# Patient Record
Sex: FEMALE | Race: WHITE | Employment: UNEMPLOYED | URBAN - NONMETROPOLITAN AREA
[De-identification: names, ages, dates, MRNs, and addresses within clinical notes are randomized per-mention and may not be internally consistent; named-entity substitution may affect disease eponyms.]

---

## 2017-02-06 ENCOUNTER — OFFICE VISIT (OUTPATIENT)
Dept: PRIMARY CARE CLINIC | Age: 52
End: 2017-02-06

## 2017-02-06 VITALS
DIASTOLIC BLOOD PRESSURE: 74 MMHG | SYSTOLIC BLOOD PRESSURE: 138 MMHG | HEART RATE: 74 BPM | WEIGHT: 176.6 LBS | BODY MASS INDEX: 27.72 KG/M2 | TEMPERATURE: 98.3 F | HEIGHT: 67 IN | OXYGEN SATURATION: 98 %

## 2017-02-06 DIAGNOSIS — M25.572 PAIN IN JOINT, FOOT, LEFT: ICD-10-CM

## 2017-02-06 DIAGNOSIS — M25.572 ACUTE LEFT ANKLE PAIN: ICD-10-CM

## 2017-02-06 DIAGNOSIS — S93.492A HIGH ANKLE SPRAIN, LEFT, INITIAL ENCOUNTER: Primary | ICD-10-CM

## 2017-02-06 PROCEDURE — L4350 ANKLE CONTROL ORTHO PRE OTS: HCPCS | Performed by: FAMILY MEDICINE

## 2017-02-06 PROCEDURE — 99213 OFFICE O/P EST LOW 20 MIN: CPT | Performed by: FAMILY MEDICINE

## 2017-02-06 ASSESSMENT — ENCOUNTER SYMPTOMS: COLOR CHANGE: 0

## 2017-07-14 ENCOUNTER — OFFICE VISIT (OUTPATIENT)
Dept: PRIMARY CARE CLINIC | Age: 52
End: 2017-07-14
Payer: COMMERCIAL

## 2017-07-14 VITALS
BODY MASS INDEX: 44.2 KG/M2 | WEIGHT: 275 LBS | HEIGHT: 66 IN | DIASTOLIC BLOOD PRESSURE: 102 MMHG | HEART RATE: 63 BPM | OXYGEN SATURATION: 98 % | SYSTOLIC BLOOD PRESSURE: 156 MMHG

## 2017-07-14 DIAGNOSIS — M77.8 TENDONITIS OF SHOULDER, LEFT: Primary | ICD-10-CM

## 2017-07-14 PROCEDURE — 99213 OFFICE O/P EST LOW 20 MIN: CPT | Performed by: FAMILY MEDICINE

## 2017-07-14 RX ORDER — PREDNISONE 20 MG/1
TABLET ORAL
Qty: 12 TABLET | Refills: 0 | Status: SHIPPED | OUTPATIENT
Start: 2017-07-14 | End: 2017-09-05 | Stop reason: ALTCHOICE

## 2017-07-16 ASSESSMENT — ENCOUNTER SYMPTOMS: COLOR CHANGE: 0

## 2017-08-14 ENCOUNTER — HOSPITAL ENCOUNTER (OUTPATIENT)
Dept: ULTRASOUND IMAGING | Age: 52
Discharge: HOME | End: 2017-08-14
Payer: MEDICARE

## 2017-08-14 DIAGNOSIS — N94.9 UNSPECIFIED SYMPTOM ASSOCIATED WITH FEMALE GENITAL ORGANS: ICD-10-CM

## 2017-08-14 DIAGNOSIS — R10.10 PAIN OF UPPER ABDOMEN: ICD-10-CM

## 2017-08-14 PROCEDURE — 76700 US EXAM ABDOM COMPLETE: CPT

## 2017-08-14 PROCEDURE — 76830 TRANSVAGINAL US NON-OB: CPT

## 2017-08-14 PROCEDURE — 76856 US EXAM PELVIC COMPLETE: CPT

## 2017-09-01 ENCOUNTER — HOSPITAL ENCOUNTER (OUTPATIENT)
Dept: MAMMOGRAPHY | Age: 52
Discharge: HOME | End: 2017-09-01
Payer: MEDICARE

## 2017-09-01 ENCOUNTER — HOSPITAL ENCOUNTER (OUTPATIENT)
Dept: ULTRASOUND IMAGING | Age: 52
Discharge: HOME | End: 2017-09-01
Payer: MEDICARE

## 2017-09-01 DIAGNOSIS — R92.8 ABNORMAL MAMMOGRAM: ICD-10-CM

## 2017-09-01 PROCEDURE — G0206 DX MAMMO INCL CAD UNI: HCPCS

## 2017-09-01 PROCEDURE — 76642 ULTRASOUND BREAST LIMITED: CPT

## 2017-09-05 ENCOUNTER — INITIAL CONSULT (OUTPATIENT)
Dept: SURGERY | Age: 52
End: 2017-09-05
Payer: MEDICARE

## 2017-09-05 VITALS
SYSTOLIC BLOOD PRESSURE: 128 MMHG | RESPIRATION RATE: 16 BRPM | BODY MASS INDEX: 45.71 KG/M2 | DIASTOLIC BLOOD PRESSURE: 86 MMHG | HEIGHT: 66 IN | TEMPERATURE: 98.3 F | WEIGHT: 284.4 LBS

## 2017-09-05 DIAGNOSIS — N63.10 MASS OF RIGHT BREAST: Primary | ICD-10-CM

## 2017-09-05 PROCEDURE — 99204 OFFICE O/P NEW MOD 45 MIN: CPT | Performed by: SURGERY

## 2017-09-05 RX ORDER — FLUOXETINE HYDROCHLORIDE 20 MG/1
CAPSULE ORAL
COMMUNITY
Start: 2017-07-21 | End: 2017-09-05 | Stop reason: ALTCHOICE

## 2017-09-05 RX ORDER — PRAVASTATIN SODIUM 40 MG
40 TABLET ORAL DAILY
COMMUNITY
Start: 2017-08-31 | End: 2019-02-12 | Stop reason: DRUGHIGH

## 2017-09-05 RX ORDER — LORATADINE 10 MG/1
10 TABLET ORAL DAILY
COMMUNITY
Start: 2017-08-31 | End: 2019-01-02

## 2017-09-05 RX ORDER — LAMOTRIGINE 100 MG/1
150 TABLET ORAL NIGHTLY
COMMUNITY
Start: 2017-08-30

## 2017-09-05 RX ORDER — QUETIAPINE FUMARATE 300 MG/1
150 TABLET, FILM COATED ORAL DAILY
COMMUNITY
Start: 2017-08-09 | End: 2019-02-22

## 2017-09-05 RX ORDER — LISINOPRIL 20 MG/1
5 TABLET ORAL DAILY
COMMUNITY
Start: 2017-08-31 | End: 2019-02-22 | Stop reason: DRUGHIGH

## 2017-09-05 RX ORDER — HYDROXYZINE HYDROCHLORIDE 10 MG/1
10 TABLET, FILM COATED ORAL DAILY
COMMUNITY
Start: 2017-07-21 | End: 2021-05-10

## 2017-09-05 RX ORDER — TOPIRAMATE 25 MG/1
25 TABLET ORAL DAILY
Status: ON HOLD | COMMUNITY
Start: 2017-07-21 | End: 2017-12-20 | Stop reason: HOSPADM

## 2017-09-05 RX ORDER — TRAZODONE HYDROCHLORIDE 100 MG/1
50 TABLET ORAL NIGHTLY
COMMUNITY
Start: 2017-08-30 | End: 2019-04-04 | Stop reason: ALTCHOICE

## 2017-09-05 RX ORDER — IBUPROFEN 800 MG/1
800 TABLET ORAL EVERY 6 HOURS PRN
COMMUNITY
Start: 2017-08-31 | End: 2017-11-17

## 2017-09-05 RX ORDER — CHOLECALCIFEROL (VITAMIN D3) 125 MCG
5000 CAPSULE ORAL DAILY
Status: ON HOLD | COMMUNITY
Start: 2017-08-31 | End: 2017-12-20 | Stop reason: HOSPADM

## 2017-09-05 RX ORDER — FLUTICASONE PROPIONATE 50 MCG
1 SPRAY, SUSPENSION (ML) NASAL DAILY
COMMUNITY
Start: 2017-08-31 | End: 2020-04-24 | Stop reason: SDUPTHER

## 2017-09-05 RX ORDER — SUMATRIPTAN 25 MG/1
25 TABLET, FILM COATED ORAL
Status: ON HOLD | COMMUNITY
Start: 2017-07-21 | End: 2017-12-20 | Stop reason: HOSPADM

## 2017-09-05 RX ORDER — BUTALBITAL, ACETAMINOPHEN AND CAFFEINE 50; 325; 40 MG/1; MG/1; MG/1
1-2 TABLET ORAL EVERY 4 HOURS PRN
COMMUNITY
Start: 2017-07-21 | End: 2019-07-09 | Stop reason: SDUPTHER

## 2017-09-08 ENCOUNTER — HOSPITAL ENCOUNTER (OUTPATIENT)
Age: 52
Setting detail: SPECIMEN
Discharge: HOME | End: 2017-09-08
Payer: MEDICARE

## 2017-09-08 ENCOUNTER — HOSPITAL ENCOUNTER (OUTPATIENT)
Dept: ULTRASOUND IMAGING | Age: 52
Discharge: HOME | End: 2017-09-08
Payer: MEDICARE

## 2017-09-08 ENCOUNTER — HOSPITAL ENCOUNTER (OUTPATIENT)
Dept: MAMMOGRAPHY | Age: 52
Discharge: HOME | End: 2017-09-08
Payer: MEDICARE

## 2017-09-08 DIAGNOSIS — N63.10 MASS OF RIGHT BREAST: ICD-10-CM

## 2017-09-08 DIAGNOSIS — R92.8 ABNORMAL MAMMOGRAM: ICD-10-CM

## 2017-09-08 DIAGNOSIS — R92.8 ABNORMAL MAMMOGRAM, UNSPECIFIED: ICD-10-CM

## 2017-09-08 PROCEDURE — 19084 BX BREAST ADD LESION US IMAG: CPT

## 2017-09-08 PROCEDURE — G0206 DX MAMMO INCL CAD UNI: HCPCS

## 2017-09-08 PROCEDURE — 88377 M/PHMTRC ALYS ISHQUANT/SEMIQ: CPT

## 2017-09-08 PROCEDURE — A4648 IMPLANTABLE TISSUE MARKER: HCPCS

## 2017-09-08 PROCEDURE — 88342 IMHCHEM/IMCYTCHM 1ST ANTB: CPT

## 2017-09-08 PROCEDURE — 88360 TUMOR IMMUNOHISTOCHEM/MANUAL: CPT

## 2017-09-08 PROCEDURE — 88305 TISSUE EXAM BY PATHOLOGIST: CPT

## 2017-09-11 ENCOUNTER — OFFICE VISIT (OUTPATIENT)
Dept: OBGYN | Age: 52
End: 2017-09-11
Payer: MEDICARE

## 2017-09-11 VITALS
HEART RATE: 80 BPM | BODY MASS INDEX: 45.32 KG/M2 | SYSTOLIC BLOOD PRESSURE: 128 MMHG | RESPIRATION RATE: 16 BRPM | HEIGHT: 66 IN | WEIGHT: 282 LBS | DIASTOLIC BLOOD PRESSURE: 80 MMHG

## 2017-09-11 DIAGNOSIS — N85.9 ENDOMETRIAL DISORDER: Primary | ICD-10-CM

## 2017-09-11 PROCEDURE — 99203 OFFICE O/P NEW LOW 30 MIN: CPT | Performed by: OBSTETRICS & GYNECOLOGY

## 2017-09-13 ENCOUNTER — OFFICE VISIT (OUTPATIENT)
Dept: SURGERY | Age: 52
End: 2017-09-13
Payer: MEDICARE

## 2017-09-13 VITALS
HEART RATE: 78 BPM | WEIGHT: 283 LBS | HEIGHT: 66 IN | TEMPERATURE: 98.1 F | SYSTOLIC BLOOD PRESSURE: 110 MMHG | RESPIRATION RATE: 16 BRPM | BODY MASS INDEX: 45.48 KG/M2 | DIASTOLIC BLOOD PRESSURE: 84 MMHG

## 2017-09-13 DIAGNOSIS — C50.911 INVASIVE DUCTAL CARCINOMA OF BREAST, RIGHT (HCC): Primary | ICD-10-CM

## 2017-09-13 DIAGNOSIS — N60.11 FIBROCYSTIC DISEASE OF BREAST, RIGHT: ICD-10-CM

## 2017-09-13 DIAGNOSIS — D05.11 DUCTAL CARCINOMA IN SITU (DCIS) OF RIGHT BREAST: ICD-10-CM

## 2017-09-13 PROCEDURE — 99214 OFFICE O/P EST MOD 30 MIN: CPT | Performed by: SURGERY

## 2017-09-18 ENCOUNTER — HOSPITAL ENCOUNTER (OUTPATIENT)
Dept: LAB | Age: 52
Setting detail: SPECIMEN
Discharge: HOME | End: 2017-09-18
Payer: MEDICARE

## 2017-09-18 ENCOUNTER — OFFICE VISIT (OUTPATIENT)
Dept: ONCOLOGY | Age: 52
End: 2017-09-18
Payer: MEDICAID

## 2017-09-18 VITALS
WEIGHT: 285.6 LBS | DIASTOLIC BLOOD PRESSURE: 90 MMHG | RESPIRATION RATE: 16 BRPM | SYSTOLIC BLOOD PRESSURE: 128 MMHG | HEIGHT: 66 IN | BODY MASS INDEX: 45.9 KG/M2 | TEMPERATURE: 99.8 F

## 2017-09-18 DIAGNOSIS — C50.211 MALIGNANT NEOPLASM OF UPPER-INNER QUADRANT OF RIGHT FEMALE BREAST (HCC): Primary | ICD-10-CM

## 2017-09-18 DIAGNOSIS — C50.211 MALIGNANT NEOPLASM OF UPPER-INNER QUADRANT OF RIGHT FEMALE BREAST (HCC): ICD-10-CM

## 2017-09-18 LAB
ABSOLUTE EOS #: 0.2 K/UL (ref 0–0.4)
ABSOLUTE LYMPH #: 3 K/UL (ref 1–4.8)
ABSOLUTE MONO #: 0.7 K/UL (ref 0.1–1.2)
ALBUMIN SERPL-MCNC: 4.1 G/DL (ref 3.5–5.2)
ALBUMIN/GLOBULIN RATIO: 1.2 (ref 1–2.5)
ALP BLD-CCNC: 78 U/L (ref 35–104)
ALT SERPL-CCNC: 10 U/L (ref 5–33)
ANION GAP SERPL CALCULATED.3IONS-SCNC: 12 MMOL/L (ref 9–17)
AST SERPL-CCNC: 12 U/L
BASOPHILS # BLD: 1 % (ref 0–1)
BASOPHILS ABSOLUTE: 0.1 K/UL (ref 0–0.2)
BILIRUB SERPL-MCNC: 0.16 MG/DL (ref 0.3–1.2)
BUN BLDV-MCNC: 20 MG/DL (ref 6–20)
BUN/CREAT BLD: 25 (ref 9–20)
CALCIUM SERPL-MCNC: 9.5 MG/DL (ref 8.6–10.4)
CHLORIDE BLD-SCNC: 106 MMOL/L (ref 98–107)
CO2: 23 MMOL/L (ref 20–31)
CREAT SERPL-MCNC: 0.79 MG/DL (ref 0.5–0.9)
DIFFERENTIAL TYPE: NORMAL
EOSINOPHILS RELATIVE PERCENT: 2 % (ref 1–7)
GFR AFRICAN AMERICAN: >60 ML/MIN
GFR NON-AFRICAN AMERICAN: >60 ML/MIN
GFR SERPL CREATININE-BSD FRML MDRD: ABNORMAL ML/MIN/{1.73_M2}
GFR SERPL CREATININE-BSD FRML MDRD: ABNORMAL ML/MIN/{1.73_M2}
GLUCOSE BLD-MCNC: 100 MG/DL (ref 70–99)
HCT VFR BLD CALC: 39.6 % (ref 36–46)
HEMOGLOBIN: 13 G/DL (ref 12–16)
LYMPHOCYTES # BLD: 33 % (ref 16–46)
MCH RBC QN AUTO: 30.1 PG (ref 26–34)
MCHC RBC AUTO-ENTMCNC: 32.7 G/DL (ref 31–37)
MCV RBC AUTO: 92 FL (ref 80–100)
MONOCYTES # BLD: 8 % (ref 4–11)
PDW BLD-RTO: 13.3 % (ref 11–14.5)
PLATELET # BLD: 228 K/UL (ref 140–450)
PLATELET ESTIMATE: NORMAL
PMV BLD AUTO: 7.9 FL (ref 6–12)
POTASSIUM SERPL-SCNC: 3.9 MMOL/L (ref 3.7–5.3)
RBC # BLD: 4.31 M/UL (ref 4–5.2)
RBC # BLD: NORMAL 10*6/UL
SEG NEUTROPHILS: 56 % (ref 43–77)
SEGMENTED NEUTROPHILS ABSOLUTE COUNT: 5.1 K/UL (ref 1.8–7.7)
SODIUM BLD-SCNC: 141 MMOL/L (ref 135–144)
SURGICAL PATHOLOGY REPORT: NORMAL
TOTAL PROTEIN: 7.6 G/DL (ref 6.4–8.3)
WBC # BLD: 9.1 K/UL (ref 3.5–11)
WBC # BLD: NORMAL 10*3/UL

## 2017-09-18 PROCEDURE — 85025 COMPLETE CBC W/AUTO DIFF WBC: CPT

## 2017-09-18 PROCEDURE — 99204 OFFICE O/P NEW MOD 45 MIN: CPT | Performed by: INTERNAL MEDICINE

## 2017-09-18 PROCEDURE — 36415 COLL VENOUS BLD VENIPUNCTURE: CPT

## 2017-09-18 PROCEDURE — 80053 COMPREHEN METABOLIC PANEL: CPT

## 2017-09-19 ENCOUNTER — TELEPHONE (OUTPATIENT)
Dept: INFUSION THERAPY | Facility: MEDICAL CENTER | Age: 52
End: 2017-09-19

## 2017-09-22 ENCOUNTER — INITIAL CONSULT (OUTPATIENT)
Dept: ONCOLOGY | Age: 52
End: 2017-09-22
Payer: MEDICARE

## 2017-09-22 DIAGNOSIS — Z80.3 FAMILY HISTORY OF BREAST CANCER: ICD-10-CM

## 2017-09-22 DIAGNOSIS — C50.211 MALIGNANT NEOPLASM OF UPPER-INNER QUADRANT OF RIGHT FEMALE BREAST (HCC): Primary | ICD-10-CM

## 2017-09-22 PROCEDURE — 99215 OFFICE O/P EST HI 40 MIN: CPT | Performed by: GENETIC COUNSELOR, MS

## 2017-09-29 ENCOUNTER — TELEPHONE (OUTPATIENT)
Dept: SURGERY | Age: 52
End: 2017-09-29

## 2017-10-04 ENCOUNTER — TELEPHONE (OUTPATIENT)
Dept: ONCOLOGY | Age: 52
End: 2017-10-04

## 2017-10-04 NOTE — TELEPHONE ENCOUNTER
3 [facility] Program   Hereditary Cancer Risk Assessment     Name: Jihan Myers  YOB: 1965  Date of Results Disclosure: 10/04/17     HISTORY   Ms. Francetta Scheuermann was seen for genetic counseling on 9/22/2017 at the request of Dr. Lilly Burnham due to her personal and family history of breast cancer. At that time, Ms. Francetta Scheuermann chose to pursue genetic testing via the [facility] gene panel. These results were discussed with Ms. Francetta Scheuermann on 10/04/17 via telephone. A summary of Ms. Francetta Scheuermann' results and recommendations are below. RESULTS  [facility]: NEGATIVE - NO CLINICALLY SIGNIFICANT MUTATION DETECTED   This panel included the analysis of 28 genes associated with hereditary cancer including: APC, BEATRIZ, BARD1, BMPR1A, BRCA1, BRCA2, BRIP1, CDH1, CDK4, CDKN2A, CHEK2, EPCAM, GREM1, MLH1, MSH2, MSH6, MUTYH, NBN, PALB2, POLD1, POLE, PMS2, PTEN, RAD51C, RAD51D, SMAD4, STK11, and TP53. Please refer to genetic test report for technical details. We discussed that Ms. Francetta Scheuermann' negative test result greatly reduces the likelihood that her personal history of cancer is due to a hereditary mutation. It is possible that her personal history of cancer may be due to a gene for which testing was not performed or which has yet to be discovered. Additional Findings:   Variant of Uncertain Significance (VUS): BEATRIZ c.38G>A ([id number])     A variant of uncertain significance (VUS) occurs when the laboratory does not have enough data to determine whether the genetic variant is benign (not associated with cancer) or pathogenic (associated with cancer). Because the significance of the BEATRIZ VUS is unknown, medical management must be based on Ms. Francetta Scheuermann' personal and family history of cancer. RECOMMENDATIONS  BREAST CANCER   The outcome of Ms. Francetta Scheuermann' genetic test results do not affect her current cancer treatment.  Ms. Francetta Scheuermann should continue cancer treatment and surveillance as directed by her

## 2017-10-09 ENCOUNTER — HOSPITAL ENCOUNTER (OUTPATIENT)
Dept: LAB | Age: 52
Setting detail: SPECIMEN
Discharge: HOME | End: 2017-10-09
Payer: MEDICARE

## 2017-10-09 ENCOUNTER — OFFICE VISIT (OUTPATIENT)
Dept: OBGYN | Age: 52
End: 2017-10-09
Payer: MEDICARE

## 2017-10-09 VITALS
HEART RATE: 84 BPM | HEIGHT: 66 IN | DIASTOLIC BLOOD PRESSURE: 82 MMHG | RESPIRATION RATE: 16 BRPM | BODY MASS INDEX: 46.12 KG/M2 | SYSTOLIC BLOOD PRESSURE: 130 MMHG | WEIGHT: 287 LBS

## 2017-10-09 DIAGNOSIS — R93.89 ENDOMETRIAL THICKENING ON ULTRA SOUND: ICD-10-CM

## 2017-10-09 DIAGNOSIS — Z01.818 PRE-OP TESTING: ICD-10-CM

## 2017-10-09 DIAGNOSIS — Z01.818 PRE-OP TESTING: Primary | ICD-10-CM

## 2017-10-09 LAB
ABSOLUTE EOS #: 0.2 K/UL (ref 0–0.4)
ABSOLUTE LYMPH #: 2.7 K/UL (ref 1–4.8)
ABSOLUTE MONO #: 0.8 K/UL (ref 0.1–1.2)
ALBUMIN SERPL-MCNC: 4.3 G/DL (ref 3.5–5.2)
ALBUMIN/GLOBULIN RATIO: 1.3 (ref 1–2.5)
ALP BLD-CCNC: 74 U/L (ref 35–104)
ALT SERPL-CCNC: 16 U/L (ref 5–33)
ANION GAP SERPL CALCULATED.3IONS-SCNC: 15 MMOL/L (ref 9–17)
AST SERPL-CCNC: 17 U/L
BASOPHILS # BLD: 1 % (ref 0–1)
BASOPHILS ABSOLUTE: 0 K/UL (ref 0–0.2)
BILIRUB SERPL-MCNC: 0.25 MG/DL (ref 0.3–1.2)
BUN BLDV-MCNC: 18 MG/DL (ref 6–20)
BUN/CREAT BLD: 20 (ref 9–20)
CALCIUM SERPL-MCNC: 10.2 MG/DL (ref 8.6–10.4)
CHLORIDE BLD-SCNC: 105 MMOL/L (ref 98–107)
CO2: 25 MMOL/L (ref 20–31)
CREAT SERPL-MCNC: 0.89 MG/DL (ref 0.5–0.9)
DIFFERENTIAL TYPE: NORMAL
EOSINOPHILS RELATIVE PERCENT: 2 % (ref 1–7)
GFR AFRICAN AMERICAN: >60 ML/MIN
GFR NON-AFRICAN AMERICAN: >60 ML/MIN
GFR SERPL CREATININE-BSD FRML MDRD: ABNORMAL ML/MIN/{1.73_M2}
GFR SERPL CREATININE-BSD FRML MDRD: ABNORMAL ML/MIN/{1.73_M2}
GLUCOSE BLD-MCNC: 141 MG/DL (ref 70–99)
HCT VFR BLD CALC: 41 % (ref 36–46)
HEMOGLOBIN: 13.4 G/DL (ref 12–16)
LYMPHOCYTES # BLD: 33 % (ref 16–46)
MCH RBC QN AUTO: 30.4 PG (ref 26–34)
MCHC RBC AUTO-ENTMCNC: 32.7 G/DL (ref 31–37)
MCV RBC AUTO: 93 FL (ref 80–100)
MONOCYTES # BLD: 9 % (ref 4–11)
PDW BLD-RTO: 13.7 % (ref 11–14.5)
PLATELET # BLD: 252 K/UL (ref 140–450)
PLATELET ESTIMATE: NORMAL
PMV BLD AUTO: 7.9 FL (ref 6–12)
POTASSIUM SERPL-SCNC: 3.9 MMOL/L (ref 3.7–5.3)
RBC # BLD: 4.41 M/UL (ref 4–5.2)
RBC # BLD: NORMAL 10*6/UL
SEG NEUTROPHILS: 55 % (ref 43–77)
SEGMENTED NEUTROPHILS ABSOLUTE COUNT: 4.4 K/UL (ref 1.8–7.7)
SODIUM BLD-SCNC: 145 MMOL/L (ref 135–144)
TOTAL PROTEIN: 7.7 G/DL (ref 6.4–8.3)
WBC # BLD: 8.1 K/UL (ref 3.5–11)
WBC # BLD: NORMAL 10*3/UL

## 2017-10-09 PROCEDURE — 85025 COMPLETE CBC W/AUTO DIFF WBC: CPT

## 2017-10-09 PROCEDURE — 80053 COMPREHEN METABOLIC PANEL: CPT

## 2017-10-09 PROCEDURE — 36415 COLL VENOUS BLD VENIPUNCTURE: CPT

## 2017-10-09 PROCEDURE — 99213 OFFICE O/P EST LOW 20 MIN: CPT | Performed by: OBSTETRICS & GYNECOLOGY

## 2017-10-09 ASSESSMENT — ENCOUNTER SYMPTOMS
RESPIRATORY NEGATIVE: 1
EYES NEGATIVE: 1
GASTROINTESTINAL NEGATIVE: 1

## 2017-10-09 NOTE — PROGRESS NOTES
Subjective:      Patient ID: Shiv Rangel is a 46 y.o. female. HPI here for pre-op H&P. This menopausal pt was found to have an abnormally thickened EMC at 11 mm on an US done in w/u of abdominal pain. Denies any postmenopausal bleeding. Never took HRT. Hx significant for HTN, Bipolar depression and recent diagnosis of right breast cancer. Prior . Review of Systems   Constitutional: Negative. HENT: Negative. Eyes: Negative. Respiratory: Negative. Cardiovascular: Negative. Gastrointestinal: Negative. Genitourinary: Negative. Musculoskeletal: Negative. Hematological: Negative. Psychiatric/Behavioral:        Depression       Objective:   Physical Exam   Constitutional: She is oriented to person, place, and time. She appears well-developed. OBESE     Eyes: Pupils are equal, round, and reactive to light. Neck: Neck supple. No thyromegaly present. Cardiovascular: Normal rate, regular rhythm and normal heart sounds. Pulmonary/Chest: Effort normal and breath sounds normal. She has no wheezes. She has no rales. Abdominal: Soft. She exhibits no distension and no mass. There is no tenderness. Low midline vertical scar   Genitourinary:   Genitourinary Comments: Normal perineum and vagina. Cervix small, without lesion and stenotic. Uterus nl size. No mass or tenderness. Musculoskeletal: Normal range of motion. Lymphadenopathy:     She has no cervical adenopathy. Neurological: She is alert and oriented to person, place, and time. Skin: Skin is warm and dry. Numerous insect bite sores   Psychiatric: She has a normal mood and affect.        Postmenopausal thickening of EMC      Plan:      Hysteroscopic D&C

## 2017-10-10 ENCOUNTER — OFFICE VISIT (OUTPATIENT)
Dept: SURGERY | Age: 52
End: 2017-10-10
Payer: MEDICARE

## 2017-10-10 ENCOUNTER — HOSPITAL ENCOUNTER (OUTPATIENT)
Dept: LAB | Age: 52
Setting detail: SPECIMEN
Discharge: HOME | End: 2017-10-10
Payer: MEDICARE

## 2017-10-10 ENCOUNTER — HOSPITAL ENCOUNTER (OUTPATIENT)
Dept: GENERAL RADIOLOGY | Age: 52
Discharge: HOME | End: 2017-10-10
Payer: MEDICARE

## 2017-10-10 ENCOUNTER — HOSPITAL ENCOUNTER (OUTPATIENT)
Dept: NON INVASIVE DIAGNOSTICS | Age: 52
Discharge: HOME | End: 2017-10-10
Payer: MEDICARE

## 2017-10-10 VITALS
WEIGHT: 288.6 LBS | HEIGHT: 66 IN | BODY MASS INDEX: 46.38 KG/M2 | SYSTOLIC BLOOD PRESSURE: 128 MMHG | TEMPERATURE: 99 F | HEART RATE: 78 BPM | RESPIRATION RATE: 16 BRPM | DIASTOLIC BLOOD PRESSURE: 80 MMHG

## 2017-10-10 DIAGNOSIS — C50.911 INVASIVE DUCTAL CARCINOMA OF BREAST, RIGHT (HCC): ICD-10-CM

## 2017-10-10 DIAGNOSIS — C50.911 INVASIVE DUCTAL CARCINOMA OF BREAST, RIGHT (HCC): Primary | ICD-10-CM

## 2017-10-10 LAB
EKG ATRIAL RATE: 77 BPM
EKG P AXIS: 47 DEGREES
EKG P-R INTERVAL: 148 MS
EKG Q-T INTERVAL: 396 MS
EKG QRS DURATION: 94 MS
EKG QTC CALCULATION (BAZETT): 448 MS
EKG R AXIS: 79 DEGREES
EKG T AXIS: 31 DEGREES
EKG VENTRICULAR RATE: 77 BPM
INR BLD: 1
PARTIAL THROMBOPLASTIN TIME: 26.7 SEC (ref 27–35)
PROTHROMBIN TIME: 10.7 SEC (ref 9.4–11.3)

## 2017-10-10 PROCEDURE — 99215 OFFICE O/P EST HI 40 MIN: CPT | Performed by: SURGERY

## 2017-10-10 PROCEDURE — 85730 THROMBOPLASTIN TIME PARTIAL: CPT

## 2017-10-10 PROCEDURE — 71020 XR CHEST STANDARD TWO VW: CPT

## 2017-10-10 PROCEDURE — 93005 ELECTROCARDIOGRAM TRACING: CPT

## 2017-10-10 PROCEDURE — 36415 COLL VENOUS BLD VENIPUNCTURE: CPT

## 2017-10-10 PROCEDURE — 85610 PROTHROMBIN TIME: CPT

## 2017-10-10 NOTE — PROGRESS NOTES
cooperative, obese and ambulatory without difficulty  Head/face:  NCAT  Neck:  no bruits, Adenopathy- absent and thick neck  Lungs:  Normal expansion. Clear to auscultation. No rales, rhonchi, or wheezing. Heart:  Heart sounds are normal.  Regular rate and rhythm without murmur, gallop or rub. 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  Strong history of cancer of the breast in the family. Obesity. Plan:  Scheduled for right total mastectomy and sentinel lymph node biopsy of the right axilla. She is to have a prophylactic total mastectomy on the left side after 3 months from the right mastectomy.     Electronically signed by Farshad Lyman MD on 10/10/2017 at 2:09 PM

## 2017-10-11 ENCOUNTER — ANESTHESIA EVENT (OUTPATIENT)
Dept: OPERATING ROOM | Age: 52
End: 2017-10-11
Payer: MEDICAID

## 2017-10-13 ENCOUNTER — TELEPHONE (OUTPATIENT)
Dept: SURGERY | Age: 52
End: 2017-10-13

## 2017-10-13 ENCOUNTER — ANESTHESIA (OUTPATIENT)
Dept: OPERATING ROOM | Age: 52
End: 2017-10-13
Payer: MEDICAID

## 2017-10-13 ENCOUNTER — HOSPITAL ENCOUNTER (OUTPATIENT)
Age: 52
Setting detail: OUTPATIENT SURGERY
Discharge: HOME | End: 2017-10-13
Attending: OBSTETRICS & GYNECOLOGY | Admitting: OBSTETRICS & GYNECOLOGY
Payer: MEDICAID

## 2017-10-13 VITALS
DIASTOLIC BLOOD PRESSURE: 72 MMHG | HEIGHT: 66 IN | TEMPERATURE: 98 F | RESPIRATION RATE: 16 BRPM | SYSTOLIC BLOOD PRESSURE: 141 MMHG | WEIGHT: 287 LBS | OXYGEN SATURATION: 95 % | HEART RATE: 74 BPM | BODY MASS INDEX: 46.12 KG/M2

## 2017-10-13 VITALS
RESPIRATION RATE: 16 BRPM | SYSTOLIC BLOOD PRESSURE: 166 MMHG | OXYGEN SATURATION: 99 % | DIASTOLIC BLOOD PRESSURE: 89 MMHG | TEMPERATURE: 95.9 F

## 2017-10-13 PROCEDURE — 6370000000 HC RX 637 (ALT 250 FOR IP): Performed by: NURSE ANESTHETIST, CERTIFIED REGISTERED

## 2017-10-13 PROCEDURE — 2500000003 HC RX 250 WO HCPCS: Performed by: NURSE ANESTHETIST, CERTIFIED REGISTERED

## 2017-10-13 PROCEDURE — 7100000000 HC PACU RECOVERY - FIRST 15 MIN: Performed by: OBSTETRICS & GYNECOLOGY

## 2017-10-13 PROCEDURE — 88305 TISSUE EXAM BY PATHOLOGIST: CPT

## 2017-10-13 PROCEDURE — A6402 STERILE GAUZE <= 16 SQ IN: HCPCS | Performed by: OBSTETRICS & GYNECOLOGY

## 2017-10-13 PROCEDURE — 7100000001 HC PACU RECOVERY - ADDTL 15 MIN: Performed by: OBSTETRICS & GYNECOLOGY

## 2017-10-13 PROCEDURE — 00952 ANES VAG PX HYSTSC&/HSG: CPT | Performed by: NURSE ANESTHETIST, CERTIFIED REGISTERED

## 2017-10-13 PROCEDURE — 3700000001 HC ADD 15 MINUTES (ANESTHESIA): Performed by: OBSTETRICS & GYNECOLOGY

## 2017-10-13 PROCEDURE — 2580000003 HC RX 258: Performed by: NURSE ANESTHETIST, CERTIFIED REGISTERED

## 2017-10-13 PROCEDURE — 2580000003 HC RX 258: Performed by: OBSTETRICS & GYNECOLOGY

## 2017-10-13 PROCEDURE — 3600000012 HC SURGERY LEVEL 2 ADDTL 15MIN: Performed by: OBSTETRICS & GYNECOLOGY

## 2017-10-13 PROCEDURE — 7100000011 HC PHASE II RECOVERY - ADDTL 15 MIN: Performed by: OBSTETRICS & GYNECOLOGY

## 2017-10-13 PROCEDURE — 3700000000 HC ANESTHESIA ATTENDED CARE: Performed by: OBSTETRICS & GYNECOLOGY

## 2017-10-13 PROCEDURE — 7100000010 HC PHASE II RECOVERY - FIRST 15 MIN: Performed by: OBSTETRICS & GYNECOLOGY

## 2017-10-13 PROCEDURE — 3600000002 HC SURGERY LEVEL 2 BASE: Performed by: OBSTETRICS & GYNECOLOGY

## 2017-10-13 PROCEDURE — 6360000002 HC RX W HCPCS: Performed by: NURSE ANESTHETIST, CERTIFIED REGISTERED

## 2017-10-13 RX ORDER — MIDAZOLAM HYDROCHLORIDE 1 MG/ML
INJECTION INTRAMUSCULAR; INTRAVENOUS PRN
Status: DISCONTINUED | OUTPATIENT
Start: 2017-10-13 | End: 2017-10-13 | Stop reason: SDUPTHER

## 2017-10-13 RX ORDER — SODIUM CHLORIDE, SODIUM LACTATE, POTASSIUM CHLORIDE, CALCIUM CHLORIDE 600; 310; 30; 20 MG/100ML; MG/100ML; MG/100ML; MG/100ML
INJECTION, SOLUTION INTRAVENOUS CONTINUOUS
Status: CANCELLED | OUTPATIENT
Start: 2017-10-13

## 2017-10-13 RX ORDER — KETOROLAC TROMETHAMINE 30 MG/ML
30 INJECTION, SOLUTION INTRAMUSCULAR; INTRAVENOUS EVERY 6 HOURS
Status: CANCELLED | OUTPATIENT
Start: 2017-10-13 | End: 2017-10-15

## 2017-10-13 RX ORDER — OXYCODONE HYDROCHLORIDE 5 MG/1
5 TABLET ORAL EVERY 4 HOURS PRN
Status: CANCELLED | OUTPATIENT
Start: 2017-10-13

## 2017-10-13 RX ORDER — PROPOFOL 10 MG/ML
INJECTION, EMULSION INTRAVENOUS PRN
Status: DISCONTINUED | OUTPATIENT
Start: 2017-10-13 | End: 2017-10-13 | Stop reason: SDUPTHER

## 2017-10-13 RX ORDER — SODIUM CHLORIDE, SODIUM LACTATE, POTASSIUM CHLORIDE, CALCIUM CHLORIDE 600; 310; 30; 20 MG/100ML; MG/100ML; MG/100ML; MG/100ML
INJECTION, SOLUTION INTRAVENOUS CONTINUOUS PRN
Status: DISCONTINUED | OUTPATIENT
Start: 2017-10-13 | End: 2017-10-13 | Stop reason: SDUPTHER

## 2017-10-13 RX ORDER — KETOROLAC TROMETHAMINE 30 MG/ML
INJECTION, SOLUTION INTRAMUSCULAR; INTRAVENOUS PRN
Status: DISCONTINUED | OUTPATIENT
Start: 2017-10-13 | End: 2017-10-13 | Stop reason: SDUPTHER

## 2017-10-13 RX ORDER — FENTANYL CITRATE 50 UG/ML
INJECTION, SOLUTION INTRAMUSCULAR; INTRAVENOUS PRN
Status: DISCONTINUED | OUTPATIENT
Start: 2017-10-13 | End: 2017-10-13 | Stop reason: SDUPTHER

## 2017-10-13 RX ORDER — SODIUM CHLORIDE 0.9 % (FLUSH) 0.9 %
10 SYRINGE (ML) INJECTION PRN
Status: CANCELLED | OUTPATIENT
Start: 2017-10-13

## 2017-10-13 RX ORDER — LIDOCAINE HYDROCHLORIDE 40 MG/ML
INJECTION, SOLUTION RETROBULBAR; TOPICAL PRN
Status: DISCONTINUED | OUTPATIENT
Start: 2017-10-13 | End: 2017-10-13 | Stop reason: SDUPTHER

## 2017-10-13 RX ORDER — SODIUM CHLORIDE, SODIUM LACTATE, POTASSIUM CHLORIDE, CALCIUM CHLORIDE 600; 310; 30; 20 MG/100ML; MG/100ML; MG/100ML; MG/100ML
INJECTION, SOLUTION INTRAVENOUS CONTINUOUS
Status: DISCONTINUED | OUTPATIENT
Start: 2017-10-13 | End: 2017-10-13 | Stop reason: HOSPADM

## 2017-10-13 RX ORDER — OXYCODONE HYDROCHLORIDE 5 MG/1
10 TABLET ORAL EVERY 4 HOURS PRN
Status: CANCELLED | OUTPATIENT
Start: 2017-10-13

## 2017-10-13 RX ORDER — SODIUM CHLORIDE 0.9 % (FLUSH) 0.9 %
10 SYRINGE (ML) INJECTION EVERY 12 HOURS SCHEDULED
Status: CANCELLED | OUTPATIENT
Start: 2017-10-13

## 2017-10-13 RX ORDER — ONDANSETRON 2 MG/ML
4 INJECTION INTRAMUSCULAR; INTRAVENOUS EVERY 6 HOURS PRN
Status: CANCELLED | OUTPATIENT
Start: 2017-10-13

## 2017-10-13 RX ORDER — ACETAMINOPHEN 325 MG/1
650 TABLET ORAL EVERY 4 HOURS PRN
Status: CANCELLED | OUTPATIENT
Start: 2017-10-13

## 2017-10-13 RX ORDER — ONDANSETRON 2 MG/ML
INJECTION INTRAMUSCULAR; INTRAVENOUS PRN
Status: DISCONTINUED | OUTPATIENT
Start: 2017-10-13 | End: 2017-10-13 | Stop reason: SDUPTHER

## 2017-10-13 RX ORDER — DEXAMETHASONE SODIUM PHOSPHATE 4 MG/ML
INJECTION, SOLUTION INTRA-ARTICULAR; INTRALESIONAL; INTRAMUSCULAR; INTRAVENOUS; SOFT TISSUE PRN
Status: DISCONTINUED | OUTPATIENT
Start: 2017-10-13 | End: 2017-10-13 | Stop reason: SDUPTHER

## 2017-10-13 RX ADMIN — LIDOCAINE HYDROCHLORIDE 80 MG: 40 INJECTION, SOLUTION RETROBULBAR; TOPICAL at 07:48

## 2017-10-13 RX ADMIN — SODIUM CHLORIDE, POTASSIUM CHLORIDE, SODIUM LACTATE AND CALCIUM CHLORIDE: 600; 310; 30; 20 INJECTION, SOLUTION INTRAVENOUS at 07:00

## 2017-10-13 RX ADMIN — FENTANYL CITRATE 50 MCG: 50 INJECTION, SOLUTION INTRAMUSCULAR; INTRAVENOUS at 07:55

## 2017-10-13 RX ADMIN — KETOROLAC TROMETHAMINE 30 MG: 30 INJECTION, SOLUTION INTRAMUSCULAR; INTRAVENOUS at 07:55

## 2017-10-13 RX ADMIN — MIDAZOLAM HYDROCHLORIDE 2 MG: 1 INJECTION, SOLUTION INTRAMUSCULAR; INTRAVENOUS at 07:39

## 2017-10-13 RX ADMIN — SODIUM CHLORIDE, POTASSIUM CHLORIDE, SODIUM LACTATE AND CALCIUM CHLORIDE: 600; 310; 30; 20 INJECTION, SOLUTION INTRAVENOUS at 07:39

## 2017-10-13 RX ADMIN — FENTANYL CITRATE 50 MCG: 50 INJECTION, SOLUTION INTRAMUSCULAR; INTRAVENOUS at 07:39

## 2017-10-13 RX ADMIN — ACETAMINOPHEN 650 MG: 325 SUPPOSITORY RECTAL at 07:50

## 2017-10-13 RX ADMIN — ONDANSETRON 4 MG: 2 INJECTION INTRAMUSCULAR; INTRAVENOUS at 07:50

## 2017-10-13 RX ADMIN — DEXAMETHASONE SODIUM PHOSPHATE 8 MG: 4 INJECTION, SOLUTION INTRAMUSCULAR; INTRAVENOUS at 07:50

## 2017-10-13 RX ADMIN — PROPOFOL 200 MG: 10 INJECTION, EMULSION INTRAVENOUS at 07:48

## 2017-10-13 ASSESSMENT — PULMONARY FUNCTION TESTS
PIF_VALUE: 11
PIF_VALUE: 10
PIF_VALUE: 9
PIF_VALUE: 0
PIF_VALUE: 9
PIF_VALUE: 2
PIF_VALUE: 13
PIF_VALUE: 0
PIF_VALUE: 28
PIF_VALUE: 7
PIF_VALUE: 13
PIF_VALUE: 20
PIF_VALUE: 10
PIF_VALUE: 2
PIF_VALUE: 12
PIF_VALUE: 10
PIF_VALUE: 9
PIF_VALUE: 10
PIF_VALUE: 12
PIF_VALUE: 10
PIF_VALUE: 12
PIF_VALUE: 15
PIF_VALUE: 16
PIF_VALUE: 14
PIF_VALUE: 9
PIF_VALUE: 12
PIF_VALUE: 10
PIF_VALUE: 4
PIF_VALUE: 11
PIF_VALUE: 10
PIF_VALUE: 28
PIF_VALUE: 10

## 2017-10-13 ASSESSMENT — PAIN SCALES - GENERAL
PAINLEVEL_OUTOF10: 0
PAINLEVEL_OUTOF10: 0
PAINLEVEL_OUTOF10: 1
PAINLEVEL_OUTOF10: 0
PAINLEVEL_OUTOF10: 1
PAINLEVEL_OUTOF10: 0
PAINLEVEL_OUTOF10: 0

## 2017-10-13 ASSESSMENT — PAIN - FUNCTIONAL ASSESSMENT: PAIN_FUNCTIONAL_ASSESSMENT: 0-10

## 2017-10-13 NOTE — OP NOTE
complications were none. Sponge and instrument counts correct. Blood loss was negligible. LORETTA Rodrigues    D: 10/13/2017 13:37:24       T: 10/13/2017 16:23:38     [email or web address]  Job#: [id number]     Doc#: [id number]

## 2017-10-13 NOTE — TELEPHONE ENCOUNTER
[facility]    Pre-Operative Evaluation/Consultation    Name:  Tarik Walden                                         Age:  46 y.o. MRN:  [id number]       :  1965   Date:  10/13/2017         Sex: female    There were no encounter diagnoses.     Surgeon:  Dr. Keith Gonzalez  Procedure (Planned):  Right mastectomy with sentinel node bx  Date Scheduled surgery: 10/19/17    Attending : No att. providers found    Primary Physician:Pooja Casillas  Cardiologist: None    Type of Anesthesia Requested: General    Patient Medical history:  No Known Allergies  Patient Active Problem List   Diagnosis    [facility]    Hyperlipidemia     Past Medical History:   Diagnosis Date    [facility]     [facility] cancer ([facility] 2017    right side     Headache     Hyperlipidemia     Migraine      Past Surgical History:   Procedure Laterality Date    SHOULDER SURGERY Left , 2015    x 2 surgeries total; Dr. Nava [place] History   Substance Use Topics    Smoking status: Never Smoker    Smokeless tobacco: Never Used    Alcohol use No     Medications:  Current Outpatient Prescriptions   Medication Sig Dispense Refill    VENTOLIN  (90 Base) MCG/ACT inhaler Inhale 2 puffs into the lungs every 4 hours as needed       butalbital-acetaminophen-caffeine (FIORICET, ESGIC) -40 MG per tablet Take 1-2 tablets by mouth every 4 hours as needed       RA VITAMIN D-3 5000 units CAPS capsule Take 5,000 Units by mouth daily       fluticasone (FLONASE) 50 MCG/ACT nasal spray 1 spray by Nasal route Indications: as needed       hydrOXYzine (ATARAX) 10 MG tablet Take 10 mg by mouth daily       ibuprofen (ADVIL;MOTRIN) 800 MG tablet Take 800 mg by mouth every 6 hours as needed       lamoTRIgine (LAMICTAL) 100 MG tablet 100 mg nightly       lisinopril (PRINIVIL;ZESTRIL) 20 MG tablet 20 mg daily       loratadine (CLARITIN) 10 MG tablet Take 10 mg by mouth daily       pravastatin (PRAVACHOL) 40 MG tablet Take 40 mg by mouth daily       QUEtiapine (SEROQUEL) 300 MG tablet Take 300 mg by mouth nightly       SUMAtriptan (IMITREX) 25 MG tablet 25 mg once as needed       topiramate (TOPAMAX) 25 MG tablet Take 25 mg by mouth daily       traZODone (DESYREL) 100 MG tablet Take 100 mg by mouth nightly        No current facility-administered medications for this visit. Facility-Administered Medications Ordered in Other Visits   Medication Dose Route Frequency Provider Last Rate Last Dose    lactated ringers infusion   Intravenous Continuous Jenaro Harper MD   Stopped at 10/13/17 0913     Scheduled Meds:  Continuous Infusions:  PRN Meds:. Prior to Admission medications    Medication Sig Start Date End Date Taking?  Authorizing Provider   VENTOLIN  (90 Base) MCG/ACT inhaler Inhale 2 puffs into the lungs every 4 hours as needed  9/1/17   Historical Provider, MD   butalbital-acetaminophen-caffeine (FIORICET, ESGIC) -40 MG per tablet Take 1-2 tablets by mouth every 4 hours as needed  7/21/17   Historical Provider, MD SHIN VITAMIN D-3 5000 units CAPS capsule Take 5,000 Units by mouth daily  8/31/17   Historical Provider, MD   fluticasone (FLONASE) 50 MCG/ACT nasal spray 1 spray by Nasal route Indications: as needed  8/31/17   Historical Provider, MD   hydrOXYzine (ATARAX) 10 MG tablet Take 10 mg by mouth daily  7/21/17   Historical Provider, MD   ibuprofen (ADVIL;MOTRIN) 800 MG tablet Take 800 mg by mouth every 6 hours as needed  8/31/17   Historical Provider, MD   lamoTRIgine (LAMICTAL) 100 MG tablet 100 mg nightly  8/30/17   Historical Provider, MD   lisinopril (PRINIVIL;ZESTRIL) 20 MG tablet 20 mg daily  8/31/17   Historical Provider, MD   loratadine (CLARITIN) 10 MG tablet Take 10 mg by mouth daily  8/31/17   Historical Provider, MD   pravastatin (PRAVACHOL) 40 MG tablet Take 40 mg by mouth daily  8/31/17   Historical Provider, MD   QUEtiapine (SEROQUEL) 300 MG tablet Take 300 PREGTESTUR, PREGSERUM, HCG, HCGQUANT   ABGs No results found for: PHART, PO2ART, EKT9GPN, ZCB3WZW, BEART, O6SQOAUX   Type & Screen (If Applicable)  No results found for: Castropetra Velarde    Additional ordered pre-operative testing:  [x]CBC    []ABG      [x] BMP   []URINALYSIS   []CMP    []HCG   [x]COAGS PT/INR  []T&C  []LFTs   []TYPE AND SCREEN    [x] EKG  [x] Chest X-Ray  [] Other Radiology    [] Sent to Hospitalist None  [] Sent to Anesthesia for your review: CRNA pool   [] Additional Orders: None     Comments:see epic   Requests: None    Signed: Tisa Crigler, MA 10/13/2017 11:13 AM

## 2017-10-13 NOTE — ANESTHESIA PRE PROCEDURE
Historical Provider, MD       Current medications:    Current Facility-Administered Medications   Medication Dose Route Frequency Provider Last Rate Last Dose    lactated ringers infusion   Intravenous Continuous Trino Quiros  mL/hr at 10/13/17 0700         Allergies:  No Known Allergies    Problem List:    Patient Active Problem List   Diagnosis Code    Bipolar 1 disorder ([facility] F31.9    Hyperlipidemia E78.5       Past Medical History:        Diagnosis Date    B[facility] 1 disorder ([facility]     Breast cancer ([facility] 2017    right side     Headache     Hyperlipidemia     Migraine        Past Surgical History:        Procedure Laterality Date    SHOULDER SURGERY Left 2014, 2015    x 2 surgeries total; Dr. Orly Barclay History:    Social History   Substance Use Topics    Smoking status: Never Smoker    Smokeless tobacco: Never Used    Alcohol use No                                Counseling given: Not Answered      Vital Signs (Current):   Vitals:    10/13/17 0648   BP: (!) 160/75   Pulse: 79   Resp: 20   Temp: 36.3 °C (97.4 °F)   TempSrc: Oral   SpO2: 97%   Weight: 287 lb (130.2 kg)   Height: 5' 6\" (1.676 m)                                              BP Readings from Last 3 Encounters:   10/13/17 (!) 160/75   10/10/17 128/80   10/09/17 130/82       NPO Status: Time of last liquid consumption: 1800                        Time of last solid consumption: 1800                        Date of last liquid consumption: 10/12/17                        Date of last solid food consumption: 10/12/17    BMI:   Wt Readings from Last 3 Encounters:   10/13/17 287 lb (130.2 kg)   10/10/17 288 lb 9.6 oz (130.9 kg)   10/09/17 287 lb (130.2 kg)     Body mass index is 46.32 kg/m².     CBC:   Lab Results   Component Value Date    WBC 8.1 10/09/2017    RBC 4.41 10/09/2017    HGB 13.4 10/09/2017    HCT 41.0 10/09/2017    MCV 93.0 10/09/2017    RDW 13.7 10/09/2017     10/09/2017       CMP:   Lab Results

## 2017-10-13 NOTE — ANESTHESIA POSTPROCEDURE EVALUATION
Department of Anesthesiology  Postprocedure Note    Patient: Mariah Bradshaw  MRN: [id number]  YOB: 1965  Date of evaluation: 10/13/2017  Time:  8:35 AM     Procedure Summary     Date:  10/13/17 Room / Location:  [address] [place] [address]    Anesthesia Start:  0740 Anesthesia Stop:  0831    Procedure:  D & C HYSTEROSCOPY with polypectomy (N/A ) Diagnosis:  (Thickened Endometrium)    Surgeon:  Barbara Buenrostro MD Responsible Provider:  Sy Waddell CRNA    Anesthesia Type:  general ASA Status:  3          Anesthesia Type: general    Chris Phase I: Chris Score: 9    Chris Phase II:      Last vitals: Reviewed and per EMR flowsheets.        Anesthesia Post Evaluation    Patient location during evaluation: PACU  Patient participation: complete - patient cannot participate  Level of consciousness: awake and alert  Pain score: 0  Airway patency: patent  Nausea & Vomiting: no nausea  Complications: no  Cardiovascular status: blood pressure returned to baseline and hemodynamically stable  Respiratory status: acceptable  Hydration status: euvolemic

## 2017-10-13 NOTE — PROCEDURES
Hysteroscope D&C  Pre-op - postmenopausal bleeding with thickened EMC at 11mm  Post-op -same with 2 endometrial polyps. Procedure. Hysteroscope showed  a sessile polyp in her right lower uterus and a long feathery polyp in her mid fundus. Endometrium appeared atrophic otherwise. D&C performed with removal of both polyps. Hysteroscope confirmed the surgical absence of both polyps. Uterus sounded to 9 cm. Complications - none. EBL was negligible.

## 2017-10-17 LAB — SURGICAL PATHOLOGY REPORT: NORMAL

## 2017-10-18 NOTE — H&P
Name:  Morgan Vega  Age:  46 y.o.   :  1965     Physician: Ronit Voss MD         Chief Complaint: This 46years old white female had a right breast biopsy on 2017 which showed invasive ductal carcinoma of the right breast.  The tumor was estrogen and progesterone positive and RIGHT BREAST CARCINOMA (1:00):       -  POSITIVE FOR HER2 (ERBB2) GENE AMPLIFICATION BY FISH.         -  HER2: CEP (D17Z1) RATIO, 6.1    The patient has a strong history of breast cancer in the family having an aunt who had cancer of the breast and one of her sisters had cancer of the breast and  from it. She had genetic testing for breast cancer which came back negative. She was referred for medical oncology consultation also. Her findings were discussed with her and her mother and after having the options of the surgical treatment the patient requested to have bilateral mastectomy therapeutic on the right side and prophylactic on the left side because of the strong history of the breast cancer in the family even though the genetic testing was negative for genetic connection. So I recommended having a centimeter lymph node biopsy on the right breast side where the invasive tumor was diagnosed. And recommended because of his obesity to have the right total mastectomy and sentinel lymph node biopsy done first and after 6-12 weeks due to prophylactic total mastectomy on the left side.   The patient agreed to that plan.     Past Medical History:    Past Medical History        Diagnosis Date    Bipolar 1 disorder ([address]      Breast cancer ([facility] 2017     right side     Headache      Hyperlipidemia      Migraine              Past Surgical History:    Past Surgical History             Procedure Laterality Date    SHOULDER SURGERY Left ,      x 2 surgeries total; Dr. Nohemi Pal                   Current Outpatient Prescriptions on File Prior to Visit   Medication Sig Dispense Refill    VENTOLIN HFA

## 2017-10-19 ENCOUNTER — APPOINTMENT (OUTPATIENT)
Dept: NUCLEAR MEDICINE | Age: 52
End: 2017-10-19
Attending: SURGERY
Payer: MEDICAID

## 2017-10-19 ENCOUNTER — ANESTHESIA (OUTPATIENT)
Dept: OPERATING ROOM | Age: 52
End: 2017-10-19
Payer: MEDICAID

## 2017-10-19 ENCOUNTER — HOSPITAL ENCOUNTER (OUTPATIENT)
Age: 52
Setting detail: OUTPATIENT SURGERY
Discharge: HOME | End: 2017-10-19
Attending: SURGERY | Admitting: SURGERY
Payer: MEDICAID

## 2017-10-19 ENCOUNTER — ANESTHESIA EVENT (OUTPATIENT)
Dept: OPERATING ROOM | Age: 52
End: 2017-10-19
Payer: MEDICAID

## 2017-10-19 VITALS
TEMPERATURE: 95.9 F | RESPIRATION RATE: 11 BRPM | OXYGEN SATURATION: 99 % | DIASTOLIC BLOOD PRESSURE: 89 MMHG | SYSTOLIC BLOOD PRESSURE: 156 MMHG

## 2017-10-19 VITALS
TEMPERATURE: 97 F | BODY MASS INDEX: 46.45 KG/M2 | WEIGHT: 289 LBS | OXYGEN SATURATION: 97 % | SYSTOLIC BLOOD PRESSURE: 123 MMHG | RESPIRATION RATE: 16 BRPM | HEIGHT: 66 IN | DIASTOLIC BLOOD PRESSURE: 73 MMHG | HEART RATE: 76 BPM

## 2017-10-19 PROCEDURE — A6258 TRANSPARENT FILM >16<=48 IN: HCPCS | Performed by: SURGERY

## 2017-10-19 PROCEDURE — 00400 ANES INTEGUMENTARY SYS NOS: CPT | Performed by: NURSE ANESTHETIST, CERTIFIED REGISTERED

## 2017-10-19 PROCEDURE — 88307 TISSUE EXAM BY PATHOLOGIST: CPT

## 2017-10-19 PROCEDURE — 2500000003 HC RX 250 WO HCPCS

## 2017-10-19 PROCEDURE — 6370000000 HC RX 637 (ALT 250 FOR IP): Performed by: SURGERY

## 2017-10-19 PROCEDURE — 3430000000 HC RX DIAGNOSTIC RADIOPHARMACEUTICAL: Performed by: SURGERY

## 2017-10-19 PROCEDURE — 6360000002 HC RX W HCPCS

## 2017-10-19 PROCEDURE — 7100000001 HC PACU RECOVERY - ADDTL 15 MIN: Performed by: SURGERY

## 2017-10-19 PROCEDURE — 6360000002 HC RX W HCPCS: Performed by: SURGERY

## 2017-10-19 PROCEDURE — 88360 TUMOR IMMUNOHISTOCHEM/MANUAL: CPT

## 2017-10-19 PROCEDURE — 3700000001 HC ADD 15 MINUTES (ANESTHESIA): Performed by: SURGERY

## 2017-10-19 PROCEDURE — 38525 BIOPSY/REMOVAL LYMPH NODES: CPT | Performed by: SURGERY

## 2017-10-19 PROCEDURE — 2580000003 HC RX 258: Performed by: SURGERY

## 2017-10-19 PROCEDURE — 7100000010 HC PHASE II RECOVERY - FIRST 15 MIN: Performed by: SURGERY

## 2017-10-19 PROCEDURE — 3600000013 HC SURGERY LEVEL 3 ADDTL 15MIN: Performed by: SURGERY

## 2017-10-19 PROCEDURE — C1729 CATH, DRAINAGE: HCPCS | Performed by: SURGERY

## 2017-10-19 PROCEDURE — 7100000000 HC PACU RECOVERY - FIRST 15 MIN: Performed by: SURGERY

## 2017-10-19 PROCEDURE — 78195 LYMPH SYSTEM IMAGING: CPT

## 2017-10-19 PROCEDURE — 7100000011 HC PHASE II RECOVERY - ADDTL 15 MIN: Performed by: SURGERY

## 2017-10-19 PROCEDURE — 19303 MAST SIMPLE COMPLETE: CPT | Performed by: SURGERY

## 2017-10-19 PROCEDURE — 3700000000 HC ANESTHESIA ATTENDED CARE: Performed by: SURGERY

## 2017-10-19 PROCEDURE — 3600000003 HC SURGERY LEVEL 3 BASE: Performed by: SURGERY

## 2017-10-19 PROCEDURE — A9541 TC99M SULFUR COLLOID: HCPCS | Performed by: SURGERY

## 2017-10-19 RX ORDER — LIDOCAINE HYDROCHLORIDE 40 MG/ML
INJECTION, SOLUTION RETROBULBAR; TOPICAL PRN
Status: DISCONTINUED | OUTPATIENT
Start: 2017-10-19 | End: 2017-10-19 | Stop reason: SDUPTHER

## 2017-10-19 RX ORDER — SODIUM CHLORIDE, SODIUM LACTATE, POTASSIUM CHLORIDE, CALCIUM CHLORIDE 600; 310; 30; 20 MG/100ML; MG/100ML; MG/100ML; MG/100ML
INJECTION, SOLUTION INTRAVENOUS CONTINUOUS
Status: DISCONTINUED | OUTPATIENT
Start: 2017-10-19 | End: 2017-10-19 | Stop reason: HOSPADM

## 2017-10-19 RX ORDER — PROMETHAZINE HYDROCHLORIDE 25 MG/ML
6.25 INJECTION, SOLUTION INTRAMUSCULAR; INTRAVENOUS EVERY 6 HOURS PRN
Status: DISCONTINUED | OUTPATIENT
Start: 2017-10-19 | End: 2017-10-19 | Stop reason: HOSPADM

## 2017-10-19 RX ORDER — HYDROCODONE BITARTRATE AND ACETAMINOPHEN 5; 325 MG/1; MG/1
2 TABLET ORAL EVERY 4 HOURS PRN
Status: DISCONTINUED | OUTPATIENT
Start: 2017-10-19 | End: 2017-10-19 | Stop reason: SDUPTHER

## 2017-10-19 RX ORDER — FENTANYL CITRATE 50 UG/ML
INJECTION, SOLUTION INTRAMUSCULAR; INTRAVENOUS PRN
Status: DISCONTINUED | OUTPATIENT
Start: 2017-10-19 | End: 2017-10-19 | Stop reason: SDUPTHER

## 2017-10-19 RX ORDER — MORPHINE SULFATE 10 MG/ML
INJECTION, SOLUTION INTRAMUSCULAR; INTRAVENOUS PRN
Status: DISCONTINUED | OUTPATIENT
Start: 2017-10-19 | End: 2017-10-19 | Stop reason: SDUPTHER

## 2017-10-19 RX ORDER — KETOROLAC TROMETHAMINE 30 MG/ML
INJECTION, SOLUTION INTRAMUSCULAR; INTRAVENOUS PRN
Status: DISCONTINUED | OUTPATIENT
Start: 2017-10-19 | End: 2017-10-19 | Stop reason: SDUPTHER

## 2017-10-19 RX ORDER — MIDAZOLAM HYDROCHLORIDE 1 MG/ML
INJECTION INTRAMUSCULAR; INTRAVENOUS PRN
Status: DISCONTINUED | OUTPATIENT
Start: 2017-10-19 | End: 2017-10-19 | Stop reason: SDUPTHER

## 2017-10-19 RX ORDER — HYDROCODONE BITARTRATE AND ACETAMINOPHEN 5; 325 MG/1; MG/1
2 TABLET ORAL EVERY 6 HOURS PRN
Status: DISCONTINUED | OUTPATIENT
Start: 2017-10-19 | End: 2017-10-19 | Stop reason: HOSPADM

## 2017-10-19 RX ORDER — PROPOFOL 10 MG/ML
INJECTION, EMULSION INTRAVENOUS PRN
Status: DISCONTINUED | OUTPATIENT
Start: 2017-10-19 | End: 2017-10-19 | Stop reason: SDUPTHER

## 2017-10-19 RX ORDER — SUCCINYLCHOLINE CHLORIDE 20 MG/ML
INJECTION INTRAMUSCULAR; INTRAVENOUS PRN
Status: DISCONTINUED | OUTPATIENT
Start: 2017-10-19 | End: 2017-10-19 | Stop reason: SDUPTHER

## 2017-10-19 RX ORDER — HYDROCODONE BITARTRATE AND ACETAMINOPHEN 5; 325 MG/1; MG/1
1 TABLET ORAL EVERY 6 HOURS PRN
Qty: 20 TABLET | Refills: 0 | Status: SHIPPED | OUTPATIENT
Start: 2017-10-19 | End: 2017-10-24

## 2017-10-19 RX ORDER — ONDANSETRON 2 MG/ML
INJECTION INTRAMUSCULAR; INTRAVENOUS PRN
Status: DISCONTINUED | OUTPATIENT
Start: 2017-10-19 | End: 2017-10-19 | Stop reason: SDUPTHER

## 2017-10-19 RX ADMIN — PHENYLEPHRINE HYDROCHLORIDE 200 MCG: 10 INJECTION INTRAMUSCULAR; INTRAVENOUS; SUBCUTANEOUS at 10:50

## 2017-10-19 RX ADMIN — FENTANYL CITRATE 100 MCG: 50 INJECTION, SOLUTION INTRAMUSCULAR; INTRAVENOUS at 11:42

## 2017-10-19 RX ADMIN — ONDANSETRON 8 MG: 2 INJECTION INTRAMUSCULAR; INTRAVENOUS at 11:10

## 2017-10-19 RX ADMIN — FENTANYL CITRATE 100 MCG: 50 INJECTION, SOLUTION INTRAMUSCULAR; INTRAVENOUS at 09:51

## 2017-10-19 RX ADMIN — PHENYLEPHRINE HYDROCHLORIDE 200 MCG: 10 INJECTION INTRAMUSCULAR; INTRAVENOUS; SUBCUTANEOUS at 10:12

## 2017-10-19 RX ADMIN — PHENYLEPHRINE HYDROCHLORIDE 200 MCG: 10 INJECTION INTRAMUSCULAR; INTRAVENOUS; SUBCUTANEOUS at 10:24

## 2017-10-19 RX ADMIN — PHENYLEPHRINE HYDROCHLORIDE 200 MCG: 10 INJECTION INTRAMUSCULAR; INTRAVENOUS; SUBCUTANEOUS at 10:54

## 2017-10-19 RX ADMIN — PROMETHAZINE HYDROCHLORIDE 6.25 MG: 25 INJECTION INTRAMUSCULAR; INTRAVENOUS at 12:59

## 2017-10-19 RX ADMIN — FENTANYL CITRATE 50 MCG: 50 INJECTION, SOLUTION INTRAMUSCULAR; INTRAVENOUS at 11:34

## 2017-10-19 RX ADMIN — PROPOFOL 200 MG: 10 INJECTION, EMULSION INTRAVENOUS at 09:51

## 2017-10-19 RX ADMIN — PHENYLEPHRINE HYDROCHLORIDE 200 MCG: 10 INJECTION INTRAMUSCULAR; INTRAVENOUS; SUBCUTANEOUS at 10:47

## 2017-10-19 RX ADMIN — SODIUM CHLORIDE, POTASSIUM CHLORIDE, SODIUM LACTATE AND CALCIUM CHLORIDE: 600; 310; 30; 20 INJECTION, SOLUTION INTRAVENOUS at 11:07

## 2017-10-19 RX ADMIN — HYDROCODONE BITARTRATE AND ACETAMINOPHEN 1 TABLET: 5; 325 TABLET ORAL at 12:49

## 2017-10-19 RX ADMIN — MIDAZOLAM HYDROCHLORIDE 2 MG: 1 INJECTION, SOLUTION INTRAMUSCULAR; INTRAVENOUS at 09:43

## 2017-10-19 RX ADMIN — HYDROCODONE BITARTRATE AND ACETAMINOPHEN 1 TABLET: 5; 325 TABLET ORAL at 12:51

## 2017-10-19 RX ADMIN — SODIUM CHLORIDE, POTASSIUM CHLORIDE, SODIUM LACTATE AND CALCIUM CHLORIDE: 600; 310; 30; 20 INJECTION, SOLUTION INTRAVENOUS at 09:24

## 2017-10-19 RX ADMIN — FENTANYL CITRATE 50 MCG: 50 INJECTION, SOLUTION INTRAMUSCULAR; INTRAVENOUS at 10:09

## 2017-10-19 RX ADMIN — LIDOCAINE HYDROCHLORIDE 80 MG: 40 INJECTION, SOLUTION RETROBULBAR; TOPICAL at 09:43

## 2017-10-19 RX ADMIN — MORPHINE SULFATE 10 MG: 10 INJECTION, SOLUTION INTRAMUSCULAR; INTRAVENOUS at 10:09

## 2017-10-19 RX ADMIN — PHENYLEPHRINE HYDROCHLORIDE 200 MCG: 10 INJECTION INTRAMUSCULAR; INTRAVENOUS; SUBCUTANEOUS at 10:20

## 2017-10-19 RX ADMIN — SODIUM CHLORIDE, POTASSIUM CHLORIDE, SODIUM LACTATE AND CALCIUM CHLORIDE: 600; 310; 30; 20 INJECTION, SOLUTION INTRAVENOUS at 12:43

## 2017-10-19 RX ADMIN — SUCCINYLCHOLINE CHLORIDE 200 MG: 20 INJECTION, SOLUTION INTRAMUSCULAR; INTRAVENOUS at 09:51

## 2017-10-19 RX ADMIN — Medication 1 MILLICURIE: at 08:25

## 2017-10-19 RX ADMIN — PHENYLEPHRINE HYDROCHLORIDE 200 MCG: 10 INJECTION INTRAMUSCULAR; INTRAVENOUS; SUBCUTANEOUS at 11:03

## 2017-10-19 RX ADMIN — KETOROLAC TROMETHAMINE 30 MG: 30 INJECTION, SOLUTION INTRAMUSCULAR; INTRAVENOUS at 11:10

## 2017-10-19 RX ADMIN — PHENYLEPHRINE HYDROCHLORIDE 100 MCG: 10 INJECTION INTRAMUSCULAR; INTRAVENOUS; SUBCUTANEOUS at 10:06

## 2017-10-19 ASSESSMENT — PULMONARY FUNCTION TESTS
PIF_VALUE: 26
PIF_VALUE: 15
PIF_VALUE: 25
PIF_VALUE: 19
PIF_VALUE: 24
PIF_VALUE: 16
PIF_VALUE: 26
PIF_VALUE: 23
PIF_VALUE: 24
PIF_VALUE: 25
PIF_VALUE: 10
PIF_VALUE: 25
PIF_VALUE: 24
PIF_VALUE: 26
PIF_VALUE: 24
PIF_VALUE: 21
PIF_VALUE: 25
PIF_VALUE: 23
PIF_VALUE: 25
PIF_VALUE: 15
PIF_VALUE: 24
PIF_VALUE: 19
PIF_VALUE: 26
PIF_VALUE: 23
PIF_VALUE: 25
PIF_VALUE: 26
PIF_VALUE: 21
PIF_VALUE: 15
PIF_VALUE: 23
PIF_VALUE: 25
PIF_VALUE: 26
PIF_VALUE: 25
PIF_VALUE: 24
PIF_VALUE: 24
PIF_VALUE: 22
PIF_VALUE: 24
PIF_VALUE: 26
PIF_VALUE: 21
PIF_VALUE: 24
PIF_VALUE: 21
PIF_VALUE: 22
PIF_VALUE: 25
PIF_VALUE: 25
PIF_VALUE: 5
PIF_VALUE: 5
PIF_VALUE: 25
PIF_VALUE: 14
PIF_VALUE: 27
PIF_VALUE: 24
PIF_VALUE: 15
PIF_VALUE: 2
PIF_VALUE: 23
PIF_VALUE: 24
PIF_VALUE: 12
PIF_VALUE: 24
PIF_VALUE: 24
PIF_VALUE: 16
PIF_VALUE: 15
PIF_VALUE: 24
PIF_VALUE: 25
PIF_VALUE: 15
PIF_VALUE: 25
PIF_VALUE: 16
PIF_VALUE: 26
PIF_VALUE: 15
PIF_VALUE: 32
PIF_VALUE: 24
PIF_VALUE: 19
PIF_VALUE: 25
PIF_VALUE: 15
PIF_VALUE: 25
PIF_VALUE: 24
PIF_VALUE: 15
PIF_VALUE: 16
PIF_VALUE: 4
PIF_VALUE: 25
PIF_VALUE: 26
PIF_VALUE: 25
PIF_VALUE: 26
PIF_VALUE: 22
PIF_VALUE: 25
PIF_VALUE: 24
PIF_VALUE: 23
PIF_VALUE: 27
PIF_VALUE: 24
PIF_VALUE: 13
PIF_VALUE: 31
PIF_VALUE: 26
PIF_VALUE: 25
PIF_VALUE: 24
PIF_VALUE: 16
PIF_VALUE: 25
PIF_VALUE: 26
PIF_VALUE: 24
PIF_VALUE: 24
PIF_VALUE: 22
PIF_VALUE: 25
PIF_VALUE: 26
PIF_VALUE: 25
PIF_VALUE: 22
PIF_VALUE: 21
PIF_VALUE: 22

## 2017-10-19 ASSESSMENT — PAIN DESCRIPTION - ORIENTATION
ORIENTATION: RIGHT

## 2017-10-19 ASSESSMENT — PAIN SCALES - GENERAL
PAINLEVEL_OUTOF10: 7
PAINLEVEL_OUTOF10: 7
PAINLEVEL_OUTOF10: 8
PAINLEVEL_OUTOF10: 7
PAINLEVEL_OUTOF10: 6

## 2017-10-19 ASSESSMENT — PAIN DESCRIPTION - DESCRIPTORS
DESCRIPTORS: CONSTANT

## 2017-10-19 ASSESSMENT — PAIN DESCRIPTION - LOCATION
LOCATION: CHEST

## 2017-10-19 ASSESSMENT — PAIN DESCRIPTION - FREQUENCY
FREQUENCY: CONTINUOUS

## 2017-10-19 ASSESSMENT — PAIN DESCRIPTION - PAIN TYPE
TYPE: SURGICAL PAIN

## 2017-10-19 ASSESSMENT — PAIN - FUNCTIONAL ASSESSMENT: PAIN_FUNCTIONAL_ASSESSMENT: 0-10

## 2017-10-19 NOTE — ANESTHESIA PRE PROCEDURE
tablet Take 1-2 tablets by mouth every 4 hours as needed  7/21/17   Historical Provider, MD       Current medications:    Current Facility-Administered Medications   Medication Dose Route Frequency Provider Last Rate Last Dose    lactated ringers infusion   Intravenous Continuous Sunil Mcclure  mL/hr at 10/19/17 7072         Allergies:  No Known Allergies    Problem List:    Patient Active Problem List   Diagnosis Code    Bipolar 1 disorder ([facility] F31.9    Hyperlipidemia E78.5       Past Medical History:        Diagnosis Date    Bipolar 1 disorder ([facility]     Breast cancer ([facility] 2017    right side     Headache(784.0)     Hyperlipidemia     Migraine        Past Surgical History:        Procedure Laterality Date    DILATION AND CURETTAGE OF UTERUS N/A 10/13/2017    D & C HYSTEROSCOPY with polypectomy performed by Jenaro Harper MD at [place] [address] Left 2014, 2015    x 2 surgeries total; Dr. Shruthi Gongora History:    Social History   Substance Use Topics    Smoking status: Never Smoker    Smokeless tobacco: Never Used    Alcohol use No                                Counseling given: Not Answered      Vital Signs (Current):   Vitals:    10/19/17 0736   BP: 129/81   Pulse: 74   Resp: 16   SpO2: 94%   Weight: 289 lb (131.1 kg)   Height: 5' 6\" (1.676 m)                                              BP Readings from Last 3 Encounters:   10/19/17 129/81   10/13/17 (!) 166/89   10/13/17 (!) 141/72       NPO Status: Time of last liquid consumption: 1700                        Time of last solid consumption: 1700                        Date of last liquid consumption: 10/18/17                        Date of last solid food consumption: 10/18/17    BMI:   Wt Readings from Last 3 Encounters:   10/19/17 289 lb (131.1 kg)   10/13/17 287 lb (130.2 kg)   10/10/17 288 lb 9.6 oz (130.9 kg)     Body mass index is 46.65 kg/m².     CBC:   Lab Results   Component Value Date    WBC 8.1 10/09/2017 RBC 4.41 10/09/2017    HGB 13.4 10/09/2017    HCT 41.0 10/09/2017    MCV 93.0 10/09/2017    RDW 13.7 10/09/2017     10/09/2017       CMP:   Lab Results   Component Value Date     10/09/2017    K 3.9 10/09/2017     10/09/2017    CO2 25 10/09/2017    BUN 18 10/09/2017    CREATININE 0.89 10/09/2017    GFRAA >60 10/09/2017    LABGLOM >60 10/09/2017    GLUCOSE 141 10/09/2017    PROT 7.7 10/09/2017    CALCIUM 10.2 10/09/2017    BILITOT 0.25 10/09/2017    ALKPHOS 74 10/09/2017    AST 17 10/09/2017    ALT 16 10/09/2017       POC Tests: No results for input(s): POCGLU, POCNA, POCK, POCCL, POCBUN, POCHEMO, POCHCT in the last 72 hours. Coags:   Lab Results   Component Value Date    PROTIME 10.7 10/10/2017    INR 1.0 10/10/2017    APTT 26.7 10/10/2017       HCG (If Applicable): No results found for: PREGTESTUR, PREGSERUM, HCG, HCGQUANT     ABGs: No results found for: PHART, PO2ART, WDM0ZAB, VDF9IWT, BEART, S7ILGSED     Type & Screen (If Applicable):  No results found for: LABABO, LABRH    Anesthesia Evaluation    Airway: Mallampati: III     Neck ROM: full   Dental: normal exam         Pulmonary:negative ROS and normal exam                               Cardiovascular:  Exercise tolerance: good (>4 METS),   (+) hypertension: moderate,       ECG reviewed  Rhythm: regular  Rate: normal                    Neuro/Psych:   (+) headaches: migraine headaches, psychiatric history: stable with treatment            GI/Hepatic/Renal: neg ROS            Endo/Other: negative ROS             Pt had PAT visit. Abdominal:   (+) obese,         Vascular:                                      Anesthesia Plan      general     ASA 3       Induction: intravenous. Anesthetic plan and risks discussed with patient. Use of blood products discussed with patient whom. Plan discussed with CRNA.                   Michelle Villavicencio RN   10/19/2017

## 2017-10-19 NOTE — OP NOTE
Of note    Bubba Rothman  YOB: 1965  [id number]    Pre-operative Diagnosis: Invasive ductal carcinoma right breast     Post-operative Diagnosis: Same    Procedure: Total mastectomy and sentinel lymph node dissection of right breast.  The radioactive material for the localization of sentinel lymph node was injected by the radiology department before surgery. Anesthesia: General    Anesthetist: HUBER Miller    Surgeons/Assistants: Alisa Hayes    Estimated Blood Loss: Approximately 615 mL    Complications: None    Specimens: Was Obtained: Right breast and sent to the lymph node marked with a stitch at the site of the maximum admission and the lymph node itself. Findings: The patient had a biopsy of the right breast which showed invasive ductal carcinoma. She has a strong history of cancer of the breast in the family however her genetic testing was unremarkable. The centimeter lymph node is approximately one half by half by 1 cm greatest examination and it may have an attached smaller lymph node approximately 5 mm diameter there was maximum radiation within the lymph node on one side which was marked with a stitch. Procedure: The patient was put in the supine position with the right arm abducted and extended and the right shoulder and chest wall elevated with a rolled towel. The patient was given general anesthesia with endotracheal tube. Using the [employer] SYSTEM the radiation was detected within the site of injection and the maximum detection of admission and the axilla was marked and appeared to be behind the lateral margin of the pectoralis major in the mid axilla. The chest wall was then prepped with Betadine soap and solution including the neck right shoulder upper arm and right side of the chest.  The right breast and right axilla were prepped and draped in the usual manner.   An elliptical incision was made in transverse fashion and slightly oblique upward and around the malleoli nipple and extended through subcutaneous tissue. The flap of skin and subcutaneous tissue were elevated above the breast and below the breasts all the way to the chest wall and the pectoralis major fascia and the interdigitating and head of the right rectus muscle. The breast with overlying skin and nipple and areola were then elevated off the chest wall anterior to the pectoral fascia and all the way to the edge of the dorsalis muscle and removed. Detection of radiation through the incision in the axilla was then carried out and the sentinel lymph nodes was slowly placed and found and it was then dissected off of the surrounding tissue utilizing electrocautery and removed. The radiation was detected with the lymph node outside of the axilla and showed the same radiation and detected before and no significant remaining radiation in the axilla. The wound was irrigated thoroughly with distilled water and VITO drain was placed percutaneously in the axilla and exited through the skin below the incision. The wound was then closed with interrupted #2-0 Vicryl suture applied to the subcutaneous tissue and skin staples applied to the skin.   The drain was suture ligated to the skin with #3 0 nylon suture ligature  Proper sterile dry dressing was applied to the incision and the patient was transferred to the recovery room in satisfactory condition    Electronically signed by Ronit Voss MD on 10/19/2017 at 11:34 AM

## 2017-10-19 NOTE — FLOWSHEET NOTE
Patient injected for sentinel node at 0825 by Dr. Donney Aschoff. No problems to report. Imaging to follow.

## 2017-10-19 NOTE — ANESTHESIA POSTPROCEDURE EVALUATION
Department of Anesthesiology  Postprocedure Note    Patient: Malcolm Scott  MRN: [id number]  YOB: 1965  Date of evaluation: 10/19/2017  Time:  11:44 AM     Procedure Summary     Date:  10/19/17 Room / Location:  [address] [place] [address] / [address] [facility]    Anesthesia Start:  Elena Patel Anesthesia Stop:  0081    Procedure:  Right Breast Mastectomy with  [place] Node Biopsy (Right ) Diagnosis:  (Right Breast Cancer)    Surgeon:  Oneida Jenkins MD Responsible Provider:  Pedrito Haynes CRNA    Anesthesia Type:  general ASA Status:  3          Anesthesia Type: general    Chris Phase I: Chris Score: 10    Chris Phase II:      Last vitals: Reviewed and per EMR flowsheets.        Anesthesia Post Evaluation    Patient location during evaluation: PACU  Patient participation: complete - patient participated  Level of consciousness: awake and responsive to verbal stimuli  Airway patency: patent  Nausea & Vomiting: no nausea and no vomiting  Complications: no  Cardiovascular status: blood pressure returned to baseline  Respiratory status: acceptable  Hydration status: euvolemic

## 2017-10-20 ENCOUNTER — OFFICE VISIT (OUTPATIENT)
Dept: SURGERY | Age: 52
End: 2017-10-20

## 2017-10-20 VITALS
HEART RATE: 80 BPM | DIASTOLIC BLOOD PRESSURE: 80 MMHG | HEIGHT: 66 IN | BODY MASS INDEX: 47.09 KG/M2 | SYSTOLIC BLOOD PRESSURE: 124 MMHG | WEIGHT: 293 LBS | TEMPERATURE: 98.5 F

## 2017-10-20 DIAGNOSIS — Z90.11 STATUS POST RIGHT MASTECTOMY: Primary | ICD-10-CM

## 2017-10-20 PROCEDURE — 99024 POSTOP FOLLOW-UP VISIT: CPT | Performed by: SURGERY

## 2017-10-20 NOTE — PROGRESS NOTES
This patient had total mastectomy and sentinel lymph node biopsy of the right breast yesterday. She called the office and stated that she had fever this morning and having pain in the area of the incision. She apparently felt wall and did not take her temperature. And has soreness in the chest wall when she moves her arm. The drain is draining small amount blood. The skin appears normal and the area of the incision and the dressing is dry and intact. She moved her arm freely with some soreness. There is no indication of infection or active bleeding. She is instructed to move her arm freely and take Norco for pain and if needed she may take an Advil between the Norco doses. She is instructed to return 1 week after her surgery for removal of the skin staples and the drain. She is advised to keep the dressing on as long as this dry and intact and return if she needs to have a dressing change.

## 2017-10-24 LAB — SURGICAL PATHOLOGY REPORT: NORMAL

## 2017-10-26 ENCOUNTER — TELEPHONE (OUTPATIENT)
Dept: INFUSION THERAPY | Facility: MEDICAL CENTER | Age: 52
End: 2017-10-26

## 2017-10-31 ENCOUNTER — OFFICE VISIT (OUTPATIENT)
Dept: SURGERY | Age: 52
End: 2017-10-31

## 2017-10-31 VITALS
DIASTOLIC BLOOD PRESSURE: 90 MMHG | HEART RATE: 80 BPM | TEMPERATURE: 99.1 F | RESPIRATION RATE: 16 BRPM | HEIGHT: 66 IN | SYSTOLIC BLOOD PRESSURE: 132 MMHG | BODY MASS INDEX: 47.09 KG/M2 | WEIGHT: 293 LBS

## 2017-10-31 DIAGNOSIS — Z90.11 STATUS POST RIGHT MASTECTOMY: Primary | ICD-10-CM

## 2017-10-31 DIAGNOSIS — C50.911 INVASIVE DUCTAL CARCINOMA OF BREAST, RIGHT (HCC): ICD-10-CM

## 2017-10-31 PROCEDURE — 99024 POSTOP FOLLOW-UP VISIT: CPT | Performed by: SURGERY

## 2017-10-31 RX ORDER — HYDROCODONE BITARTRATE AND ACETAMINOPHEN 5; 325 MG/1; MG/1
1 TABLET ORAL EVERY 6 HOURS PRN
COMMUNITY
End: 2018-08-15 | Stop reason: ALTCHOICE

## 2017-11-01 ENCOUNTER — OFFICE VISIT (OUTPATIENT)
Dept: ONCOLOGY | Age: 52
End: 2017-11-01
Payer: MEDICARE

## 2017-11-01 VITALS
TEMPERATURE: 99.2 F | BODY MASS INDEX: 47.09 KG/M2 | HEIGHT: 66 IN | DIASTOLIC BLOOD PRESSURE: 82 MMHG | SYSTOLIC BLOOD PRESSURE: 124 MMHG | HEART RATE: 93 BPM | OXYGEN SATURATION: 98 % | WEIGHT: 293 LBS

## 2017-11-01 DIAGNOSIS — C50.811 MALIGNANT NEOPLASM OF OVERLAPPING SITES OF RIGHT BREAST IN FEMALE, ESTROGEN RECEPTOR NEGATIVE (HCC): Primary | ICD-10-CM

## 2017-11-01 DIAGNOSIS — Z17.1 MALIGNANT NEOPLASM OF OVERLAPPING SITES OF RIGHT BREAST IN FEMALE, ESTROGEN RECEPTOR NEGATIVE (HCC): Primary | ICD-10-CM

## 2017-11-01 DIAGNOSIS — R94.31 ABNORMAL ELECTROCARDIOGRAM: ICD-10-CM

## 2017-11-01 PROBLEM — C50.819 MALIGNANT NEOPLASM OF OVERLAPPING SITES OF BREAST IN FEMALE, ESTROGEN RECEPTOR NEGATIVE (HCC): Status: ACTIVE | Noted: 2017-11-01

## 2017-11-01 PROCEDURE — G8484 FLU IMMUNIZE NO ADMIN: HCPCS | Performed by: INTERNAL MEDICINE

## 2017-11-01 PROCEDURE — 99215 OFFICE O/P EST HI 40 MIN: CPT | Performed by: INTERNAL MEDICINE

## 2017-11-01 PROCEDURE — 1036F TOBACCO NON-USER: CPT | Performed by: INTERNAL MEDICINE

## 2017-11-01 PROCEDURE — G8427 DOCREV CUR MEDS BY ELIG CLIN: HCPCS | Performed by: INTERNAL MEDICINE

## 2017-11-01 PROCEDURE — 3017F COLORECTAL CA SCREEN DOC REV: CPT | Performed by: INTERNAL MEDICINE

## 2017-11-01 PROCEDURE — 3014F SCREEN MAMMO DOC REV: CPT | Performed by: INTERNAL MEDICINE

## 2017-11-01 PROCEDURE — G8417 CALC BMI ABV UP PARAM F/U: HCPCS | Performed by: INTERNAL MEDICINE

## 2017-11-01 RX ORDER — DEXAMETHASONE 4 MG/1
8 TABLET ORAL 2 TIMES DAILY WITH MEALS
COMMUNITY
End: 2017-11-17 | Stop reason: ALTCHOICE

## 2017-11-01 RX ORDER — DEXAMETHASONE 4 MG/1
8 TABLET ORAL 2 TIMES DAILY WITH MEALS
Status: CANCELLED | OUTPATIENT
Start: 2017-11-01

## 2017-11-01 NOTE — PROGRESS NOTES
Seamus Elise                                                                                                                  11/1/2017  MRN:   [id number]  YOB: 1965  PCP:                           Gilles Sibley NP  Referring Physician: No ref. provider found  Treating Physician Name: Veleta Eisenmenger, MD      Reason for visit:  Patient presents to the clinic for a follow-up visit to discuss further treatment plan    Current problems/ Active and recent treatments:  Stage IIa infiltrating ductal carcinoma of right breast, ER negative, OH positive and HER-2 amplified. Strong family history of breast cancer in sister and maternal aunt    Summary of Case/History:    Seamus Elise a 46 y. o.female who presents to the hematology oncology office to establish care and for further recommendations for management of her recently diagnosed right breast cancer    Patient had a mammogram after many years in September 2017 which came back abnormal.  Subsequently patient had an ultrasound which confirmed a 1.2 cm lesion in the right breast at 1:00 position. There was another 4 mm lesion at 12:00 position    Patient underwent ultrasound-guided biopsy on 9/8/17. the lesion at 1:00 position shows invasive ductal carcinoma. ER negative and OH positive associated with high-grade DCIS. Lesion at 12:00 position showed fibrocystic disease and focal adenosis and hyperplasia. Patient underwent right sided mastectomy with sentinel lymph node biopsy on 10/19/17. Pathology report showed invasive ductal carcinoma, grade 3 out of 3, 2.8 cm in size with negative margins. pT2,pN0,M0  Tumor specimen was negative for ER receptor and weekly positive for OH receptor (5-10 percent). High-grade DCIS was also noted. One sentinel lymph node was sampled which was negative for metastasis    Patient denies any unintentional weight loss. Denies any focal area of bone pain. Patient denies history of smoking.   She does have strong family history. ECOG function status 0      Interim History:  Patient presents to the clinic for follow-up visit and for further treatment. Since last office visit patient underwent right-sided mastectomy with sentinel lymph node biopsy. Patient is recovering from the surgery without any unexpected or severe side effects. Denies nausea vomiting fever or chills. Does complain of pain over the chest however it is controlled. Past Medical History:   Past Medical History:   Diagnosis Date    Bipolar 1 disorder ([address]     Breast cancer ([address] 2017    right side     Headache(784.0)     Hyperlipidemia     Migraine        Past Surgical History:     Past Surgical History:   Procedure Laterality Date    DILATION AND CURETTAGE OF UTERUS N/A 10/13/2017    D & [facility] [address] [facility]    MASTECTOMY Right 10/19/2017    Right Breast Mastectomy with  [place] Node Biopsy performed by Marianne Gerard MD at [place] [address] Left 2014, 2015    x 2 surgeries total; Dr. Omero Marquis       Patient Family Social History:     Social History     Social History Narrative    No narrative on file       Current Medications:     Current Outpatient Prescriptions   Medication Sig Dispense Refill    dexamethasone (DECADRON) 4 MG tablet Take 8 mg by mouth 2 times daily (with meals) Take 2 4mg tablets po BID      HYDROcodone-acetaminophen (NORCO) 5-325 MG per tablet Take 1 tablet by mouth every 6 hours as needed for Pain .       VENTOLIN  (90 Base) MCG/ACT inhaler Inhale 2 puffs into the lungs every 4 hours as needed       butalbital-acetaminophen-caffeine (FIORICET, ESGIC) -40 MG per tablet Take 1-2 tablets by mouth every 4 hours as needed       RA VITAMIN D-3 5000 units CAPS capsule Take 5,000 Units by mouth daily       fluticasone (FLONASE) 50 MCG/ACT nasal spray 1 spray by Nasal route Indications: CRIBRIFORM TYPE, MARGINS  NEGATIVE.  -PREVIOUSLY POSITIVE FOR HER-2 AMPLIFICATION, RATIO 6.1 (SEE VS  [id number]). 2.  SENTINEL LYMPH NODE BIOPSY:  -NEGATIVE FOR MALIGNANCY (0/1). JEET Gomez  **Electronically Signed Out**         ajlorena/10/23/2017      Clinical Information  Pre-op Diagnosis:  RIGHT BREAST CA NCER  Operative Findings:  RIGHT BREAST; SENTINEL NODE  SUTURE MARKER AT  MAXIMUM IMMISSION OF RADIATION  Operation Performed:  RIGHT BREAST MASTECTOMY WITH SENTINEL NODE  BIOPSY     Source of Specimen  1: RIGHT BREAST (A)  2: SENTINEL NODE (B)    Gross Description  1. \"GENIA RADHA, RIGHT BREAST\" 19.0 x 16.0 x 7.0 cm product of a  right simple mastectomy surmounted by a 19.0 x 9.0 cm ellipse of tan  skin with an eccentrically located non-retracted nipple. No obvious  scar is seen on the skin surface. The deep margin is inked black, one  anterior margin blue and the opposite anterior margin red. Sectioning  reveals a 2.8 x 2.2 x 1.0 cm tan-white lesion confined to one  quadrant. The lesion is 4.5 cm from the deep margin and 1.0 cm from  the red inked margin. A coil marker is present within the lesion  consistent with a previous biopsy. At the periphery of this lesion, a  second ribbon-shaped marker is identified and there is an adjacent 1.5  x 1.3 x 0.8 cm circumscribed nodule  that is 5.0 cm from the nearest  deep margin. The remaining cut surfaces are mostly fatty with no  other lesion. Cassette summary:  \"A\" nipple and deep margin  perpendicular, \"B-D\" lesion with section that contain coiled marker in  \"B,\" \"E\" additional tissue near lesion, \"F\" area of lesion that  contained ribbon-shaped marker and nodule, \"G\" remainder of  circumscribed nodule, \"H-L\" uninvolved breast.  2. \"GENIA GARCIA SENTINEL NODE  SUTURE MARKED AT MAXIMUM IMMISSION  OF RADIATION\" 3.0 x 1.5 x 1.0 cm node with attached fat.   At the  periphery of the node, is a suture designating maximum immission manual morphometry  (negative=less than 1% tumor cell nuclear staining; otherwise  positive); external control is reactive  -Positive (percent / range of positive tumor cell nuclei):  No,  0%   ---Average intensity of positive staining:  N/A  -Negative:   ---Internal control present and stain as expected:  Yes  ---Internal control cells absent (comment):  N/A  ---Internal control cells present but do not stain (comment):  N/A  Progesterone Re ceptor If performed at [place] [address] laboratory: Immunostain  clone 1E2 with indirect biotin streptavidin detection system, vendor  Cathy Latham (FDA cleared); evaluated by manual morphometry  (negative=less than 1% tumor cell nuclear staining; otherwise  positive); external control is reactive  -Positive (percent / range of positive tumor cell nuclei):  Yes, 5-10  %   --- Average intensity of positive staining:  Weak  -Negative:   ---Internal control present and stain as expected:  N/A  ---Internal control cells absent (comment):  N/A  ---Internal control cells present but do not stain (comment):  N/A  HER2 (immunohistochemistry):  N/A  HER2 (insitu hybridization):  Previously reported positive,  HER-2  amplified, ratio 6.1         Time specimen removed:  10-19- 17 at  10:51 AM        Time specimen placed in formalin  10-20-17  at  8: 56 AM   Cold ischemia time meets the requirements specified in latest version  of ASCO / CAP guidelines (less than 1 hour):  0   Fixation time meets  the requirements specified in latest version of  ASCO / CAP guidelines (6-72 hours):  35 hours and  9 minutes  Fixative: 10% buffered formalin  [Based on CAP version January 2016]               Xr Chest Standard (2 Vw)    Result Date: 10/10/2017  EXAMINATION: TWO VIEWS OF THE CHEST 10/10/2017 2:37 pm COMPARISON: 10/18/2011 HISTORY: ORDERING SYSTEM PROVIDED HISTORY: Invasive ductal carcinoma of breast, right [facility]) TECHNOLOGIST PROVIDED HISTORY: Reason for exam:->Pre op Ordering Physician Provided Reason for Exam: Pre op for right mastectomy. No chest complaints. Acuity: Unknown Type of Exam: Initial Additional signs and symptoms: n/a Relevant Medical/Surgical History: breast cancer FINDINGS: The lungs are without acute focal process. There is no effusion or pneumothorax. The cardiomediastinal silhouette is stable. The osseous structures are stable. No acute process. Nm Lymphoscintigram    Result Date: 10/19/2017  EXAMINATION: LYMPHOSCINTIGRAPHY 10/19/2017 9:21 am TECHNIQUE: Sulfur colloid labeled with 0.920 mCi of Tc99 was administered in 4 subdermal wheals around the patient's right areolar region. Delayed images were obtained. HISTORY: ORDERING SYSTEM PROVIDED HISTORY: Surgery TECHNOLOGIST PROVIDED HISTORY: Reason for exam:->Surgery Ordering Physician Provided Reason for Exam: 0.920 mCi 99mTc filtered Sulfur Colloid injected subcutaneous around RT nipple. Acuity: Unknown Type of Exam: Unknown FINDINGS: There appears to be migration into the right axilla suggestive of the sentinel node. Migration of the radiotracer into the right axilla suggestive of the patient's sentinel node. Impression:  Invasive ductal carcinoma of right breast, stage IIa. pT,pN0,M0. ER negative, GA weakly positive. HER-2 amplified. Status post right mastectomy with sentinel lymph node biopsy  Family history of breast cancer      Plan:  I had a detailed discussion with the patient and personally went over the results of the pathology report and other level. I also personally discussed patient's case with her surgeon over the phone. Patient has stage IIA HER-2 amplified invasive ductal carcinoma of right breast.  I described the role of adjuvant chemotherapy and added benefit which has shown to improve rate of recurrence as well as overall survival.  Patient does have high risk features including greater than 2 cm primary tumor size.   According to data from phase 3 Aphinity trial I would recommend treatment

## 2017-11-06 ENCOUNTER — HOSPITAL ENCOUNTER (OUTPATIENT)
Dept: INFUSION THERAPY | Age: 52
Discharge: HOME | End: 2017-11-06
Payer: MEDICARE

## 2017-11-06 DIAGNOSIS — C50.811 MALIGNANT NEOPLASM OF OVERLAPPING SITES OF RIGHT BREAST IN FEMALE, ESTROGEN RECEPTOR NEGATIVE (HCC): ICD-10-CM

## 2017-11-06 DIAGNOSIS — Z17.1 MALIGNANT NEOPLASM OF OVERLAPPING SITES OF RIGHT BREAST IN FEMALE, ESTROGEN RECEPTOR NEGATIVE (HCC): ICD-10-CM

## 2017-11-06 PROCEDURE — 99214 OFFICE O/P EST MOD 30 MIN: CPT

## 2017-11-06 RX ORDER — PROCHLORPERAZINE MALEATE 10 MG
10 TABLET ORAL EVERY 6 HOURS PRN
COMMUNITY
End: 2019-01-02

## 2017-11-06 NOTE — PROGRESS NOTES
Patient here for chemotherapy teaching along with her mother and sister. Spent 60 minutes with them. Teaching sheets from [facility] and verbal information given on chemotherapy agents, action, administration and side effects. Provided chemotherapy foldder, chemotherapy booklet, and nutrition information. Drugs discussed: Perjeta; Herceptin; Taxotere; Carboplatin    Discussed implanted port and echocardiogram    Reviewed anti emetic medications and steriod. Prescriptions for Compazine 10 mg 1 tablet every 6 hours as needed for nausea and Decadron 4 mg 2 tablets twice a day for three days starting one day prior to chemotherapy treatment called to [facility] [place]. Questions answered, support given, encouraged patient to review all information given and to write down any questions and to call office or bring with her to her treatment on Monday.

## 2017-11-07 ENCOUNTER — HOSPITAL ENCOUNTER (OUTPATIENT)
Dept: NON INVASIVE DIAGNOSTICS | Age: 52
Discharge: HOME | End: 2017-11-07
Payer: MEDICARE

## 2017-11-07 ENCOUNTER — TELEPHONE (OUTPATIENT)
Dept: SURGERY | Age: 52
End: 2017-11-07

## 2017-11-07 ENCOUNTER — INITIAL CONSULT (OUTPATIENT)
Dept: SURGERY | Age: 52
End: 2017-11-07
Payer: MEDICARE

## 2017-11-07 ENCOUNTER — OFFICE VISIT (OUTPATIENT)
Dept: OBGYN | Age: 52
End: 2017-11-07

## 2017-11-07 VITALS
DIASTOLIC BLOOD PRESSURE: 80 MMHG | WEIGHT: 293 LBS | BODY MASS INDEX: 47.09 KG/M2 | HEIGHT: 66 IN | SYSTOLIC BLOOD PRESSURE: 118 MMHG | RESPIRATION RATE: 16 BRPM | HEART RATE: 76 BPM

## 2017-11-07 VITALS
TEMPERATURE: 98.6 F | RESPIRATION RATE: 16 BRPM | DIASTOLIC BLOOD PRESSURE: 68 MMHG | HEART RATE: 96 BPM | WEIGHT: 293 LBS | HEIGHT: 66 IN | BODY MASS INDEX: 47.09 KG/M2 | SYSTOLIC BLOOD PRESSURE: 112 MMHG

## 2017-11-07 DIAGNOSIS — C50.811 MALIGNANT NEOPLASM OF OVERLAPPING SITES OF RIGHT BREAST IN FEMALE, ESTROGEN RECEPTOR NEGATIVE (HCC): ICD-10-CM

## 2017-11-07 DIAGNOSIS — N95.0 POSTMENOPAUSAL VAGINAL BLEEDING: ICD-10-CM

## 2017-11-07 DIAGNOSIS — Z09 SURGERY FOLLOW-UP EXAMINATION: ICD-10-CM

## 2017-11-07 DIAGNOSIS — Z17.1 MALIGNANT NEOPLASM OF OVERLAPPING SITES OF RIGHT BREAST IN FEMALE, ESTROGEN RECEPTOR NEGATIVE (HCC): ICD-10-CM

## 2017-11-07 DIAGNOSIS — R94.31 ABNORMAL ELECTROCARDIOGRAM: ICD-10-CM

## 2017-11-07 DIAGNOSIS — Z79.899 NEED FOR PROPHYLACTIC CHEMOTHERAPY: ICD-10-CM

## 2017-11-07 DIAGNOSIS — C50.911 INVASIVE DUCTAL CARCINOMA OF BREAST, RIGHT (HCC): Primary | ICD-10-CM

## 2017-11-07 DIAGNOSIS — I87.8 POOR VENOUS ACCESS: ICD-10-CM

## 2017-11-07 PROCEDURE — 99215 OFFICE O/P EST HI 40 MIN: CPT | Performed by: SURGERY

## 2017-11-07 PROCEDURE — G8427 DOCREV CUR MEDS BY ELIG CLIN: HCPCS | Performed by: SURGERY

## 2017-11-07 PROCEDURE — G8484 FLU IMMUNIZE NO ADMIN: HCPCS | Performed by: SURGERY

## 2017-11-07 PROCEDURE — 1036F TOBACCO NON-USER: CPT | Performed by: SURGERY

## 2017-11-07 PROCEDURE — 3017F COLORECTAL CA SCREEN DOC REV: CPT | Performed by: SURGERY

## 2017-11-07 PROCEDURE — 93306 TTE W/DOPPLER COMPLETE: CPT

## 2017-11-07 PROCEDURE — 99024 POSTOP FOLLOW-UP VISIT: CPT | Performed by: OBSTETRICS & GYNECOLOGY

## 2017-11-07 PROCEDURE — 3014F SCREEN MAMMO DOC REV: CPT | Performed by: SURGERY

## 2017-11-07 PROCEDURE — G8417 CALC BMI ABV UP PARAM F/U: HCPCS | Performed by: SURGERY

## 2017-11-07 NOTE — PROGRESS NOTES
Name:  Tarik Walden  Age:  46 y.o.   :  1965    Physician: Astrid Marie MD       Chief Complaint: This 46years old white female is known to have invasive ductal carcinoma of the right breasts with negative sentinel lymph node biopsy and had total mastectomy and sentinel lymph node biopsy approximately 6 weeks ago. She is planned to have chemotherapy and has poor peripheral venous access. She is here for to be scheduled for that. The procedure was explained to the patient and she understood and agreed. The risk of infection bleeding pneumothorax and fragmentation of the catheter were also discussed with her. She understood and agreed. She does not smoke and she does not drink alcohol. She is known to have obesity and strong history of cancer of the breasts in the family. She is known to have bipolar disorder. Social History: @  Social History     Social History    Marital status: Single     Spouse name: N/A    Number of children: N/A    Years of education: N/A     Occupational History    Not on file.      Social History Main Topics    Smoking status: Never Smoker    Smokeless tobacco: Never Used    Alcohol use No    Drug use: No    Sexual activity: Yes     Partners: Male     Birth control/ protection: Surgical     Other Topics Concern    Not on file     Social History Narrative    No narrative on file       Family History: @  Family History   Problem Relation Age of Onset    Breast Cancer Sister 54    Breast Cancer Maternal Aunt 71    Other Maternal Cousin      Atypical Ductal Hyperplasia     Uterine Cancer Sister 32    Other Sister      breast cyst       Past Medical History:        Diagnosis Date    Bipolar 1 disorder ([address]     [facility]     right side     Headache(784.0)     Hyperlipidemia     Migraine        Past Surgical History:        Procedure Laterality Date    DILATION AND CURETTAGE OF UTERUS N/A 10/13/2017    D & C HYSTEROSCOPY with polypectomy performed by Geovanny De La Paz MD at [address] Right 10/19/2017     [address] [facility]    MASTECTOMY Right 10/19/2017    Right Breast Mastectomy with  [place] Node Biopsy performed by Jalen Jules MD at [place] [address] Left 2014, 2015    x 2 surgeries total; Dr. Cortez Rosado Prescriptions on File Prior to Visit   Medication Sig Dispense Refill    prochlorperazine (COMPAZINE) 10 MG tablet Take 10 mg by mouth every 6 hours as needed      dexamethasone (DECADRON) 4 MG tablet Take 8 mg by mouth 2 times daily (with meals) Take 2 4mg tablets po BID      HYDROcodone-acetaminophen (NORCO) 5-325 MG per tablet Take 1 tablet by mouth every 6 hours as needed for Pain .  VENTOLIN  (90 Base) MCG/ACT inhaler Inhale 2 puffs into the lungs every 4 hours as needed       butalbital-acetaminophen-caffeine (FIORICET, ESGIC) -40 MG per tablet Take 1-2 tablets by mouth every 4 hours as needed       RA VITAMIN D-3 5000 units CAPS capsule Take 5,000 Units by mouth daily       fluticasone (FLONASE) 50 MCG/ACT nasal spray 1 spray by Nasal route Indications: as needed       hydrOXYzine (ATARAX) 10 MG tablet Take 10 mg by mouth daily       ibuprofen (ADVIL;MOTRIN) 800 MG tablet Take 800 mg by mouth every 6 hours as needed       lamoTRIgine (LAMICTAL) 100 MG tablet 100 mg nightly       lisinopril (PRINIVIL;ZESTRIL) 20 MG tablet 20 mg daily       loratadine (CLARITIN) 10 MG tablet Take 10 mg by mouth daily       pravastatin (PRAVACHOL) 40 MG tablet Take 40 mg by mouth daily       QUEtiapine (SEROQUEL) 300 MG tablet Take 300 mg by mouth nightly       SUMAtriptan (IMITREX) 25 MG tablet 25 mg once as needed       topiramate (TOPAMAX) 25 MG tablet Take 25 mg by mouth daily       traZODone (DESYREL) 100 MG tablet Take 100 mg by mouth nightly        No current facility-administered medications on file prior to visit.         No Known Allergies     reports that

## 2017-11-07 NOTE — TELEPHONE ENCOUNTER
[facility]    Pre-Operative Evaluation/Consultation    Name:  Regino Norman                                         Age:  46 y.o. MRN:  [id number]       :  1965   Date:  2017         Sex: female    There were no encounter diagnoses. Surgeon:  Dr. Quang Aceves  Procedure (Planned):  Port placement  Date Scheduled surgery: 17    Attending : No att. providers found    Primary Physician:Tasha Buenrostro  Cardiologist: None    Type of Anesthesia Requested: local mac    Patient Medical history:  No Known Allergies  Patient Active Problem List   Diagnosis    [facility]    Hyperlipidemia    Malignant neoplasm of overlapping sites of breast in female, estrogen receptor negative ([facility]     Past Medical History:   Diagnosis Date    B[facility] 1 disorder ([facility]     Breast cancer ([facility] 2017    right side     Headache(784.0)     Hyperlipidemia     Migraine      Past Surgical History:   Procedure Laterality Date    DILATION AND CURETTAGE OF UTERUS N/A 10/13/2017    D & C HYSTEROSCOPY with polypectomy performed by Kevin Alvares MD at [address] [place] [address] Right 10/19/2017     [address] [facility]    MASTECTOMY Right 10/19/2017    Right Breast Mastectomy with  [place] Node Biopsy performed by Allyssa Mahmood MD at [place] [address] Left , 2015    x 2 surgeries total; Dr. Jessy Gómez History   Substance Use Topics    Smoking status: Never Smoker    Smokeless tobacco: Never Used    Alcohol use No     Medications:  Current Outpatient Prescriptions   Medication Sig Dispense Refill    prochlorperazine (COMPAZINE) 10 MG tablet Take 10 mg by mouth every 6 hours as needed      dexamethasone (DECADRON) 4 MG tablet Take 8 mg by mouth 2 times daily (with meals) Take 2 4mg tablets po BID      HYDROcodone-acetaminophen (NORCO) 5-325 MG per tablet Take 1 tablet by mouth every 6 hours as needed for Pain .       VENTOLIN  (90 Base) MCG/ACT inhaler Inhale 2 are the current and completed labs:  CBC:   Lab Results   Component Value Date    WBC 8.1 10/09/2017    RBC 4.41 10/09/2017    HGB 13.4 10/09/2017    HCT 41.0 10/09/2017    MCV 93.0 10/09/2017    RDW 13.7 10/09/2017     10/09/2017     CMP:   Lab Results   Component Value Date     10/09/2017    K 3.9 10/09/2017     10/09/2017    CO2 25 10/09/2017    BUN 18 10/09/2017    CREATININE 0.89 10/09/2017    GFRAA >60 10/09/2017    LABGLOM >60 10/09/2017    GLUCOSE 141 10/09/2017    PROT 7.7 10/09/2017    CALCIUM 10.2 10/09/2017    BILITOT 0.25 10/09/2017    ALKPHOS 74 10/09/2017    AST 17 10/09/2017    ALT 16 10/09/2017     POC Tests: No results for input(s): POCGLU, POCNA, POCK, POCCL, POCBUN, POCHEMO, POCHCT in the last 72 hours.   Coags    Lab Results   Component Value Date    PROTIME 10.7 10/10/2017    INR 1.0 10/10/2017    APTT 26.7 24/81/4778     HCG (If Applicable) No results found for: PREGTESTUR, PREGSERUM, HCG, HCGQUANT   ABGs No results found for: PHART, PO2ART, MON5GWM, QWE0DUM, BEART, M6CGDXZE   Type & Screen (If Applicable)  No results found for: Logan Gold    Additional ordered pre-operative testing:  [x]CBC    []ABG      [x] BMP   []URINALYSIS   []CMP    []HCG   [x]COAGS PT/INR  []T&C  []LFTs   []TYPE AND SCREEN    [x] EKG  [x] Chest X-Ray  [] Other Radiology    [] Sent to Hospitalist None  [] Sent to Anesthesia for your review: CRNA   [] Additional Orders: None     Comments:see epic   Requests: None    Signed: Luis Carlos Amaro MA 11/7/2017 10:00 AM

## 2017-11-07 NOTE — PROGRESS NOTES
Subjective:      Patient ID: Shiv Rangel is a 46 y.o. female. HPI  For post op D&C check. No problems   No bleeding. Path showed benign polyps. Pt had right mastectomy 1 wk after D&C. To start chemo next week and eventually have left mastectomy.   Review of Systems    Objective:   Physical Exam    Assessment:      Benign endometrial pathology      Plan:      F/U for yearly or sooner prn

## 2017-11-07 NOTE — PROGRESS NOTES
SOCIAL HISTORY      Smoking    Tobacco No   Marijuana No   Chew No   Snuff No     Drinking     Alcohol No     Non-Alcoholic No       Coffee No     Pop Yes     Tea Yes    Any over the counter medications      NSAID'S No      Health food supplements  No        Daily Diet (Do you eat at least one of  these daily)        Beef Yes     Pork Yes     Fish No     Poultry Yes     Dairy Yes        Do you consume any of the following at least once a day     Fruits Yes     Vegetables Yes     Fiber No       Restricted calorie diet No   Diabetic diet No    Chocolate Yes  Laxatives No    Occupation:  Disabled

## 2017-11-08 ENCOUNTER — ANESTHESIA EVENT (OUTPATIENT)
Dept: OPERATING ROOM | Age: 52
End: 2017-11-08
Payer: MEDICARE

## 2017-11-08 NOTE — H&P
topiramate (TOPAMAX) 25 MG tablet Take 25 mg by mouth daily         traZODone (DESYREL) 100 MG tablet Take 100 mg by mouth nightly           No current facility-administered medications on file prior to visit.          No Known Allergies      reports that she has never smoked. She has never used smokeless tobacco.  reports that she does not drink alcohol.       Review of Systems - History obtained from chart review and the patient  General ROS: Overweight and obesity  Ophthalmic ROS: negative  Endocrine ROS: Obesity  Breast ROS: As mentioned above  Respiratory ROS: no cough, shortness of breath, or wheezing  Cardiovascular ROS: no chest pain or dyspnea on exertion  Gastrointestinal ROS: no abdominal pain, change in bowel habits, or black or bloody stools  Genito-Urinary ROS: no dysuria, trouble voiding, or hematuria  Has mentioned above  Musculoskeletal ROS: negative  Neurological ROS: negative           Physical Exam:     /68   Pulse 96   Temp 98.6 °F (37 °C) (Tympanic)   Resp 16   Ht 5' 5.98\" (1.676 m)   Wt 295 lb 12.8 oz (134.2 kg)   BMI 47.77 kg/m²   Weight: 295 lb 12.8 oz (134.2 kg)      General Appearance:  awake, alert, oriented, in no acute distress, well developed, well nourished, in no acute distress, cooperative, obese and ambulatory  Head/face:  NCAT  Neck:  no bruits and thick neck  Breasts: Status post simple mastectomy on the right side with healed scar. Lungs:  Normal expansion. Clear to auscultation. No rales, rhonchi, or wheezing.,  Status post right total mastectomy  Heart:  Heart sounds are normal.  Regular rate and rhythm without murmur, gallop or rub. Extremities: Extremities warm to touch, pink, with no edema. and moves all extremities equally.   Neurologic:  No obvious musculoskeletal weakness or lateralization or loss of balance.          Assessment:  Poor peripheral venous access, status post invasive ductal carcinoma of the right breast and right simple mastectomy and sentinel lymph node dissection. Need for venous access.     Plan:  Scheduled for insertion of [id number].

## 2017-11-09 ENCOUNTER — HOSPITAL ENCOUNTER (OUTPATIENT)
Age: 52
Setting detail: OUTPATIENT SURGERY
Discharge: HOME | End: 2017-11-09
Attending: SURGERY | Admitting: SURGERY
Payer: MEDICARE

## 2017-11-09 ENCOUNTER — APPOINTMENT (OUTPATIENT)
Dept: GENERAL RADIOLOGY | Age: 52
End: 2017-11-09
Attending: SURGERY
Payer: MEDICARE

## 2017-11-09 ENCOUNTER — ANESTHESIA (OUTPATIENT)
Dept: OPERATING ROOM | Age: 52
End: 2017-11-09
Payer: MEDICARE

## 2017-11-09 VITALS
RESPIRATION RATE: 16 BRPM | SYSTOLIC BLOOD PRESSURE: 132 MMHG | HEIGHT: 66 IN | OXYGEN SATURATION: 96 % | BODY MASS INDEX: 47.09 KG/M2 | DIASTOLIC BLOOD PRESSURE: 82 MMHG | HEART RATE: 78 BPM | TEMPERATURE: 98.1 F | WEIGHT: 293 LBS

## 2017-11-09 VITALS — SYSTOLIC BLOOD PRESSURE: 130 MMHG | OXYGEN SATURATION: 98 % | DIASTOLIC BLOOD PRESSURE: 73 MMHG

## 2017-11-09 PROCEDURE — C1892 INTRO/SHEATH,FIXED,PEEL-AWAY: HCPCS | Performed by: SURGERY

## 2017-11-09 PROCEDURE — 2500000003 HC RX 250 WO HCPCS: Performed by: NURSE ANESTHETIST, CERTIFIED REGISTERED

## 2017-11-09 PROCEDURE — 2580000003 HC RX 258: Performed by: SURGERY

## 2017-11-09 PROCEDURE — 3700000000 HC ANESTHESIA ATTENDED CARE: Performed by: SURGERY

## 2017-11-09 PROCEDURE — 7100000010 HC PHASE II RECOVERY - FIRST 15 MIN: Performed by: SURGERY

## 2017-11-09 PROCEDURE — 6360000002 HC RX W HCPCS: Performed by: SURGERY

## 2017-11-09 PROCEDURE — 3209999900 FLUORO FOR SURGICAL PROCEDURES

## 2017-11-09 PROCEDURE — 3600000012 HC SURGERY LEVEL 2 ADDTL 15MIN: Performed by: SURGERY

## 2017-11-09 PROCEDURE — A6258 TRANSPARENT FILM >16<=48 IN: HCPCS | Performed by: SURGERY

## 2017-11-09 PROCEDURE — 3700000001 HC ADD 15 MINUTES (ANESTHESIA): Performed by: SURGERY

## 2017-11-09 PROCEDURE — 6360000002 HC RX W HCPCS: Performed by: NURSE ANESTHETIST, CERTIFIED REGISTERED

## 2017-11-09 PROCEDURE — 3600000002 HC SURGERY LEVEL 2 BASE: Performed by: SURGERY

## 2017-11-09 PROCEDURE — 00532 ANES ACCESS CTR VENOUS CRCJ: CPT | Performed by: NURSE ANESTHETIST, CERTIFIED REGISTERED

## 2017-11-09 PROCEDURE — 7100000011 HC PHASE II RECOVERY - ADDTL 15 MIN: Performed by: SURGERY

## 2017-11-09 PROCEDURE — C1788 PORT, INDWELLING, IMP: HCPCS | Performed by: SURGERY

## 2017-11-09 PROCEDURE — 2500000003 HC RX 250 WO HCPCS

## 2017-11-09 PROCEDURE — 71010 XR CHEST PORTABLE: CPT

## 2017-11-09 PROCEDURE — 6370000000 HC RX 637 (ALT 250 FOR IP): Performed by: SURGERY

## 2017-11-09 PROCEDURE — 36561 INSERT TUNNELED CV CATH: CPT | Performed by: SURGERY

## 2017-11-09 DEVICE — IMPLANTABLE DEVICE
Type: IMPLANTABLE DEVICE | Site: CHEST | Status: FUNCTIONAL
Removed: 2018-11-30

## 2017-11-09 RX ORDER — MIDAZOLAM HYDROCHLORIDE 1 MG/ML
INJECTION INTRAMUSCULAR; INTRAVENOUS PRN
Status: DISCONTINUED | OUTPATIENT
Start: 2017-11-09 | End: 2017-11-09 | Stop reason: SDUPTHER

## 2017-11-09 RX ORDER — KETAMINE HYDROCHLORIDE 50 MG/ML
INJECTION, SOLUTION, CONCENTRATE INTRAMUSCULAR; INTRAVENOUS PRN
Status: DISCONTINUED | OUTPATIENT
Start: 2017-11-09 | End: 2017-11-09 | Stop reason: SDUPTHER

## 2017-11-09 RX ORDER — SODIUM CHLORIDE, SODIUM LACTATE, POTASSIUM CHLORIDE, CALCIUM CHLORIDE 600; 310; 30; 20 MG/100ML; MG/100ML; MG/100ML; MG/100ML
INJECTION, SOLUTION INTRAVENOUS CONTINUOUS
Status: DISCONTINUED | OUTPATIENT
Start: 2017-11-09 | End: 2017-11-09 | Stop reason: HOSPADM

## 2017-11-09 RX ORDER — LIDOCAINE HYDROCHLORIDE 20 MG/ML
INJECTION, SOLUTION EPIDURAL; INFILTRATION; INTRACAUDAL; PERINEURAL PRN
Status: DISCONTINUED | OUTPATIENT
Start: 2017-11-09 | End: 2017-11-09 | Stop reason: SDUPTHER

## 2017-11-09 RX ORDER — ACETAMINOPHEN 325 MG/1
650 TABLET ORAL EVERY 4 HOURS PRN
Status: DISCONTINUED | OUTPATIENT
Start: 2017-11-09 | End: 2017-11-09 | Stop reason: HOSPADM

## 2017-11-09 RX ORDER — FENTANYL CITRATE 50 UG/ML
INJECTION, SOLUTION INTRAMUSCULAR; INTRAVENOUS PRN
Status: DISCONTINUED | OUTPATIENT
Start: 2017-11-09 | End: 2017-11-09 | Stop reason: SDUPTHER

## 2017-11-09 RX ORDER — HYDROCODONE BITARTRATE AND ACETAMINOPHEN 5; 325 MG/1; MG/1
1 TABLET ORAL EVERY 6 HOURS PRN
Status: DISCONTINUED | OUTPATIENT
Start: 2017-11-09 | End: 2017-11-09 | Stop reason: HOSPADM

## 2017-11-09 RX ORDER — HEPARIN SODIUM 5000 [USP'U]/ML
INJECTION, SOLUTION INTRAVENOUS; SUBCUTANEOUS PRN
Status: DISCONTINUED | OUTPATIENT
Start: 2017-11-09 | End: 2017-11-09 | Stop reason: HOSPADM

## 2017-11-09 RX ORDER — PROPOFOL 10 MG/ML
INJECTION, EMULSION INTRAVENOUS PRN
Status: DISCONTINUED | OUTPATIENT
Start: 2017-11-09 | End: 2017-11-09 | Stop reason: SDUPTHER

## 2017-11-09 RX ADMIN — LIDOCAINE HYDROCHLORIDE 30 MG: 20 INJECTION, SOLUTION EPIDURAL; INFILTRATION; INTRACAUDAL; PERINEURAL at 07:50

## 2017-11-09 RX ADMIN — PROPOFOL 30 MG: 10 INJECTION, EMULSION INTRAVENOUS at 08:00

## 2017-11-09 RX ADMIN — PROPOFOL 30 MG: 10 INJECTION, EMULSION INTRAVENOUS at 08:05

## 2017-11-09 RX ADMIN — FENTANYL CITRATE 15 MCG: 50 INJECTION INTRAMUSCULAR; INTRAVENOUS at 07:45

## 2017-11-09 RX ADMIN — MIDAZOLAM 2 MG: 1 INJECTION INTRAMUSCULAR; INTRAVENOUS at 07:41

## 2017-11-09 RX ADMIN — PROPOFOL 30 MG: 10 INJECTION, EMULSION INTRAVENOUS at 08:10

## 2017-11-09 RX ADMIN — KETAMINE HYDROCHLORIDE 15 MG: 50 INJECTION, SOLUTION INTRAMUSCULAR; INTRAVENOUS at 07:45

## 2017-11-09 RX ADMIN — FENTANYL CITRATE 15 MCG: 50 INJECTION INTRAMUSCULAR; INTRAVENOUS at 07:50

## 2017-11-09 RX ADMIN — KETAMINE HYDROCHLORIDE 15 MG: 50 INJECTION, SOLUTION INTRAMUSCULAR; INTRAVENOUS at 07:55

## 2017-11-09 RX ADMIN — PROPOFOL 50 MG: 10 INJECTION, EMULSION INTRAVENOUS at 08:16

## 2017-11-09 RX ADMIN — LIDOCAINE HYDROCHLORIDE 30 MG: 20 INJECTION, SOLUTION EPIDURAL; INFILTRATION; INTRACAUDAL; PERINEURAL at 08:10

## 2017-11-09 RX ADMIN — PROPOFOL 30 MG: 10 INJECTION, EMULSION INTRAVENOUS at 07:55

## 2017-11-09 RX ADMIN — LIDOCAINE HYDROCHLORIDE 30 MG: 20 INJECTION, SOLUTION EPIDURAL; INFILTRATION; INTRACAUDAL; PERINEURAL at 07:45

## 2017-11-09 RX ADMIN — PROPOFOL 30 MG: 10 INJECTION, EMULSION INTRAVENOUS at 07:45

## 2017-11-09 RX ADMIN — PROPOFOL 50 MG: 10 INJECTION, EMULSION INTRAVENOUS at 08:24

## 2017-11-09 RX ADMIN — FENTANYL CITRATE 15 MCG: 50 INJECTION INTRAMUSCULAR; INTRAVENOUS at 08:10

## 2017-11-09 RX ADMIN — PROPOFOL 20 MG: 10 INJECTION, EMULSION INTRAVENOUS at 08:13

## 2017-11-09 RX ADMIN — LIDOCAINE HYDROCHLORIDE 30 MG: 20 INJECTION, SOLUTION EPIDURAL; INFILTRATION; INTRACAUDAL; PERINEURAL at 08:00

## 2017-11-09 RX ADMIN — FENTANYL CITRATE 10 MCG: 50 INJECTION INTRAMUSCULAR; INTRAVENOUS at 08:13

## 2017-11-09 RX ADMIN — FENTANYL CITRATE 15 MCG: 50 INJECTION INTRAMUSCULAR; INTRAVENOUS at 07:55

## 2017-11-09 RX ADMIN — SODIUM CHLORIDE, POTASSIUM CHLORIDE, SODIUM LACTATE AND CALCIUM CHLORIDE: 600; 310; 30; 20 INJECTION, SOLUTION INTRAVENOUS at 07:25

## 2017-11-09 RX ADMIN — KETAMINE HYDROCHLORIDE 10 MG: 50 INJECTION, SOLUTION INTRAMUSCULAR; INTRAVENOUS at 08:13

## 2017-11-09 RX ADMIN — LIDOCAINE HYDROCHLORIDE 20 MG: 20 INJECTION, SOLUTION EPIDURAL; INFILTRATION; INTRACAUDAL; PERINEURAL at 08:13

## 2017-11-09 RX ADMIN — FENTANYL CITRATE 15 MCG: 50 INJECTION INTRAMUSCULAR; INTRAVENOUS at 08:00

## 2017-11-09 RX ADMIN — KETAMINE HYDROCHLORIDE 15 MG: 50 INJECTION, SOLUTION INTRAMUSCULAR; INTRAVENOUS at 07:50

## 2017-11-09 RX ADMIN — ACETAMINOPHEN 650 MG: 325 TABLET ORAL at 09:17

## 2017-11-09 RX ADMIN — KETAMINE HYDROCHLORIDE 15 MG: 50 INJECTION, SOLUTION INTRAMUSCULAR; INTRAVENOUS at 08:10

## 2017-11-09 RX ADMIN — LIDOCAINE HYDROCHLORIDE 30 MG: 20 INJECTION, SOLUTION EPIDURAL; INFILTRATION; INTRACAUDAL; PERINEURAL at 08:05

## 2017-11-09 RX ADMIN — KETAMINE HYDROCHLORIDE 15 MG: 50 INJECTION, SOLUTION INTRAMUSCULAR; INTRAVENOUS at 08:05

## 2017-11-09 RX ADMIN — KETAMINE HYDROCHLORIDE 15 MG: 50 INJECTION, SOLUTION INTRAMUSCULAR; INTRAVENOUS at 08:00

## 2017-11-09 RX ADMIN — PROPOFOL 50 MG: 10 INJECTION, EMULSION INTRAVENOUS at 08:20

## 2017-11-09 RX ADMIN — FENTANYL CITRATE 15 MCG: 50 INJECTION INTRAMUSCULAR; INTRAVENOUS at 08:05

## 2017-11-09 RX ADMIN — LIDOCAINE HYDROCHLORIDE 30 MG: 20 INJECTION, SOLUTION EPIDURAL; INFILTRATION; INTRACAUDAL; PERINEURAL at 07:55

## 2017-11-09 RX ADMIN — PROPOFOL 10 MG: 10 INJECTION, EMULSION INTRAVENOUS at 08:28

## 2017-11-09 RX ADMIN — PROPOFOL 30 MG: 10 INJECTION, EMULSION INTRAVENOUS at 07:50

## 2017-11-09 ASSESSMENT — PULMONARY FUNCTION TESTS
PIF_VALUE: 0

## 2017-11-09 ASSESSMENT — PAIN DESCRIPTION - PAIN TYPE
TYPE: SURGICAL PAIN

## 2017-11-09 ASSESSMENT — PAIN DESCRIPTION - LOCATION
LOCATION: CHEST

## 2017-11-09 ASSESSMENT — PAIN SCALES - GENERAL
PAINLEVEL_OUTOF10: 8
PAINLEVEL_OUTOF10: 5
PAINLEVEL_OUTOF10: 4
PAINLEVEL_OUTOF10: 8
PAINLEVEL_OUTOF10: 5

## 2017-11-09 ASSESSMENT — PAIN DESCRIPTION - ORIENTATION
ORIENTATION: LEFT

## 2017-11-09 ASSESSMENT — PAIN DESCRIPTION - PROGRESSION: CLINICAL_PROGRESSION: GRADUALLY IMPROVING

## 2017-11-09 ASSESSMENT — PAIN - FUNCTIONAL ASSESSMENT: PAIN_FUNCTIONAL_ASSESSMENT: 0-10

## 2017-11-09 NOTE — OP NOTE
Operative note     Yoandy Wadsworth  YOB: 1965  [id number]    Pre-operative Diagnosis: Cancer of the right breast and chemotherapy. Poor venous access    Post-operative Diagnosis: Same    Procedure: Insertion implant of Nhnwbi-l-Pjiy left subclavian vein    Anesthesia: MAC and Local, using 1% Xylocaine solution and 0.5% with Marcaine solution in 1-1 proportion. Utilizing approximately 25 mL    Anesthetist: HUBER Acosta    Surgeons/Assistants: Stephanie Hunter    Estimated Blood Loss: Less than 20 ML    Complications: None    Specimens: Was Not Obtained    Findings: This patient has invasive ductal carcinoma of the right breast and had simple mastectomy and sentinel lymph node biopsy and is scheduled to have adjuvant chemotherapy. The procedure: The patient was put in the supine position. She was given monitored anesthesia care and sedation. A rolled towel was placed between the shoulder blades in the upper back and the left chest wall shoulder and left side of the neck were prepped with Betadine soap and solution and draped in the usual manner exposing the left subclavian area. This construct a tissue were infiltrated with Marcaine 0.5 and 1% Xylocaine solution and a skin incision was made parallel to the mid third of the clavicle and below it and extended through the subcutaneous tissue over the to the pectoralis fascia. A pocket of subcutaneous tissue was developed extended to the pectoralis fascia and below the incision. The venipuncture of the left subclavian was made through the incision and a guidewire was passed through the needle however he did not read properly so and was then removed along with the needle and another venipuncture was made slightly lateral to the first one and this time the guidewire was passed through the needle easily.   The needle was then removed and the dilator were passed over the guidewire and the guidewire along with the stylet were then removed and

## 2017-11-09 NOTE — ANESTHESIA PRE PROCEDURE
mg by mouth daily  8/31/17   Historical Provider, MD   QUEtiapine (SEROQUEL) 300 MG tablet Take 300 mg by mouth nightly  8/9/17   Historical Provider, MD   SUMAtriptan (IMITREX) 25 MG tablet 25 mg once as needed  7/21/17   Historical Provider, MD   topiramate (TOPAMAX) 25 MG tablet Take 25 mg by mouth daily  7/21/17   Historical Provider, MD   traZODone (DESYREL) 100 MG tablet Take 100 mg by mouth nightly  8/30/17   Historical Provider, MD       Current medications:    No current facility-administered medications for this visit. No current outpatient prescriptions on file. Facility-Administered Medications Ordered in Other Visits   Medication Dose Route Frequency Provider Last Rate Last Dose    lactated ringers infusion   Intravenous Continuous Souheil MD Kami           Allergies:  No Known Allergies    Problem List:    Patient Active Problem List   Diagnosis Code    Bipolar 1 disorder ([facility] F31.9    Hyperlipidemia E78.5    Malignant neoplasm of overlapping sites of breast in female, estrogen receptor negative ([facility] C50.819, Z17.1       Past Medical History:        Diagnosis Date    Bipolar 1 disorder ([facility]     Breast cancer ([facility] 2017    right side     Headache(784.0)     Hyperlipidemia     Migraine        Past Surgical History:        Procedure Laterality Date    DILATION AND CURETTAGE OF UTERUS N/A 10/13/2017    D & C HYSTEROSCOPY with polypectomy performed by Rafael An MD at [address] [place] [address] Right 10/19/2017     [address] [facility]    MASTECTOMY Right 10/19/2017    Right Breast Mastectomy with  [place] Node Biopsy performed by Joey Cartagena MD at [place] [address] Left 2014, 2015    x 2 surgeries total; Dr. Jay Gamboa History:    Social History   Substance Use Topics    Smoking status: Never Smoker    Smokeless tobacco: Never Used    Alcohol use No                                Counseling given: Not Answered      Vital Signs (Current):    There were no vitals filed for this visit. BP Readings from Last 3 Encounters:   11/07/17 118/80   11/07/17 112/68   11/01/17 124/82       NPO Status:                                                                                 BMI:   Wt Readings from Last 3 Encounters:   11/07/17 294 lb (133.4 kg)   11/07/17 295 lb 12.8 oz (134.2 kg)   11/01/17 293 lb (132.9 kg)     There is no height or weight on file to calculate BMI.    CBC:   Lab Results   Component Value Date    WBC 8.1 10/09/2017    RBC 4.41 10/09/2017    HGB 13.4 10/09/2017    HCT 41.0 10/09/2017    MCV 93.0 10/09/2017    RDW 13.7 10/09/2017     10/09/2017       CMP:   Lab Results   Component Value Date     10/09/2017    K 3.9 10/09/2017     10/09/2017    CO2 25 10/09/2017    BUN 18 10/09/2017    CREATININE 0.89 10/09/2017    GFRAA >60 10/09/2017    LABGLOM >60 10/09/2017    GLUCOSE 141 10/09/2017    PROT 7.7 10/09/2017    CALCIUM 10.2 10/09/2017    BILITOT 0.25 10/09/2017    ALKPHOS 74 10/09/2017    AST 17 10/09/2017    ALT 16 10/09/2017       POC Tests: No results for input(s): POCGLU, POCNA, POCK, POCCL, POCBUN, POCHEMO, POCHCT in the last 72 hours.     Coags:   Lab Results   Component Value Date    PROTIME 10.7 10/10/2017    INR 1.0 10/10/2017    APTT 26.7 10/10/2017       HCG (If Applicable): No results found for: PREGTESTUR, PREGSERUM, HCG, HCGQUANT     ABGs: No results found for: PHART, PO2ART, KVD2TZE, XJV3ZJS, BEART, C7GJXCCQ     Type & Screen (If Applicable):  [facility]: [facility]    Anesthesia Evaluation  Patient summary reviewed no history of anesthetic complications:   Airway: Mallampati: III  TM distance: >3 FB   Neck ROM: full  Mouth opening: > = 3 FB Dental: normal exam         Pulmonary:negative ROS   (+) decreased breath sounds,  asthma:                            Cardiovascular:  Exercise tolerance: good (>4 METS),   (+) hypertension: moderate,       ECG reviewed  Rhythm: regular  Rate: normal  Echocardiogram reviewed    Cleared by cardiology              Neuro/Psych:   (+) headaches: migraine headaches, psychiatric history: stable with treatment            GI/Hepatic/Renal: neg ROS            Endo/Other: negative ROS             Pt had PAT visit. Abdominal:   (+) obese,         Vascular:                                        Anesthesia Plan      MAC     ASA 3     (IV sedation   Anesthetic plan includes the use of IV sedation. Specifically dicussed risks, benefits, alternatives, personnel involved. Risks include: loss of airway/stop breathing, need for airway device, seizures, drop in blood pressure, pain, pressure, memory of event, ability to hear things, talking but unable to remember conversation, lifting chin/repositioning airway. Benefits continue breathing on own, unless obtunded. Since sedation is on a contuinuum general anesthesia with the use of endotracheal intubation was discussed as a back up plan. Specifically dicussed risks, benefits, alternatives, personnel involved, including risks of: cut or cracked lip, chipped tooth/teeth, broken or removed teeth, sore throat, damaged vocal cords, nausea and vomiting, allergic reaction to medication, failed intubation, need to repeat procedure, emergent airway attempts, case cancellation, need for prolonged intubation/remaining intubated, other monitors, and possible death. The patient will be placed on a cardiac monitor and vital signs, pulse oximetry and level of consciousness will be continuously evaluated throughout the procedure. The patient will be closely monitored until recovery from the medications is complete and the patient has returned to baseline status. Pt verbalized an understanding and agreed to proceed.  )  Induction: intravenous. Anesthetic plan and risks discussed with patient. Use of blood products discussed with patient whom.    Plan discussed with surgical team.          Electronically signed

## 2017-11-13 ENCOUNTER — HOSPITAL ENCOUNTER (OUTPATIENT)
Dept: GENERAL RADIOLOGY | Age: 52
Discharge: HOME | End: 2017-11-13
Payer: MEDICARE

## 2017-11-13 ENCOUNTER — HOSPITAL ENCOUNTER (OUTPATIENT)
Dept: INFUSION THERAPY | Age: 52
Discharge: HOME | End: 2017-11-13
Payer: MEDICARE

## 2017-11-13 VITALS
RESPIRATION RATE: 16 BRPM | SYSTOLIC BLOOD PRESSURE: 131 MMHG | BODY MASS INDEX: 46.9 KG/M2 | WEIGHT: 290.6 LBS | HEART RATE: 121 BPM | DIASTOLIC BLOOD PRESSURE: 82 MMHG | TEMPERATURE: 98.9 F

## 2017-11-13 DIAGNOSIS — C50.811 MALIGNANT NEOPLASM OF OVERLAPPING SITES OF RIGHT BREAST IN FEMALE, ESTROGEN RECEPTOR NEGATIVE (HCC): ICD-10-CM

## 2017-11-13 DIAGNOSIS — Z45.2 ENCOUNTER FOR CARE RELATED TO VASCULAR ACCESS PORT: ICD-10-CM

## 2017-11-13 DIAGNOSIS — Z17.1 MALIGNANT NEOPLASM OF OVERLAPPING SITES OF RIGHT BREAST IN FEMALE, ESTROGEN RECEPTOR NEGATIVE (HCC): ICD-10-CM

## 2017-11-13 LAB
ABSOLUTE EOS #: 0 K/UL (ref 0–0.4)
ABSOLUTE IMMATURE GRANULOCYTE: ABNORMAL K/UL (ref 0–0.3)
ABSOLUTE LYMPH #: 1.4 K/UL (ref 1–4.8)
ABSOLUTE MONO #: 0.3 K/UL (ref 0.1–1.2)
ALBUMIN SERPL-MCNC: 4 G/DL (ref 3.5–5.2)
ALBUMIN/GLOBULIN RATIO: 1.2 (ref 1–2.5)
ALP BLD-CCNC: 77 U/L (ref 35–104)
ALT SERPL-CCNC: 16 U/L (ref 5–33)
ANION GAP SERPL CALCULATED.3IONS-SCNC: 20 MMOL/L (ref 9–17)
AST SERPL-CCNC: 14 U/L
BASOPHILS # BLD: 0 % (ref 0–1)
BASOPHILS ABSOLUTE: 0 K/UL (ref 0–0.2)
BILIRUB SERPL-MCNC: 0.14 MG/DL (ref 0.3–1.2)
BUN BLDV-MCNC: 21 MG/DL (ref 6–20)
BUN/CREAT BLD: 22 (ref 9–20)
CALCIUM SERPL-MCNC: 9.7 MG/DL (ref 8.6–10.4)
CHLORIDE BLD-SCNC: 100 MMOL/L (ref 98–107)
CO2: 19 MMOL/L (ref 20–31)
CREAT SERPL-MCNC: 0.95 MG/DL (ref 0.5–0.9)
DIFFERENTIAL TYPE: ABNORMAL
EOSINOPHILS RELATIVE PERCENT: 0 % (ref 1–7)
GFR AFRICAN AMERICAN: >60 ML/MIN
GFR NON-AFRICAN AMERICAN: >60 ML/MIN
GFR SERPL CREATININE-BSD FRML MDRD: ABNORMAL ML/MIN/{1.73_M2}
GFR SERPL CREATININE-BSD FRML MDRD: ABNORMAL ML/MIN/{1.73_M2}
GLUCOSE BLD-MCNC: 276 MG/DL (ref 70–99)
HCT VFR BLD CALC: 40 % (ref 36–46)
HEMOGLOBIN: 13.3 G/DL (ref 12–16)
IMMATURE GRANULOCYTES: ABNORMAL %
LYMPHOCYTES # BLD: 10 % (ref 16–46)
MCH RBC QN AUTO: 31.1 PG (ref 26–34)
MCHC RBC AUTO-ENTMCNC: 33.2 G/DL (ref 31–37)
MCV RBC AUTO: 93.4 FL (ref 80–100)
MONOCYTES # BLD: 3 % (ref 4–11)
PDW BLD-RTO: 13.5 % (ref 11–14.5)
PLATELET # BLD: 220 K/UL (ref 140–450)
PLATELET ESTIMATE: ABNORMAL
PMV BLD AUTO: 8.3 FL (ref 6–12)
POTASSIUM SERPL-SCNC: 4 MMOL/L (ref 3.7–5.3)
RBC # BLD: 4.28 M/UL (ref 4–5.2)
RBC # BLD: ABNORMAL 10*6/UL
SEG NEUTROPHILS: 87 % (ref 43–77)
SEGMENTED NEUTROPHILS ABSOLUTE COUNT: 11.6 K/UL (ref 1.8–7.7)
SODIUM BLD-SCNC: 139 MMOL/L (ref 135–144)
TOTAL PROTEIN: 7.4 G/DL (ref 6.4–8.3)
WBC # BLD: 13.4 K/UL (ref 3.5–11)
WBC # BLD: ABNORMAL 10*3/UL

## 2017-11-13 PROCEDURE — 77001 FLUOROGUIDE FOR VEIN DEVICE: CPT

## 2017-11-13 PROCEDURE — 2580000003 HC RX 258: Performed by: INTERNAL MEDICINE

## 2017-11-13 PROCEDURE — 6360000004 HC RX CONTRAST MEDICATION: Performed by: INTERNAL MEDICINE

## 2017-11-13 PROCEDURE — 36593 DECLOT VASCULAR DEVICE: CPT

## 2017-11-13 PROCEDURE — 6360000002 HC RX W HCPCS: Performed by: INTERNAL MEDICINE

## 2017-11-13 PROCEDURE — 96413 CHEMO IV INFUSION 1 HR: CPT

## 2017-11-13 PROCEDURE — 96415 CHEMO IV INFUSION ADDL HR: CPT

## 2017-11-13 PROCEDURE — 36415 COLL VENOUS BLD VENIPUNCTURE: CPT

## 2017-11-13 PROCEDURE — 85025 COMPLETE CBC W/AUTO DIFF WBC: CPT

## 2017-11-13 PROCEDURE — 96375 TX/PRO/DX INJ NEW DRUG ADDON: CPT

## 2017-11-13 PROCEDURE — 96367 TX/PROPH/DG ADDL SEQ IV INF: CPT

## 2017-11-13 PROCEDURE — 80053 COMPREHEN METABOLIC PANEL: CPT

## 2017-11-13 PROCEDURE — 96417 CHEMO IV INFUS EACH ADDL SEQ: CPT

## 2017-11-13 RX ORDER — HEPARIN SODIUM (PORCINE) LOCK FLUSH IV SOLN 100 UNIT/ML 100 UNIT/ML
500 SOLUTION INTRAVENOUS PRN
Status: DISCONTINUED | OUTPATIENT
Start: 2017-11-13 | End: 2017-11-14 | Stop reason: HOSPADM

## 2017-11-13 RX ORDER — PALONOSETRON 0.05 MG/ML
0.25 INJECTION, SOLUTION INTRAVENOUS ONCE
Status: CANCELLED | OUTPATIENT
Start: 2017-11-13

## 2017-11-13 RX ORDER — 0.9 % SODIUM CHLORIDE 0.9 %
10 VIAL (ML) INJECTION ONCE
Status: CANCELLED | OUTPATIENT
Start: 2017-11-13 | End: 2017-11-13

## 2017-11-13 RX ORDER — SODIUM CHLORIDE 9 MG/ML
INJECTION, SOLUTION INTRAVENOUS ONCE
Status: CANCELLED | OUTPATIENT
Start: 2017-11-13 | End: 2017-11-13

## 2017-11-13 RX ORDER — SODIUM CHLORIDE 9 MG/ML
INJECTION, SOLUTION INTRAVENOUS ONCE
Status: COMPLETED | OUTPATIENT
Start: 2017-11-13 | End: 2017-11-13

## 2017-11-13 RX ORDER — METHYLPREDNISOLONE SODIUM SUCCINATE 125 MG/2ML
125 INJECTION, POWDER, LYOPHILIZED, FOR SOLUTION INTRAMUSCULAR; INTRAVENOUS ONCE
Status: CANCELLED | OUTPATIENT
Start: 2017-11-13 | End: 2017-11-13

## 2017-11-13 RX ORDER — SODIUM CHLORIDE 9 MG/ML
INJECTION, SOLUTION INTRAVENOUS CONTINUOUS
Status: CANCELLED | OUTPATIENT
Start: 2017-11-13

## 2017-11-13 RX ORDER — SODIUM CHLORIDE 0.9 % (FLUSH) 0.9 %
10 SYRINGE (ML) INJECTION PRN
Status: DISCONTINUED | OUTPATIENT
Start: 2017-11-13 | End: 2017-11-14 | Stop reason: HOSPADM

## 2017-11-13 RX ORDER — PALONOSETRON 0.05 MG/ML
0.25 INJECTION, SOLUTION INTRAVENOUS ONCE
Status: COMPLETED | OUTPATIENT
Start: 2017-11-13 | End: 2017-11-13

## 2017-11-13 RX ORDER — SODIUM CHLORIDE 0.9 % (FLUSH) 0.9 %
5 SYRINGE (ML) INJECTION PRN
Status: DISCONTINUED | OUTPATIENT
Start: 2017-11-13 | End: 2017-11-14 | Stop reason: HOSPADM

## 2017-11-13 RX ORDER — HEPARIN SODIUM (PORCINE) LOCK FLUSH IV SOLN 100 UNIT/ML 100 UNIT/ML
500 SOLUTION INTRAVENOUS PRN
Status: CANCELLED | OUTPATIENT
Start: 2017-11-13

## 2017-11-13 RX ORDER — DIPHENHYDRAMINE HYDROCHLORIDE 50 MG/ML
50 INJECTION INTRAMUSCULAR; INTRAVENOUS ONCE
Status: CANCELLED | OUTPATIENT
Start: 2017-11-13 | End: 2017-11-13

## 2017-11-13 RX ADMIN — IOHEXOL 40 ML: 240 INJECTION, SOLUTION INTRATHECAL; INTRAVASCULAR; INTRAVENOUS; ORAL at 12:42

## 2017-11-13 RX ADMIN — Medication 10 ML: at 12:44

## 2017-11-13 RX ADMIN — ALTEPLASE 2 MG: 2.2 INJECTION, POWDER, LYOPHILIZED, FOR SOLUTION INTRAVENOUS at 10:02

## 2017-11-13 RX ADMIN — ALTEPLASE 2 MG: 2.2 INJECTION, POWDER, LYOPHILIZED, FOR SOLUTION INTRAVENOUS at 08:53

## 2017-11-13 RX ADMIN — TRASTUZUMAB 1063 MG: 150 INJECTION, POWDER, LYOPHILIZED, FOR SOLUTION INTRAVENOUS at 14:32

## 2017-11-13 RX ADMIN — CARBOPLATIN 800 MG: 10 INJECTION, SOLUTION INTRAVENOUS at 17:16

## 2017-11-13 RX ADMIN — PERTUZUMAB 840 MG: 30 INJECTION, SOLUTION, CONCENTRATE INTRAVENOUS at 12:56

## 2017-11-13 RX ADMIN — Medication 10 ML: at 08:47

## 2017-11-13 RX ADMIN — WATER: 1 INJECTION INTRAMUSCULAR; INTRAVENOUS; SUBCUTANEOUS at 08:51

## 2017-11-13 RX ADMIN — Medication 10 ML: at 10:00

## 2017-11-13 RX ADMIN — Medication 10 ML: at 08:46

## 2017-11-13 RX ADMIN — Medication 10 ML: at 12:27

## 2017-11-13 RX ADMIN — DEXAMETHASONE SODIUM PHOSPHATE 12 MG: 10 INJECTION, SOLUTION INTRAMUSCULAR; INTRAVENOUS at 11:49

## 2017-11-13 RX ADMIN — SODIUM CHLORIDE, PRESERVATIVE FREE 500 UNITS: 5 INJECTION INTRAVENOUS at 12:46

## 2017-11-13 RX ADMIN — PALONOSETRON HYDROCHLORIDE 0.25 MG: 0.25 INJECTION INTRAVENOUS at 11:45

## 2017-11-13 RX ADMIN — SODIUM CHLORIDE: 9 INJECTION, SOLUTION INTRAVENOUS at 10:52

## 2017-11-13 RX ADMIN — DOCETAXEL ANHYDROUS 187 MG: 10 INJECTION, SOLUTION INTRAVENOUS at 16:11

## 2017-11-13 RX ADMIN — Medication 10 ML: at 10:50

## 2017-11-13 NOTE — PROGRESS NOTES
perjeta infused. No complaints. Ordered lunch. Patient sitting in chair with feet elevated.   Will monitor for 30 minutes prior to administering herceptin

## 2017-11-13 NOTE — PROGRESS NOTES
Patient came to unit for first treatment. VAD accessed with 3/4 inch mora needle. Flushes easy with blood return. Lab to come to unit for draw. Flushed with 10 ml of normal saline 3 times. No complaints. Reports feeling good feeling very nervous support needed and given.

## 2017-11-13 NOTE — PROGRESS NOTES
Dr. Gonzalo Mcgovern informed of carbo dose and WBC. Awaiting for orders signed. He will look at chart and sign orders he reported back.

## 2017-11-13 NOTE — PROGRESS NOTES
Patient is feeling hot. Face reddened. Gave her a hand fan and removed blankets. No difficulties breathing no other symptoms does report getting hot flashes regularly at home. Daughter at chairside.

## 2017-11-13 NOTE — PROGRESS NOTES
Patient completed treatment. IV discontinued. No complaints she is feeling good. She went to bathroom and she was discharged with her daughter.

## 2017-11-13 NOTE — PROGRESS NOTES
Went to radiology and accessed port with 1 inch mora needle. No blood return. Saline pushed easily.   Left patient with radiologist.

## 2017-11-13 NOTE — PLAN OF CARE
Problem: KNOWLEDGE DEFICIT  Goal: Patient/S.O. demonstrates understanding of disease process, treatment plan, medications, and discharge instructions.     Intervention: PROVIDE TEACHING AT LEVEL OF UNDERSTANDING  Review medications name, purpose, and side effects as they are given

## 2017-11-13 NOTE — PROGRESS NOTES
No blood return with chathflo. Humberto Ochoa RN re accessed VAD with no blood return and did push well. Letting patient sit will recheck again.

## 2017-11-13 NOTE — PROGRESS NOTES
Appointment made with Dr. Tyrese Murphy at 11 am.  Wrote this out on her AVS that has already been reviewed with her. She has had all of her premeds.

## 2017-11-14 ENCOUNTER — OFFICE VISIT (OUTPATIENT)
Dept: SURGERY | Age: 52
End: 2017-11-14
Payer: MEDICARE

## 2017-11-14 VITALS
SYSTOLIC BLOOD PRESSURE: 138 MMHG | WEIGHT: 293 LBS | RESPIRATION RATE: 16 BRPM | HEART RATE: 98 BPM | TEMPERATURE: 98.8 F | BODY MASS INDEX: 47.09 KG/M2 | DIASTOLIC BLOOD PRESSURE: 88 MMHG | HEIGHT: 66 IN

## 2017-11-14 PROCEDURE — 1036F TOBACCO NON-USER: CPT | Performed by: SURGERY

## 2017-11-14 PROCEDURE — G8417 CALC BMI ABV UP PARAM F/U: HCPCS | Performed by: SURGERY

## 2017-11-14 PROCEDURE — G8484 FLU IMMUNIZE NO ADMIN: HCPCS | Performed by: SURGERY

## 2017-11-14 PROCEDURE — G8427 DOCREV CUR MEDS BY ELIG CLIN: HCPCS | Performed by: SURGERY

## 2017-11-14 PROCEDURE — 3017F COLORECTAL CA SCREEN DOC REV: CPT | Performed by: SURGERY

## 2017-11-14 PROCEDURE — 99215 OFFICE O/P EST HI 40 MIN: CPT | Performed by: SURGERY

## 2017-11-14 PROCEDURE — 3014F SCREEN MAMMO DOC REV: CPT | Performed by: SURGERY

## 2017-11-14 NOTE — PROGRESS NOTES
This patient had an insertion of Umkmvy-e-Dojx of the left subclavian 5 days ago. Attempt to the porch yesterday was not successful apparently blood could not be aspirated through the port. She had x-ray ports grams which showed patent port and catheter although blood could not be aspirated.   The dressing is removed and the port is stopped with a #21 needle percutaneously and was flushed with 20 mL of normal saline followed by flushing with 5 mL of heparin solution 100 unit per mL  She is advised to return after 3 days for removal of the suture and repeated flushing and possible aspiration of the port and if that's feasible then the port may be used for chemotherapy

## 2017-11-16 ENCOUNTER — TELEPHONE (OUTPATIENT)
Dept: ONCOLOGY | Age: 52
End: 2017-11-16

## 2017-11-16 NOTE — TELEPHONE ENCOUNTER
Pt called into infusion room complaining of diarrhea to Domonique Hoffman notified me and I contacted Dr. Manasa Ley. Per his instructions pt can try OTC imodium and monitor for temp. Writer notified pt of these instructions and told pt to call back in if diarrhea persists and will notify physician.  Pt stated understanding of instructions

## 2017-11-17 ENCOUNTER — OFFICE VISIT (OUTPATIENT)
Dept: SURGERY | Age: 52
End: 2017-11-17

## 2017-11-17 VITALS
HEART RATE: 64 BPM | SYSTOLIC BLOOD PRESSURE: 120 MMHG | DIASTOLIC BLOOD PRESSURE: 60 MMHG | BODY MASS INDEX: 47.09 KG/M2 | WEIGHT: 293 LBS | RESPIRATION RATE: 16 BRPM | HEIGHT: 66 IN | TEMPERATURE: 99.5 F

## 2017-11-17 DIAGNOSIS — T85.698S: Primary | ICD-10-CM

## 2017-11-17 PROCEDURE — 99024 POSTOP FOLLOW-UP VISIT: CPT | Performed by: SURGERY

## 2017-11-17 NOTE — PROGRESS NOTES
The patient is here for suture removal after insertion of Jvspfl-n-Surd in the left subclavian vein. She is also for testing the port. .  The wound healed satisfactorily. All sutures removed. The port was accessed with the needle and could not aspirate however it was irrigated with saline Estrace 20 mL and then heparinized with 10 mL without having any difficulties and there was no local swelling or evidence of extravasation.   The patient will come back from a couple of weeks we'll try and alleviate again and if that doesn't work then we may have to change the port

## 2017-11-20 ENCOUNTER — TELEPHONE (OUTPATIENT)
Dept: ONCOLOGY | Age: 52
End: 2017-11-20

## 2017-11-20 NOTE — TELEPHONE ENCOUNTER
Pt called in stated that she is still having some diarrhea, pt is currently taking imodium 2 tabs every 4 hrs, pt denies fever or stomach pain. Pt did say that has developed rash on face by hairline that is itchy. Writer phoned Dr. Ping Ely and per his instructions pt to continue with imodium, monitor temp, pt also instructed to take benadryl OTC. Pt also instructed to call our office back in 1-2 days to notify of response to medication. Pt also instructed that if she were to experience abdominal pain, and fever and was unable to reach our office that pt was to go to ER. Pt stated understanding of instructions.

## 2017-11-27 ENCOUNTER — TELEPHONE (OUTPATIENT)
Dept: INFUSION THERAPY | Facility: MEDICAL CENTER | Age: 52
End: 2017-11-27

## 2017-11-28 ENCOUNTER — TELEPHONE (OUTPATIENT)
Dept: SURGERY | Age: 52
End: 2017-11-28

## 2017-11-28 ENCOUNTER — OFFICE VISIT (OUTPATIENT)
Dept: SURGERY | Age: 52
End: 2017-11-28
Payer: MEDICARE

## 2017-11-28 VITALS
HEIGHT: 65 IN | SYSTOLIC BLOOD PRESSURE: 152 MMHG | WEIGHT: 292 LBS | TEMPERATURE: 99.3 F | HEART RATE: 80 BPM | DIASTOLIC BLOOD PRESSURE: 98 MMHG | BODY MASS INDEX: 48.65 KG/M2

## 2017-11-28 DIAGNOSIS — Z95.828 PORT CATHETER IN PLACE: Primary | ICD-10-CM

## 2017-11-28 PROCEDURE — 1036F TOBACCO NON-USER: CPT | Performed by: SURGERY

## 2017-11-28 PROCEDURE — G8484 FLU IMMUNIZE NO ADMIN: HCPCS | Performed by: SURGERY

## 2017-11-28 PROCEDURE — 3017F COLORECTAL CA SCREEN DOC REV: CPT | Performed by: SURGERY

## 2017-11-28 PROCEDURE — G8417 CALC BMI ABV UP PARAM F/U: HCPCS | Performed by: SURGERY

## 2017-11-28 PROCEDURE — G8427 DOCREV CUR MEDS BY ELIG CLIN: HCPCS | Performed by: SURGERY

## 2017-11-28 PROCEDURE — 99215 OFFICE O/P EST HI 40 MIN: CPT | Performed by: SURGERY

## 2017-11-28 PROCEDURE — 3014F SCREEN MAMMO DOC REV: CPT | Performed by: SURGERY

## 2017-11-28 NOTE — TELEPHONE ENCOUNTER
Indications: as needed  8/31/17   Historical Provider, MD   hydrOXYzine (ATARAX) 10 MG tablet Take 10 mg by mouth daily  7/21/17   Historical Provider, MD   lamoTRIgine (LAMICTAL) 100 MG tablet 100 mg nightly  8/30/17   Historical Provider, MD   lisinopril (PRINIVIL;ZESTRIL) 20 MG tablet 20 mg daily  8/31/17   Historical Provider, MD   loratadine (CLARITIN) 10 MG tablet Take 10 mg by mouth daily  8/31/17   Historical Provider, MD   pravastatin (PRAVACHOL) 40 MG tablet Take 40 mg by mouth daily  8/31/17   Historical Provider, MD   QUEtiapine (SEROQUEL) 300 MG tablet Take 300 mg by mouth nightly  8/9/17   Historical Provider, MD   SUMAtriptan (IMITREX) 25 MG tablet 25 mg once as needed  7/21/17   Historical Provider, MD   topiramate (TOPAMAX) 25 MG tablet Take 25 mg by mouth daily  7/21/17   Historical Provider, MD   traZODone (DESYREL) 100 MG tablet Take 100 mg by mouth nightly  8/30/17   Historical Provider, MD     Vital Signs (Current) [unfilled]    Weight:   Wt Readings from Last 1 Encounters:   11/28/17 292 lb (132.5 kg)     Height:   Ht Readings from Last 1 Encounters:   11/28/17 5' 5\" (1.651 m)      BMI:  There is no height or weight on file to calculate BMI. Estimated body mass index is 48.59 kg/m² as calculated from the following:    Height as of an earlier encounter on 11/28/17: 5' 5\" (1.651 m). Weight as of an earlier encounter on 11/28/17: 292 lb (132.5 kg). body mass index is unknown because there is no height or weight on file. Cardiac Clearance: None   Medical Clearance:None   Appointment for surgery Clearance scheduled for:None     Preoperative Testing:   These are the current and completed labs:  CBC:   Lab Results   Component Value Date    WBC 13.4 11/13/2017    RBC 4.28 11/13/2017    HGB 13.3 11/13/2017    HCT 40.0 11/13/2017    MCV 93.4 11/13/2017    RDW 13.5 11/13/2017     11/13/2017     CMP:   Lab Results   Component Value Date     11/13/2017    K 4.0 11/13/2017    CL 100 11/13/2017    CO2 19 11/13/2017    BUN 21 11/13/2017    CREATININE 0.95 11/13/2017    GFRAA >60 11/13/2017    LABGLOM >60 11/13/2017    GLUCOSE 276 11/13/2017    PROT 7.4 11/13/2017    CALCIUM 9.7 11/13/2017    BILITOT 0.14 11/13/2017    ALKPHOS 77 11/13/2017    AST 14 11/13/2017    ALT 16 11/13/2017     POC Tests: No results for input(s): POCGLU, POCNA, POCK, POCCL, POCBUN, POCHEMO, POCHCT in the last 72 hours.   Coags    Lab Results   Component Value Date    PROTIME 10.7 10/10/2017    INR 1.0 10/10/2017    APTT 26.7 70/17/2693     HCG (If Applicable) No results found for: PREGTESTUR, PREGSERUM, HCG, HCGQUANT   ABGs No results found for: PHART, PO2ART, UHB9HPJ, DTA0IZJ, BEART, Q9LVITRT   Type & Screen (If Applicable)  No results found for: Kolton Gray    Additional ordered pre-operative testing:  [x]CBC    []ABG      [] BMP   []URINALYSIS   [x]CMP    []HCG   []COAGS PT/INR  []T&C  []LFTs   []TYPE AND SCREEN    [x] EKG  [x] Chest X-Ray  [] Other Radiology    [] Sent to Hospitalist None  [] Sent to Anesthesia for your review: None   [] Additional Orders: None     Comments:see epic   Requests: None    Signed: Sy Denise MA 11/28/2017 2:38 PM

## 2017-11-28 NOTE — PROGRESS NOTES
Eloisa Jenkins MD at [address] [facility] / REMOVAL / REPLACEMENT VENOUS ACCESS CATHETER Left 11/9/2017    PORT INSERTION performed by Leila Arias MD at [address] Right 10/19/2017     [address] [facility]    MASTECTOMY Right 10/19/2017    Right Breast Mastectomy with  [place] Node Biopsy performed by Leila Arias MD at [place] [address] Left 2014, 2015    x 2 surgeries total; Dr. Edith Sellers Prescriptions on File Prior to Visit   Medication Sig Dispense Refill    lamoTRIgine (LAMICTAL) 100 MG tablet 100 mg nightly       lisinopril (PRINIVIL;ZESTRIL) 20 MG tablet 20 mg daily       loratadine (CLARITIN) 10 MG tablet Take 10 mg by mouth daily       pravastatin (PRAVACHOL) 40 MG tablet Take 40 mg by mouth daily       QUEtiapine (SEROQUEL) 300 MG tablet Take 300 mg by mouth nightly       SUMAtriptan (IMITREX) 25 MG tablet 25 mg once as needed       topiramate (TOPAMAX) 25 MG tablet Take 25 mg by mouth daily       traZODone (DESYREL) 100 MG tablet Take 100 mg by mouth nightly       prochlorperazine (COMPAZINE) 10 MG tablet Take 10 mg by mouth every 6 hours as needed      HYDROcodone-acetaminophen (NORCO) 5-325 MG per tablet Take 1 tablet by mouth every 6 hours as needed for Pain .  VENTOLIN  (90 Base) MCG/ACT inhaler Inhale 2 puffs into the lungs every 4 hours as needed       butalbital-acetaminophen-caffeine (FIORICET, ESGIC) -40 MG per tablet Take 1-2 tablets by mouth every 4 hours as needed       RA VITAMIN D-3 5000 units CAPS capsule Take 5,000 Units by mouth daily       fluticasone (FLONASE) 50 MCG/ACT nasal spray 1 spray by Nasal route Indications: as needed       hydrOXYzine (ATARAX) 10 MG tablet Take 10 mg by mouth daily        No current facility-administered medications on file prior to visit. No Known Allergies     reports that she has never smoked.  She has never used smokeless tobacco.  reports that she does not drink alcohol. Review of Systems - History obtained from chart review and the patient  General ROS: Obesity  Psychological ROS: negative  Ophthalmic ROS: negative  Hematological and Lymphatic ROS: negative  Breast ROS: As mentioned above  Respiratory ROS: negative  Cardiovascular ROS: negative  Gastrointestinal ROS: negative  Genito-Urinary ROS: negative  Musculoskeletal ROS: negative        Physical Exam:    BP (!) 152/98   Pulse 80   Temp 99.3 °F (37.4 °C)   Ht 5' 5\" (1.651 m)   Wt 292 lb (132.5 kg)   BMI 48.59 kg/m²   Weight: 292 lb (132.5 kg)     General Appearance:  awake, alert, oriented, in no acute distress, well developed, well nourished, in no acute distress, cooperative, obese and ambulatory  Head/face:  NCAT  Neck:  no bruits and thick neck  Lungs:  Normal expansion. Clear to auscultation. No rales, rhonchi, or wheezing. The port is palpable in the subcutaneous pocket below the left clavicle  Heart:  Heart sounds are normal.  Regular rate and rhythm without murmur, gallop or rub. Heart regular rate and rhythm        Assessment:  Malfunctioning Ehvqzn-k-Jrzt. Advised replacement. The patient understood and agreed. Plan:  Scheduled for replacement of Fggfsb-e-Nrxc over the left subclavian vein.     Electronically signed by Maycol Puente MD on 11/28/2017 at 2:21 PM

## 2017-11-30 ENCOUNTER — ANESTHESIA EVENT (OUTPATIENT)
Dept: OPERATING ROOM | Age: 52
End: 2017-11-30
Payer: MEDICARE

## 2017-11-30 ENCOUNTER — APPOINTMENT (OUTPATIENT)
Dept: GENERAL RADIOLOGY | Age: 52
End: 2017-11-30
Attending: SURGERY
Payer: MEDICARE

## 2017-11-30 ENCOUNTER — ANESTHESIA (OUTPATIENT)
Dept: OPERATING ROOM | Age: 52
End: 2017-11-30
Payer: MEDICARE

## 2017-11-30 ENCOUNTER — HOSPITAL ENCOUNTER (OUTPATIENT)
Age: 52
Setting detail: OUTPATIENT SURGERY
Discharge: HOME | End: 2017-11-30
Attending: SURGERY | Admitting: SURGERY
Payer: MEDICARE

## 2017-11-30 VITALS
RESPIRATION RATE: 18 BRPM | BODY MASS INDEX: 48.65 KG/M2 | SYSTOLIC BLOOD PRESSURE: 130 MMHG | HEART RATE: 86 BPM | HEIGHT: 65 IN | WEIGHT: 292 LBS | TEMPERATURE: 97.5 F | OXYGEN SATURATION: 95 % | DIASTOLIC BLOOD PRESSURE: 81 MMHG

## 2017-11-30 VITALS — SYSTOLIC BLOOD PRESSURE: 164 MMHG | DIASTOLIC BLOOD PRESSURE: 103 MMHG | OXYGEN SATURATION: 95 %

## 2017-11-30 PROCEDURE — 2500000003 HC RX 250 WO HCPCS

## 2017-11-30 PROCEDURE — 2500000003 HC RX 250 WO HCPCS: Performed by: NURSE ANESTHETIST, CERTIFIED REGISTERED

## 2017-11-30 PROCEDURE — 2580000003 HC RX 258: Performed by: SURGERY

## 2017-11-30 PROCEDURE — 00532 ANES ACCESS CTR VENOUS CRCJ: CPT | Performed by: NURSE ANESTHETIST, CERTIFIED REGISTERED

## 2017-11-30 PROCEDURE — 3600000002 HC SURGERY LEVEL 2 BASE: Performed by: SURGERY

## 2017-11-30 PROCEDURE — 36582 REPLACE TUNNELED CV CATH: CPT | Performed by: SURGERY

## 2017-11-30 PROCEDURE — 6370000000 HC RX 637 (ALT 250 FOR IP): Performed by: SURGERY

## 2017-11-30 PROCEDURE — A6257 TRANSPARENT FILM <= 16 SQ IN: HCPCS | Performed by: SURGERY

## 2017-11-30 PROCEDURE — 3600000012 HC SURGERY LEVEL 2 ADDTL 15MIN: Performed by: SURGERY

## 2017-11-30 PROCEDURE — 7100000001 HC PACU RECOVERY - ADDTL 15 MIN: Performed by: SURGERY

## 2017-11-30 PROCEDURE — 6360000002 HC RX W HCPCS: Performed by: NURSE ANESTHETIST, CERTIFIED REGISTERED

## 2017-11-30 PROCEDURE — 7100000011 HC PHASE II RECOVERY - ADDTL 15 MIN: Performed by: SURGERY

## 2017-11-30 PROCEDURE — 71010 XR CHEST PORTABLE: CPT

## 2017-11-30 PROCEDURE — 6360000002 HC RX W HCPCS: Performed by: SURGERY

## 2017-11-30 PROCEDURE — 7100000000 HC PACU RECOVERY - FIRST 15 MIN: Performed by: SURGERY

## 2017-11-30 PROCEDURE — C1788 PORT, INDWELLING, IMP: HCPCS | Performed by: SURGERY

## 2017-11-30 PROCEDURE — 7100000010 HC PHASE II RECOVERY - FIRST 15 MIN: Performed by: SURGERY

## 2017-11-30 PROCEDURE — 3209999900 FLUORO FOR SURGICAL PROCEDURES

## 2017-11-30 PROCEDURE — 3700000001 HC ADD 15 MINUTES (ANESTHESIA): Performed by: SURGERY

## 2017-11-30 PROCEDURE — 3700000000 HC ANESTHESIA ATTENDED CARE: Performed by: SURGERY

## 2017-11-30 DEVICE — IMPLANTABLE DEVICE
Type: IMPLANTABLE DEVICE | Site: SUBCLAVIAN | Status: FUNCTIONAL
Removed: 2018-03-01

## 2017-11-30 RX ORDER — PROPOFOL 10 MG/ML
INJECTION, EMULSION INTRAVENOUS CONTINUOUS PRN
Status: DISCONTINUED | OUTPATIENT
Start: 2017-11-30 | End: 2017-11-30 | Stop reason: SDUPTHER

## 2017-11-30 RX ORDER — HEPARIN SODIUM 5000 [USP'U]/ML
INJECTION, SOLUTION INTRAVENOUS; SUBCUTANEOUS PRN
Status: DISCONTINUED | OUTPATIENT
Start: 2017-11-30 | End: 2017-11-30 | Stop reason: HOSPADM

## 2017-11-30 RX ORDER — PROPOFOL 10 MG/ML
INJECTION, EMULSION INTRAVENOUS PRN
Status: DISCONTINUED | OUTPATIENT
Start: 2017-11-30 | End: 2017-11-30 | Stop reason: SDUPTHER

## 2017-11-30 RX ORDER — KETOROLAC TROMETHAMINE 30 MG/ML
INJECTION, SOLUTION INTRAMUSCULAR; INTRAVENOUS PRN
Status: DISCONTINUED | OUTPATIENT
Start: 2017-11-30 | End: 2017-11-30 | Stop reason: SDUPTHER

## 2017-11-30 RX ORDER — SODIUM CHLORIDE, SODIUM LACTATE, POTASSIUM CHLORIDE, CALCIUM CHLORIDE 600; 310; 30; 20 MG/100ML; MG/100ML; MG/100ML; MG/100ML
INJECTION, SOLUTION INTRAVENOUS CONTINUOUS
Status: DISCONTINUED | OUTPATIENT
Start: 2017-11-30 | End: 2017-11-30 | Stop reason: HOSPADM

## 2017-11-30 RX ORDER — MIDAZOLAM HYDROCHLORIDE 1 MG/ML
INJECTION INTRAMUSCULAR; INTRAVENOUS PRN
Status: DISCONTINUED | OUTPATIENT
Start: 2017-11-30 | End: 2017-11-30 | Stop reason: SDUPTHER

## 2017-11-30 RX ORDER — ONDANSETRON 2 MG/ML
INJECTION INTRAMUSCULAR; INTRAVENOUS PRN
Status: DISCONTINUED | OUTPATIENT
Start: 2017-11-30 | End: 2017-11-30 | Stop reason: SDUPTHER

## 2017-11-30 RX ORDER — FENTANYL CITRATE 50 UG/ML
INJECTION, SOLUTION INTRAMUSCULAR; INTRAVENOUS PRN
Status: DISCONTINUED | OUTPATIENT
Start: 2017-11-30 | End: 2017-11-30 | Stop reason: SDUPTHER

## 2017-11-30 RX ORDER — HYDROCODONE BITARTRATE AND ACETAMINOPHEN 5; 325 MG/1; MG/1
1 TABLET ORAL EVERY 6 HOURS PRN
Qty: 20 TABLET | Refills: 0 | Status: SHIPPED | OUTPATIENT
Start: 2017-11-30 | End: 2017-12-05

## 2017-11-30 RX ORDER — LIDOCAINE HYDROCHLORIDE 40 MG/ML
INJECTION, SOLUTION RETROBULBAR; TOPICAL PRN
Status: DISCONTINUED | OUTPATIENT
Start: 2017-11-30 | End: 2017-11-30 | Stop reason: SDUPTHER

## 2017-11-30 RX ORDER — HYDROCODONE BITARTRATE AND ACETAMINOPHEN 5; 325 MG/1; MG/1
1 TABLET ORAL ONCE
Status: COMPLETED | OUTPATIENT
Start: 2017-11-30 | End: 2017-11-30

## 2017-11-30 RX ADMIN — PROPOFOL 225 MCG/KG/MIN: 10 INJECTION, EMULSION INTRAVENOUS at 12:53

## 2017-11-30 RX ADMIN — FENTANYL CITRATE 50 MCG: 50 INJECTION, SOLUTION INTRAMUSCULAR; INTRAVENOUS at 14:08

## 2017-11-30 RX ADMIN — MIDAZOLAM HYDROCHLORIDE 2 MG: 1 INJECTION, SOLUTION INTRAMUSCULAR; INTRAVENOUS at 12:25

## 2017-11-30 RX ADMIN — LIDOCAINE HYDROCHLORIDE 100 MG: 40 INJECTION, SOLUTION RETROBULBAR; TOPICAL at 12:50

## 2017-11-30 RX ADMIN — KETOROLAC TROMETHAMINE 30 MG: 30 INJECTION, SOLUTION INTRAMUSCULAR; INTRAVENOUS at 14:17

## 2017-11-30 RX ADMIN — HYDROCODONE BITARTRATE AND ACETAMINOPHEN 1 TABLET: 5; 325 TABLET ORAL at 15:19

## 2017-11-30 RX ADMIN — FENTANYL CITRATE 50 MCG: 50 INJECTION, SOLUTION INTRAMUSCULAR; INTRAVENOUS at 14:15

## 2017-11-30 RX ADMIN — PROPOFOL 100 MG: 10 INJECTION, EMULSION INTRAVENOUS at 13:00

## 2017-11-30 RX ADMIN — ONDANSETRON 4 MG: 2 INJECTION INTRAMUSCULAR; INTRAVENOUS at 13:41

## 2017-11-30 RX ADMIN — FENTANYL CITRATE 50 MCG: 50 INJECTION, SOLUTION INTRAMUSCULAR; INTRAVENOUS at 12:25

## 2017-11-30 RX ADMIN — SODIUM CHLORIDE, POTASSIUM CHLORIDE, SODIUM LACTATE AND CALCIUM CHLORIDE: 600; 310; 30; 20 INJECTION, SOLUTION INTRAVENOUS at 12:29

## 2017-11-30 ASSESSMENT — PULMONARY FUNCTION TESTS
PIF_VALUE: 15
PIF_VALUE: 6
PIF_VALUE: 2
PIF_VALUE: 1
PIF_VALUE: 2
PIF_VALUE: 3
PIF_VALUE: 5
PIF_VALUE: 2
PIF_VALUE: 2
PIF_VALUE: 1
PIF_VALUE: 4
PIF_VALUE: 2
PIF_VALUE: 8
PIF_VALUE: 4
PIF_VALUE: 1
PIF_VALUE: 2
PIF_VALUE: 8
PIF_VALUE: 8
PIF_VALUE: 15
PIF_VALUE: 14
PIF_VALUE: 6
PIF_VALUE: 5
PIF_VALUE: 1
PIF_VALUE: 2
PIF_VALUE: 5
PIF_VALUE: 7
PIF_VALUE: 1
PIF_VALUE: 2
PIF_VALUE: 1
PIF_VALUE: 6
PIF_VALUE: 2
PIF_VALUE: 11
PIF_VALUE: 6
PIF_VALUE: 2
PIF_VALUE: 2
PIF_VALUE: 1
PIF_VALUE: 12
PIF_VALUE: 2
PIF_VALUE: 2

## 2017-11-30 ASSESSMENT — PAIN SCALES - GENERAL
PAINLEVEL_OUTOF10: 0
PAINLEVEL_OUTOF10: 10
PAINLEVEL_OUTOF10: 5

## 2017-11-30 ASSESSMENT — PAIN DESCRIPTION - ORIENTATION
ORIENTATION: LEFT

## 2017-11-30 ASSESSMENT — PAIN DESCRIPTION - PAIN TYPE
TYPE: SURGICAL PAIN

## 2017-11-30 ASSESSMENT — PAIN DESCRIPTION - ONSET: ONSET: GRADUAL

## 2017-11-30 ASSESSMENT — PAIN DESCRIPTION - LOCATION
LOCATION: CHEST

## 2017-11-30 ASSESSMENT — PAIN - FUNCTIONAL ASSESSMENT: PAIN_FUNCTIONAL_ASSESSMENT: 0-10

## 2017-11-30 ASSESSMENT — PAIN DESCRIPTION - DESCRIPTORS: DESCRIPTORS: CONSTANT

## 2017-11-30 ASSESSMENT — PAIN DESCRIPTION - PROGRESSION: CLINICAL_PROGRESSION: GRADUALLY IMPROVING

## 2017-11-30 ASSESSMENT — PAIN DESCRIPTION - FREQUENCY: FREQUENCY: CONTINUOUS

## 2017-11-30 NOTE — ANESTHESIA PRE PROCEDURE
daily  7/21/17   Historical Provider, MD   traZODone (DESYREL) 100 MG tablet Take 100 mg by mouth nightly  8/30/17   Historical Provider, MD       Current medications:    No current facility-administered medications for this visit. No current outpatient prescriptions on file. Facility-Administered Medications Ordered in Other Visits   Medication Dose Route Frequency Provider Last Rate Last Dose    lactated ringers infusion   Intravenous Continuous Lilianail MD Kami 125 mL/hr at 11/30/17 1229         Allergies:  No Known Allergies    Problem List:    Patient Active Problem List   Diagnosis Code    Bipolar 1 disorder ([facility] F31.9    Hyperlipidemia E78.5    Malignant neoplasm of overlapping sites of breast in female, estrogen receptor negative ([facility] C50.819, Z17.1       Past Medical History:        Diagnosis Date    Bipolar 1 disorder ([facility]     Breast cancer ([facility] 2017    right side     Headache(784.0)     Hyperlipidemia     Migraine        Past Surgical History:        Procedure Laterality Date    DILATION AND CURETTAGE OF UTERUS N/A 10/13/2017    D & C HYSTEROSCOPY with polypectomy performed by Yesika Rich MD at [address] / REMOVAL / [address] Ulysses Said Left 11/9/2017    PORT INSERTION performed by Brooks Herring MD at [address] Right 10/19/2017     [facility]    MASTECTOMY Right 10/19/2017    Right Breast Mastectomy with  [place] Node Biopsy performed by Brooks Herring MD at [place] [address] Left 2014, 2015    x 2 surgeries total; [facility] History:    Social History   Substance Use Topics    Smoking status: Never Smoker    Smokeless tobacco: Never Used    Alcohol use No                                Counseling given: Not Answered      Vital Signs (Current): There were no vitals filed for this visit.                                            BP Readings from Last 3 Encounters:   11/30/17 136/85   11/28/17 (!) 152/98   11/17/17 120/60       NPO Status:                                                                                 BMI:   Wt Readings from Last 3 Encounters:   11/30/17 292 lb (132.5 kg)   11/28/17 292 lb (132.5 kg)   11/17/17 294 lb (133.4 kg)     There is no height or weight on file to calculate BMI.    CBC:   Lab Results   Component Value Date    WBC 13.4 11/13/2017    RBC 4.28 11/13/2017    HGB 13.3 11/13/2017    HCT 40.0 11/13/2017    MCV 93.4 11/13/2017    RDW 13.5 11/13/2017     11/13/2017       CMP:   Lab Results   Component Value Date     11/13/2017    K 4.0 11/13/2017     11/13/2017    CO2 19 11/13/2017    BUN 21 11/13/2017    CREATININE 0.95 11/13/2017    GFRAA >60 11/13/2017    LABGLOM >60 11/13/2017    GLUCOSE 276 11/13/2017    PROT 7.4 11/13/2017    CALCIUM 9.7 11/13/2017    BILITOT 0.14 11/13/2017    ALKPHOS 77 11/13/2017    AST 14 11/13/2017    ALT 16 11/13/2017       POC Tests: No results for input(s): POCGLU, POCNA, POCK, POCCL, POCBUN, POCHEMO, POCHCT in the last 72 hours. Coags:   Lab Results   Component Value Date    PROTIME 10.7 10/10/2017    INR 1.0 10/10/2017    APTT 26.7 10/10/2017       HCG (If Applicable): No results found for: PREGTESTUR, PREGSERUM, HCG, HCGQUANT     ABGs: No results found for: PHART, PO2ART, BGK5FGH, UBE2TQW, BEART, A7MGWSRK     Type & Screen (If Applicable):  No results found for: LABABO, [address]    Anesthesia Evaluation  Patient summary reviewed and Nursing notes reviewed no history of anesthetic complications:   Airway: Mallampati: II  TM distance: >3 FB   Neck ROM: full  Mouth opening: > = 3 FB Dental: normal exam         Pulmonary:Negative Pulmonary ROS and normal exam              Patient did not smoke on day of surgery.                  Cardiovascular:    (+) hypertension:,       ECG reviewed        Stress test reviewed                Neuro/Psych:   (+) headaches:, psychiatric history:            GI/Hepatic/Renal: Neg GI/Hepatic/Renal ROS            Endo/Other: Negative Endo/Other ROS                    Abdominal:           Vascular:                                          Anesthesia Plan      general     ASA 3     (Risks and benefits of general anesthesia discussed, questions answered, Pt verbalizes understanding and agrees to proceed. Specifically dicussed risks, benefits, alternatives, personnel involved, including risks of: cut or cracked lip, chipped tooth/teeth, broken or removed teeth, sore throat, damaged vocal cords, nausea and vomiting, allergic reaction to medication, failed intubation, need to repeat procedure, emergent airway attempts, case cancellation, need for prolonged intubation/remaining intubated, other monitors, and possible death. The patient will be placed on a cardiac monitor and vital signs, pulse oximetry and level of consciousness will be continuously evaluated throughout the procedure. The patient will be closely monitored until recovery from the medications is complete and the patient has returned to baseline status. Electronically signed by:  Andreas Walls CRNA,MSN,10/13/2017 7:16 AM  Staff Anesthetist  [facility]  Dept of Anesthesia  )  Induction: intravenous. Anesthetic plan and risks discussed with patient. Use of blood products discussed with patient whom consented to blood products.    Plan discussed with CRNA and surgical team.                  Andreas Walls CRNA   11/30/2017

## 2017-11-30 NOTE — ANESTHESIA POSTPROCEDURE EVALUATION
Department of Anesthesiology  Postprocedure Note    Patient: Cassia Albarado  MRN: [id number]  YOB: 1965  Date of evaluation: 11/30/2017  Time:  4:07 PM     Procedure Summary     Date:  11/30/17 Room / Location:  [address] [place] [address]    Anesthesia Start:  5510 Anesthesia Stop:  7058    Procedure:  Port Removal & Insertion (N/A ) Diagnosis:  (Non functioning port)    Surgeon:  Leila Arias MD Responsible Provider:  Ruthie Nicole CRNA    Anesthesia Type:  general ASA Status:  3          Anesthesia Type: general    Chris Phase I: Chris Score: 9    Chris Phase II: Crhis Score: 10    Last vitals: Reviewed and per EMR flowsheets. Anesthesia Post Evaluation    Patient location during evaluation: PACU  Patient participation: complete - patient participated  Level of consciousness: awake and alert  Pain score: 0  Airway patency: patent  Nausea & Vomiting: no nausea and no vomiting  Complications: no  Cardiovascular status: hemodynamically stable  Respiratory status: acceptable and room air  Hydration status: euvolemic  Comments: Pt stated no pain upon discharge & was taking po without nausea.

## 2017-12-04 ENCOUNTER — HOSPITAL ENCOUNTER (OUTPATIENT)
Dept: INFUSION THERAPY | Age: 52
End: 2017-12-04
Payer: MEDICARE

## 2017-12-06 ENCOUNTER — OFFICE VISIT (OUTPATIENT)
Dept: SURGERY | Age: 52
End: 2017-12-06

## 2017-12-06 VITALS
WEIGHT: 288.4 LBS | RESPIRATION RATE: 20 BRPM | HEART RATE: 74 BPM | HEIGHT: 65 IN | SYSTOLIC BLOOD PRESSURE: 124 MMHG | TEMPERATURE: 101 F | BODY MASS INDEX: 48.05 KG/M2 | DIASTOLIC BLOOD PRESSURE: 84 MMHG

## 2017-12-06 DIAGNOSIS — Z09 POSTOP CHECK: Primary | ICD-10-CM

## 2017-12-06 PROCEDURE — 99024 POSTOP FOLLOW-UP VISIT: CPT | Performed by: SURGERY

## 2017-12-06 NOTE — PROGRESS NOTES
This patient is post replacement of malfunctioning Gnlvoe-a-Dbxw, the catheter of  was found to be transected. That was replaced with a longer catheter and was tested and was found to be functional in the time of the surgery. Her wound is satisfactory and all sutures were removed. The site of the port was located and it was then marked and in shoes with saline using at least 20 mL of normal saline and was to be functional however blood could not be aspirated through the needle. It was then heparinized. She is due for chemotherapy treatment and 4 days. The port may be tapped at that time and tested with an saline infusion and aspiration. If aspiration of blood could not be done and attempt to verify the port with x-ray and injection of contrast through the port should be done and if it appears functional without extravasation then it may be used for infusion of chemotherapy.

## 2017-12-11 ENCOUNTER — HOSPITAL ENCOUNTER (OUTPATIENT)
Dept: INFUSION THERAPY | Age: 52
Discharge: HOME | End: 2017-12-11
Payer: MEDICARE

## 2017-12-11 ENCOUNTER — OFFICE VISIT (OUTPATIENT)
Dept: ONCOLOGY | Age: 52
End: 2017-12-11
Payer: MEDICARE

## 2017-12-11 VITALS
HEART RATE: 116 BPM | WEIGHT: 283.8 LBS | HEIGHT: 65 IN | RESPIRATION RATE: 18 BRPM | SYSTOLIC BLOOD PRESSURE: 108 MMHG | DIASTOLIC BLOOD PRESSURE: 64 MMHG | BODY MASS INDEX: 47.28 KG/M2 | TEMPERATURE: 98 F

## 2017-12-11 DIAGNOSIS — Z17.1 MALIGNANT NEOPLASM OF OVERLAPPING SITES OF RIGHT BREAST IN FEMALE, ESTROGEN RECEPTOR NEGATIVE (HCC): ICD-10-CM

## 2017-12-11 DIAGNOSIS — C50.811 MALIGNANT NEOPLASM OF OVERLAPPING SITES OF RIGHT BREAST IN FEMALE, ESTROGEN RECEPTOR NEGATIVE (HCC): Primary | ICD-10-CM

## 2017-12-11 DIAGNOSIS — Z17.1 MALIGNANT NEOPLASM OF OVERLAPPING SITES OF RIGHT BREAST IN FEMALE, ESTROGEN RECEPTOR NEGATIVE (HCC): Primary | ICD-10-CM

## 2017-12-11 DIAGNOSIS — C50.811 MALIGNANT NEOPLASM OF OVERLAPPING SITES OF RIGHT BREAST IN FEMALE, ESTROGEN RECEPTOR NEGATIVE (HCC): ICD-10-CM

## 2017-12-11 LAB
ABSOLUTE EOS #: 0.2 K/UL (ref 0–0.4)
ABSOLUTE IMMATURE GRANULOCYTE: ABNORMAL K/UL (ref 0–0.3)
ABSOLUTE LYMPH #: 1.9 K/UL (ref 1–4.8)
ABSOLUTE MONO #: 0.7 K/UL (ref 0.1–1.2)
ALBUMIN SERPL-MCNC: 3.5 G/DL (ref 3.5–5.2)
ALBUMIN/GLOBULIN RATIO: 1 (ref 1–2.5)
ALP BLD-CCNC: 69 U/L (ref 35–104)
ALT SERPL-CCNC: 15 U/L (ref 5–33)
ANION GAP SERPL CALCULATED.3IONS-SCNC: 18 MMOL/L (ref 9–17)
AST SERPL-CCNC: 15 U/L
BASOPHILS # BLD: 1 % (ref 0–1)
BASOPHILS ABSOLUTE: 0 K/UL (ref 0–0.2)
BILIRUB SERPL-MCNC: 0.22 MG/DL (ref 0.3–1.2)
BUN BLDV-MCNC: 17 MG/DL (ref 6–20)
BUN/CREAT BLD: 16 (ref 9–20)
CALCIUM SERPL-MCNC: 9.4 MG/DL (ref 8.6–10.4)
CHLORIDE BLD-SCNC: 102 MMOL/L (ref 98–107)
CO2: 23 MMOL/L (ref 20–31)
CREAT SERPL-MCNC: 1.04 MG/DL (ref 0.5–0.9)
DIFFERENTIAL TYPE: ABNORMAL
EOSINOPHILS RELATIVE PERCENT: 2 % (ref 1–7)
GFR AFRICAN AMERICAN: >60 ML/MIN
GFR NON-AFRICAN AMERICAN: 56 ML/MIN
GFR SERPL CREATININE-BSD FRML MDRD: ABNORMAL ML/MIN/{1.73_M2}
GFR SERPL CREATININE-BSD FRML MDRD: ABNORMAL ML/MIN/{1.73_M2}
GLUCOSE BLD-MCNC: 172 MG/DL (ref 70–99)
HCT VFR BLD CALC: 32.9 % (ref 36–46)
HEMOGLOBIN: 10.9 G/DL (ref 12–16)
IMMATURE GRANULOCYTES: ABNORMAL %
LYMPHOCYTES # BLD: 31 % (ref 16–46)
MCH RBC QN AUTO: 30.7 PG (ref 26–34)
MCHC RBC AUTO-ENTMCNC: 33.2 G/DL (ref 31–37)
MCV RBC AUTO: 92.7 FL (ref 80–100)
MONOCYTES # BLD: 11 % (ref 4–11)
PDW BLD-RTO: 13.5 % (ref 11–14.5)
PLATELET # BLD: 257 K/UL (ref 140–450)
PLATELET ESTIMATE: ABNORMAL
PMV BLD AUTO: 7.7 FL (ref 6–12)
POTASSIUM SERPL-SCNC: 3.5 MMOL/L (ref 3.7–5.3)
RBC # BLD: 3.55 M/UL (ref 4–5.2)
RBC # BLD: ABNORMAL 10*6/UL
SEG NEUTROPHILS: 55 % (ref 43–77)
SEGMENTED NEUTROPHILS ABSOLUTE COUNT: 3.5 K/UL (ref 1.8–7.7)
SODIUM BLD-SCNC: 143 MMOL/L (ref 135–144)
TOTAL PROTEIN: 6.9 G/DL (ref 6.4–8.3)
WBC # BLD: 6.3 K/UL (ref 3.5–11)
WBC # BLD: ABNORMAL 10*3/UL

## 2017-12-11 PROCEDURE — 2580000003 HC RX 258: Performed by: INTERNAL MEDICINE

## 2017-12-11 PROCEDURE — 96375 TX/PRO/DX INJ NEW DRUG ADDON: CPT

## 2017-12-11 PROCEDURE — G8417 CALC BMI ABV UP PARAM F/U: HCPCS | Performed by: INTERNAL MEDICINE

## 2017-12-11 PROCEDURE — G8484 FLU IMMUNIZE NO ADMIN: HCPCS | Performed by: INTERNAL MEDICINE

## 2017-12-11 PROCEDURE — 96417 CHEMO IV INFUS EACH ADDL SEQ: CPT

## 2017-12-11 PROCEDURE — 36591 DRAW BLOOD OFF VENOUS DEVICE: CPT

## 2017-12-11 PROCEDURE — 36593 DECLOT VASCULAR DEVICE: CPT

## 2017-12-11 PROCEDURE — 6360000002 HC RX W HCPCS: Performed by: INTERNAL MEDICINE

## 2017-12-11 PROCEDURE — 85025 COMPLETE CBC W/AUTO DIFF WBC: CPT

## 2017-12-11 PROCEDURE — 99215 OFFICE O/P EST HI 40 MIN: CPT | Performed by: INTERNAL MEDICINE

## 2017-12-11 PROCEDURE — 96367 TX/PROPH/DG ADDL SEQ IV INF: CPT

## 2017-12-11 PROCEDURE — 96413 CHEMO IV INFUSION 1 HR: CPT

## 2017-12-11 PROCEDURE — 80053 COMPREHEN METABOLIC PANEL: CPT

## 2017-12-11 PROCEDURE — 96415 CHEMO IV INFUSION ADDL HR: CPT

## 2017-12-11 PROCEDURE — 36415 COLL VENOUS BLD VENIPUNCTURE: CPT

## 2017-12-11 PROCEDURE — G8427 DOCREV CUR MEDS BY ELIG CLIN: HCPCS | Performed by: INTERNAL MEDICINE

## 2017-12-11 PROCEDURE — 3014F SCREEN MAMMO DOC REV: CPT | Performed by: INTERNAL MEDICINE

## 2017-12-11 PROCEDURE — 1036F TOBACCO NON-USER: CPT | Performed by: INTERNAL MEDICINE

## 2017-12-11 PROCEDURE — 3017F COLORECTAL CA SCREEN DOC REV: CPT | Performed by: INTERNAL MEDICINE

## 2017-12-11 RX ORDER — HEPARIN SODIUM (PORCINE) LOCK FLUSH IV SOLN 100 UNIT/ML 100 UNIT/ML
500 SOLUTION INTRAVENOUS PRN
Status: CANCELLED | OUTPATIENT
Start: 2017-12-11

## 2017-12-11 RX ORDER — SODIUM CHLORIDE 0.9 % (FLUSH) 0.9 %
10 SYRINGE (ML) INJECTION PRN
Status: CANCELLED | OUTPATIENT
Start: 2017-12-11

## 2017-12-11 RX ORDER — SODIUM CHLORIDE 0.9 % (FLUSH) 0.9 %
5 SYRINGE (ML) INJECTION PRN
Status: CANCELLED | OUTPATIENT
Start: 2017-12-11

## 2017-12-11 RX ORDER — METHYLPREDNISOLONE SODIUM SUCCINATE 125 MG/2ML
125 INJECTION, POWDER, LYOPHILIZED, FOR SOLUTION INTRAMUSCULAR; INTRAVENOUS ONCE
Status: CANCELLED | OUTPATIENT
Start: 2017-12-11 | End: 2017-12-11

## 2017-12-11 RX ORDER — EPINEPHRINE 1 MG/ML
0.3 INJECTION INTRAMUSCULAR; INTRAVENOUS; SUBCUTANEOUS PRN
Status: CANCELLED | OUTPATIENT
Start: 2017-12-11

## 2017-12-11 RX ORDER — SODIUM CHLORIDE 9 MG/ML
INJECTION, SOLUTION INTRAVENOUS CONTINUOUS
Status: CANCELLED | OUTPATIENT
Start: 2017-12-11

## 2017-12-11 RX ORDER — SODIUM CHLORIDE 0.9 % (FLUSH) 0.9 %
10 SYRINGE (ML) INJECTION PRN
Status: DISCONTINUED | OUTPATIENT
Start: 2017-12-11 | End: 2017-12-12 | Stop reason: HOSPADM

## 2017-12-11 RX ORDER — 0.9 % SODIUM CHLORIDE 0.9 %
1000 INTRAVENOUS SOLUTION INTRAVENOUS ONCE
Status: CANCELLED
Start: 2017-12-14 | End: 2017-12-14

## 2017-12-11 RX ORDER — PALONOSETRON 0.05 MG/ML
0.25 INJECTION, SOLUTION INTRAVENOUS ONCE
Status: COMPLETED | OUTPATIENT
Start: 2017-12-11 | End: 2017-12-11

## 2017-12-11 RX ORDER — HEPARIN SODIUM (PORCINE) LOCK FLUSH IV SOLN 100 UNIT/ML 100 UNIT/ML
500 SOLUTION INTRAVENOUS PRN
Status: DISCONTINUED | OUTPATIENT
Start: 2017-12-11 | End: 2017-12-12 | Stop reason: HOSPADM

## 2017-12-11 RX ORDER — PALONOSETRON 0.05 MG/ML
0.25 INJECTION, SOLUTION INTRAVENOUS ONCE
Status: CANCELLED | OUTPATIENT
Start: 2017-12-11

## 2017-12-11 RX ORDER — SODIUM CHLORIDE 9 MG/ML
INJECTION, SOLUTION INTRAVENOUS ONCE
Status: COMPLETED | OUTPATIENT
Start: 2017-12-11 | End: 2017-12-11

## 2017-12-11 RX ORDER — SODIUM CHLORIDE 9 MG/ML
INJECTION, SOLUTION INTRAVENOUS ONCE
Status: CANCELLED | OUTPATIENT
Start: 2017-12-11 | End: 2017-12-11

## 2017-12-11 RX ORDER — DIPHENHYDRAMINE HYDROCHLORIDE 50 MG/ML
50 INJECTION INTRAMUSCULAR; INTRAVENOUS ONCE
Status: CANCELLED | OUTPATIENT
Start: 2017-12-11 | End: 2017-12-11

## 2017-12-11 RX ADMIN — Medication 10 ML: at 09:10

## 2017-12-11 RX ADMIN — CARBOPLATIN 900 MG: 10 INJECTION, SOLUTION INTRAVENOUS at 14:38

## 2017-12-11 RX ADMIN — DEXAMETHASONE SODIUM PHOSPHATE 12 MG: 10 INJECTION, SOLUTION INTRAMUSCULAR; INTRAVENOUS at 10:51

## 2017-12-11 RX ADMIN — ALTEPLASE 2 MG: 2.2 INJECTION, POWDER, LYOPHILIZED, FOR SOLUTION INTRAVENOUS at 09:25

## 2017-12-11 RX ADMIN — SODIUM CHLORIDE: 9 INJECTION, SOLUTION INTRAVENOUS at 10:13

## 2017-12-11 RX ADMIN — DOCETAXEL 187 MG: 20 INJECTION, SOLUTION, CONCENTRATE INTRAVENOUS at 13:33

## 2017-12-11 RX ADMIN — SODIUM CHLORIDE, PRESERVATIVE FREE 500 UNITS: 5 INJECTION INTRAVENOUS at 15:28

## 2017-12-11 RX ADMIN — Medication 10 ML: at 09:11

## 2017-12-11 RX ADMIN — PALONOSETRON HYDROCHLORIDE 0.25 MG: 0.25 INJECTION INTRAVENOUS at 10:48

## 2017-12-11 RX ADMIN — PERTUZUMAB 420 MG: 30 INJECTION, SOLUTION, CONCENTRATE INTRAVENOUS at 11:35

## 2017-12-11 RX ADMIN — TRASTUZUMAB 750 MG: 150 INJECTION, POWDER, LYOPHILIZED, FOR SOLUTION INTRAVENOUS at 12:57

## 2017-12-11 NOTE — PROGRESS NOTES
Chemotherapy infused and d/c'd. Mediport capped flushed with 30 ml NSS and 5 ml heparin. Patient tolerated treatment without difficulty. Patient ambulated to restroom without difficulty, denies any complaints. Patient's daughter arrived to  patient. Patient discharged to home.

## 2017-12-11 NOTE — PROGRESS NOTES
ON unit for cycle 2 of chemo day 1. Port ids new it was accessed with 1 inch mora needle saline pushes easily no blood return. Cathflow ordered. Sitting in chair feet elevated. Lab here to draw blood.

## 2017-12-11 NOTE — PROGRESS NOTES
Did get blood return from port. IV infusing saline through the port. Patient sitting in chair with feet elevated and eating breakfast.  Lab results called to Dr. Carol Cristobal office per Robin Almanza to relay message to check labs and sign orders.

## 2017-12-11 NOTE — PLAN OF CARE
Dose adjustment.  Herceptin ordered as 6mg/kg reduced dose down to 750 mg to reduce medication waste

## 2017-12-12 NOTE — PROGRESS NOTES
Cassia Albarado                                                                                                                  12/11/2017  MRN:   [id number]  YOB: 1965  PCP:                           Pete Walden NP  Referring Physician: No ref. provider found  Treating Physician Name: [facility], MD      Reason for visit:  Patient presents to the clinic for a follow-up visit to discuss further treatment plan    Current problems/ Active and recent treatments:  Stage IIa infiltrating ductal carcinoma of right breast, ER negative, OH positive and HER-2 amplified. Strong family history of breast cancer in sister and maternal aunt  Grade 2 diarrhea, nonbloody    Summary of Case/History:    Cassia Albarado a 46 y. o.female who presents to the hematology oncology office to establish care and for further recommendations for management of her recently diagnosed right breast cancer    Patient had a mammogram after many years in September 2017 which came back abnormal.  Subsequently patient had an ultrasound which confirmed a 1.2 cm lesion in the right breast at 1:00 position. There was another 4 mm lesion at 12:00 position    Patient underwent ultrasound-guided biopsy on 9/8/17. the lesion at 1:00 position shows invasive ductal carcinoma. ER negative and OH positive associated with high-grade DCIS. Lesion at 12:00 position showed fibrocystic disease and focal adenosis and hyperplasia. Patient underwent right sided mastectomy with sentinel lymph node biopsy on 10/19/17. Pathology report showed invasive ductal carcinoma, grade 3 out of 3, 2.8 cm in size with negative margins. pT2,pN0,M0  Tumor specimen was negative for ER receptor and weekly positive for OH receptor (5-10 percent). High-grade DCIS was also noted. One sentinel lymph node was sampled which was negative for metastasis    Patient started on neoadjuvant chemotherapy using TCHP regimen.     Cycle #1, day #1 on 11/13/17    Patient underwent removal of port with replacement due to port malfunction on 11/30/17    Cycle #2, day #1 on 12/11/17      Interim History:  Patient presents to the clinic for a follow-up visit and to discuss further treatment plan. Patient is complaining of frequent bowel movements every day. He denies noticing any bloody diarrhea. Also complains of noticing fever at home. Denies abdominal pain. Denies any rash. She has started to lose hair. Complains of weakness. Complains of loss of appetite.       Past Medical History:   Past Medical History:   Diagnosis Date    Bipolar 1 disorder ([facility]     Breast cancer ([facility] 2017    right side     Headache(784.0)     Hyperlipidemia     Migraine        Past Surgical History:     Past Surgical History:   Procedure Laterality Date    DILATION AND CURETTAGE OF UTERUS N/A 10/13/2017    D & C HYSTEROSCOPY with polypectomy performed by Cristiana Meier MD at [address] [place] [address] / REMOVAL / [address] [place] [address] [place] Said Left 11/9/2017    PORT INSERTION performed by Ben Dale MD at [address] [place] [address] / REMOVAL / [facility] [place] N/A 11/30/2017    Port Removal & Insertion performed by Ben Dale MD at [address] [place] [address] Right 10/19/2017     [facility]    MASTECTOMY Right 10/19/2017    Right Breast Mastectomy with  [place] Node Biopsy performed by Ben Dale MD at [place] [address] Left 2014, 2015    x 2 surgeries total; Dr. Kori Gibbons TUNNELED VENOUS PORT PLACEMENT Left 11/30/2017    removal of et replacement of       Patient Family Social History:     Social History     Social History Narrative    No narrative on file       Current Medications:     Current Outpatient Prescriptions   Medication Sig Dispense Refill    prochlorperazine (COMPAZINE) 10 MG tablet Take 10 mg by mouth every 6 hours as needed      HYDROcodone-acetaminophen (NORCO) 5-325 MG per tablet Take 1 tablet by mouth every 6 hours as needed for Pain .  VENTOLIN  (90 Base) MCG/ACT inhaler Inhale 2 puffs into the lungs every 4 hours as needed       butalbital-acetaminophen-caffeine (FIORICET, ESGIC) -40 MG per tablet Take 1-2 tablets by mouth every 4 hours as needed       RA VITAMIN D-3 5000 units CAPS capsule Take 5,000 Units by mouth daily       fluticasone (FLONASE) 50 MCG/ACT nasal spray 1 spray by Nasal route Indications: as needed       hydrOXYzine (ATARAX) 10 MG tablet Take 10 mg by mouth daily       lamoTRIgine (LAMICTAL) 100 MG tablet 100 mg nightly       lisinopril (PRINIVIL;ZESTRIL) 20 MG tablet 20 mg daily       loratadine (CLARITIN) 10 MG tablet Take 10 mg by mouth daily       pravastatin (PRAVACHOL) 40 MG tablet Take 40 mg by mouth daily       QUEtiapine (SEROQUEL) 300 MG tablet Take 300 mg by mouth nightly       SUMAtriptan (IMITREX) 25 MG tablet 25 mg once as needed       topiramate (TOPAMAX) 25 MG tablet Take 25 mg by mouth daily       traZODone (DESYREL) 100 MG tablet Take 100 mg by mouth nightly        No current facility-administered medications for this visit. Facility-Administered Medications Ordered in Other Visits   Medication Dose Route Frequency Provider Last Rate Last Dose    sodium chloride flush 0.9 % injection 10 mL  10 mL Intravenous PRN Kam Wilks MD   10 mL at 12/11/17 0911    heparin flush 100 UNIT/ML injection 500 Units  500 Units Intercatheter PRN Kam Wilks MD   500 Units at 12/11/17 1528    sterile water injection 2.2 mL  2.2 mL Injection Once Kam Wilks MD           Allergies:   Review of patient's allergies indicates no known allergies. Review of Systems:    Constitutional: No fever or chills.  No night sweats, Positive weight loss   Eyes: No eye discharge, double vision, or eye pain   HEENT: negative for sore mouth, sore throat, hoarseness and voice change   Respiratory: negative for cough , sputum, dyspnea, wheezing, hemoptysis, chest pain Cardiovascular: negative for chest pain, dyspnea, palpitations, orthopnea, PND   Gastrointestinal: negative for nausea, vomiting, constipation, abdominal pain, Dysphagia, hematemesis and hematochezia . Positive for diarrhea  Genitourinary: negative for frequency, dysuria, nocturia, urinary incontinence, and hematuria   Integument: negative for rash, skin lesions, bruises.    Hematologic/Lymphatic: negative for easy bruising, bleeding, lymphadenopathy, or petechiae   Endocrine: negative for heat or cold intolerance,weight changes, change in bowel habits and hair loss   Musculoskeletal: negative for myalgias, arthralgias, pain, joint swelling,and bone pain   Neurological: negative for headaches, dizziness, seizures, weakness, numbness        Physical Exam:    Vitals:  LMP 02/28/2013    General appearance - well appearing, no in pain or distress  Mental status - AAO X3  Eyes - pupils equal and reactive, extraocular eye movements intact  Mouth - mucous membranes moist, pharynx normal without lesions  Neck - supple, no significant adenopathy  Lymphatics - no palpable lymphadenopathy, no hepatosplenomegaly  Chest - clear to auscultation, no wheezes, rales or rhonchi, symmetric air entry  Heart - normal rate, regular rhythm, normal S1, S2, no murmurs  Abdomen - soft, nontender, nondistended, no masses or organomegaly  Neurological - alert, oriented, normal speech, no focal findings or movement disorder noted  Extremities - peripheral pulses normal, no pedal edema, no clubbing or cyanosis  Skin - normal coloration and turgor, no rashes, no suspicious skin lesions noted       DATA:    Results for orders placed or performed during the hospital encounter of 12/11/17   CBC Auto Differential   Result Value Ref Range    WBC 6.3 3.5 - 11.0 k/uL    RBC 3.55 (L) 4.0 - 5.2 m/uL    Hemoglobin 10.9 (L) 12.0 - 16.0 g/dL    Hematocrit 32.9 (L) 36 - 46 %    MCV 92.7 80 - 100 fL    MCH 30.7 26 - 34 pg    MCHC 33.2 31 - 37 g/dL    RDW 13.5 11.0 - 14.5 %    Platelets 614 619 - 391 k/uL    MPV 7.7 6.0 - 12.0 fL    Differential Type NOT REPORTED     Immature Granulocytes NOT REPORTED 0 %    Absolute Immature Granulocyte NOT REPORTED 0.00 - 0.30 k/uL    WBC Morphology NOT REPORTED     RBC Morphology NOT REPORTED     Platelet Estimate NOT REPORTED     Seg Neutrophils 55 43 - 77 %    Lymphocytes 31 16 - 46 %    Monocytes 11 4 - 11 %    Eosinophils % 2 1 - 7 %    Basophils 1 0 - 1 %    Segs Absolute 3.50 1.8 - 7.7 k/uL    Absolute Lymph # 1.90 1.0 - 4.8 k/uL    Absolute Mono # 0.70 0.1 - 1.2 k/uL    Absolute Eos # 0.20 0.0 - 0.4 k/uL    Basophils # 0.00 0.0 - 0.2 k/uL   Comprehensive Metabolic Panel   Result Value Ref Range    Glucose 172 (H) 70 - 99 mg/dL    BUN 17 6 - 20 mg/dL    CREATININE 1.04 (H) 0.50 - 0.90 mg/dL    Bun/Cre Ratio 16 9 - 20    Calcium 9.4 8.6 - 10.4 mg/dL    Sodium 143 135 - 144 mmol/L    Potassium 3.5 (L) 3.7 - 5.3 mmol/L    Chloride 102 98 - 107 mmol/L    CO2 23 20 - 31 mmol/L    Anion Gap 18 (H) 9 - 17 mmol/L    Alkaline Phosphatase 69 35 - 104 U/L    ALT 15 5 - 33 U/L    AST 15 <32 U/L    Total Bilirubin 0.22 (L) 0.3 - 1.2 mg/dL    Total Protein 6.9 6.4 - 8.3 g/dL    Alb 3.5 3.5 - 5.2 g/dL    Albumin/Globulin Ratio 1.0 1.0 - 2.5    GFR Non- 56 (L) >60 mL/min    GFR African American >60 >60 mL/min    GFR Comment          GFR Staging NOT REPORTED      Xr Chest Standard (2 Vw)    Result Date: 10/10/2017  EXAMINATION: TWO VIEWS OF THE CHEST 10/10/2017 2:37 pm COMPARISON: 10/18/2011 HISTORY: ORDERING SYSTEM PROVIDED HISTORY: Invasive ductal carcinoma of breast, right [facility]) TECHNOLOGIST PROVIDED HISTORY: Reason for exam:->Pre op Ordering Physician Provided Reason for Exam: Pre op for right mastectomy. No chest complaints. Acuity: Unknown Type of Exam: Initial Additional signs and symptoms: n/a Relevant Medical/Surgical History: breast cancer FINDINGS: The lungs are without acute focal process.   There is no effusion opportunities to ask questions which I answered to the best of my ability. SONAL GARCÍA        I spent more than 40 minutes examining, evaluating, reviewing data and counseling the patient. Greater than 50% time was spent face-to-face patient    This note is created with the assistance of a speech recognition program.  While intending to generate a document that actually reflects the content of the visit, the document can still have some errors including those of syntax and sound a like substitutions which may escape proof reading. It such instances, actual meaning can be extrapolated by contextual diversion.

## 2017-12-14 ENCOUNTER — APPOINTMENT (OUTPATIENT)
Dept: INFUSION THERAPY | Age: 52
End: 2017-12-14
Payer: MEDICARE

## 2017-12-15 NOTE — PROGRESS NOTES
Pt. Canceled appointment for 12/14. Called to follow up with patient because her port was not de-accessed due to her coming in on 12/14 of IV hydration. Pt.s daughter stated that she was currently in the ER at [place]. I spoke with the nurse at [place] ER and she will de-access the port upon discharge. She stated they are currently giving the patient IV hydration.

## 2017-12-18 ENCOUNTER — HOSPITAL ENCOUNTER (OUTPATIENT)
Age: 52
Setting detail: OBSERVATION
Discharge: HOME | End: 2017-12-20
Attending: INTERNAL MEDICINE | Admitting: INTERNAL MEDICINE
Payer: MEDICARE

## 2017-12-18 ENCOUNTER — OFFICE VISIT (OUTPATIENT)
Dept: ONCOLOGY | Age: 52
End: 2017-12-18
Payer: MEDICARE

## 2017-12-18 VITALS
HEIGHT: 66 IN | HEART RATE: 66 BPM | BODY MASS INDEX: 45.14 KG/M2 | WEIGHT: 280.87 LBS | SYSTOLIC BLOOD PRESSURE: 134 MMHG | TEMPERATURE: 100 F | DIASTOLIC BLOOD PRESSURE: 84 MMHG

## 2017-12-18 DIAGNOSIS — C50.811 MALIGNANT NEOPLASM OF OVERLAPPING SITES OF RIGHT BREAST IN FEMALE, ESTROGEN RECEPTOR NEGATIVE (HCC): Primary | ICD-10-CM

## 2017-12-18 DIAGNOSIS — R19.7 DIARRHEA OF PRESUMED INFECTIOUS ORIGIN: ICD-10-CM

## 2017-12-18 DIAGNOSIS — T45.1X5A CHEMOTHERAPY INDUCED NAUSEA AND VOMITING: ICD-10-CM

## 2017-12-18 DIAGNOSIS — Z17.1 MALIGNANT NEOPLASM OF OVERLAPPING SITES OF RIGHT BREAST IN FEMALE, ESTROGEN RECEPTOR NEGATIVE (HCC): Primary | ICD-10-CM

## 2017-12-18 DIAGNOSIS — R11.2 CHEMOTHERAPY INDUCED NAUSEA AND VOMITING: ICD-10-CM

## 2017-12-18 LAB
ABSOLUTE EOS #: 0.1 K/UL (ref 0–0.4)
ABSOLUTE IMMATURE GRANULOCYTE: ABNORMAL K/UL (ref 0–0.3)
ABSOLUTE LYMPH #: 1.7 K/UL (ref 1–4.8)
ABSOLUTE MONO #: 0.3 K/UL (ref 0.1–1.2)
ALBUMIN SERPL-MCNC: 3.4 G/DL (ref 3.5–5.2)
ALBUMIN/GLOBULIN RATIO: 0.8 (ref 1–2.5)
ALP BLD-CCNC: 72 U/L (ref 35–104)
ALT SERPL-CCNC: 17 U/L (ref 5–33)
ANION GAP SERPL CALCULATED.3IONS-SCNC: 17 MMOL/L (ref 9–17)
AST SERPL-CCNC: 18 U/L
BASOPHILS # BLD: 1 % (ref 0–1)
BASOPHILS ABSOLUTE: 0 K/UL (ref 0–0.2)
BILIRUB SERPL-MCNC: 0.43 MG/DL (ref 0.3–1.2)
BUN BLDV-MCNC: 13 MG/DL (ref 6–20)
BUN/CREAT BLD: 16 (ref 9–20)
CALCIUM SERPL-MCNC: 9.2 MG/DL (ref 8.6–10.4)
CHLORIDE BLD-SCNC: 97 MMOL/L (ref 98–107)
CO2: 23 MMOL/L (ref 20–31)
CREAT SERPL-MCNC: 0.83 MG/DL (ref 0.5–0.9)
DIFFERENTIAL TYPE: ABNORMAL
EOSINOPHILS RELATIVE PERCENT: 2 % (ref 1–7)
GFR AFRICAN AMERICAN: >60 ML/MIN
GFR NON-AFRICAN AMERICAN: >60 ML/MIN
GFR SERPL CREATININE-BSD FRML MDRD: ABNORMAL ML/MIN/{1.73_M2}
GFR SERPL CREATININE-BSD FRML MDRD: ABNORMAL ML/MIN/{1.73_M2}
GLUCOSE BLD-MCNC: 123 MG/DL (ref 70–99)
HCT VFR BLD CALC: 36 % (ref 36–46)
HEMOGLOBIN: 12 G/DL (ref 12–16)
IMMATURE GRANULOCYTES: ABNORMAL %
LACTIC ACID: 1.8 MMOL/L (ref 0.5–2.2)
LYMPHOCYTES # BLD: 61 % (ref 16–46)
MCH RBC QN AUTO: 30.4 PG (ref 26–34)
MCHC RBC AUTO-ENTMCNC: 33.2 G/DL (ref 31–37)
MCV RBC AUTO: 91.4 FL (ref 80–100)
MONOCYTES # BLD: 10 % (ref 4–11)
PDW BLD-RTO: 13.2 % (ref 11–14.5)
PLATELET # BLD: 220 K/UL (ref 140–450)
PLATELET ESTIMATE: ABNORMAL
PMV BLD AUTO: 8.7 FL (ref 6–12)
POTASSIUM SERPL-SCNC: 3.6 MMOL/L (ref 3.7–5.3)
RBC # BLD: 3.94 M/UL (ref 4–5.2)
RBC # BLD: ABNORMAL 10*6/UL
SEG NEUTROPHILS: 26 % (ref 43–77)
SEGMENTED NEUTROPHILS ABSOLUTE COUNT: 0.7 K/UL (ref 1.8–7.7)
SODIUM BLD-SCNC: 137 MMOL/L (ref 135–144)
TOTAL PROTEIN: 7.9 G/DL (ref 6.4–8.3)
WBC # BLD: 2.8 K/UL (ref 3.5–11)
WBC # BLD: ABNORMAL 10*3/UL

## 2017-12-18 PROCEDURE — 6360000002 HC RX W HCPCS: Performed by: INTERNAL MEDICINE

## 2017-12-18 PROCEDURE — 99220 PR INITIAL OBSERVATION CARE/DAY 70 MINUTES: CPT | Performed by: INTERNAL MEDICINE

## 2017-12-18 PROCEDURE — 87505 NFCT AGENT DETECTION GI: CPT

## 2017-12-18 PROCEDURE — 96372 THER/PROPH/DIAG INJ SC/IM: CPT

## 2017-12-18 PROCEDURE — 36415 COLL VENOUS BLD VENIPUNCTURE: CPT

## 2017-12-18 PROCEDURE — G0378 HOSPITAL OBSERVATION PER HR: HCPCS

## 2017-12-18 PROCEDURE — 96366 THER/PROPH/DIAG IV INF ADDON: CPT

## 2017-12-18 PROCEDURE — 96365 THER/PROPH/DIAG IV INF INIT: CPT

## 2017-12-18 PROCEDURE — G8484 FLU IMMUNIZE NO ADMIN: HCPCS | Performed by: INTERNAL MEDICINE

## 2017-12-18 PROCEDURE — G8417 CALC BMI ABV UP PARAM F/U: HCPCS | Performed by: INTERNAL MEDICINE

## 2017-12-18 PROCEDURE — G8427 DOCREV CUR MEDS BY ELIG CLIN: HCPCS | Performed by: INTERNAL MEDICINE

## 2017-12-18 PROCEDURE — 85025 COMPLETE CBC W/AUTO DIFF WBC: CPT

## 2017-12-18 PROCEDURE — 6370000000 HC RX 637 (ALT 250 FOR IP): Performed by: INTERNAL MEDICINE

## 2017-12-18 PROCEDURE — 96375 TX/PRO/DX INJ NEW DRUG ADDON: CPT

## 2017-12-18 PROCEDURE — G0379 DIRECT REFER HOSPITAL OBSERV: HCPCS

## 2017-12-18 PROCEDURE — 83605 ASSAY OF LACTIC ACID: CPT

## 2017-12-18 PROCEDURE — 2500000003 HC RX 250 WO HCPCS: Performed by: INTERNAL MEDICINE

## 2017-12-18 PROCEDURE — 1036F TOBACCO NON-USER: CPT | Performed by: INTERNAL MEDICINE

## 2017-12-18 PROCEDURE — 2580000003 HC RX 258: Performed by: INTERNAL MEDICINE

## 2017-12-18 PROCEDURE — 80053 COMPREHEN METABOLIC PANEL: CPT

## 2017-12-18 PROCEDURE — 3017F COLORECTAL CA SCREEN DOC REV: CPT | Performed by: INTERNAL MEDICINE

## 2017-12-18 PROCEDURE — 99214 OFFICE O/P EST MOD 30 MIN: CPT | Performed by: INTERNAL MEDICINE

## 2017-12-18 PROCEDURE — 3014F SCREEN MAMMO DOC REV: CPT | Performed by: INTERNAL MEDICINE

## 2017-12-18 PROCEDURE — 96361 HYDRATE IV INFUSION ADD-ON: CPT

## 2017-12-18 PROCEDURE — 87493 C DIFF AMPLIFIED PROBE: CPT

## 2017-12-18 RX ORDER — TRAZODONE HYDROCHLORIDE 50 MG/1
100 TABLET ORAL NIGHTLY
Status: DISCONTINUED | OUTPATIENT
Start: 2017-12-18 | End: 2017-12-19

## 2017-12-18 RX ORDER — SODIUM CHLORIDE 9 MG/ML
INJECTION, SOLUTION INTRAVENOUS CONTINUOUS
Status: DISCONTINUED | OUTPATIENT
Start: 2017-12-18 | End: 2017-12-20 | Stop reason: HOSPADM

## 2017-12-18 RX ORDER — LAMOTRIGINE 100 MG/1
100 TABLET ORAL NIGHTLY
Status: DISCONTINUED | OUTPATIENT
Start: 2017-12-18 | End: 2017-12-19

## 2017-12-18 RX ORDER — LISINOPRIL 20 MG/1
20 TABLET ORAL DAILY
Status: DISCONTINUED | OUTPATIENT
Start: 2017-12-19 | End: 2017-12-19

## 2017-12-18 RX ORDER — LACTOBACILLUS RHAMNOSUS GG 10B CELL
1 CAPSULE ORAL 3 TIMES DAILY
Status: DISCONTINUED | OUTPATIENT
Start: 2017-12-18 | End: 2017-12-19

## 2017-12-18 RX ORDER — FLUTICASONE PROPIONATE 50 MCG
1 SPRAY, SUSPENSION (ML) NASAL DAILY
Status: DISCONTINUED | OUTPATIENT
Start: 2017-12-18 | End: 2017-12-20 | Stop reason: HOSPADM

## 2017-12-18 RX ORDER — ONDANSETRON 2 MG/ML
8 INJECTION INTRAMUSCULAR; INTRAVENOUS EVERY 4 HOURS PRN
Status: DISCONTINUED | OUTPATIENT
Start: 2017-12-18 | End: 2017-12-20 | Stop reason: HOSPADM

## 2017-12-18 RX ORDER — BUTALBITAL, ACETAMINOPHEN AND CAFFEINE 50; 325; 40 MG/1; MG/1; MG/1
1 TABLET ORAL EVERY 4 HOURS PRN
Status: DISCONTINUED | OUTPATIENT
Start: 2017-12-18 | End: 2017-12-20 | Stop reason: HOSPADM

## 2017-12-18 RX ORDER — TOPIRAMATE 25 MG/1
25 TABLET ORAL DAILY
Status: DISCONTINUED | OUTPATIENT
Start: 2017-12-18 | End: 2017-12-19

## 2017-12-18 RX ORDER — HYDROXYZINE HYDROCHLORIDE 10 MG/1
10 TABLET, FILM COATED ORAL DAILY
Status: DISCONTINUED | OUTPATIENT
Start: 2017-12-18 | End: 2017-12-20 | Stop reason: HOSPADM

## 2017-12-18 RX ORDER — QUETIAPINE FUMARATE 100 MG/1
300 TABLET, FILM COATED ORAL NIGHTLY
Status: DISCONTINUED | OUTPATIENT
Start: 2017-12-18 | End: 2017-12-19

## 2017-12-18 RX ORDER — PRAVASTATIN SODIUM 20 MG
40 TABLET ORAL DAILY
Status: DISCONTINUED | OUTPATIENT
Start: 2017-12-18 | End: 2017-12-19

## 2017-12-18 RX ORDER — PROMETHAZINE HYDROCHLORIDE 25 MG/ML
12.5 INJECTION, SOLUTION INTRAMUSCULAR; INTRAVENOUS EVERY 6 HOURS PRN
Status: DISCONTINUED | OUTPATIENT
Start: 2017-12-18 | End: 2017-12-20 | Stop reason: HOSPADM

## 2017-12-18 RX ORDER — LORATADINE 10 MG/1
10 TABLET ORAL DAILY
Status: DISCONTINUED | OUTPATIENT
Start: 2017-12-18 | End: 2017-12-20 | Stop reason: HOSPADM

## 2017-12-18 RX ORDER — SACCHAROMYCES BOULARDII 250 MG
250 CAPSULE ORAL 2 TIMES DAILY
Status: DISCONTINUED | OUTPATIENT
Start: 2017-12-18 | End: 2017-12-20 | Stop reason: HOSPADM

## 2017-12-18 RX ORDER — HYDROCODONE BITARTRATE AND ACETAMINOPHEN 5; 325 MG/1; MG/1
1 TABLET ORAL EVERY 6 HOURS PRN
Status: DISCONTINUED | OUTPATIENT
Start: 2017-12-18 | End: 2017-12-20 | Stop reason: HOSPADM

## 2017-12-18 RX ADMIN — Medication 1 CAPSULE: at 20:32

## 2017-12-18 RX ADMIN — SODIUM CHLORIDE: 9 INJECTION, SOLUTION INTRAVENOUS at 14:58

## 2017-12-18 RX ADMIN — ENOXAPARIN SODIUM 40 MG: 40 INJECTION SUBCUTANEOUS at 17:04

## 2017-12-18 RX ADMIN — Medication 250 MG: at 20:32

## 2017-12-18 RX ADMIN — NYSTATIN 500000 UNITS: 100000 SUSPENSION ORAL at 21:14

## 2017-12-18 RX ADMIN — METRONIDAZOLE 500 MG: 500 INJECTION, SOLUTION INTRAVENOUS at 20:32

## 2017-12-18 RX ADMIN — NYSTATIN 500000 UNITS: 100000 SUSPENSION ORAL at 17:04

## 2017-12-18 RX ADMIN — METRONIDAZOLE 500 MG: 500 INJECTION, SOLUTION INTRAVENOUS at 15:47

## 2017-12-18 RX ADMIN — Medication 1 CAPSULE: at 17:04

## 2017-12-18 RX ADMIN — ONDANSETRON 8 MG: 2 INJECTION INTRAMUSCULAR; INTRAVENOUS at 21:19

## 2017-12-18 ASSESSMENT — PAIN SCALES - GENERAL
PAINLEVEL_OUTOF10: 0
PAINLEVEL_OUTOF10: 0

## 2017-12-18 NOTE — PROGRESS NOTES
Morphology NOT REPORTED     RBC Morphology NOT REPORTED     Platelet Estimate NOT REPORTED     Seg Neutrophils 55 43 - 77 %    Lymphocytes 31 16 - 46 %    Monocytes 11 4 - 11 %    Eosinophils % 2 1 - 7 %    Basophils 1 0 - 1 %    Segs Absolute 3.50 1.8 - 7.7 k/uL    Absolute Lymph # 1.90 1.0 - 4.8 k/uL    Absolute Mono # 0.70 0.1 - 1.2 k/uL    Absolute Eos # 0.20 0.0 - 0.4 k/uL    Basophils # 0.00 0.0 - 0.2 k/uL   Comprehensive Metabolic Panel   Result Value Ref Range    Glucose 172 (H) 70 - 99 mg/dL    BUN 17 6 - 20 mg/dL    CREATININE 1.04 (H) 0.50 - 0.90 mg/dL    Bun/Cre Ratio 16 9 - 20    Calcium 9.4 8.6 - 10.4 mg/dL    Sodium 143 135 - 144 mmol/L    Potassium 3.5 (L) 3.7 - 5.3 mmol/L    Chloride 102 98 - 107 mmol/L    CO2 23 20 - 31 mmol/L    Anion Gap 18 (H) 9 - 17 mmol/L    Alkaline Phosphatase 69 35 - 104 U/L    ALT 15 5 - 33 U/L    AST 15 <32 U/L    Total Bilirubin 0.22 (L) 0.3 - 1.2 mg/dL    Total Protein 6.9 6.4 - 8.3 g/dL    Alb 3.5 3.5 - 5.2 g/dL    Albumin/Globulin Ratio 1.0 1.0 - 2.5    GFR Non- 56 (L) >60 mL/min    GFR African American >60 >60 mL/min    GFR Comment          GFR Staging NOT REPORTED      Xr Chest Standard (2 Vw)    Result Date: 10/10/2017  EXAMINATION: TWO VIEWS OF THE CHEST 10/10/2017 2:37 pm COMPARISON: 10/18/2011 HISTORY: ORDERING SYSTEM PROVIDED HISTORY: Invasive ductal carcinoma of breast, right [facility]) TECHNOLOGIST PROVIDED HISTORY: Reason for exam:->Pre op Ordering Physician Provided Reason for Exam: Pre op for right mastectomy. No chest complaints. Acuity: Unknown Type of Exam: Initial Additional signs and symptoms: n/a Relevant Medical/Surgical History: breast cancer FINDINGS: The lungs are without acute focal process. There is no effusion or pneumothorax. The cardiomediastinal silhouette is stable. The osseous structures are stable. No acute process.      Nm Lymphoscintigram    Result Date: 10/19/2017  EXAMINATION: LYMPHOSCINTIGRAPHY 10/19/2017 9:21 am TECHNIQUE: Sulfur colloid labeled with 0.920 mCi of Tc99 was administered in 4 subdermal wheals around the patient's right areolar region. Delayed images were obtained. HISTORY: ORDERING SYSTEM PROVIDED HISTORY: Surgery TECHNOLOGIST PROVIDED HISTORY: Reason for exam:->Surgery Ordering Physician Provided Reason for Exam: 0.920 mCi 99mTc filtered Sulfur Colloid injected subcutaneous around RT nipple. Acuity: Unknown Type of Exam: Unknown FINDINGS: There appears to be migration into the right axilla suggestive of the sentinel node. Migration of the radiotracer into the right axilla suggestive of the patient's sentinel node. Impression:  Invasive ductal carcinoma of right breast, stage IIa. pT,pN0,M0. ER negative, AL weakly positive. HER-2 amplified. Status post right mastectomy with sentinel lymph node biopsy  Family history of breast cancer  Abd cramps, Diarrhea, NV    Plan:  I had a detailed discussion with the patient and personally went over the results of the blood workup. With her intractable NV a abd cramps and geeny watery stools, it is important to rule out C diff. If C diff negative the she could use imodium or lomitil. Approximately 10% patient developed grade 3 or higher diarrhea in Pertuzumab arm in [facility] trial    She will need inpatient management for her symptoms. I discussed with dr Maxime Chapman who kindly agreed assume her inpatient care. Plan for IV, IV zofran, check for C diff and supportive care  Patient is agreeable  Patient expressed understanding of the plan and was in agreement. She was also given opportunities to ask questions which I answered to the best of my ability. Dalton Tapia        I spent more than 40 minutes examining, evaluating, reviewing data and counseling the patient.   Greater than 50% time was spent face-to-face patient    This note is created with the assistance of a speech recognition program.  While intending to generate a document that actually reflects the content of the visit, the document can still have some errors including those of syntax and sound a like substitutions which may escape proof reading. It such instances, actual meaning can be extrapolated by contextual diversion.

## 2017-12-18 NOTE — H&P
HOSPITALIST ADMISSION H&P      REASON FOR ADMISSION:  Gastroenteritis--nausea--vomiting--diarrhea--abdominal cramping--fever--breast cancer chemotherapy patient--concern possible C. difficle  ESTIMATED LENGTH OF STAY:  > 2 midnights----2-3 days    ATTENDING/ADMITTING PHYSICIAN: Lesa Redman MD  PCP: Pete Walden NP    HISTORY OF PRESENT ILLNESS:      The patient is a 46 y.o. female patient of Pete Walden NP who presents with above---started after last chemotherapy 12.11.2017----abdominal pain---cramping---diarrhea--fevers----some events of nausea-vomiting ---WBC = 2.8---dehydration. Breast carcinoma---post op and chemotherapy---see below     Kidney disease----Stage 3    Bipolar disorder I--has missed c. 6 days of Seroquel       See below for additional PMH.     Patient vkpp-[id number]-[id number]-available records reviewed, including, but not limited to,  Hematology Oncology  eport from Dr. Gloria Hinkle records clinic and ----personal notes       Past Medical History:   Diagnosis Date    Bipolar 1 disorder ([facility]     Breast cancer ([facility] 2017    right side     Headache(784.0)     Hyperlipidemia     Migraine            Past Surgical History:   Procedure Laterality Date    DILATION AND CURETTAGE OF UTERUS N/A 10/13/2017    D & C HYSTEROSCOPY with polypectomy performed by Shamika Gtz MD at [facility] / REMOVAL / [address] Left 11/9/2017    PORT INSERTION performed by Leila Arias MD at [facility] / REMOVAL / [address] N/A 11/30/2017    Port Removal & Insertion performed by Leila Arias MD at [address] Right 10/19/2017     [address] [place]    MASTECTOMY Right 10/19/2017    Right Breast Mastectomy with  [place] Node Biopsy performed by Leila Arias MD at [place] [id number] Left 2014, 2015    x 2 surgeries total; Dr. Kathy Braswell [place] [address] Left 11/30/2017 removal of et replacement of       Medications Prior to Admission:    Prescriptions Prior to Admission: prochlorperazine (COMPAZINE) 10 MG tablet, Take 10 mg by mouth every 6 hours as needed  HYDROcodone-acetaminophen (NORCO) 5-325 MG per tablet, Take 1 tablet by mouth every 6 hours as needed for Pain . VENTOLIN  (90 Base) MCG/ACT inhaler, Inhale 2 puffs into the lungs every 4 hours as needed   butalbital-acetaminophen-caffeine (FIORICET, ESGIC) -40 MG per tablet, Take 1-2 tablets by mouth every 4 hours as needed   fluticasone (FLONASE) 50 MCG/ACT nasal spray, 1 spray by Nasal route Indications: as needed   lamoTRIgine (LAMICTAL) 100 MG tablet, 100 mg nightly   lisinopril (PRINIVIL;ZESTRIL) 20 MG tablet, 20 mg daily   loratadine (CLARITIN) 10 MG tablet, Take 10 mg by mouth daily   pravastatin (PRAVACHOL) 40 MG tablet, Take 40 mg by mouth daily   QUEtiapine (SEROQUEL) 300 MG tablet, Take 300 mg by mouth nightly   topiramate (TOPAMAX) 25 MG tablet, Take 25 mg by mouth daily   traZODone (DESYREL) 100 MG tablet, Take 100 mg by mouth nightly   RA VITAMIN D-3 5000 units CAPS capsule, Take 5,000 Units by mouth daily   hydrOXYzine (ATARAX) 10 MG tablet, Take 10 mg by mouth daily   SUMAtriptan (IMITREX) 25 MG tablet, 25 mg once as needed     Allergies:    Review of patient's allergies indicates no known allergies. Social History:    reports that she has never smoked. She has never used smokeless tobacco. She reports that she does not drink alcohol or use drugs. Family History:   family history includes Breast Cancer (age of onset: 54) in her sister; Breast Cancer (age of onset: 71) in her maternal aunt; Other in her maternal cousin and sister; Uterine Cancer (age of onset: 32) in her sister.     REVIEW OF SYSTEMS:  See HPI and problem list; otherwise no other new complaints with respect to HEENT, neck, pulmonary, coronary, GI, , endocrine, musculoskeletal, immune system/connective tissue disease,

## 2017-12-19 ENCOUNTER — HOSPITAL ENCOUNTER (OUTPATIENT)
Dept: INFUSION THERAPY | Age: 52
Discharge: HOME | End: 2017-12-19

## 2017-12-19 LAB
-: ABNORMAL
ABSOLUTE EOS #: 0.02 K/UL (ref 0–0.4)
ABSOLUTE IMMATURE GRANULOCYTE: ABNORMAL K/UL (ref 0–0.3)
ABSOLUTE LYMPH #: 1.28 K/UL (ref 1–4.8)
ABSOLUTE MONO #: 0.4 K/UL (ref 0.1–1.2)
ALBUMIN SERPL-MCNC: 3.2 G/DL (ref 3.5–5.2)
ALBUMIN/GLOBULIN RATIO: 0.9 (ref 1–2.5)
ALP BLD-CCNC: 62 U/L (ref 35–104)
ALT SERPL-CCNC: 14 U/L (ref 5–33)
AMORPHOUS: ABNORMAL
ANION GAP SERPL CALCULATED.3IONS-SCNC: 13 MMOL/L (ref 9–17)
AST SERPL-CCNC: 12 U/L
ATYPICAL LYMPHOCYTE ABSOLUTE COUNT: 0.11 K/UL
ATYPICAL LYMPHOCYTES: 5 %
BACTERIA: ABNORMAL
BASOPHILS # BLD: 0 % (ref 0–1)
BASOPHILS ABSOLUTE: 0 K/UL (ref 0–0.2)
BILIRUB SERPL-MCNC: 0.35 MG/DL (ref 0.3–1.2)
BILIRUBIN URINE: NEGATIVE
BUN BLDV-MCNC: 11 MG/DL (ref 6–20)
BUN/CREAT BLD: 13 (ref 9–20)
CALCIUM SERPL-MCNC: 8.7 MG/DL (ref 8.6–10.4)
CASTS UA: ABNORMAL /LPF (ref 0–2)
CHLORIDE BLD-SCNC: 101 MMOL/L (ref 98–107)
CO2: 25 MMOL/L (ref 20–31)
COLOR: ABNORMAL
COMMENT UA: ABNORMAL
CREAT SERPL-MCNC: 0.84 MG/DL (ref 0.5–0.9)
CRYSTALS, UA: ABNORMAL /HPF
DIFFERENTIAL TYPE: ABNORMAL
DIRECT EXAM: NORMAL
EOSINOPHILS RELATIVE PERCENT: 1 % (ref 1–7)
EPITHELIAL CELLS UA: ABNORMAL /HPF (ref 0–5)
GFR AFRICAN AMERICAN: >60 ML/MIN
GFR NON-AFRICAN AMERICAN: >60 ML/MIN
GFR SERPL CREATININE-BSD FRML MDRD: ABNORMAL ML/MIN/{1.73_M2}
GFR SERPL CREATININE-BSD FRML MDRD: ABNORMAL ML/MIN/{1.73_M2}
GLUCOSE BLD-MCNC: 120 MG/DL (ref 70–99)
GLUCOSE URINE: NEGATIVE
HCT VFR BLD CALC: 30.5 % (ref 36–46)
HCT VFR BLD CALC: 31.4 % (ref 36–46)
HEMOGLOBIN: 10.1 G/DL (ref 12–16)
HEMOGLOBIN: 10.4 G/DL (ref 12–16)
IMMATURE GRANULOCYTES: ABNORMAL %
KETONES, URINE: NEGATIVE
LEUKOCYTE ESTERASE, URINE: NEGATIVE
LYMPHOCYTES # BLD: 61 % (ref 16–46)
Lab: NORMAL
MCH RBC QN AUTO: 30.3 PG (ref 26–34)
MCHC RBC AUTO-ENTMCNC: 33.2 G/DL (ref 31–37)
MCV RBC AUTO: 91.3 FL (ref 80–100)
MONOCYTES # BLD: 19 % (ref 4–11)
MORPHOLOGY: ABNORMAL
MUCUS: ABNORMAL
NITRITE, URINE: NEGATIVE
OTHER OBSERVATIONS UA: ABNORMAL
PDW BLD-RTO: 13.4 % (ref 11–14.5)
PH UA: 6 (ref 5–6)
PLATELET # BLD: 162 K/UL (ref 140–450)
PLATELET ESTIMATE: ABNORMAL
PMV BLD AUTO: 8.4 FL (ref 6–12)
POTASSIUM SERPL-SCNC: 3.6 MMOL/L (ref 3.7–5.3)
PROTEIN UA: NEGATIVE
RBC # BLD: 3.34 M/UL (ref 4–5.2)
RBC # BLD: ABNORMAL 10*6/UL
RBC UA: ABNORMAL /HPF (ref 0–4)
RENAL EPITHELIAL, UA: ABNORMAL /HPF
SEG NEUTROPHILS: 14 % (ref 43–77)
SEGMENTED NEUTROPHILS ABSOLUTE COUNT: 0.29 K/UL (ref 1.8–7.7)
SODIUM BLD-SCNC: 139 MMOL/L (ref 135–144)
SPECIFIC GRAVITY UA: 1.01 (ref 1.01–1.02)
SPECIMEN DESCRIPTION: NORMAL
STATUS: NORMAL
TOTAL PROTEIN: 6.7 G/DL (ref 6.4–8.3)
TRICHOMONAS: ABNORMAL
TURBIDITY: ABNORMAL
URINE HGB: NEGATIVE
UROBILINOGEN, URINE: NORMAL
WBC # BLD: 2.1 K/UL (ref 3.5–11)
WBC # BLD: ABNORMAL 10*3/UL
WBC UA: ABNORMAL /HPF (ref 0–4)
YEAST: ABNORMAL

## 2017-12-19 PROCEDURE — 6360000002 HC RX W HCPCS: Performed by: INTERNAL MEDICINE

## 2017-12-19 PROCEDURE — 80053 COMPREHEN METABOLIC PANEL: CPT

## 2017-12-19 PROCEDURE — 99225 PR SBSQ OBSERVATION CARE/DAY 25 MINUTES: CPT | Performed by: INTERNAL MEDICINE

## 2017-12-19 PROCEDURE — 2580000003 HC RX 258: Performed by: INTERNAL MEDICINE

## 2017-12-19 PROCEDURE — 6370000000 HC RX 637 (ALT 250 FOR IP): Performed by: INTERNAL MEDICINE

## 2017-12-19 PROCEDURE — 2500000003 HC RX 250 WO HCPCS: Performed by: INTERNAL MEDICINE

## 2017-12-19 PROCEDURE — 96366 THER/PROPH/DIAG IV INF ADDON: CPT

## 2017-12-19 PROCEDURE — 96376 TX/PRO/DX INJ SAME DRUG ADON: CPT

## 2017-12-19 PROCEDURE — G0378 HOSPITAL OBSERVATION PER HR: HCPCS

## 2017-12-19 PROCEDURE — 81001 URINALYSIS AUTO W/SCOPE: CPT

## 2017-12-19 PROCEDURE — 96361 HYDRATE IV INFUSION ADD-ON: CPT

## 2017-12-19 PROCEDURE — 36415 COLL VENOUS BLD VENIPUNCTURE: CPT

## 2017-12-19 PROCEDURE — 96372 THER/PROPH/DIAG INJ SC/IM: CPT

## 2017-12-19 PROCEDURE — 85014 HEMATOCRIT: CPT

## 2017-12-19 PROCEDURE — 6360000002 HC RX W HCPCS: Performed by: PHYSICIAN ASSISTANT

## 2017-12-19 PROCEDURE — 85025 COMPLETE CBC W/AUTO DIFF WBC: CPT

## 2017-12-19 PROCEDURE — 85018 HEMOGLOBIN: CPT

## 2017-12-19 RX ORDER — LISINOPRIL 20 MG/1
20 TABLET ORAL DAILY
Status: DISCONTINUED | OUTPATIENT
Start: 2017-12-20 | End: 2017-12-20 | Stop reason: HOSPADM

## 2017-12-19 RX ORDER — LAMOTRIGINE 100 MG/1
100 TABLET ORAL NIGHTLY
Status: DISCONTINUED | OUTPATIENT
Start: 2017-12-19 | End: 2017-12-20 | Stop reason: HOSPADM

## 2017-12-19 RX ORDER — TRAZODONE HYDROCHLORIDE 50 MG/1
TABLET ORAL
Status: DISPENSED
Start: 2017-12-19 | End: 2017-12-20

## 2017-12-19 RX ORDER — QUETIAPINE FUMARATE 100 MG/1
TABLET, FILM COATED ORAL
Status: DISPENSED
Start: 2017-12-19 | End: 2017-12-20

## 2017-12-19 RX ORDER — TRAZODONE HYDROCHLORIDE 100 MG/1
100 TABLET ORAL NIGHTLY
Status: DISCONTINUED | OUTPATIENT
Start: 2017-12-19 | End: 2017-12-20 | Stop reason: HOSPADM

## 2017-12-19 RX ORDER — LAMOTRIGINE 100 MG/1
TABLET ORAL
Status: DISPENSED
Start: 2017-12-19 | End: 2017-12-20

## 2017-12-19 RX ORDER — PRAVASTATIN SODIUM 20 MG
TABLET ORAL
Status: DISPENSED
Start: 2017-12-19 | End: 2017-12-20

## 2017-12-19 RX ORDER — LACTOBACILLUS RHAMNOSUS GG 10B CELL
1 CAPSULE ORAL 3 TIMES DAILY
Status: DISCONTINUED | OUTPATIENT
Start: 2017-12-19 | End: 2017-12-20 | Stop reason: HOSPADM

## 2017-12-19 RX ORDER — QUETIAPINE FUMARATE 300 MG/1
300 TABLET, FILM COATED ORAL NIGHTLY
Status: DISCONTINUED | OUTPATIENT
Start: 2017-12-19 | End: 2017-12-20 | Stop reason: HOSPADM

## 2017-12-19 RX ORDER — PRAVASTATIN SODIUM 40 MG
40 TABLET ORAL DAILY
Status: DISCONTINUED | OUTPATIENT
Start: 2017-12-19 | End: 2017-12-20 | Stop reason: HOSPADM

## 2017-12-19 RX ADMIN — ONDANSETRON 8 MG: 2 INJECTION INTRAMUSCULAR; INTRAVENOUS at 18:15

## 2017-12-19 RX ADMIN — METRONIDAZOLE 500 MG: 500 INJECTION, SOLUTION INTRAVENOUS at 03:01

## 2017-12-19 RX ADMIN — Medication 1 CAPSULE: at 13:01

## 2017-12-19 RX ADMIN — ONDANSETRON 8 MG: 2 INJECTION INTRAMUSCULAR; INTRAVENOUS at 13:01

## 2017-12-19 RX ADMIN — ENOXAPARIN SODIUM 40 MG: 40 INJECTION SUBCUTANEOUS at 08:33

## 2017-12-19 RX ADMIN — Medication 40 MG: at 21:38

## 2017-12-19 RX ADMIN — SODIUM CHLORIDE: 9 INJECTION, SOLUTION INTRAVENOUS at 08:49

## 2017-12-19 RX ADMIN — Medication 250 MG: at 21:31

## 2017-12-19 RX ADMIN — METRONIDAZOLE 500 MG: 500 INJECTION, SOLUTION INTRAVENOUS at 08:33

## 2017-12-19 RX ADMIN — LORATADINE 10 MG: 10 TABLET ORAL at 08:37

## 2017-12-19 RX ADMIN — LISINOPRIL 20 MG: 20 TABLET ORAL at 08:36

## 2017-12-19 RX ADMIN — ONDANSETRON 8 MG: 2 INJECTION INTRAMUSCULAR; INTRAVENOUS at 03:06

## 2017-12-19 RX ADMIN — NYSTATIN 500000 UNITS: 100000 SUSPENSION ORAL at 21:36

## 2017-12-19 RX ADMIN — SODIUM CHLORIDE: 9 INJECTION, SOLUTION INTRAVENOUS at 18:11

## 2017-12-19 RX ADMIN — Medication 250 MG: at 08:33

## 2017-12-19 RX ADMIN — Medication 1 CAPSULE: at 08:34

## 2017-12-19 RX ADMIN — Medication 1 CAPSULE: at 21:32

## 2017-12-19 RX ADMIN — NYSTATIN 500000 UNITS: 100000 SUSPENSION ORAL at 08:33

## 2017-12-19 RX ADMIN — NYSTATIN 500000 UNITS: 100000 SUSPENSION ORAL at 13:01

## 2017-12-19 RX ADMIN — ONDANSETRON 8 MG: 2 INJECTION INTRAMUSCULAR; INTRAVENOUS at 23:51

## 2017-12-19 RX ADMIN — HYDROCODONE BITARTRATE AND ACETAMINOPHEN 1 TABLET: 5; 325 TABLET ORAL at 14:34

## 2017-12-19 RX ADMIN — NYSTATIN 500000 UNITS: 100000 SUSPENSION ORAL at 17:27

## 2017-12-19 RX ADMIN — HYDROCODONE BITARTRATE AND ACETAMINOPHEN 1 TABLET: 5; 325 TABLET ORAL at 08:49

## 2017-12-19 RX ADMIN — SODIUM CHLORIDE: 9 INJECTION, SOLUTION INTRAVENOUS at 00:40

## 2017-12-19 RX ADMIN — PROMETHAZINE HYDROCHLORIDE 12.5 MG: 25 INJECTION INTRAMUSCULAR; INTRAVENOUS at 14:55

## 2017-12-19 RX ADMIN — PROMETHAZINE HYDROCHLORIDE 12.5 MG: 25 INJECTION INTRAMUSCULAR; INTRAVENOUS at 00:54

## 2017-12-19 RX ADMIN — ONDANSETRON 8 MG: 2 INJECTION INTRAMUSCULAR; INTRAVENOUS at 08:50

## 2017-12-19 ASSESSMENT — PAIN SCALES - GENERAL
PAINLEVEL_OUTOF10: 0
PAINLEVEL_OUTOF10: 6
PAINLEVEL_OUTOF10: 0
PAINLEVEL_OUTOF10: 2
PAINLEVEL_OUTOF10: 0
PAINLEVEL_OUTOF10: 8
PAINLEVEL_OUTOF10: 4
PAINLEVEL_OUTOF10: 0

## 2017-12-19 NOTE — PROGRESS NOTES
Luis Manuel Chetingham is a 46 y.o. female patient.     Current Facility-Administered Medications   Medication Dose Route Frequency Provider Last Rate Last Dose    metronidazole (FLAGYL) 500 mg in NaCl 100 mL IVPB premix  500 mg Intravenous Q6H Peyton Hurl   Stopped at 12/18/17 2132    0.9 % sodium chloride infusion   Intravenous Continuous Peyton Hurl 125 mL/hr at 12/18/17 1458      enoxaparin (LOVENOX) injection 40 mg  40 mg Subcutaneous Daily Peyton Hurl   40 mg at 12/18/17 1704    lactobacillus (CULTURELLE) capsule 1 capsule  1 capsule Oral TID Peyton Hurl   1 capsule at 12/18/17 2032    saccharomyces boulardii (FLORASTOR) capsule 250 mg  250 mg Oral BID Peyton Hurl   250 mg at 12/18/17 2032    butalbital-acetaminophen-caffeine (FIORICET, ESGIC) per tablet 1 tablet  1 tablet Oral Q4H PRN Peyton Hurl        fluticasone Hoffmann Lust) 50 MCG/ACT nasal spray 1 spray  1 spray Each Nare Daily Peyton Hurl        HYDROcodone-acetaminophen (NORCO) 5-325 MG per tablet 1 tablet  1 tablet Oral Q6H PRN Peyton Hurl        hydrOXYzine (ATARAX) tablet 10 mg  10 mg Oral Daily Peyton Hurl        lamoTRIgine (LAMICTAL) tablet 100 mg  100 mg Oral Nightly Peyton Hurl        [START ON 12/19/2017] lisinopril (PRINIVIL;ZESTRIL) tablet 20 mg  20 mg Oral Daily Yamil Vaughn        loratadine (CLARITIN) tablet 10 mg  10 mg Oral Daily Peyton Hurl        pravastatin (PRAVACHOL) tablet 40 mg  40 mg Oral Daily Peyton Hurl        QUEtiapine (SEROQUEL) tablet 300 mg  300 mg Oral Nightly Peyton Hurl        vitamin D (CHOLECALCIFEROL) tablet 5,000 Units  5,000 Units Oral Daily Peyton Hurl        topiramate (TOPAMAX) tablet 25 mg  25 mg Oral Daily Peyton Hurl        traZODone (DESYREL) tablet 100 mg  100 mg Oral Nightly Peyton Hurl        ondansetron [facility]) injection 8 mg  8 mg Intravenous Q4H PRN Peyton Hurl   8 mg at 12/18/17 2119    nystatin (MYCOSTATIN) 068102 UNIT/ML suspension 500,000 Units  5 mL

## 2017-12-19 NOTE — PROGRESS NOTES
Patient declined OT services, stating they are at their baseline level of function. Patient notes they are independent with functional mobility, toileting and simple ADLs. Patient discharged per their request. Patient was advised to inform staff if there is a decline in function or if they have any questions, and therapy can return at that time. Patient verbalized understanding.

## 2017-12-19 NOTE — PROGRESS NOTES
Physical Therapy  Attempted PT eval. Patient refused eval stating she has no concerns about her strength, movement, or functional ability now (in hospital) or at home. Told patient that if something changes where she does feel unsafe or unsteady that new orders can be made for a PT eval. Pt stated she understood.     Discontinue PT orders due to pt refusal.    Liane Valle, PT, DPT

## 2017-12-19 NOTE — FLOWSHEET NOTE
Lorenza Nowak intends to discuss advance directives with her daughter. Did not have questions at this time. Provided contact information if she has questions.       12/19/17 1233   Encounter Summary   Services provided to: Patient   Referral/Consult From: Rounding   Complexity of Encounter Low   Length of Encounter 15 minutes   Routine   Type Follow up   Assessment Approachable   Intervention Sustaining presence/ Ministry of presence   Outcome Engaged in conversation

## 2017-12-19 NOTE — PROGRESS NOTES
Nutrition Assessment    Type and Reason for Visit: Initial    Malnutrition Assessment:  · Malnutrition Status: No malnutrition    Nutrition Diagnosis:   · Problem: No nutrition diagnosis at this time    Nutrition Assessment:  · Subjective Assessment: Patient referred to nutrition due to weight loss. Pt is consuming food, and weight loss is not significant. Full assesment is not indicated. Nutrition Risk Level   Risk Level: Low      Patient assessed for nutrition risk. Deemed to be at low risk at this time. Will continue to follow patient.       Electronically signed by Jensen Bonilla on 12/19/17 at 1:50 PM    Contact Number: [id number]

## 2017-12-19 NOTE — PROGRESS NOTES
Hospitalist Progress Note    Patient:  Mo Cole     YOB: 1965    MRN: [id number]   Admit date: 12/18/2017     Acct: [de-identified]     PCP: Bethel Zeng NP    CC--Interval History:   Gastroenteritis----still with loose stool-nausea--abdominal discomfort more LLQ today--cultures pending    Dehydration---IV fluids    Kidney disease---Stage 3 at admission ----> Stage 2 today    Breast carcinoma---chemotherapy patient---last chemotherapy---12.11.2017    See note below     All other ROS negative except noted in HPI    Diet:  DIET FULL LIQUID;    Medications:  Scheduled Meds:   [START ON 12/20/2017] lisinopril  20 mg Oral Daily    traZODone  100 mg Oral Nightly    QUEtiapine  300 mg Oral Nightly    lamoTRIgine  100 mg Oral Nightly    pravastatin  40 mg Oral Daily    metroNIDAZOLE  500 mg Intravenous Q6H    enoxaparin  40 mg Subcutaneous Daily    lactobacillus  1 capsule Oral TID    saccharomyces boulardii  250 mg Oral BID    fluticasone  1 spray Each Nare Daily    hydrOXYzine  10 mg Oral Daily    loratadine  10 mg Oral Daily    nystatin  5 mL Oral 4x Daily     Continuous Infusions:   sodium chloride 125 mL/hr at 12/19/17 0849     PRN Meds:butalbital-acetaminophen-caffeine, HYDROcodone-acetaminophen, ondansetron, promethazine    Objective:  Labs:  CBC with Differential:    Lab Results   Component Value Date    WBC 2.1 12/19/2017    RBC 3.34 12/19/2017    HGB 10.4 12/19/2017    HCT 31.4 12/19/2017     12/19/2017    MCV 91.3 12/19/2017    MCH 30.3 12/19/2017    MCHC 33.2 12/19/2017    RDW 13.4 12/19/2017    LYMPHOPCT 61 12/19/2017    MONOPCT 19 12/19/2017    BASOPCT 0 12/19/2017    MONOSABS 0.40 12/19/2017    LYMPHSABS 1.28 12/19/2017    EOSABS 0.02 12/19/2017    BASOSABS 0.00 12/19/2017    DIFFTYPE NOT REPORTED 12/19/2017     CMP:    Lab Results   Component Value Date     12/19/2017    K 3.6 12/19/2017     12/19/2017    CO2 25 12/19/2017    BUN 11 12/19/2017 CREATININE 0.84 12/19/2017    GFRAA >60 12/19/2017    LABGLOM >60 12/19/2017    GLUCOSE 120 12/19/2017    PROT 6.7 12/19/2017    LABALBU 3.2 12/19/2017    CALCIUM 8.7 12/19/2017    BILITOT 0.35 12/19/2017    ALKPHOS 62 12/19/2017    AST 12 12/19/2017    ALT 14 12/19/2017           Physical Exam:  Vitals: /85   Pulse 98   Temp 98.8 °F (37.1 °C) (Tympanic)   Resp 18   Ht 5' 6\" (1.676 m)   Wt 278 lb 6.4 oz (126.3 kg)   LMP 02/28/2013   SpO2 95%   BMI 44.93 kg/m²   24 hour intake/output:  Intake/Output Summary (Last 24 hours) at 12/19/17 1031  Last data filed at 12/19/17 0310   Gross per 24 hour   Intake              716 ml   Output              450 ml   Net              266 ml     Last 3 weights: Wt Readings from Last 3 Encounters:   12/18/17 278 lb 6.4 oz (126.3 kg)   12/18/17 280 lb 13.9 oz (127.4 kg)   12/11/17 283 lb 12.8 oz (128.7 kg)     HEENT: Normocephalic and Atraumatic  Neck: Supple, No Masses, Tenderness, Nodularity and No Lymphadenopathy  Chest/Lungs: Clear to Auscultation without Rales, Rhonchi, or Wheezes  Cardiac: Regular Rate and Rhythm  GI/Abdomen:  Bowel Sounds Present and Tenderness--diffusely more epigastric and LLQ---no GR/R  : Not examined  EXT/Skin: No Edema, No Cyanosis and No Clubbing  Neuro: Alert and Oriented, to Person, to Time, to Place, to Situation and No Localizing Signs/Symptoms      Assessment:    Active Problems:    Nausea and vomiting    GENIA GARCIA dc  HO  52  WF  [SOLO Clement;  Farhad Garvin, NP---[facility]; LYRIC Parsons]  FULL CODE       LOVENOX    Anti-infectives:  Flagyl IV    Gastroenteritis----12.18.2017--nausea-vomiting---diarrhea---nausea---                   fever--abdominal cramping  Dehydration---12.18.2017  Infiltrating ductal carcinoma---Stage IIa--right breast--ER--[-]--NH-[+]--                  HER-2 amplified--overlapping 1.2 cm 1:00 position and 4 mm                  12:00 position---pT--pNO--pMO Right-sided mastectomy--sentinel LN biopsy--10.19.2017--invasive                  ductal carcinoma--Grade 3/3--2.8 cm size--negative margins--                  ER-[-]---weakly AK-[+]---LN negative        Ultrasound-guided biopsy---9.8.2017--invasive ductal carcinoma--                  ER-[-]--AK-[+]--high grade DCIS---lesion 12:00 fibrocystic                  disease--focal adenosis--hyperplasia       Adjuvant chemotherapy---TCHP regimen---11.13.2017 and 12.11.2017           2D ECHO--11.7.2017--NLVSF--NRVSF--normal appearing valves---                  Grade 1 DD---LVEF ~ 60%        EKG---10.10.2017--NSR--77--NSSTTCs    Hyperlipidemia  Kidney disease---chronic---Stage 3  Hyperglycemia   Morbid obesity   Anemia   Bipolar disorder----Type 1  PMH:  headaches---migraine type  PSH:   see above, left shoulder x 2, left tunneled venous port placement---             11.9.2017---replacement---11.30.2017    Allergies:   NKDA           Plan:  1. Gastroenteritis---IV fluids  2. Loose stool----await culture---currently on Flagyl  3.   Anemia----recheck H&H  4.  See orders    Add:  PAVREZ kahn----[-] ----> dc Flagyl      Electronically signed by Anthony Soria on 12/19/2017 at 10:31 AM    Hospitalist

## 2017-12-20 VITALS
DIASTOLIC BLOOD PRESSURE: 79 MMHG | HEART RATE: 87 BPM | OXYGEN SATURATION: 96 % | HEIGHT: 66 IN | SYSTOLIC BLOOD PRESSURE: 135 MMHG | TEMPERATURE: 97.6 F | RESPIRATION RATE: 16 BRPM | BODY MASS INDEX: 45.8 KG/M2 | WEIGHT: 285 LBS

## 2017-12-20 LAB
ABSOLUTE EOS #: 0 K/UL (ref 0–0.4)
ABSOLUTE IMMATURE GRANULOCYTE: ABNORMAL K/UL (ref 0–0.3)
ABSOLUTE LYMPH #: 1.3 K/UL (ref 1–4.8)
ABSOLUTE MONO #: 0.5 K/UL (ref 0.1–1.2)
ANION GAP SERPL CALCULATED.3IONS-SCNC: 12 MMOL/L (ref 9–17)
BASOPHILS # BLD: 0 % (ref 0–1)
BASOPHILS ABSOLUTE: 0 K/UL (ref 0–0.2)
BUN BLDV-MCNC: 7 MG/DL (ref 6–20)
BUN/CREAT BLD: 9 (ref 9–20)
CALCIUM SERPL-MCNC: 8.7 MG/DL (ref 8.6–10.4)
CAMPYLOBACTER PCR: NORMAL
CHLORIDE BLD-SCNC: 106 MMOL/L (ref 98–107)
CO2: 23 MMOL/L (ref 20–31)
CREAT SERPL-MCNC: 0.82 MG/DL (ref 0.5–0.9)
DIFFERENTIAL TYPE: ABNORMAL
EOSINOPHILS RELATIVE PERCENT: 2 % (ref 1–7)
GFR AFRICAN AMERICAN: >60 ML/MIN
GFR NON-AFRICAN AMERICAN: >60 ML/MIN
GFR SERPL CREATININE-BSD FRML MDRD: ABNORMAL ML/MIN/{1.73_M2}
GFR SERPL CREATININE-BSD FRML MDRD: ABNORMAL ML/MIN/{1.73_M2}
GLUCOSE BLD-MCNC: 112 MG/DL (ref 70–99)
HCT VFR BLD CALC: 30.4 % (ref 36–46)
HEMOGLOBIN: 10.1 G/DL (ref 12–16)
IMMATURE GRANULOCYTES: ABNORMAL %
LYMPHOCYTES # BLD: 63 % (ref 16–46)
MCH RBC QN AUTO: 30.6 PG (ref 26–34)
MCHC RBC AUTO-ENTMCNC: 33.2 G/DL (ref 31–37)
MCV RBC AUTO: 92.2 FL (ref 80–100)
MONOCYTES # BLD: 22 % (ref 4–11)
PDW BLD-RTO: 13.3 % (ref 11–14.5)
PLATELET # BLD: 154 K/UL (ref 140–450)
PLATELET ESTIMATE: ABNORMAL
PMV BLD AUTO: 8.1 FL (ref 6–12)
POTASSIUM SERPL-SCNC: 3.9 MMOL/L (ref 3.7–5.3)
RBC # BLD: 3.3 M/UL (ref 4–5.2)
RBC # BLD: ABNORMAL 10*6/UL
SALMONELLA PCR: NORMAL
SEG NEUTROPHILS: 13 % (ref 43–77)
SEGMENTED NEUTROPHILS ABSOLUTE COUNT: 0.3 K/UL (ref 1.8–7.7)
SHIGATOXIN GENE PCR: NORMAL
SHIGELLA SP PCR: NORMAL
SODIUM BLD-SCNC: 141 MMOL/L (ref 135–144)
SPECIMEN: NORMAL
WBC # BLD: 2.1 K/UL (ref 3.5–11)
WBC # BLD: ABNORMAL 10*3/UL

## 2017-12-20 PROCEDURE — 96376 TX/PRO/DX INJ SAME DRUG ADON: CPT

## 2017-12-20 PROCEDURE — 2580000003 HC RX 258: Performed by: INTERNAL MEDICINE

## 2017-12-20 PROCEDURE — G0378 HOSPITAL OBSERVATION PER HR: HCPCS

## 2017-12-20 PROCEDURE — 80048 BASIC METABOLIC PNL TOTAL CA: CPT

## 2017-12-20 PROCEDURE — 6360000002 HC RX W HCPCS: Performed by: INTERNAL MEDICINE

## 2017-12-20 PROCEDURE — 6370000000 HC RX 637 (ALT 250 FOR IP): Performed by: INTERNAL MEDICINE

## 2017-12-20 PROCEDURE — 99217 PR OBSERVATION CARE DISCHARGE MANAGEMENT: CPT | Performed by: INTERNAL MEDICINE

## 2017-12-20 PROCEDURE — 36415 COLL VENOUS BLD VENIPUNCTURE: CPT

## 2017-12-20 PROCEDURE — 96372 THER/PROPH/DIAG INJ SC/IM: CPT

## 2017-12-20 PROCEDURE — 99222 1ST HOSP IP/OBS MODERATE 55: CPT | Performed by: INTERNAL MEDICINE

## 2017-12-20 PROCEDURE — 85025 COMPLETE CBC W/AUTO DIFF WBC: CPT

## 2017-12-20 PROCEDURE — 96361 HYDRATE IV INFUSION ADD-ON: CPT

## 2017-12-20 RX ORDER — LACTOBACILLUS RHAMNOSUS GG 10B CELL
1 CAPSULE ORAL 3 TIMES DAILY
Qty: 90 CAPSULE | Refills: 0 | COMMUNITY
Start: 2017-12-20 | End: 2019-01-02

## 2017-12-20 RX ORDER — SACCHAROMYCES BOULARDII 250 MG
250 CAPSULE ORAL 2 TIMES DAILY
Qty: 60 CAPSULE | Refills: 0 | COMMUNITY
Start: 2017-12-20 | End: 2017-12-27

## 2017-12-20 RX ORDER — OMEPRAZOLE 20 MG/1
20 CAPSULE, DELAYED RELEASE ORAL 2 TIMES DAILY
Qty: 60 CAPSULE | Refills: 0 | COMMUNITY
Start: 2017-12-20 | End: 2018-08-15 | Stop reason: ALTCHOICE

## 2017-12-20 RX ADMIN — Medication 250 MG: at 09:34

## 2017-12-20 RX ADMIN — HYDROCODONE BITARTRATE AND ACETAMINOPHEN 1 TABLET: 5; 325 TABLET ORAL at 09:34

## 2017-12-20 RX ADMIN — Medication 1 CAPSULE: at 09:34

## 2017-12-20 RX ADMIN — ONDANSETRON 8 MG: 2 INJECTION INTRAMUSCULAR; INTRAVENOUS at 06:57

## 2017-12-20 RX ADMIN — NYSTATIN 500000 UNITS: 100000 SUSPENSION ORAL at 13:03

## 2017-12-20 RX ADMIN — LISINOPRIL 20 MG: 20 TABLET ORAL at 09:34

## 2017-12-20 RX ADMIN — LORATADINE 10 MG: 10 TABLET ORAL at 09:36

## 2017-12-20 RX ADMIN — Medication 1 CAPSULE: at 13:03

## 2017-12-20 RX ADMIN — ENOXAPARIN SODIUM 40 MG: 40 INJECTION SUBCUTANEOUS at 09:36

## 2017-12-20 RX ADMIN — NYSTATIN 500000 UNITS: 100000 SUSPENSION ORAL at 09:34

## 2017-12-20 RX ADMIN — SODIUM CHLORIDE: 9 INJECTION, SOLUTION INTRAVENOUS at 02:25

## 2017-12-20 ASSESSMENT — PAIN DESCRIPTION - PAIN TYPE: TYPE: ACUTE PAIN

## 2017-12-20 ASSESSMENT — PAIN SCALES - GENERAL
PAINLEVEL_OUTOF10: 0
PAINLEVEL_OUTOF10: 8
PAINLEVEL_OUTOF10: 0

## 2017-12-20 ASSESSMENT — PAIN DESCRIPTION - LOCATION: LOCATION: ABDOMEN

## 2017-12-20 ASSESSMENT — PAIN DESCRIPTION - DESCRIPTORS: DESCRIPTORS: ACHING

## 2017-12-20 ASSESSMENT — PAIN DESCRIPTION - ORIENTATION: ORIENTATION: LEFT

## 2017-12-20 NOTE — PROGRESS NOTES
GFRAA >60 12/20/2017    LABGLOM >60 12/20/2017    GLUCOSE 112 12/20/2017           Physical Exam:  Vitals: /79   Pulse 87   Temp 97.6 °F (36.4 °C) (Tympanic)   Resp 16   Ht 5' 6\" (1.676 m)   Wt 285 lb (129.3 kg)   LMP 02/28/2013   SpO2 96%   BMI 46.00 kg/m²   24 hour intake/output:  Intake/Output Summary (Last 24 hours) at 12/20/17 1308  Last data filed at 12/20/17 1300   Gross per 24 hour   Intake          3771.66 ml   Output                0 ml   Net          3771.66 ml     Last 3 weights: Wt Readings from Last 3 Encounters:   12/20/17 285 lb (129.3 kg)   12/18/17 280 lb 13.9 oz (127.4 kg)   12/11/17 283 lb 12.8 oz (128.7 kg)     HEENT: alopecia----, Normocephalic and Atraumatic  Neck: Supple, No Masses, Tenderness, Nodularity and No Lymphadenopathy  Chest/Lungs: Clear to Auscultation without Rales, Rhonchi, or Wheezes  Cardiac: Regular Rate and Rhythm without Rubs, Clicks, Gallops, or Murmurs  GI/Abdomen:  Bowel Sounds Present, Soft, mild epigastric tenderness---without Guarding or Rebound Tenderness, No Masses and No Tenderness  : Not examined  EXT/Skin: No Edema, No Cyanosis and No Clubbing  Neuro: Alert and Oriented and No Localizing Signs/Symptoms      Assessment:    Active Problems:    Nausea and vomiting    [facility]  52  WF  [SOLO Clement;  [employer], NP---[facility]; osman Das ]  FULL CODE       LOVENOX    Anti-infectives:  Flagyl IV--dcd    Gastroenteritis----12.18.2017--nausea-vomiting---diarrhea---nausea---                   fever--abdominal cramping----C. difficle--[-]  Dehydration---12.18.2017  Infiltrating ductal carcinoma---Stage IIa--right breast--ER--[-]--DC-[+]--                  HER-2 amplified--overlapping 1.2 cm 1:00 position and 4 mm                  12:00 position---pT--pNO--pMO        Right-sided mastectomy--sentinel LN biopsy--10.19.2017--invasive                  ductal carcinoma--Grade 3/3--2.8 cm size--negative

## 2017-12-20 NOTE — PROGRESS NOTES
Dickson Chaidez is a 46 y.o. female patient.     Current Facility-Administered Medications   Medication Dose Route Frequency Provider Last Rate Last Dose    [START ON 12/20/2017] lisinopril (PRINIVIL;ZESTRIL) tablet 20 mg  20 mg Oral Daily Yamil Vaughn        traZODone (DESYREL) tablet 100 mg  100 mg Oral Nightly Magalie Hedge        QUEtiapine (SEROQUEL) tablet 300 mg  300 mg Oral Nightly Magalie Hedge        lamoTRIgine (LAMICTAL) tablet 100 mg  100 mg Oral Nightly Magalie Hedge        pravastatin (PRAVACHOL) tablet 40 mg  40 mg Oral Daily Yamil Vaughn        lactobacillus (CULTURELLE) capsule 1 capsule  1 capsule Oral TID Magalie Hedge   1 capsule at 12/19/17 1301    QUEtiapine (SEROQUEL) 100 MG tablet             lamoTRIgine (LAMICTAL) 100 MG tablet             0.9 % sodium chloride infusion   Intravenous Continuous Magalie Hedge 125 mL/hr at 12/19/17 1811      enoxaparin (LOVENOX) injection 40 mg  40 mg Subcutaneous Daily Tracy Milton Vaughn   40 mg at 12/19/17 4206    saccharomyces boulardii (FLORASTOR) capsule 250 mg  250 mg Oral BID Magalie Hedge   250 mg at 12/19/17 5714    butalbital-acetaminophen-caffeine (FIORICET, ESGIC) per tablet 1 tablet  1 tablet Oral Q4H PRN Magalie Hedge        fluticasone [facility]) 50 MCG/ACT nasal spray 1 spray  1 spray Each Nare Daily Magalie Hedge        HYDROcodone-acetaminophen (NORCO) 5-325 MG per tablet 1 tablet  1 tablet Oral Q6H PRN Magalie Hedge   1 tablet at 12/19/17 1434    hydrOXYzine (ATARAX) tablet 10 mg  10 mg Oral Daily Yamil Vaughn        loratadine (CLARITIN) tablet 10 mg  10 mg Oral Daily Magalie Hedge   10 mg at 12/19/17 8827    ondansetron (ZOFRAN) injection 8 mg  8 mg Intravenous Q4H PRN Magalie Hedge   8 mg at 12/19/17 1815    nystatin (MYCOSTATIN) 125582 UNIT/ML suspension 500,000 Units  5 mL Oral 4x Daily Magalie Hedge   500,000 Units at 12/19/17 1727    promethazine (PHENERGAN) injection 12.5 mg  12.5 mg Intramuscular Q6H PRN Delicia CAPPS Arnaldo Doran PA-C   12.5 mg at 12/19/17 8147     No Known Allergies  Active Problems:    Nausea and vomiting    Blood pressure (!) 145/77, pulse 91, temperature 98.7 °F (37.1 °C), resp. rate 16, height 5' 6\" (1.676 m), weight 278 lb 6.4 oz (126.3 kg), last menstrual period 02/28/2013, SpO2 98 %, not currently breastfeeding. Subjective Patient is still complaining of nausea that is intermittent and severe. She is not tolerating oral fluids well but continues to get IV fluids. Objective  Patient is well appearing in no acute distress. The patient is breathing easily, heart is RRR. The patient's lower extremities have a full ROM and no edema.     Assessment & Plan  Nausea and vomiting - patient's nausea is difficult to control, will continue anti-emetics at this time and IV fluids    Erika Coello PA-C  12/19/2017

## 2017-12-20 NOTE — CONSULTS
11/30/2017). Medications:    Prior to Admission medications    Medication Sig Start Date End Date Taking? Authorizing Provider   prochlorperazine (COMPAZINE) 10 MG tablet Take 10 mg by mouth every 6 hours as needed   Yes Historical Provider, MD   HYDROcodone-acetaminophen (NORCO) 5-325 MG per tablet Take 1 tablet by mouth every 6 hours as needed for Pain .    Yes Historical Provider, MD   VENTOLIN  (90 Base) MCG/ACT inhaler Inhale 2 puffs into the lungs every 4 hours as needed  9/1/17  Yes Historical Provider, MD   butalbital-acetaminophen-caffeine (FIORICET, ESGIC) -40 MG per tablet Take 1-2 tablets by mouth every 4 hours as needed  7/21/17  Yes Historical Provider, MD   fluticasone (FLONASE) 50 MCG/ACT nasal spray 1 spray by Nasal route Indications: as needed  8/31/17  Yes Historical Provider, MD   lamoTRIgine (LAMICTAL) 100 MG tablet 100 mg nightly  8/30/17  Yes Historical Provider, MD   lisinopril (PRINIVIL;ZESTRIL) 20 MG tablet 20 mg daily  8/31/17  Yes Historical Provider, MD   loratadine (CLARITIN) 10 MG tablet Take 10 mg by mouth daily  8/31/17  Yes Historical Provider, MD   pravastatin (PRAVACHOL) 40 MG tablet Take 40 mg by mouth daily  8/31/17  Yes Historical Provider, MD   QUEtiapine (SEROQUEL) 300 MG tablet Take 300 mg by mouth nightly  8/9/17  Yes Historical Provider, MD   topiramate (TOPAMAX) 25 MG tablet Take 25 mg by mouth daily  7/21/17  Yes Historical Provider, MD   traZODone (DESYREL) 100 MG tablet Take 100 mg by mouth nightly  8/30/17  Yes Historical Provider, MD   RA VITAMIN D-3 5000 units CAPS capsule Take 5,000 Units by mouth daily  8/31/17   Historical Provider, MD   hydrOXYzine (ATARAX) 10 MG tablet Take 10 mg by mouth daily  7/21/17   Historical Provider, MD   SUMAtriptan (IMITREX) 25 MG tablet 25 mg once as needed  7/21/17   Historical Provider, MD     Current Facility-Administered Medications   Medication Dose Route Frequency Provider Last Rate Last Dose    lisinopril (PRINIVIL;ZESTRIL) tablet 20 mg  20 mg Oral Daily Love Bolk   20 mg at 12/20/17 9243    traZODone (DESYREL) tablet 100 mg  100 mg Oral Nightly Love Bolk        QUEtiapine (SEROQUEL) tablet 300 mg  300 mg Oral Nightly Love Bolk        lamoTRIgine (LAMICTAL) tablet 100 mg  100 mg Oral Nightly Love Bolk        pravastatin (PRAVACHOL) tablet 40 mg  40 mg Oral Daily Love Bolk   40 mg at 12/19/17 2138    lactobacillus (CULTURELLE) capsule 1 capsule  1 capsule Oral TID Love Bolk   1 capsule at 12/20/17 9939    0.9 % sodium chloride infusion   Intravenous Continuous Love Bolk   Stopped at 12/20/17 1030    enoxaparin (LOVENOX) injection 40 mg  40 mg Subcutaneous Daily Love Bolk   40 mg at 12/20/17 3356    saccharomyces boulardii (FLORASTOR) capsule 250 mg  250 mg Oral BID Love Bolk   250 mg at 12/20/17 6656    butalbital-acetaminophen-caffeine (FIORICET, ESGIC) per tablet 1 tablet  1 tablet Oral Q4H PRN Love Bolk        fluticasone [facility]) 50 MCG/ACT nasal spray 1 spray  1 spray Each Nare Daily Love Bolk        HYDROcodone-acetaminophen (NORCO) 5-325 MG per tablet 1 tablet  1 tablet Oral Q6H PRN Love Bolk   1 tablet at 12/20/17 3992    hydrOXYzine (ATARAX) tablet 10 mg  10 mg Oral Daily Yamil Vaughn        loratadine (CLARITIN) tablet 10 mg  10 mg Oral Daily Love Bolk   10 mg at 12/20/17 2644    ondansetron (ZOFRAN) injection 8 mg  8 mg Intravenous Q4H PRN Love Bolk   8 mg at 12/20/17 8497    nystatin (MYCOSTATIN) 227133 UNIT/ML suspension 500,000 Units  5 mL Oral 4x Daily Love Bolk   500,000 Units at 12/20/17 3133    promethazine (PHENERGAN) injection 12.5 mg  12.5 mg Intramuscular Q6H PRN Alice Castro PA-C   12.5 mg at 12/19/17 7095       Allergies:  Review of patient's allergies indicates no known allergies. Social History:   reports that she has never smoked.  She has never used smokeless tobacco. She reports that she does not drink alcohol or use drugs. Family History: family history includes Breast Cancer (age of onset: 54) in her sister; Breast Cancer (age of onset: 71) in her maternal aunt; Other in her maternal cousin and sister; Uterine Cancer (age of onset: 32) in her sister. REVIEW OF SYSTEMS:      Constitutional: Positive fever . No night sweats, no weight loss   Eyes: No eye discharge, double vision, or eye pain   HEENT: negative for sore mouth, sore throat, hoarseness and voice change   Respiratory: negative for cough , sputum, dyspnea, wheezing, hemoptysis, chest pain   Cardiovascular: negative for chest pain, dyspnea, palpitations, orthopnea, PND   Gastrointestinal:  Intractable nausea vomiting diarrhea  Genitourinary: negative for frequency, dysuria, nocturia, urinary incontinence, and hematuria   Integument: negative for rash, skin lesions, bruises.    Hematologic/Lymphatic: negative for easy bruising, bleeding, lymphadenopathy, or petechiae   Endocrine: negative for heat or cold intolerance,weight changes, change in bowel habits and hair loss   Musculoskeletal: negative for myalgias, arthralgias, pain, joint swelling,and bone pain   Neurological: negative for headaches, dizziness, seizures, weakness, numbness    PHYSICAL EXAM:        /79   Pulse 87   Temp 97.6 °F (36.4 °C) (Tympanic)   Resp 16   Ht 5' 6\" (1.676 m)   Wt 285 lb (129.3 kg)   LMP 2013   SpO2 96%   BMI 46.00 kg/m²    Temp (24hrs), Av.7 °F (37.1 °C), Min:97.6 °F (36.4 °C), Max:99.4 °F (37.4 °C)      General appearance - not in acute distress   Mental status - alert and cooperative   Eyes - pupils equal and reactive, extraocular eye movements intact   Ears - bilateral TM's and external ear canals normal   Mouth - mucous membranes moist, pharynx normal without lesions   Neck - supple, no significant adenopathy   Lymphatics - no palpable lymphadenopathy, no hepatosplenomegaly   Chest - clear to auscultation, no wheezes, rales or rhonchi, 18 <32 U/L    Total Bilirubin 0.43 0.3 - 1.2 mg/dL    Total Protein 7.9 6.4 - 8.3 g/dL    Alb 3.4 (L) 3.5 - 5.2 g/dL    Albumin/Globulin Ratio 0.8 (L) 1.0 - 2.5    GFR Non-African American >60 >60 mL/min    GFR African American >60 >60 mL/min    GFR Comment          GFR Staging NOT REPORTED    CBC Auto Differential   Result Value Ref Range    WBC 2.1 (L) 3.5 - 11.0 k/uL    RBC 3.34 (L) 4.0 - 5.2 m/uL    Hemoglobin 10.1 (L) 12.0 - 16.0 g/dL    Hematocrit 30.5 (L) 36 - 46 %    MCV 91.3 80 - 100 fL    MCH 30.3 26 - 34 pg    MCHC 33.2 31 - 37 g/dL    RDW 13.4 11.0 - 14.5 %    Platelets 536 001 - 662 k/uL    MPV 8.4 6.0 - 12.0 fL    Differential Type NOT REPORTED     Immature Granulocytes NOT REPORTED 0 %    Absolute Immature Granulocyte NOT REPORTED 0.00 - 0.30 k/uL    WBC Morphology NOT REPORTED     RBC Morphology NOT REPORTED     Platelet Estimate NOT REPORTED     Monocytes 19 (H) 4 - 11 %    Lymphocytes 61 (H) 16 - 46 %    Seg Neutrophils 14 (L) 43 - 77 %    Eosinophils % 1 1 - 7 %    Basophils 0 0 - 1 %    Atypical Lymphocytes 5 %    Absolute Mono # 0.40 0.1 - 1.2 k/uL    Absolute Lymph # 1.28 1.0 - 4.8 k/uL    Segs Absolute 0.29 (L) 1.8 - 7.7 k/uL    Absolute Eos # 0.02 0.0 - 0.4 k/uL    Basophils # 0.00 0.0 - 0.2 k/uL    Atypical Lymphocytes Absolute 0.11 k/uL    Morphology [facility]    Comprehensive Metabolic Panel   Result Value Ref Range    Glucose 120 (H) 70 - 99 mg/dL    BUN 11 6 - 20 mg/dL    CREATININE 0.84 0.50 - 0.90 mg/dL    Bun/Cre Ratio 13 9 - 20    Calcium 8.7 8.6 - 10.4 mg/dL    Sodium 139 135 - 144 mmol/L    Potassium 3.6 (L) 3.7 - 5.3 mmol/L    Chloride 101 98 - 107 mmol/L    CO2 25 20 - 31 mmol/L    Anion Gap 13 9 - 17 mmol/L    Alkaline Phosphatase 62 35 - 104 U/L    ALT 14 5 - 33 U/L    AST 12 <32 U/L    Total Bilirubin 0.35 0.3 - 1.2 mg/dL    Total Protein 6.7 6.4 - 8.3 g/dL    Alb 3.2 (L) 3.5 - 5.2 g/dL    Albumin/Globulin Ratio 0.9 (L) 1.0 - 2.5    GFR Non-African American >60 >60 mL/min    GFR  >60 >60 mL/min    GFR Comment          GFR Staging NOT REPORTED    C Diff Tox A, DNA   Result Value Ref Range    Specimen Description . FECES     Special Requests NOT REPORTED     Direct Exam       Negative-Specimen negative for toxigenic C. difficile by DNA amplification    Direct Exam       Performed at [facility] [address] [facility] [address] [phone]     Status FINAL 12/19/2017    Hgb/Hct   Result Value Ref Range    Hemoglobin 10.4 (L) 12.0 - 16.0 g/dL    Hematocrit 31.4 (L) 36 - 46 %   CBC Auto Differential   Result Value Ref Range    WBC 2.1 (L) 3.5 - 11.0 k/uL    RBC 3.30 (L) 4.0 - 5.2 m/uL    Hemoglobin 10.1 (L) 12.0 - 16.0 g/dL    Hematocrit 30.4 (L) 36 - 46 %    MCV 92.2 80 - 100 fL    MCH 30.6 26 - 34 pg    MCHC 33.2 31 - 37 g/dL    RDW 13.3 11.0 - 14.5 %    Platelets 956 436 - 042 k/uL    MPV 8.1 6.0 - 12.0 fL    Differential Type NOT REPORTED     Immature Granulocytes NOT REPORTED 0 %    Absolute Immature Granulocyte NOT REPORTED 0.00 - 0.30 k/uL    WBC Morphology NOT REPORTED     RBC Morphology NOT REPORTED     Platelet Estimate NOT REPORTED     Seg Neutrophils 13 (L) 43 - 77 %    Lymphocytes 63 (H) 16 - 46 %    Monocytes 22 (H) 4 - 11 %    Eosinophils % 2 1 - 7 %    Basophils 0 0 - 1 %    Segs Absolute 0.30 (L) 1.8 - 7.7 k/uL    Absolute Lymph # 1.30 1.0 - 4.8 k/uL    Absolute Mono # 0.50 0.1 - 1.2 k/uL    Absolute Eos # 0.00 0.0 - 0.4 k/uL    Basophils # 0.00 0.0 - 0.2 k/uL   Basic Metabolic Panel   Result Value Ref Range    Glucose 112 (H) 70 - 99 mg/dL    BUN 7 6 - 20 mg/dL    CREATININE 0.82 0.50 - 0.90 mg/dL    Bun/Cre Ratio 9 9 - 20    Calcium 8.7 8.6 - 10.4 mg/dL    Sodium 141 135 - 144 mmol/L    Potassium 3.9 3.7 - 5.3 mmol/L    Chloride 106 98 - 107 mmol/L    CO2 23 20 - 31 mmol/L    Anion Gap 12 9 - 17 mmol/L    GFR Non-African American >60 >60 mL/min    GFR African American >60 >60 mL/min    GFR Comment GFR Staging NOT REPORTED        IMAGING DATA:    Xr Chest Portable    Result Date: 11/30/2017  EXAMINATION: SINGLE VIEW OF THE CHEST 11/30/2017 2:32 pm COMPARISON: 11/09/2017 HISTORY: ORDERING SYSTEM PROVIDED HISTORY: POST OP PORT PLACEMENT TECHNOLOGIST PROVIDED HISTORY: Reason for exam:->POST OP PORT PLACEMENT Ordering Physician Provided Reason for Exam: Post-op CXR after port removal and replacement Acuity: Acute Type of Exam: Initial FINDINGS: A port a catheter is in place from the left side terminating in the SVC. The lungs are without acute focal process. There is no effusion or pneumothorax. The cardiomediastinal silhouette is stable. The osseous structures are stable. No acute process. Port a catheter in place from the left side. No pneumothorax identified. Fluoro For Surgical Procedures    Result Date: 11/30/2017  Radiology exam is complete. No Radiologist dictation. Please follow up with ordering provider. Path report:  -- Diagnosis --   1.  RIGHT BREAST, MASTECTOMY:   -INVASIVE DUCTAL CARCINOMA, GRADE 3/3, 2.8 CM, SURGICAL MARGINS   NEGATIVE. -NEGATIVE FOR ESTROGEN RECEPTOR (0% TUMOR NUCLEI POSITIVE) AND   POSITIVE   FOR PROGESTERONE RECEPTOR (5-10% % TUMOR NUCLEI WEAK POSITIVE). -HIGH-GRADE DUCTAL CARCINOMA IN SITU, CRIBRIFORM TYPE, MARGINS   NEGATIVE.   -PREVIOUSLY POSITIVE FOR HER-2 AMPLIFICATION, RATIO 6.1 (SEE VS   [id number]). 2.  SENTINEL LYMPH NODE BIOPSY:   -NEGATIVE FOR MALIGNANCY (0/1). IMPRESSION:   Primary Problem  <principal problem not specified>    Active Hospital Problems    Diagnosis Date Noted    Nausea and vomiting [R11.2]      Intractable diarrhea  Dehydration  Stage IIa invasive ductal carcinoma of breast, HER-2 amplified. ER negative MN positive. High-grade DCIS    RECOMMENDATIONS:  I had a detailed discussion with the patient and personally went over the results of her blood workup and imaging studies.   Patient's diarrhea is likely related to

## 2017-12-21 NOTE — DISCHARGE SUMMARY
nightly. Lisinopril 20  mg p.o. daily. Claritin (loratadine) 10 mg p.o. daily. Norco (hydrocodone  APAP) 5/325 one p.o. q. 6 hours p.r.n. pain. Pravachol (pravastatin) 40 mg  p.o. daily. Seroquel (quetiapine) 300 mg p.o. nightly. Trazodone 100 mg  p.o. nightly. Ventolin HFA 2 puffs every 4 hours p.r.n. shortness of  breath. Follow up with the patient's personal caregiver Mazin Palacios, nurse  practitioner, [facility], Dr. Erinn Mcadams,  hematology/oncology. Any aspect of the patient's care not discussed in the chart and/or  dictation will be addressed and treated as an outpatient. The patient's medications have been reviewed including, but not limited to,  pre-hospital, hospital and discharge medications. The patient and/or the  patient's personal representatives were specifically advised the only  medications to be taken are those set forth in the discharge orders and no  other medications should be taken. Any prior medications not on the  discharge orders are specifically discontinued. The patient has been advised of the financial relationship and ownership  interests between [facility] physicians and employees  of [facility] and [facility].         Michelle Landa    D: 12/20/2017 13:45:48       T: 12/21/2017 3:24:55     [email or web address]  Job#: [id number]     Doc#: [id number]    CC:

## 2017-12-22 ENCOUNTER — TELEPHONE (OUTPATIENT)
Dept: ONCOLOGY | Age: 52
End: 2017-12-22

## 2018-01-02 ENCOUNTER — HOSPITAL ENCOUNTER (OUTPATIENT)
Dept: INFUSION THERAPY | Age: 53
Discharge: HOME | End: 2018-01-02
Payer: MEDICARE

## 2018-01-02 VITALS
BODY MASS INDEX: 44.23 KG/M2 | HEIGHT: 66 IN | SYSTOLIC BLOOD PRESSURE: 165 MMHG | TEMPERATURE: 99.2 F | DIASTOLIC BLOOD PRESSURE: 95 MMHG | WEIGHT: 275.2 LBS | HEART RATE: 108 BPM | RESPIRATION RATE: 16 BRPM

## 2018-01-02 DIAGNOSIS — C50.811 MALIGNANT NEOPLASM OF OVERLAPPING SITES OF RIGHT BREAST IN FEMALE, ESTROGEN RECEPTOR NEGATIVE (HCC): Primary | ICD-10-CM

## 2018-01-02 DIAGNOSIS — Z17.1 MALIGNANT NEOPLASM OF OVERLAPPING SITES OF RIGHT BREAST IN FEMALE, ESTROGEN RECEPTOR NEGATIVE (HCC): Primary | ICD-10-CM

## 2018-01-02 DIAGNOSIS — Z17.1 MALIGNANT NEOPLASM OF OVERLAPPING SITES OF RIGHT BREAST IN FEMALE, ESTROGEN RECEPTOR NEGATIVE (HCC): ICD-10-CM

## 2018-01-02 DIAGNOSIS — C50.811 MALIGNANT NEOPLASM OF OVERLAPPING SITES OF RIGHT BREAST IN FEMALE, ESTROGEN RECEPTOR NEGATIVE (HCC): ICD-10-CM

## 2018-01-02 LAB
ABSOLUTE EOS #: 0 K/UL (ref 0–0.4)
ABSOLUTE IMMATURE GRANULOCYTE: ABNORMAL K/UL (ref 0–0.3)
ABSOLUTE LYMPH #: 1.6 K/UL (ref 1–4.8)
ABSOLUTE MONO #: 0.6 K/UL (ref 0.1–1.2)
ALBUMIN SERPL-MCNC: 3.6 G/DL (ref 3.5–5.2)
ALBUMIN/GLOBULIN RATIO: 0.9 (ref 1–2.5)
ALP BLD-CCNC: 85 U/L (ref 35–104)
ALT SERPL-CCNC: 20 U/L (ref 5–33)
ANION GAP SERPL CALCULATED.3IONS-SCNC: 15 MMOL/L (ref 9–17)
AST SERPL-CCNC: 21 U/L
BASOPHILS # BLD: 1 % (ref 0–1)
BASOPHILS ABSOLUTE: 0 K/UL (ref 0–0.2)
BILIRUB SERPL-MCNC: 0.24 MG/DL (ref 0.3–1.2)
BUN BLDV-MCNC: 10 MG/DL (ref 6–20)
BUN/CREAT BLD: 11 (ref 9–20)
CALCIUM SERPL-MCNC: 9.3 MG/DL (ref 8.6–10.4)
CHLORIDE BLD-SCNC: 99 MMOL/L (ref 98–107)
CO2: 27 MMOL/L (ref 20–31)
CREAT SERPL-MCNC: 0.93 MG/DL (ref 0.5–0.9)
DIFFERENTIAL TYPE: ABNORMAL
EOSINOPHILS RELATIVE PERCENT: 1 % (ref 1–7)
GFR AFRICAN AMERICAN: >60 ML/MIN
GFR NON-AFRICAN AMERICAN: >60 ML/MIN
GFR SERPL CREATININE-BSD FRML MDRD: ABNORMAL ML/MIN/{1.73_M2}
GFR SERPL CREATININE-BSD FRML MDRD: ABNORMAL ML/MIN/{1.73_M2}
GLUCOSE BLD-MCNC: 125 MG/DL (ref 70–99)
HCT VFR BLD CALC: 34.5 % (ref 36–46)
HEMOGLOBIN: 11.4 G/DL (ref 12–16)
IMMATURE GRANULOCYTES: ABNORMAL %
LYMPHOCYTES # BLD: 33 % (ref 16–46)
MCH RBC QN AUTO: 30.7 PG (ref 26–34)
MCHC RBC AUTO-ENTMCNC: 33.1 G/DL (ref 31–37)
MCV RBC AUTO: 92.9 FL (ref 80–100)
MONOCYTES # BLD: 12 % (ref 4–11)
PDW BLD-RTO: 15.3 % (ref 11–14.5)
PLATELET # BLD: 175 K/UL (ref 140–450)
PLATELET ESTIMATE: ABNORMAL
PMV BLD AUTO: 7.6 FL (ref 6–12)
POTASSIUM SERPL-SCNC: 3.6 MMOL/L (ref 3.7–5.3)
RBC # BLD: 3.72 M/UL (ref 4–5.2)
RBC # BLD: ABNORMAL 10*6/UL
SEG NEUTROPHILS: 53 % (ref 43–77)
SEGMENTED NEUTROPHILS ABSOLUTE COUNT: 2.6 K/UL (ref 1.8–7.7)
SODIUM BLD-SCNC: 141 MMOL/L (ref 135–144)
TOTAL PROTEIN: 7.7 G/DL (ref 6.4–8.3)
WBC # BLD: 4.8 K/UL (ref 3.5–11)
WBC # BLD: ABNORMAL 10*3/UL

## 2018-01-02 PROCEDURE — 96413 CHEMO IV INFUSION 1 HR: CPT

## 2018-01-02 PROCEDURE — 96417 CHEMO IV INFUS EACH ADDL SEQ: CPT

## 2018-01-02 PROCEDURE — 6360000002 HC RX W HCPCS: Performed by: INTERNAL MEDICINE

## 2018-01-02 PROCEDURE — 2580000003 HC RX 258: Performed by: INTERNAL MEDICINE

## 2018-01-02 PROCEDURE — 96375 TX/PRO/DX INJ NEW DRUG ADDON: CPT

## 2018-01-02 PROCEDURE — 96367 TX/PROPH/DG ADDL SEQ IV INF: CPT

## 2018-01-02 PROCEDURE — 85025 COMPLETE CBC W/AUTO DIFF WBC: CPT

## 2018-01-02 PROCEDURE — 96415 CHEMO IV INFUSION ADDL HR: CPT

## 2018-01-02 PROCEDURE — 80053 COMPREHEN METABOLIC PANEL: CPT

## 2018-01-02 PROCEDURE — 36415 COLL VENOUS BLD VENIPUNCTURE: CPT

## 2018-01-02 RX ORDER — PALONOSETRON 0.05 MG/ML
0.25 INJECTION, SOLUTION INTRAVENOUS ONCE
Status: COMPLETED | OUTPATIENT
Start: 2018-01-02 | End: 2018-01-02

## 2018-01-02 RX ORDER — 0.9 % SODIUM CHLORIDE 0.9 %
10 VIAL (ML) INJECTION ONCE
Status: CANCELLED | OUTPATIENT
Start: 2018-01-02 | End: 2018-01-02

## 2018-01-02 RX ORDER — 0.9 % SODIUM CHLORIDE 0.9 %
1000 INTRAVENOUS SOLUTION INTRAVENOUS ONCE
Status: CANCELLED
Start: 2018-01-04 | End: 2018-01-04

## 2018-01-02 RX ORDER — 0.9 % SODIUM CHLORIDE 0.9 %
1000 INTRAVENOUS SOLUTION INTRAVENOUS ONCE
Status: CANCELLED
Start: 2018-01-15 | End: 2018-01-11

## 2018-01-02 RX ORDER — 0.9 % SODIUM CHLORIDE 0.9 %
1000 INTRAVENOUS SOLUTION INTRAVENOUS ONCE
Status: CANCELLED
Start: 2018-01-18 | End: 2018-01-18

## 2018-01-02 RX ORDER — DIPHENHYDRAMINE HYDROCHLORIDE 50 MG/ML
50 INJECTION INTRAMUSCULAR; INTRAVENOUS ONCE
Status: CANCELLED | OUTPATIENT
Start: 2018-01-02 | End: 2018-01-02

## 2018-01-02 RX ORDER — SODIUM CHLORIDE 0.9 % (FLUSH) 0.9 %
5 SYRINGE (ML) INJECTION PRN
Status: CANCELLED | OUTPATIENT
Start: 2018-01-02

## 2018-01-02 RX ORDER — METHYLPREDNISOLONE SODIUM SUCCINATE 125 MG/2ML
125 INJECTION, POWDER, LYOPHILIZED, FOR SOLUTION INTRAMUSCULAR; INTRAVENOUS ONCE
Status: CANCELLED | OUTPATIENT
Start: 2018-01-02 | End: 2018-01-02

## 2018-01-02 RX ORDER — HEPARIN SODIUM (PORCINE) LOCK FLUSH IV SOLN 100 UNIT/ML 100 UNIT/ML
500 SOLUTION INTRAVENOUS PRN
Status: CANCELLED | OUTPATIENT
Start: 2018-01-02

## 2018-01-02 RX ORDER — SODIUM CHLORIDE 0.9 % (FLUSH) 0.9 %
10 SYRINGE (ML) INJECTION PRN
Status: DISCONTINUED | OUTPATIENT
Start: 2018-01-02 | End: 2018-01-03 | Stop reason: HOSPADM

## 2018-01-02 RX ORDER — HEPARIN SODIUM (PORCINE) LOCK FLUSH IV SOLN 100 UNIT/ML 100 UNIT/ML
500 SOLUTION INTRAVENOUS PRN
Status: DISCONTINUED | OUTPATIENT
Start: 2018-01-02 | End: 2018-01-03 | Stop reason: HOSPADM

## 2018-01-02 RX ORDER — SODIUM CHLORIDE 9 MG/ML
INJECTION, SOLUTION INTRAVENOUS ONCE
Status: CANCELLED | OUTPATIENT
Start: 2018-01-02 | End: 2018-01-02

## 2018-01-02 RX ORDER — SODIUM CHLORIDE 0.9 % (FLUSH) 0.9 %
10 SYRINGE (ML) INJECTION PRN
Status: CANCELLED | OUTPATIENT
Start: 2018-01-02

## 2018-01-02 RX ORDER — SODIUM CHLORIDE 9 MG/ML
INJECTION, SOLUTION INTRAVENOUS CONTINUOUS
Status: CANCELLED | OUTPATIENT
Start: 2018-01-02

## 2018-01-02 RX ORDER — PALONOSETRON 0.05 MG/ML
0.25 INJECTION, SOLUTION INTRAVENOUS ONCE
Status: CANCELLED | OUTPATIENT
Start: 2018-01-02

## 2018-01-02 RX ORDER — SODIUM CHLORIDE 9 MG/ML
INJECTION, SOLUTION INTRAVENOUS ONCE
Status: COMPLETED | OUTPATIENT
Start: 2018-01-02 | End: 2018-01-02

## 2018-01-02 RX ADMIN — SODIUM CHLORIDE: 9 INJECTION, SOLUTION INTRAVENOUS at 09:07

## 2018-01-02 RX ADMIN — PALONOSETRON HYDROCHLORIDE 0.25 MG: 0.25 INJECTION INTRAVENOUS at 10:23

## 2018-01-02 RX ADMIN — SODIUM CHLORIDE, PRESERVATIVE FREE 500 UNITS: 5 INJECTION INTRAVENOUS at 15:30

## 2018-01-02 RX ADMIN — Medication 10 ML: at 09:07

## 2018-01-02 RX ADMIN — DEXAMETHASONE SODIUM PHOSPHATE 12 MG: 10 INJECTION INTRAMUSCULAR; INTRAVENOUS at 10:26

## 2018-01-02 RX ADMIN — TRASTUZUMAB 750 MG: 150 INJECTION, POWDER, LYOPHILIZED, FOR SOLUTION INTRAVENOUS at 12:40

## 2018-01-02 RX ADMIN — CARBOPLATIN 900 MG: 10 INJECTION, SOLUTION INTRAVENOUS at 14:55

## 2018-01-02 RX ADMIN — PERTUZUMAB 420 MG: 30 INJECTION, SOLUTION, CONCENTRATE INTRAVENOUS at 11:30

## 2018-01-02 RX ADMIN — DOCETAXEL ANHYDROUS 187 MG: 10 INJECTION, SOLUTION INTRAVENOUS at 13:50

## 2018-01-02 NOTE — PROGRESS NOTES
Chemotherapy infused and d/c'd. Mediport flushed with 20 ml NSS and 5 ml heparin. Patient tolerated treatment with out difficulty. Guerrero needle d/c'd, pressure dressing applied to site. Patient ambulated to restroom without difficulty,  denies any complaints.

## 2018-01-02 NOTE — PROGRESS NOTES
Patient Reports was very sick day 2 after last treatment. Was hospitalized for 3 days during week one afterlast treatment. Is feeling better now. Has a runny nose today and had diarrhea yesterday. Did vomit day 2 after treatment and vomited blood she reports. None since then 2 weeks ago.

## 2018-01-02 NOTE — PROGRESS NOTES
Called  Reported labs and that she has a runny nose and cough no fever and WBC WNL. Temp 99.2 he will sign chemo orders.

## 2018-01-02 NOTE — PROGRESS NOTES
ON unit for chemotherapy cycle 3 day one. Aviva Davis RN Accessed port. Gets pink to light red blood return. Saline pushes easily. Lab came to draw blood form right antecubital unable to pull from port. IV infusing saline through the port no complaints of pain. Sitting in chair with feet elevated.

## 2018-01-04 ENCOUNTER — HOSPITAL ENCOUNTER (OUTPATIENT)
Dept: INFUSION THERAPY | Age: 53
Discharge: HOME | End: 2018-01-04
Payer: MEDICARE

## 2018-01-04 VITALS
HEART RATE: 96 BPM | TEMPERATURE: 98.2 F | DIASTOLIC BLOOD PRESSURE: 88 MMHG | RESPIRATION RATE: 16 BRPM | SYSTOLIC BLOOD PRESSURE: 144 MMHG

## 2018-01-04 DIAGNOSIS — Z17.1 MALIGNANT NEOPLASM OF OVERLAPPING SITES OF RIGHT BREAST IN FEMALE, ESTROGEN RECEPTOR NEGATIVE (HCC): ICD-10-CM

## 2018-01-04 DIAGNOSIS — C50.811 MALIGNANT NEOPLASM OF OVERLAPPING SITES OF RIGHT BREAST IN FEMALE, ESTROGEN RECEPTOR NEGATIVE (HCC): ICD-10-CM

## 2018-01-04 PROCEDURE — 6360000002 HC RX W HCPCS: Performed by: INTERNAL MEDICINE

## 2018-01-04 PROCEDURE — 96361 HYDRATE IV INFUSION ADD-ON: CPT

## 2018-01-04 PROCEDURE — 2580000003 HC RX 258: Performed by: INTERNAL MEDICINE

## 2018-01-04 PROCEDURE — 96360 HYDRATION IV INFUSION INIT: CPT

## 2018-01-04 RX ORDER — HEPARIN SODIUM (PORCINE) LOCK FLUSH IV SOLN 100 UNIT/ML 100 UNIT/ML
500 SOLUTION INTRAVENOUS PRN
Status: DISCONTINUED | OUTPATIENT
Start: 2018-01-04 | End: 2018-01-05 | Stop reason: HOSPADM

## 2018-01-04 RX ORDER — 0.9 % SODIUM CHLORIDE 0.9 %
1000 INTRAVENOUS SOLUTION INTRAVENOUS ONCE
Status: COMPLETED | OUTPATIENT
Start: 2018-01-04 | End: 2018-01-04

## 2018-01-04 RX ORDER — SODIUM CHLORIDE 0.9 % (FLUSH) 0.9 %
10 SYRINGE (ML) INJECTION PRN
Status: CANCELLED | OUTPATIENT
Start: 2018-01-04

## 2018-01-04 RX ORDER — HEPARIN SODIUM (PORCINE) LOCK FLUSH IV SOLN 100 UNIT/ML 100 UNIT/ML
500 SOLUTION INTRAVENOUS PRN
Status: CANCELLED | OUTPATIENT
Start: 2018-01-04

## 2018-01-04 RX ORDER — SODIUM CHLORIDE 0.9 % (FLUSH) 0.9 %
10 SYRINGE (ML) INJECTION PRN
Status: DISCONTINUED | OUTPATIENT
Start: 2018-01-04 | End: 2018-01-05 | Stop reason: HOSPADM

## 2018-01-04 RX ADMIN — SODIUM CHLORIDE, PRESERVATIVE FREE 500 UNITS: 5 INJECTION INTRAVENOUS at 11:01

## 2018-01-04 RX ADMIN — Medication 10 ML: at 08:52

## 2018-01-04 RX ADMIN — SODIUM CHLORIDE 1000 ML: 9 INJECTION, SOLUTION INTRAVENOUS at 08:54

## 2018-01-04 RX ADMIN — Medication 10 ML: at 08:53

## 2018-01-04 NOTE — PROGRESS NOTES
Port accesses with 1 inch mora needle. Saline pushes easily. IV infusing saline through the port.,  No complaints from patient. Stomach cramps started this am.  Still trying to get over cold. Temperature 98.2. No other complaints. Did encourage to take immodium. Will observe throughout hydration therapy.

## 2018-01-09 ENCOUNTER — PRE-PROCEDURE TELEPHONE (OUTPATIENT)
Dept: INFUSION THERAPY | Age: 53
End: 2018-01-09

## 2018-01-09 ENCOUNTER — HOSPITAL ENCOUNTER (INPATIENT)
Age: 53
LOS: 1 days | Discharge: HOME | DRG: 640 | End: 2018-01-12
Attending: EMERGENCY MEDICINE | Admitting: FAMILY MEDICINE
Payer: MEDICARE

## 2018-01-09 DIAGNOSIS — R11.2 INTRACTABLE VOMITING WITH NAUSEA, UNSPECIFIED VOMITING TYPE: Primary | ICD-10-CM

## 2018-01-09 DIAGNOSIS — E86.0 DEHYDRATION: ICD-10-CM

## 2018-01-09 LAB
ABSOLUTE EOS #: 0 K/UL (ref 0–0.4)
ABSOLUTE EOS #: 0.02 K/UL (ref 0–0.4)
ABSOLUTE IMMATURE GRANULOCYTE: ABNORMAL K/UL (ref 0–0.3)
ABSOLUTE IMMATURE GRANULOCYTE: ABNORMAL K/UL (ref 0–0.3)
ABSOLUTE LYMPH #: 1.4 K/UL (ref 1–4.8)
ABSOLUTE LYMPH #: 1.54 K/UL (ref 1–4.8)
ABSOLUTE MONO #: 0.1 K/UL (ref 0.1–1.2)
ABSOLUTE MONO #: 0.15 K/UL (ref 0.1–1.2)
ALBUMIN SERPL-MCNC: 3.8 G/DL (ref 3.5–5.2)
ALBUMIN SERPL-MCNC: 4.1 G/DL (ref 3.5–5.2)
ALBUMIN/GLOBULIN RATIO: 1 (ref 1–2.5)
ALBUMIN/GLOBULIN RATIO: 1.1 (ref 1–2.5)
ALP BLD-CCNC: 74 U/L (ref 35–104)
ALP BLD-CCNC: 79 U/L (ref 35–104)
ALT SERPL-CCNC: 39 U/L (ref 5–33)
ALT SERPL-CCNC: 42 U/L (ref 5–33)
ANION GAP SERPL CALCULATED.3IONS-SCNC: 14 MMOL/L (ref 9–17)
ANION GAP SERPL CALCULATED.3IONS-SCNC: 18 MMOL/L (ref 9–17)
AST SERPL-CCNC: 35 U/L
AST SERPL-CCNC: 40 U/L
BASOPHILS # BLD: 0 % (ref 0–1)
BASOPHILS # BLD: 1 % (ref 0–1)
BASOPHILS ABSOLUTE: 0 K/UL (ref 0–0.2)
BASOPHILS ABSOLUTE: 0 K/UL (ref 0–0.2)
BILIRUB SERPL-MCNC: 0.47 MG/DL (ref 0.3–1.2)
BILIRUB SERPL-MCNC: 0.57 MG/DL (ref 0.3–1.2)
BILIRUBIN DIRECT: 0.15 MG/DL
BILIRUBIN, INDIRECT: 0.42 MG/DL (ref 0–1)
BUN BLDV-MCNC: 18 MG/DL (ref 6–20)
BUN BLDV-MCNC: 19 MG/DL (ref 6–20)
BUN/CREAT BLD: 17 (ref 9–20)
BUN/CREAT BLD: 19 (ref 9–20)
CALCIUM SERPL-MCNC: 9.1 MG/DL (ref 8.6–10.4)
CALCIUM SERPL-MCNC: 9.7 MG/DL (ref 8.6–10.4)
CHLORIDE BLD-SCNC: 102 MMOL/L (ref 98–107)
CHLORIDE BLD-SCNC: 99 MMOL/L (ref 98–107)
CO2: 23 MMOL/L (ref 20–31)
CO2: 25 MMOL/L (ref 20–31)
CREAT SERPL-MCNC: 0.98 MG/DL (ref 0.5–0.9)
CREAT SERPL-MCNC: 1.05 MG/DL (ref 0.5–0.9)
DIFFERENTIAL TYPE: ABNORMAL
DIFFERENTIAL TYPE: ABNORMAL
EKG ATRIAL RATE: 95 BPM
EKG P AXIS: 26 DEGREES
EKG P-R INTERVAL: 160 MS
EKG Q-T INTERVAL: 366 MS
EKG QRS DURATION: 94 MS
EKG QTC CALCULATION (BAZETT): 459 MS
EKG R AXIS: 73 DEGREES
EKG T AXIS: -90 DEGREES
EKG VENTRICULAR RATE: 95 BPM
EOSINOPHILS RELATIVE PERCENT: 1 % (ref 1–7)
EOSINOPHILS RELATIVE PERCENT: 1 % (ref 1–7)
GFR AFRICAN AMERICAN: >60 ML/MIN
GFR AFRICAN AMERICAN: >60 ML/MIN
GFR NON-AFRICAN AMERICAN: 55 ML/MIN
GFR NON-AFRICAN AMERICAN: 60 ML/MIN
GFR SERPL CREATININE-BSD FRML MDRD: ABNORMAL ML/MIN/{1.73_M2}
GLOBULIN: 4 G/DL (ref 1.5–3.8)
GLUCOSE BLD-MCNC: 114 MG/DL (ref 70–99)
GLUCOSE BLD-MCNC: 150 MG/DL (ref 70–99)
HCT VFR BLD CALC: 31.6 % (ref 36–46)
HCT VFR BLD CALC: 34.6 % (ref 36–46)
HEMOGLOBIN: 10.6 G/DL (ref 12–16)
HEMOGLOBIN: 11.6 G/DL (ref 12–16)
IMMATURE GRANULOCYTES: ABNORMAL %
IMMATURE GRANULOCYTES: ABNORMAL %
LACTIC ACID, WHOLE BLOOD: ABNORMAL MMOL/L (ref 0.7–2.1)
LACTIC ACID: 2.4 MMOL/L (ref 0.5–2.2)
LIPASE: 28 U/L (ref 13–60)
LYMPHOCYTES # BLD: 74 % (ref 16–46)
LYMPHOCYTES # BLD: 81 % (ref 16–46)
MCH RBC QN AUTO: 30.8 PG (ref 26–34)
MCH RBC QN AUTO: 31 PG (ref 26–34)
MCHC RBC AUTO-ENTMCNC: 33.5 G/DL (ref 31–37)
MCHC RBC AUTO-ENTMCNC: 33.7 G/DL (ref 31–37)
MCV RBC AUTO: 92 FL (ref 80–100)
MCV RBC AUTO: 92.1 FL (ref 80–100)
MONOCYTES # BLD: 8 % (ref 4–11)
MONOCYTES # BLD: 8 % (ref 4–11)
MORPHOLOGY: ABNORMAL
PDW BLD-RTO: 15.5 % (ref 11–14.5)
PDW BLD-RTO: 15.7 % (ref 11–14.5)
PLATELET # BLD: 198 K/UL (ref 140–450)
PLATELET # BLD: 237 K/UL (ref 140–450)
PLATELET ESTIMATE: ABNORMAL
PLATELET ESTIMATE: ABNORMAL
PMV BLD AUTO: 8.3 FL (ref 6–12)
PMV BLD AUTO: 8.4 FL (ref 6–12)
POTASSIUM SERPL-SCNC: 3.8 MMOL/L (ref 3.7–5.3)
POTASSIUM SERPL-SCNC: 3.9 MMOL/L (ref 3.7–5.3)
RBC # BLD: 3.43 M/UL (ref 4–5.2)
RBC # BLD: 3.76 M/UL (ref 4–5.2)
RBC # BLD: ABNORMAL 10*6/UL
RBC # BLD: ABNORMAL 10*6/UL
SEG NEUTROPHILS: 10 % (ref 43–77)
SEG NEUTROPHILS: 16 % (ref 43–77)
SEGMENTED NEUTROPHILS ABSOLUTE COUNT: 0.19 K/UL (ref 1.8–7.7)
SEGMENTED NEUTROPHILS ABSOLUTE COUNT: 0.3 K/UL (ref 1.8–7.7)
SODIUM BLD-SCNC: 140 MMOL/L (ref 135–144)
SODIUM BLD-SCNC: 141 MMOL/L (ref 135–144)
TOTAL PROTEIN: 7.3 G/DL (ref 6.4–8.3)
TOTAL PROTEIN: 8.1 G/DL (ref 6.4–8.3)
WBC # BLD: 1.9 K/UL (ref 3.5–11)
WBC # BLD: 1.9 K/UL (ref 3.5–11)
WBC # BLD: ABNORMAL 10*3/UL
WBC # BLD: ABNORMAL 10*3/UL

## 2018-01-09 PROCEDURE — 85025 COMPLETE CBC W/AUTO DIFF WBC: CPT

## 2018-01-09 PROCEDURE — 99225 PR SBSQ OBSERVATION CARE/DAY 25 MINUTES: CPT | Performed by: INTERNAL MEDICINE

## 2018-01-09 PROCEDURE — 94640 AIRWAY INHALATION TREATMENT: CPT

## 2018-01-09 PROCEDURE — 83690 ASSAY OF LIPASE: CPT

## 2018-01-09 PROCEDURE — G0378 HOSPITAL OBSERVATION PER HR: HCPCS

## 2018-01-09 PROCEDURE — 6360000002 HC RX W HCPCS: Performed by: INTERNAL MEDICINE

## 2018-01-09 PROCEDURE — 80048 BASIC METABOLIC PNL TOTAL CA: CPT

## 2018-01-09 PROCEDURE — 2580000003 HC RX 258: Performed by: NURSE PRACTITIONER

## 2018-01-09 PROCEDURE — 6360000002 HC RX W HCPCS: Performed by: EMERGENCY MEDICINE

## 2018-01-09 PROCEDURE — 36415 COLL VENOUS BLD VENIPUNCTURE: CPT

## 2018-01-09 PROCEDURE — 96374 THER/PROPH/DIAG INJ IV PUSH: CPT

## 2018-01-09 PROCEDURE — 80053 COMPREHEN METABOLIC PANEL: CPT

## 2018-01-09 PROCEDURE — 6360000002 HC RX W HCPCS: Performed by: NURSE PRACTITIONER

## 2018-01-09 PROCEDURE — 6370000000 HC RX 637 (ALT 250 FOR IP): Performed by: INTERNAL MEDICINE

## 2018-01-09 PROCEDURE — 2580000003 HC RX 258: Performed by: EMERGENCY MEDICINE

## 2018-01-09 PROCEDURE — 96375 TX/PRO/DX INJ NEW DRUG ADDON: CPT

## 2018-01-09 PROCEDURE — 96372 THER/PROPH/DIAG INJ SC/IM: CPT

## 2018-01-09 PROCEDURE — 99284 EMERGENCY DEPT VISIT MOD MDM: CPT

## 2018-01-09 PROCEDURE — 96376 TX/PRO/DX INJ SAME DRUG ADON: CPT

## 2018-01-09 PROCEDURE — 96361 HYDRATE IV INFUSION ADD-ON: CPT

## 2018-01-09 PROCEDURE — 6370000000 HC RX 637 (ALT 250 FOR IP): Performed by: NURSE PRACTITIONER

## 2018-01-09 PROCEDURE — 93005 ELECTROCARDIOGRAM TRACING: CPT

## 2018-01-09 PROCEDURE — 80076 HEPATIC FUNCTION PANEL: CPT

## 2018-01-09 PROCEDURE — 94760 N-INVAS EAR/PLS OXIMETRY 1: CPT

## 2018-01-09 PROCEDURE — 83605 ASSAY OF LACTIC ACID: CPT

## 2018-01-09 RX ORDER — PROMETHAZINE HYDROCHLORIDE 25 MG/ML
12.5 INJECTION, SOLUTION INTRAMUSCULAR; INTRAVENOUS ONCE
Status: COMPLETED | OUTPATIENT
Start: 2018-01-09 | End: 2018-01-09

## 2018-01-09 RX ORDER — TRAZODONE HYDROCHLORIDE 50 MG/1
100 TABLET ORAL NIGHTLY
Status: DISCONTINUED | OUTPATIENT
Start: 2018-01-09 | End: 2018-01-12 | Stop reason: HOSPADM

## 2018-01-09 RX ORDER — MAGNESIUM HYDROXIDE/ALUMINUM HYDROXICE/SIMETHICONE 120; 1200; 1200 MG/30ML; MG/30ML; MG/30ML
30 SUSPENSION ORAL EVERY 6 HOURS PRN
Status: DISCONTINUED | OUTPATIENT
Start: 2018-01-09 | End: 2018-01-12 | Stop reason: HOSPADM

## 2018-01-09 RX ORDER — ONDANSETRON 2 MG/ML
4 INJECTION INTRAMUSCULAR; INTRAVENOUS EVERY 4 HOURS PRN
Status: DISCONTINUED | OUTPATIENT
Start: 2018-01-09 | End: 2018-01-12 | Stop reason: HOSPADM

## 2018-01-09 RX ORDER — MORPHINE SULFATE 4 MG/ML
4 INJECTION, SOLUTION INTRAMUSCULAR; INTRAVENOUS ONCE
Status: COMPLETED | OUTPATIENT
Start: 2018-01-09 | End: 2018-01-09

## 2018-01-09 RX ORDER — CETIRIZINE HYDROCHLORIDE 5 MG/1
10 TABLET ORAL DAILY
Status: DISCONTINUED | OUTPATIENT
Start: 2018-01-09 | End: 2018-01-12 | Stop reason: HOSPADM

## 2018-01-09 RX ORDER — PRAVASTATIN SODIUM 20 MG
40 TABLET ORAL DAILY
Status: DISCONTINUED | OUTPATIENT
Start: 2018-01-09 | End: 2018-01-12 | Stop reason: HOSPADM

## 2018-01-09 RX ORDER — 0.9 % SODIUM CHLORIDE 0.9 %
1000 INTRAVENOUS SOLUTION INTRAVENOUS ONCE
Status: COMPLETED | OUTPATIENT
Start: 2018-01-09 | End: 2018-01-09

## 2018-01-09 RX ORDER — ALBUTEROL SULFATE 90 UG/1
2 AEROSOL, METERED RESPIRATORY (INHALATION) EVERY 4 HOURS PRN
Status: DISCONTINUED | OUTPATIENT
Start: 2018-01-09 | End: 2018-01-12 | Stop reason: HOSPADM

## 2018-01-09 RX ORDER — HYDROXYZINE HYDROCHLORIDE 10 MG/1
10 TABLET, FILM COATED ORAL DAILY
Status: DISCONTINUED | OUTPATIENT
Start: 2018-01-09 | End: 2018-01-09 | Stop reason: DRUGHIGH

## 2018-01-09 RX ORDER — SODIUM CHLORIDE 0.9 % (FLUSH) 0.9 %
10 SYRINGE (ML) INJECTION EVERY 12 HOURS SCHEDULED
Status: DISCONTINUED | OUTPATIENT
Start: 2018-01-09 | End: 2018-01-12 | Stop reason: HOSPADM

## 2018-01-09 RX ORDER — SODIUM CHLORIDE 9 MG/ML
INJECTION, SOLUTION INTRAVENOUS CONTINUOUS
Status: DISCONTINUED | OUTPATIENT
Start: 2018-01-09 | End: 2018-01-12 | Stop reason: HOSPADM

## 2018-01-09 RX ORDER — PANTOPRAZOLE SODIUM 40 MG/1
40 TABLET, DELAYED RELEASE ORAL
Status: DISCONTINUED | OUTPATIENT
Start: 2018-01-09 | End: 2018-01-12 | Stop reason: HOSPADM

## 2018-01-09 RX ORDER — HYDROXYZINE HYDROCHLORIDE 25 MG/1
12.5 TABLET, FILM COATED ORAL DAILY
Status: DISCONTINUED | OUTPATIENT
Start: 2018-01-09 | End: 2018-01-12 | Stop reason: HOSPADM

## 2018-01-09 RX ORDER — SODIUM CHLORIDE 0.9 % (FLUSH) 0.9 %
10 SYRINGE (ML) INJECTION PRN
Status: DISCONTINUED | OUTPATIENT
Start: 2018-01-09 | End: 2018-01-12 | Stop reason: HOSPADM

## 2018-01-09 RX ORDER — LAMOTRIGINE 100 MG/1
100 TABLET ORAL NIGHTLY
Status: DISCONTINUED | OUTPATIENT
Start: 2018-01-09 | End: 2018-01-12 | Stop reason: HOSPADM

## 2018-01-09 RX ORDER — LACTOBACILLUS RHAMNOSUS GG 10B CELL
1 CAPSULE ORAL 3 TIMES DAILY
Status: DISCONTINUED | OUTPATIENT
Start: 2018-01-09 | End: 2018-01-12 | Stop reason: HOSPADM

## 2018-01-09 RX ORDER — ONDANSETRON 2 MG/ML
4 INJECTION INTRAMUSCULAR; INTRAVENOUS ONCE
Status: COMPLETED | OUTPATIENT
Start: 2018-01-09 | End: 2018-01-09

## 2018-01-09 RX ORDER — QUETIAPINE FUMARATE 100 MG/1
300 TABLET, FILM COATED ORAL NIGHTLY
Status: DISCONTINUED | OUTPATIENT
Start: 2018-01-09 | End: 2018-01-12 | Stop reason: HOSPADM

## 2018-01-09 RX ORDER — LISINOPRIL 20 MG/1
20 TABLET ORAL DAILY
Status: DISCONTINUED | OUTPATIENT
Start: 2018-01-09 | End: 2018-01-12 | Stop reason: HOSPADM

## 2018-01-09 RX ORDER — MAGNESIUM HYDROXIDE/ALUMINUM HYDROXICE/SIMETHICONE 120; 1200; 1200 MG/30ML; MG/30ML; MG/30ML
SUSPENSION ORAL
Status: DISPENSED
Start: 2018-01-09 | End: 2018-01-09

## 2018-01-09 RX ORDER — ACETAMINOPHEN 325 MG/1
650 TABLET ORAL EVERY 4 HOURS PRN
Status: DISCONTINUED | OUTPATIENT
Start: 2018-01-09 | End: 2018-01-12 | Stop reason: HOSPADM

## 2018-01-09 RX ORDER — FLUTICASONE PROPIONATE 50 MCG
1 SPRAY, SUSPENSION (ML) NASAL DAILY
Status: DISCONTINUED | OUTPATIENT
Start: 2018-01-09 | End: 2018-01-12 | Stop reason: HOSPADM

## 2018-01-09 RX ADMIN — ONDANSETRON 4 MG: 2 INJECTION INTRAMUSCULAR; INTRAVENOUS at 09:41

## 2018-01-09 RX ADMIN — SODIUM CHLORIDE: 9 INJECTION, SOLUTION INTRAVENOUS at 03:54

## 2018-01-09 RX ADMIN — ALBUTEROL SULFATE 2 PUFF: 90 AEROSOL, METERED RESPIRATORY (INHALATION) at 09:35

## 2018-01-09 RX ADMIN — ALUMINUM HYDROXIDE, MAGNESIUM HYDROXIDE, AND SIMETHICONE 30 ML: 200; 200; 20 SUSPENSION ORAL at 06:31

## 2018-01-09 RX ADMIN — ONDANSETRON 4 MG: 2 INJECTION INTRAMUSCULAR; INTRAVENOUS at 02:15

## 2018-01-09 RX ADMIN — CETIRIZINE HYDROCHLORIDE 10 MG: 5 TABLET ORAL at 09:41

## 2018-01-09 RX ADMIN — MORPHINE SULFATE 4 MG: 4 INJECTION, SOLUTION INTRAMUSCULAR; INTRAVENOUS at 02:16

## 2018-01-09 RX ADMIN — HYDROXYZINE HYDROCHLORIDE 12.5 MG: 25 TABLET, FILM COATED ORAL at 12:57

## 2018-01-09 RX ADMIN — ALUMINUM HYDROXIDE, MAGNESIUM HYDROXIDE, AND SIMETHICONE 30 ML: 200; 200; 20 SUSPENSION ORAL at 13:01

## 2018-01-09 RX ADMIN — SODIUM CHLORIDE 1000 ML: 9 INJECTION, SOLUTION INTRAVENOUS at 00:59

## 2018-01-09 RX ADMIN — SODIUM CHLORIDE: 9 INJECTION, SOLUTION INTRAVENOUS at 12:56

## 2018-01-09 RX ADMIN — PANTOPRAZOLE SODIUM 40 MG: 40 TABLET, DELAYED RELEASE ORAL at 16:31

## 2018-01-09 RX ADMIN — Medication 10 ML: at 03:54

## 2018-01-09 RX ADMIN — ENOXAPARIN SODIUM 30 MG: 30 INJECTION SUBCUTANEOUS at 21:20

## 2018-01-09 RX ADMIN — PROMETHAZINE HYDROCHLORIDE 12.5 MG: 25 INJECTION INTRAMUSCULAR; INTRAVENOUS at 00:56

## 2018-01-09 RX ADMIN — SODIUM CHLORIDE: 9 INJECTION, SOLUTION INTRAVENOUS at 19:16

## 2018-01-09 RX ADMIN — Medication 1 CAPSULE: at 21:20

## 2018-01-09 RX ADMIN — Medication 1 CAPSULE: at 12:56

## 2018-01-09 RX ADMIN — PANTOPRAZOLE SODIUM 40 MG: 40 TABLET, DELAYED RELEASE ORAL at 06:10

## 2018-01-09 RX ADMIN — ONDANSETRON 4 MG: 2 INJECTION INTRAMUSCULAR; INTRAVENOUS at 17:59

## 2018-01-09 RX ADMIN — Medication 1 CAPSULE: at 09:41

## 2018-01-09 RX ADMIN — ENOXAPARIN SODIUM 40 MG: 40 INJECTION SUBCUTANEOUS at 09:41

## 2018-01-09 RX ADMIN — ONDANSETRON 4 MG: 2 INJECTION INTRAMUSCULAR; INTRAVENOUS at 06:14

## 2018-01-09 RX ADMIN — PRAVASTATIN SODIUM 40 MG: 20 TABLET ORAL at 09:41

## 2018-01-09 RX ADMIN — PROCHLORPERAZINE EDISYLATE 10 MG: 5 INJECTION INTRAMUSCULAR; INTRAVENOUS at 20:24

## 2018-01-09 RX ADMIN — LISINOPRIL 20 MG: 20 TABLET ORAL at 09:41

## 2018-01-09 ASSESSMENT — ENCOUNTER SYMPTOMS
SHORTNESS OF BREATH: 0
EYE DISCHARGE: 0
FACIAL SWELLING: 0
CHEST TIGHTNESS: 0
ABDOMINAL DISTENTION: 0
NAUSEA: 1
ABDOMINAL PAIN: 1
EYE REDNESS: 0

## 2018-01-09 ASSESSMENT — PAIN SCALES - GENERAL
PAINLEVEL_OUTOF10: 0
PAINLEVEL_OUTOF10: 7
PAINLEVEL_OUTOF10: 0

## 2018-01-09 ASSESSMENT — PAIN DESCRIPTION - PROGRESSION: CLINICAL_PROGRESSION: GRADUALLY WORSENING

## 2018-01-09 ASSESSMENT — PAIN DESCRIPTION - LOCATION: LOCATION: ABDOMEN

## 2018-01-09 ASSESSMENT — PAIN DESCRIPTION - FREQUENCY: FREQUENCY: INTERMITTENT

## 2018-01-09 ASSESSMENT — PAIN DESCRIPTION - ONSET: ONSET: GRADUAL

## 2018-01-09 ASSESSMENT — PAIN DESCRIPTION - ORIENTATION: ORIENTATION: LEFT

## 2018-01-09 ASSESSMENT — PAIN DESCRIPTION - PAIN TYPE: TYPE: ACUTE PAIN

## 2018-01-09 ASSESSMENT — PAIN DESCRIPTION - DESCRIPTORS: DESCRIPTORS: ACHING

## 2018-01-09 NOTE — ED PROVIDER NOTES
pallor and rash. Neurological: Negative for speech difficulty and headaches. All other systems reviewed and are negative. Except as noted above the remainder of the review of systems was reviewed and negative. PAST MEDICAL HISTORY     Past Medical History:   Diagnosis Date    B[facility] 1 disorder ([facility]     Breast cancer ([facility] 2017    right side     Headache(784.0)     Hyperlipidemia     Migraine          SURGICAL HISTORY       Past Surgical History:   Procedure Laterality Date    DILATION AND CURETTAGE OF UTERUS N/A 10/13/2017    D & C HYSTEROSCOPY with polypectomy performed by Dory Muñoz MD at [address] [place] / REMOVAL / [address] Left 11/9/2017    PORT INSERTION performed by Sydni Hinson MD at [address] [place] / REMOVAL / REPLACEMENT VENOUS ACCESS CATHETER N/A 11/30/2017    Port Removal & Insertion performed by Sydni Hinson MD at [address] [place] [address] Right 10/19/2017     [address] [facility]    MASTECTOMY Right 10/19/2017    Right Breast Mastectomy with  [place] Node Biopsy performed by Sydni Hinson MD at [facility] Left 2014, 2015    x 2 surgeries total; Dr. Cecilia Cortes TUNNELED VENOUS PORT PLACEMENT Left 11/30/2017    removal of et replacement of         CURRENT MEDICATIONS       Previous Medications    BUTALBITAL-ACETAMINOPHEN-CAFFEINE (FIORICET, ESGIC) -40 MG PER TABLET    Take 1-2 tablets by mouth every 4 hours as needed     FLUTICASONE (FLONASE) 50 MCG/ACT NASAL SPRAY    1 spray by Nasal route Indications: as needed     HYDROCODONE-ACETAMINOPHEN (NORCO) 5-325 MG PER TABLET    Take 1 tablet by mouth every 6 hours as needed for Pain .     HYDROXYZINE (ATARAX) 10 MG TABLET    Take 10 mg by mouth daily     LACTOBACILLUS (CULTURELLE) CAPS CAPSULE    Take 1 capsule by mouth 3 times daily    LAMOTRIGINE (LAMICTAL) 100 MG TABLET    100 mg nightly     LISINOPRIL (PRINIVIL;ZESTRIL) 20 MG TABLET    20 mg daily     LORATADINE 01/09/18 0022 01/09/18 0201   BP: 114/73 108/71   Pulse: 105 96   Resp: 16 14   Temp: 99.9 °F (37.7 °C)    TempSrc: Tympanic    SpO2: 98% 96%   Weight: 270 lb (122.5 kg)    Height: 5' 6\" (1.676 m)        The patient will get lab work and fluids. I will give her something for nausea. RE-EVALUATION:    The patient is still having some nausea and she did have another episode of vomiting. She would like to be admitted. She's had this similar incident happened with nausea in December after her chemo treatments. I spoke with she'll begin for admission. CONSULTS:  None    PROCEDURES:  None    FINAL IMPRESSION      1. Intractable vomiting with nausea, unspecified vomiting type    2. Dehydration          DISPOSITION/PLAN   DISPOSITION Decision To Admit 01/09/2018 02:10:48 AM      CONDITION ON DISPOSITION:  stable    PATIENT REFERRED TO:  No follow-up provider specified. DISCHARGE MEDICATIONS:  New Prescriptions    No medications on file       (Please note that portions of this note were completed with a voice recognition program.  Efforts were made to edit the dictations but occasionally words are mis-transcribed.)    Alicia Garcia D.O., F.A.C.E.P.   Attending Emergency Physician         Alicia Garcia,   01/09/18 9967

## 2018-01-09 NOTE — PROGRESS NOTES
CREATININE 0.98 01/09/2018    CALCIUM 9.1 01/09/2018    GFRAA >60 01/09/2018    LABGLOM 60 01/09/2018    GLUCOSE 114 01/09/2018           Physical Exam:  Vitals: /69   Pulse 91   Temp 97.6 °F (36.4 °C) (Tympanic)   Resp 18   Ht 5' 6\" (1.676 m)   Wt 266 lb 1.6 oz (120.7 kg)   LMP 02/28/2013   SpO2 94%   BMI 42.95 kg/m²   24 hour intake/output:  Intake/Output Summary (Last 24 hours) at 01/09/18 1234  Last data filed at 01/09/18 0636   Gross per 24 hour   Intake              390 ml   Output                0 ml   Net              390 ml     Last 3 weights: Wt Readings from Last 3 Encounters:   01/09/18 266 lb 1.6 oz (120.7 kg)   01/02/18 275 lb 3.2 oz (124.8 kg)   12/20/17 285 lb (129.3 kg)     HEENT: Mucosa Pink, Moist, Normocephalic and Atraumatic  Neck: Supple, No Masses, Tenderness, Nodularity and No Lymphadenopathy  Chest/Lungs: Clear to Auscultation without Rales, Rhonchi, or Wheezes  Cardiac: Regular Rate and Rhythm  GI/Abdomen: Bowel Sounds Present and Soft, Non-tender, without Guarding or Rebound Tenderness---except mild tenderness to palpation left abdomen similar to prior examination  : Not examined  EXT/Skin: no rashes---, No Edema, No Cyanosis and No Clubbing  Neuro: Alert and Oriented and No Localizing Signs/Symptoms      Assessment:    Principal Problem:    Nausea with vomiting  Active Problems:    Dehydration    Intractable vomiting with nausea     GENIA GARCIA dc    52  WF  [SOLO Clement;  Nii Durbin NP---[facility];                                                                            LYRIC Pierre]  FULL CODE       LOVENOX    Nausea--vomiting---1.9.2018                EKG---1.9.2018--NSR--95--NSSTTCs--inferolateral ischemia? Last chemotherapy---1. 2.2018--TCHP  Dehydration---1.9.2018  Elevated lactic acid level---1.9.2018---due to dehydration                  Sepsis ruled

## 2018-01-09 NOTE — PROGRESS NOTES
· Goals: Consume greater than 50% of items on tray at each meal time. Start ONS 3x per day. · Monitoring: Meal Intake, Supplement Intake    See Adult Nutrition Doc Flowsheet for more detail.      Electronically signed by Michael Mendieta on 1/9/18 at 4:07 PM    Contact Number: [phone]

## 2018-01-09 NOTE — H&P
HOSPITALIST ADMISSION H&P      REASON FOR ADMISSION:  Nausea and vomiting--dehydration  ESTIMATED LENGTH OF STAY:<2mn, 1-2 days    ATTENDING/ADMITTING PHYSICIAN: Jose Carlos Pimentel MD/Bernadine Garvey NP-C, FNP-BC  PCP: Jorden Chandra NP    HISTORY OF PRESENT ILLNESS:      The patient is a 46 y.o. female patient of Jorden Chandra NP who presents from the ER with c/o nausea and vomiting which began around 2100 on 01/08/2018. She states she feels her symptoms are a side effect of her chemotherapy (TCHP regimen), which she last received on 01/02/2018, for right sided breast cancer. She states she has compazine PO at home, but was unable to keep the tablet down. Her oncologist is Dr. Carlo Schmitz. She does report diarrhea, but states it is chronic in nature and unchanged. She states she has chronic acid reflux/indigestion and occasional epigastric discomfort after vomiting. She feels the morphine given in ER has helped resolve this discomfort. She denies any chest pain/pressure, dyspnea, diaphoresis, fever/chills, abdominal pain, cough, otalgia, or sore throat. In ER, her ALT and AST were slightly increased at 42 and 40 respectively. WBC 1.9. Lactic acid 2.4--likely due to cancer and dehydration. Dr. Emily Henriquez requested admission for IV hydration, antiemetics prn, and recheck ALT/AST. She has a history of CKD, stage 3--stable    She has a history of bipolar, managed by Lexis Houser at [facility]. Cristel Haro states she stopped taking her atarax, lamictal, seroquel, and trazodone more than a month ago due to them making her feel confused and too sedated. She states she has not seen Robert Fernandez since taking herself off of these medications. Wounds and LDAs present prior to admission: left subclavian port present     See below for additional PMH.     Patient qrwy-vvfnahaioq-dygaxlgx-available records reviewed, including, but not limited to ER records, imaging results, lab results, office records, .  butalbital-acetaminophen-caffeine (FIORICET, ESGIC) -40 MG per tablet, Take 1-2 tablets by mouth every 4 hours as needed   hydrOXYzine (ATARAX) 10 MG tablet, Take 10 mg by mouth daily   lamoTRIgine (LAMICTAL) 100 MG tablet, 100 mg nightly   QUEtiapine (SEROQUEL) 300 MG tablet, Take 300 mg by mouth nightly   traZODone (DESYREL) 100 MG tablet, Take 100 mg by mouth nightly     Allergies:    Review of patient's allergies indicates no known allergies. Social History:    reports that she has never smoked. She has never used smokeless tobacco. She reports that she does not drink alcohol or use drugs. Family History:   family history includes Breast Cancer (age of onset: 54) in her sister; Breast Cancer (age of onset: 71) in her maternal aunt; Other in her maternal cousin and sister; Uterine Cancer (age of onset: 32) in her sister. REVIEW OF SYSTEMS:  See HPI and problem list; otherwise no other new complaints with respect to HEENT, neck, pulmonary, coronary, GI, , endocrine, musculoskeletal, immune system/connective tissue disease, hematologic, neuropsych, skin, lymphatics, or malignancies. Code status discussed with patient/family--wishes for Full Code at this time. PHYSICAL EXAM:  Vitals:  BP (!) 102/59   Pulse 88   Temp 96.4 °F (35.8 °C) (Oral)   Resp 18   Ht 5' 6\" (1.676 m)   Wt 266 lb 1.6 oz (120.7 kg)   LMP 02/28/2013   SpO2 92%   BMI 42.95 kg/m²     General: awake, alert and cooperative  HEENT: alopecia, EMOI, Normocephalic and Atraumatic  Neck: Supple, Carotid Pulses Present, No Masses, Tenderness, Nodularity and No Lymphadenopathy  Chest/Lungs: Clear to Auscultation without Rales, Rhonchi, or Wheezes  Cardiac: Regular Rate and Rhythm  GI/Abdomen:  Bowel Sounds Present, Soft, Non-tender, without Guarding or Rebound Tenderness and No Masses  : Not examined  EXT/Skin: 1+ BLE edema, No Cyanosis and No Clubbing  Neuro: Alert and Oriented, to Person, to Time, to Place, to Situation

## 2018-01-10 LAB
ABSOLUTE EOS #: 0 K/UL (ref 0–0.4)
ABSOLUTE IMMATURE GRANULOCYTE: ABNORMAL K/UL (ref 0–0.3)
ABSOLUTE LYMPH #: 1 K/UL (ref 1–4.8)
ABSOLUTE MONO #: 0.2 K/UL (ref 0.1–1.2)
ANION GAP SERPL CALCULATED.3IONS-SCNC: 13 MMOL/L (ref 9–17)
BASOPHILS # BLD: 0 % (ref 0–1)
BASOPHILS ABSOLUTE: 0 K/UL (ref 0–0.2)
BUN BLDV-MCNC: 11 MG/DL (ref 6–20)
BUN/CREAT BLD: 13 (ref 9–20)
CALCIUM SERPL-MCNC: 8.6 MG/DL (ref 8.6–10.4)
CHLORIDE BLD-SCNC: 107 MMOL/L (ref 98–107)
CO2: 21 MMOL/L (ref 20–31)
CREAT SERPL-MCNC: 0.87 MG/DL (ref 0.5–0.9)
DIFFERENTIAL TYPE: ABNORMAL
EOSINOPHILS RELATIVE PERCENT: 1 % (ref 1–7)
GFR AFRICAN AMERICAN: >60 ML/MIN
GFR NON-AFRICAN AMERICAN: >60 ML/MIN
GFR SERPL CREATININE-BSD FRML MDRD: ABNORMAL ML/MIN/{1.73_M2}
GFR SERPL CREATININE-BSD FRML MDRD: ABNORMAL ML/MIN/{1.73_M2}
GLUCOSE BLD-MCNC: 101 MG/DL (ref 70–99)
HCT VFR BLD CALC: 28.9 % (ref 36–46)
HEMOGLOBIN: 9.6 G/DL (ref 12–16)
IMMATURE GRANULOCYTES: ABNORMAL %
LYMPHOCYTES # BLD: 71 % (ref 16–46)
MCH RBC QN AUTO: 30.6 PG (ref 26–34)
MCHC RBC AUTO-ENTMCNC: 33.3 G/DL (ref 31–37)
MCV RBC AUTO: 91.9 FL (ref 80–100)
MONOCYTES # BLD: 18 % (ref 4–11)
PDW BLD-RTO: 16.1 % (ref 11–14.5)
PLATELET # BLD: 154 K/UL (ref 140–450)
PLATELET ESTIMATE: ABNORMAL
PMV BLD AUTO: 8.3 FL (ref 6–12)
POTASSIUM SERPL-SCNC: 3.5 MMOL/L (ref 3.7–5.3)
RBC # BLD: 3.14 M/UL (ref 4–5.2)
RBC # BLD: ABNORMAL 10*6/UL
SEG NEUTROPHILS: 10 % (ref 43–77)
SEGMENTED NEUTROPHILS ABSOLUTE COUNT: 0.1 K/UL (ref 1.8–7.7)
SODIUM BLD-SCNC: 141 MMOL/L (ref 135–144)
WBC # BLD: 1.4 K/UL (ref 3.5–11)
WBC # BLD: ABNORMAL 10*3/UL

## 2018-01-10 PROCEDURE — G8987 SELF CARE CURRENT STATUS: HCPCS | Performed by: OCCUPATIONAL THERAPIST

## 2018-01-10 PROCEDURE — 96376 TX/PRO/DX INJ SAME DRUG ADON: CPT

## 2018-01-10 PROCEDURE — 96372 THER/PROPH/DIAG INJ SC/IM: CPT

## 2018-01-10 PROCEDURE — G8989 SELF CARE D/C STATUS: HCPCS | Performed by: OCCUPATIONAL THERAPIST

## 2018-01-10 PROCEDURE — 80048 BASIC METABOLIC PNL TOTAL CA: CPT

## 2018-01-10 PROCEDURE — 6360000002 HC RX W HCPCS: Performed by: INTERNAL MEDICINE

## 2018-01-10 PROCEDURE — 94760 N-INVAS EAR/PLS OXIMETRY 1: CPT

## 2018-01-10 PROCEDURE — 97165 OT EVAL LOW COMPLEX 30 MIN: CPT | Performed by: OCCUPATIONAL THERAPIST

## 2018-01-10 PROCEDURE — 85025 COMPLETE CBC W/AUTO DIFF WBC: CPT

## 2018-01-10 PROCEDURE — G8988 SELF CARE GOAL STATUS: HCPCS | Performed by: OCCUPATIONAL THERAPIST

## 2018-01-10 PROCEDURE — 36415 COLL VENOUS BLD VENIPUNCTURE: CPT

## 2018-01-10 PROCEDURE — 6370000000 HC RX 637 (ALT 250 FOR IP): Performed by: NURSE PRACTITIONER

## 2018-01-10 PROCEDURE — G0378 HOSPITAL OBSERVATION PER HR: HCPCS

## 2018-01-10 PROCEDURE — 99225 PR SBSQ OBSERVATION CARE/DAY 25 MINUTES: CPT | Performed by: INTERNAL MEDICINE

## 2018-01-10 PROCEDURE — 6370000000 HC RX 637 (ALT 250 FOR IP): Performed by: INTERNAL MEDICINE

## 2018-01-10 PROCEDURE — 6360000002 HC RX W HCPCS: Performed by: NURSE PRACTITIONER

## 2018-01-10 PROCEDURE — 2580000003 HC RX 258: Performed by: NURSE PRACTITIONER

## 2018-01-10 RX ORDER — POTASSIUM CHLORIDE 20 MEQ/1
40 TABLET, EXTENDED RELEASE ORAL ONCE
Status: COMPLETED | OUTPATIENT
Start: 2018-01-10 | End: 2018-01-10

## 2018-01-10 RX ADMIN — Medication 1 CAPSULE: at 13:36

## 2018-01-10 RX ADMIN — NYSTATIN 500000 UNITS: 100000 SUSPENSION ORAL at 20:28

## 2018-01-10 RX ADMIN — SODIUM CHLORIDE: 9 INJECTION, SOLUTION INTRAVENOUS at 03:14

## 2018-01-10 RX ADMIN — Medication 1 CAPSULE: at 09:47

## 2018-01-10 RX ADMIN — Medication 1 CAPSULE: at 20:27

## 2018-01-10 RX ADMIN — ENOXAPARIN SODIUM 30 MG: 30 INJECTION SUBCUTANEOUS at 09:48

## 2018-01-10 RX ADMIN — ENOXAPARIN SODIUM 30 MG: 30 INJECTION SUBCUTANEOUS at 20:28

## 2018-01-10 RX ADMIN — ONDANSETRON 4 MG: 2 INJECTION INTRAMUSCULAR; INTRAVENOUS at 23:26

## 2018-01-10 RX ADMIN — PANTOPRAZOLE SODIUM 40 MG: 40 TABLET, DELAYED RELEASE ORAL at 07:26

## 2018-01-10 RX ADMIN — NYSTATIN 500000 UNITS: 100000 SUSPENSION ORAL at 17:11

## 2018-01-10 RX ADMIN — PRAVASTATIN SODIUM 40 MG: 20 TABLET ORAL at 09:47

## 2018-01-10 RX ADMIN — POTASSIUM CHLORIDE 40 MEQ: 20 TABLET, EXTENDED RELEASE ORAL at 09:47

## 2018-01-10 RX ADMIN — CETIRIZINE HYDROCHLORIDE 10 MG: 5 TABLET ORAL at 09:48

## 2018-01-10 RX ADMIN — SODIUM CHLORIDE: 9 INJECTION, SOLUTION INTRAVENOUS at 19:46

## 2018-01-10 RX ADMIN — LISINOPRIL 20 MG: 20 TABLET ORAL at 09:48

## 2018-01-10 RX ADMIN — PANTOPRAZOLE SODIUM 40 MG: 40 TABLET, DELAYED RELEASE ORAL at 17:11

## 2018-01-10 RX ADMIN — HYDROXYZINE HYDROCHLORIDE 12.5 MG: 25 TABLET, FILM COATED ORAL at 09:48

## 2018-01-10 RX ADMIN — SODIUM CHLORIDE: 9 INJECTION, SOLUTION INTRAVENOUS at 10:00

## 2018-01-10 NOTE — PROGRESS NOTES
Occupational Therapy   Occupational Therapy Initial Assessment  Date: 1/10/2018   Patient Name: Satish Pringle  MRN: [id number]     : 1965    Patient Diagnosis(es): The primary encounter diagnosis was Intractable vomiting with nausea, unspecified vomiting type. A diagnosis of Dehydration was also pertinent to this visit. has a past medical history of Bipolar 1 disorder ([facility] [address] Breast cancer ([facility] [address] Headache(784.0); Hyperlipidemia; and Migraine. has a past surgical history that includes shoulder surgery (Left, , ); Dilation and curettage of uterus (N/A, 10/13/2017); Mastectomy (Right, 10/19/2017); Mastectomy (Right, 10/19/2017); INSERTION / REMOVAL / REPLACEMENT VENOUS ACCESS CATHETER (Left, 2017); Tunneled venous port placement (Left, 2017); and INSERTION / REMOVAL / REPLACEMENT VENOUS ACCESS CATHETER (N/A, 2017).          Restrictions  Restrictions/Precautions  Restrictions/Precautions: Contact Precautions    Subjective   General  Chart Reviewed: Yes  Patient assessed for rehabilitation services?: Yes  Family / Caregiver Present: No  Referring Practitioner: Dipti Murphy  Diagnosis: intractable vomiting with nausea  Subjective  Subjective: Patient rec'd in bed, pleasant and cooperative 46 yr old female with vomiting  Pain Assessment  Patient Currently in Pain: Yes  Pain Assessment: 0-10 (had morphine in the ER)  Pain Level: 0  Pain Type: Acute pain  Pain Location: Abdomen  Pain Orientation: Left  Pain Descriptors: Aching  Pain Frequency: Intermittent  Clinical Progression: Gradually worsening  Patient's Stated Pain Goal: No pain  Pain Intervention(s): Medication (see eMar)  Response to Pain Intervention: Patient Satisfied  Multiple Pain Sites: No  Oxygen Therapy  SpO2: 94 %  Pulse Oximeter Device Mode: Intermittent  O2 Device: None (Room air)  Social/Functional History  Social/Functional History  Lives With: Daughter  Type of Home: House  Bathroom Accessibility: Accessible  ADL Assistance: Independent  Homemaking Assistance: Independent  Homemaking Responsibilities: Yes  Meal Prep Responsibility: Secondary  Laundry Responsibility: Secondary  Cleaning Responsibility: Secondary  Shopping Responsibility: Secondary  Ambulation Assistance: Independent  Transfer Assistance: Independent     Objective   Orientation  Overall Orientation Status: Within Normal Limits  Standing Balance  Sit to stand: Independent  Stand to sit: Independent  ADL  LE Dressing: Independent  Toileting: Independent  Bed mobility  Supine to Sit: Independent  Sit to Supine: Independent  Scooting: Independent  Transfers  Sit to stand: Independent  Stand to sit: Independent  Cognition  Overall Cognitive Status: WNL  LUE AROM (degrees)  LUE AROM : WFL  Left Hand AROM (degrees)  Left Hand AROM: WFL  RUE AROM (degrees)  RUE AROM : WFL  Right Hand AROM (degrees)  Right Hand AROM: WFL  LUE Strength  Gross LUE Strength: WFL  RUE Strength  Gross RUE Strength: WFL    Assessment   Performance deficits / Impairments: Decreased endurance;Decreased high-level IADLs  Decision Making: Low Complexity  Discharge Recommendations: Home with assist PRN  No Skilled OT: Independent with ADL's  REQUIRES OT FOLLOW UP: No  Safety Devices  Safety Devices in place: Yes  Type of devices: Call light within reach; Left in bed        Discharge Recommendations:  Home with assist PRN  Plan   Plan  Times per week: acute care OT services not required  G-Code  OT G-codes  Functional Limitation: Self care  Self Care Current Status (): At least 1 percent but less than 20 percent impaired, limited or restricted  Self Care Goal Status (): At least 1 percent but less than 20 percent impaired, limited or restricted  Self Care Discharge Status ():  At least 1 percent but less than 20 percent impaired, limited or restricted  OutComes Score     AM-PAC Score    Goals  Short term goals  Time Frame for Short term goals: n/a eval only       Therapy Time

## 2018-01-10 NOTE — PROGRESS NOTES
BUN 11 01/10/2018    LABALBU 3.8 01/09/2018    CREATININE 0.87 01/10/2018    CALCIUM 8.6 01/10/2018    GFRAA >60 01/10/2018    LABGLOM >60 01/10/2018    GLUCOSE 101 01/10/2018           Physical Exam:  Vitals: /61   Pulse 94   Temp 97.1 °F (36.2 °C) (Oral)   Resp 18   Ht 5' 5.75\" (1.67 m)   Wt 266 lb 1.6 oz (120.7 kg)   LMP 02/28/2013   SpO2 95%   BMI 43.28 kg/m²   24 hour intake/output:  Intake/Output Summary (Last 24 hours) at 01/10/18 1315  Last data filed at 01/10/18 1257   Gross per 24 hour   Intake          1807.92 ml   Output                0 ml   Net          1807.92 ml     Last 3 weights: Wt Readings from Last 3 Encounters:   01/09/18 266 lb 1.6 oz (120.7 kg)   01/02/18 275 lb 3.2 oz (124.8 kg)   12/20/17 285 lb (129.3 kg)     HEENT: Normocephalic and Atraumatic--tongue--mouth--throat visualized portion clear--no thrush  Neck: Supple, No Masses, Tenderness, Nodularity and No Lymphadenopathy  Chest/Lungs: Clear to Auscultation without Rales, Rhonchi, or Wheezes  Cardiac: Regular Rate and Rhythm without Rubs, Clicks, Gallops, or Murmurs  GI/Abdomen: Bowel Sounds Present and Soft, Non-tender, without Guarding or Rebound Tenderness  : Not examined  EXT/Skin: No Edema, No Cyanosis and No Clubbing  Neuro: Alert and Oriented, to Person, to Time, to Place, to Situation and No Localizing Signs/Symptoms      Assessment:    Principal Problem:    Nausea with vomiting  Active Problems:    Dehydration    Intractable vomiting with nausea    GENIA GARCIA dc  52  WF  [SOLO Clement;  Diaz Clay NP---[facility] [place] [facility]                                                                            osman Pimentel ]  FULL CODE       LOVENOX    Nausea--vomiting---1.9.2018                EKG---1.9.2018--NSR--95--NSSTTCs--inferolateral ischemia? Last chemotherapy---1. 2.2018--[facility]  Dehydration---1.9.2018  Elevated lactic acid level---1.9.2018---due to dehydration

## 2018-01-11 ENCOUNTER — HOSPITAL ENCOUNTER (OUTPATIENT)
Dept: INFUSION THERAPY | Age: 53
End: 2018-01-11
Payer: MEDICARE

## 2018-01-11 LAB
ABSOLUTE EOS #: 0 K/UL (ref 0–0.4)
ABSOLUTE IMMATURE GRANULOCYTE: ABNORMAL K/UL (ref 0–0.3)
ABSOLUTE LYMPH #: 1.2 K/UL (ref 1–4.8)
ABSOLUTE MONO #: 0.4 K/UL (ref 0.1–1.2)
ANION GAP SERPL CALCULATED.3IONS-SCNC: 15 MMOL/L (ref 9–17)
BASOPHILS # BLD: 0 % (ref 0–1)
BASOPHILS ABSOLUTE: 0 K/UL (ref 0–0.2)
BUN BLDV-MCNC: 5 MG/DL (ref 6–20)
BUN/CREAT BLD: 6 (ref 9–20)
CALCIUM SERPL-MCNC: 9 MG/DL (ref 8.6–10.4)
CHLORIDE BLD-SCNC: 111 MMOL/L (ref 98–107)
CO2: 18 MMOL/L (ref 20–31)
CREAT SERPL-MCNC: 0.86 MG/DL (ref 0.5–0.9)
DIFFERENTIAL TYPE: ABNORMAL
DIRECT EXAM: NORMAL
EOSINOPHILS RELATIVE PERCENT: 0 % (ref 1–7)
GFR AFRICAN AMERICAN: >60 ML/MIN
GFR NON-AFRICAN AMERICAN: >60 ML/MIN
GFR SERPL CREATININE-BSD FRML MDRD: ABNORMAL ML/MIN/{1.73_M2}
GFR SERPL CREATININE-BSD FRML MDRD: ABNORMAL ML/MIN/{1.73_M2}
GLUCOSE BLD-MCNC: 105 MG/DL (ref 70–99)
HCT VFR BLD CALC: 29.1 % (ref 36–46)
HEMOGLOBIN: 9.6 G/DL (ref 12–16)
IMMATURE GRANULOCYTES: ABNORMAL %
LYMPHOCYTES # BLD: 71 % (ref 16–46)
Lab: NORMAL
MCH RBC QN AUTO: 31 PG (ref 26–34)
MCHC RBC AUTO-ENTMCNC: 33.1 G/DL (ref 31–37)
MCV RBC AUTO: 93.6 FL (ref 80–100)
MONOCYTES # BLD: 22 % (ref 4–11)
PDW BLD-RTO: 16.2 % (ref 11–14.5)
PLATELET # BLD: 152 K/UL (ref 140–450)
PLATELET ESTIMATE: ABNORMAL
PMV BLD AUTO: 8.1 FL (ref 6–12)
POTASSIUM SERPL-SCNC: 3.7 MMOL/L (ref 3.7–5.3)
RBC # BLD: 3.11 M/UL (ref 4–5.2)
RBC # BLD: ABNORMAL 10*6/UL
SEG NEUTROPHILS: 7 % (ref 43–77)
SEGMENTED NEUTROPHILS ABSOLUTE COUNT: 0.1 K/UL (ref 1.8–7.7)
SODIUM BLD-SCNC: 144 MMOL/L (ref 135–144)
SPECIMEN DESCRIPTION: NORMAL
STATUS: NORMAL
WBC # BLD: 1.8 K/UL (ref 3.5–11)
WBC # BLD: ABNORMAL 10*3/UL

## 2018-01-11 PROCEDURE — 99232 SBSQ HOSP IP/OBS MODERATE 35: CPT | Performed by: INTERNAL MEDICINE

## 2018-01-11 PROCEDURE — 94761 N-INVAS EAR/PLS OXIMETRY MLT: CPT

## 2018-01-11 PROCEDURE — 6370000000 HC RX 637 (ALT 250 FOR IP): Performed by: NURSE PRACTITIONER

## 2018-01-11 PROCEDURE — 36415 COLL VENOUS BLD VENIPUNCTURE: CPT

## 2018-01-11 PROCEDURE — 87493 C DIFF AMPLIFIED PROBE: CPT

## 2018-01-11 PROCEDURE — 2060000000 HC ICU INTERMEDIATE R&B

## 2018-01-11 PROCEDURE — 6360000002 HC RX W HCPCS: Performed by: INTERNAL MEDICINE

## 2018-01-11 PROCEDURE — 80048 BASIC METABOLIC PNL TOTAL CA: CPT

## 2018-01-11 PROCEDURE — 85025 COMPLETE CBC W/AUTO DIFF WBC: CPT

## 2018-01-11 PROCEDURE — 2580000003 HC RX 258: Performed by: NURSE PRACTITIONER

## 2018-01-11 PROCEDURE — G0378 HOSPITAL OBSERVATION PER HR: HCPCS

## 2018-01-11 PROCEDURE — 6370000000 HC RX 637 (ALT 250 FOR IP): Performed by: INTERNAL MEDICINE

## 2018-01-11 RX ORDER — POTASSIUM CHLORIDE 20 MEQ/1
40 TABLET, EXTENDED RELEASE ORAL ONCE
Status: COMPLETED | OUTPATIENT
Start: 2018-01-11 | End: 2018-01-11

## 2018-01-11 RX ORDER — DIPHENOXYLATE HYDROCHLORIDE AND ATROPINE SULFATE 2.5; .025 MG/1; MG/1
2 TABLET ORAL 4 TIMES DAILY PRN
Status: DISCONTINUED | OUTPATIENT
Start: 2018-01-11 | End: 2018-01-12 | Stop reason: HOSPADM

## 2018-01-11 RX ORDER — IPRATROPIUM BROMIDE AND ALBUTEROL SULFATE 2.5; .5 MG/3ML; MG/3ML
SOLUTION RESPIRATORY (INHALATION)
Status: DISPENSED
Start: 2018-01-11 | End: 2018-01-11

## 2018-01-11 RX ADMIN — DIPHENOXYLATE HYDROCHLORIDE AND ATROPINE SULFATE 2 TABLET: 2.5; .025 TABLET ORAL at 20:32

## 2018-01-11 RX ADMIN — Medication 1 CAPSULE: at 20:32

## 2018-01-11 RX ADMIN — LISINOPRIL 20 MG: 20 TABLET ORAL at 09:17

## 2018-01-11 RX ADMIN — Medication 1 CAPSULE: at 13:07

## 2018-01-11 RX ADMIN — PRAVASTATIN SODIUM 40 MG: 20 TABLET ORAL at 09:16

## 2018-01-11 RX ADMIN — SODIUM CHLORIDE: 9 INJECTION, SOLUTION INTRAVENOUS at 03:12

## 2018-01-11 RX ADMIN — NYSTATIN 500000 UNITS: 100000 SUSPENSION ORAL at 13:07

## 2018-01-11 RX ADMIN — ENOXAPARIN SODIUM 30 MG: 30 INJECTION SUBCUTANEOUS at 09:16

## 2018-01-11 RX ADMIN — PANTOPRAZOLE SODIUM 40 MG: 40 TABLET, DELAYED RELEASE ORAL at 06:28

## 2018-01-11 RX ADMIN — PANTOPRAZOLE SODIUM 40 MG: 40 TABLET, DELAYED RELEASE ORAL at 15:57

## 2018-01-11 RX ADMIN — SODIUM CHLORIDE: 9 INJECTION, SOLUTION INTRAVENOUS at 15:57

## 2018-01-11 RX ADMIN — CETIRIZINE HYDROCHLORIDE 10 MG: 5 TABLET ORAL at 09:17

## 2018-01-11 RX ADMIN — NYSTATIN 500000 UNITS: 100000 SUSPENSION ORAL at 18:08

## 2018-01-11 RX ADMIN — Medication 1 CAPSULE: at 09:17

## 2018-01-11 RX ADMIN — NYSTATIN 500000 UNITS: 100000 SUSPENSION ORAL at 09:16

## 2018-01-11 RX ADMIN — POTASSIUM CHLORIDE 40 MEQ: 20 TABLET, EXTENDED RELEASE ORAL at 14:48

## 2018-01-11 RX ADMIN — NYSTATIN 500000 UNITS: 100000 SUSPENSION ORAL at 20:33

## 2018-01-11 RX ADMIN — HYDROXYZINE HYDROCHLORIDE 12.5 MG: 25 TABLET, FILM COATED ORAL at 09:16

## 2018-01-11 RX ADMIN — ENOXAPARIN SODIUM 30 MG: 30 INJECTION SUBCUTANEOUS at 20:35

## 2018-01-11 ASSESSMENT — PAIN SCALES - GENERAL
PAINLEVEL_OUTOF10: 0

## 2018-01-11 NOTE — PROGRESS NOTES
Regular Rate and Rhythm  GI/Abdomen: Bowel Sounds Present, Soft, Non-tender, without Guarding or Rebound Tenderness and No Masses  : Not examined  EXT/Skin: No edema, No Cyanosis and No Clubbing  Neuro: Alert and Oriented, to Person, to Time, to Place, to Situation and No Localizing Signs/Symptoms        Assessment:    Principal Problem:    Nausea with vomiting  Active Problems:    Dehydration    Intractable vomiting with nausea        Plan:  1. Nausea--con't IV fluids--antiemetics prn  2. Neutropenic precautions--oncology to see  3. C. Diff screening pending  4.  See orders    Electronically signed by Peri LERMAC, FNP-BC on 1/11/2018 at 3:11 AM    Hospitalist

## 2018-01-11 NOTE — CONSULTS
UNIT/ML suspension 500,000 Units  5 mL Oral 4x Daily Tom Corey   500,000 Units at 01/11/18 1307    fluticasone (FLONASE) 50 MCG/ACT nasal spray 1 spray  1 spray Nasal Daily Lisa Pour, CNP        lactobacillus (CULTURELLE) capsule 1 capsule  1 capsule Oral TID Lisa Pour, CNP   1 capsule at 01/11/18 1307    lisinopril (PRINIVIL;ZESTRIL) tablet 20 mg  20 mg Oral Daily Lisa Pour, CNP   20 mg at 01/11/18 0917    cetirizine (ZYRTEC) tablet 10 mg  10 mg Oral Daily Lisa Pour, CNP   10 mg at 01/11/18 0917    pantoprazole (PROTONIX) tablet 40 mg  40 mg Oral BID AC Lisa Pour, CNP   40 mg at 01/11/18 1557    pravastatin (PRAVACHOL) tablet 40 mg  40 mg Oral Daily Lisa Pour, CNP   40 mg at 01/11/18 0916    albuterol sulfate  (90 Base) MCG/ACT inhaler 2 puff  2 puff Inhalation Q4H PRN Lisa Pour, CNP   2 puff at 01/09/18 0935    sodium chloride flush 0.9 % injection 10 mL  10 mL Intravenous 2 times per day Lisa Pour, CNP        sodium chloride flush 0.9 % injection 10 mL  10 mL Intravenous PRN Lisa Pour, CNP   10 mL at 01/09/18 0354    acetaminophen (TYLENOL) tablet 650 mg  650 mg Oral Q4H PRN Lisa Pour, CNP        magnesium hydroxide (MILK OF MAGNESIA) 400 MG/5ML suspension 30 mL  30 mL Oral Daily PRN Lisa Pour, CNP        ondansetron (ZOFRAN) injection 4 mg  4 mg Intravenous Q4H PRN Lisa Pour, CNP   4 mg at 01/10/18 2326    0.9 % sodium chloride infusion   Intravenous Continuous Lisa Pour, CNP 75 mL/hr at 01/11/18 1557      prochlorperazine (COMPAZINE) injection 10 mg  10 mg Intravenous Q6H PRN Lisa Pour, CNP   10 mg at 01/09/18 2024    gi cocktail 30 mL  30 mL Oral Once Lisa Pour, CNP        aluminum & magnesium hydroxide-simethicone (MAALOX) 200-200-20 MG/5ML suspension 30 mL  30 mL Oral Q6H PRN Lisa Pour, CNP   30 mL at 01/09/18 1301    traZODone (DESYREL) tablet 100 mg  100 mg Oral Nightly Lucho Ceballos °F (37.2 °C) (Tympanic)   Resp 16   Ht 5' 5.75\" (1.67 m)   Wt 266 lb 1.6 oz (120.7 kg)   LMP 2013   SpO2 97%   BMI 43.28 kg/m²    Temp (24hrs), Av.3 °F (36.8 °C), Min:97.6 °F (36.4 °C), Max:98.9 °F (37.2 °C)      General appearance - well appearing, no in pain or distress   Mental status - alert and cooperative   Eyes - pupils equal and reactive, extraocular eye movements intact   Ears - bilateral TM's and external ear canals normal   Mouth - mucous membranes moist, pharynx normal without lesions   Neck - supple, no significant adenopathy   Lymphatics - no palpable lymphadenopathy, no hepatosplenomegaly   Chest - clear to auscultation, no wheezes, rales or rhonchi, symmetric air entry   Heart - normal rate, regular rhythm, normal S1, S2, no murmurs  Abdomen - soft, nontender, nondistended, no masses or organomegaly   Neurological - alert, oriented, normal speech, no focal findings or movement disorder noted   Musculoskeletal - no joint tenderness, deformity or swelling   Extremities - peripheral pulses normal, no pedal edema, no clubbing or cyanosis   Skin - normal coloration and turgor, no rashes, no suspicious skin lesions noted ,      DATA:      Labs:     CBC:   Recent Labs      01/10/18   0626  18   0636   WBC  1.4*  1.8*   HGB  9.6*  9.6*   HCT  28.9*  29.1*   PLT  154  152     BMP:   Recent Labs      01/10/18   0626  18   0636   NA  141  144   K  3.5*  3.7   CO2  21  18*   BUN  11  5*   CREATININE  0.87  0.86   LABGLOM  >60  >60   GLUCOSE  101*  105*     PT/INR: No results for input(s): PROTIME, INR in the last 72 hours. APTT:No results for input(s): APTT in the last 72 hours.   LIVER PROFILE:  Recent Labs      18   0051  18   0658   AST  40*  35*   ALT  42*  39*   LABALBU  4.1  3.8       Results for orders placed or performed during the hospital encounter of 18   CBC Auto Differential   Result Value Ref Range    WBC 1.9 (LL) 3.5 - 11.0 k/uL    RBC 3.76 (L) 4.0 - 16.1 (H) 11.0 - 14.5 %    Platelets 218 551 - 183 k/uL    MPV 8.3 6.0 - 12.0 fL    Differential Type NOT REPORTED     Immature Granulocytes NOT REPORTED 0 %    Absolute Immature Granulocyte NOT REPORTED 0.00 - 0.30 k/uL    WBC Morphology NOT REPORTED     RBC Morphology NOT REPORTED     Platelet Estimate NOT REPORTED     Seg Neutrophils 10 (L) 43 - 77 %    Lymphocytes 71 (H) 16 - 46 %    Monocytes 18 (H) 4 - 11 %    Eosinophils % 1 1 - 7 %    Basophils 0 0 - 1 %    Segs Absolute 0.10 (L) 1.8 - 7.7 k/uL    Absolute Lymph # 1.00 1.0 - 4.8 k/uL    Absolute Mono # 0.20 0.1 - 1.2 k/uL    Absolute Eos # 0.00 0.0 - 0.4 k/uL    Basophils # 0.00 0.0 - 0.2 k/uL   Basic Metabolic Panel   Result Value Ref Range    Glucose 101 (H) 70 - 99 mg/dL    BUN 11 6 - 20 mg/dL    CREATININE 0.87 0.50 - 0.90 mg/dL    Bun/Cre Ratio 13 9 - 20    Calcium 8.6 8.6 - 10.4 mg/dL    Sodium 141 135 - 144 mmol/L    Potassium 3.5 (L) 3.7 - 5.3 mmol/L    Chloride 107 98 - 107 mmol/L    CO2 21 20 - 31 mmol/L    Anion Gap 13 9 - 17 mmol/L    GFR Non-African American >60 >60 mL/min    GFR African American >60 >60 mL/min    GFR Comment          GFR Staging NOT REPORTED    CBC Auto Differential   Result Value Ref Range    WBC 1.8 (LL) 3.5 - 11.0 k/uL    RBC 3.11 (L) 4.0 - 5.2 m/uL    Hemoglobin 9.6 (L) 12.0 - 16.0 g/dL    Hematocrit 29.1 (L) 36 - 46 %    MCV 93.6 80 - 100 fL    MCH 31.0 26 - 34 pg    MCHC 33.1 31 - 37 g/dL    RDW 16.2 (H) 11.0 - 14.5 %    Platelets 236 220 - 864 k/uL    MPV 8.1 6.0 - 12.0 fL    Differential Type NOT REPORTED     Immature Granulocytes NOT REPORTED 0 %    Absolute Immature Granulocyte NOT REPORTED 0.00 - 0.30 k/uL    WBC Morphology NOT REPORTED     RBC Morphology NOT REPORTED     Platelet Estimate NOT REPORTED     Seg Neutrophils 7 (L) 43 - 77 %    Lymphocytes 71 (H) 16 - 46 %    Monocytes 22 (H) 4 - 11 %    Eosinophils % 0 (L) 1 - 7 %    Basophils 0 0 - 1 %    Segs Absolute 0.10 (L) 1.8 - 7.7 k/uL    Absolute Lymph # 1.20 1.0 - 4.8 k/uL    Absolute Mono # 0.40 0.1 - 1.2 k/uL    Absolute Eos # 0.00 0.0 - 0.4 k/uL    Basophils # 0.00 0.0 - 0.2 k/uL   Basic Metabolic Panel   Result Value Ref Range    Glucose 105 (H) 70 - 99 mg/dL    BUN 5 (L) 6 - 20 mg/dL    CREATININE 0.86 0.50 - 0.90 mg/dL    Bun/Cre Ratio 6 (L) 9 - 20    Calcium 9.0 8.6 - 10.4 mg/dL    Sodium 144 135 - 144 mmol/L    Potassium 3.7 3.7 - 5.3 mmol/L    Chloride 111 (H) 98 - 107 mmol/L    CO2 18 (L) 20 - 31 mmol/L    Anion Gap 15 9 - 17 mmol/L    GFR Non-African American >60 >60 mL/min    GFR African American >60 >60 mL/min    GFR Comment          GFR Staging NOT REPORTED    C Diff Tox A, DNA   Result Value Ref Range    Specimen Description . FECES     Special Requests NOT REPORTED     Direct Exam       Negative-Specimen negative for toxigenic C. difficile by DNA amplification    Direct Exam       Performed at [facility] [address] [facility] [place] [address] [phone]     Status FINAL 01/11/2018    EKG 12 lead   Result Value Ref Range    Ventricular Rate 95 BPM    Atrial Rate 95 BPM    P-R Interval 160 ms    QRS Duration 94 ms    Q-T Interval 366 ms    QTc Calculation (Bazett) 459 ms    P Axis 26 degrees    R Axis 73 degrees    T Axis -90 degrees     -- Diagnosis --   1.  RIGHT BREAST, 1:00, ULTRASOUND-GUIDED NEEDLE BIOPSIES:   - INVASIVE DUCTAL CARCINOMA, ER NEGATIVE, [place] POSITIVE, WITH ASSOCIATED   HIGH-GRADE DUCTAL CARCINOMA IN SITU. SEE COMMENT. 2.  RIGHT BREAST, 12:00, ULTRASOUND-GUIDED NEEDLE BIOPSIES:   - BENIGN FIBROCYSTIC CHANGES WITH FOCAL ADENOSIS AND FOCAL MILD TO   MODERATE EPITHELIAL HYPERPLASIA. - NEGATIVE FOR ATYPIA AND MALIGNANCY. COMMENT: THE RESULT OF THE HER-2 FISH ANALYSIS WILL FOLLOW IN A   SEPARATE REPORT. FOR , THE SLIDES OF SPECIMEN 1 WERE   REVIEWED BY A SECOND PATHOLOGIST (MICHELLE).   DUE TO THE SMALL VOLUME OF   INVASIVE TUMOR IN THE BIOPSY (SPECIMEN 1), REPEAT OF THE ER AND NV   ANALYSIS ON THE RESECTION SPECIMEN MAY BE CONSIDERED. IMAGING DATA:    Chest x-ray:  Impression   No acute process.       Port a catheter in place from the left side.  No pneumothorax identified.           IMPRESSION:     Primary Problem  Nausea with vomiting    Active Hospital Problems    Diagnosis Date Noted    Nausea with vomiting [R11.2] 01/09/2018    Dehydration [E86.0] 01/09/2018    Intractable vomiting with nausea [R11.2]      Stage IIa invasive ductal carcinoma of breast, HER-2 amplified. ER negative NV positive. High-grade DCIS      RECOMMENDATIONS:  I had a detailed discussion with the patient and personally went over the results of her blood workup imaging studies and other data. Patient's diarrhea is likely secondary to adverse effects from chemotherapy. Recommend testing stool for C. diff. Recommend supportive care and IV fluid. We will make adjustments to those of carboplatin and It at 800 mg   Once C. diff rule out continue symptomatic management using Imodium for diarrhea. patient will also be given a prescription for Zofran to help with the nausea. Continue current management and supportive care. discussed with Dr. Niecy Vargas      Discussed with patient and Nurse. Thank you for asking us to see this patient. Eliud Hogue MD          This note is created with the assistance of a speech recognition program.  While intending to generate a document that actually reflects the content of the visit, the document can still have some errors including those of syntax and sound a like substitutions which may escape proof reading. It such instances, actual meaning can be extrapolated by contextual diversion.

## 2018-01-11 NOTE — PROGRESS NOTES
Hospitalist Progress Note    Patient:  Ashly Moody     YOB: 1965    MRN: [id number]   Admit date: 1/9/2018     Acct: [de-identified]     PCP: Jhonatan Posey NP    CC--Interval History:    Nausea--vomiting---some nausea--no vomiting    Dehydration---improved with IV fluids    Loose stool likely due to chemotherapy----C difficle  Negative---Lomotil started    Breast CA---sp chemotherapy----WBC 1.4 ---> 1.8 today    See by Oncology---see note    See note below     All other ROS negative except noted in HPI    Diet:  DIET FULL LIQUID;    Medications:  Scheduled Meds:   ipratropium-albuterol        nystatin  5 mL Oral 4x Daily    fluticasone  1 spray Nasal Daily    lactobacillus  1 capsule Oral TID    lisinopril  20 mg Oral Daily    cetirizine  10 mg Oral Daily    pantoprazole  40 mg Oral BID AC    pravastatin  40 mg Oral Daily    sodium chloride flush  10 mL Intravenous 2 times per day    gi cocktail  30 mL Oral Once    traZODone  100 mg Oral Nightly    QUEtiapine  300 mg Oral Nightly    lamoTRIgine  100 mg Oral Nightly    enoxaparin  30 mg Subcutaneous BID    hydrOXYzine  12.5 mg Oral Daily     Continuous Infusions:   sodium chloride 75 mL/hr at 01/11/18 0320     PRN Meds:albuterol sulfate HFA, sodium chloride flush, acetaminophen, magnesium hydroxide, ondansetron, prochlorperazine, aluminum & magnesium hydroxide-simethicone    Objective:  Labs:  CBC with Differential:    Lab Results   Component Value Date    WBC 1.8 01/11/2018    RBC 3.11 01/11/2018    HGB 9.6 01/11/2018    HCT 29.1 01/11/2018     01/11/2018    MCV 93.6 01/11/2018    MCH 31.0 01/11/2018    MCHC 33.1 01/11/2018    RDW 16.2 01/11/2018    LYMPHOPCT 71 01/11/2018    MONOPCT 22 01/11/2018    BASOPCT 0 01/11/2018    MONOSABS 0.40 01/11/2018    LYMPHSABS 1.20 01/11/2018    EOSABS 0.00 01/11/2018    BASOSABS 0.00 01/11/2018    DIFFTYPE NOT REPORTED 01/11/2018     BMP:    Lab Results   Component Value Date

## 2018-01-12 VITALS
SYSTOLIC BLOOD PRESSURE: 120 MMHG | TEMPERATURE: 99.3 F | RESPIRATION RATE: 16 BRPM | HEART RATE: 94 BPM | WEIGHT: 266.1 LBS | HEIGHT: 66 IN | BODY MASS INDEX: 42.77 KG/M2 | OXYGEN SATURATION: 97 % | DIASTOLIC BLOOD PRESSURE: 68 MMHG

## 2018-01-12 LAB
ABSOLUTE EOS #: 0 K/UL (ref 0–0.4)
ABSOLUTE IMMATURE GRANULOCYTE: ABNORMAL K/UL (ref 0–0.3)
ABSOLUTE LYMPH #: 1.4 K/UL (ref 1–4.8)
ABSOLUTE MONO #: 0.6 K/UL (ref 0.1–1.2)
ANION GAP SERPL CALCULATED.3IONS-SCNC: 16 MMOL/L (ref 9–17)
BASOPHILS # BLD: 0 % (ref 0–1)
BASOPHILS ABSOLUTE: 0 K/UL (ref 0–0.2)
BUN BLDV-MCNC: 3 MG/DL (ref 6–20)
BUN/CREAT BLD: 4 (ref 9–20)
CALCIUM SERPL-MCNC: 9.2 MG/DL (ref 8.6–10.4)
CHLORIDE BLD-SCNC: 109 MMOL/L (ref 98–107)
CO2: 16 MMOL/L (ref 20–31)
CREAT SERPL-MCNC: 0.82 MG/DL (ref 0.5–0.9)
DIFFERENTIAL TYPE: ABNORMAL
EOSINOPHILS RELATIVE PERCENT: 1 % (ref 1–7)
GFR AFRICAN AMERICAN: >60 ML/MIN
GFR NON-AFRICAN AMERICAN: >60 ML/MIN
GFR SERPL CREATININE-BSD FRML MDRD: ABNORMAL ML/MIN/{1.73_M2}
GFR SERPL CREATININE-BSD FRML MDRD: ABNORMAL ML/MIN/{1.73_M2}
GLUCOSE BLD-MCNC: 98 MG/DL (ref 70–99)
HCT VFR BLD CALC: 28.6 % (ref 36–46)
HEMOGLOBIN: 9.7 G/DL (ref 12–16)
IMMATURE GRANULOCYTES: ABNORMAL %
LYMPHOCYTES # BLD: 64 % (ref 16–46)
MCH RBC QN AUTO: 31.1 PG (ref 26–34)
MCHC RBC AUTO-ENTMCNC: 33.8 G/DL (ref 31–37)
MCV RBC AUTO: 92.2 FL (ref 80–100)
MONOCYTES # BLD: 27 % (ref 4–11)
PDW BLD-RTO: 16.2 % (ref 11–14.5)
PLATELET # BLD: 137 K/UL (ref 140–450)
PLATELET ESTIMATE: ABNORMAL
PMV BLD AUTO: 9.1 FL (ref 6–12)
POTASSIUM SERPL-SCNC: 3.6 MMOL/L (ref 3.7–5.3)
RBC # BLD: 3.1 M/UL (ref 4–5.2)
RBC # BLD: ABNORMAL 10*6/UL
SEG NEUTROPHILS: 8 % (ref 43–77)
SEGMENTED NEUTROPHILS ABSOLUTE COUNT: 0.2 K/UL (ref 1.8–7.7)
SODIUM BLD-SCNC: 141 MMOL/L (ref 135–144)
WBC # BLD: 2.1 K/UL (ref 3.5–11)
WBC # BLD: ABNORMAL 10*3/UL

## 2018-01-12 PROCEDURE — 94760 N-INVAS EAR/PLS OXIMETRY 1: CPT

## 2018-01-12 PROCEDURE — 99223 1ST HOSP IP/OBS HIGH 75: CPT | Performed by: INTERNAL MEDICINE

## 2018-01-12 PROCEDURE — 2580000003 HC RX 258: Performed by: NURSE PRACTITIONER

## 2018-01-12 PROCEDURE — 80048 BASIC METABOLIC PNL TOTAL CA: CPT

## 2018-01-12 PROCEDURE — 85025 COMPLETE CBC W/AUTO DIFF WBC: CPT

## 2018-01-12 PROCEDURE — 36415 COLL VENOUS BLD VENIPUNCTURE: CPT

## 2018-01-12 PROCEDURE — 6360000002 HC RX W HCPCS: Performed by: INTERNAL MEDICINE

## 2018-01-12 PROCEDURE — 6370000000 HC RX 637 (ALT 250 FOR IP): Performed by: NURSE PRACTITIONER

## 2018-01-12 PROCEDURE — 6370000000 HC RX 637 (ALT 250 FOR IP): Performed by: INTERNAL MEDICINE

## 2018-01-12 PROCEDURE — 99238 HOSP IP/OBS DSCHRG MGMT 30/<: CPT | Performed by: INTERNAL MEDICINE

## 2018-01-12 RX ORDER — SACCHAROMYCES BOULARDII 250 MG
250 CAPSULE ORAL 2 TIMES DAILY
Qty: 60 CAPSULE | Refills: 0 | COMMUNITY
Start: 2018-01-12 | End: 2018-02-11

## 2018-01-12 RX ORDER — DIPHENOXYLATE HYDROCHLORIDE AND ATROPINE SULFATE 2.5; .025 MG/1; MG/1
2 TABLET ORAL 4 TIMES DAILY PRN
Qty: 20 TABLET | Refills: 0 | COMMUNITY
Start: 2018-01-12 | End: 2018-01-22

## 2018-01-12 RX ORDER — POTASSIUM CHLORIDE 20 MEQ/1
40 TABLET, EXTENDED RELEASE ORAL ONCE
Status: COMPLETED | OUTPATIENT
Start: 2018-01-12 | End: 2018-01-12

## 2018-01-12 RX ADMIN — PANTOPRAZOLE SODIUM 40 MG: 40 TABLET, DELAYED RELEASE ORAL at 05:43

## 2018-01-12 RX ADMIN — DIPHENOXYLATE HYDROCHLORIDE AND ATROPINE SULFATE 2 TABLET: 2.5; .025 TABLET ORAL at 04:16

## 2018-01-12 RX ADMIN — CETIRIZINE HYDROCHLORIDE 10 MG: 5 TABLET ORAL at 07:59

## 2018-01-12 RX ADMIN — HYDROXYZINE HYDROCHLORIDE 12.5 MG: 25 TABLET, FILM COATED ORAL at 07:59

## 2018-01-12 RX ADMIN — DIPHENOXYLATE HYDROCHLORIDE AND ATROPINE SULFATE 2 TABLET: 2.5; .025 TABLET ORAL at 07:59

## 2018-01-12 RX ADMIN — NYSTATIN 500000 UNITS: 100000 SUSPENSION ORAL at 08:00

## 2018-01-12 RX ADMIN — Medication 1 CAPSULE: at 07:59

## 2018-01-12 RX ADMIN — SODIUM CHLORIDE: 9 INJECTION, SOLUTION INTRAVENOUS at 04:07

## 2018-01-12 RX ADMIN — ENOXAPARIN SODIUM 30 MG: 30 INJECTION SUBCUTANEOUS at 07:59

## 2018-01-12 RX ADMIN — POTASSIUM CHLORIDE 40 MEQ: 20 TABLET, EXTENDED RELEASE ORAL at 09:04

## 2018-01-12 RX ADMIN — PRAVASTATIN SODIUM 40 MG: 20 TABLET ORAL at 07:59

## 2018-01-12 RX ADMIN — LISINOPRIL 20 MG: 20 TABLET ORAL at 07:59

## 2018-01-12 ASSESSMENT — PAIN SCALES - GENERAL
PAINLEVEL_OUTOF10: 0
PAINLEVEL_OUTOF10: 0

## 2018-01-12 NOTE — PLAN OF CARE
Problem: Pain:  Goal: Control of acute pain  Control of acute pain   Outcome: Ongoing      Problem: Nausea/Vomiting:  Goal: Absence of nausea/vomiting  Absence of nausea/vomiting   Outcome: Ongoing
Problem: Pain:  Goal: Pain level will decrease  Pain level will decrease   Outcome: Ongoing    Goal: Control of acute pain  Control of acute pain   Outcome: Ongoing    Goal: Control of chronic pain  Control of chronic pain   Outcome: Ongoing      Problem: Nausea/Vomiting:  Goal: Absence of nausea/vomiting  Absence of nausea/vomiting   Outcome: Ongoing    Goal: Able to drink  Able to drink   Outcome: Ongoing    Goal: Able to eat  Able to eat   Outcome: Ongoing    Goal: Ability to achieve adequate nutritional intake will improve  Ability to achieve adequate nutritional intake will improve   Outcome: Ongoing      Problem: Activity:  Goal: Risk for activity intolerance will decrease  Risk for activity intolerance will decrease   Outcome: Ongoing      Problem:  Bowel/Gastric:  Goal: Bowel function will improve  Bowel function will improve   Outcome: Ongoing    Goal: Diagnostic test results will improve  Diagnostic test results will improve   Outcome: Ongoing    Goal: Occurrences of nausea will decrease  Occurrences of nausea will decrease   Outcome: Ongoing    Goal: Occurrences of vomiting will decrease  Occurrences of vomiting will decrease   Outcome: Ongoing      Problem: Fluid Volume:  Goal: Maintenance of adequate hydration will improve  Maintenance of adequate hydration will improve   Outcome: Ongoing      Problem: Health Behavior:  Goal: Ability to state signs and symptoms to report to health care provider will improve  Ability to state signs and symptoms to report to health care provider will improve   Outcome: Ongoing      Problem: Physical Regulation:  Goal: Complications related to the disease process, condition or treatment will be avoided or minimized  Complications related to the disease process, condition or treatment will be avoided or minimized   Outcome: Ongoing    Goal: Ability to maintain clinical measurements within normal limits will improve  Ability to maintain clinical measurements within normal
Problem: Pain:  Goal: Pain level will decrease  Pain level will decrease   Outcome: Ongoing    Goal: Control of acute pain  Control of acute pain   Outcome: Ongoing    Goal: Control of chronic pain  Control of chronic pain   Outcome: Ongoing      Problem: Nausea/Vomiting:  Goal: Absence of nausea/vomiting  Absence of nausea/vomiting   Outcome: Ongoing    Goal: Able to drink  Able to drink   Outcome: Ongoing    Goal: Able to eat  Able to eat   Outcome: Ongoing    Goal: Ability to achieve adequate nutritional intake will improve  Ability to achieve adequate nutritional intake will improve   Outcome: Ongoing      Problem: Activity:  Goal: Risk for activity intolerance will decrease  Risk for activity intolerance will decrease   Outcome: Ongoing      Problem:  Bowel/Gastric:  Goal: Bowel function will improve  Bowel function will improve   Outcome: Ongoing    Goal: Diagnostic test results will improve  Diagnostic test results will improve   Outcome: Ongoing    Goal: Occurrences of nausea will decrease  Occurrences of nausea will decrease   Outcome: Ongoing    Goal: Occurrences of vomiting will decrease  Occurrences of vomiting will decrease   Outcome: Ongoing      Problem: Fluid Volume:  Goal: Maintenance of adequate hydration will improve  Maintenance of adequate hydration will improve   Outcome: Ongoing      Problem: Health Behavior:  Goal: Ability to state signs and symptoms to report to health care provider will improve  Ability to state signs and symptoms to report to health care provider will improve   Outcome: Ongoing      Problem: Physical Regulation:  Goal: Complications related to the disease process, condition or treatment will be avoided or minimized  Complications related to the disease process, condition or treatment will be avoided or minimized   Outcome: Ongoing    Goal: Ability to maintain clinical measurements within normal limits will improve  Ability to maintain clinical measurements within normal
improve  Outcome: Ongoing    Goal: Will remain free from infection  Will remain free from infection  Outcome: Ongoing    Goal: Ability to maintain vital signs within normal range will improve  Ability to maintain vital signs within normal range will improve  Outcome: Ongoing      Problem: Sensory:  Goal: Pain level will decrease  Pain level will decrease   Outcome: Ongoing    Goal: Ability to identify factors that increase the pain will improve  Ability to identify factors that increase the pain will improve  Outcome: Ongoing    Goal: Ability to notify healthcare provider of pain before it becomes unmanageable or unbearable will improve  Ability to notify healthcare provider of pain before it becomes unmanageable or unbearable will improve  Outcome: Ongoing      Problem: Skin Integrity:  Goal: Complications related to intravenous access or infusion will be avoided or minimized  Complications related to intravenous access or infusion will be avoided or minimized  Outcome: Ongoing

## 2018-01-12 NOTE — PROGRESS NOTES
Hospitalist Progress Note    Patient:  Mendy Arenas     YOB: 1965    MRN: [id number]   Admit date: 1/9/2018     Acct: [de-identified]     PCP: Meron Green NP    CC--Interval History: Patient reports nausea has decreased and appetite is beginning to improve--WBC 1.8--seen by oncology--C. Diff [-]    All other ROS negative except noted in HPI    Diet:  DIET CARDIAC;    Medications:  Scheduled Meds:   nystatin  5 mL Oral 4x Daily    fluticasone  1 spray Nasal Daily    lactobacillus  1 capsule Oral TID    lisinopril  20 mg Oral Daily    cetirizine  10 mg Oral Daily    pantoprazole  40 mg Oral BID AC    pravastatin  40 mg Oral Daily    sodium chloride flush  10 mL Intravenous 2 times per day    gi cocktail  30 mL Oral Once    traZODone  100 mg Oral Nightly    QUEtiapine  300 mg Oral Nightly    lamoTRIgine  100 mg Oral Nightly    enoxaparin  30 mg Subcutaneous BID    hydrOXYzine  12.5 mg Oral Daily     Continuous Infusions:   sodium chloride 75 mL/hr at 01/11/18 1557     PRN Meds:diphenoxylate-atropine, albuterol sulfate HFA, sodium chloride flush, acetaminophen, magnesium hydroxide, ondansetron, prochlorperazine, aluminum & magnesium hydroxide-simethicone    Objective:      Physical Exam:  Vitals: /64   Pulse 102   Temp 98.6 °F (37 °C) (Oral)   Resp 16   Ht 5' 5.75\" (1.67 m)   Wt 266 lb 1.6 oz (120.7 kg)   LMP 02/28/2013   SpO2 96%   BMI 43.28 kg/m²   24 hour intake/output:    Intake/Output Summary (Last 24 hours) at 01/12/18 0115  Last data filed at 01/11/18 2300   Gross per 24 hour   Intake             2588 ml   Output              400 ml   Net             2188 ml       General: awake, alert and cooperative  HEENT: EMOI, Normocephalic and Atraumatic  Neck: Supple, Carotid Pulses Present, No Masses, Tenderness, Nodularity and No Lymphadenopathy  Chest/Lungs: Clear to Auscultation without Rales, Rhonchi, or Wheezes  Cardiac: Regular Rate and Rhythm  GI/Abdomen:  Bowel Sounds

## 2018-01-13 RX ORDER — ONDANSETRON 4 MG/1
8 TABLET, FILM COATED ORAL EVERY 6 HOURS PRN
Qty: 40 TABLET | Refills: 1 | Status: SHIPPED | OUTPATIENT
Start: 2018-01-13 | End: 2018-02-01 | Stop reason: SDUPTHER

## 2018-01-13 NOTE — DISCHARGE SUMMARY
Maddi [address]                   [address] [place] [address], 00 [facility]                                 DISCHARGE SUMMARY    PATIENT NAME: Toribio Bell                   :        1965  MED REC NO:   [id number]                             ROOM:       0210  ACCOUNT NO:   [de-identified]                           ADMIT DATE: 2018  PROVIDER:     Logan Moody                  DISCHARGE DATE: 2018    ATTENDING PHYSICIAN OF HOSPITALIZATION:  Lebron Polanco MD    The patient is seen by Dr. Aaron Lopez, Hematology/Oncology, seen by Dr. Johanna Matamoros,  outpatient as well. PERSONAL CAREGIVER:  Sriramsergo Mik, nurse practitioner, [facility]. DIAGNOSES:  1.  Nausea/vomiting, 2018. EKG, 2018; normal sinus rhythm,  rate 95, nonspecific ST-T changes, inferior lateral ischemia? .  Last  chemotherapy 2018, TCHP. 2.  Dehydration, 2018. 3.  Elevated lactic acid level, 2018, due to dehydration. Sepsis  ruled out, 2018. Leukopenia, 2018, with chemotherapy-induced  pancytopenia. 4.  Diarrhea, chronic, 2018, unchanged. Clostridium difficile  negative. 5.  Gastroesophageal reflux disease. 6.  Infiltrating ductal carcinoma, stage 2A, right breast ER negative, ME  positive, HER-2 amplified. Overlapping 1.2 cm at 1 o'clock position, 4 mm  at 12 o'clock position of masses, PT _____, status post right-sided  mastectomy, sentinel lymph node biopsy 10/19/2017, for invasive ductal  carcinoma. 7.  Two-D echo on 2017; normal left ventricular systolic function,  normal right ventricular systolic function, normal-appearing valves, grade  1 diastolic dysfunction, LVEF 60%. 8.  Hyperlipidemia. 9.  Kidney disease, chronic, state III, currently the stage II level  following rehydration. 10.  Hyperglycemia. 11.  Morbid obesity. 12.  Bipolar disorder 1, controlled.     Other medical problems set forth in the

## 2018-01-15 ENCOUNTER — HOSPITAL ENCOUNTER (OUTPATIENT)
Dept: INFUSION THERAPY | Age: 53
Discharge: HOME | End: 2018-01-15
Payer: MEDICARE

## 2018-01-15 VITALS
HEART RATE: 99 BPM | RESPIRATION RATE: 16 BRPM | TEMPERATURE: 99.3 F | SYSTOLIC BLOOD PRESSURE: 101 MMHG | DIASTOLIC BLOOD PRESSURE: 73 MMHG

## 2018-01-15 DIAGNOSIS — C50.811 MALIGNANT NEOPLASM OF OVERLAPPING SITES OF RIGHT BREAST IN FEMALE, ESTROGEN RECEPTOR NEGATIVE (HCC): ICD-10-CM

## 2018-01-15 DIAGNOSIS — Z17.1 MALIGNANT NEOPLASM OF OVERLAPPING SITES OF RIGHT BREAST IN FEMALE, ESTROGEN RECEPTOR NEGATIVE (HCC): ICD-10-CM

## 2018-01-15 PROCEDURE — 2580000003 HC RX 258: Performed by: INTERNAL MEDICINE

## 2018-01-15 PROCEDURE — 96361 HYDRATE IV INFUSION ADD-ON: CPT

## 2018-01-15 PROCEDURE — 96360 HYDRATION IV INFUSION INIT: CPT

## 2018-01-15 PROCEDURE — 6360000002 HC RX W HCPCS: Performed by: INTERNAL MEDICINE

## 2018-01-15 RX ORDER — 0.9 % SODIUM CHLORIDE 0.9 %
1000 INTRAVENOUS SOLUTION INTRAVENOUS ONCE
Status: COMPLETED | OUTPATIENT
Start: 2018-01-15 | End: 2018-01-15

## 2018-01-15 RX ORDER — SODIUM CHLORIDE 0.9 % (FLUSH) 0.9 %
10 SYRINGE (ML) INJECTION PRN
Status: DISCONTINUED | OUTPATIENT
Start: 2018-01-15 | End: 2018-01-16 | Stop reason: HOSPADM

## 2018-01-15 RX ORDER — SODIUM CHLORIDE 0.9 % (FLUSH) 0.9 %
10 SYRINGE (ML) INJECTION PRN
Status: CANCELLED | OUTPATIENT
Start: 2018-01-15

## 2018-01-15 RX ORDER — HEPARIN SODIUM (PORCINE) LOCK FLUSH IV SOLN 100 UNIT/ML 100 UNIT/ML
500 SOLUTION INTRAVENOUS PRN
Status: CANCELLED | OUTPATIENT
Start: 2018-01-15

## 2018-01-15 RX ORDER — HEPARIN SODIUM (PORCINE) LOCK FLUSH IV SOLN 100 UNIT/ML 100 UNIT/ML
500 SOLUTION INTRAVENOUS PRN
Status: DISCONTINUED | OUTPATIENT
Start: 2018-01-15 | End: 2018-01-16 | Stop reason: HOSPADM

## 2018-01-15 RX ADMIN — SODIUM CHLORIDE, PRESERVATIVE FREE 500 UNITS: 5 INJECTION INTRAVENOUS at 15:17

## 2018-01-15 RX ADMIN — SODIUM CHLORIDE 1000 ML: 9 INJECTION, SOLUTION INTRAVENOUS at 13:15

## 2018-01-15 NOTE — PROGRESS NOTES
Patient on unit for hydration. VAD accessed with 3/4 inch mora needle. Did not flush de accessed port and MEMO Hernandez RN  and Re accessed patient with 1 1/4 inch Mora needle she felt back of port and saline pushed easily. No blood return. No swelling at site. Patient could not feel anything when flushed port with saline. IV infusing saline through the port. Will monitor. Patient knows to let staff know if port begins to hurt. Secured accessed port with transparent dressing. Patient sitting in chair with feet elevated.   No complaints looking at her cell phone

## 2018-01-18 ENCOUNTER — HOSPITAL ENCOUNTER (OUTPATIENT)
Dept: INFUSION THERAPY | Age: 53
Discharge: HOME | End: 2018-01-18
Payer: MEDICARE

## 2018-01-18 VITALS
DIASTOLIC BLOOD PRESSURE: 81 MMHG | HEART RATE: 96 BPM | RESPIRATION RATE: 16 BRPM | TEMPERATURE: 99.3 F | SYSTOLIC BLOOD PRESSURE: 123 MMHG

## 2018-01-18 DIAGNOSIS — Z17.1 MALIGNANT NEOPLASM OF OVERLAPPING SITES OF RIGHT BREAST IN FEMALE, ESTROGEN RECEPTOR NEGATIVE (HCC): ICD-10-CM

## 2018-01-18 DIAGNOSIS — C50.811 MALIGNANT NEOPLASM OF OVERLAPPING SITES OF RIGHT BREAST IN FEMALE, ESTROGEN RECEPTOR NEGATIVE (HCC): ICD-10-CM

## 2018-01-18 PROCEDURE — 96360 HYDRATION IV INFUSION INIT: CPT

## 2018-01-18 PROCEDURE — 2580000003 HC RX 258: Performed by: INTERNAL MEDICINE

## 2018-01-18 PROCEDURE — 96361 HYDRATE IV INFUSION ADD-ON: CPT

## 2018-01-18 PROCEDURE — 6360000002 HC RX W HCPCS: Performed by: INTERNAL MEDICINE

## 2018-01-18 RX ORDER — HEPARIN SODIUM (PORCINE) LOCK FLUSH IV SOLN 100 UNIT/ML 100 UNIT/ML
500 SOLUTION INTRAVENOUS PRN
Status: CANCELLED | OUTPATIENT
Start: 2018-01-18

## 2018-01-18 RX ORDER — 0.9 % SODIUM CHLORIDE 0.9 %
1000 INTRAVENOUS SOLUTION INTRAVENOUS ONCE
Status: COMPLETED | OUTPATIENT
Start: 2018-01-18 | End: 2018-01-18

## 2018-01-18 RX ORDER — SODIUM CHLORIDE 0.9 % (FLUSH) 0.9 %
10 SYRINGE (ML) INJECTION PRN
Status: DISCONTINUED | OUTPATIENT
Start: 2018-01-18 | End: 2018-01-19 | Stop reason: HOSPADM

## 2018-01-18 RX ORDER — SODIUM CHLORIDE 0.9 % (FLUSH) 0.9 %
10 SYRINGE (ML) INJECTION PRN
Status: CANCELLED | OUTPATIENT
Start: 2018-01-18

## 2018-01-18 RX ORDER — HEPARIN SODIUM (PORCINE) LOCK FLUSH IV SOLN 100 UNIT/ML 100 UNIT/ML
500 SOLUTION INTRAVENOUS PRN
Status: DISCONTINUED | OUTPATIENT
Start: 2018-01-18 | End: 2018-01-19 | Stop reason: HOSPADM

## 2018-01-18 RX ADMIN — SODIUM CHLORIDE, PRESERVATIVE FREE 500 UNITS: 5 INJECTION INTRAVENOUS at 11:05

## 2018-01-18 RX ADMIN — SODIUM CHLORIDE 1000 ML: 9 INJECTION, SOLUTION INTRAVENOUS at 09:05

## 2018-01-18 RX ADMIN — Medication 10 ML: at 09:04

## 2018-01-18 NOTE — PROGRESS NOTES
Patient on unit for IV hydration. VAD accessed per MATHIEU. :guadalupe RN with 11/4 inch mora needle saline pushes easily and patient could taste salt when she flushed port patient tolerated access without complaint. IV infusing saline through the port. No complaints from patient. Reports diarrhea getting better 3-4 watery stools per day. Been working on improving diet with bland foods and taking antidiarrheal meds. Been nauseated a couple but no vomiting. In chair now feet elevated warm blankets provided.

## 2018-01-18 NOTE — PROGRESS NOTES
Patient resting comfortably in recliner watching TV, denies any complaints. Will continue to monitor.

## 2018-01-18 NOTE — PROGRESS NOTES
Flushed port with 5 ml of heparin and de accessed port. Band-Aid applied. No complaints form patient. Up to bathroom stable. Discharged to home.

## 2018-01-22 ENCOUNTER — HOSPITAL ENCOUNTER (OUTPATIENT)
Dept: GENERAL RADIOLOGY | Age: 53
Discharge: HOME | End: 2018-01-25
Payer: MEDICARE

## 2018-01-22 ENCOUNTER — HOSPITAL ENCOUNTER (OUTPATIENT)
Dept: INFUSION THERAPY | Age: 53
Discharge: HOME | End: 2018-01-22
Payer: MEDICARE

## 2018-01-22 VITALS
SYSTOLIC BLOOD PRESSURE: 118 MMHG | RESPIRATION RATE: 16 BRPM | HEART RATE: 85 BPM | TEMPERATURE: 99.2 F | DIASTOLIC BLOOD PRESSURE: 77 MMHG

## 2018-01-22 DIAGNOSIS — C50.811 MALIGNANT NEOPLASM OF OVERLAPPING SITES OF RIGHT BREAST IN FEMALE, ESTROGEN RECEPTOR NEGATIVE (HCC): ICD-10-CM

## 2018-01-22 DIAGNOSIS — Z17.1 MALIGNANT NEOPLASM OF OVERLAPPING SITES OF RIGHT BREAST IN FEMALE, ESTROGEN RECEPTOR NEGATIVE (HCC): ICD-10-CM

## 2018-01-22 PROCEDURE — 2580000003 HC RX 258: Performed by: INTERNAL MEDICINE

## 2018-01-22 PROCEDURE — 6360000004 HC RX CONTRAST MEDICATION: Performed by: INTERNAL MEDICINE

## 2018-01-22 PROCEDURE — 77001 FLUOROGUIDE FOR VEIN DEVICE: CPT

## 2018-01-22 PROCEDURE — 36555 INSERT NON-TUNNEL CV CATH: CPT

## 2018-01-22 PROCEDURE — 96360 HYDRATION IV INFUSION INIT: CPT

## 2018-01-22 PROCEDURE — 96365 THER/PROPH/DIAG IV INF INIT: CPT

## 2018-01-22 PROCEDURE — 96361 HYDRATE IV INFUSION ADD-ON: CPT

## 2018-01-22 RX ORDER — 0.9 % SODIUM CHLORIDE 0.9 %
1000 INTRAVENOUS SOLUTION INTRAVENOUS ONCE
Status: COMPLETED | OUTPATIENT
Start: 2018-01-22 | End: 2018-01-22

## 2018-01-22 RX ORDER — HEPARIN SODIUM (PORCINE) LOCK FLUSH IV SOLN 100 UNIT/ML 100 UNIT/ML
500 SOLUTION INTRAVENOUS PRN
Status: DISCONTINUED | OUTPATIENT
Start: 2018-01-22 | End: 2018-01-23 | Stop reason: HOSPADM

## 2018-01-22 RX ORDER — SODIUM CHLORIDE 0.9 % (FLUSH) 0.9 %
10 SYRINGE (ML) INJECTION PRN
Status: DISCONTINUED | OUTPATIENT
Start: 2018-01-22 | End: 2018-01-23 | Stop reason: HOSPADM

## 2018-01-22 RX ORDER — SODIUM CHLORIDE 0.9 % (FLUSH) 0.9 %
10 SYRINGE (ML) INJECTION PRN
Status: CANCELLED | OUTPATIENT
Start: 2018-01-22

## 2018-01-22 RX ORDER — 0.9 % SODIUM CHLORIDE 0.9 %
1000 INTRAVENOUS SOLUTION INTRAVENOUS ONCE
Status: CANCELLED | OUTPATIENT
Start: 2018-01-22 | End: 2018-01-22

## 2018-01-22 RX ORDER — HEPARIN SODIUM (PORCINE) LOCK FLUSH IV SOLN 100 UNIT/ML 100 UNIT/ML
500 SOLUTION INTRAVENOUS PRN
Status: CANCELLED | OUTPATIENT
Start: 2018-01-22

## 2018-01-22 RX ADMIN — SODIUM CHLORIDE 1000 ML: 9 INJECTION, SOLUTION INTRAVENOUS at 12:19

## 2018-01-22 RX ADMIN — IOHEXOL 20 ML: 240 INJECTION, SOLUTION INTRATHECAL; INTRAVASCULAR; INTRAVENOUS; ORAL at 11:00

## 2018-01-22 NOTE — PROGRESS NOTES
Patient's left arm appears slightly larger than right arm. NO pitting edema when pushed on left arm (port on left subclavian area) No pain in arm. Reported this to Dr. Lyle Langford. Port site appears swollen has serous draniage when pushed on where it was de accessed today. Band-Aid now over that site and ice at port site. Encouraged patient to ice site 20 min Off and on. Instructed to watch for signs of infection. Redness, hot to touch , and pain. No orders given for swollen arm. He wants patient to watch if it gets worse to call him. If arm gets red,  more swollen, and/ or  painful call. Patient has on call number to [facility].

## 2018-01-22 NOTE — PROGRESS NOTES
Patient stable at discharge. Discharged to home. She plans on going to [address] [place] [address] to get a wig.

## 2018-01-22 NOTE — PROGRESS NOTES
Back from radiology ordered patient breakfast.  Waiting for radiologist to read portagram and write report.   Gema Alford RN talked to radiologist.

## 2018-01-22 NOTE — PROGRESS NOTES
Ice removed from port site no redness noted does have pink area below port area marked out area with skin marker. .  Tender slightly to touch. Encouraged once again to monitor site for sign of infection and temperature above 100.5 F  Patient to monitor pink area if gets larger or more red to call in.

## 2018-01-22 NOTE — PROGRESS NOTES
Port site not red  Is swollen Did note serous drainage form site reapplied band aid . Is swollen ice applied to port site.

## 2018-01-22 NOTE — PROGRESS NOTES
Spoke with  regarding patient port dye study.  ordered patient to receive hydration through peripheral IV and will review dye study. IV accessed, NSS infusing. Patient resting in recliner, will continue to monitor.

## 2018-01-22 NOTE — PROGRESS NOTES
Patient on unit for hydration. Writer accessed port with 1 1/4 inch mora needle flet needle hit side of port De accessed needle MEMO Hernandez,PARRIS accessed port felt back of port and got light red blood return and flushed well with saline cspped port access and dressing applied of tegaderm. area of port looked swollen. Called Dr. Manuel Ray and got order for [facility].  Patient down in radiology for portagram now.

## 2018-01-23 ENCOUNTER — APPOINTMENT (OUTPATIENT)
Dept: INFUSION THERAPY | Age: 53
End: 2018-01-23
Payer: MEDICARE

## 2018-01-25 ENCOUNTER — OFFICE VISIT (OUTPATIENT)
Dept: ONCOLOGY | Age: 53
End: 2018-01-25
Payer: MEDICARE

## 2018-01-25 ENCOUNTER — HOSPITAL ENCOUNTER (OUTPATIENT)
Dept: INFUSION THERAPY | Age: 53
Discharge: HOME | End: 2018-01-25
Payer: MEDICARE

## 2018-01-25 VITALS
OXYGEN SATURATION: 98 % | RESPIRATION RATE: 16 BRPM | BODY MASS INDEX: 43.71 KG/M2 | SYSTOLIC BLOOD PRESSURE: 123 MMHG | HEIGHT: 66 IN | HEART RATE: 101 BPM | DIASTOLIC BLOOD PRESSURE: 84 MMHG | TEMPERATURE: 99.6 F | WEIGHT: 272 LBS

## 2018-01-25 DIAGNOSIS — C50.811 MALIGNANT NEOPLASM OF OVERLAPPING SITES OF RIGHT BREAST IN FEMALE, ESTROGEN RECEPTOR NEGATIVE (HCC): Primary | ICD-10-CM

## 2018-01-25 DIAGNOSIS — C50.811 MALIGNANT NEOPLASM OF OVERLAPPING SITES OF RIGHT BREAST IN FEMALE, ESTROGEN RECEPTOR NEGATIVE (HCC): ICD-10-CM

## 2018-01-25 DIAGNOSIS — Z17.1 MALIGNANT NEOPLASM OF OVERLAPPING SITES OF RIGHT BREAST IN FEMALE, ESTROGEN RECEPTOR NEGATIVE (HCC): Primary | ICD-10-CM

## 2018-01-25 DIAGNOSIS — Z17.1 MALIGNANT NEOPLASM OF OVERLAPPING SITES OF RIGHT BREAST IN FEMALE, ESTROGEN RECEPTOR NEGATIVE (HCC): ICD-10-CM

## 2018-01-25 LAB
ABSOLUTE EOS #: 0 K/UL (ref 0–0.4)
ABSOLUTE IMMATURE GRANULOCYTE: ABNORMAL K/UL (ref 0–0.3)
ABSOLUTE LYMPH #: 1.63 K/UL (ref 1–4.8)
ABSOLUTE MONO #: 0.3 K/UL (ref 0.1–1.2)
ALBUMIN SERPL-MCNC: 3.5 G/DL (ref 3.5–5.2)
ALBUMIN/GLOBULIN RATIO: 1.1 (ref 1–2.5)
ALP BLD-CCNC: 81 U/L (ref 35–104)
ALT SERPL-CCNC: 29 U/L (ref 5–33)
ANION GAP SERPL CALCULATED.3IONS-SCNC: 12 MMOL/L (ref 9–17)
AST SERPL-CCNC: 27 U/L
BASOPHILS # BLD: 0 % (ref 0–1)
BASOPHILS ABSOLUTE: 0 K/UL (ref 0–0.2)
BILIRUB SERPL-MCNC: 0.34 MG/DL (ref 0.3–1.2)
BUN BLDV-MCNC: 9 MG/DL (ref 6–20)
BUN/CREAT BLD: 13 (ref 9–20)
CALCIUM SERPL-MCNC: 8.8 MG/DL (ref 8.6–10.4)
CHLORIDE BLD-SCNC: 102 MMOL/L (ref 98–107)
CO2: 30 MMOL/L (ref 20–31)
CREAT SERPL-MCNC: 0.72 MG/DL (ref 0.5–0.9)
DIFFERENTIAL TYPE: ABNORMAL
EOSINOPHILS RELATIVE PERCENT: 0 % (ref 1–7)
GFR AFRICAN AMERICAN: >60 ML/MIN
GFR NON-AFRICAN AMERICAN: >60 ML/MIN
GFR SERPL CREATININE-BSD FRML MDRD: ABNORMAL ML/MIN/{1.73_M2}
GFR SERPL CREATININE-BSD FRML MDRD: ABNORMAL ML/MIN/{1.73_M2}
GLUCOSE BLD-MCNC: 121 MG/DL (ref 70–99)
HCT VFR BLD CALC: 29.7 % (ref 36–46)
HEMOGLOBIN: 10.1 G/DL (ref 12–16)
IMMATURE GRANULOCYTES: ABNORMAL %
LYMPHOCYTES # BLD: 44 % (ref 16–46)
MCH RBC QN AUTO: 32.1 PG (ref 26–34)
MCHC RBC AUTO-ENTMCNC: 33.9 G/DL (ref 31–37)
MCV RBC AUTO: 94.6 FL (ref 80–100)
MONOCYTES # BLD: 8 % (ref 4–11)
MORPHOLOGY: ABNORMAL
NRBC AUTOMATED: ABNORMAL PER 100 WBC
PDW BLD-RTO: 19.8 % (ref 11–14.5)
PLATELET # BLD: 139 K/UL (ref 140–450)
PLATELET ESTIMATE: ABNORMAL
PMV BLD AUTO: 7.9 FL (ref 6–12)
POTASSIUM SERPL-SCNC: 2.9 MMOL/L (ref 3.7–5.3)
RBC # BLD: 3.14 M/UL (ref 4–5.2)
RBC # BLD: ABNORMAL 10*6/UL
SEG NEUTROPHILS: 48 % (ref 43–77)
SEGMENTED NEUTROPHILS ABSOLUTE COUNT: 1.77 K/UL (ref 1.8–7.7)
SODIUM BLD-SCNC: 144 MMOL/L (ref 135–144)
TOTAL PROTEIN: 6.8 G/DL (ref 6.4–8.3)
WBC # BLD: 3.7 K/UL (ref 3.5–11)
WBC # BLD: ABNORMAL 10*3/UL

## 2018-01-25 PROCEDURE — G8417 CALC BMI ABV UP PARAM F/U: HCPCS | Performed by: INTERNAL MEDICINE

## 2018-01-25 PROCEDURE — 96413 CHEMO IV INFUSION 1 HR: CPT

## 2018-01-25 PROCEDURE — 85025 COMPLETE CBC W/AUTO DIFF WBC: CPT

## 2018-01-25 PROCEDURE — 1036F TOBACCO NON-USER: CPT | Performed by: INTERNAL MEDICINE

## 2018-01-25 PROCEDURE — G8428 CUR MEDS NOT DOCUMENT: HCPCS | Performed by: INTERNAL MEDICINE

## 2018-01-25 PROCEDURE — 6360000002 HC RX W HCPCS: Performed by: INTERNAL MEDICINE

## 2018-01-25 PROCEDURE — 3017F COLORECTAL CA SCREEN DOC REV: CPT | Performed by: INTERNAL MEDICINE

## 2018-01-25 PROCEDURE — G8484 FLU IMMUNIZE NO ADMIN: HCPCS | Performed by: INTERNAL MEDICINE

## 2018-01-25 PROCEDURE — 1111F DSCHRG MED/CURRENT MED MERGE: CPT | Performed by: INTERNAL MEDICINE

## 2018-01-25 PROCEDURE — 96417 CHEMO IV INFUS EACH ADDL SEQ: CPT

## 2018-01-25 PROCEDURE — 96415 CHEMO IV INFUSION ADDL HR: CPT

## 2018-01-25 PROCEDURE — 2580000003 HC RX 258: Performed by: INTERNAL MEDICINE

## 2018-01-25 PROCEDURE — 3014F SCREEN MAMMO DOC REV: CPT | Performed by: INTERNAL MEDICINE

## 2018-01-25 PROCEDURE — 80053 COMPREHEN METABOLIC PANEL: CPT

## 2018-01-25 PROCEDURE — 96367 TX/PROPH/DG ADDL SEQ IV INF: CPT

## 2018-01-25 PROCEDURE — 99214 OFFICE O/P EST MOD 30 MIN: CPT | Performed by: INTERNAL MEDICINE

## 2018-01-25 PROCEDURE — 36415 COLL VENOUS BLD VENIPUNCTURE: CPT

## 2018-01-25 PROCEDURE — 96375 TX/PRO/DX INJ NEW DRUG ADDON: CPT

## 2018-01-25 PROCEDURE — 96366 THER/PROPH/DIAG IV INF ADDON: CPT

## 2018-01-25 PROCEDURE — 36593 DECLOT VASCULAR DEVICE: CPT

## 2018-01-25 RX ORDER — POTASSIUM CHLORIDE 20 MEQ/1
20 TABLET, EXTENDED RELEASE ORAL DAILY
Qty: 30 TABLET | Refills: 1 | Status: SHIPPED | OUTPATIENT
Start: 2018-01-25 | End: 2018-04-18 | Stop reason: SDUPTHER

## 2018-01-25 RX ORDER — DIPHENHYDRAMINE HYDROCHLORIDE 50 MG/ML
50 INJECTION INTRAMUSCULAR; INTRAVENOUS ONCE
Status: CANCELLED | OUTPATIENT
Start: 2018-01-25 | End: 2018-01-25

## 2018-01-25 RX ORDER — SODIUM CHLORIDE 9 MG/ML
INJECTION, SOLUTION INTRAVENOUS ONCE
Status: CANCELLED | OUTPATIENT
Start: 2018-01-25 | End: 2018-01-25

## 2018-01-25 RX ORDER — PALONOSETRON 0.05 MG/ML
0.25 INJECTION, SOLUTION INTRAVENOUS ONCE
Status: COMPLETED | OUTPATIENT
Start: 2018-01-25 | End: 2018-01-25

## 2018-01-25 RX ORDER — SODIUM CHLORIDE 9 MG/ML
INJECTION, SOLUTION INTRAVENOUS CONTINUOUS
Status: CANCELLED | OUTPATIENT
Start: 2018-01-25

## 2018-01-25 RX ORDER — ECHINACEA PURPUREA EXTRACT 125 MG
1 TABLET ORAL PRN
Qty: 1 BOTTLE | Refills: 1 | Status: SHIPPED | OUTPATIENT
Start: 2018-01-25 | End: 2021-05-10

## 2018-01-25 RX ORDER — HEPARIN SODIUM (PORCINE) LOCK FLUSH IV SOLN 100 UNIT/ML 100 UNIT/ML
500 SOLUTION INTRAVENOUS PRN
Status: CANCELLED | OUTPATIENT
Start: 2018-01-25

## 2018-01-25 RX ORDER — HEPARIN SODIUM (PORCINE) LOCK FLUSH IV SOLN 100 UNIT/ML 100 UNIT/ML
500 SOLUTION INTRAVENOUS PRN
Status: DISCONTINUED | OUTPATIENT
Start: 2018-01-25 | End: 2018-01-26 | Stop reason: HOSPADM

## 2018-01-25 RX ORDER — METHYLPREDNISOLONE SODIUM SUCCINATE 125 MG/2ML
125 INJECTION, POWDER, LYOPHILIZED, FOR SOLUTION INTRAMUSCULAR; INTRAVENOUS ONCE
Status: CANCELLED | OUTPATIENT
Start: 2018-01-25 | End: 2018-01-25

## 2018-01-25 RX ORDER — SODIUM CHLORIDE 0.9 % (FLUSH) 0.9 %
10 SYRINGE (ML) INJECTION PRN
Status: DISCONTINUED | OUTPATIENT
Start: 2018-01-25 | End: 2018-01-26 | Stop reason: HOSPADM

## 2018-01-25 RX ORDER — 0.9 % SODIUM CHLORIDE 0.9 %
1000 INTRAVENOUS SOLUTION INTRAVENOUS ONCE
Status: CANCELLED
Start: 2018-01-26 | End: 2018-01-26

## 2018-01-25 RX ORDER — SODIUM CHLORIDE 9 MG/ML
INJECTION, SOLUTION INTRAVENOUS ONCE
Status: COMPLETED | OUTPATIENT
Start: 2018-01-25 | End: 2018-01-25

## 2018-01-25 RX ORDER — 0.9 % SODIUM CHLORIDE 0.9 %
10 VIAL (ML) INJECTION ONCE
Status: CANCELLED | OUTPATIENT
Start: 2018-01-25 | End: 2018-01-25

## 2018-01-25 RX ORDER — POTASSIUM CHLORIDE AND SODIUM CHLORIDE 900; 300 MG/100ML; MG/100ML
INJECTION, SOLUTION INTRAVENOUS ONCE
Status: COMPLETED | OUTPATIENT
Start: 2018-01-25 | End: 2018-01-25

## 2018-01-25 RX ORDER — SODIUM CHLORIDE 0.9 % (FLUSH) 0.9 %
5 SYRINGE (ML) INJECTION PRN
Status: CANCELLED | OUTPATIENT
Start: 2018-01-25

## 2018-01-25 RX ORDER — PALONOSETRON 0.05 MG/ML
0.25 INJECTION, SOLUTION INTRAVENOUS ONCE
Status: CANCELLED | OUTPATIENT
Start: 2018-01-25

## 2018-01-25 RX ORDER — 0.9 % SODIUM CHLORIDE 0.9 %
1000 INTRAVENOUS SOLUTION INTRAVENOUS ONCE
Status: CANCELLED
Start: 2018-02-09 | End: 2018-02-09

## 2018-01-25 RX ORDER — 0.9 % SODIUM CHLORIDE 0.9 %
1000 INTRAVENOUS SOLUTION INTRAVENOUS ONCE
Status: CANCELLED
Start: 2018-02-01 | End: 2018-02-02

## 2018-01-25 RX ORDER — SODIUM CHLORIDE 0.9 % (FLUSH) 0.9 %
10 SYRINGE (ML) INJECTION PRN
Status: CANCELLED | OUTPATIENT
Start: 2018-01-25

## 2018-01-25 RX ADMIN — DOCETAXEL ANHYDROUS 187 MG: 10 INJECTION, SOLUTION INTRAVENOUS at 12:48

## 2018-01-25 RX ADMIN — DEXAMETHASONE SODIUM PHOSPHATE 12 MG: 10 INJECTION, SOLUTION INTRAMUSCULAR; INTRAVENOUS at 10:15

## 2018-01-25 RX ADMIN — Medication 10 ML: at 09:06

## 2018-01-25 RX ADMIN — ALTEPLASE 2 MG: 2.2 INJECTION, POWDER, LYOPHILIZED, FOR SOLUTION INTRAVENOUS at 09:15

## 2018-01-25 RX ADMIN — PERTUZUMAB 420 MG: 30 INJECTION, SOLUTION, CONCENTRATE INTRAVENOUS at 11:04

## 2018-01-25 RX ADMIN — CARBOPLATIN 800 MG: 10 INJECTION, SOLUTION INTRAVENOUS at 13:55

## 2018-01-25 RX ADMIN — Medication 10 ML: at 09:05

## 2018-01-25 RX ADMIN — TRASTUZUMAB 750 MG: 150 INJECTION, POWDER, LYOPHILIZED, FOR SOLUTION INTRAVENOUS at 12:10

## 2018-01-25 RX ADMIN — Medication 10 ML: at 15:59

## 2018-01-25 RX ADMIN — WATER: 1 INJECTION INTRAMUSCULAR; INTRAVENOUS; SUBCUTANEOUS at 09:16

## 2018-01-25 RX ADMIN — SODIUM CHLORIDE, PRESERVATIVE FREE 500 UNITS: 5 INJECTION INTRAVENOUS at 15:59

## 2018-01-25 RX ADMIN — PALONOSETRON HYDROCHLORIDE 0.25 MG: 0.25 INJECTION INTRAVENOUS at 10:12

## 2018-01-25 RX ADMIN — POTASSIUM CHLORIDE AND SODIUM CHLORIDE: 900; 300 INJECTION, SOLUTION INTRAVENOUS at 11:45

## 2018-01-25 RX ADMIN — SODIUM CHLORIDE: 9 INJECTION, SOLUTION INTRAVENOUS at 09:51

## 2018-01-25 ASSESSMENT — PAIN SCALES - GENERAL: PAINLEVEL_OUTOF10: 0

## 2018-01-25 NOTE — PROGRESS NOTES
patient comes to unit for Cycle 4 day 1. VAD accessed with 3/4 inch mora needle per Maurice Blankenship RN. Flushed easily with saline 10 ml with pink  return. Unable to get dark red return. Labs drawn per . Secured accessed port with transparent dressing. Activase ordered and released and dwelling in port. Did report patient condition to Dr. Alberto Johnson. He will sign orders . Reported all symptoms to him. See toxicity assessment.

## 2018-01-25 NOTE — PROGRESS NOTES
Patient ate a full lunch and perjeta has been infused will monitor. Presently sitting in chair no complaints looking at cell phone warm blankets provided.

## 2018-01-25 NOTE — PROGRESS NOTES
IV Potassium infused. Flushed port with 10 ml of saline and 5 ml of heparin flush. De accessed port and Band-Aid applied with 2x2 under 2 band aide port site bleeding. pressure held for approximately 45 seconds. Port site not tender. No redness noted. Patient stable. No complaints . D/C to home with daughter.

## 2018-01-25 NOTE — PROGRESS NOTES
Spoke with  regarding patients low potassium of 2.9. Telephone orders received to give patient IV 40meq of potassium with treatment today. Orders placed and pharmacy notified.

## 2018-01-25 NOTE — PLAN OF CARE
Problem: SAFETY  Goal: Free from accidental physical injury    Intervention: PROVIDE AND MAINTAIN SAFE ENVIRONMENT  Patient in direct line of vision during treatment. Patient has been encouraged and acknowledges to ask for assistance if needed during treatment.

## 2018-01-25 NOTE — PROGRESS NOTES
Reported labs to Eloisa Gutting LPN to report to Dr. Evonne Isaacs. Potassium 2.9. CBC within limit to give chemo treatment.

## 2018-01-25 NOTE — PROGRESS NOTES
After 30 minute dwell MARIA FERNANDA Mccullough, RN Got dark red blood return. IV infusing saline at 500 ml/ hr through the port. Patient in chair with feet elevated no complaints.

## 2018-01-29 ENCOUNTER — HOSPITAL ENCOUNTER (OUTPATIENT)
Dept: INFUSION THERAPY | Age: 53
Discharge: HOME | End: 2018-01-29
Payer: MEDICARE

## 2018-01-29 VITALS
SYSTOLIC BLOOD PRESSURE: 90 MMHG | HEART RATE: 99 BPM | RESPIRATION RATE: 16 BRPM | TEMPERATURE: 99 F | DIASTOLIC BLOOD PRESSURE: 70 MMHG

## 2018-01-29 DIAGNOSIS — Z17.1 MALIGNANT NEOPLASM OF OVERLAPPING SITES OF RIGHT BREAST IN FEMALE, ESTROGEN RECEPTOR NEGATIVE (HCC): ICD-10-CM

## 2018-01-29 DIAGNOSIS — C50.811 MALIGNANT NEOPLASM OF OVERLAPPING SITES OF RIGHT BREAST IN FEMALE, ESTROGEN RECEPTOR NEGATIVE (HCC): ICD-10-CM

## 2018-01-29 PROCEDURE — 96360 HYDRATION IV INFUSION INIT: CPT

## 2018-01-29 PROCEDURE — 2580000003 HC RX 258: Performed by: INTERNAL MEDICINE

## 2018-01-29 PROCEDURE — 6360000002 HC RX W HCPCS: Performed by: INTERNAL MEDICINE

## 2018-01-29 PROCEDURE — 96361 HYDRATE IV INFUSION ADD-ON: CPT

## 2018-01-29 RX ORDER — HEPARIN SODIUM (PORCINE) LOCK FLUSH IV SOLN 100 UNIT/ML 100 UNIT/ML
500 SOLUTION INTRAVENOUS PRN
Status: DISCONTINUED | OUTPATIENT
Start: 2018-01-29 | End: 2018-01-30 | Stop reason: HOSPADM

## 2018-01-29 RX ORDER — 0.9 % SODIUM CHLORIDE 0.9 %
1000 INTRAVENOUS SOLUTION INTRAVENOUS ONCE
Status: COMPLETED | OUTPATIENT
Start: 2018-01-29 | End: 2018-01-29

## 2018-01-29 RX ORDER — HEPARIN SODIUM (PORCINE) LOCK FLUSH IV SOLN 100 UNIT/ML 100 UNIT/ML
500 SOLUTION INTRAVENOUS PRN
Status: CANCELLED | OUTPATIENT
Start: 2018-01-29

## 2018-01-29 RX ORDER — SODIUM CHLORIDE 0.9 % (FLUSH) 0.9 %
10 SYRINGE (ML) INJECTION PRN
Status: CANCELLED | OUTPATIENT
Start: 2018-01-29

## 2018-01-29 RX ORDER — SODIUM CHLORIDE 0.9 % (FLUSH) 0.9 %
10 SYRINGE (ML) INJECTION PRN
Status: DISCONTINUED | OUTPATIENT
Start: 2018-01-29 | End: 2018-01-30 | Stop reason: HOSPADM

## 2018-01-29 RX ADMIN — SODIUM CHLORIDE 1000 ML: 9 INJECTION, SOLUTION INTRAVENOUS at 11:06

## 2018-01-29 RX ADMIN — Medication 10 ML: at 11:05

## 2018-01-29 RX ADMIN — SODIUM CHLORIDE, PRESERVATIVE FREE 500 UNITS: 5 INJECTION INTRAVENOUS at 13:10

## 2018-01-29 NOTE — PROGRESS NOTES
Patient at infusion center for IV hydration. Patient c/o headache, increased eye twitching and states her vision is slightly more blurry. Patient also states that she is weaker today and nauseated, states her zofran is really not helping. Eusebio Henley Notified  of her symptoms, physician ordered patient to get an MRI of her brain with and without contrast.  VAD accessed per protocol using 20 gauge 1 1/4\" mora needle. Flushes easily. Negative for blood return. NSS infusing. Patient notified of physicians orders and verbalized understanding.

## 2018-01-29 NOTE — PROGRESS NOTES
IV hydration infused and d/c'd. Mediport flushed with 5 ml heparin. Patient states she feels about the same as when she arrived to infusion center. Instructed patient to increase her fluids and to call in anything worsens. Patient verbalized understanding. Guerrero needle d/c'd, pressure dressing applied. Patient ambulated to restroom without difficulty. Patient discharged to home.

## 2018-02-01 ENCOUNTER — HOSPITAL ENCOUNTER (OUTPATIENT)
Dept: MRI IMAGING | Age: 53
Discharge: HOME | End: 2018-02-03
Payer: MEDICARE

## 2018-02-01 ENCOUNTER — HOSPITAL ENCOUNTER (OUTPATIENT)
Dept: INFUSION THERAPY | Age: 53
Discharge: HOME | End: 2018-02-01
Payer: MEDICARE

## 2018-02-01 VITALS
DIASTOLIC BLOOD PRESSURE: 81 MMHG | SYSTOLIC BLOOD PRESSURE: 114 MMHG | TEMPERATURE: 99 F | RESPIRATION RATE: 16 BRPM | HEART RATE: 101 BPM

## 2018-02-01 DIAGNOSIS — C50.811 MALIGNANT NEOPLASM OF OVERLAPPING SITES OF RIGHT BREAST IN FEMALE, ESTROGEN RECEPTOR NEGATIVE (HCC): ICD-10-CM

## 2018-02-01 DIAGNOSIS — Z17.1 MALIGNANT NEOPLASM OF OVERLAPPING SITES OF RIGHT BREAST IN FEMALE, ESTROGEN RECEPTOR NEGATIVE (HCC): Primary | ICD-10-CM

## 2018-02-01 DIAGNOSIS — Z17.1 MALIGNANT NEOPLASM OF OVERLAPPING SITES OF RIGHT BREAST IN FEMALE, ESTROGEN RECEPTOR NEGATIVE (HCC): ICD-10-CM

## 2018-02-01 DIAGNOSIS — C50.811 MALIGNANT NEOPLASM OF OVERLAPPING SITES OF RIGHT BREAST IN FEMALE, ESTROGEN RECEPTOR NEGATIVE (HCC): Primary | ICD-10-CM

## 2018-02-01 PROCEDURE — 6360000002 HC RX W HCPCS: Performed by: INTERNAL MEDICINE

## 2018-02-01 PROCEDURE — 96361 HYDRATE IV INFUSION ADD-ON: CPT

## 2018-02-01 PROCEDURE — 96360 HYDRATION IV INFUSION INIT: CPT

## 2018-02-01 PROCEDURE — 2580000003 HC RX 258: Performed by: INTERNAL MEDICINE

## 2018-02-01 RX ORDER — HEPARIN SODIUM (PORCINE) LOCK FLUSH IV SOLN 100 UNIT/ML 100 UNIT/ML
500 SOLUTION INTRAVENOUS PRN
Status: DISCONTINUED | OUTPATIENT
Start: 2018-02-01 | End: 2018-02-02 | Stop reason: HOSPADM

## 2018-02-01 RX ORDER — SODIUM CHLORIDE 0.9 % (FLUSH) 0.9 %
10 SYRINGE (ML) INJECTION PRN
Status: DISCONTINUED | OUTPATIENT
Start: 2018-02-01 | End: 2018-02-02 | Stop reason: HOSPADM

## 2018-02-01 RX ORDER — HEPARIN SODIUM (PORCINE) LOCK FLUSH IV SOLN 100 UNIT/ML 100 UNIT/ML
500 SOLUTION INTRAVENOUS PRN
Status: CANCELLED | OUTPATIENT
Start: 2018-02-01

## 2018-02-01 RX ORDER — SODIUM CHLORIDE 0.9 % (FLUSH) 0.9 %
10 SYRINGE (ML) INJECTION PRN
Status: CANCELLED | OUTPATIENT
Start: 2018-02-01

## 2018-02-01 RX ORDER — 0.9 % SODIUM CHLORIDE 0.9 %
1000 INTRAVENOUS SOLUTION INTRAVENOUS ONCE
Status: COMPLETED | OUTPATIENT
Start: 2018-02-01 | End: 2018-02-01

## 2018-02-01 RX ORDER — LORAZEPAM 0.5 MG/1
0.5 TABLET ORAL PRN
COMMUNITY
End: 2018-02-01 | Stop reason: SDUPTHER

## 2018-02-01 RX ADMIN — SODIUM CHLORIDE 1000 ML: 9 INJECTION, SOLUTION INTRAVENOUS at 09:00

## 2018-02-01 RX ADMIN — SODIUM CHLORIDE, PRESERVATIVE FREE 500 UNITS: 5 INJECTION INTRAVENOUS at 11:02

## 2018-02-01 RX ADMIN — Medication 10 ML: at 09:00

## 2018-02-01 ASSESSMENT — PAIN SCALES - GENERAL: PAINLEVEL_OUTOF10: 8

## 2018-02-01 ASSESSMENT — PAIN DESCRIPTION - LOCATION: LOCATION: HEAD

## 2018-02-01 NOTE — PROGRESS NOTES
Patient came to unit from MRI was unable to do MRI because she became anxious. Reports feeling weak today and last couple days. Left eye continues to twitch. Has a headache and has had headache for 3 weeks. Ativan was ordered for MRI and MRI rescheduled for Monday February 5th. Instructions given to patient. Patient to show up at 8 am for 8:30am MRI and take one ativan half hour before MRI and one tablet when she gets to clinic if needed. She verbalized understanding. Instructions wrote out on her AVS.   Gaviota Su RN accessed Port with 1 1/4 \" mora needle. Saline pushes easily. No blood return. Patient could taste saline as pushed in no pain. IV infusing saline through the port. Will monitor patient and site.

## 2018-02-01 NOTE — PROGRESS NOTES
Infusion complete. Flushed with saline from infusion and 5 ml of heparin flush. D/C mora needle. 2x2 and Tegaderm to site. D/C from unit stable.

## 2018-02-01 NOTE — PROGRESS NOTES
Patient feels nauseated today. Says it will not go away. Did eat some food since here.   No further complaints

## 2018-02-02 RX ORDER — LORAZEPAM 0.5 MG/1
0.5 TABLET ORAL PRN
Qty: 2 TABLET | Refills: 0 | Status: SHIPPED | OUTPATIENT
Start: 2018-02-02 | End: 2018-02-03

## 2018-02-02 RX ORDER — ONDANSETRON 4 MG/1
8 TABLET, FILM COATED ORAL EVERY 6 HOURS PRN
Qty: 40 TABLET | Refills: 1 | Status: SHIPPED | OUTPATIENT
Start: 2018-02-02 | End: 2020-04-24

## 2018-02-05 ENCOUNTER — HOSPITAL ENCOUNTER (OUTPATIENT)
Dept: MRI IMAGING | Age: 53
Discharge: HOME | End: 2018-02-07
Payer: MEDICARE

## 2018-02-05 ENCOUNTER — HOSPITAL ENCOUNTER (OUTPATIENT)
Dept: INFUSION THERAPY | Age: 53
Discharge: HOME | End: 2018-02-05
Payer: MEDICARE

## 2018-02-05 VITALS
DIASTOLIC BLOOD PRESSURE: 70 MMHG | TEMPERATURE: 98 F | SYSTOLIC BLOOD PRESSURE: 113 MMHG | HEART RATE: 117 BPM | RESPIRATION RATE: 16 BRPM

## 2018-02-05 DIAGNOSIS — Z17.1 MALIGNANT NEOPLASM OF OVERLAPPING SITES OF RIGHT BREAST IN FEMALE, ESTROGEN RECEPTOR NEGATIVE (HCC): ICD-10-CM

## 2018-02-05 DIAGNOSIS — C50.811 MALIGNANT NEOPLASM OF OVERLAPPING SITES OF RIGHT BREAST IN FEMALE, ESTROGEN RECEPTOR NEGATIVE (HCC): ICD-10-CM

## 2018-02-05 PROCEDURE — 2580000003 HC RX 258: Performed by: INTERNAL MEDICINE

## 2018-02-05 PROCEDURE — 6360000004 HC RX CONTRAST MEDICATION: Performed by: INTERNAL MEDICINE

## 2018-02-05 PROCEDURE — 96360 HYDRATION IV INFUSION INIT: CPT

## 2018-02-05 PROCEDURE — 96361 HYDRATE IV INFUSION ADD-ON: CPT

## 2018-02-05 PROCEDURE — 6360000002 HC RX W HCPCS: Performed by: INTERNAL MEDICINE

## 2018-02-05 PROCEDURE — 70553 MRI BRAIN STEM W/O & W/DYE: CPT

## 2018-02-05 PROCEDURE — A9579 GAD-BASE MR CONTRAST NOS,1ML: HCPCS | Performed by: INTERNAL MEDICINE

## 2018-02-05 RX ORDER — HEPARIN SODIUM (PORCINE) LOCK FLUSH IV SOLN 100 UNIT/ML 100 UNIT/ML
500 SOLUTION INTRAVENOUS PRN
Status: CANCELLED | OUTPATIENT
Start: 2018-02-05

## 2018-02-05 RX ORDER — SODIUM CHLORIDE 0.9 % (FLUSH) 0.9 %
10 SYRINGE (ML) INJECTION PRN
Status: DISCONTINUED | OUTPATIENT
Start: 2018-02-05 | End: 2018-02-06 | Stop reason: HOSPADM

## 2018-02-05 RX ORDER — 0.9 % SODIUM CHLORIDE 0.9 %
1000 INTRAVENOUS SOLUTION INTRAVENOUS ONCE
Status: CANCELLED
Start: 2018-02-05 | End: 2018-02-05

## 2018-02-05 RX ORDER — HEPARIN SODIUM (PORCINE) LOCK FLUSH IV SOLN 100 UNIT/ML 100 UNIT/ML
500 SOLUTION INTRAVENOUS PRN
Status: DISCONTINUED | OUTPATIENT
Start: 2018-02-05 | End: 2018-02-06 | Stop reason: HOSPADM

## 2018-02-05 RX ORDER — 0.9 % SODIUM CHLORIDE 0.9 %
1000 INTRAVENOUS SOLUTION INTRAVENOUS ONCE
Status: COMPLETED | OUTPATIENT
Start: 2018-02-05 | End: 2018-02-05

## 2018-02-05 RX ORDER — SODIUM CHLORIDE 0.9 % (FLUSH) 0.9 %
10 SYRINGE (ML) INJECTION PRN
Status: CANCELLED | OUTPATIENT
Start: 2018-02-05

## 2018-02-05 RX ADMIN — GADOTERIDOL 15 ML: 279.3 INJECTION, SOLUTION INTRAVENOUS at 09:05

## 2018-02-05 RX ADMIN — SODIUM CHLORIDE 1000 ML: 9 INJECTION, SOLUTION INTRAVENOUS at 09:32

## 2018-02-05 RX ADMIN — SODIUM CHLORIDE, PRESERVATIVE FREE 500 UNITS: 5 INJECTION INTRAVENOUS at 11:35

## 2018-02-05 RX ADMIN — Medication 10 ML: at 09:32

## 2018-02-05 NOTE — PROGRESS NOTES
Infusion complete. VAD deaccessed and dressing applied. Pt denies needs. Discharged to home with family. no

## 2018-02-05 NOTE — PROGRESS NOTES
patient on unit for hydration. Came to unit after getting MRI of head with and without contrast.  Had Ativan prior to MRI. Laura Morrow RN accessed port with 1 inch mora needle. Pushed easily with saline no blood return. Patient could taste saline. Sitting in chair with feet elevated. Warm blankets provided. Reports her vsion is now blurred. Can not read her mail now.

## 2018-02-08 ENCOUNTER — HOSPITAL ENCOUNTER (OUTPATIENT)
Dept: INFUSION THERAPY | Age: 53
Discharge: HOME | End: 2018-02-08
Payer: MEDICARE

## 2018-02-08 VITALS
DIASTOLIC BLOOD PRESSURE: 72 MMHG | HEART RATE: 92 BPM | RESPIRATION RATE: 16 BRPM | TEMPERATURE: 99.1 F | SYSTOLIC BLOOD PRESSURE: 136 MMHG

## 2018-02-08 DIAGNOSIS — Z17.1 MALIGNANT NEOPLASM OF OVERLAPPING SITES OF RIGHT BREAST IN FEMALE, ESTROGEN RECEPTOR NEGATIVE (HCC): ICD-10-CM

## 2018-02-08 DIAGNOSIS — C50.811 MALIGNANT NEOPLASM OF OVERLAPPING SITES OF RIGHT BREAST IN FEMALE, ESTROGEN RECEPTOR NEGATIVE (HCC): ICD-10-CM

## 2018-02-08 PROCEDURE — 96360 HYDRATION IV INFUSION INIT: CPT

## 2018-02-08 PROCEDURE — 2580000003 HC RX 258: Performed by: INTERNAL MEDICINE

## 2018-02-08 PROCEDURE — 96361 HYDRATE IV INFUSION ADD-ON: CPT

## 2018-02-08 PROCEDURE — 6360000002 HC RX W HCPCS: Performed by: INTERNAL MEDICINE

## 2018-02-08 RX ORDER — SODIUM CHLORIDE 0.9 % (FLUSH) 0.9 %
10 SYRINGE (ML) INJECTION PRN
Status: CANCELLED | OUTPATIENT
Start: 2018-02-08

## 2018-02-08 RX ORDER — 0.9 % SODIUM CHLORIDE 0.9 %
1000 INTRAVENOUS SOLUTION INTRAVENOUS ONCE
Status: COMPLETED | OUTPATIENT
Start: 2018-02-08 | End: 2018-02-08

## 2018-02-08 RX ORDER — 0.9 % SODIUM CHLORIDE 0.9 %
1000 INTRAVENOUS SOLUTION INTRAVENOUS ONCE
Status: CANCELLED
Start: 2018-02-08 | End: 2018-02-08

## 2018-02-08 RX ORDER — HEPARIN SODIUM (PORCINE) LOCK FLUSH IV SOLN 100 UNIT/ML 100 UNIT/ML
500 SOLUTION INTRAVENOUS PRN
Status: CANCELLED | OUTPATIENT
Start: 2018-02-08

## 2018-02-08 RX ORDER — HEPARIN SODIUM (PORCINE) LOCK FLUSH IV SOLN 100 UNIT/ML 100 UNIT/ML
500 SOLUTION INTRAVENOUS PRN
Status: DISCONTINUED | OUTPATIENT
Start: 2018-02-08 | End: 2018-02-09 | Stop reason: HOSPADM

## 2018-02-08 RX ORDER — SODIUM CHLORIDE 0.9 % (FLUSH) 0.9 %
10 SYRINGE (ML) INJECTION PRN
Status: DISCONTINUED | OUTPATIENT
Start: 2018-02-08 | End: 2018-02-09 | Stop reason: HOSPADM

## 2018-02-08 RX ADMIN — Medication 10 ML: at 10:01

## 2018-02-08 RX ADMIN — SODIUM CHLORIDE 1000 ML: 9 INJECTION, SOLUTION INTRAVENOUS at 10:02

## 2018-02-08 RX ADMIN — SODIUM CHLORIDE, PRESERVATIVE FREE 500 UNITS: 5 INJECTION INTRAVENOUS at 12:11

## 2018-02-08 NOTE — PROGRESS NOTES
Pt ambulated into unit. VAD accessed with good blood return and flushed per Augusto Parish RN. Warm blanket and water provided to pt per request.  Denies other needs at present.

## 2018-02-08 NOTE — PROGRESS NOTES
IV hydration infused and d/c'd. Mediport flushed with NSS and 5 ml heparin. Patient tolerated infusion treatment without complaints. Guerrero needle d/c'd, pressure dressing applied. Patient discharged to home.

## 2018-02-12 ENCOUNTER — HOSPITAL ENCOUNTER (OUTPATIENT)
Dept: INFUSION THERAPY | Age: 53
Discharge: HOME | End: 2018-02-12
Payer: MEDICARE

## 2018-02-12 VITALS
TEMPERATURE: 99.3 F | RESPIRATION RATE: 16 BRPM | HEART RATE: 100 BPM | SYSTOLIC BLOOD PRESSURE: 121 MMHG | DIASTOLIC BLOOD PRESSURE: 82 MMHG

## 2018-02-12 DIAGNOSIS — C50.811 MALIGNANT NEOPLASM OF OVERLAPPING SITES OF RIGHT BREAST IN FEMALE, ESTROGEN RECEPTOR NEGATIVE (HCC): ICD-10-CM

## 2018-02-12 DIAGNOSIS — Z17.1 MALIGNANT NEOPLASM OF OVERLAPPING SITES OF RIGHT BREAST IN FEMALE, ESTROGEN RECEPTOR NEGATIVE (HCC): ICD-10-CM

## 2018-02-12 PROCEDURE — 96360 HYDRATION IV INFUSION INIT: CPT

## 2018-02-12 PROCEDURE — 6360000002 HC RX W HCPCS: Performed by: INTERNAL MEDICINE

## 2018-02-12 PROCEDURE — 2580000003 HC RX 258: Performed by: INTERNAL MEDICINE

## 2018-02-12 PROCEDURE — 96361 HYDRATE IV INFUSION ADD-ON: CPT

## 2018-02-12 RX ORDER — 0.9 % SODIUM CHLORIDE 0.9 %
1000 INTRAVENOUS SOLUTION INTRAVENOUS ONCE
Status: CANCELLED
Start: 2018-02-12 | End: 2018-02-12

## 2018-02-12 RX ORDER — SODIUM CHLORIDE 0.9 % (FLUSH) 0.9 %
10 SYRINGE (ML) INJECTION PRN
Status: CANCELLED | OUTPATIENT
Start: 2018-02-12

## 2018-02-12 RX ORDER — HEPARIN SODIUM (PORCINE) LOCK FLUSH IV SOLN 100 UNIT/ML 100 UNIT/ML
500 SOLUTION INTRAVENOUS PRN
Status: CANCELLED | OUTPATIENT
Start: 2018-02-12

## 2018-02-12 RX ORDER — SODIUM CHLORIDE 0.9 % (FLUSH) 0.9 %
10 SYRINGE (ML) INJECTION PRN
Status: DISCONTINUED | OUTPATIENT
Start: 2018-02-12 | End: 2018-02-13 | Stop reason: HOSPADM

## 2018-02-12 RX ORDER — 0.9 % SODIUM CHLORIDE 0.9 %
1000 INTRAVENOUS SOLUTION INTRAVENOUS ONCE
Status: COMPLETED | OUTPATIENT
Start: 2018-02-12 | End: 2018-02-12

## 2018-02-12 RX ORDER — HEPARIN SODIUM (PORCINE) LOCK FLUSH IV SOLN 100 UNIT/ML 100 UNIT/ML
500 SOLUTION INTRAVENOUS PRN
Status: DISCONTINUED | OUTPATIENT
Start: 2018-02-12 | End: 2018-02-13 | Stop reason: HOSPADM

## 2018-02-12 RX ADMIN — Medication 10 ML: at 10:07

## 2018-02-12 RX ADMIN — SODIUM CHLORIDE 1000 ML: 9 INJECTION, SOLUTION INTRAVENOUS at 10:09

## 2018-02-12 RX ADMIN — SODIUM CHLORIDE, PRESERVATIVE FREE 500 UNITS: 5 INJECTION INTRAVENOUS at 12:11

## 2018-02-12 NOTE — PROGRESS NOTES
Hydration infused. Flushed port with 5 ml heparin flush and D/C'd mora needle. Applied 2x2 and tegaderm over site. Bled easily when needle removed. Blood dark red. No bleeding noted at discharge. Patient stable.

## 2018-02-12 NOTE — PROGRESS NOTES
Patient on unit for hydration. Reports eye is still twitching. Vision changes are getting better using natural tear eye drops reports this is helping. Says she has been feeling good this week. Warm blankets provided. Lara Dumont RN accessed port with 1 inch mora needle with minimal blood return. IV infusing normal saline through the port. No complaints.

## 2018-02-14 ENCOUNTER — HOSPITAL ENCOUNTER (OUTPATIENT)
Dept: LAB | Age: 53
Setting detail: SPECIMEN
Discharge: HOME | End: 2018-02-14
Payer: MEDICARE

## 2018-02-14 ENCOUNTER — OFFICE VISIT (OUTPATIENT)
Dept: ONCOLOGY | Age: 53
End: 2018-02-14
Payer: MEDICARE

## 2018-02-14 VITALS
OXYGEN SATURATION: 98 % | BODY MASS INDEX: 42.56 KG/M2 | WEIGHT: 264.8 LBS | TEMPERATURE: 99.1 F | HEIGHT: 66 IN | SYSTOLIC BLOOD PRESSURE: 122 MMHG | DIASTOLIC BLOOD PRESSURE: 68 MMHG | HEART RATE: 91 BPM

## 2018-02-14 DIAGNOSIS — C50.811 MALIGNANT NEOPLASM OF OVERLAPPING SITES OF RIGHT BREAST IN FEMALE, ESTROGEN RECEPTOR NEGATIVE (HCC): ICD-10-CM

## 2018-02-14 DIAGNOSIS — Z17.1 MALIGNANT NEOPLASM OF OVERLAPPING SITES OF RIGHT BREAST IN FEMALE, ESTROGEN RECEPTOR NEGATIVE (HCC): Primary | ICD-10-CM

## 2018-02-14 DIAGNOSIS — Z17.1 MALIGNANT NEOPLASM OF OVERLAPPING SITES OF RIGHT BREAST IN FEMALE, ESTROGEN RECEPTOR NEGATIVE (HCC): ICD-10-CM

## 2018-02-14 DIAGNOSIS — C50.811 MALIGNANT NEOPLASM OF OVERLAPPING SITES OF RIGHT BREAST IN FEMALE, ESTROGEN RECEPTOR NEGATIVE (HCC): Primary | ICD-10-CM

## 2018-02-14 LAB
ABSOLUTE EOS #: 0 K/UL (ref 0–0.4)
ABSOLUTE IMMATURE GRANULOCYTE: ABNORMAL K/UL (ref 0–0.3)
ABSOLUTE LYMPH #: 1.9 K/UL (ref 1–4.8)
ABSOLUTE MONO #: 0.4 K/UL (ref 0.1–1.2)
ALBUMIN SERPL-MCNC: 3.8 G/DL (ref 3.5–5.2)
ALBUMIN/GLOBULIN RATIO: 1.1 (ref 1–2.5)
ALP BLD-CCNC: 82 U/L (ref 35–104)
ALT SERPL-CCNC: 30 U/L (ref 5–33)
ANION GAP SERPL CALCULATED.3IONS-SCNC: 15 MMOL/L (ref 9–17)
AST SERPL-CCNC: 28 U/L
BASOPHILS # BLD: 1 % (ref 0–1)
BASOPHILS ABSOLUTE: 0 K/UL (ref 0–0.2)
BILIRUB SERPL-MCNC: 0.29 MG/DL (ref 0.3–1.2)
BUN BLDV-MCNC: 11 MG/DL (ref 6–20)
BUN/CREAT BLD: 15 (ref 9–20)
CALCIUM SERPL-MCNC: 8.8 MG/DL (ref 8.6–10.4)
CHLORIDE BLD-SCNC: 107 MMOL/L (ref 98–107)
CO2: 21 MMOL/L (ref 20–31)
CREAT SERPL-MCNC: 0.72 MG/DL (ref 0.5–0.9)
DIFFERENTIAL TYPE: ABNORMAL
EOSINOPHILS RELATIVE PERCENT: 0 % (ref 1–7)
GFR AFRICAN AMERICAN: >60 ML/MIN
GFR NON-AFRICAN AMERICAN: >60 ML/MIN
GFR SERPL CREATININE-BSD FRML MDRD: ABNORMAL ML/MIN/{1.73_M2}
GFR SERPL CREATININE-BSD FRML MDRD: ABNORMAL ML/MIN/{1.73_M2}
GLUCOSE BLD-MCNC: 122 MG/DL (ref 70–99)
HCT VFR BLD CALC: 31.4 % (ref 36–46)
HEMOGLOBIN: 10.6 G/DL (ref 12–16)
IMMATURE GRANULOCYTES: ABNORMAL %
LYMPHOCYTES # BLD: 46 % (ref 16–46)
MCH RBC QN AUTO: 33.1 PG (ref 26–34)
MCHC RBC AUTO-ENTMCNC: 33.6 G/DL (ref 31–37)
MCV RBC AUTO: 98.4 FL (ref 80–100)
MONOCYTES # BLD: 11 % (ref 4–11)
NRBC AUTOMATED: ABNORMAL PER 100 WBC
PDW BLD-RTO: 21.5 % (ref 11–14.5)
PLATELET # BLD: 53 K/UL (ref 140–450)
PLATELET ESTIMATE: ABNORMAL
PMV BLD AUTO: 8.1 FL (ref 6–12)
POTASSIUM SERPL-SCNC: 3.8 MMOL/L (ref 3.7–5.3)
RBC # BLD: 3.19 M/UL (ref 4–5.2)
RBC # BLD: ABNORMAL 10*6/UL
SEG NEUTROPHILS: 42 % (ref 43–77)
SEGMENTED NEUTROPHILS ABSOLUTE COUNT: 1.7 K/UL (ref 1.8–7.7)
SODIUM BLD-SCNC: 143 MMOL/L (ref 135–144)
TOTAL PROTEIN: 7.3 G/DL (ref 6.4–8.3)
WBC # BLD: 4.1 K/UL (ref 3.5–11)
WBC # BLD: ABNORMAL 10*3/UL

## 2018-02-14 PROCEDURE — G8427 DOCREV CUR MEDS BY ELIG CLIN: HCPCS | Performed by: INTERNAL MEDICINE

## 2018-02-14 PROCEDURE — G8484 FLU IMMUNIZE NO ADMIN: HCPCS | Performed by: INTERNAL MEDICINE

## 2018-02-14 PROCEDURE — 99215 OFFICE O/P EST HI 40 MIN: CPT | Performed by: INTERNAL MEDICINE

## 2018-02-14 PROCEDURE — 85025 COMPLETE CBC W/AUTO DIFF WBC: CPT

## 2018-02-14 PROCEDURE — 80053 COMPREHEN METABOLIC PANEL: CPT

## 2018-02-14 PROCEDURE — 1036F TOBACCO NON-USER: CPT | Performed by: INTERNAL MEDICINE

## 2018-02-14 PROCEDURE — G8417 CALC BMI ABV UP PARAM F/U: HCPCS | Performed by: INTERNAL MEDICINE

## 2018-02-14 PROCEDURE — 3014F SCREEN MAMMO DOC REV: CPT | Performed by: INTERNAL MEDICINE

## 2018-02-14 PROCEDURE — 36415 COLL VENOUS BLD VENIPUNCTURE: CPT

## 2018-02-14 PROCEDURE — 3017F COLORECTAL CA SCREEN DOC REV: CPT | Performed by: INTERNAL MEDICINE

## 2018-02-14 NOTE — PROGRESS NOTES
Xr Chest Standard (2 Vw)    Result Date: 10/10/2017  EXAMINATION: TWO VIEWS OF THE CHEST 10/10/2017 2:37 pm COMPARISON: 10/18/2011 HISTORY: ORDERING SYSTEM PROVIDED HISTORY: Invasive ductal carcinoma of breast, right [facility]) TECHNOLOGIST PROVIDED HISTORY: Reason for exam:->Pre op Ordering Physician Provided Reason for Exam: Pre op for right mastectomy. No chest complaints. Acuity: Unknown Type of Exam: Initial Additional signs and symptoms: n/a Relevant Medical/Surgical History: breast cancer FINDINGS: The lungs are without acute focal process. There is no effusion or pneumothorax. The cardiomediastinal silhouette is stable. The osseous structures are stable. No acute process. Nm Lymphoscintigram    Result Date: 10/19/2017  EXAMINATION: LYMPHOSCINTIGRAPHY 10/19/2017 9:21 am TECHNIQUE: Sulfur colloid labeled with 0.920 mCi of Tc99 was administered in 4 subdermal wheals around the patient's right areolar region. Delayed images were obtained. HISTORY: ORDERING SYSTEM PROVIDED HISTORY: Surgery TECHNOLOGIST PROVIDED HISTORY: Reason for exam:->Surgery Ordering Physician Provided Reason for Exam: 0.920 mCi 99mTc filtered Sulfur Colloid injected subcutaneous around RT nipple. Acuity: Unknown Type of Exam: Unknown FINDINGS: There appears to be migration into the right axilla suggestive of the sentinel node. Migration of the radiotracer into the right axilla suggestive of the patient's sentinel node. MRI brain 2/5/18  Impression   1. Unremarkable MRI of the brain.  No evidence of metastatic disease. 2. Acute left sphenoid sinusitis. Impression:  Invasive ductal carcinoma of right breast, stage IIa. pT,pN0,M0. ER negative, AK weakly positive. HER-2 amplified.   Status post right mastectomy with sentinel lymph node biopsy  Currently receiving TCH+P with plan for  6 cycle followed by Herceptin for total 1 year  Family history of breast cancer  Abd cramps, Diarrhea, NV  Facial twitching left side:  brain negative    Plan:  I had a detailed discussion with the patient and personally went over the results of the blood workup. Her plts are 53K. She is scheduled for chemo tomorrow and also will get hydration, so will check CBC tomorrow and will hold off chemo if plts are still low, Otherwise she will receive hydration only and reschedule chemo in one week  Add magnesium to today's lab and replace tomorrow if low    Patient is agreeable  Patient expressed understanding of the plan and was in agreement. She was also given opportunities to ask questions which I answered to the best of my ability. Dalton Tapia        I spent more than 40 minutes examining, evaluating, reviewing data and counseling the patient. Greater than 50% time was spent face-to-face patient    This note is created with the assistance of a speech recognition program.  While intending to generate a document that actually reflects the content of the visit, the document can still have some errors including those of syntax and sound a like substitutions which may escape proof reading. It such instances, actual meaning can be extrapolated by contextual diversion.

## 2018-02-15 ENCOUNTER — TELEPHONE (OUTPATIENT)
Dept: ONCOLOGY | Age: 53
End: 2018-02-15

## 2018-02-15 ENCOUNTER — HOSPITAL ENCOUNTER (OUTPATIENT)
Dept: INFUSION THERAPY | Age: 53
Discharge: HOME | End: 2018-02-15
Payer: MEDICARE

## 2018-02-15 VITALS
RESPIRATION RATE: 16 BRPM | DIASTOLIC BLOOD PRESSURE: 92 MMHG | WEIGHT: 263.2 LBS | TEMPERATURE: 99.3 F | SYSTOLIC BLOOD PRESSURE: 120 MMHG | BODY MASS INDEX: 42.81 KG/M2 | HEART RATE: 86 BPM

## 2018-02-15 DIAGNOSIS — Z17.1 MALIGNANT NEOPLASM OF OVERLAPPING SITES OF RIGHT BREAST IN FEMALE, ESTROGEN RECEPTOR NEGATIVE (HCC): ICD-10-CM

## 2018-02-15 DIAGNOSIS — C50.811 MALIGNANT NEOPLASM OF OVERLAPPING SITES OF RIGHT BREAST IN FEMALE, ESTROGEN RECEPTOR NEGATIVE (HCC): ICD-10-CM

## 2018-02-15 LAB
ABSOLUTE EOS #: 0 K/UL (ref 0–0.4)
ABSOLUTE IMMATURE GRANULOCYTE: ABNORMAL K/UL (ref 0–0.3)
ABSOLUTE LYMPH #: 2 K/UL (ref 1–4.8)
ABSOLUTE MONO #: 0.5 K/UL (ref 0.1–1.2)
BASOPHILS # BLD: 1 % (ref 0–1)
BASOPHILS ABSOLUTE: 0 K/UL (ref 0–0.2)
DIFFERENTIAL TYPE: ABNORMAL
EOSINOPHILS RELATIVE PERCENT: 0 % (ref 1–7)
HCT VFR BLD CALC: 30.1 % (ref 36–46)
HEMOGLOBIN: 10.1 G/DL (ref 12–16)
IMMATURE GRANULOCYTES: ABNORMAL %
LYMPHOCYTES # BLD: 44 % (ref 16–46)
MAGNESIUM: 1.4 MG/DL (ref 1.6–2.6)
MCH RBC QN AUTO: 32.8 PG (ref 26–34)
MCHC RBC AUTO-ENTMCNC: 33.6 G/DL (ref 31–37)
MCV RBC AUTO: 97.8 FL (ref 80–100)
MONOCYTES # BLD: 12 % (ref 4–11)
NRBC AUTOMATED: ABNORMAL PER 100 WBC
PDW BLD-RTO: 21.5 % (ref 11–14.5)
PLATELET # BLD: 49 K/UL (ref 140–450)
PLATELET ESTIMATE: ABNORMAL
PMV BLD AUTO: 7.8 FL (ref 6–12)
RBC # BLD: 3.08 M/UL (ref 4–5.2)
RBC # BLD: ABNORMAL 10*6/UL
SEG NEUTROPHILS: 43 % (ref 43–77)
SEGMENTED NEUTROPHILS ABSOLUTE COUNT: 1.9 K/UL (ref 1.8–7.7)
WBC # BLD: 4.4 K/UL (ref 3.5–11)
WBC # BLD: ABNORMAL 10*3/UL

## 2018-02-15 PROCEDURE — 6360000002 HC RX W HCPCS: Performed by: INTERNAL MEDICINE

## 2018-02-15 PROCEDURE — 96360 HYDRATION IV INFUSION INIT: CPT

## 2018-02-15 PROCEDURE — 36415 COLL VENOUS BLD VENIPUNCTURE: CPT

## 2018-02-15 PROCEDURE — 96366 THER/PROPH/DIAG IV INF ADDON: CPT

## 2018-02-15 PROCEDURE — 85025 COMPLETE CBC W/AUTO DIFF WBC: CPT

## 2018-02-15 PROCEDURE — 83735 ASSAY OF MAGNESIUM: CPT

## 2018-02-15 PROCEDURE — 2580000003 HC RX 258: Performed by: INTERNAL MEDICINE

## 2018-02-15 PROCEDURE — 96361 HYDRATE IV INFUSION ADD-ON: CPT

## 2018-02-15 PROCEDURE — 96365 THER/PROPH/DIAG IV INF INIT: CPT

## 2018-02-15 RX ORDER — 0.9 % SODIUM CHLORIDE 0.9 %
1000 INTRAVENOUS SOLUTION INTRAVENOUS ONCE
Status: COMPLETED | OUTPATIENT
Start: 2018-02-15 | End: 2018-02-15

## 2018-02-15 RX ORDER — SODIUM CHLORIDE 0.9 % (FLUSH) 0.9 %
10 SYRINGE (ML) INJECTION PRN
Status: CANCELLED | OUTPATIENT
Start: 2018-02-15

## 2018-02-15 RX ORDER — HEPARIN SODIUM (PORCINE) LOCK FLUSH IV SOLN 100 UNIT/ML 100 UNIT/ML
500 SOLUTION INTRAVENOUS PRN
Status: CANCELLED | OUTPATIENT
Start: 2018-02-15

## 2018-02-15 RX ORDER — 0.9 % SODIUM CHLORIDE 0.9 %
1000 INTRAVENOUS SOLUTION INTRAVENOUS ONCE
Status: CANCELLED
Start: 2018-02-15 | End: 2018-02-15

## 2018-02-15 RX ORDER — MAGNESIUM SULFATE IN WATER 40 MG/ML
2 INJECTION, SOLUTION INTRAVENOUS ONCE
Status: DISCONTINUED | OUTPATIENT
Start: 2018-02-15 | End: 2018-02-15 | Stop reason: RX

## 2018-02-15 RX ORDER — SODIUM CHLORIDE 0.9 % (FLUSH) 0.9 %
10 SYRINGE (ML) INJECTION PRN
Status: DISCONTINUED | OUTPATIENT
Start: 2018-02-15 | End: 2018-02-16 | Stop reason: HOSPADM

## 2018-02-15 RX ORDER — HEPARIN SODIUM (PORCINE) LOCK FLUSH IV SOLN 100 UNIT/ML 100 UNIT/ML
500 SOLUTION INTRAVENOUS PRN
Status: DISCONTINUED | OUTPATIENT
Start: 2018-02-15 | End: 2018-02-16 | Stop reason: HOSPADM

## 2018-02-15 RX ADMIN — MAGNESIUM SULFATE HEPTAHYDRATE 2 G: 500 INJECTION, SOLUTION INTRAMUSCULAR; INTRAVENOUS at 10:20

## 2018-02-15 RX ADMIN — Medication 10 ML: at 09:10

## 2018-02-15 RX ADMIN — SODIUM CHLORIDE 1000 ML: 9 INJECTION, SOLUTION INTRAVENOUS at 09:10

## 2018-02-15 RX ADMIN — SODIUM CHLORIDE, PRESERVATIVE FREE 500 UNITS: 5 INJECTION INTRAVENOUS at 13:19

## 2018-02-15 ASSESSMENT — PAIN SCALES - GENERAL: PAINLEVEL_OUTOF10: 0

## 2018-02-15 NOTE — PROGRESS NOTES
Spoke with  regarding patients platelet count of 49 and magnesium level of 1.4. Orders received to hold treatment today and for patient to receive 2 grams magnesium sulfate today.  would also like to see patient next week in the clinic. Sayra notified and scheduled patient for 2/22/18 at 2pm. Magnesium Sulfate orders placed and patient notified of physicians orders. Patient verbalized understanding. AVS given.

## 2018-02-15 NOTE — PROGRESS NOTES
Patient on unit for cycle 5 day 1. Saw Dr. Chapo Guadalupe yesterday toxicity screen done by him. Patient to get CBC and Magnesium drawn today had to draw from left antecubital per Angelic Martinez RN.  Angelic Martinez RN accessed VAD did get blood return and pushed easily with saline. Not able to draw blood back for lab draw from port. Patient had low platelet count yesterday if still low today will hold treatment. Patient continues to have diarrhea and eye twitching. Her headaches are better and come and go now were constant. Numbness in finger tips off and on. Dr. Chapo Guadalupe reviewed all these symptoms with her yesterday. Sitting in chair with feet elevated and warm blankets provided. No further complaints. IV infusing saline at 500 ml / hour for hydration.

## 2018-02-15 NOTE — PROGRESS NOTES
Hydration infused and d/c'd. Mediport flushed with 5 ml heparin. Guerrero needle d/c'd, and pressure dressing applied. Patient tolerated infusion without difficulty and denies any complaints. Patient waiting for daughter to pick her up.

## 2018-02-19 ENCOUNTER — HOSPITAL ENCOUNTER (OUTPATIENT)
Dept: INFUSION THERAPY | Age: 53
Discharge: HOME | End: 2018-02-19
Payer: MEDICARE

## 2018-02-19 DIAGNOSIS — C50.811 MALIGNANT NEOPLASM OF OVERLAPPING SITES OF RIGHT BREAST IN FEMALE, ESTROGEN RECEPTOR NEGATIVE (HCC): ICD-10-CM

## 2018-02-19 DIAGNOSIS — Z17.1 MALIGNANT NEOPLASM OF OVERLAPPING SITES OF RIGHT BREAST IN FEMALE, ESTROGEN RECEPTOR NEGATIVE (HCC): ICD-10-CM

## 2018-02-19 PROCEDURE — 6360000002 HC RX W HCPCS: Performed by: INTERNAL MEDICINE

## 2018-02-19 PROCEDURE — 2580000003 HC RX 258: Performed by: INTERNAL MEDICINE

## 2018-02-19 PROCEDURE — 96361 HYDRATE IV INFUSION ADD-ON: CPT

## 2018-02-19 PROCEDURE — 96360 HYDRATION IV INFUSION INIT: CPT

## 2018-02-19 RX ORDER — 0.9 % SODIUM CHLORIDE 0.9 %
1000 INTRAVENOUS SOLUTION INTRAVENOUS ONCE
Status: COMPLETED | OUTPATIENT
Start: 2018-02-19 | End: 2018-02-19

## 2018-02-19 RX ORDER — 0.9 % SODIUM CHLORIDE 0.9 %
1000 INTRAVENOUS SOLUTION INTRAVENOUS ONCE
Status: CANCELLED
Start: 2018-02-19 | End: 2018-02-19

## 2018-02-19 RX ORDER — HEPARIN SODIUM (PORCINE) LOCK FLUSH IV SOLN 100 UNIT/ML 100 UNIT/ML
500 SOLUTION INTRAVENOUS PRN
Status: CANCELLED | OUTPATIENT
Start: 2018-02-19

## 2018-02-19 RX ORDER — SODIUM CHLORIDE 0.9 % (FLUSH) 0.9 %
10 SYRINGE (ML) INJECTION PRN
Status: DISCONTINUED | OUTPATIENT
Start: 2018-02-19 | End: 2018-02-20 | Stop reason: HOSPADM

## 2018-02-19 RX ORDER — SODIUM CHLORIDE 0.9 % (FLUSH) 0.9 %
10 SYRINGE (ML) INJECTION PRN
Status: CANCELLED | OUTPATIENT
Start: 2018-02-19

## 2018-02-19 RX ORDER — HEPARIN SODIUM (PORCINE) LOCK FLUSH IV SOLN 100 UNIT/ML 100 UNIT/ML
500 SOLUTION INTRAVENOUS PRN
Status: DISCONTINUED | OUTPATIENT
Start: 2018-02-19 | End: 2018-02-20 | Stop reason: HOSPADM

## 2018-02-19 RX ADMIN — SODIUM CHLORIDE, PRESERVATIVE FREE 500 UNITS: 5 INJECTION INTRAVENOUS at 12:44

## 2018-02-19 RX ADMIN — Medication 10 ML: at 12:44

## 2018-02-19 RX ADMIN — SODIUM CHLORIDE 1000 ML: 9 INJECTION, SOLUTION INTRAVENOUS at 10:40

## 2018-02-19 NOTE — PROGRESS NOTES
Pt ambulated into unit for IV hydration infusion. States she is feeling better and eating better. VAD accessed and blood return noted. IV fluids attached to line for infusion. Pt denies needs at this time.

## 2018-02-19 NOTE — PROGRESS NOTES
Fluid hydration infusion complete. VAD flushed with heparin and deaccessed without difficulty. Dressing applied to VAD per pt request.  Pt up to bathroom and discharged home.

## 2018-02-19 NOTE — PROGRESS NOTES
IV hydration infusing. Patient resting in recliner watching TV, denies any complaints, will continue to monitor.

## 2018-02-22 ENCOUNTER — OFFICE VISIT (OUTPATIENT)
Dept: ONCOLOGY | Age: 53
End: 2018-02-22
Payer: MEDICARE

## 2018-02-22 ENCOUNTER — HOSPITAL ENCOUNTER (OUTPATIENT)
Dept: INFUSION THERAPY | Age: 53
Discharge: HOME | End: 2018-02-22
Payer: MEDICARE

## 2018-02-22 ENCOUNTER — HOSPITAL ENCOUNTER (OUTPATIENT)
Age: 53
Setting detail: SPECIMEN
Discharge: HOME | End: 2018-02-22
Payer: MEDICARE

## 2018-02-22 VITALS
BODY MASS INDEX: 43.01 KG/M2 | HEART RATE: 92 BPM | OXYGEN SATURATION: 97 % | RESPIRATION RATE: 16 BRPM | HEIGHT: 66 IN | DIASTOLIC BLOOD PRESSURE: 74 MMHG | TEMPERATURE: 98.6 F | SYSTOLIC BLOOD PRESSURE: 143 MMHG | WEIGHT: 267.6 LBS

## 2018-02-22 DIAGNOSIS — Z17.1 MALIGNANT NEOPLASM OF OVERLAPPING SITES OF RIGHT BREAST IN FEMALE, ESTROGEN RECEPTOR NEGATIVE (HCC): Primary | ICD-10-CM

## 2018-02-22 DIAGNOSIS — R11.2 CHEMOTHERAPY INDUCED NAUSEA AND VOMITING: ICD-10-CM

## 2018-02-22 DIAGNOSIS — Z17.1 MALIGNANT NEOPLASM OF OVERLAPPING SITES OF RIGHT BREAST IN FEMALE, ESTROGEN RECEPTOR NEGATIVE (HCC): ICD-10-CM

## 2018-02-22 DIAGNOSIS — C50.811 MALIGNANT NEOPLASM OF OVERLAPPING SITES OF RIGHT BREAST IN FEMALE, ESTROGEN RECEPTOR NEGATIVE (HCC): Primary | ICD-10-CM

## 2018-02-22 DIAGNOSIS — C50.811 MALIGNANT NEOPLASM OF OVERLAPPING SITES OF RIGHT BREAST IN FEMALE, ESTROGEN RECEPTOR NEGATIVE (HCC): ICD-10-CM

## 2018-02-22 DIAGNOSIS — T45.1X5A CHEMOTHERAPY INDUCED NAUSEA AND VOMITING: ICD-10-CM

## 2018-02-22 LAB
ABSOLUTE EOS #: 0.07 K/UL (ref 0–0.4)
ABSOLUTE IMMATURE GRANULOCYTE: ABNORMAL K/UL (ref 0–0.3)
ABSOLUTE LYMPH #: 1.7 K/UL (ref 1–4.8)
ABSOLUTE MONO #: 0.41 K/UL (ref 0.1–1.2)
ALBUMIN SERPL-MCNC: 3.7 G/DL (ref 3.5–5.2)
ALBUMIN/GLOBULIN RATIO: 1.1 (ref 1–2.5)
ALP BLD-CCNC: 76 U/L (ref 35–104)
ALT SERPL-CCNC: 25 U/L (ref 5–33)
ANION GAP SERPL CALCULATED.3IONS-SCNC: 14 MMOL/L (ref 9–17)
AST SERPL-CCNC: 21 U/L
BASOPHILS # BLD: 0 % (ref 0–1)
BASOPHILS ABSOLUTE: 0 K/UL (ref 0–0.2)
BILIRUB SERPL-MCNC: 0.28 MG/DL (ref 0.3–1.2)
BUN BLDV-MCNC: 14 MG/DL (ref 6–20)
BUN/CREAT BLD: 17 (ref 9–20)
CALCIUM SERPL-MCNC: 9.3 MG/DL (ref 8.6–10.4)
CHLORIDE BLD-SCNC: 103 MMOL/L (ref 98–107)
CO2: 24 MMOL/L (ref 20–31)
CREAT SERPL-MCNC: 0.82 MG/DL (ref 0.5–0.9)
DIFFERENTIAL TYPE: ABNORMAL
EOSINOPHILS RELATIVE PERCENT: 2 % (ref 1–7)
FOLATE: 6.2 NG/ML
GFR AFRICAN AMERICAN: >60 ML/MIN
GFR NON-AFRICAN AMERICAN: >60 ML/MIN
GFR SERPL CREATININE-BSD FRML MDRD: ABNORMAL ML/MIN/{1.73_M2}
GFR SERPL CREATININE-BSD FRML MDRD: ABNORMAL ML/MIN/{1.73_M2}
GLUCOSE BLD-MCNC: 107 MG/DL (ref 70–99)
HCT VFR BLD CALC: 29.6 % (ref 36–46)
HEMOGLOBIN: 9.7 G/DL (ref 12–16)
IMMATURE GRANULOCYTES: ABNORMAL %
LYMPHOCYTES # BLD: 46 % (ref 16–46)
MAGNESIUM: 1.6 MG/DL (ref 1.6–2.6)
MCH RBC QN AUTO: 32.9 PG (ref 26–34)
MCHC RBC AUTO-ENTMCNC: 32.7 G/DL (ref 31–37)
MCV RBC AUTO: 100.5 FL (ref 80–100)
MONOCYTES # BLD: 11 % (ref 4–11)
MORPHOLOGY: ABNORMAL
NRBC AUTOMATED: ABNORMAL PER 100 WBC
PDW BLD-RTO: 21.9 % (ref 11–14.5)
PLATELET # BLD: 133 K/UL (ref 140–450)
PLATELET ESTIMATE: ABNORMAL
PMV BLD AUTO: 8 FL (ref 6–12)
POTASSIUM SERPL-SCNC: 4.1 MMOL/L (ref 3.7–5.3)
RBC # BLD: 2.95 M/UL (ref 4–5.2)
RBC # BLD: ABNORMAL 10*6/UL
SEG NEUTROPHILS: 41 % (ref 43–77)
SEGMENTED NEUTROPHILS ABSOLUTE COUNT: 1.52 K/UL (ref 1.8–7.7)
SODIUM BLD-SCNC: 141 MMOL/L (ref 135–144)
TOTAL PROTEIN: 7.2 G/DL (ref 6.4–8.3)
VITAMIN B-12: 331 PG/ML (ref 232–1245)
WBC # BLD: 3.7 K/UL (ref 3.5–11)
WBC # BLD: ABNORMAL 10*3/UL

## 2018-02-22 PROCEDURE — G8428 CUR MEDS NOT DOCUMENT: HCPCS | Performed by: INTERNAL MEDICINE

## 2018-02-22 PROCEDURE — 82607 VITAMIN B-12: CPT

## 2018-02-22 PROCEDURE — G8417 CALC BMI ABV UP PARAM F/U: HCPCS | Performed by: INTERNAL MEDICINE

## 2018-02-22 PROCEDURE — 1036F TOBACCO NON-USER: CPT | Performed by: INTERNAL MEDICINE

## 2018-02-22 PROCEDURE — 99214 OFFICE O/P EST MOD 30 MIN: CPT | Performed by: INTERNAL MEDICINE

## 2018-02-22 PROCEDURE — 36415 COLL VENOUS BLD VENIPUNCTURE: CPT

## 2018-02-22 PROCEDURE — 3014F SCREEN MAMMO DOC REV: CPT | Performed by: INTERNAL MEDICINE

## 2018-02-22 PROCEDURE — 85025 COMPLETE CBC W/AUTO DIFF WBC: CPT

## 2018-02-22 PROCEDURE — 96415 CHEMO IV INFUSION ADDL HR: CPT

## 2018-02-22 PROCEDURE — 36593 DECLOT VASCULAR DEVICE: CPT

## 2018-02-22 PROCEDURE — 6360000002 HC RX W HCPCS: Performed by: INTERNAL MEDICINE

## 2018-02-22 PROCEDURE — 96417 CHEMO IV INFUS EACH ADDL SEQ: CPT

## 2018-02-22 PROCEDURE — 83735 ASSAY OF MAGNESIUM: CPT

## 2018-02-22 PROCEDURE — 96367 TX/PROPH/DG ADDL SEQ IV INF: CPT

## 2018-02-22 PROCEDURE — 80053 COMPREHEN METABOLIC PANEL: CPT

## 2018-02-22 PROCEDURE — 3017F COLORECTAL CA SCREEN DOC REV: CPT | Performed by: INTERNAL MEDICINE

## 2018-02-22 PROCEDURE — G8484 FLU IMMUNIZE NO ADMIN: HCPCS | Performed by: INTERNAL MEDICINE

## 2018-02-22 PROCEDURE — 96413 CHEMO IV INFUSION 1 HR: CPT

## 2018-02-22 PROCEDURE — 82746 ASSAY OF FOLIC ACID SERUM: CPT

## 2018-02-22 PROCEDURE — 2580000003 HC RX 258: Performed by: INTERNAL MEDICINE

## 2018-02-22 PROCEDURE — 96375 TX/PRO/DX INJ NEW DRUG ADDON: CPT

## 2018-02-22 RX ORDER — SODIUM CHLORIDE 9 MG/ML
INJECTION, SOLUTION INTRAVENOUS ONCE
Status: COMPLETED | OUTPATIENT
Start: 2018-02-22 | End: 2018-02-22

## 2018-02-22 RX ORDER — HEPARIN SODIUM (PORCINE) LOCK FLUSH IV SOLN 100 UNIT/ML 100 UNIT/ML
500 SOLUTION INTRAVENOUS PRN
Status: CANCELLED | OUTPATIENT
Start: 2018-02-22

## 2018-02-22 RX ORDER — METHYLPREDNISOLONE SODIUM SUCCINATE 125 MG/2ML
125 INJECTION, POWDER, LYOPHILIZED, FOR SOLUTION INTRAMUSCULAR; INTRAVENOUS ONCE
Status: CANCELLED | OUTPATIENT
Start: 2018-02-22 | End: 2018-02-22

## 2018-02-22 RX ORDER — PALONOSETRON 0.05 MG/ML
0.25 INJECTION, SOLUTION INTRAVENOUS ONCE
Status: CANCELLED | OUTPATIENT
Start: 2018-02-22

## 2018-02-22 RX ORDER — DIPHENHYDRAMINE HYDROCHLORIDE 50 MG/ML
50 INJECTION INTRAMUSCULAR; INTRAVENOUS ONCE
Status: CANCELLED | OUTPATIENT
Start: 2018-02-22 | End: 2018-02-22

## 2018-02-22 RX ORDER — 0.9 % SODIUM CHLORIDE 0.9 %
1000 INTRAVENOUS SOLUTION INTRAVENOUS ONCE
Status: CANCELLED
Start: 2018-03-04 | End: 2018-03-04

## 2018-02-22 RX ORDER — SODIUM CHLORIDE 0.9 % (FLUSH) 0.9 %
5 SYRINGE (ML) INJECTION PRN
Status: CANCELLED | OUTPATIENT
Start: 2018-02-22

## 2018-02-22 RX ORDER — PALONOSETRON 0.05 MG/ML
0.25 INJECTION, SOLUTION INTRAVENOUS ONCE
Status: COMPLETED | OUTPATIENT
Start: 2018-02-22 | End: 2018-02-22

## 2018-02-22 RX ORDER — 0.9 % SODIUM CHLORIDE 0.9 %
1000 INTRAVENOUS SOLUTION INTRAVENOUS ONCE
Status: CANCELLED
Start: 2018-03-11 | End: 2018-03-11

## 2018-02-22 RX ORDER — SODIUM CHLORIDE 0.9 % (FLUSH) 0.9 %
10 SYRINGE (ML) INJECTION PRN
Status: CANCELLED | OUTPATIENT
Start: 2018-02-22

## 2018-02-22 RX ORDER — 0.9 % SODIUM CHLORIDE 0.9 %
1000 INTRAVENOUS SOLUTION INTRAVENOUS ONCE
Status: CANCELLED
Start: 2018-02-25 | End: 2018-02-25

## 2018-02-22 RX ORDER — 0.9 % SODIUM CHLORIDE 0.9 %
10 VIAL (ML) INJECTION ONCE
Status: CANCELLED | OUTPATIENT
Start: 2018-02-22 | End: 2018-02-22

## 2018-02-22 RX ORDER — SODIUM CHLORIDE 9 MG/ML
INJECTION, SOLUTION INTRAVENOUS CONTINUOUS
Status: CANCELLED | OUTPATIENT
Start: 2018-02-22

## 2018-02-22 RX ORDER — SODIUM CHLORIDE 0.9 % (FLUSH) 0.9 %
10 SYRINGE (ML) INJECTION PRN
Status: DISCONTINUED | OUTPATIENT
Start: 2018-02-22 | End: 2018-02-23 | Stop reason: HOSPADM

## 2018-02-22 RX ORDER — HEPARIN SODIUM (PORCINE) LOCK FLUSH IV SOLN 100 UNIT/ML 100 UNIT/ML
500 SOLUTION INTRAVENOUS PRN
Status: DISCONTINUED | OUTPATIENT
Start: 2018-02-22 | End: 2018-02-23 | Stop reason: HOSPADM

## 2018-02-22 RX ORDER — SODIUM CHLORIDE 9 MG/ML
INJECTION, SOLUTION INTRAVENOUS ONCE
Status: CANCELLED | OUTPATIENT
Start: 2018-02-22 | End: 2018-02-22

## 2018-02-22 RX ADMIN — CARBOPLATIN 800 MG: 10 INJECTION, SOLUTION INTRAVENOUS at 13:59

## 2018-02-22 RX ADMIN — Medication 10 ML: at 09:09

## 2018-02-22 RX ADMIN — DEXAMETHASONE SODIUM PHOSPHATE 12 MG: 10 INJECTION, SOLUTION INTRAMUSCULAR; INTRAVENOUS at 10:33

## 2018-02-22 RX ADMIN — TRASTUZUMAB 750 MG: 150 INJECTION, POWDER, LYOPHILIZED, FOR SOLUTION INTRAVENOUS at 12:18

## 2018-02-22 RX ADMIN — PERTUZUMAB 420 MG: 30 INJECTION, SOLUTION, CONCENTRATE INTRAVENOUS at 11:06

## 2018-02-22 RX ADMIN — Medication 10 ML: at 09:43

## 2018-02-22 RX ADMIN — PALONOSETRON HYDROCHLORIDE 0.25 MG: 0.25 INJECTION INTRAVENOUS at 10:30

## 2018-02-22 RX ADMIN — SODIUM CHLORIDE: 9 INJECTION, SOLUTION INTRAVENOUS at 09:44

## 2018-02-22 RX ADMIN — Medication 10 ML: at 09:08

## 2018-02-22 RX ADMIN — Medication 10 ML: at 09:44

## 2018-02-22 RX ADMIN — SODIUM CHLORIDE, PRESERVATIVE FREE 500 UNITS: 5 INJECTION INTRAVENOUS at 14:36

## 2018-02-22 RX ADMIN — DOCETAXEL 187 MG: 20 INJECTION, SOLUTION, CONCENTRATE INTRAVENOUS at 12:54

## 2018-02-22 ASSESSMENT — PAIN SCALES - GENERAL: PAINLEVEL_OUTOF10: 0

## 2018-02-22 NOTE — PROGRESS NOTES
Chemotherapy infused and d/c'd. Mediport flushed with 30 ml NSS and 5 ml heparin. Guerrero needle d/c'd, pressure dressing applied. Patient denies any complaints while waiting on transportation.

## 2018-02-22 NOTE — PROGRESS NOTES
Medications Ordered in Other Visits   Medication Dose Route Frequency Provider Last Rate Last Dose    sterile water injection 2.2 mL  2.2 mL Injection Once [facility], MD        alteplase (CATHFLO) injection 2 mg  2 mg Intracatheter Once [facility], MD        sodium chloride flush 0.9 % injection 10 mL  10 mL Intravenous PRN [facility] MD EMERITA   10 mL at 02/22/18 0944    heparin flush 100 UNIT/ML injection 500 Units  500 Units Intercatheter PRN [facility] MD EMERITA   500 Units at 02/22/18 1436       Allergies:   Review of patient's allergies indicates no known allergies. Review of Systems:    Constitutional: Negative for fever or chills. No night sweats, weight is stable now  Eyes: No eye discharge, double vision, or eye pain   HEENT: negative for sore mouth, sore throat, hoarseness and voice change   Respiratory: negative for cough , sputum, dyspnea, wheezing, hemoptysis, chest pain   Cardiovascular: negative for chest pain, dyspnea, palpitations, orthopnea, PND   Gastrointestinal: +++ nausea, vomiting, constipation, abdominal pain, Dysphagia, hematemesis and hematochezia . Positive for diarrhea  Genitourinary: negative for frequency, dysuria, nocturia, urinary incontinence, and hematuria   Integument: negative for rash, skin lesions, bruises.    Hematologic/Lymphatic: negative for easy bruising, bleeding, lymphadenopathy, or petechiae   Endocrine: negative for heat or cold intolerance,weight changes, change in bowel habits and hair loss   Musculoskeletal: negative for myalgias, arthralgias, pain, joint swelling,and bone pain   Neurological: negative for headaches, dizziness, seizures, weakness, numbness      Physical Exam:    Vitals:  [facility] 02/28/2013    General appearance - patient not in acute distress  Mental status - AAO X3  Eyes - pupils equal and reactive, extraocular eye movements intact  Mouth - mucous membranes moist, pharynx normal without lesions  Neck - supple, no significant adenopathy  Lymphatics - no palpable lymphadenopathy, no hepatosplenomegaly  Chest - clear to auscultation, no wheezes, rales or rhonchi, symmetric air entry  Heart - normal rate, regular rhythm, normal S1, S2, no murmurs  Abdomen - soft, nontender, nondistended, no masses or organomegaly  Neurological - alert, oriented, normal speech, no focal findings or movement disorder noted  Extremities - peripheral pulses normal, no pedal edema, no clubbing or cyanosis  Skin - normal coloration and turgor, no rashes, no suspicious skin lesions noted       DATA:    Results for orders placed or performed during the hospital encounter of 02/22/18   CBC Auto Differential   Result Value Ref Range    WBC 3.7 3.5 - 11.0 k/uL    RBC 2.95 (L) 4.0 - 5.2 m/uL    Hemoglobin 9.7 (L) 12.0 - 16.0 g/dL    Hematocrit 29.6 (L) 36 - 46 %    .5 (H) 80 - 100 fL    MCH 32.9 26 - 34 pg    MCHC 32.7 31 - 37 g/dL    RDW 21.9 (H) 11.0 - 14.5 %    Platelets 141 (L) 360 - 450 k/uL    MPV 8.0 6.0 - 12.0 fL    NRBC Automated NOT REPORTED per 100 WBC    Differential Type NOT REPORTED     Immature Granulocytes NOT REPORTED 0 %    Absolute Immature Granulocyte NOT REPORTED 0.00 - 0.30 k/uL    WBC Morphology NOT REPORTED     RBC Morphology NOT REPORTED     Platelet Estimate NOT REPORTED     Monocytes 11 4 - 11 %    Lymphocytes 46 16 - 46 %    Seg Neutrophils 41 (L) 43 - 77 %    Eosinophils % 2 1 - 7 %    Basophils 0 0 - 1 %    Absolute Mono # 0.41 0.1 - 1.2 k/uL    Absolute Lymph # 1.70 1.0 - 4.8 k/uL    Segs Absolute 1.52 (L) 1.8 - 7.7 k/uL    Absolute Eos # 0.07 0.0 - 0.4 k/uL    Basophils # 0.00 0.0 - 0.2 k/uL    Morphology ANISOCYTOSIS PRESENT    Comprehensive Metabolic Panel   Result Value Ref Range    Glucose 107 (H) 70 - 99 mg/dL    BUN 14 6 - 20 mg/dL    CREATININE 0.82 0.50 - 0.90 mg/dL    Bun/Cre Ratio 17 9 - 20    Calcium 9.3 8.6 - 10.4 mg/dL    Sodium 141 135 - 144 mmol/L    Potassium 4.1 3.7 - 5.3 mmol/L    Chloride 103 98 - 107 mmol/L Unremarkable MRI of the brain.  No evidence of metastatic disease. 2. Acute left sphenoid sinusitis. Impression:  Invasive ductal carcinoma of right breast, stage IIa. pT,pN0,M0. ER negative, UT weakly positive. HER-2 amplified. Status post right mastectomy with sentinel lymph node biopsy  Currently receiving TCH+P with plan for  6 cycle followed by Herceptin for total 1 year  Family history of breast cancer  Abd cramps, Diarrhea, NV  Facial twitching left side: MR brain negative    Plan:  I had a detailed discussion with the patient and personally went over the results of the blood workup. Overall patient condition is improved with IV fluid support. Patient's labs are adequate for treatment. We will proceed with cycle #5. We plan to treat with total of 6 cycles of combination chemotherapy and anti-HER-2 antibodies. Plan to complete 1 year of Herceptin. NCCN guidelines were reviewed and discussed with the patient. The diagnosis and care plan were discussed with the patient in detail. I discussed the natural history of the disease, prognosis, risks and goals of therapy and answered all the patients questions to the best of my ability. Patient expressed understanding and was in agreement. Cristhian Aparicio        I spent more than 25 minutes examining, evaluating, reviewing data and counseling the patient. Greater than 50% time was spent face-to-face patient    This note is created with the assistance of a speech recognition program.  While intending to generate a document that actually reflects the content of the visit, the document can still have some errors including those of syntax and sound a like substitutions which may escape proof reading. It such instances, actual meaning can be extrapolated by contextual diversion.

## 2018-02-22 NOTE — PROGRESS NOTES
Assessed patients mediport, site still appears swollen but no swelling noted down left arm or left side of neck only in left hand. When accessing mediport with 20 g 1 3/4\" mora needle after puncturing the skin, blood went into catheter without even being in port. Withdrew needle and started milking around the port site, blood continues to drain out of puncture site and saturated multiple gauze pads. Re-accessed mediport, port flushed easily patient denies any pain while flushing and states she can taste the saline. Unable to withdraw blood. Alteplase given.

## 2018-02-22 NOTE — PROGRESS NOTES
After 30 min of dwell time, mediport positive for blood return. NSS infusing. Patient resting in recliner.

## 2018-02-23 ENCOUNTER — TELEPHONE (OUTPATIENT)
Dept: INFUSION THERAPY | Facility: MEDICAL CENTER | Age: 53
End: 2018-02-23

## 2018-02-23 ENCOUNTER — OFFICE VISIT (OUTPATIENT)
Dept: SURGERY | Age: 53
End: 2018-02-23
Payer: MEDICARE

## 2018-02-23 VITALS
HEIGHT: 66 IN | TEMPERATURE: 98.5 F | BODY MASS INDEX: 43.07 KG/M2 | SYSTOLIC BLOOD PRESSURE: 126 MMHG | WEIGHT: 268 LBS | HEART RATE: 98 BPM | DIASTOLIC BLOOD PRESSURE: 76 MMHG

## 2018-02-23 DIAGNOSIS — T85.698S: Primary | ICD-10-CM

## 2018-02-23 DIAGNOSIS — D05.11 DUCTAL CARCINOMA IN SITU (DCIS) OF RIGHT BREAST: ICD-10-CM

## 2018-02-23 DIAGNOSIS — E66.9 OBESITY WITHOUT SERIOUS COMORBIDITY, UNSPECIFIED CLASSIFICATION, UNSPECIFIED OBESITY TYPE: ICD-10-CM

## 2018-02-23 PROCEDURE — G8484 FLU IMMUNIZE NO ADMIN: HCPCS | Performed by: SURGERY

## 2018-02-23 PROCEDURE — 1036F TOBACCO NON-USER: CPT | Performed by: SURGERY

## 2018-02-23 PROCEDURE — G8417 CALC BMI ABV UP PARAM F/U: HCPCS | Performed by: SURGERY

## 2018-02-23 PROCEDURE — 3014F SCREEN MAMMO DOC REV: CPT | Performed by: SURGERY

## 2018-02-23 PROCEDURE — 99214 OFFICE O/P EST MOD 30 MIN: CPT | Performed by: SURGERY

## 2018-02-23 PROCEDURE — G8427 DOCREV CUR MEDS BY ELIG CLIN: HCPCS | Performed by: SURGERY

## 2018-02-23 PROCEDURE — 3017F COLORECTAL CA SCREEN DOC REV: CPT | Performed by: SURGERY

## 2018-02-23 NOTE — PROGRESS NOTES
Name:  Pamela Ayoub  Age:  46 y.o.   :  1965    Physician: Alana Hollis MD       Chief Complaint: This 46years old white female is known to have carcinoma of the right breast and she is status post mastectomy. She has been on chemotherapy and had an Fxpyeq-n-Uboe inserted in the left shoulder however it had malfunctioned namely the could not draw blood from his although this seems to fuse the saline. She had a previously inserted port which apparently its catheter was severed and had to be replaced. She had a radiology contrast study and showed extravasation again from the port. She has no pain or indication of infection of the port area. Social History: @  Social History     Social History    Marital status: Single     Spouse name: N/A    Number of children: N/A    Years of education: N/A     Occupational History    Not on file.      Social History Main Topics    Smoking status: Never Smoker    Smokeless tobacco: Never Used    Alcohol use No    Drug use: No    Sexual activity: Yes     Partners: Male     Birth control/ protection: Surgical     Other Topics Concern    Not on file     Social History Narrative    No narrative on file       Family History: @  Family History   Problem Relation Age of Onset    Breast Cancer Sister 54    Breast Cancer Maternal Aunt 71    Other Maternal Cousin      Atypical Ductal Hyperplasia     Uterine Cancer Sister 32    Other Sister      breast cyst       Past Medical History:        Diagnosis Date    Bipolar 1 disorder ([address]     Breast cancer ([address] 2017    right side     Headache(784.0)     Hyperlipidemia     Migraine        Past Surgical History:        Procedure Laterality Date    DILATION AND CURETTAGE OF UTERUS N/A 10/13/2017    D & C HYSTEROSCOPY with polypectomy performed by Mo Banks MD at [facility] / REMOVAL / [address] Left 2017    PORT INSERTION performed by Annmarie Quiroz MD at [address] [facility] OR    INSERTION / REMOVAL / REPLACEMENT VENOUS ACCESS CATHETER N/A 11/30/2017    Port Removal & Insertion performed by Cy Vazquez MD at [address] Right 10/19/2017     [address] [facility]    MASTECTOMY Right 10/19/2017    Right Breast Mastectomy with  [place] Node Biopsy performed by Cy Vazquez MD at [place] [address] Left 2014, 2015    x 2 surgeries total; Dr. Artie Pastor TUNNELED VENOUS PORT PLACEMENT Left 11/30/2017    removal of et replacement of       Current Outpatient Prescriptions on File Prior to Visit   Medication Sig Dispense Refill    ondansetron (ZOFRAN) 4 MG tablet Take 2 tablets by mouth every 6 hours as needed for Nausea or Vomiting 40 tablet 1    potassium chloride (KLOR-CON M) 20 MEQ extended release tablet Take 1 tablet by mouth daily 30 tablet 1    prochlorperazine (COMPAZINE) 10 MG tablet Take 10 mg by mouth every 6 hours as needed      VENTOLIN  (90 Base) MCG/ACT inhaler Inhale 2 puffs into the lungs every 4 hours as needed       fluticasone (FLONASE) 50 MCG/ACT nasal spray 1 spray by Nasal route Indications: as needed       lamoTRIgine (LAMICTAL) 100 MG tablet 50 mg nightly       lisinopril (PRINIVIL;ZESTRIL) 20 MG tablet 20 mg daily       loratadine (CLARITIN) 10 MG tablet Take 10 mg by mouth daily       pravastatin (PRAVACHOL) 40 MG tablet Take 40 mg by mouth daily       QUEtiapine (SEROQUEL) 300 MG tablet Take 100 mg by mouth nightly       traZODone (DESYREL) 100 MG tablet Take 50 mg by mouth nightly       sodium chloride (ALTAMIST SPRAY) 0.65 % nasal spray 1 spray by Nasal route as needed for Congestion 1 Bottle 1    lactobacillus (CULTURELLE) CAPS capsule Take 1 capsule by mouth 3 times daily 90 capsule 0    omeprazole (PRILOSEC) 20 MG delayed release capsule Take 1 capsule by mouth 2 times daily 60 capsule 0    HYDROcodone-acetaminophen (NORCO) 5-325 MG per tablet Take 1 tablet by mouth every 6 hours as needed for Pain . 2. Ductal carcinoma in situ (DCIS) of right breast    3.  Obesity without serious comorbidity, unspecified classification, unspecified obesity type        Electronically signed by April Schmitt MD on 2/23/2018 at 10:12 AM

## 2018-02-26 ENCOUNTER — HOSPITAL ENCOUNTER (OUTPATIENT)
Dept: INFUSION THERAPY | Age: 53
Discharge: HOME | End: 2018-02-26
Payer: MEDICARE

## 2018-02-26 VITALS
RESPIRATION RATE: 18 BRPM | SYSTOLIC BLOOD PRESSURE: 107 MMHG | DIASTOLIC BLOOD PRESSURE: 77 MMHG | HEART RATE: 106 BPM | TEMPERATURE: 98.4 F

## 2018-02-26 DIAGNOSIS — Z17.1 MALIGNANT NEOPLASM OF OVERLAPPING SITES OF RIGHT BREAST IN FEMALE, ESTROGEN RECEPTOR NEGATIVE (HCC): ICD-10-CM

## 2018-02-26 DIAGNOSIS — C50.811 MALIGNANT NEOPLASM OF OVERLAPPING SITES OF RIGHT BREAST IN FEMALE, ESTROGEN RECEPTOR NEGATIVE (HCC): ICD-10-CM

## 2018-02-26 PROCEDURE — 96360 HYDRATION IV INFUSION INIT: CPT

## 2018-02-26 PROCEDURE — 6360000002 HC RX W HCPCS: Performed by: INTERNAL MEDICINE

## 2018-02-26 PROCEDURE — 96361 HYDRATE IV INFUSION ADD-ON: CPT

## 2018-02-26 PROCEDURE — 2580000003 HC RX 258: Performed by: INTERNAL MEDICINE

## 2018-02-26 RX ORDER — 0.9 % SODIUM CHLORIDE 0.9 %
1000 INTRAVENOUS SOLUTION INTRAVENOUS ONCE
Status: COMPLETED | OUTPATIENT
Start: 2018-02-26 | End: 2018-02-26

## 2018-02-26 RX ORDER — HEPARIN SODIUM (PORCINE) LOCK FLUSH IV SOLN 100 UNIT/ML 100 UNIT/ML
500 SOLUTION INTRAVENOUS PRN
Status: DISCONTINUED | OUTPATIENT
Start: 2018-02-26 | End: 2018-02-27 | Stop reason: HOSPADM

## 2018-02-26 RX ORDER — 0.9 % SODIUM CHLORIDE 0.9 %
1000 INTRAVENOUS SOLUTION INTRAVENOUS ONCE
Status: CANCELLED
Start: 2018-02-26 | End: 2018-02-26

## 2018-02-26 RX ORDER — HEPARIN SODIUM (PORCINE) LOCK FLUSH IV SOLN 100 UNIT/ML 100 UNIT/ML
500 SOLUTION INTRAVENOUS PRN
Status: CANCELLED | OUTPATIENT
Start: 2018-02-26

## 2018-02-26 RX ORDER — SODIUM CHLORIDE 0.9 % (FLUSH) 0.9 %
10 SYRINGE (ML) INJECTION PRN
Status: CANCELLED | OUTPATIENT
Start: 2018-02-26

## 2018-02-26 RX ORDER — SODIUM CHLORIDE 0.9 % (FLUSH) 0.9 %
10 SYRINGE (ML) INJECTION PRN
Status: DISCONTINUED | OUTPATIENT
Start: 2018-02-26 | End: 2018-02-27 | Stop reason: HOSPADM

## 2018-02-26 RX ADMIN — SODIUM CHLORIDE, PRESERVATIVE FREE 500 UNITS: 5 INJECTION INTRAVENOUS at 11:06

## 2018-02-26 RX ADMIN — Medication 10 ML: at 09:05

## 2018-02-26 RX ADMIN — SODIUM CHLORIDE 1000 ML: 9 INJECTION, SOLUTION INTRAVENOUS at 09:05

## 2018-02-26 NOTE — PROGRESS NOTES
Patient at infusion center for IV hydration. Patient states she has no energy, no appetite, nauseated, and a little bit of diarrhea. Patient states this started about Friday night. VAD accessed per protocol using 20 gauge 3/4\" mora needle. Flushes easily. Negative for blood return. NSS infusing. Patient resting in recliner, will continue to monitor.

## 2018-02-26 NOTE — PROGRESS NOTES
Infusion complete. Flushed with saline and 5 ml of heparin flush. D/C mora needle. 2x2 and tegaderm to site. No complaints up to bathroom. Reports feeling \"a little better\"  Than tyson came into unit. Patient discharged to home and was stable. Wiill get 1 liter of fluids in surgery unit Thursday. Talked to [place], Rn about this.

## 2018-02-26 NOTE — PROGRESS NOTES
Ellender Meigs from Dr. Lucio Rosario office. She said that Dr. Anton Angulo said OK to use Port for IV hydration today. Port Is being replaced on Thursday.

## 2018-02-28 NOTE — H&P
(PRILOSEC) 20 MG delayed release capsule Take 1 capsule by mouth 2 times daily 60 capsule 0    HYDROcodone-acetaminophen (NORCO) 5-325 MG per tablet Take 1 tablet by mouth every 6 hours as needed for Pain .        butalbital-acetaminophen-caffeine (FIORICET, ESGIC) -40 MG per tablet Take 1-2 tablets by mouth every 4 hours as needed         hydrOXYzine (ATARAX) 10 MG tablet Take 10 mg by mouth daily           No current facility-administered medications on file prior to visit.          No Known Allergies      reports that she has never smoked. She has never used smokeless tobacco.  reports that she does not drink alcohol.       Review of Systems - History obtained from chart review and the patient  General ROS: She has symptoms related to the chemotherapy with alopecia and weakness and she has twitching of her left eye which is known to the evaluated by ophthalmologist.                 Physical Exam:     /76   Pulse 98   Temp 98.5 °F (36.9 °C)   Ht 5' 5.75\" (1.67 m)   Wt 268 lb (121.6 kg)   LMP 02/28/2013   BMI 43.59 kg/m²   Weight: 268 lb (121.6 kg)      General Appearance:  awake, alert, oriented, in no acute distress, well developed, well nourished, alert, cooperative, obese and ambulatory  Skin:  Skin color, texture, turgor normal. No rashes or lesions. Head/face:  NCAT and twitching of the eyelid of the left eye. Neck:  no bruits and thick neck  Lungs:  Normal expansion. Clear to auscultation. No rales, rhonchi, or wheezing. Heart:  Heart sounds are normal.  Regular rate and rhythm without murmur, gallop or rub. Heart regular rate and rhythm    Left shoulder: The implantable port is visible and palpable and the left sub-clavicular area. There no swelling tenderness or erythema of the overlying skin and the scar of previous insertion appears to be healed and show no inflammation.   The gastric portogram was reviewed and appears to have aches extremities patient from the

## 2018-03-01 ENCOUNTER — ANESTHESIA (OUTPATIENT)
Dept: OPERATING ROOM | Age: 53
End: 2018-03-01
Payer: MEDICARE

## 2018-03-01 ENCOUNTER — HOSPITAL ENCOUNTER (OUTPATIENT)
Dept: GENERAL RADIOLOGY | Age: 53
Setting detail: OUTPATIENT SURGERY
Discharge: HOME | End: 2018-03-03
Attending: SURGERY
Payer: MEDICARE

## 2018-03-01 ENCOUNTER — ANESTHESIA EVENT (OUTPATIENT)
Dept: OPERATING ROOM | Age: 53
End: 2018-03-01
Payer: MEDICARE

## 2018-03-01 ENCOUNTER — APPOINTMENT (OUTPATIENT)
Dept: GENERAL RADIOLOGY | Age: 53
End: 2018-03-01
Attending: SURGERY
Payer: MEDICARE

## 2018-03-01 ENCOUNTER — HOSPITAL ENCOUNTER (OUTPATIENT)
Age: 53
Setting detail: OUTPATIENT SURGERY
Discharge: HOME | End: 2018-03-01
Attending: SURGERY | Admitting: SURGERY
Payer: MEDICARE

## 2018-03-01 VITALS
OXYGEN SATURATION: 100 % | DIASTOLIC BLOOD PRESSURE: 72 MMHG | HEART RATE: 86 BPM | BODY MASS INDEX: 41.54 KG/M2 | RESPIRATION RATE: 16 BRPM | HEIGHT: 66 IN | SYSTOLIC BLOOD PRESSURE: 121 MMHG | TEMPERATURE: 97.1 F | WEIGHT: 258.5 LBS

## 2018-03-01 VITALS
TEMPERATURE: 82.4 F | DIASTOLIC BLOOD PRESSURE: 84 MMHG | SYSTOLIC BLOOD PRESSURE: 140 MMHG | OXYGEN SATURATION: 96 % | RESPIRATION RATE: 22 BRPM

## 2018-03-01 DIAGNOSIS — Z95.828 PORT CATHETER IN PLACE: Primary | ICD-10-CM

## 2018-03-01 DIAGNOSIS — Z95.828 PORT CATHETER IN PLACE: ICD-10-CM

## 2018-03-01 DIAGNOSIS — G89.18 POSTOPERATIVE PAIN: ICD-10-CM

## 2018-03-01 PROCEDURE — 7100000010 HC PHASE II RECOVERY - FIRST 15 MIN: Performed by: SURGERY

## 2018-03-01 PROCEDURE — 2580000003 HC RX 258

## 2018-03-01 PROCEDURE — C1894 INTRO/SHEATH, NON-LASER: HCPCS | Performed by: SURGERY

## 2018-03-01 PROCEDURE — 6360000002 HC RX W HCPCS

## 2018-03-01 PROCEDURE — 2580000003 HC RX 258: Performed by: SURGERY

## 2018-03-01 PROCEDURE — 7100000001 HC PACU RECOVERY - ADDTL 15 MIN: Performed by: SURGERY

## 2018-03-01 PROCEDURE — 7100000000 HC PACU RECOVERY - FIRST 15 MIN: Performed by: SURGERY

## 2018-03-01 PROCEDURE — 77001 FLUOROGUIDE FOR VEIN DEVICE: CPT

## 2018-03-01 PROCEDURE — 3600000002 HC SURGERY LEVEL 2 BASE: Performed by: SURGERY

## 2018-03-01 PROCEDURE — 3700000001 HC ADD 15 MINUTES (ANESTHESIA): Performed by: SURGERY

## 2018-03-01 PROCEDURE — 71045 X-RAY EXAM CHEST 1 VIEW: CPT

## 2018-03-01 PROCEDURE — 00532 ANES ACCESS CTR VENOUS CRCJ: CPT | Performed by: NURSE ANESTHETIST, CERTIFIED REGISTERED

## 2018-03-01 PROCEDURE — 6370000000 HC RX 637 (ALT 250 FOR IP): Performed by: SURGERY

## 2018-03-01 PROCEDURE — 36582 REPLACE TUNNELED CV CATH: CPT | Performed by: SURGERY

## 2018-03-01 PROCEDURE — 3600000012 HC SURGERY LEVEL 2 ADDTL 15MIN: Performed by: SURGERY

## 2018-03-01 PROCEDURE — 2780000010 HC IMPLANT OTHER: Performed by: SURGERY

## 2018-03-01 PROCEDURE — 2500000003 HC RX 250 WO HCPCS

## 2018-03-01 PROCEDURE — 7100000011 HC PHASE II RECOVERY - ADDTL 15 MIN: Performed by: SURGERY

## 2018-03-01 PROCEDURE — 3700000000 HC ANESTHESIA ATTENDED CARE: Performed by: SURGERY

## 2018-03-01 PROCEDURE — 6360000002 HC RX W HCPCS: Performed by: SURGERY

## 2018-03-01 DEVICE — IMPLANTABLE DEVICE: Type: IMPLANTABLE DEVICE | Status: FUNCTIONAL

## 2018-03-01 RX ORDER — ONDANSETRON 2 MG/ML
INJECTION INTRAMUSCULAR; INTRAVENOUS PRN
Status: DISCONTINUED | OUTPATIENT
Start: 2018-03-01 | End: 2018-03-01 | Stop reason: SDUPTHER

## 2018-03-01 RX ORDER — SODIUM CHLORIDE, SODIUM LACTATE, POTASSIUM CHLORIDE, CALCIUM CHLORIDE 600; 310; 30; 20 MG/100ML; MG/100ML; MG/100ML; MG/100ML
INJECTION, SOLUTION INTRAVENOUS CONTINUOUS
Status: DISCONTINUED | OUTPATIENT
Start: 2018-03-01 | End: 2018-03-01 | Stop reason: HOSPADM

## 2018-03-01 RX ORDER — SODIUM CHLORIDE 9 MG/ML
INJECTION, SOLUTION INTRAVENOUS ONCE
Status: COMPLETED | OUTPATIENT
Start: 2018-03-01 | End: 2018-03-01

## 2018-03-01 RX ORDER — PROPOFOL 10 MG/ML
INJECTION, EMULSION INTRAVENOUS CONTINUOUS PRN
Status: DISCONTINUED | OUTPATIENT
Start: 2018-03-01 | End: 2018-03-01 | Stop reason: SDUPTHER

## 2018-03-01 RX ORDER — HEPARIN SODIUM 5000 [USP'U]/ML
INJECTION, SOLUTION INTRAVENOUS; SUBCUTANEOUS PRN
Status: DISCONTINUED | OUTPATIENT
Start: 2018-03-01 | End: 2018-03-01 | Stop reason: HOSPADM

## 2018-03-01 RX ORDER — SODIUM CHLORIDE 9 MG/ML
INJECTION, SOLUTION INTRAVENOUS CONTINUOUS PRN
Status: DISCONTINUED | OUTPATIENT
Start: 2018-03-01 | End: 2018-03-01 | Stop reason: SDUPTHER

## 2018-03-01 RX ORDER — FENTANYL CITRATE 50 UG/ML
INJECTION, SOLUTION INTRAMUSCULAR; INTRAVENOUS PRN
Status: DISCONTINUED | OUTPATIENT
Start: 2018-03-01 | End: 2018-03-01 | Stop reason: SDUPTHER

## 2018-03-01 RX ORDER — PROPOFOL 10 MG/ML
INJECTION, EMULSION INTRAVENOUS PRN
Status: DISCONTINUED | OUTPATIENT
Start: 2018-03-01 | End: 2018-03-01 | Stop reason: SDUPTHER

## 2018-03-01 RX ORDER — HYDROCODONE BITARTRATE AND ACETAMINOPHEN 5; 325 MG/1; MG/1
1 TABLET ORAL EVERY 6 HOURS PRN
Qty: 20 TABLET | Refills: 0 | Status: SHIPPED | OUTPATIENT
Start: 2018-03-01 | End: 2018-11-19 | Stop reason: SDUPTHER

## 2018-03-01 RX ORDER — HYDROCODONE BITARTRATE AND ACETAMINOPHEN 5; 325 MG/1; MG/1
2 TABLET ORAL
Status: COMPLETED | OUTPATIENT
Start: 2018-03-01 | End: 2018-03-01

## 2018-03-01 RX ADMIN — PROPOFOL 200 MG: 10 INJECTION, EMULSION INTRAVENOUS at 07:51

## 2018-03-01 RX ADMIN — PHENYLEPHRINE HYDROCHLORIDE 200 MCG: 10 INJECTION INTRAMUSCULAR; INTRAVENOUS; SUBCUTANEOUS at 07:59

## 2018-03-01 RX ADMIN — PHENYLEPHRINE HYDROCHLORIDE 200 MCG: 10 INJECTION INTRAMUSCULAR; INTRAVENOUS; SUBCUTANEOUS at 08:04

## 2018-03-01 RX ADMIN — PHENYLEPHRINE HYDROCHLORIDE 200 MCG: 10 INJECTION INTRAMUSCULAR; INTRAVENOUS; SUBCUTANEOUS at 08:01

## 2018-03-01 RX ADMIN — PHENYLEPHRINE HYDROCHLORIDE 100 MCG: 10 INJECTION INTRAMUSCULAR; INTRAVENOUS; SUBCUTANEOUS at 08:03

## 2018-03-01 RX ADMIN — SODIUM CHLORIDE: 9 INJECTION, SOLUTION INTRAVENOUS at 07:43

## 2018-03-01 RX ADMIN — PHENYLEPHRINE HYDROCHLORIDE 200 MCG: 10 INJECTION INTRAMUSCULAR; INTRAVENOUS; SUBCUTANEOUS at 08:06

## 2018-03-01 RX ADMIN — FENTANYL CITRATE 100 MCG: 50 INJECTION, SOLUTION INTRAMUSCULAR; INTRAVENOUS at 07:51

## 2018-03-01 RX ADMIN — HYDROCODONE BITARTRATE AND ACETAMINOPHEN 2 TABLET: 5; 325 TABLET ORAL at 09:55

## 2018-03-01 RX ADMIN — SODIUM CHLORIDE, POTASSIUM CHLORIDE, SODIUM LACTATE AND CALCIUM CHLORIDE: 600; 310; 30; 20 INJECTION, SOLUTION INTRAVENOUS at 08:21

## 2018-03-01 RX ADMIN — ONDANSETRON 4 MG: 2 INJECTION INTRAMUSCULAR; INTRAVENOUS at 08:57

## 2018-03-01 RX ADMIN — PHENYLEPHRINE HYDROCHLORIDE 200 MCG: 10 INJECTION INTRAMUSCULAR; INTRAVENOUS; SUBCUTANEOUS at 08:15

## 2018-03-01 RX ADMIN — PHENYLEPHRINE HYDROCHLORIDE 100 MCG: 10 INJECTION INTRAMUSCULAR; INTRAVENOUS; SUBCUTANEOUS at 08:14

## 2018-03-01 RX ADMIN — SODIUM CHLORIDE: 9 INJECTION, SOLUTION INTRAVENOUS at 07:10

## 2018-03-01 RX ADMIN — PHENYLEPHRINE HYDROCHLORIDE 300 MCG: 10 INJECTION INTRAMUSCULAR; INTRAVENOUS; SUBCUTANEOUS at 08:24

## 2018-03-01 RX ADMIN — PHENYLEPHRINE HYDROCHLORIDE 100 MCG: 10 INJECTION INTRAMUSCULAR; INTRAVENOUS; SUBCUTANEOUS at 08:11

## 2018-03-01 RX ADMIN — PHENYLEPHRINE HYDROCHLORIDE 200 MCG: 10 INJECTION INTRAMUSCULAR; INTRAVENOUS; SUBCUTANEOUS at 08:09

## 2018-03-01 RX ADMIN — PROPOFOL 175 MCG/KG/MIN: 10 INJECTION, EMULSION INTRAVENOUS at 07:51

## 2018-03-01 RX ADMIN — PHENYLEPHRINE HYDROCHLORIDE 300 MCG: 10 INJECTION INTRAMUSCULAR; INTRAVENOUS; SUBCUTANEOUS at 08:19

## 2018-03-01 ASSESSMENT — PAIN DESCRIPTION - LOCATION
LOCATION: CHEST

## 2018-03-01 ASSESSMENT — PULMONARY FUNCTION TESTS
PIF_VALUE: 16
PIF_VALUE: 0
PIF_VALUE: 1
PIF_VALUE: 4
PIF_VALUE: 1
PIF_VALUE: 2
PIF_VALUE: 1
PIF_VALUE: 4
PIF_VALUE: 16
PIF_VALUE: 4
PIF_VALUE: 16
PIF_VALUE: 2
PIF_VALUE: 4
PIF_VALUE: 1
PIF_VALUE: 15
PIF_VALUE: 1
PIF_VALUE: 15
PIF_VALUE: 4
PIF_VALUE: 4
PIF_VALUE: 2
PIF_VALUE: 18
PIF_VALUE: 2
PIF_VALUE: 4
PIF_VALUE: 4
PIF_VALUE: 16
PIF_VALUE: 4
PIF_VALUE: 16
PIF_VALUE: 16
PIF_VALUE: 0
PIF_VALUE: 16
PIF_VALUE: 1
PIF_VALUE: 16
PIF_VALUE: 1
PIF_VALUE: 15
PIF_VALUE: 4
PIF_VALUE: 1
PIF_VALUE: 4
PIF_VALUE: 16
PIF_VALUE: 16
PIF_VALUE: 4
PIF_VALUE: 16
PIF_VALUE: 16
PIF_VALUE: 1
PIF_VALUE: 16
PIF_VALUE: 2
PIF_VALUE: 12
PIF_VALUE: 2
PIF_VALUE: 16
PIF_VALUE: 4
PIF_VALUE: 16
PIF_VALUE: 2
PIF_VALUE: 16
PIF_VALUE: 4
PIF_VALUE: 3
PIF_VALUE: 4
PIF_VALUE: 1
PIF_VALUE: 1
PIF_VALUE: 16
PIF_VALUE: 4
PIF_VALUE: 4
PIF_VALUE: 2
PIF_VALUE: 1
PIF_VALUE: 7
PIF_VALUE: 16
PIF_VALUE: 16
PIF_VALUE: 1
PIF_VALUE: 2
PIF_VALUE: 1
PIF_VALUE: 2
PIF_VALUE: 0
PIF_VALUE: 4
PIF_VALUE: 4
PIF_VALUE: 16
PIF_VALUE: 15
PIF_VALUE: 4
PIF_VALUE: 15
PIF_VALUE: 2
PIF_VALUE: 16
PIF_VALUE: 4
PIF_VALUE: 4
PIF_VALUE: 15
PIF_VALUE: 1
PIF_VALUE: 4
PIF_VALUE: 3
PIF_VALUE: 4
PIF_VALUE: 2
PIF_VALUE: 1
PIF_VALUE: 1
PIF_VALUE: 16
PIF_VALUE: 4
PIF_VALUE: 1
PIF_VALUE: 15
PIF_VALUE: 16
PIF_VALUE: 16
PIF_VALUE: 18
PIF_VALUE: 16
PIF_VALUE: 4

## 2018-03-01 ASSESSMENT — PAIN SCALES - GENERAL
PAINLEVEL_OUTOF10: 10
PAINLEVEL_OUTOF10: 8
PAINLEVEL_OUTOF10: 7
PAINLEVEL_OUTOF10: 10
PAINLEVEL_OUTOF10: 8
PAINLEVEL_OUTOF10: 10
PAINLEVEL_OUTOF10: 8
PAINLEVEL_OUTOF10: 8

## 2018-03-01 ASSESSMENT — PAIN DESCRIPTION - ORIENTATION
ORIENTATION: LEFT;UPPER
ORIENTATION: LEFT;UPPER
ORIENTATION: UPPER;LEFT
ORIENTATION: LEFT;UPPER
ORIENTATION: LEFT;UPPER

## 2018-03-01 ASSESSMENT — PAIN DESCRIPTION - FREQUENCY: FREQUENCY: CONTINUOUS

## 2018-03-01 ASSESSMENT — PAIN DESCRIPTION - PAIN TYPE
TYPE: SURGICAL PAIN

## 2018-03-01 ASSESSMENT — PAIN - FUNCTIONAL ASSESSMENT: PAIN_FUNCTIONAL_ASSESSMENT: 0-10

## 2018-03-01 ASSESSMENT — PAIN DESCRIPTION - DESCRIPTORS: DESCRIPTORS: STABBING

## 2018-03-01 NOTE — OP NOTE
Operative note      Denisse Fink  YOB: 1965  [id number]    Pre-operative Diagnosis: Malfunctioning Wvsorb-v-Whxo for venous access    Post-operative Diagnosis: Same    Procedure: Removal and replacement of Tezltx-n-Fvmp left subclavian area. Anesthesia: General and Local    Anesthetist: JESSICA Pickard    Surgeons/Assistants: Daniel Albarado    Estimated Blood Loss: 20 ML    Complications: None    Specimens: Was Not Obtained    Findings: This patient has cancer of the right breast cancer status post mastectomy of the right breast and she is on chemotherapy and has poor venous access. She had an insertion of Ewwczk-e-Rfck in the left subclavian vein twice and had multiple malfunctioning of the last port. She is brought in for changing the port is replaced with a new dual-chamber and double catheter port, [employer] injectable [place], 9.6 F, [place] [id number]. The procedure: The patient was given general anesthesia with LMA and placed in the supine position. A folded towel was placed between the scapulas and the left shoulder and left side of the neck and chest wall and left upper arm were prepped with Betadine soap and solution and draped in the usual manner exposing the area of the palpable the skin and subcu tissue were infiltrated with Marcaine 0.5% and 1% Xylocaine solution in 1 to upper portion and using approximately 20 mL around the port and the area below the port. The skin incision was made    To the mid third of the clavicle approximately 2 inches below the scar of the previous insertion. The capsule of the port was exposed and excised exposing the entire port and cutting the transfixing sutures to the pectoralis muscle. A second incision was made through the scar of previous insertion and further excision of the capsule was done along with freeing the proximal part of the catheter. The catheter was then detached from the port and the port was removed.   A guidewire was passed

## 2018-03-01 NOTE — BRIEF OP NOTE
Brief Postoperative Note    Madison Holstein  YOB: 1965  [id number]    Pre-operative Diagnosis: Malfunctioning Uppvkb-h-Qqpr for venous access    Post-operative Diagnosis: Same    Procedure: Removal and replacement of Agmoal-m-Jzza left subclavian area. Anesthesia: General and Local    Anesthetist: JESSICA Vizcarra    Surgeons/Assistants: Parul Villanueva    Estimated Blood Loss: 20 ML    Complications: None    Specimens: Was Not Obtained    Findings: This patient has cancer of the right breast cancer status post mastectomy of the right breast and she is on chemotherapy and has poor venous access. She had an insertion of Gjfbki-o-Ocax in the left subclavian vein twice and had multiple malfunctioning of the last port. She is brought in for changing the port is replaced with a new dual-chamber and double catheter port, [employer] Power injectable [place], 9.6 F, [place] [id number]. Recommendations: The patient is to be discharged on Norco 3/325 mg every 6 hours when necessary for pain. A portable chest x-ray will be done in the recovery room. The patient will come back to the office for follow-up and for removal of sutures and testing the port before they get used by the oncology department.     Electronically signed by Parul Villanueva MD on 3/1/2018 at 9:42 AM

## 2018-03-01 NOTE — ANESTHESIA PRE PROCEDURE
Department of Anesthesiology  Preprocedure Note       Name:  Satish Pringle   Age:  46 y.o.  :  1965                                          MRN:  [id number]         Date:  3/1/2018      Surgeon: Seema Florez):  Sydni Hinson MD    Procedure: Procedure(s):  PORT Exchange    Medications prior to admission:   Prior to Admission medications    Medication Sig Start Date End Date Taking? Authorizing Provider   ondansetron (ZOFRAN) 4 MG tablet Take 2 tablets by mouth every 6 hours as needed for Nausea or Vomiting 18   Amy Marshall MD   potassium chloride (KLOR-CON M) 20 MEQ extended release tablet Take 1 tablet by mouth daily 18  Amy Marshall MD   sodium chloride (ALTAMIST SPRAY) 0.65 % nasal spray 1 spray by Nasal route as needed for Congestion 18   Amy Marshall MD   lactobacillus (CULTURELLE) CAPS capsule Take 1 capsule by mouth 3 times daily 17   Fay Bautista   omeprazole (PRILOSEC) 20 MG delayed release capsule Take 1 capsule by mouth 2 times daily 17   Fay Bautista   prochlorperazine (COMPAZINE) 10 MG tablet Take 10 mg by mouth every 6 hours as needed    Historical Provider, MD   HYDROcodone-acetaminophen (NORCO) 5-325 MG per tablet Take 1 tablet by mouth every 6 hours as needed for Pain .     Historical Provider, MD   VENTOLIN  (90 Base) MCG/ACT inhaler Inhale 2 puffs into the lungs every 4 hours as needed  17   Historical Provider, MD   butalbital-acetaminophen-caffeine (FIORICET, ESGIC) -40 MG per tablet Take 1-2 tablets by mouth every 4 hours as needed  17   Historical Provider, MD   fluticasone (FLONASE) 50 MCG/ACT nasal spray 1 spray by Nasal route Indications: as needed  17   Historical Provider, MD   hydrOXYzine (ATARAX) 10 MG tablet Take 10 mg by mouth daily  17   Historical Provider, MD   lamoTRIgine (LAMICTAL) 100 MG tablet 50 mg nightly  17   Historical Provider, MD   lisinopril (PRINIVIL;ZESTRIL) 20 MG tablet 20 mg Type & Screen (If Applicable):  No results found for: [facility]    Anesthesia Evaluation  Patient summary reviewed and Nursing notes reviewed no history of anesthetic complications:   Airway: Mallampati: II  TM distance: >3 FB   Neck ROM: full  Mouth opening: > = 3 FB Dental: normal exam         Pulmonary:Negative Pulmonary ROS and normal exam  breath sounds clear to auscultation                             Cardiovascular:    (+) hypertension:,       ECG reviewed  Rhythm: regular  Rate: normal           Beta Blocker:  Not on Beta Blocker         Neuro/Psych:   (+) headaches:, psychiatric history:            GI/Hepatic/Renal: Neg GI/Hepatic/Renal ROS            Endo/Other: Negative Endo/Other ROS             Pt had no PAT visit       Abdominal:   (+) obese,         Vascular: negative vascular ROS. Anesthesia Plan      MAC     ASA 3     (IV Sedation, MAC, TIVA  Anesthetic plan includes the use of IV sedation. Specifically dicussed risks, benefits, alternatives, personnel involved. Risks include: loss of airway/stop breathing, need for airway device, seizures, drop in blood pressure, pain, pressure, memory of event, ability to hear things, talking but unable to remember conversation, lifting chin/repositioning airway. Benefits continue breathing on own, unless obtunded. Since sedation is on a contuinuum general anesthesia with the use of endotracheal intubation was discussed as a back up plan. Specifically dicussed risks, benefits, alternatives, personnel involved, including risks of: cut or cracked lip, chipped tooth/teeth, broken or removed teeth, sore throat, damaged vocal cords, nausea and vomiting, allergic reaction to medication, failed intubation, need to repeat procedure, emergent airway attempts, case cancellation, need for prolonged intubation/remaining intubated, other monitors, and possible death.  The patient will be placed on a cardiac monitor and vital signs, pulse oximetry and level of consciousness will be continuously evaluated throughout the procedure. The patient will be closely monitored until recovery from the medications is complete and the patient has returned to baseline status. Pt verbalized an understanding and agreed to proceed.  )  Induction: intravenous. Anesthetic plan and risks discussed with patient. Use of blood products discussed with patient whom consented to blood products. Plan discussed with CRNA.                   Shara Arellano   3/1/2018

## 2018-03-04 NOTE — PROGRESS NOTES
Maureen Anderson                                                                                                                  1/25/2018  MRN:   [id number]  YOB: 1965  PCP:                           Morrie Ranks, CNP  Referring Physician: No ref. provider found  Treating Physician Name: Simran Murphy MD      Reason for visit:  Patient presents to the clinic for a follow-up visit to discuss further treatment plan    Current problems/ Active and recent treatments:  Stage IIa infiltrating ductal carcinoma of right breast, ER negative, ME positive and HER-2 amplified. Strong family history of breast cancer in sister and maternal aunt  Grade 2 diarrhea, nonbloody    Summary of Case/History:    Maureen Anderson a 46 y. o.female who presents to the hematology oncology office to establish care and for further recommendations for management of her recently diagnosed right breast cancer    Patient had a mammogram after many years in September 2017 which came back abnormal.  Subsequently patient had an ultrasound which confirmed a 1.2 cm lesion in the right breast at 1:00 position. There was another 4 mm lesion at 12:00 position    Patient underwent ultrasound-guided biopsy on 9/8/17. the lesion at 1:00 position shows invasive ductal carcinoma. ER negative and ME positive associated with high-grade DCIS. Lesion at 12:00 position showed fibrocystic disease and focal adenosis and hyperplasia. Patient underwent right sided mastectomy with sentinel lymph node biopsy on 10/19/17. Pathology report showed invasive ductal carcinoma, grade 3 out of 3, 2.8 cm in size with negative margins. pT2,pN0,M0  Tumor specimen was negative for ER receptor and weekly positive for ME receptor (5-10 percent). High-grade DCIS was also noted. One sentinel lymph node was sampled which was negative for metastasis    Patient started on neoadjuvant chemotherapy using TCHP regimen.     Cycle #1, day #1 on release tablet Take 1 tablet by mouth daily 30 tablet 1    sodium chloride (ALTAMIST SPRAY) 0.65 % nasal spray 1 spray by Nasal route as needed for Congestion 1 Bottle 1    HYDROcodone-acetaminophen (NORCO) 5-325 MG per tablet Take 1 tablet by mouth every 6 hours as needed for Pain for up to 5 days. 20 tablet 0    ondansetron (ZOFRAN) 4 MG tablet Take 2 tablets by mouth every 6 hours as needed for Nausea or Vomiting 40 tablet 1    lactobacillus (CULTURELLE) CAPS capsule Take 1 capsule by mouth 3 times daily 90 capsule 0    omeprazole (PRILOSEC) 20 MG delayed release capsule Take 1 capsule by mouth 2 times daily 60 capsule 0    prochlorperazine (COMPAZINE) 10 MG tablet Take 10 mg by mouth every 6 hours as needed      HYDROcodone-acetaminophen (NORCO) 5-325 MG per tablet Take 1 tablet by mouth every 6 hours as needed for Pain .  VENTOLIN  (90 Base) MCG/ACT inhaler Inhale 2 puffs into the lungs every 4 hours as needed       butalbital-acetaminophen-caffeine (FIORICET, ESGIC) -40 MG per tablet Take 1-2 tablets by mouth every 4 hours as needed       fluticasone (FLONASE) 50 MCG/ACT nasal spray 1 spray by Nasal route Indications: as needed       hydrOXYzine (ATARAX) 10 MG tablet Take 10 mg by mouth daily       lamoTRIgine (LAMICTAL) 100 MG tablet 50 mg nightly       lisinopril (PRINIVIL;ZESTRIL) 20 MG tablet 20 mg daily       loratadine (CLARITIN) 10 MG tablet Take 10 mg by mouth daily       pravastatin (PRAVACHOL) 40 MG tablet Take 40 mg by mouth daily       QUEtiapine (SEROQUEL) 300 MG tablet Take 100 mg by mouth nightly       traZODone (DESYREL) 100 MG tablet Take 50 mg by mouth nightly        No current facility-administered medications for this visit. Allergies:   Patient has no known allergies. Review of Systems:    Constitutional: ++low grade fever or  NO chills.  No night sweats, Positive weight loss   Eyes: No eye discharge, double vision, or eye pain   HEENT: negative for sore mouth, sore throat, hoarseness and voice change   Respiratory: negative for cough , sputum, dyspnea, wheezing, hemoptysis, chest pain   Cardiovascular: negative for chest pain, dyspnea, palpitations, orthopnea, PND   Gastrointestinal: +++ nausea, vomiting, constipation, ++abdominal pain, Dysphagia, hematemesis and hematochezia . Positive for diarrhea  Genitourinary: negative for frequency, dysuria, nocturia, urinary incontinence, and hematuria   Integument: negative for rash, skin lesions, bruises.    Hematologic/Lymphatic: negative for easy bruising, bleeding, lymphadenopathy, or petechiae   Endocrine: negative for heat or cold intolerance,weight changes, change in bowel habits and hair loss   Musculoskeletal: negative for myalgias, arthralgias, pain, joint swelling,and bone pain   Neurological: negative for headaches, dizziness, seizures, weakness, numbness      Physical Exam:    Vitals:  [facility] 02/28/2013    General appearance - ill appearing, no in pain or distress  Mental status - AAO X3  Eyes - pupils equal and reactive, extraocular eye movements intact  Mouth - mucous membranes moist, pharynx normal without lesions  Neck - supple, no significant adenopathy  Lymphatics - no palpable lymphadenopathy, no hepatosplenomegaly  Chest - clear to auscultation, no wheezes, rales or rhonchi, symmetric air entry  Heart - normal rate, regular rhythm, normal S1, S2, no murmurs  Abdomen - soft, nontender, nondistended, no masses or organomegaly  Neurological - alert, oriented, normal speech, no focal findings or movement disorder noted  Extremities - peripheral pulses normal, no pedal edema, no clubbing or cyanosis  Skin - normal coloration and turgor, no rashes, no suspicious skin lesions noted       DATA:    Results for orders placed or performed during the hospital encounter of 01/25/18   CBC Auto Differential   Result Value Ref Range    WBC 3.7 3.5 - 11.0 k/uL    RBC 3.14 (L) 4.0 - 5.2 m/uL    Hemoglobin 10.1 (L) 12.0 - 16.0 g/dL    Hematocrit 29.7 (L) 36 - 46 %    MCV 94.6 80 - 100 fL    MCH 32.1 26 - 34 pg    MCHC 33.9 31 - 37 g/dL    RDW 19.8 (H) 11.0 - 14.5 %    Platelets 452 (L) 861 - 450 k/uL    MPV 7.9 6.0 - 12.0 fL    NRBC Automated NOT REPORTED per 100 WBC    Differential Type NOT REPORTED     Immature Granulocytes NOT REPORTED 0 %    Absolute Immature Granulocyte NOT REPORTED 0.00 - 0.30 k/uL    WBC Morphology NOT REPORTED     RBC Morphology NOT REPORTED     Platelet Estimate NOT REPORTED     Monocytes 8 4 - 11 %    Lymphocytes 44 16 - 46 %    Seg Neutrophils 48 43 - 77 %    Eosinophils % 0 (L) 1 - 7 %    Basophils 0 0 - 1 %    Absolute Mono # 0.30 0.1 - 1.2 k/uL    Absolute Lymph # 1.63 1.0 - 4.8 k/uL    Segs Absolute 1.77 (L) 1.8 - 7.7 k/uL    Absolute Eos # 0.00 0.0 - 0.4 k/uL    Basophils # 0.00 0.0 - 0.2 k/uL    Morphology ANISOCYTOSIS PRESENT    Comprehensive Metabolic Panel   Result Value Ref Range    Glucose 121 (H) 70 - 99 mg/dL    BUN 9 6 - 20 mg/dL    CREATININE 0.72 0.50 - 0.90 mg/dL    Bun/Cre Ratio 13 9 - 20    Calcium 8.8 8.6 - 10.4 mg/dL    Sodium 144 135 - 144 mmol/L    Potassium 2.9 (LL) 3.7 - 5.3 mmol/L    Chloride 102 98 - 107 mmol/L    CO2 30 20 - 31 mmol/L    Anion Gap 12 9 - 17 mmol/L    Alkaline Phosphatase 81 35 - 104 U/L    ALT 29 5 - 33 U/L    AST 27 <32 U/L    Total Bilirubin 0.34 0.3 - 1.2 mg/dL    Total Protein 6.8 6.4 - 8.3 g/dL    Alb 3.5 3.5 - 5.2 g/dL    Albumin/Globulin Ratio 1.1 1.0 - 2.5    GFR Non-African American >60 >60 mL/min    GFR African American >60 >60 mL/min    GFR Comment          GFR Staging NOT REPORTED      Xr Chest Standard (2 Vw)    Result Date: 10/10/2017  EXAMINATION: TWO VIEWS OF THE CHEST 10/10/2017 2:37 pm COMPARISON: 10/18/2011 HISTORY: ORDERING SYSTEM PROVIDED HISTORY: Invasive ductal carcinoma of breast, right [facility]) TECHNOLOGIST PROVIDED HISTORY: Reason for exam:->Pre op Ordering Physician Provided Reason for Exam: Pre op for right mastectomy.   No chest

## 2018-03-05 ENCOUNTER — TELEPHONE (OUTPATIENT)
Dept: SURGERY | Age: 53
End: 2018-03-05

## 2018-03-05 ENCOUNTER — HOSPITAL ENCOUNTER (OUTPATIENT)
Dept: INFUSION THERAPY | Age: 53
Discharge: HOME | End: 2018-03-05
Payer: MEDICARE

## 2018-03-05 VITALS
SYSTOLIC BLOOD PRESSURE: 121 MMHG | TEMPERATURE: 99.7 F | HEART RATE: 108 BPM | RESPIRATION RATE: 16 BRPM | DIASTOLIC BLOOD PRESSURE: 88 MMHG

## 2018-03-05 DIAGNOSIS — C50.811 MALIGNANT NEOPLASM OF OVERLAPPING SITES OF RIGHT BREAST IN FEMALE, ESTROGEN RECEPTOR NEGATIVE (HCC): ICD-10-CM

## 2018-03-05 DIAGNOSIS — Z17.1 MALIGNANT NEOPLASM OF OVERLAPPING SITES OF RIGHT BREAST IN FEMALE, ESTROGEN RECEPTOR NEGATIVE (HCC): ICD-10-CM

## 2018-03-05 PROCEDURE — 2580000003 HC RX 258: Performed by: INTERNAL MEDICINE

## 2018-03-05 PROCEDURE — 96360 HYDRATION IV INFUSION INIT: CPT

## 2018-03-05 PROCEDURE — 96375 TX/PRO/DX INJ NEW DRUG ADDON: CPT

## 2018-03-05 PROCEDURE — 6360000002 HC RX W HCPCS: Performed by: INTERNAL MEDICINE

## 2018-03-05 PROCEDURE — 96361 HYDRATE IV INFUSION ADD-ON: CPT

## 2018-03-05 PROCEDURE — 96374 THER/PROPH/DIAG INJ IV PUSH: CPT

## 2018-03-05 RX ORDER — 0.9 % SODIUM CHLORIDE 0.9 %
1000 INTRAVENOUS SOLUTION INTRAVENOUS ONCE
Status: COMPLETED | OUTPATIENT
Start: 2018-03-05 | End: 2018-03-05

## 2018-03-05 RX ORDER — ONDANSETRON 2 MG/ML
8 INJECTION INTRAMUSCULAR; INTRAVENOUS ONCE
Status: COMPLETED | OUTPATIENT
Start: 2018-03-05 | End: 2018-03-05

## 2018-03-05 RX ADMIN — SODIUM CHLORIDE 1000 ML: 9 INJECTION, SOLUTION INTRAVENOUS at 09:39

## 2018-03-05 RX ADMIN — ONDANSETRON 8 MG: 2 INJECTION INTRAMUSCULAR; INTRAVENOUS at 09:51

## 2018-03-05 NOTE — TELEPHONE ENCOUNTER
Spoke to oncology nurse aicha who was also,doing three way conversation with the patient/and myself, while she was there in the office, she checked the port site there was no redness,swelling drainage, patient was getting iv infusion, she is instructed to use ice to the area, and also use tylenol or advil between doses of norco, for on going pain,per Dr. John Byrnes.

## 2018-03-05 NOTE — PROGRESS NOTES
Dr. Mira Hinton LPN called unit. Reported port site does not appear swollen red or hot. Dr. Kaden Benton wants patient to use icepack and take Ibuprofen or tylenol between doses of Norco for pain management. Patient reported Dr. Oscar Mendes nurse told her not to take Ibuprofen so clarified with Mali Garcia why and it was only prior to surgery not to take ibuprofen. It is ok to take ibuprofen now. Patient verbalizes understanding of instructions as does her daughter that is at chairside.

## 2018-03-05 NOTE — PROGRESS NOTES
IV hydration infused and d/c'd.  IV d/c'd, coban to site. Patient states she feels a little less nauseated but still feels weak. Patient discharged via wheelchair to private car.

## 2018-03-05 NOTE — PROGRESS NOTES
Patient not feeling well on unit for hydration. She has been vomiting since 830 pm last night. X3 and metallic taste in mouth. Pale. Still nauseated. Called Dr. Dorota Perez and reported symptoms. He wants her to get 8 mg of zofran IV with fluids today. He also wants patient to get fluids on Wednesday this week rather than Thursday. Patient informed of orders and verbalized understanding.

## 2018-03-05 NOTE — TELEPHONE ENCOUNTER
Patient called in requesting stronger pain medication. Resident states that she will be in infusion later this morning if you can let her know. States that what she is taking is not working.   Please call her at [phone]

## 2018-03-07 ENCOUNTER — OFFICE VISIT (OUTPATIENT)
Dept: SURGERY | Age: 53
End: 2018-03-07

## 2018-03-07 ENCOUNTER — HOSPITAL ENCOUNTER (OUTPATIENT)
Dept: GENERAL RADIOLOGY | Age: 53
Discharge: HOME | End: 2018-03-09
Payer: MEDICARE

## 2018-03-07 ENCOUNTER — HOSPITAL ENCOUNTER (OUTPATIENT)
Dept: INFUSION THERAPY | Age: 53
Discharge: HOME | End: 2018-03-07
Payer: MEDICARE

## 2018-03-07 VITALS
HEIGHT: 66 IN | WEIGHT: 263 LBS | BODY MASS INDEX: 42.27 KG/M2 | TEMPERATURE: 98.6 F | SYSTOLIC BLOOD PRESSURE: 110 MMHG | HEART RATE: 62 BPM | DIASTOLIC BLOOD PRESSURE: 72 MMHG

## 2018-03-07 VITALS
RESPIRATION RATE: 16 BRPM | DIASTOLIC BLOOD PRESSURE: 76 MMHG | TEMPERATURE: 98.9 F | SYSTOLIC BLOOD PRESSURE: 112 MMHG | OXYGEN SATURATION: 98 % | HEART RATE: 98 BPM

## 2018-03-07 DIAGNOSIS — C50.811 MALIGNANT NEOPLASM OF OVERLAPPING SITES OF RIGHT BREAST IN FEMALE, ESTROGEN RECEPTOR NEGATIVE (HCC): ICD-10-CM

## 2018-03-07 DIAGNOSIS — Z09 POSTOP CHECK: Primary | ICD-10-CM

## 2018-03-07 DIAGNOSIS — Z09 POSTOP CHECK: ICD-10-CM

## 2018-03-07 DIAGNOSIS — C50.919 MALIGNANT NEOPLASM OF FEMALE BREAST, UNSPECIFIED ESTROGEN RECEPTOR STATUS, UNSPECIFIED LATERALITY, UNSPECIFIED SITE OF BREAST (HCC): ICD-10-CM

## 2018-03-07 DIAGNOSIS — Z17.1 MALIGNANT NEOPLASM OF OVERLAPPING SITES OF RIGHT BREAST IN FEMALE, ESTROGEN RECEPTOR NEGATIVE (HCC): ICD-10-CM

## 2018-03-07 PROCEDURE — 71046 X-RAY EXAM CHEST 2 VIEWS: CPT

## 2018-03-07 PROCEDURE — 96361 HYDRATE IV INFUSION ADD-ON: CPT

## 2018-03-07 PROCEDURE — 2580000003 HC RX 258: Performed by: INTERNAL MEDICINE

## 2018-03-07 PROCEDURE — 6360000004 HC RX CONTRAST MEDICATION: Performed by: SURGERY

## 2018-03-07 PROCEDURE — 96360 HYDRATION IV INFUSION INIT: CPT

## 2018-03-07 PROCEDURE — 99024 POSTOP FOLLOW-UP VISIT: CPT | Performed by: SURGERY

## 2018-03-07 PROCEDURE — 76000 FLUOROSCOPY <1 HR PHYS/QHP: CPT

## 2018-03-07 RX ORDER — SODIUM CHLORIDE 0.9 % (FLUSH) 0.9 %
10 SYRINGE (ML) INJECTION PRN
Status: CANCELLED | OUTPATIENT
Start: 2018-03-07

## 2018-03-07 RX ORDER — 0.9 % SODIUM CHLORIDE 0.9 %
1000 INTRAVENOUS SOLUTION INTRAVENOUS ONCE
Status: COMPLETED | OUTPATIENT
Start: 2018-03-07 | End: 2018-03-07

## 2018-03-07 RX ORDER — 0.9 % SODIUM CHLORIDE 0.9 %
1000 INTRAVENOUS SOLUTION INTRAVENOUS ONCE
Status: CANCELLED
Start: 2018-03-07 | End: 2018-03-07

## 2018-03-07 RX ORDER — HEPARIN SODIUM (PORCINE) LOCK FLUSH IV SOLN 100 UNIT/ML 100 UNIT/ML
500 SOLUTION INTRAVENOUS PRN
Status: CANCELLED | OUTPATIENT
Start: 2018-03-07

## 2018-03-07 RX ADMIN — IOHEXOL 25 ML: 300 INJECTION, SOLUTION INTRAVENOUS at 17:19

## 2018-03-07 RX ADMIN — SODIUM CHLORIDE 1000 ML: 9 INJECTION, SOLUTION INTRAVENOUS at 12:58

## 2018-03-07 NOTE — PROGRESS NOTES
Went to Dr. Watts Left office and capped Port with hep lock after flushing lock with saline and used alcohol cap on that and transparent dressing placed on port access. Dr. Hima Ronquillo reported he accessed both sides of dual port and flushed with saline and heparin flush to both sides of port. Patient sent to radiology for portogram.  Gave  saline and heparin flush to  Radiologist  and talked to Radiologist about flushing port with saline and heparin after portogram he said he would. Dr. Chan Goldberg said to leave port accessed with needle until Monday when she is getting hydration. Her dressing was labeled with my initials date and time. Informed radiologist That Dr. Chan Goldberg wants port left accessed to use on Monday if port is usable after portogram results If not radiologist will send patient to Dr. Chan Goldberg to get de accessed today after portogram.   Left message with  to let Dr. Wallace Summers know that Radiologist aware if it does not work he will send patient to Dr. Chan Goldberg for [place] access of port.

## 2018-03-07 NOTE — PROGRESS NOTES
IV hydration infused and d/c'd.  IV d/c'd, coban to site. Patient tolerated without difficulty. Patient denies any complaints. Patient discharged with daughter for  appointment.

## 2018-03-07 NOTE — PROGRESS NOTES
Patient at infusion center for IV hydration. Patient states she's feeling a little better today than she did Monday. Mediport site still extremely tender. Patient seeing Dr. Angle Vigil after hydration treatment. IV accessed, NSS infusing. Will continue to monitor.

## 2018-03-12 ENCOUNTER — HOSPITAL ENCOUNTER (OUTPATIENT)
Dept: INFUSION THERAPY | Age: 53
Discharge: HOME | End: 2018-03-12
Payer: MEDICARE

## 2018-03-12 VITALS
SYSTOLIC BLOOD PRESSURE: 118 MMHG | RESPIRATION RATE: 16 BRPM | HEART RATE: 118 BPM | TEMPERATURE: 97.8 F | DIASTOLIC BLOOD PRESSURE: 79 MMHG

## 2018-03-12 DIAGNOSIS — Z17.1 MALIGNANT NEOPLASM OF OVERLAPPING SITES OF BREAST IN FEMALE, ESTROGEN RECEPTOR NEGATIVE, UNSPECIFIED LATERALITY (HCC): Primary | ICD-10-CM

## 2018-03-12 DIAGNOSIS — C50.811 MALIGNANT NEOPLASM OF OVERLAPPING SITES OF RIGHT BREAST IN FEMALE, ESTROGEN RECEPTOR NEGATIVE (HCC): ICD-10-CM

## 2018-03-12 DIAGNOSIS — Z17.1 MALIGNANT NEOPLASM OF OVERLAPPING SITES OF RIGHT BREAST IN FEMALE, ESTROGEN RECEPTOR NEGATIVE (HCC): ICD-10-CM

## 2018-03-12 DIAGNOSIS — C50.819 MALIGNANT NEOPLASM OF OVERLAPPING SITES OF BREAST IN FEMALE, ESTROGEN RECEPTOR NEGATIVE, UNSPECIFIED LATERALITY (HCC): Primary | ICD-10-CM

## 2018-03-12 PROCEDURE — 96360 HYDRATION IV INFUSION INIT: CPT

## 2018-03-12 PROCEDURE — 2580000003 HC RX 258: Performed by: INTERNAL MEDICINE

## 2018-03-12 PROCEDURE — 6360000002 HC RX W HCPCS: Performed by: INTERNAL MEDICINE

## 2018-03-12 PROCEDURE — 96361 HYDRATE IV INFUSION ADD-ON: CPT

## 2018-03-12 RX ORDER — 0.9 % SODIUM CHLORIDE 0.9 %
1000 INTRAVENOUS SOLUTION INTRAVENOUS ONCE
Status: CANCELLED
Start: 2018-03-12 | End: 2018-03-12

## 2018-03-12 RX ORDER — LIDOCAINE AND PRILOCAINE 25; 25 MG/G; MG/G
CREAM TOPICAL
Qty: 1 TUBE | Refills: 1 | Status: SHIPPED | OUTPATIENT
Start: 2018-03-12 | End: 2019-01-02

## 2018-03-12 RX ORDER — 0.9 % SODIUM CHLORIDE 0.9 %
1000 INTRAVENOUS SOLUTION INTRAVENOUS ONCE
Status: COMPLETED | OUTPATIENT
Start: 2018-03-12 | End: 2018-03-12

## 2018-03-12 RX ORDER — HEPARIN SODIUM (PORCINE) LOCK FLUSH IV SOLN 100 UNIT/ML 100 UNIT/ML
500 SOLUTION INTRAVENOUS PRN
Status: DISCONTINUED | OUTPATIENT
Start: 2018-03-12 | End: 2018-03-13 | Stop reason: HOSPADM

## 2018-03-12 RX ORDER — HEPARIN SODIUM (PORCINE) LOCK FLUSH IV SOLN 100 UNIT/ML 100 UNIT/ML
500 SOLUTION INTRAVENOUS PRN
Status: CANCELLED | OUTPATIENT
Start: 2018-03-12

## 2018-03-12 RX ORDER — SODIUM CHLORIDE 0.9 % (FLUSH) 0.9 %
10 SYRINGE (ML) INJECTION PRN
Status: DISCONTINUED | OUTPATIENT
Start: 2018-03-12 | End: 2018-03-13 | Stop reason: HOSPADM

## 2018-03-12 RX ORDER — SODIUM CHLORIDE 0.9 % (FLUSH) 0.9 %
10 SYRINGE (ML) INJECTION PRN
Status: CANCELLED | OUTPATIENT
Start: 2018-03-12

## 2018-03-12 RX ADMIN — SODIUM CHLORIDE 1000 ML: 9 INJECTION, SOLUTION INTRAVENOUS at 09:03

## 2018-03-12 RX ADMIN — Medication 10 ML: at 09:03

## 2018-03-12 RX ADMIN — Medication 10 ML: at 09:02

## 2018-03-12 RX ADMIN — SODIUM CHLORIDE, PRESERVATIVE FREE 500 UNITS: 5 INJECTION INTRAVENOUS at 11:01

## 2018-03-12 NOTE — PROGRESS NOTES
Called patient about EMLA cream and put a note on her appointment with Dr. Janet Pruett on Wednesday to order for her. Called patient and talked to her about making sure to get EMLA cream ordered and how to apply with piece of saran wrap over it and to put on 30 minutes prior to appointment with infusion center. She verbalized understanding.

## 2018-03-12 NOTE — PROGRESS NOTES
Talked to Dr. Sami Lyman about ordering EMLA cream for patient to help with pain with port access. He said he would send prescription in.

## 2018-03-12 NOTE — PROGRESS NOTES
Hydration infused and d/c'd. Mediport flushed with 5 ml heparin. Guerrero needle d/c'd, band aid to site. Patient denies any complaints. Patient discharged to home.

## 2018-03-14 ENCOUNTER — TELEPHONE (OUTPATIENT)
Dept: ONCOLOGY | Age: 53
End: 2018-03-14

## 2018-03-14 ENCOUNTER — OFFICE VISIT (OUTPATIENT)
Dept: ONCOLOGY | Age: 53
End: 2018-03-14
Payer: MEDICARE

## 2018-03-14 ENCOUNTER — HOSPITAL ENCOUNTER (OUTPATIENT)
Dept: LAB | Age: 53
Setting detail: SPECIMEN
Discharge: HOME | End: 2018-03-14
Payer: MEDICARE

## 2018-03-14 VITALS
DIASTOLIC BLOOD PRESSURE: 76 MMHG | SYSTOLIC BLOOD PRESSURE: 128 MMHG | BODY MASS INDEX: 42.4 KG/M2 | TEMPERATURE: 98.8 F | HEIGHT: 66 IN | WEIGHT: 263.8 LBS | HEART RATE: 108 BPM | OXYGEN SATURATION: 98 %

## 2018-03-14 DIAGNOSIS — C50.811 MALIGNANT NEOPLASM OF OVERLAPPING SITES OF RIGHT BREAST IN FEMALE, ESTROGEN RECEPTOR NEGATIVE (HCC): Primary | ICD-10-CM

## 2018-03-14 DIAGNOSIS — Z17.1 MALIGNANT NEOPLASM OF OVERLAPPING SITES OF BREAST IN FEMALE, ESTROGEN RECEPTOR NEGATIVE, UNSPECIFIED LATERALITY (HCC): ICD-10-CM

## 2018-03-14 DIAGNOSIS — R68.89 OTHER GENERAL SYMPTOMS AND SIGNS: ICD-10-CM

## 2018-03-14 DIAGNOSIS — C50.819 MALIGNANT NEOPLASM OF OVERLAPPING SITES OF BREAST IN FEMALE, ESTROGEN RECEPTOR NEGATIVE, UNSPECIFIED LATERALITY (HCC): ICD-10-CM

## 2018-03-14 DIAGNOSIS — Z17.1 MALIGNANT NEOPLASM OF OVERLAPPING SITES OF RIGHT BREAST IN FEMALE, ESTROGEN RECEPTOR NEGATIVE (HCC): Primary | ICD-10-CM

## 2018-03-14 LAB
ABSOLUTE EOS #: 0 K/UL (ref 0–0.4)
ABSOLUTE IMMATURE GRANULOCYTE: ABNORMAL K/UL (ref 0–0.3)
ABSOLUTE LYMPH #: 1.68 K/UL (ref 1–4.8)
ABSOLUTE MONO #: 0.2 K/UL (ref 0.1–1.2)
ALBUMIN SERPL-MCNC: 3.7 G/DL (ref 3.5–5.2)
ALBUMIN/GLOBULIN RATIO: 0.9 (ref 1–2.5)
ALP BLD-CCNC: 83 U/L (ref 35–104)
ALT SERPL-CCNC: 20 U/L (ref 5–33)
ANION GAP SERPL CALCULATED.3IONS-SCNC: 16 MMOL/L (ref 9–17)
AST SERPL-CCNC: 21 U/L
BASOPHILS # BLD: 0 % (ref 0–1)
BASOPHILS ABSOLUTE: 0 K/UL (ref 0–0.2)
BILIRUB SERPL-MCNC: 0.33 MG/DL (ref 0.3–1.2)
BUN BLDV-MCNC: 9 MG/DL (ref 6–20)
BUN/CREAT BLD: 10 (ref 9–20)
CALCIUM SERPL-MCNC: 8.9 MG/DL (ref 8.6–10.4)
CHLORIDE BLD-SCNC: 101 MMOL/L (ref 98–107)
CO2: 24 MMOL/L (ref 20–31)
CREAT SERPL-MCNC: 0.87 MG/DL (ref 0.5–0.9)
DIFFERENTIAL TYPE: ABNORMAL
EOSINOPHILS RELATIVE PERCENT: 0 % (ref 1–7)
GFR AFRICAN AMERICAN: >60 ML/MIN
GFR NON-AFRICAN AMERICAN: >60 ML/MIN
GFR SERPL CREATININE-BSD FRML MDRD: ABNORMAL ML/MIN/{1.73_M2}
GFR SERPL CREATININE-BSD FRML MDRD: ABNORMAL ML/MIN/{1.73_M2}
GLUCOSE BLD-MCNC: 132 MG/DL (ref 70–99)
HCT VFR BLD CALC: 25.3 % (ref 36–46)
HEMOGLOBIN: 8.3 G/DL (ref 12–16)
IMMATURE GRANULOCYTES: ABNORMAL %
LYMPHOCYTES # BLD: 58 % (ref 16–46)
MCH RBC QN AUTO: 33.9 PG (ref 26–34)
MCHC RBC AUTO-ENTMCNC: 33 G/DL (ref 31–37)
MCV RBC AUTO: 102.7 FL (ref 80–100)
MONOCYTES # BLD: 7 % (ref 4–11)
MORPHOLOGY: ABNORMAL
NRBC AUTOMATED: ABNORMAL PER 100 WBC
PDW BLD-RTO: 18.1 % (ref 11–14.5)
PLATELET # BLD: 43 K/UL (ref 140–450)
PLATELET ESTIMATE: ABNORMAL
PMV BLD AUTO: 8.7 FL (ref 6–12)
POTASSIUM SERPL-SCNC: 3.9 MMOL/L (ref 3.7–5.3)
RBC # BLD: 2.46 M/UL (ref 4–5.2)
RBC # BLD: ABNORMAL 10*6/UL
SEG NEUTROPHILS: 35 % (ref 43–77)
SEGMENTED NEUTROPHILS ABSOLUTE COUNT: 1.02 K/UL (ref 1.8–7.7)
SODIUM BLD-SCNC: 141 MMOL/L (ref 135–144)
TOTAL PROTEIN: 7.7 G/DL (ref 6.4–8.3)
WBC # BLD: 2.9 K/UL (ref 3.5–11)
WBC # BLD: ABNORMAL 10*3/UL

## 2018-03-14 PROCEDURE — G8427 DOCREV CUR MEDS BY ELIG CLIN: HCPCS | Performed by: INTERNAL MEDICINE

## 2018-03-14 PROCEDURE — G8484 FLU IMMUNIZE NO ADMIN: HCPCS | Performed by: INTERNAL MEDICINE

## 2018-03-14 PROCEDURE — 99215 OFFICE O/P EST HI 40 MIN: CPT | Performed by: INTERNAL MEDICINE

## 2018-03-14 PROCEDURE — 1036F TOBACCO NON-USER: CPT | Performed by: INTERNAL MEDICINE

## 2018-03-14 PROCEDURE — 3014F SCREEN MAMMO DOC REV: CPT | Performed by: INTERNAL MEDICINE

## 2018-03-14 PROCEDURE — G8417 CALC BMI ABV UP PARAM F/U: HCPCS | Performed by: INTERNAL MEDICINE

## 2018-03-14 PROCEDURE — 3017F COLORECTAL CA SCREEN DOC REV: CPT | Performed by: INTERNAL MEDICINE

## 2018-03-14 PROCEDURE — 36415 COLL VENOUS BLD VENIPUNCTURE: CPT

## 2018-03-14 PROCEDURE — 80053 COMPREHEN METABOLIC PANEL: CPT

## 2018-03-14 PROCEDURE — 85025 COMPLETE CBC W/AUTO DIFF WBC: CPT

## 2018-03-14 RX ORDER — SODIUM CHLORIDE 0.9 % (FLUSH) 0.9 %
5 SYRINGE (ML) INJECTION PRN
Status: CANCELLED | OUTPATIENT
Start: 2018-03-29

## 2018-03-14 RX ORDER — HEPARIN SODIUM (PORCINE) LOCK FLUSH IV SOLN 100 UNIT/ML 100 UNIT/ML
500 SOLUTION INTRAVENOUS PRN
Status: CANCELLED | OUTPATIENT
Start: 2018-03-29

## 2018-03-14 RX ORDER — 0.9 % SODIUM CHLORIDE 0.9 %
1000 INTRAVENOUS SOLUTION INTRAVENOUS ONCE
Status: CANCELLED
Start: 2018-04-18 | End: 2018-03-25

## 2018-03-14 RX ORDER — METHYLPREDNISOLONE SODIUM SUCCINATE 125 MG/2ML
125 INJECTION, POWDER, LYOPHILIZED, FOR SOLUTION INTRAMUSCULAR; INTRAVENOUS ONCE
Status: CANCELLED | OUTPATIENT
Start: 2018-03-29 | End: 2018-03-15

## 2018-03-14 RX ORDER — SODIUM CHLORIDE 9 MG/ML
INJECTION, SOLUTION INTRAVENOUS CONTINUOUS
Status: CANCELLED | OUTPATIENT
Start: 2018-03-29

## 2018-03-14 RX ORDER — 0.9 % SODIUM CHLORIDE 0.9 %
1000 INTRAVENOUS SOLUTION INTRAVENOUS ONCE
Status: CANCELLED
Start: 2018-04-25 | End: 2018-04-01

## 2018-03-14 RX ORDER — SODIUM CHLORIDE 9 MG/ML
INJECTION, SOLUTION INTRAVENOUS ONCE
Status: CANCELLED | OUTPATIENT
Start: 2018-03-29 | End: 2018-03-15

## 2018-03-14 RX ORDER — SODIUM CHLORIDE 0.9 % (FLUSH) 0.9 %
10 SYRINGE (ML) INJECTION PRN
Status: CANCELLED | OUTPATIENT
Start: 2018-03-29

## 2018-03-14 RX ORDER — PALONOSETRON 0.05 MG/ML
0.25 INJECTION, SOLUTION INTRAVENOUS ONCE
Status: CANCELLED | OUTPATIENT
Start: 2018-03-29

## 2018-03-14 RX ORDER — 0.9 % SODIUM CHLORIDE 0.9 %
10 VIAL (ML) INJECTION ONCE
Status: CANCELLED | OUTPATIENT
Start: 2018-03-29 | End: 2018-03-15

## 2018-03-14 RX ORDER — DIPHENHYDRAMINE HYDROCHLORIDE 50 MG/ML
50 INJECTION INTRAMUSCULAR; INTRAVENOUS ONCE
Status: CANCELLED | OUTPATIENT
Start: 2018-03-29 | End: 2018-03-15

## 2018-03-14 RX ORDER — FOLIC ACID 1 MG/1
1 TABLET ORAL DAILY
Qty: 30 TABLET | Refills: 3 | Status: SHIPPED | OUTPATIENT
Start: 2018-03-14 | End: 2018-07-22 | Stop reason: SDUPTHER

## 2018-03-14 RX ORDER — 0.9 % SODIUM CHLORIDE 0.9 %
1000 INTRAVENOUS SOLUTION INTRAVENOUS ONCE
Status: CANCELLED
Start: 2018-04-11 | End: 2018-03-18

## 2018-03-14 NOTE — PROGRESS NOTES
Sheldon Said                                                                                                                  3/14/2018  MRN:   [id number]  YOB: 1965  PCP:                           Oscar Landa CNP  Referring Physician: No ref. provider found  Treating Physician Name: Clarnce Severance, MD      Reason for visit:  Patient presents to the clinic for a follow-up visit to discuss further treatment plan    Current problems/ Active and recent treatments:  Stage IIa infiltrating ductal carcinoma of right breast, ER negative, RI positive and HER-2 amplified. Strong family history of breast cancer in sister and maternal aunt  Grade 2 diarrhea, nonbloody    Summary of Case/History:    Sheldon Said a 46 y. o.female who presents to the hematology oncology office to establish care and for further recommendations for management of her recently diagnosed right breast cancer    Patient had a mammogram after many years in September 2017 which came back abnormal.  Subsequently patient had an ultrasound which confirmed a 1.2 cm lesion in the right breast at 1:00 position. There was another 4 mm lesion at 12:00 position    Patient underwent ultrasound-guided biopsy on 9/8/17. the lesion at 1:00 position shows invasive ductal carcinoma. ER negative and RI positive associated with high-grade DCIS. Lesion at 12:00 position showed fibrocystic disease and focal adenosis and hyperplasia. Patient underwent right sided mastectomy with sentinel lymph node biopsy on 10/19/17. Pathology report showed invasive ductal carcinoma, grade 3 out of 3, 2.8 cm in size with negative margins. pT2,pN0,M0  Tumor specimen was negative for ER receptor and weekly positive for RI receptor (5-10 percent). High-grade DCIS was also noted. One sentinel lymph node was sampled which was negative for metastasis    Patient started on neoadjuvant chemotherapy using TCHP regimen.     Cycle #1, day #1 on Allergies:   Patient has no known allergies. Review of Systems:    Constitutional: Negative for fever or chills. No night sweats, weight is stable now  Eyes: No eye discharge, double vision, or eye pain   HEENT: negative for sore mouth, sore throat, hoarseness and voice change   Respiratory: negative for cough , sputum, dyspnea, wheezing, hemoptysis, chest pain   Cardiovascular: negative for chest pain, dyspnea, palpitations, orthopnea, PND   Gastrointestinal: Positive for nausea, vomiting, constipation, abdominal pain, Dysphagia, hematemesis and hematochezia . Genitourinary: negative for frequency, dysuria, nocturia, urinary incontinence, and hematuria   Integument: negative for rash, skin lesions, bruises. Hematologic/Lymphatic: negative for easy bruising, bleeding, lymphadenopathy, or petechiae   Endocrine: negative for heat or cold intolerance,weight changes, change in bowel habits and hair loss   Musculoskeletal: negative for myalgias, arthralgias, pain, joint swelling,and bone pain   Neurological: negative for headaches, dizziness, seizures, weakness, numbness      Physical Exam:    Vitals:  /76 (Site: Right Arm, Position: Sitting, Cuff Size: Medium Adult)   Pulse 108   Temp 98.8 °F (37.1 °C) (Tympanic)   Ht 5' 5.98\" (1.676 m)   Wt 263 lb 12.8 oz (119.7 kg)   LMP 02/28/2013   SpO2 98%   BMI 42.60 kg/m²    General appearance - patient not in acute distress  Mental status - AAO X3  Eyes - pupils equal and reactive, extraocular eye movements intact  Mouth - mucous membranes moist, pharynx normal without lesions  Neck - supple, no significant adenopathy  Lymphatics - no palpable lymphadenopathy, no hepatosplenomegaly  Chest - clear to auscultation, no wheezes, rales or rhonchi, symmetric air entry. Port in place. Surgical incision healing.   Heart - normal rate, regular rhythm, normal S1, S2, no murmurs  Abdomen - soft, nontender, nondistended, no masses or organomegaly  Neurological - American >60 >60 mL/min    GFR African American >60 >60 mL/min    GFR Comment          GFR Staging NOT REPORTED      Xr Chest Standard (2 Vw)    Result Date: 10/10/2017  EXAMINATION: TWO VIEWS OF THE CHEST 10/10/2017 2:37 pm COMPARISON: 10/18/2011 HISTORY: ORDERING SYSTEM PROVIDED HISTORY: Invasive ductal carcinoma of breast, right [facility]) TECHNOLOGIST PROVIDED HISTORY: Reason for exam:->Pre op Ordering Physician Provided Reason for Exam: Pre op for right mastectomy. No chest complaints. Acuity: Unknown Type of Exam: Initial Additional signs and symptoms: n/a Relevant Medical/Surgical History: breast cancer FINDINGS: The lungs are without acute focal process. There is no effusion or pneumothorax. The cardiomediastinal silhouette is stable. The osseous structures are stable. No acute process. Nm Lymphoscintigram    Result Date: 10/19/2017  EXAMINATION: LYMPHOSCINTIGRAPHY 10/19/2017 9:21 am TECHNIQUE: Sulfur colloid labeled with 0.920 mCi of Tc99 was administered in 4 subdermal wheals around the patient's right areolar region. Delayed images were obtained. HISTORY: ORDERING SYSTEM PROVIDED HISTORY: Surgery TECHNOLOGIST PROVIDED HISTORY: Reason for exam:->Surgery Ordering Physician Provided Reason for Exam: 0.920 mCi 99mTc filtered Sulfur Colloid injected subcutaneous around RT nipple. Acuity: Unknown Type of Exam: Unknown FINDINGS: There appears to be migration into the right axilla suggestive of the sentinel node. Migration of the radiotracer into the right axilla suggestive of the patient's sentinel node. MRI brain 2/5/18  Impression   1. Unremarkable MRI of the brain.  No evidence of metastatic disease. 2. Acute left sphenoid sinusitis. Impression:  Invasive ductal carcinoma of right breast, stage IIa. pT,pN0,M0. ER negative, [place] weakly positive. HER-2 amplified.   Status post right mastectomy with sentinel lymph node biopsy  Currently receiving TCH+P with plan for  6 cycle followed by

## 2018-03-15 ENCOUNTER — HOSPITAL ENCOUNTER (OUTPATIENT)
Dept: INFUSION THERAPY | Age: 53
Discharge: HOME | End: 2018-03-15
Payer: MEDICARE

## 2018-03-15 ENCOUNTER — HOSPITAL ENCOUNTER (OUTPATIENT)
Dept: INFUSION THERAPY | Age: 53
End: 2018-03-15

## 2018-03-15 VITALS
DIASTOLIC BLOOD PRESSURE: 79 MMHG | HEART RATE: 101 BPM | SYSTOLIC BLOOD PRESSURE: 107 MMHG | TEMPERATURE: 99.2 F | RESPIRATION RATE: 16 BRPM

## 2018-03-15 DIAGNOSIS — C50.811 MALIGNANT NEOPLASM OF OVERLAPPING SITES OF RIGHT BREAST IN FEMALE, ESTROGEN RECEPTOR NEGATIVE (HCC): ICD-10-CM

## 2018-03-15 DIAGNOSIS — Z17.1 MALIGNANT NEOPLASM OF OVERLAPPING SITES OF BREAST IN FEMALE, ESTROGEN RECEPTOR NEGATIVE, UNSPECIFIED LATERALITY (HCC): ICD-10-CM

## 2018-03-15 DIAGNOSIS — Z17.1 MALIGNANT NEOPLASM OF OVERLAPPING SITES OF RIGHT BREAST IN FEMALE, ESTROGEN RECEPTOR NEGATIVE (HCC): ICD-10-CM

## 2018-03-15 DIAGNOSIS — C50.819 MALIGNANT NEOPLASM OF OVERLAPPING SITES OF BREAST IN FEMALE, ESTROGEN RECEPTOR NEGATIVE, UNSPECIFIED LATERALITY (HCC): ICD-10-CM

## 2018-03-15 PROCEDURE — 96361 HYDRATE IV INFUSION ADD-ON: CPT

## 2018-03-15 PROCEDURE — 96360 HYDRATION IV INFUSION INIT: CPT

## 2018-03-15 PROCEDURE — 2580000003 HC RX 258: Performed by: INTERNAL MEDICINE

## 2018-03-15 PROCEDURE — 6360000002 HC RX W HCPCS: Performed by: INTERNAL MEDICINE

## 2018-03-15 RX ORDER — 0.9 % SODIUM CHLORIDE 0.9 %
1000 INTRAVENOUS SOLUTION INTRAVENOUS ONCE
Status: CANCELLED
Start: 2018-03-15 | End: 2018-03-15

## 2018-03-15 RX ORDER — SODIUM CHLORIDE 0.9 % (FLUSH) 0.9 %
10 SYRINGE (ML) INJECTION PRN
Status: DISCONTINUED | OUTPATIENT
Start: 2018-03-15 | End: 2018-03-16 | Stop reason: HOSPADM

## 2018-03-15 RX ORDER — HEPARIN SODIUM (PORCINE) LOCK FLUSH IV SOLN 100 UNIT/ML 100 UNIT/ML
500 SOLUTION INTRAVENOUS PRN
Status: DISCONTINUED | OUTPATIENT
Start: 2018-03-15 | End: 2018-03-16 | Stop reason: HOSPADM

## 2018-03-15 RX ORDER — HEPARIN SODIUM (PORCINE) LOCK FLUSH IV SOLN 100 UNIT/ML 100 UNIT/ML
500 SOLUTION INTRAVENOUS PRN
Status: CANCELLED | OUTPATIENT
Start: 2018-03-15

## 2018-03-15 RX ORDER — SODIUM CHLORIDE 0.9 % (FLUSH) 0.9 %
10 SYRINGE (ML) INJECTION PRN
Status: CANCELLED | OUTPATIENT
Start: 2018-03-15

## 2018-03-15 RX ORDER — 0.9 % SODIUM CHLORIDE 0.9 %
1000 INTRAVENOUS SOLUTION INTRAVENOUS ONCE
Status: COMPLETED | OUTPATIENT
Start: 2018-03-15 | End: 2018-03-15

## 2018-03-15 RX ADMIN — Medication 10 ML: at 09:15

## 2018-03-15 RX ADMIN — Medication 10 ML: at 09:16

## 2018-03-15 RX ADMIN — SODIUM CHLORIDE, PRESERVATIVE FREE 500 UNITS: 5 INJECTION INTRAVENOUS at 11:17

## 2018-03-15 RX ADMIN — SODIUM CHLORIDE 1000 ML: 9 INJECTION, SOLUTION INTRAVENOUS at 09:16

## 2018-03-15 NOTE — PROGRESS NOTES
VAD accessed per protocol with 1 inch 20 gauge mora needle per Antoinette Kulkarni RN. Flushes easy with good blood return. Flushed with 10 ml of normal saline x 2. IV infusing saline through the [place]. Patient on the unit for hydration. Has a low platelet count. Verbalizes frustration because she wanted to get last big chemo treatment done. Support given. Arm on left is bruised one johnson ecchymotic purple bruise and 4 other lighter fading ecchymotic areas. Support needed and given. Gave her 2 handouts on thrombocytopenia and reviewed them with her in detail. Encouraged to call Doctor or unit with any concerns. She has unit , office and [place] phone numbers to call. Warm blankets and food ordered.

## 2018-03-15 NOTE — PROGRESS NOTES
Patient is done with hydration infusion. D/C'd  Brena Bliss needle and applied 2x2 and tegaderm. Yellow pink drainage from needle site initially. No swelling or redness at port site noted. Patient reports feeling \"ok\" . Patient stable and discharged to home.

## 2018-03-19 ENCOUNTER — HOSPITAL ENCOUNTER (OUTPATIENT)
Dept: INFUSION THERAPY | Age: 53
Discharge: HOME | End: 2018-03-19
Payer: MEDICARE

## 2018-03-19 VITALS
RESPIRATION RATE: 16 BRPM | SYSTOLIC BLOOD PRESSURE: 111 MMHG | HEART RATE: 99 BPM | DIASTOLIC BLOOD PRESSURE: 72 MMHG | TEMPERATURE: 98.9 F

## 2018-03-19 DIAGNOSIS — C50.819 MALIGNANT NEOPLASM OF OVERLAPPING SITES OF BREAST IN FEMALE, ESTROGEN RECEPTOR NEGATIVE, UNSPECIFIED LATERALITY (HCC): ICD-10-CM

## 2018-03-19 DIAGNOSIS — Z17.1 MALIGNANT NEOPLASM OF OVERLAPPING SITES OF BREAST IN FEMALE, ESTROGEN RECEPTOR NEGATIVE, UNSPECIFIED LATERALITY (HCC): ICD-10-CM

## 2018-03-19 DIAGNOSIS — C50.811 MALIGNANT NEOPLASM OF OVERLAPPING SITES OF RIGHT BREAST IN FEMALE, ESTROGEN RECEPTOR NEGATIVE (HCC): ICD-10-CM

## 2018-03-19 DIAGNOSIS — Z17.1 MALIGNANT NEOPLASM OF OVERLAPPING SITES OF RIGHT BREAST IN FEMALE, ESTROGEN RECEPTOR NEGATIVE (HCC): ICD-10-CM

## 2018-03-19 PROCEDURE — 2580000003 HC RX 258: Performed by: INTERNAL MEDICINE

## 2018-03-19 PROCEDURE — 96360 HYDRATION IV INFUSION INIT: CPT

## 2018-03-19 PROCEDURE — 6360000002 HC RX W HCPCS: Performed by: INTERNAL MEDICINE

## 2018-03-19 PROCEDURE — 96361 HYDRATE IV INFUSION ADD-ON: CPT

## 2018-03-19 RX ORDER — 0.9 % SODIUM CHLORIDE 0.9 %
1000 INTRAVENOUS SOLUTION INTRAVENOUS ONCE
Status: COMPLETED | OUTPATIENT
Start: 2018-03-19 | End: 2018-03-19

## 2018-03-19 RX ORDER — SODIUM CHLORIDE 0.9 % (FLUSH) 0.9 %
10 SYRINGE (ML) INJECTION PRN
Status: CANCELLED | OUTPATIENT
Start: 2018-03-19

## 2018-03-19 RX ORDER — SODIUM CHLORIDE 0.9 % (FLUSH) 0.9 %
10 SYRINGE (ML) INJECTION PRN
Status: DISCONTINUED | OUTPATIENT
Start: 2018-03-19 | End: 2018-03-20 | Stop reason: HOSPADM

## 2018-03-19 RX ORDER — HEPARIN SODIUM (PORCINE) LOCK FLUSH IV SOLN 100 UNIT/ML 100 UNIT/ML
500 SOLUTION INTRAVENOUS PRN
Status: DISCONTINUED | OUTPATIENT
Start: 2018-03-19 | End: 2018-03-20 | Stop reason: HOSPADM

## 2018-03-19 RX ORDER — 0.9 % SODIUM CHLORIDE 0.9 %
1000 INTRAVENOUS SOLUTION INTRAVENOUS ONCE
Status: CANCELLED
Start: 2018-03-19 | End: 2018-03-19

## 2018-03-19 RX ORDER — HEPARIN SODIUM (PORCINE) LOCK FLUSH IV SOLN 100 UNIT/ML 100 UNIT/ML
500 SOLUTION INTRAVENOUS PRN
Status: CANCELLED | OUTPATIENT
Start: 2018-03-19

## 2018-03-19 RX ADMIN — SODIUM CHLORIDE, PRESERVATIVE FREE 500 UNITS: 5 INJECTION INTRAVENOUS at 11:48

## 2018-03-19 RX ADMIN — Medication 10 ML: at 09:44

## 2018-03-19 RX ADMIN — Medication 10 ML: at 09:43

## 2018-03-19 RX ADMIN — SODIUM CHLORIDE 1000 ML: 9 INJECTION, SOLUTION INTRAVENOUS at 09:45

## 2018-03-19 RX ADMIN — Medication 10 ML: at 09:45

## 2018-03-19 NOTE — PROGRESS NOTES
Pt ambulated into unit for IV hydration. Pt states she feels weak, but denies trouble sleeping, nausea, vomiting, diarrhea or pain. VAD site remains tender. VAD accessed x2 attempts. Blood return noted. IV hydration started. Breakfast ordered.

## 2018-03-19 NOTE — PROGRESS NOTES
IV hydration infused and d/c'd. Mediport flushed with 5 ml heparin. Guerrero needle d/c'd, pressure dressing applied to site. Patient denies any complaints and discharged to home.

## 2018-03-21 ENCOUNTER — HOSPITAL ENCOUNTER (OUTPATIENT)
Dept: NON INVASIVE DIAGNOSTICS | Age: 53
Discharge: HOME | End: 2018-03-21
Payer: MEDICARE

## 2018-03-21 DIAGNOSIS — C50.811 MALIGNANT NEOPLASM OF OVERLAPPING SITES OF RIGHT BREAST IN FEMALE, ESTROGEN RECEPTOR NEGATIVE (HCC): ICD-10-CM

## 2018-03-21 DIAGNOSIS — R68.89 OTHER GENERAL SYMPTOMS AND SIGNS: ICD-10-CM

## 2018-03-21 DIAGNOSIS — Z17.1 MALIGNANT NEOPLASM OF OVERLAPPING SITES OF RIGHT BREAST IN FEMALE, ESTROGEN RECEPTOR NEGATIVE (HCC): ICD-10-CM

## 2018-03-21 LAB
LV EF: 55 %
LVEF MODALITY: NORMAL

## 2018-03-21 PROCEDURE — 93306 TTE W/DOPPLER COMPLETE: CPT

## 2018-03-22 ENCOUNTER — HOSPITAL ENCOUNTER (OUTPATIENT)
Dept: INFUSION THERAPY | Age: 53
Discharge: HOME | End: 2018-03-22
Payer: MEDICARE

## 2018-03-22 VITALS
HEIGHT: 66 IN | HEART RATE: 93 BPM | RESPIRATION RATE: 16 BRPM | DIASTOLIC BLOOD PRESSURE: 70 MMHG | SYSTOLIC BLOOD PRESSURE: 111 MMHG | WEIGHT: 261.4 LBS | BODY MASS INDEX: 42.01 KG/M2 | TEMPERATURE: 98.3 F

## 2018-03-22 DIAGNOSIS — Z17.1 MALIGNANT NEOPLASM OF OVERLAPPING SITES OF BREAST IN FEMALE, ESTROGEN RECEPTOR NEGATIVE, UNSPECIFIED LATERALITY (HCC): ICD-10-CM

## 2018-03-22 DIAGNOSIS — C50.811 MALIGNANT NEOPLASM OF OVERLAPPING SITES OF RIGHT BREAST IN FEMALE, ESTROGEN RECEPTOR NEGATIVE (HCC): ICD-10-CM

## 2018-03-22 DIAGNOSIS — Z17.1 MALIGNANT NEOPLASM OF OVERLAPPING SITES OF RIGHT BREAST IN FEMALE, ESTROGEN RECEPTOR NEGATIVE (HCC): ICD-10-CM

## 2018-03-22 DIAGNOSIS — C50.819 MALIGNANT NEOPLASM OF OVERLAPPING SITES OF BREAST IN FEMALE, ESTROGEN RECEPTOR NEGATIVE, UNSPECIFIED LATERALITY (HCC): ICD-10-CM

## 2018-03-22 LAB
ABSOLUTE EOS #: 0 K/UL (ref 0–0.4)
ABSOLUTE IMMATURE GRANULOCYTE: ABNORMAL K/UL (ref 0–0.3)
ABSOLUTE LYMPH #: 1.6 K/UL (ref 1–4.8)
ABSOLUTE MONO #: 0.4 K/UL (ref 0.1–1.2)
ALBUMIN SERPL-MCNC: 3.6 G/DL (ref 3.5–5.2)
ALBUMIN/GLOBULIN RATIO: 0.9 (ref 1–2.5)
ALP BLD-CCNC: 78 U/L (ref 35–104)
ALT SERPL-CCNC: 17 U/L (ref 5–33)
ANION GAP SERPL CALCULATED.3IONS-SCNC: 13 MMOL/L (ref 9–17)
AST SERPL-CCNC: 18 U/L
BASOPHILS # BLD: 0 % (ref 0–1)
BASOPHILS ABSOLUTE: 0 K/UL (ref 0–0.2)
BILIRUB SERPL-MCNC: 0.26 MG/DL (ref 0.3–1.2)
BUN BLDV-MCNC: 25 MG/DL (ref 6–20)
BUN/CREAT BLD: 30 (ref 9–20)
CALCIUM SERPL-MCNC: 9.1 MG/DL (ref 8.6–10.4)
CHLORIDE BLD-SCNC: 100 MMOL/L (ref 98–107)
CO2: 24 MMOL/L (ref 20–31)
CREAT SERPL-MCNC: 0.84 MG/DL (ref 0.5–0.9)
DIFFERENTIAL TYPE: ABNORMAL
EOSINOPHILS RELATIVE PERCENT: 1 % (ref 1–7)
GFR AFRICAN AMERICAN: >60 ML/MIN
GFR NON-AFRICAN AMERICAN: >60 ML/MIN
GFR SERPL CREATININE-BSD FRML MDRD: ABNORMAL ML/MIN/{1.73_M2}
GFR SERPL CREATININE-BSD FRML MDRD: ABNORMAL ML/MIN/{1.73_M2}
GLUCOSE BLD-MCNC: 99 MG/DL (ref 70–99)
HCT VFR BLD CALC: 25.1 % (ref 36–46)
HEMOGLOBIN: 8.2 G/DL (ref 12–16)
IMMATURE GRANULOCYTES: ABNORMAL %
LYMPHOCYTES # BLD: 56 % (ref 16–46)
MAGNESIUM: 1.6 MG/DL (ref 1.6–2.6)
MCH RBC QN AUTO: 34.4 PG (ref 26–34)
MCHC RBC AUTO-ENTMCNC: 32.6 G/DL (ref 31–37)
MCV RBC AUTO: 105.4 FL (ref 80–100)
MONOCYTES # BLD: 13 % (ref 4–11)
NRBC AUTOMATED: ABNORMAL PER 100 WBC
PDW BLD-RTO: 19.7 % (ref 11–14.5)
PLATELET # BLD: 241 K/UL (ref 140–450)
PLATELET ESTIMATE: ABNORMAL
PMV BLD AUTO: 7.8 FL (ref 6–12)
POTASSIUM SERPL-SCNC: 4.2 MMOL/L (ref 3.7–5.3)
RBC # BLD: 2.38 M/UL (ref 4–5.2)
RBC # BLD: ABNORMAL 10*6/UL
SEG NEUTROPHILS: 30 % (ref 43–77)
SEGMENTED NEUTROPHILS ABSOLUTE COUNT: 0.9 K/UL (ref 1.8–7.7)
SODIUM BLD-SCNC: 137 MMOL/L (ref 135–144)
TOTAL PROTEIN: 7.4 G/DL (ref 6.4–8.3)
WBC # BLD: 2.9 K/UL (ref 3.5–11)
WBC # BLD: ABNORMAL 10*3/UL

## 2018-03-22 PROCEDURE — 6360000002 HC RX W HCPCS: Performed by: INTERNAL MEDICINE

## 2018-03-22 PROCEDURE — 83735 ASSAY OF MAGNESIUM: CPT

## 2018-03-22 PROCEDURE — 85025 COMPLETE CBC W/AUTO DIFF WBC: CPT

## 2018-03-22 PROCEDURE — 96361 HYDRATE IV INFUSION ADD-ON: CPT

## 2018-03-22 PROCEDURE — 2580000003 HC RX 258: Performed by: INTERNAL MEDICINE

## 2018-03-22 PROCEDURE — 80053 COMPREHEN METABOLIC PANEL: CPT

## 2018-03-22 PROCEDURE — 96360 HYDRATION IV INFUSION INIT: CPT

## 2018-03-22 PROCEDURE — 36415 COLL VENOUS BLD VENIPUNCTURE: CPT

## 2018-03-22 RX ORDER — 0.9 % SODIUM CHLORIDE 0.9 %
1000 INTRAVENOUS SOLUTION INTRAVENOUS ONCE
Status: COMPLETED | OUTPATIENT
Start: 2018-03-22 | End: 2018-03-22

## 2018-03-22 RX ORDER — HEPARIN SODIUM (PORCINE) LOCK FLUSH IV SOLN 100 UNIT/ML 100 UNIT/ML
500 SOLUTION INTRAVENOUS PRN
Status: DISCONTINUED | OUTPATIENT
Start: 2018-03-22 | End: 2018-03-23 | Stop reason: HOSPADM

## 2018-03-22 RX ORDER — 0.9 % SODIUM CHLORIDE 0.9 %
1000 INTRAVENOUS SOLUTION INTRAVENOUS ONCE
Status: CANCELLED
Start: 2018-03-22 | End: 2018-03-22

## 2018-03-22 RX ORDER — HEPARIN SODIUM (PORCINE) LOCK FLUSH IV SOLN 100 UNIT/ML 100 UNIT/ML
500 SOLUTION INTRAVENOUS PRN
Status: CANCELLED | OUTPATIENT
Start: 2018-03-22

## 2018-03-22 RX ORDER — SODIUM CHLORIDE 0.9 % (FLUSH) 0.9 %
10 SYRINGE (ML) INJECTION PRN
Status: DISCONTINUED | OUTPATIENT
Start: 2018-03-22 | End: 2018-03-23 | Stop reason: HOSPADM

## 2018-03-22 RX ORDER — SODIUM CHLORIDE 0.9 % (FLUSH) 0.9 %
10 SYRINGE (ML) INJECTION PRN
Status: CANCELLED | OUTPATIENT
Start: 2018-03-22

## 2018-03-22 RX ADMIN — Medication 10 ML: at 09:20

## 2018-03-22 RX ADMIN — SODIUM CHLORIDE 1000 ML: 9 INJECTION, SOLUTION INTRAVENOUS at 09:20

## 2018-03-22 RX ADMIN — SODIUM CHLORIDE, PRESERVATIVE FREE 500 UNITS: 5 INJECTION INTRAVENOUS at 11:40

## 2018-03-22 ASSESSMENT — PAIN SCALES - GENERAL
PAINLEVEL_OUTOF10: 0
PAINLEVEL_OUTOF10: 0

## 2018-03-22 NOTE — PROGRESS NOTES
Gave patient handout on neutropenia. Talked to her about handwashing, avoiding large crowds monitor Temperature above 100.5 F Report / seek treatment. Encouraged to read handout on neutropenia.

## 2018-03-22 NOTE — PROGRESS NOTES
Labs back. Dr Mariana Vickers was text at [phone] to call back. Pt tearful when lab results given and emotional support given.

## 2018-03-22 NOTE — PROGRESS NOTES
Sitting in chair looking at phone. No complaints. Will monitor. Needs support. Upset treatment is delayed again.

## 2018-03-22 NOTE — PROGRESS NOTES
Infusion complete. VAD flushed with NSS and Heparin, deaccessed without difficulty. Dressing applied. Discharged to home with daughter.

## 2018-03-26 ENCOUNTER — HOSPITAL ENCOUNTER (OUTPATIENT)
Dept: INFUSION THERAPY | Age: 53
Discharge: HOME | End: 2018-03-26
Payer: MEDICARE

## 2018-03-26 VITALS
DIASTOLIC BLOOD PRESSURE: 77 MMHG | HEART RATE: 118 BPM | TEMPERATURE: 99.1 F | SYSTOLIC BLOOD PRESSURE: 117 MMHG | RESPIRATION RATE: 16 BRPM

## 2018-03-26 DIAGNOSIS — Z17.1 MALIGNANT NEOPLASM OF OVERLAPPING SITES OF RIGHT BREAST IN FEMALE, ESTROGEN RECEPTOR NEGATIVE (HCC): ICD-10-CM

## 2018-03-26 DIAGNOSIS — C50.811 MALIGNANT NEOPLASM OF OVERLAPPING SITES OF RIGHT BREAST IN FEMALE, ESTROGEN RECEPTOR NEGATIVE (HCC): ICD-10-CM

## 2018-03-26 PROCEDURE — 96361 HYDRATE IV INFUSION ADD-ON: CPT

## 2018-03-26 PROCEDURE — 96360 HYDRATION IV INFUSION INIT: CPT

## 2018-03-26 PROCEDURE — 6360000002 HC RX W HCPCS: Performed by: INTERNAL MEDICINE

## 2018-03-26 PROCEDURE — 2580000003 HC RX 258: Performed by: INTERNAL MEDICINE

## 2018-03-26 RX ORDER — HEPARIN SODIUM (PORCINE) LOCK FLUSH IV SOLN 100 UNIT/ML 100 UNIT/ML
500 SOLUTION INTRAVENOUS PRN
Status: CANCELLED | OUTPATIENT
Start: 2018-03-26

## 2018-03-26 RX ORDER — SODIUM CHLORIDE 0.9 % (FLUSH) 0.9 %
10 SYRINGE (ML) INJECTION PRN
Status: CANCELLED | OUTPATIENT
Start: 2018-03-26

## 2018-03-26 RX ORDER — SODIUM CHLORIDE 0.9 % (FLUSH) 0.9 %
10 SYRINGE (ML) INJECTION PRN
Status: DISCONTINUED | OUTPATIENT
Start: 2018-03-26 | End: 2018-03-27 | Stop reason: HOSPADM

## 2018-03-26 RX ORDER — 0.9 % SODIUM CHLORIDE 0.9 %
1000 INTRAVENOUS SOLUTION INTRAVENOUS ONCE
Status: CANCELLED
Start: 2018-03-26 | End: 2018-03-26

## 2018-03-26 RX ORDER — 0.9 % SODIUM CHLORIDE 0.9 %
1000 INTRAVENOUS SOLUTION INTRAVENOUS ONCE
Status: COMPLETED | OUTPATIENT
Start: 2018-03-26 | End: 2018-03-26

## 2018-03-26 RX ORDER — HEPARIN SODIUM (PORCINE) LOCK FLUSH IV SOLN 100 UNIT/ML 100 UNIT/ML
500 SOLUTION INTRAVENOUS PRN
Status: DISCONTINUED | OUTPATIENT
Start: 2018-03-26 | End: 2018-03-27 | Stop reason: HOSPADM

## 2018-03-26 RX ADMIN — SODIUM CHLORIDE, PRESERVATIVE FREE 500 UNITS: 5 INJECTION INTRAVENOUS at 12:55

## 2018-03-26 RX ADMIN — SODIUM CHLORIDE 1000 ML: 9 INJECTION, SOLUTION INTRAVENOUS at 10:50

## 2018-03-26 RX ADMIN — Medication 10 ML: at 10:49

## 2018-03-26 NOTE — PROGRESS NOTES
Hydration infused. Patient feels \"pretty good\"  Hoping to have better lab work on Thursday wants to get her chemo treatment. Support needed and given. Flushed with saline and 5 ml of heparin flush. D/C mora needle. 2x2 and tegaderm to site. Patient stable and discharged to home.

## 2018-03-26 NOTE — PROGRESS NOTES
Patient on unit for Hydration. Reports she is feeling better. Feels she has more energy. VAD accessed per protocol with 3/4 inch 20 gauge mora needle. Flushed easily with saline 10 ml.  no blood return. Accessed medial port side. She could taste saline as it was pushed in. Secured accessed port with transparent dressing. IV infusing Saline at 500 ml/hr. Sitting in chair feet elevated. Will monitor. Reports no pain at port site.

## 2018-03-29 ENCOUNTER — HOSPITAL ENCOUNTER (OUTPATIENT)
Dept: INFUSION THERAPY | Age: 53
Discharge: HOME | End: 2018-03-29
Payer: MEDICARE

## 2018-03-29 VITALS
DIASTOLIC BLOOD PRESSURE: 80 MMHG | WEIGHT: 259.4 LBS | SYSTOLIC BLOOD PRESSURE: 133 MMHG | RESPIRATION RATE: 16 BRPM | HEART RATE: 106 BPM | BODY MASS INDEX: 41.69 KG/M2 | HEIGHT: 66 IN | TEMPERATURE: 98.7 F

## 2018-03-29 DIAGNOSIS — C50.819 MALIGNANT NEOPLASM OF OVERLAPPING SITES OF BREAST IN FEMALE, ESTROGEN RECEPTOR NEGATIVE, UNSPECIFIED LATERALITY (HCC): ICD-10-CM

## 2018-03-29 DIAGNOSIS — Z17.1 MALIGNANT NEOPLASM OF OVERLAPPING SITES OF RIGHT BREAST IN FEMALE, ESTROGEN RECEPTOR NEGATIVE (HCC): ICD-10-CM

## 2018-03-29 DIAGNOSIS — Z17.1 MALIGNANT NEOPLASM OF OVERLAPPING SITES OF BREAST IN FEMALE, ESTROGEN RECEPTOR NEGATIVE, UNSPECIFIED LATERALITY (HCC): ICD-10-CM

## 2018-03-29 DIAGNOSIS — C50.811 MALIGNANT NEOPLASM OF OVERLAPPING SITES OF RIGHT BREAST IN FEMALE, ESTROGEN RECEPTOR NEGATIVE (HCC): ICD-10-CM

## 2018-03-29 LAB
ABSOLUTE EOS #: 0.1 K/UL (ref 0–0.4)
ABSOLUTE IMMATURE GRANULOCYTE: ABNORMAL K/UL (ref 0–0.3)
ABSOLUTE LYMPH #: 2.3 K/UL (ref 1–4.8)
ABSOLUTE MONO #: 0.7 K/UL (ref 0.1–1.2)
ALBUMIN SERPL-MCNC: 3.7 G/DL (ref 3.5–5.2)
ALBUMIN/GLOBULIN RATIO: 0.9 (ref 1–2.5)
ALP BLD-CCNC: 92 U/L (ref 35–104)
ALT SERPL-CCNC: 13 U/L (ref 5–33)
ANION GAP SERPL CALCULATED.3IONS-SCNC: 17 MMOL/L (ref 9–17)
AST SERPL-CCNC: 13 U/L
BASOPHILS # BLD: 1 % (ref 0–1)
BASOPHILS ABSOLUTE: 0.1 K/UL (ref 0–0.2)
BILIRUB SERPL-MCNC: 0.25 MG/DL (ref 0.3–1.2)
BUN BLDV-MCNC: 19 MG/DL (ref 6–20)
BUN/CREAT BLD: 18 (ref 9–20)
CALCIUM SERPL-MCNC: 9.7 MG/DL (ref 8.6–10.4)
CHLORIDE BLD-SCNC: 99 MMOL/L (ref 98–107)
CO2: 23 MMOL/L (ref 20–31)
CREAT SERPL-MCNC: 1.04 MG/DL (ref 0.5–0.9)
DIFFERENTIAL TYPE: ABNORMAL
EOSINOPHILS RELATIVE PERCENT: 2 % (ref 1–7)
GFR AFRICAN AMERICAN: >60 ML/MIN
GFR NON-AFRICAN AMERICAN: 56 ML/MIN
GFR SERPL CREATININE-BSD FRML MDRD: ABNORMAL ML/MIN/{1.73_M2}
GFR SERPL CREATININE-BSD FRML MDRD: ABNORMAL ML/MIN/{1.73_M2}
GLUCOSE BLD-MCNC: 104 MG/DL (ref 70–99)
HCT VFR BLD CALC: 28.1 % (ref 36–46)
HEMOGLOBIN: 9.3 G/DL (ref 12–16)
IMMATURE GRANULOCYTES: ABNORMAL %
LYMPHOCYTES # BLD: 37 % (ref 16–46)
MCH RBC QN AUTO: 34.6 PG (ref 26–34)
MCHC RBC AUTO-ENTMCNC: 33 G/DL (ref 31–37)
MCV RBC AUTO: 104.7 FL (ref 80–100)
MONOCYTES # BLD: 11 % (ref 4–11)
NRBC AUTOMATED: ABNORMAL PER 100 WBC
PDW BLD-RTO: 18.2 % (ref 11–14.5)
PLATELET # BLD: 286 K/UL (ref 140–450)
PLATELET ESTIMATE: ABNORMAL
PMV BLD AUTO: 7.5 FL (ref 6–12)
POTASSIUM SERPL-SCNC: 4.3 MMOL/L (ref 3.7–5.3)
RBC # BLD: 2.69 M/UL (ref 4–5.2)
RBC # BLD: ABNORMAL 10*6/UL
SEG NEUTROPHILS: 49 % (ref 43–77)
SEGMENTED NEUTROPHILS ABSOLUTE COUNT: 3.1 K/UL (ref 1.8–7.7)
SODIUM BLD-SCNC: 139 MMOL/L (ref 135–144)
TOTAL PROTEIN: 7.7 G/DL (ref 6.4–8.3)
WBC # BLD: 6.2 K/UL (ref 3.5–11)
WBC # BLD: ABNORMAL 10*3/UL

## 2018-03-29 PROCEDURE — 96367 TX/PROPH/DG ADDL SEQ IV INF: CPT

## 2018-03-29 PROCEDURE — 2580000003 HC RX 258: Performed by: INTERNAL MEDICINE

## 2018-03-29 PROCEDURE — 36591 DRAW BLOOD OFF VENOUS DEVICE: CPT

## 2018-03-29 PROCEDURE — 6360000002 HC RX W HCPCS: Performed by: INTERNAL MEDICINE

## 2018-03-29 PROCEDURE — 96409 CHEMO IV PUSH SNGL DRUG: CPT

## 2018-03-29 PROCEDURE — 96417 CHEMO IV INFUS EACH ADDL SEQ: CPT

## 2018-03-29 PROCEDURE — 96375 TX/PRO/DX INJ NEW DRUG ADDON: CPT

## 2018-03-29 PROCEDURE — 80053 COMPREHEN METABOLIC PANEL: CPT

## 2018-03-29 PROCEDURE — 96415 CHEMO IV INFUSION ADDL HR: CPT

## 2018-03-29 PROCEDURE — 96413 CHEMO IV INFUSION 1 HR: CPT

## 2018-03-29 PROCEDURE — 85025 COMPLETE CBC W/AUTO DIFF WBC: CPT

## 2018-03-29 RX ORDER — SODIUM CHLORIDE 0.9 % (FLUSH) 0.9 %
10 SYRINGE (ML) INJECTION PRN
Status: DISCONTINUED | OUTPATIENT
Start: 2018-03-29 | End: 2018-03-30 | Stop reason: HOSPADM

## 2018-03-29 RX ORDER — PALONOSETRON 0.05 MG/ML
0.25 INJECTION, SOLUTION INTRAVENOUS ONCE
Status: COMPLETED | OUTPATIENT
Start: 2018-03-29 | End: 2018-03-29

## 2018-03-29 RX ORDER — HEPARIN SODIUM (PORCINE) LOCK FLUSH IV SOLN 100 UNIT/ML 100 UNIT/ML
500 SOLUTION INTRAVENOUS PRN
Status: DISCONTINUED | OUTPATIENT
Start: 2018-03-29 | End: 2018-03-30 | Stop reason: HOSPADM

## 2018-03-29 RX ORDER — SODIUM CHLORIDE 9 MG/ML
INJECTION, SOLUTION INTRAVENOUS ONCE
Status: COMPLETED | OUTPATIENT
Start: 2018-03-29 | End: 2018-03-29

## 2018-03-29 RX ADMIN — Medication 10 ML: at 09:15

## 2018-03-29 RX ADMIN — CARBOPLATIN 800 MG: 10 INJECTION, SOLUTION INTRAVENOUS at 13:53

## 2018-03-29 RX ADMIN — TRASTUZUMAB 750 MG: 150 INJECTION, POWDER, LYOPHILIZED, FOR SOLUTION INTRAVENOUS at 12:05

## 2018-03-29 RX ADMIN — SODIUM CHLORIDE: 9 INJECTION, SOLUTION INTRAVENOUS at 09:16

## 2018-03-29 RX ADMIN — PALONOSETRON HYDROCHLORIDE 0.25 MG: 0.25 INJECTION INTRAVENOUS at 10:14

## 2018-03-29 RX ADMIN — PERTUZUMAB 420 MG: 30 INJECTION, SOLUTION, CONCENTRATE INTRAVENOUS at 10:53

## 2018-03-29 RX ADMIN — DEXAMETHASONE SODIUM PHOSPHATE 12 MG: 10 INJECTION, SOLUTION INTRAMUSCULAR; INTRAVENOUS at 10:13

## 2018-03-29 RX ADMIN — DOCETAXEL 187 MG: 20 INJECTION, SOLUTION, CONCENTRATE INTRAVENOUS at 12:44

## 2018-03-29 RX ADMIN — SODIUM CHLORIDE, PRESERVATIVE FREE 500 UNITS: 5 INJECTION INTRAVENOUS at 14:33

## 2018-03-29 ASSESSMENT — PAIN SCALES - GENERAL: PAINLEVEL_OUTOF10: 0

## 2018-03-29 NOTE — PROGRESS NOTES
Chemotherapy infused and d/c'd. Mediport flushed with 30 ml NSS and 5 ml heparin. Patient tolerated treatment without difficulty. Guerrero needle d/c'd, pressure dressing applied. Patient up to restroom and denies any complaints. Patient discharged to home with daughter.

## 2018-03-29 NOTE — PROGRESS NOTES
VAD accessed per protocol with 1 inch 20 gauge mora needle. Flushed easily with saline 10 ml. Good blood return. Labs drawn. Secured accessed port with transparent dressing. IV infusing NSS. Patient sitting in chair answering Toxicity screening questions. Warm blankets provided.

## 2018-03-29 NOTE — PROGRESS NOTES
Talked to Dr. Yue Crowley about labs. Orders were signed and released. Creatinine level 1.04 reported to Dr. Yue Crowley and pharmacy. Weight loss reported to Dr. Yue Crowley and pharmacy.

## 2018-03-29 NOTE — PROGRESS NOTES
Weight loss is less than 10 percent no dosage change needed. Perjeta is infused will monitor 30 minutes before infusing herceptin. Patient is feeling well. Up to bathroom and stable.

## 2018-04-05 ENCOUNTER — HOSPITAL ENCOUNTER (OUTPATIENT)
Age: 53
Setting detail: OBSERVATION
Discharge: HOME | End: 2018-04-08
Attending: EMERGENCY MEDICINE | Admitting: INTERNAL MEDICINE
Payer: MEDICARE

## 2018-04-05 ENCOUNTER — HOSPITAL ENCOUNTER (OUTPATIENT)
Dept: INFUSION THERAPY | Age: 53
Discharge: HOME | End: 2018-04-05
Payer: MEDICARE

## 2018-04-05 VITALS — TEMPERATURE: 98 F

## 2018-04-05 DIAGNOSIS — C50.811 MALIGNANT NEOPLASM OF OVERLAPPING SITES OF RIGHT BREAST IN FEMALE, ESTROGEN RECEPTOR NEGATIVE (HCC): ICD-10-CM

## 2018-04-05 DIAGNOSIS — Z17.1 MALIGNANT NEOPLASM OF OVERLAPPING SITES OF RIGHT BREAST IN FEMALE, ESTROGEN RECEPTOR NEGATIVE (HCC): ICD-10-CM

## 2018-04-05 DIAGNOSIS — A41.9 SEPSIS, DUE TO UNSPECIFIED ORGANISM: Primary | ICD-10-CM

## 2018-04-05 DIAGNOSIS — N30.00 ACUTE CYSTITIS WITHOUT HEMATURIA: ICD-10-CM

## 2018-04-05 LAB
-: ABNORMAL
ABSOLUTE BANDS #: 0.03 K/UL
ABSOLUTE EOS #: 0 K/UL (ref 0–0.4)
ABSOLUTE IMMATURE GRANULOCYTE: ABNORMAL K/UL (ref 0–0.3)
ABSOLUTE LYMPH #: 1.56 K/UL (ref 1–4.8)
ABSOLUTE MONO #: 0.08 K/UL (ref 0.1–1.2)
AMORPHOUS: ABNORMAL
ANION GAP SERPL CALCULATED.3IONS-SCNC: 14 MMOL/L (ref 9–17)
ATYPICAL LYMPHOCYTE ABSOLUTE COUNT: 0.1 K/UL
ATYPICAL LYMPHOCYTES: 4 %
BACTERIA: ABNORMAL
BANDS: 1 %
BASOPHILS # BLD: 0 % (ref 0–1)
BASOPHILS ABSOLUTE: 0 K/UL (ref 0–0.2)
BILIRUBIN URINE: NEGATIVE
BUN BLDV-MCNC: 34 MG/DL (ref 6–20)
BUN/CREAT BLD: 21 (ref 9–20)
CALCIUM SERPL-MCNC: 9.9 MG/DL (ref 8.6–10.4)
CASTS UA: ABNORMAL /LPF (ref 0–2)
CHLORIDE BLD-SCNC: 94 MMOL/L (ref 98–107)
CO2: 23 MMOL/L (ref 20–31)
COLOR: ABNORMAL
COMMENT UA: ABNORMAL
CREAT SERPL-MCNC: 1.59 MG/DL (ref 0.5–0.9)
CRYSTALS, UA: ABNORMAL /HPF
DIFFERENTIAL TYPE: ABNORMAL
EOSINOPHILS RELATIVE PERCENT: 0 % (ref 1–7)
EPITHELIAL CELLS UA: ABNORMAL /HPF (ref 0–5)
GFR AFRICAN AMERICAN: 41 ML/MIN
GFR NON-AFRICAN AMERICAN: 34 ML/MIN
GFR SERPL CREATININE-BSD FRML MDRD: ABNORMAL ML/MIN/{1.73_M2}
GFR SERPL CREATININE-BSD FRML MDRD: ABNORMAL ML/MIN/{1.73_M2}
GLUCOSE BLD-MCNC: 111 MG/DL (ref 70–99)
GLUCOSE URINE: NEGATIVE
HCT VFR BLD CALC: 30 % (ref 36–46)
HEMOGLOBIN: 10 G/DL (ref 12–16)
IMMATURE GRANULOCYTES: ABNORMAL %
KETONES, URINE: NEGATIVE
LACTIC ACID: 2.2 MMOL/L (ref 0.5–2.2)
LACTIC ACID: 2.5 MMOL/L (ref 0.5–2.2)
LACTIC ACID: 2.5 MMOL/L (ref 0.5–2.2)
LEUKOCYTE ESTERASE, URINE: ABNORMAL
LYMPHOCYTES # BLD: 63 % (ref 16–46)
MCH RBC QN AUTO: 34.3 PG (ref 26–34)
MCHC RBC AUTO-ENTMCNC: 33.3 G/DL (ref 31–37)
MCV RBC AUTO: 102.8 FL (ref 80–100)
MONOCYTES # BLD: 3 % (ref 4–11)
MORPHOLOGY: ABNORMAL
MUCUS: ABNORMAL
NITRITE, URINE: NEGATIVE
NRBC AUTOMATED: ABNORMAL PER 100 WBC
OTHER OBSERVATIONS UA: ABNORMAL
PDW BLD-RTO: 15.8 % (ref 11–14.5)
PH UA: 5.5 (ref 5–6)
PLATELET # BLD: 168 K/UL (ref 140–450)
PLATELET ESTIMATE: ABNORMAL
PMV BLD AUTO: 9.3 FL (ref 6–12)
POTASSIUM SERPL-SCNC: 4.6 MMOL/L (ref 3.7–5.3)
PROTEIN UA: NEGATIVE
RBC # BLD: 2.91 M/UL (ref 4–5.2)
RBC # BLD: ABNORMAL 10*6/UL
RBC UA: ABNORMAL /HPF (ref 0–4)
RENAL EPITHELIAL, UA: ABNORMAL /HPF
SEG NEUTROPHILS: 29 % (ref 43–77)
SEGMENTED NEUTROPHILS ABSOLUTE COUNT: 0.73 K/UL (ref 1.8–7.7)
SODIUM BLD-SCNC: 131 MMOL/L (ref 135–144)
SPECIFIC GRAVITY UA: 1.01 (ref 1.01–1.02)
TRICHOMONAS: ABNORMAL
TURBIDITY: ABNORMAL
URINE HGB: NEGATIVE
UROBILINOGEN, URINE: NORMAL
WBC # BLD: 2.5 K/UL (ref 3.5–11)
WBC # BLD: ABNORMAL 10*3/UL
WBC UA: ABNORMAL /HPF (ref 0–4)
YEAST: ABNORMAL

## 2018-04-05 PROCEDURE — 96372 THER/PROPH/DIAG INJ SC/IM: CPT

## 2018-04-05 PROCEDURE — 83605 ASSAY OF LACTIC ACID: CPT

## 2018-04-05 PROCEDURE — 96361 HYDRATE IV INFUSION ADD-ON: CPT

## 2018-04-05 PROCEDURE — 87040 BLOOD CULTURE FOR BACTERIA: CPT

## 2018-04-05 PROCEDURE — 85025 COMPLETE CBC W/AUTO DIFF WBC: CPT

## 2018-04-05 PROCEDURE — 96376 TX/PRO/DX INJ SAME DRUG ADON: CPT

## 2018-04-05 PROCEDURE — 80048 BASIC METABOLIC PNL TOTAL CA: CPT

## 2018-04-05 PROCEDURE — 99226 PR SBSQ OBSERVATION CARE/DAY 35 MINUTES: CPT | Performed by: INTERNAL MEDICINE

## 2018-04-05 PROCEDURE — 87086 URINE CULTURE/COLONY COUNT: CPT

## 2018-04-05 PROCEDURE — 81001 URINALYSIS AUTO W/SCOPE: CPT

## 2018-04-05 PROCEDURE — G0378 HOSPITAL OBSERVATION PER HR: HCPCS

## 2018-04-05 PROCEDURE — 96375 TX/PRO/DX INJ NEW DRUG ADDON: CPT

## 2018-04-05 PROCEDURE — 87205 SMEAR GRAM STAIN: CPT

## 2018-04-05 PROCEDURE — 36415 COLL VENOUS BLD VENIPUNCTURE: CPT

## 2018-04-05 PROCEDURE — 94760 N-INVAS EAR/PLS OXIMETRY 1: CPT

## 2018-04-05 PROCEDURE — 6370000000 HC RX 637 (ALT 250 FOR IP): Performed by: INTERNAL MEDICINE

## 2018-04-05 PROCEDURE — 6360000002 HC RX W HCPCS: Performed by: INTERNAL MEDICINE

## 2018-04-05 PROCEDURE — 99284 EMERGENCY DEPT VISIT MOD MDM: CPT

## 2018-04-05 PROCEDURE — 87493 C DIFF AMPLIFIED PROBE: CPT

## 2018-04-05 PROCEDURE — 2580000003 HC RX 258: Performed by: INTERNAL MEDICINE

## 2018-04-05 PROCEDURE — 83735 ASSAY OF MAGNESIUM: CPT

## 2018-04-05 PROCEDURE — 2580000003 HC RX 258: Performed by: EMERGENCY MEDICINE

## 2018-04-05 PROCEDURE — 6360000002 HC RX W HCPCS: Performed by: EMERGENCY MEDICINE

## 2018-04-05 PROCEDURE — 2580000003 HC RX 258: Performed by: NURSE PRACTITIONER

## 2018-04-05 PROCEDURE — 96365 THER/PROPH/DIAG IV INF INIT: CPT

## 2018-04-05 RX ORDER — SODIUM CHLORIDE 0.9 % (FLUSH) 0.9 %
10 SYRINGE (ML) INJECTION EVERY 12 HOURS SCHEDULED
Status: DISCONTINUED | OUTPATIENT
Start: 2018-04-05 | End: 2018-04-09 | Stop reason: HOSPADM

## 2018-04-05 RX ORDER — SODIUM CHLORIDE 0.9 % (FLUSH) 0.9 %
10 SYRINGE (ML) INJECTION PRN
Status: CANCELLED | OUTPATIENT
Start: 2018-04-05

## 2018-04-05 RX ORDER — 0.9 % SODIUM CHLORIDE 0.9 %
1000 INTRAVENOUS SOLUTION INTRAVENOUS ONCE
Status: COMPLETED | OUTPATIENT
Start: 2018-04-05 | End: 2018-04-05

## 2018-04-05 RX ORDER — HYDROCODONE BITARTRATE AND ACETAMINOPHEN 5; 325 MG/1; MG/1
2 TABLET ORAL EVERY 4 HOURS PRN
Status: DISCONTINUED | OUTPATIENT
Start: 2018-04-05 | End: 2018-04-09 | Stop reason: HOSPADM

## 2018-04-05 RX ORDER — POTASSIUM CHLORIDE 20 MEQ/1
20 TABLET, EXTENDED RELEASE ORAL DAILY
Status: DISCONTINUED | OUTPATIENT
Start: 2018-04-05 | End: 2018-04-09 | Stop reason: HOSPADM

## 2018-04-05 RX ORDER — DIAPER,BRIEF,INFANT-TODD,DISP
EACH MISCELLANEOUS 2 TIMES DAILY
Status: DISCONTINUED | OUTPATIENT
Start: 2018-04-05 | End: 2018-04-09 | Stop reason: HOSPADM

## 2018-04-05 RX ORDER — LAMOTRIGINE 25 MG/1
50 TABLET ORAL NIGHTLY
Status: DISCONTINUED | OUTPATIENT
Start: 2018-04-05 | End: 2018-04-09 | Stop reason: HOSPADM

## 2018-04-05 RX ORDER — LISINOPRIL 5 MG/1
20 TABLET ORAL DAILY
Status: DISCONTINUED | OUTPATIENT
Start: 2018-04-05 | End: 2018-04-09 | Stop reason: HOSPADM

## 2018-04-05 RX ORDER — SODIUM CHLORIDE 9 MG/ML
INJECTION, SOLUTION INTRAVENOUS CONTINUOUS
Status: DISCONTINUED | OUTPATIENT
Start: 2018-04-05 | End: 2018-04-09 | Stop reason: HOSPADM

## 2018-04-05 RX ORDER — SODIUM CHLORIDE 0.9 % (FLUSH) 0.9 %
10 SYRINGE (ML) INJECTION PRN
Status: DISCONTINUED | OUTPATIENT
Start: 2018-04-05 | End: 2018-04-09 | Stop reason: HOSPADM

## 2018-04-05 RX ORDER — DIPHENHYDRAMINE HCL 25 MG
50 TABLET ORAL EVERY 6 HOURS PRN
Status: DISCONTINUED | OUTPATIENT
Start: 2018-04-05 | End: 2018-04-09 | Stop reason: HOSPADM

## 2018-04-05 RX ORDER — LACTOBACILLUS RHAMNOSUS GG 10B CELL
1 CAPSULE ORAL 3 TIMES DAILY
Status: DISCONTINUED | OUTPATIENT
Start: 2018-04-05 | End: 2018-04-09 | Stop reason: HOSPADM

## 2018-04-05 RX ORDER — FOLIC ACID 1 MG/1
1 TABLET ORAL DAILY
Status: DISCONTINUED | OUTPATIENT
Start: 2018-04-05 | End: 2018-04-09 | Stop reason: HOSPADM

## 2018-04-05 RX ORDER — HEPARIN SODIUM 5000 [USP'U]/ML
5000 INJECTION, SOLUTION INTRAVENOUS; SUBCUTANEOUS EVERY 8 HOURS SCHEDULED
Status: DISCONTINUED | OUTPATIENT
Start: 2018-04-05 | End: 2018-04-09

## 2018-04-05 RX ORDER — TRAZODONE HYDROCHLORIDE 50 MG/1
50 TABLET ORAL NIGHTLY
Status: DISCONTINUED | OUTPATIENT
Start: 2018-04-05 | End: 2018-04-09 | Stop reason: HOSPADM

## 2018-04-05 RX ORDER — PRAVASTATIN SODIUM 20 MG
40 TABLET ORAL NIGHTLY
Status: DISCONTINUED | OUTPATIENT
Start: 2018-04-05 | End: 2018-04-09 | Stop reason: HOSPADM

## 2018-04-05 RX ORDER — CIPROFLOXACIN 2 MG/ML
400 INJECTION, SOLUTION INTRAVENOUS ONCE
Status: COMPLETED | OUTPATIENT
Start: 2018-04-05 | End: 2018-04-05

## 2018-04-05 RX ORDER — HYDROCODONE BITARTRATE AND ACETAMINOPHEN 5; 325 MG/1; MG/1
1 TABLET ORAL EVERY 4 HOURS PRN
Status: DISCONTINUED | OUTPATIENT
Start: 2018-04-05 | End: 2018-04-09 | Stop reason: HOSPADM

## 2018-04-05 RX ORDER — CIPROFLOXACIN 2 MG/ML
400 INJECTION, SOLUTION INTRAVENOUS ONCE
Status: DISCONTINUED | OUTPATIENT
Start: 2018-04-06 | End: 2018-04-05

## 2018-04-05 RX ORDER — 0.9 % SODIUM CHLORIDE 0.9 %
1500 INTRAVENOUS SOLUTION INTRAVENOUS ONCE
Status: COMPLETED | OUTPATIENT
Start: 2018-04-05 | End: 2018-04-05

## 2018-04-05 RX ORDER — 0.9 % SODIUM CHLORIDE 0.9 %
1000 INTRAVENOUS SOLUTION INTRAVENOUS ONCE
Status: DISCONTINUED | OUTPATIENT
Start: 2018-04-05 | End: 2018-04-06 | Stop reason: HOSPADM

## 2018-04-05 RX ORDER — HEPARIN SODIUM (PORCINE) LOCK FLUSH IV SOLN 100 UNIT/ML 100 UNIT/ML
500 SOLUTION INTRAVENOUS PRN
Status: CANCELLED | OUTPATIENT
Start: 2018-04-05

## 2018-04-05 RX ORDER — PANTOPRAZOLE SODIUM 40 MG/1
40 TABLET, DELAYED RELEASE ORAL
Status: DISCONTINUED | OUTPATIENT
Start: 2018-04-06 | End: 2018-04-09 | Stop reason: HOSPADM

## 2018-04-05 RX ORDER — ONDANSETRON 2 MG/ML
4 INJECTION INTRAMUSCULAR; INTRAVENOUS ONCE
Status: COMPLETED | OUTPATIENT
Start: 2018-04-05 | End: 2018-04-05

## 2018-04-05 RX ORDER — HEPARIN SODIUM (PORCINE) LOCK FLUSH IV SOLN 100 UNIT/ML 100 UNIT/ML
500 SOLUTION INTRAVENOUS PRN
Status: DISCONTINUED | OUTPATIENT
Start: 2018-04-05 | End: 2018-04-06 | Stop reason: HOSPADM

## 2018-04-05 RX ORDER — ONDANSETRON 2 MG/ML
4 INJECTION INTRAMUSCULAR; INTRAVENOUS EVERY 6 HOURS PRN
Status: DISCONTINUED | OUTPATIENT
Start: 2018-04-05 | End: 2018-04-05

## 2018-04-05 RX ORDER — SACCHAROMYCES BOULARDII 250 MG
250 CAPSULE ORAL 2 TIMES DAILY
Status: DISCONTINUED | OUTPATIENT
Start: 2018-04-05 | End: 2018-04-09 | Stop reason: HOSPADM

## 2018-04-05 RX ORDER — 0.9 % SODIUM CHLORIDE 0.9 %
1000 INTRAVENOUS SOLUTION INTRAVENOUS ONCE
Status: CANCELLED
Start: 2018-04-05 | End: 2018-04-05

## 2018-04-05 RX ORDER — ALBUTEROL SULFATE 2.5 MG/3ML
2.5 SOLUTION RESPIRATORY (INHALATION)
Status: DISCONTINUED | OUTPATIENT
Start: 2018-04-05 | End: 2018-04-09 | Stop reason: HOSPADM

## 2018-04-05 RX ORDER — CIPROFLOXACIN 2 MG/ML
400 INJECTION, SOLUTION INTRAVENOUS EVERY 12 HOURS
Status: DISCONTINUED | OUTPATIENT
Start: 2018-04-06 | End: 2018-04-09 | Stop reason: HOSPADM

## 2018-04-05 RX ORDER — SODIUM CHLORIDE 0.9 % (FLUSH) 0.9 %
10 SYRINGE (ML) INJECTION PRN
Status: DISCONTINUED | OUTPATIENT
Start: 2018-04-05 | End: 2018-04-06 | Stop reason: HOSPADM

## 2018-04-05 RX ORDER — QUETIAPINE FUMARATE 100 MG/1
100 TABLET, FILM COATED ORAL NIGHTLY
Status: DISCONTINUED | OUTPATIENT
Start: 2018-04-05 | End: 2018-04-09 | Stop reason: HOSPADM

## 2018-04-05 RX ORDER — ONDANSETRON 2 MG/ML
8 INJECTION INTRAMUSCULAR; INTRAVENOUS EVERY 6 HOURS PRN
Status: DISCONTINUED | OUTPATIENT
Start: 2018-04-05 | End: 2018-04-06

## 2018-04-05 RX ADMIN — Medication 1 CAPSULE: at 15:31

## 2018-04-05 RX ADMIN — Medication 250 MG: at 22:39

## 2018-04-05 RX ADMIN — CIPROFLOXACIN 400 MG: 2 INJECTION, SOLUTION INTRAVENOUS at 13:32

## 2018-04-05 RX ADMIN — HEPARIN SODIUM 5000 UNITS: 5000 INJECTION, SOLUTION INTRAVENOUS; SUBCUTANEOUS at 15:31

## 2018-04-05 RX ADMIN — HEPARIN SODIUM 5000 UNITS: 5000 INJECTION, SOLUTION INTRAVENOUS; SUBCUTANEOUS at 22:40

## 2018-04-05 RX ADMIN — SODIUM CHLORIDE 1000 ML: 9 INJECTION, SOLUTION INTRAVENOUS at 10:16

## 2018-04-05 RX ADMIN — SODIUM CHLORIDE 1000 ML: 9 INJECTION, SOLUTION INTRAVENOUS at 21:58

## 2018-04-05 RX ADMIN — SODIUM CHLORIDE 1000 ML: 9 INJECTION, SOLUTION INTRAVENOUS at 20:43

## 2018-04-05 RX ADMIN — SODIUM CHLORIDE 1500 ML: 9 INJECTION, SOLUTION INTRAVENOUS at 14:53

## 2018-04-05 RX ADMIN — ONDANSETRON 4 MG: 2 INJECTION INTRAMUSCULAR; INTRAVENOUS at 10:17

## 2018-04-05 RX ADMIN — FOLIC ACID 1 MG: 1 TABLET ORAL at 15:31

## 2018-04-05 RX ADMIN — Medication 1 CAPSULE: at 22:39

## 2018-04-05 RX ADMIN — POTASSIUM CHLORIDE 20 MEQ: 20 TABLET, EXTENDED RELEASE ORAL at 15:31

## 2018-04-05 RX ADMIN — CEFTRIAXONE 1 G: 1 INJECTION, POWDER, FOR SOLUTION INTRAMUSCULAR; INTRAVENOUS at 14:54

## 2018-04-05 RX ADMIN — NYSTATIN 500000 UNITS: 100000 SUSPENSION ORAL at 22:40

## 2018-04-05 RX ADMIN — LAMOTRIGINE 50 MG: 25 TABLET ORAL at 22:40

## 2018-04-05 RX ADMIN — SODIUM CHLORIDE 1000 ML: 9 INJECTION, SOLUTION INTRAVENOUS at 17:59

## 2018-04-05 RX ADMIN — SODIUM CHLORIDE 1000 ML: 9 INJECTION, SOLUTION INTRAVENOUS at 11:39

## 2018-04-05 RX ADMIN — TRAZODONE HYDROCHLORIDE 50 MG: 50 TABLET ORAL at 22:40

## 2018-04-05 RX ADMIN — QUETIAPINE FUMARATE 100 MG: 100 TABLET ORAL at 22:40

## 2018-04-05 RX ADMIN — SODIUM CHLORIDE: 9 INJECTION, SOLUTION INTRAVENOUS at 21:45

## 2018-04-05 RX ADMIN — SODIUM CHLORIDE: 9 INJECTION, SOLUTION INTRAVENOUS at 18:50

## 2018-04-05 RX ADMIN — PRAVASTATIN SODIUM 40 MG: 20 TABLET ORAL at 22:39

## 2018-04-05 RX ADMIN — NYSTATIN 500000 UNITS: 100000 SUSPENSION ORAL at 15:31

## 2018-04-05 RX ADMIN — ONDANSETRON 8 MG: 2 INJECTION INTRAMUSCULAR; INTRAVENOUS at 20:39

## 2018-04-05 ASSESSMENT — PAIN DESCRIPTION - PROGRESSION: CLINICAL_PROGRESSION: GRADUALLY WORSENING

## 2018-04-05 ASSESSMENT — PAIN DESCRIPTION - FREQUENCY: FREQUENCY: CONTINUOUS

## 2018-04-05 ASSESSMENT — PAIN DESCRIPTION - LOCATION: LOCATION: ABDOMEN

## 2018-04-05 ASSESSMENT — PAIN SCALES - GENERAL
PAINLEVEL_OUTOF10: 0
PAINLEVEL_OUTOF10: 0
PAINLEVEL_OUTOF10: 8

## 2018-04-05 ASSESSMENT — PAIN DESCRIPTION - PAIN TYPE: TYPE: ACUTE PAIN

## 2018-04-05 ASSESSMENT — PAIN DESCRIPTION - ONSET: ONSET: ON-GOING

## 2018-04-05 NOTE — PROGRESS NOTES
Unable to obtain a BP and palpate a pulse. Patient state she feels really bad and feels like she may pass out. Sent patient to the ER for further evaluation. Called  at the clinic and left message with Brenden Shanks about patients condition and for Dr. Nori Lemons to call back. Brenden Shanks will notify physician.

## 2018-04-06 LAB
ABSOLUTE EOS #: 0.01 K/UL (ref 0–0.4)
ABSOLUTE EOS #: 0.01 K/UL (ref 0–0.4)
ABSOLUTE IMMATURE GRANULOCYTE: ABNORMAL K/UL (ref 0–0.3)
ABSOLUTE IMMATURE GRANULOCYTE: ABNORMAL K/UL (ref 0–0.3)
ABSOLUTE LYMPH #: 1.12 K/UL (ref 1–4.8)
ABSOLUTE LYMPH #: 1.14 K/UL (ref 1–4.8)
ABSOLUTE MONO #: 0.11 K/UL (ref 0.1–1.2)
ABSOLUTE MONO #: 0.11 K/UL (ref 0.1–1.2)
ANION GAP SERPL CALCULATED.3IONS-SCNC: 11 MMOL/L (ref 9–17)
ANION GAP SERPL CALCULATED.3IONS-SCNC: 13 MMOL/L (ref 9–17)
BASOPHILS # BLD: 1 % (ref 0–1)
BASOPHILS # BLD: 1 % (ref 0–1)
BASOPHILS ABSOLUTE: 0.01 K/UL (ref 0–0.2)
BASOPHILS ABSOLUTE: 0.01 K/UL (ref 0–0.2)
BUN BLDV-MCNC: 21 MG/DL (ref 6–20)
BUN BLDV-MCNC: 24 MG/DL (ref 6–20)
BUN/CREAT BLD: 19 (ref 9–20)
BUN/CREAT BLD: 21 (ref 9–20)
CALCIUM SERPL-MCNC: 7.8 MG/DL (ref 8.6–10.4)
CALCIUM SERPL-MCNC: 8 MG/DL (ref 8.6–10.4)
CHLORIDE BLD-SCNC: 109 MMOL/L (ref 98–107)
CHLORIDE BLD-SCNC: 110 MMOL/L (ref 98–107)
CO2: 17 MMOL/L (ref 20–31)
CO2: 18 MMOL/L (ref 20–31)
CREAT SERPL-MCNC: 1.12 MG/DL (ref 0.5–0.9)
CREAT SERPL-MCNC: 1.16 MG/DL (ref 0.5–0.9)
CULTURE: NORMAL
CULTURE: NORMAL
DIFFERENTIAL TYPE: ABNORMAL
DIFFERENTIAL TYPE: ABNORMAL
DIRECT EXAM: NORMAL
EOSINOPHILS RELATIVE PERCENT: 0 % (ref 1–7)
EOSINOPHILS RELATIVE PERCENT: 1 % (ref 1–7)
GFR AFRICAN AMERICAN: 59 ML/MIN
GFR AFRICAN AMERICAN: >60 ML/MIN
GFR NON-AFRICAN AMERICAN: 49 ML/MIN
GFR NON-AFRICAN AMERICAN: 51 ML/MIN
GFR SERPL CREATININE-BSD FRML MDRD: ABNORMAL ML/MIN/{1.73_M2}
GLUCOSE BLD-MCNC: 97 MG/DL (ref 70–99)
GLUCOSE BLD-MCNC: 99 MG/DL (ref 70–99)
HCT VFR BLD CALC: 21.1 % (ref 36–46)
HCT VFR BLD CALC: 21.8 % (ref 36–46)
HEMOGLOBIN: 7 G/DL (ref 12–16)
HEMOGLOBIN: 7.3 G/DL (ref 12–16)
IMMATURE GRANULOCYTES: ABNORMAL %
IMMATURE GRANULOCYTES: ABNORMAL %
LACTIC ACID: 1.3 MMOL/L (ref 0.5–2.2)
LYMPHOCYTES # BLD: 81 % (ref 16–46)
LYMPHOCYTES # BLD: 82 % (ref 16–46)
Lab: NORMAL
Lab: NORMAL
MAGNESIUM: 1.3 MG/DL (ref 1.6–2.6)
MAGNESIUM: 1.7 MG/DL (ref 1.6–2.6)
MCH RBC QN AUTO: 34.1 PG (ref 26–34)
MCH RBC QN AUTO: 34.3 PG (ref 26–34)
MCHC RBC AUTO-ENTMCNC: 33.4 G/DL (ref 31–37)
MCHC RBC AUTO-ENTMCNC: 33.4 G/DL (ref 31–37)
MCV RBC AUTO: 102.1 FL (ref 80–100)
MCV RBC AUTO: 102.7 FL (ref 80–100)
MONOCYTES # BLD: 8 % (ref 4–11)
MONOCYTES # BLD: 8 % (ref 4–11)
MORPHOLOGY: ABNORMAL
NRBC AUTOMATED: ABNORMAL PER 100 WBC
NRBC AUTOMATED: ABNORMAL PER 100 WBC
PDW BLD-RTO: 14.2 % (ref 11–14.5)
PDW BLD-RTO: 14.9 % (ref 11–14.5)
PLATELET # BLD: 106 K/UL (ref 140–450)
PLATELET # BLD: 126 K/UL (ref 140–450)
PLATELET ESTIMATE: ABNORMAL
PLATELET ESTIMATE: ABNORMAL
PMV BLD AUTO: 9 FL (ref 6–12)
PMV BLD AUTO: 9.5 FL (ref 6–12)
POTASSIUM SERPL-SCNC: 4.7 MMOL/L (ref 3.7–5.3)
POTASSIUM SERPL-SCNC: 4.8 MMOL/L (ref 3.7–5.3)
RBC # BLD: 2.07 M/UL (ref 4–5.2)
RBC # BLD: 2.13 M/UL (ref 4–5.2)
RBC # BLD: ABNORMAL 10*6/UL
RBC # BLD: ABNORMAL 10*6/UL
SEG NEUTROPHILS: 9 % (ref 43–77)
SEG NEUTROPHILS: 9 % (ref 43–77)
SEGMENTED NEUTROPHILS ABSOLUTE COUNT: 0.12 K/UL (ref 1.8–7.7)
SEGMENTED NEUTROPHILS ABSOLUTE COUNT: 0.13 K/UL (ref 1.8–7.7)
SODIUM BLD-SCNC: 138 MMOL/L (ref 135–144)
SODIUM BLD-SCNC: 140 MMOL/L (ref 135–144)
SPECIMEN DESCRIPTION: NORMAL
STATUS: NORMAL
STATUS: NORMAL
WBC # BLD: 1.3 K/UL (ref 3.5–11)
WBC # BLD: 1.4 K/UL (ref 3.5–11)
WBC # BLD: ABNORMAL 10*3/UL
WBC # BLD: ABNORMAL 10*3/UL

## 2018-04-06 PROCEDURE — 36415 COLL VENOUS BLD VENIPUNCTURE: CPT

## 2018-04-06 PROCEDURE — 83735 ASSAY OF MAGNESIUM: CPT

## 2018-04-06 PROCEDURE — 94761 N-INVAS EAR/PLS OXIMETRY MLT: CPT

## 2018-04-06 PROCEDURE — 6360000002 HC RX W HCPCS: Performed by: INTERNAL MEDICINE

## 2018-04-06 PROCEDURE — 96367 TX/PROPH/DG ADDL SEQ IV INF: CPT

## 2018-04-06 PROCEDURE — 96376 TX/PRO/DX INJ SAME DRUG ADON: CPT

## 2018-04-06 PROCEDURE — G0378 HOSPITAL OBSERVATION PER HR: HCPCS

## 2018-04-06 PROCEDURE — 99225 PR SBSQ OBSERVATION CARE/DAY 25 MINUTES: CPT | Performed by: INTERNAL MEDICINE

## 2018-04-06 PROCEDURE — 96375 TX/PRO/DX INJ NEW DRUG ADDON: CPT

## 2018-04-06 PROCEDURE — 6360000002 HC RX W HCPCS: Performed by: NURSE PRACTITIONER

## 2018-04-06 PROCEDURE — 80048 BASIC METABOLIC PNL TOTAL CA: CPT

## 2018-04-06 PROCEDURE — 96366 THER/PROPH/DIAG IV INF ADDON: CPT

## 2018-04-06 PROCEDURE — 2580000003 HC RX 258: Performed by: INTERNAL MEDICINE

## 2018-04-06 PROCEDURE — 85025 COMPLETE CBC W/AUTO DIFF WBC: CPT

## 2018-04-06 PROCEDURE — 6370000000 HC RX 637 (ALT 250 FOR IP): Performed by: INTERNAL MEDICINE

## 2018-04-06 PROCEDURE — 96372 THER/PROPH/DIAG INJ SC/IM: CPT

## 2018-04-06 PROCEDURE — 83605 ASSAY OF LACTIC ACID: CPT

## 2018-04-06 RX ORDER — PROMETHAZINE HYDROCHLORIDE 25 MG/ML
12.5 INJECTION, SOLUTION INTRAMUSCULAR; INTRAVENOUS EVERY 6 HOURS PRN
Status: DISCONTINUED | OUTPATIENT
Start: 2018-04-06 | End: 2018-04-09 | Stop reason: HOSPADM

## 2018-04-06 RX ORDER — MAGNESIUM SULFATE 1 G/100ML
1 INJECTION INTRAVENOUS ONCE
Status: COMPLETED | OUTPATIENT
Start: 2018-04-06 | End: 2018-04-06

## 2018-04-06 RX ADMIN — HEPARIN SODIUM 5000 UNITS: 5000 INJECTION, SOLUTION INTRAVENOUS; SUBCUTANEOUS at 05:40

## 2018-04-06 RX ADMIN — PROMETHAZINE HYDROCHLORIDE 12.5 MG: 25 INJECTION INTRAMUSCULAR; INTRAVENOUS at 17:39

## 2018-04-06 RX ADMIN — WATER 1 G: 1 INJECTION INTRAMUSCULAR; INTRAVENOUS; SUBCUTANEOUS at 11:16

## 2018-04-06 RX ADMIN — NYSTATIN 500000 UNITS: 100000 SUSPENSION ORAL at 21:08

## 2018-04-06 RX ADMIN — CIPROFLOXACIN 400 MG: 2 INJECTION, SOLUTION INTRAVENOUS at 14:12

## 2018-04-06 RX ADMIN — CIPROFLOXACIN 400 MG: 2 INJECTION, SOLUTION INTRAVENOUS at 00:30

## 2018-04-06 RX ADMIN — QUETIAPINE FUMARATE 100 MG: 100 TABLET ORAL at 21:07

## 2018-04-06 RX ADMIN — SODIUM CHLORIDE: 9 INJECTION, SOLUTION INTRAVENOUS at 11:23

## 2018-04-06 RX ADMIN — Medication 250 MG: at 21:08

## 2018-04-06 RX ADMIN — Medication 1 CAPSULE: at 21:08

## 2018-04-06 RX ADMIN — SODIUM CHLORIDE: 9 INJECTION, SOLUTION INTRAVENOUS at 22:51

## 2018-04-06 RX ADMIN — PROMETHAZINE HYDROCHLORIDE 12.5 MG: 25 INJECTION INTRAMUSCULAR; INTRAVENOUS at 09:34

## 2018-04-06 RX ADMIN — Medication 10 ML: at 09:45

## 2018-04-06 RX ADMIN — NYSTATIN 500000 UNITS: 100000 SUSPENSION ORAL at 14:12

## 2018-04-06 RX ADMIN — LAMOTRIGINE 50 MG: 25 TABLET ORAL at 21:08

## 2018-04-06 RX ADMIN — PRAVASTATIN SODIUM 40 MG: 20 TABLET ORAL at 21:07

## 2018-04-06 RX ADMIN — NYSTATIN 500000 UNITS: 100000 SUSPENSION ORAL at 17:32

## 2018-04-06 RX ADMIN — HEPARIN SODIUM 5000 UNITS: 5000 INJECTION, SOLUTION INTRAVENOUS; SUBCUTANEOUS at 21:01

## 2018-04-06 RX ADMIN — Medication 1 CAPSULE: at 14:12

## 2018-04-06 RX ADMIN — HEPARIN SODIUM 5000 UNITS: 5000 INJECTION, SOLUTION INTRAVENOUS; SUBCUTANEOUS at 16:12

## 2018-04-06 RX ADMIN — TRAZODONE HYDROCHLORIDE 50 MG: 50 TABLET ORAL at 21:08

## 2018-04-06 RX ADMIN — MAGNESIUM SULFATE HEPTAHYDRATE 1 G: 1 INJECTION, SOLUTION INTRAVENOUS at 01:44

## 2018-04-06 RX ADMIN — ONDANSETRON 8 MG: 2 INJECTION INTRAMUSCULAR; INTRAVENOUS at 05:40

## 2018-04-06 RX ADMIN — SODIUM CHLORIDE: 9 INJECTION, SOLUTION INTRAVENOUS at 00:26

## 2018-04-06 RX ADMIN — Medication 10 ML: at 09:34

## 2018-04-06 ASSESSMENT — PAIN SCALES - GENERAL: PAINLEVEL_OUTOF10: 0

## 2018-04-07 LAB
ABSOLUTE EOS #: 0 K/UL (ref 0–0.4)
ABSOLUTE IMMATURE GRANULOCYTE: ABNORMAL K/UL (ref 0–0.3)
ABSOLUTE LYMPH #: 0.86 K/UL (ref 1–4.8)
ABSOLUTE MONO #: 0.18 K/UL (ref 0.1–1.2)
ANION GAP SERPL CALCULATED.3IONS-SCNC: 12 MMOL/L (ref 9–17)
BASOPHILS # BLD: 0 % (ref 0–1)
BASOPHILS ABSOLUTE: 0 K/UL (ref 0–0.2)
BUN BLDV-MCNC: 13 MG/DL (ref 6–20)
BUN/CREAT BLD: 12 (ref 9–20)
CALCIUM SERPL-MCNC: 8.6 MG/DL (ref 8.6–10.4)
CHLORIDE BLD-SCNC: 110 MMOL/L (ref 98–107)
CO2: 18 MMOL/L (ref 20–31)
CREAT SERPL-MCNC: 1.09 MG/DL (ref 0.5–0.9)
DIFFERENTIAL TYPE: ABNORMAL
EOSINOPHILS RELATIVE PERCENT: 0 % (ref 1–7)
GFR AFRICAN AMERICAN: >60 ML/MIN
GFR NON-AFRICAN AMERICAN: 53 ML/MIN
GFR SERPL CREATININE-BSD FRML MDRD: ABNORMAL ML/MIN/{1.73_M2}
GFR SERPL CREATININE-BSD FRML MDRD: ABNORMAL ML/MIN/{1.73_M2}
GLUCOSE BLD-MCNC: 95 MG/DL (ref 70–99)
HCT VFR BLD CALC: 22.2 % (ref 36–46)
HEMOGLOBIN: 7.2 G/DL (ref 12–16)
IMMATURE GRANULOCYTES: ABNORMAL %
LYMPHOCYTES # BLD: 79 % (ref 16–46)
MCH RBC QN AUTO: 33.7 PG (ref 26–34)
MCHC RBC AUTO-ENTMCNC: 32.5 G/DL (ref 31–37)
MCV RBC AUTO: 103.6 FL (ref 80–100)
MONOCYTES # BLD: 16 % (ref 4–11)
MORPHOLOGY: ABNORMAL
NRBC AUTOMATED: ABNORMAL PER 100 WBC
PDW BLD-RTO: 14.4 % (ref 11–14.5)
PLATELET # BLD: 108 K/UL (ref 140–450)
PLATELET ESTIMATE: ABNORMAL
PMV BLD AUTO: 8.7 FL (ref 6–12)
POTASSIUM SERPL-SCNC: 4.6 MMOL/L (ref 3.7–5.3)
RBC # BLD: 2.15 M/UL (ref 4–5.2)
RBC # BLD: ABNORMAL 10*6/UL
SEG NEUTROPHILS: 5 % (ref 43–77)
SEGMENTED NEUTROPHILS ABSOLUTE COUNT: 0.06 K/UL (ref 1.8–7.7)
SODIUM BLD-SCNC: 140 MMOL/L (ref 135–144)
WBC # BLD: 1.1 K/UL (ref 3.5–11)
WBC # BLD: ABNORMAL 10*3/UL

## 2018-04-07 PROCEDURE — G0378 HOSPITAL OBSERVATION PER HR: HCPCS

## 2018-04-07 PROCEDURE — 6370000000 HC RX 637 (ALT 250 FOR IP): Performed by: INTERNAL MEDICINE

## 2018-04-07 PROCEDURE — 96376 TX/PRO/DX INJ SAME DRUG ADON: CPT

## 2018-04-07 PROCEDURE — 36415 COLL VENOUS BLD VENIPUNCTURE: CPT

## 2018-04-07 PROCEDURE — 2580000003 HC RX 258: Performed by: INTERNAL MEDICINE

## 2018-04-07 PROCEDURE — 6360000002 HC RX W HCPCS: Performed by: INTERNAL MEDICINE

## 2018-04-07 PROCEDURE — 99225 PR SBSQ OBSERVATION CARE/DAY 25 MINUTES: CPT | Performed by: FAMILY MEDICINE

## 2018-04-07 PROCEDURE — 94760 N-INVAS EAR/PLS OXIMETRY 1: CPT

## 2018-04-07 PROCEDURE — 96366 THER/PROPH/DIAG IV INF ADDON: CPT

## 2018-04-07 PROCEDURE — 85025 COMPLETE CBC W/AUTO DIFF WBC: CPT

## 2018-04-07 PROCEDURE — 80048 BASIC METABOLIC PNL TOTAL CA: CPT

## 2018-04-07 PROCEDURE — 96372 THER/PROPH/DIAG INJ SC/IM: CPT

## 2018-04-07 RX ORDER — FOLIC ACID 1 MG/1
TABLET ORAL
Status: DISPENSED
Start: 2018-04-07 | End: 2018-04-07

## 2018-04-07 RX ADMIN — PANTOPRAZOLE SODIUM 40 MG: 40 TABLET, DELAYED RELEASE ORAL at 10:22

## 2018-04-07 RX ADMIN — SODIUM CHLORIDE: 9 INJECTION, SOLUTION INTRAVENOUS at 20:33

## 2018-04-07 RX ADMIN — Medication 250 MG: at 21:29

## 2018-04-07 RX ADMIN — QUETIAPINE FUMARATE 100 MG: 100 TABLET ORAL at 21:29

## 2018-04-07 RX ADMIN — Medication 10 ML: at 10:22

## 2018-04-07 RX ADMIN — Medication 10 ML: at 16:43

## 2018-04-07 RX ADMIN — Medication 1 CAPSULE: at 10:23

## 2018-04-07 RX ADMIN — CIPROFLOXACIN 400 MG: 2 INJECTION, SOLUTION INTRAVENOUS at 13:30

## 2018-04-07 RX ADMIN — HEPARIN SODIUM 5000 UNITS: 5000 INJECTION, SOLUTION INTRAVENOUS; SUBCUTANEOUS at 16:31

## 2018-04-07 RX ADMIN — HEPARIN SODIUM 5000 UNITS: 5000 INJECTION, SOLUTION INTRAVENOUS; SUBCUTANEOUS at 10:22

## 2018-04-07 RX ADMIN — NYSTATIN 500000 UNITS: 100000 SUSPENSION ORAL at 21:28

## 2018-04-07 RX ADMIN — FOLIC ACID 1 MG: 1 TABLET ORAL at 10:23

## 2018-04-07 RX ADMIN — TRAZODONE HYDROCHLORIDE 50 MG: 50 TABLET ORAL at 21:28

## 2018-04-07 RX ADMIN — WATER 1 G: 1 INJECTION INTRAMUSCULAR; INTRAVENOUS; SUBCUTANEOUS at 13:30

## 2018-04-07 RX ADMIN — Medication 1 CAPSULE: at 13:30

## 2018-04-07 RX ADMIN — Medication 10 ML: at 13:30

## 2018-04-07 RX ADMIN — Medication 1 CAPSULE: at 21:28

## 2018-04-07 RX ADMIN — LAMOTRIGINE 50 MG: 25 TABLET ORAL at 21:29

## 2018-04-07 RX ADMIN — NYSTATIN 500000 UNITS: 100000 SUSPENSION ORAL at 17:47

## 2018-04-07 RX ADMIN — PROMETHAZINE HYDROCHLORIDE 12.5 MG: 25 INJECTION INTRAMUSCULAR; INTRAVENOUS at 10:22

## 2018-04-07 RX ADMIN — PROMETHAZINE HYDROCHLORIDE 12.5 MG: 25 INJECTION INTRAMUSCULAR; INTRAVENOUS at 16:43

## 2018-04-07 RX ADMIN — Medication 10 ML: at 10:28

## 2018-04-07 RX ADMIN — NYSTATIN 500000 UNITS: 100000 SUSPENSION ORAL at 10:23

## 2018-04-07 RX ADMIN — Medication 250 MG: at 10:23

## 2018-04-07 RX ADMIN — SODIUM CHLORIDE: 9 INJECTION, SOLUTION INTRAVENOUS at 13:10

## 2018-04-07 RX ADMIN — NYSTATIN 500000 UNITS: 100000 SUSPENSION ORAL at 13:30

## 2018-04-07 RX ADMIN — HEPARIN SODIUM 5000 UNITS: 5000 INJECTION, SOLUTION INTRAVENOUS; SUBCUTANEOUS at 21:29

## 2018-04-07 RX ADMIN — PROMETHAZINE HYDROCHLORIDE 12.5 MG: 25 INJECTION INTRAMUSCULAR; INTRAVENOUS at 00:34

## 2018-04-07 RX ADMIN — PRAVASTATIN SODIUM 40 MG: 20 TABLET ORAL at 21:29

## 2018-04-07 RX ADMIN — Medication 10 ML: at 00:35

## 2018-04-07 RX ADMIN — CIPROFLOXACIN 400 MG: 2 INJECTION, SOLUTION INTRAVENOUS at 00:29

## 2018-04-07 RX ADMIN — SODIUM CHLORIDE: 9 INJECTION, SOLUTION INTRAVENOUS at 06:33

## 2018-04-07 ASSESSMENT — PAIN SCALES - GENERAL
PAINLEVEL_OUTOF10: 0

## 2018-04-08 VITALS
TEMPERATURE: 97.9 F | HEART RATE: 86 BPM | BODY MASS INDEX: 41.96 KG/M2 | SYSTOLIC BLOOD PRESSURE: 117 MMHG | DIASTOLIC BLOOD PRESSURE: 65 MMHG | RESPIRATION RATE: 16 BRPM | WEIGHT: 261.1 LBS | OXYGEN SATURATION: 100 % | HEIGHT: 66 IN

## 2018-04-08 LAB
ABSOLUTE EOS #: 0 K/UL (ref 0–0.4)
ABSOLUTE IMMATURE GRANULOCYTE: ABNORMAL K/UL (ref 0–0.3)
ABSOLUTE LYMPH #: 1.14 K/UL (ref 1–4.8)
ABSOLUTE MONO #: 0.3 K/UL (ref 0.1–1.2)
ANION GAP SERPL CALCULATED.3IONS-SCNC: 11 MMOL/L (ref 9–17)
BASOPHILS # BLD: 0 % (ref 0–1)
BASOPHILS ABSOLUTE: 0 K/UL (ref 0–0.2)
BUN BLDV-MCNC: 9 MG/DL (ref 6–20)
BUN/CREAT BLD: 9 (ref 9–20)
CALCIUM SERPL-MCNC: 8.2 MG/DL (ref 8.6–10.4)
CHLORIDE BLD-SCNC: 111 MMOL/L (ref 98–107)
CO2: 18 MMOL/L (ref 20–31)
CREAT SERPL-MCNC: 0.95 MG/DL (ref 0.5–0.9)
CULTURE: ABNORMAL
DIFFERENTIAL TYPE: ABNORMAL
EOSINOPHILS RELATIVE PERCENT: 0 % (ref 1–7)
GFR AFRICAN AMERICAN: >60 ML/MIN
GFR NON-AFRICAN AMERICAN: >60 ML/MIN
GFR SERPL CREATININE-BSD FRML MDRD: ABNORMAL ML/MIN/{1.73_M2}
GFR SERPL CREATININE-BSD FRML MDRD: ABNORMAL ML/MIN/{1.73_M2}
GLUCOSE BLD-MCNC: 99 MG/DL (ref 70–99)
HCT VFR BLD CALC: 21.7 % (ref 36–46)
HEMOGLOBIN: 7 G/DL (ref 12–16)
IMMATURE GRANULOCYTES: ABNORMAL %
LYMPHOCYTES # BLD: 76 % (ref 16–46)
Lab: ABNORMAL
MCH RBC QN AUTO: 34.5 PG (ref 26–34)
MCHC RBC AUTO-ENTMCNC: 32.1 G/DL (ref 31–37)
MCV RBC AUTO: 107.6 FL (ref 80–100)
MONOCYTES # BLD: 20 % (ref 4–11)
MORPHOLOGY: ABNORMAL
NRBC AUTOMATED: ABNORMAL PER 100 WBC
PDW BLD-RTO: 16 % (ref 11–14.5)
PLATELET # BLD: 110 K/UL (ref 140–450)
PLATELET ESTIMATE: ABNORMAL
PMV BLD AUTO: 8.2 FL (ref 6–12)
POTASSIUM SERPL-SCNC: 4.3 MMOL/L (ref 3.7–5.3)
RBC # BLD: 2.02 M/UL (ref 4–5.2)
RBC # BLD: ABNORMAL 10*6/UL
SEG NEUTROPHILS: 4 % (ref 43–77)
SEGMENTED NEUTROPHILS ABSOLUTE COUNT: 0.06 K/UL (ref 1.8–7.7)
SODIUM BLD-SCNC: 140 MMOL/L (ref 135–144)
SPECIMEN DESCRIPTION: ABNORMAL
SPECIMEN DESCRIPTION: ABNORMAL
STATUS: ABNORMAL
WBC # BLD: 1.5 K/UL (ref 3.5–11)
WBC # BLD: ABNORMAL 10*3/UL

## 2018-04-08 PROCEDURE — P9016 RBC LEUKOCYTES REDUCED: HCPCS

## 2018-04-08 PROCEDURE — 6370000000 HC RX 637 (ALT 250 FOR IP): Performed by: INTERNAL MEDICINE

## 2018-04-08 PROCEDURE — 86850 RBC ANTIBODY SCREEN: CPT

## 2018-04-08 PROCEDURE — 96366 THER/PROPH/DIAG IV INF ADDON: CPT

## 2018-04-08 PROCEDURE — 80048 BASIC METABOLIC PNL TOTAL CA: CPT

## 2018-04-08 PROCEDURE — 6360000002 HC RX W HCPCS: Performed by: INTERNAL MEDICINE

## 2018-04-08 PROCEDURE — G0378 HOSPITAL OBSERVATION PER HR: HCPCS

## 2018-04-08 PROCEDURE — 86901 BLOOD TYPING SEROLOGIC RH(D): CPT

## 2018-04-08 PROCEDURE — 36430 TRANSFUSION BLD/BLD COMPNT: CPT

## 2018-04-08 PROCEDURE — 2580000003 HC RX 258: Performed by: INTERNAL MEDICINE

## 2018-04-08 PROCEDURE — 36415 COLL VENOUS BLD VENIPUNCTURE: CPT

## 2018-04-08 PROCEDURE — 86920 COMPATIBILITY TEST SPIN: CPT

## 2018-04-08 PROCEDURE — 86900 BLOOD TYPING SEROLOGIC ABO: CPT

## 2018-04-08 PROCEDURE — 96376 TX/PRO/DX INJ SAME DRUG ADON: CPT

## 2018-04-08 PROCEDURE — 85025 COMPLETE CBC W/AUTO DIFF WBC: CPT

## 2018-04-08 PROCEDURE — 6360000002 HC RX W HCPCS: Performed by: FAMILY MEDICINE

## 2018-04-08 PROCEDURE — 2580000003 HC RX 258: Performed by: FAMILY MEDICINE

## 2018-04-08 RX ORDER — HEPARIN SODIUM (PORCINE) LOCK FLUSH IV SOLN 100 UNIT/ML 100 UNIT/ML
300 SOLUTION INTRAVENOUS PRN
Status: DISCONTINUED | OUTPATIENT
Start: 2018-04-08 | End: 2018-04-09 | Stop reason: HOSPADM

## 2018-04-08 RX ORDER — 0.9 % SODIUM CHLORIDE 0.9 %
250 INTRAVENOUS SOLUTION INTRAVENOUS ONCE
Status: COMPLETED | OUTPATIENT
Start: 2018-04-08 | End: 2018-04-09

## 2018-04-08 RX ORDER — PROMETHAZINE HYDROCHLORIDE 25 MG/1
12.5 TABLET ORAL EVERY 6 HOURS PRN
Qty: 15 TABLET | Refills: 0 | OUTPATIENT
Start: 2018-04-08 | End: 2018-04-15

## 2018-04-08 RX ADMIN — Medication 10 ML: at 13:01

## 2018-04-08 RX ADMIN — SODIUM CHLORIDE 250 ML: 9 INJECTION, SOLUTION INTRAVENOUS at 14:42

## 2018-04-08 RX ADMIN — Medication 1 CAPSULE: at 09:35

## 2018-04-08 RX ADMIN — PROMETHAZINE HYDROCHLORIDE 12.5 MG: 25 INJECTION INTRAMUSCULAR; INTRAVENOUS at 09:35

## 2018-04-08 RX ADMIN — SODIUM CHLORIDE, PRESERVATIVE FREE 300 UNITS: 5 INJECTION INTRAVENOUS at 17:49

## 2018-04-08 RX ADMIN — WATER 1 G: 1 INJECTION INTRAMUSCULAR; INTRAVENOUS; SUBCUTANEOUS at 13:00

## 2018-04-08 RX ADMIN — SODIUM CHLORIDE: 9 INJECTION, SOLUTION INTRAVENOUS at 12:33

## 2018-04-08 RX ADMIN — FOLIC ACID 1 MG: 1 TABLET ORAL at 10:29

## 2018-04-08 RX ADMIN — PANTOPRAZOLE SODIUM 40 MG: 40 TABLET, DELAYED RELEASE ORAL at 09:35

## 2018-04-08 RX ADMIN — Medication 1 CAPSULE: at 13:06

## 2018-04-08 RX ADMIN — CIPROFLOXACIN 400 MG: 2 INJECTION, SOLUTION INTRAVENOUS at 01:02

## 2018-04-08 RX ADMIN — NYSTATIN 500000 UNITS: 100000 SUSPENSION ORAL at 09:35

## 2018-04-08 RX ADMIN — Medication 10 ML: at 17:49

## 2018-04-08 RX ADMIN — CIPROFLOXACIN 400 MG: 2 INJECTION, SOLUTION INTRAVENOUS at 13:05

## 2018-04-08 RX ADMIN — NYSTATIN 500000 UNITS: 100000 SUSPENSION ORAL at 15:04

## 2018-04-08 RX ADMIN — Medication 250 MG: at 09:35

## 2018-04-08 RX ADMIN — Medication 10 ML: at 09:35

## 2018-04-10 LAB
ABO/RH: NORMAL
ANTIBODY SCREEN: NEGATIVE
ARM BAND NUMBER: NORMAL
BLD PROD TYP BPU: NORMAL
CROSSMATCH RESULT: NORMAL
DISPENSE STATUS BLOOD BANK: NORMAL
EXPIRATION DATE: NORMAL
TRANSFUSION STATUS: NORMAL
UNIT DIVISION: 0
UNIT NUMBER: NORMAL

## 2018-04-11 ENCOUNTER — OFFICE VISIT (OUTPATIENT)
Dept: ONCOLOGY | Age: 53
End: 2018-04-11
Payer: MEDICARE

## 2018-04-11 ENCOUNTER — HOSPITAL ENCOUNTER (OUTPATIENT)
Dept: INFUSION THERAPY | Age: 53
Discharge: HOME | End: 2018-04-11
Payer: MEDICARE

## 2018-04-11 VITALS
HEART RATE: 84 BPM | DIASTOLIC BLOOD PRESSURE: 82 MMHG | RESPIRATION RATE: 16 BRPM | TEMPERATURE: 98.7 F | SYSTOLIC BLOOD PRESSURE: 134 MMHG

## 2018-04-11 VITALS
HEART RATE: 84 BPM | TEMPERATURE: 98.7 F | SYSTOLIC BLOOD PRESSURE: 134 MMHG | HEIGHT: 66 IN | DIASTOLIC BLOOD PRESSURE: 82 MMHG

## 2018-04-11 DIAGNOSIS — Z17.1 MALIGNANT NEOPLASM OF OVERLAPPING SITES OF RIGHT BREAST IN FEMALE, ESTROGEN RECEPTOR NEGATIVE (HCC): Primary | ICD-10-CM

## 2018-04-11 DIAGNOSIS — Z17.1 MALIGNANT NEOPLASM OF OVERLAPPING SITES OF RIGHT BREAST IN FEMALE, ESTROGEN RECEPTOR NEGATIVE (HCC): ICD-10-CM

## 2018-04-11 DIAGNOSIS — C50.811 MALIGNANT NEOPLASM OF OVERLAPPING SITES OF RIGHT BREAST IN FEMALE, ESTROGEN RECEPTOR NEGATIVE (HCC): ICD-10-CM

## 2018-04-11 DIAGNOSIS — C50.811 MALIGNANT NEOPLASM OF OVERLAPPING SITES OF RIGHT BREAST IN FEMALE, ESTROGEN RECEPTOR NEGATIVE (HCC): Primary | ICD-10-CM

## 2018-04-11 LAB
CULTURE: NORMAL
Lab: NORMAL
Lab: NORMAL
SPECIMEN DESCRIPTION: NORMAL
STATUS: NORMAL
STATUS: NORMAL

## 2018-04-11 PROCEDURE — G8427 DOCREV CUR MEDS BY ELIG CLIN: HCPCS | Performed by: INTERNAL MEDICINE

## 2018-04-11 PROCEDURE — 1036F TOBACCO NON-USER: CPT | Performed by: INTERNAL MEDICINE

## 2018-04-11 PROCEDURE — 99214 OFFICE O/P EST MOD 30 MIN: CPT | Performed by: INTERNAL MEDICINE

## 2018-04-11 PROCEDURE — 96361 HYDRATE IV INFUSION ADD-ON: CPT

## 2018-04-11 PROCEDURE — 3017F COLORECTAL CA SCREEN DOC REV: CPT | Performed by: INTERNAL MEDICINE

## 2018-04-11 PROCEDURE — 6360000002 HC RX W HCPCS: Performed by: INTERNAL MEDICINE

## 2018-04-11 PROCEDURE — 3014F SCREEN MAMMO DOC REV: CPT | Performed by: INTERNAL MEDICINE

## 2018-04-11 PROCEDURE — 2580000003 HC RX 258: Performed by: INTERNAL MEDICINE

## 2018-04-11 PROCEDURE — G8417 CALC BMI ABV UP PARAM F/U: HCPCS | Performed by: INTERNAL MEDICINE

## 2018-04-11 PROCEDURE — 96360 HYDRATION IV INFUSION INIT: CPT

## 2018-04-11 RX ORDER — SODIUM CHLORIDE 0.9 % (FLUSH) 0.9 %
10 SYRINGE (ML) INJECTION PRN
Status: DISCONTINUED | OUTPATIENT
Start: 2018-04-11 | End: 2018-04-12 | Stop reason: HOSPADM

## 2018-04-11 RX ORDER — 0.9 % SODIUM CHLORIDE 0.9 %
1000 INTRAVENOUS SOLUTION INTRAVENOUS ONCE
Status: COMPLETED | OUTPATIENT
Start: 2018-04-11 | End: 2018-04-11

## 2018-04-11 RX ORDER — 0.9 % SODIUM CHLORIDE 0.9 %
1000 INTRAVENOUS SOLUTION INTRAVENOUS ONCE
Status: CANCELLED
Start: 2018-04-11 | End: 2018-04-11

## 2018-04-11 RX ORDER — HEPARIN SODIUM (PORCINE) LOCK FLUSH IV SOLN 100 UNIT/ML 100 UNIT/ML
500 SOLUTION INTRAVENOUS PRN
Status: DISCONTINUED | OUTPATIENT
Start: 2018-04-11 | End: 2018-04-12 | Stop reason: HOSPADM

## 2018-04-11 RX ORDER — HEPARIN SODIUM (PORCINE) LOCK FLUSH IV SOLN 100 UNIT/ML 100 UNIT/ML
500 SOLUTION INTRAVENOUS PRN
Status: CANCELLED | OUTPATIENT
Start: 2018-04-11

## 2018-04-11 RX ORDER — SODIUM CHLORIDE 0.9 % (FLUSH) 0.9 %
10 SYRINGE (ML) INJECTION PRN
Status: CANCELLED | OUTPATIENT
Start: 2018-04-11

## 2018-04-11 RX ADMIN — SODIUM CHLORIDE, PRESERVATIVE FREE 500 UNITS: 5 INJECTION INTRAVENOUS at 12:30

## 2018-04-11 RX ADMIN — Medication 10 ML: at 10:06

## 2018-04-11 RX ADMIN — SODIUM CHLORIDE 1000 ML: 9 INJECTION, SOLUTION INTRAVENOUS at 10:10

## 2018-04-11 RX ADMIN — Medication 10 ML: at 10:05

## 2018-04-11 RX ADMIN — Medication 10 ML: at 10:07

## 2018-04-11 NOTE — PROGRESS NOTES
Patient at infusion center for IV hydration. VAD accessed per protocol using 20 gauge 1\" mora needle. Flushes easily. Good blood return. NSS infusing. Patient resting in recliner, states she is feeling much better today.

## 2018-04-11 NOTE — PROGRESS NOTES
IV hydration infused. Mediport flushed with 5 ml heparin. Guerrero needle d/c'd, pressure dressing applied.  is evaluating patient. No further hydration needed. Patient discharged to home.

## 2018-04-19 ENCOUNTER — HOSPITAL ENCOUNTER (OUTPATIENT)
Dept: INFUSION THERAPY | Age: 53
Discharge: HOME | End: 2018-04-19
Payer: MEDICARE

## 2018-04-19 ENCOUNTER — HOSPITAL ENCOUNTER (OUTPATIENT)
Age: 53
Setting detail: SPECIMEN
Discharge: HOME | End: 2018-04-19
Payer: MEDICARE

## 2018-04-19 VITALS
HEART RATE: 83 BPM | TEMPERATURE: 98.2 F | RESPIRATION RATE: 16 BRPM | SYSTOLIC BLOOD PRESSURE: 129 MMHG | HEIGHT: 66 IN | DIASTOLIC BLOOD PRESSURE: 75 MMHG | BODY MASS INDEX: 42.59 KG/M2 | WEIGHT: 265 LBS

## 2018-04-19 DIAGNOSIS — Z17.1 MALIGNANT NEOPLASM OF OVERLAPPING SITES OF BREAST IN FEMALE, ESTROGEN RECEPTOR NEGATIVE, UNSPECIFIED LATERALITY (HCC): Primary | ICD-10-CM

## 2018-04-19 DIAGNOSIS — C50.819 MALIGNANT NEOPLASM OF OVERLAPPING SITES OF BREAST IN FEMALE, ESTROGEN RECEPTOR NEGATIVE, UNSPECIFIED LATERALITY (HCC): Primary | ICD-10-CM

## 2018-04-19 DIAGNOSIS — Z17.1 MALIGNANT NEOPLASM OF OVERLAPPING SITES OF BREAST IN FEMALE, ESTROGEN RECEPTOR NEGATIVE, UNSPECIFIED LATERALITY (HCC): ICD-10-CM

## 2018-04-19 DIAGNOSIS — C50.811 MALIGNANT NEOPLASM OF OVERLAPPING SITES OF RIGHT BREAST IN FEMALE, ESTROGEN RECEPTOR NEGATIVE (HCC): ICD-10-CM

## 2018-04-19 DIAGNOSIS — Z17.1 MALIGNANT NEOPLASM OF OVERLAPPING SITES OF RIGHT BREAST IN FEMALE, ESTROGEN RECEPTOR NEGATIVE (HCC): ICD-10-CM

## 2018-04-19 DIAGNOSIS — Z17.1 MALIGNANT NEOPLASM OF OVERLAPPING SITES OF RIGHT BREAST IN FEMALE, ESTROGEN RECEPTOR NEGATIVE (HCC): Primary | ICD-10-CM

## 2018-04-19 DIAGNOSIS — C50.811 MALIGNANT NEOPLASM OF OVERLAPPING SITES OF RIGHT BREAST IN FEMALE, ESTROGEN RECEPTOR NEGATIVE (HCC): Primary | ICD-10-CM

## 2018-04-19 DIAGNOSIS — C50.819 MALIGNANT NEOPLASM OF OVERLAPPING SITES OF BREAST IN FEMALE, ESTROGEN RECEPTOR NEGATIVE, UNSPECIFIED LATERALITY (HCC): ICD-10-CM

## 2018-04-19 LAB
ABSOLUTE EOS #: 0 K/UL (ref 0–0.4)
ABSOLUTE IMMATURE GRANULOCYTE: ABNORMAL K/UL (ref 0–0.3)
ABSOLUTE LYMPH #: 1.9 K/UL (ref 1–4.8)
ABSOLUTE MONO #: 0.3 K/UL (ref 0.1–1.2)
ALBUMIN SERPL-MCNC: 4 G/DL (ref 3.5–5.2)
ALBUMIN/GLOBULIN RATIO: 1.1 (ref 1–2.5)
ALP BLD-CCNC: 86 U/L (ref 35–104)
ALT SERPL-CCNC: 13 U/L (ref 5–33)
ANION GAP SERPL CALCULATED.3IONS-SCNC: 14 MMOL/L (ref 9–17)
AST SERPL-CCNC: 12 U/L
BASOPHILS # BLD: 1 % (ref 0–1)
BASOPHILS ABSOLUTE: 0.1 K/UL (ref 0–0.2)
BILIRUB SERPL-MCNC: 0.3 MG/DL (ref 0.3–1.2)
BUN BLDV-MCNC: 21 MG/DL (ref 6–20)
BUN/CREAT BLD: 26 (ref 9–20)
CALCIUM SERPL-MCNC: 9 MG/DL (ref 8.6–10.4)
CHLORIDE BLD-SCNC: 103 MMOL/L (ref 98–107)
CO2: 23 MMOL/L (ref 20–31)
CREAT SERPL-MCNC: 0.81 MG/DL (ref 0.5–0.9)
DIFFERENTIAL TYPE: ABNORMAL
EOSINOPHILS RELATIVE PERCENT: 0 % (ref 1–7)
ESTIMATED AVERAGE GLUCOSE: 100 MG/DL
GFR AFRICAN AMERICAN: >60 ML/MIN
GFR NON-AFRICAN AMERICAN: >60 ML/MIN
GFR SERPL CREATININE-BSD FRML MDRD: ABNORMAL ML/MIN/{1.73_M2}
GFR SERPL CREATININE-BSD FRML MDRD: ABNORMAL ML/MIN/{1.73_M2}
GLUCOSE BLD-MCNC: 106 MG/DL (ref 70–99)
HBA1C MFR BLD: 5.1 % (ref 4.8–5.9)
HCT VFR BLD CALC: 30.2 % (ref 36–46)
HEMOGLOBIN: 10 G/DL (ref 12–16)
IMMATURE GRANULOCYTES: ABNORMAL %
LYMPHOCYTES # BLD: 42 % (ref 16–46)
MCH RBC QN AUTO: 33.2 PG (ref 26–34)
MCHC RBC AUTO-ENTMCNC: 32.9 G/DL (ref 31–37)
MCV RBC AUTO: 100.8 FL (ref 80–100)
MONOCYTES # BLD: 7 % (ref 4–11)
NRBC AUTOMATED: ABNORMAL PER 100 WBC
PDW BLD-RTO: 19.6 % (ref 11–14.5)
PLATELET # BLD: 63 K/UL (ref 140–450)
PLATELET ESTIMATE: ABNORMAL
PMV BLD AUTO: 7.8 FL (ref 6–12)
POTASSIUM SERPL-SCNC: 4.2 MMOL/L (ref 3.7–5.3)
RBC # BLD: 3 M/UL (ref 4–5.2)
RBC # BLD: ABNORMAL 10*6/UL
SEG NEUTROPHILS: 50 % (ref 43–77)
SEGMENTED NEUTROPHILS ABSOLUTE COUNT: 2.3 K/UL (ref 1.8–7.7)
SODIUM BLD-SCNC: 140 MMOL/L (ref 135–144)
TOTAL PROTEIN: 7.7 G/DL (ref 6.4–8.3)
WBC # BLD: 4.6 K/UL (ref 3.5–11)
WBC # BLD: ABNORMAL 10*3/UL

## 2018-04-19 PROCEDURE — 83036 HEMOGLOBIN GLYCOSYLATED A1C: CPT

## 2018-04-19 PROCEDURE — 36591 DRAW BLOOD OFF VENOUS DEVICE: CPT

## 2018-04-19 PROCEDURE — 6360000002 HC RX W HCPCS: Performed by: INTERNAL MEDICINE

## 2018-04-19 PROCEDURE — 2580000003 HC RX 258: Performed by: INTERNAL MEDICINE

## 2018-04-19 PROCEDURE — 80053 COMPREHEN METABOLIC PANEL: CPT

## 2018-04-19 PROCEDURE — 96413 CHEMO IV INFUSION 1 HR: CPT

## 2018-04-19 PROCEDURE — 85025 COMPLETE CBC W/AUTO DIFF WBC: CPT

## 2018-04-19 RX ORDER — HEPARIN SODIUM (PORCINE) LOCK FLUSH IV SOLN 100 UNIT/ML 100 UNIT/ML
500 SOLUTION INTRAVENOUS PRN
Status: CANCELLED | OUTPATIENT
Start: 2018-04-19

## 2018-04-19 RX ORDER — 0.9 % SODIUM CHLORIDE 0.9 %
1000 INTRAVENOUS SOLUTION INTRAVENOUS ONCE
Status: CANCELLED
Start: 2018-04-29 | End: 2018-04-29

## 2018-04-19 RX ORDER — SODIUM CHLORIDE 9 MG/ML
INJECTION, SOLUTION INTRAVENOUS ONCE
Status: DISCONTINUED | OUTPATIENT
Start: 2018-04-19 | End: 2018-04-20 | Stop reason: HOSPADM

## 2018-04-19 RX ORDER — SODIUM CHLORIDE 0.9 % (FLUSH) 0.9 %
10 SYRINGE (ML) INJECTION PRN
Status: DISCONTINUED | OUTPATIENT
Start: 2018-04-19 | End: 2018-04-20 | Stop reason: HOSPADM

## 2018-04-19 RX ORDER — SODIUM CHLORIDE 0.9 % (FLUSH) 0.9 %
5 SYRINGE (ML) INJECTION PRN
Status: CANCELLED | OUTPATIENT
Start: 2018-04-19

## 2018-04-19 RX ORDER — 0.9 % SODIUM CHLORIDE 0.9 %
10 VIAL (ML) INJECTION ONCE
Status: CANCELLED | OUTPATIENT
Start: 2018-04-19 | End: 2018-04-19

## 2018-04-19 RX ORDER — 0.9 % SODIUM CHLORIDE 0.9 %
1000 INTRAVENOUS SOLUTION INTRAVENOUS ONCE
Status: CANCELLED
Start: 2018-04-22 | End: 2018-04-22

## 2018-04-19 RX ORDER — 0.9 % SODIUM CHLORIDE 0.9 %
1000 INTRAVENOUS SOLUTION INTRAVENOUS ONCE
Status: CANCELLED
Start: 2018-05-06 | End: 2018-05-06

## 2018-04-19 RX ORDER — SODIUM CHLORIDE 9 MG/ML
INJECTION, SOLUTION INTRAVENOUS CONTINUOUS
Status: CANCELLED | OUTPATIENT
Start: 2018-04-19

## 2018-04-19 RX ORDER — POTASSIUM CHLORIDE 20 MEQ/1
20 TABLET, EXTENDED RELEASE ORAL DAILY
Qty: 30 TABLET | Refills: 1 | Status: SHIPPED | OUTPATIENT
Start: 2018-04-19 | End: 2018-06-22 | Stop reason: SDUPTHER

## 2018-04-19 RX ORDER — HEPARIN SODIUM (PORCINE) LOCK FLUSH IV SOLN 100 UNIT/ML 100 UNIT/ML
500 SOLUTION INTRAVENOUS PRN
Status: DISCONTINUED | OUTPATIENT
Start: 2018-04-19 | End: 2018-04-20 | Stop reason: HOSPADM

## 2018-04-19 RX ORDER — SODIUM CHLORIDE 0.9 % (FLUSH) 0.9 %
10 SYRINGE (ML) INJECTION PRN
Status: CANCELLED | OUTPATIENT
Start: 2018-04-19

## 2018-04-19 RX ORDER — DIPHENHYDRAMINE HYDROCHLORIDE 50 MG/ML
50 INJECTION INTRAMUSCULAR; INTRAVENOUS ONCE
Status: CANCELLED | OUTPATIENT
Start: 2018-04-19 | End: 2018-04-19

## 2018-04-19 RX ORDER — SODIUM CHLORIDE 9 MG/ML
INJECTION, SOLUTION INTRAVENOUS ONCE
Status: CANCELLED | OUTPATIENT
Start: 2018-04-19 | End: 2018-04-19

## 2018-04-19 RX ORDER — METHYLPREDNISOLONE SODIUM SUCCINATE 125 MG/2ML
125 INJECTION, POWDER, LYOPHILIZED, FOR SOLUTION INTRAMUSCULAR; INTRAVENOUS ONCE
Status: CANCELLED | OUTPATIENT
Start: 2018-04-19 | End: 2018-04-19

## 2018-04-19 RX ADMIN — SODIUM CHLORIDE, PRESERVATIVE FREE 500 UNITS: 5 INJECTION INTRAVENOUS at 11:32

## 2018-04-19 RX ADMIN — TRASTUZUMAB 750 MG: 150 INJECTION, POWDER, LYOPHILIZED, FOR SOLUTION INTRAVENOUS at 10:50

## 2018-04-19 RX ADMIN — Medication 10 ML: at 09:15

## 2018-04-19 RX ADMIN — SODIUM CHLORIDE: 9 INJECTION, SOLUTION INTRAVENOUS at 09:15

## 2018-04-19 ASSESSMENT — PAIN SCALES - GENERAL: PAINLEVEL_OUTOF10: 0

## 2018-04-19 NOTE — PROGRESS NOTES
Breakfast ordered. Sitting in chair with feet elevated. Warm blankets and snack provided. Waiting for treatment plan to be signed.

## 2018-04-19 NOTE — PROGRESS NOTES
VAD accessed per protocol with 1 inch 20 gauge mora needle proximal (medial)  port site. Flushed easily with saline 10 ml. Good blood return. Labs drawn. Secured accessed port with transparent dressing. IV infusing NSS. Patient feeling better since hospitalization. Getting appetite back and increased energy.

## 2018-04-19 NOTE — PROGRESS NOTES
Called Dr. Tico Conway and left message with [facility]  to have him sign treatment plan. Her platelets are 61 Dr. Tico Conway was aware and says OK to give herceptin when he was on unit at 0935. He did order a repeat CBC prior to her appointment on May 3rd with Dr. Tico Conway. Patient verbalizes understanding. Reviewed thrombocytopenia with patient and gave her a handout on it. She verbalizes understanding.

## 2018-04-19 NOTE — PROGRESS NOTES
Infusion complete. Flushed with 40  ml of saline and 5 ml of heparin flush. D/C mora needle. 2x2 and Tegaderm to site. No complaints discharged to home with daughter.

## 2018-04-25 ENCOUNTER — OFFICE VISIT (OUTPATIENT)
Dept: OPTOMETRY | Age: 53
End: 2018-04-25
Payer: MEDICARE

## 2018-04-25 DIAGNOSIS — G24.5 BLEPHAROSPASM: Primary | ICD-10-CM

## 2018-04-25 DIAGNOSIS — H20.9 IRITIS OF LEFT EYE: ICD-10-CM

## 2018-04-25 PROCEDURE — G8417 CALC BMI ABV UP PARAM F/U: HCPCS | Performed by: OPTOMETRIST

## 2018-04-25 PROCEDURE — 99203 OFFICE O/P NEW LOW 30 MIN: CPT | Performed by: OPTOMETRIST

## 2018-04-25 PROCEDURE — 1036F TOBACCO NON-USER: CPT | Performed by: OPTOMETRIST

## 2018-04-25 PROCEDURE — G8427 DOCREV CUR MEDS BY ELIG CLIN: HCPCS | Performed by: OPTOMETRIST

## 2018-04-25 PROCEDURE — 3017F COLORECTAL CA SCREEN DOC REV: CPT | Performed by: OPTOMETRIST

## 2018-04-25 RX ORDER — PREDNISOLONE ACETATE 10 MG/ML
1 SUSPENSION/ DROPS OPHTHALMIC 4 TIMES DAILY
Qty: 1 BOTTLE | Refills: 2 | Status: SHIPPED | OUTPATIENT
Start: 2018-04-25 | End: 2018-05-09

## 2018-04-25 ASSESSMENT — VISUAL ACUITY
OD_SC: 20/25
OS_SC: 20/30
METHOD: SNELLEN - LINEAR
OD_SC+: -2

## 2018-04-25 ASSESSMENT — SLIT LAMP EXAM - LIDS: COMMENTS: BLEPHAROSPASM

## 2018-04-26 ENCOUNTER — TELEPHONE (OUTPATIENT)
Dept: INFUSION THERAPY | Facility: MEDICAL CENTER | Age: 53
End: 2018-04-26

## 2018-05-03 ENCOUNTER — OFFICE VISIT (OUTPATIENT)
Dept: ONCOLOGY | Age: 53
End: 2018-05-03
Payer: MEDICARE

## 2018-05-03 ENCOUNTER — OFFICE VISIT (OUTPATIENT)
Dept: OPTOMETRY | Age: 53
End: 2018-05-03
Payer: MEDICARE

## 2018-05-03 ENCOUNTER — HOSPITAL ENCOUNTER (OUTPATIENT)
Dept: LAB | Age: 53
Setting detail: SPECIMEN
Discharge: HOME | End: 2018-05-03
Payer: MEDICARE

## 2018-05-03 VITALS
WEIGHT: 265.2 LBS | OXYGEN SATURATION: 97 % | RESPIRATION RATE: 18 BRPM | TEMPERATURE: 98.6 F | DIASTOLIC BLOOD PRESSURE: 64 MMHG | HEART RATE: 99 BPM | BODY MASS INDEX: 42.62 KG/M2 | HEIGHT: 66 IN | SYSTOLIC BLOOD PRESSURE: 108 MMHG

## 2018-05-03 DIAGNOSIS — G51.39 HEMIFACIAL SPASM: ICD-10-CM

## 2018-05-03 DIAGNOSIS — C50.819 MALIGNANT NEOPLASM OF OVERLAPPING SITES OF BREAST IN FEMALE, ESTROGEN RECEPTOR NEGATIVE, UNSPECIFIED LATERALITY (HCC): ICD-10-CM

## 2018-05-03 DIAGNOSIS — H20.9 IRITIS OF LEFT EYE: ICD-10-CM

## 2018-05-03 DIAGNOSIS — C50.819 MALIGNANT NEOPLASM OF OVERLAPPING SITES OF BREAST IN FEMALE, ESTROGEN RECEPTOR NEGATIVE, UNSPECIFIED LATERALITY (HCC): Primary | ICD-10-CM

## 2018-05-03 DIAGNOSIS — Z17.1 MALIGNANT NEOPLASM OF OVERLAPPING SITES OF BREAST IN FEMALE, ESTROGEN RECEPTOR NEGATIVE, UNSPECIFIED LATERALITY (HCC): ICD-10-CM

## 2018-05-03 DIAGNOSIS — Z17.1 MALIGNANT NEOPLASM OF OVERLAPPING SITES OF BREAST IN FEMALE, ESTROGEN RECEPTOR NEGATIVE, UNSPECIFIED LATERALITY (HCC): Primary | ICD-10-CM

## 2018-05-03 DIAGNOSIS — G24.5 BLEPHAROSPASM: Primary | ICD-10-CM

## 2018-05-03 LAB
ABSOLUTE EOS #: 0.2 K/UL (ref 0–0.4)
ABSOLUTE IMMATURE GRANULOCYTE: ABNORMAL K/UL (ref 0–0.3)
ABSOLUTE LYMPH #: 2.2 K/UL (ref 1–4.8)
ABSOLUTE MONO #: 0.8 K/UL (ref 0.1–1.2)
BASOPHILS # BLD: 1 % (ref 0–1)
BASOPHILS ABSOLUTE: 0 K/UL (ref 0–0.2)
DIFFERENTIAL TYPE: ABNORMAL
EOSINOPHILS RELATIVE PERCENT: 3 % (ref 1–7)
HCT VFR BLD CALC: 30.3 % (ref 36–46)
HEMOGLOBIN: 10 G/DL (ref 12–16)
IMMATURE GRANULOCYTES: ABNORMAL %
LYMPHOCYTES # BLD: 31 % (ref 16–46)
MCH RBC QN AUTO: 33.5 PG (ref 26–34)
MCHC RBC AUTO-ENTMCNC: 32.9 G/DL (ref 31–37)
MCV RBC AUTO: 102 FL (ref 80–100)
MONOCYTES # BLD: 10 % (ref 4–11)
NRBC AUTOMATED: ABNORMAL PER 100 WBC
PDW BLD-RTO: 18.9 % (ref 11–14.5)
PLATELET # BLD: 228 K/UL (ref 140–450)
PLATELET ESTIMATE: ABNORMAL
PMV BLD AUTO: 7.3 FL (ref 6–12)
RBC # BLD: 2.97 M/UL (ref 4–5.2)
RBC # BLD: ABNORMAL 10*6/UL
SEG NEUTROPHILS: 55 % (ref 43–77)
SEGMENTED NEUTROPHILS ABSOLUTE COUNT: 4 K/UL (ref 1.8–7.7)
WBC # BLD: 7.2 K/UL (ref 3.5–11)
WBC # BLD: ABNORMAL 10*3/UL

## 2018-05-03 PROCEDURE — 99213 OFFICE O/P EST LOW 20 MIN: CPT | Performed by: OPTOMETRIST

## 2018-05-03 PROCEDURE — 85025 COMPLETE CBC W/AUTO DIFF WBC: CPT

## 2018-05-03 PROCEDURE — 1036F TOBACCO NON-USER: CPT | Performed by: INTERNAL MEDICINE

## 2018-05-03 PROCEDURE — G8427 DOCREV CUR MEDS BY ELIG CLIN: HCPCS | Performed by: INTERNAL MEDICINE

## 2018-05-03 PROCEDURE — 99214 OFFICE O/P EST MOD 30 MIN: CPT | Performed by: INTERNAL MEDICINE

## 2018-05-03 PROCEDURE — 3017F COLORECTAL CA SCREEN DOC REV: CPT | Performed by: INTERNAL MEDICINE

## 2018-05-03 PROCEDURE — 3017F COLORECTAL CA SCREEN DOC REV: CPT | Performed by: OPTOMETRIST

## 2018-05-03 PROCEDURE — 1036F TOBACCO NON-USER: CPT | Performed by: OPTOMETRIST

## 2018-05-03 PROCEDURE — 36415 COLL VENOUS BLD VENIPUNCTURE: CPT

## 2018-05-03 PROCEDURE — G8427 DOCREV CUR MEDS BY ELIG CLIN: HCPCS | Performed by: OPTOMETRIST

## 2018-05-03 PROCEDURE — G8417 CALC BMI ABV UP PARAM F/U: HCPCS | Performed by: INTERNAL MEDICINE

## 2018-05-03 PROCEDURE — G8417 CALC BMI ABV UP PARAM F/U: HCPCS | Performed by: OPTOMETRIST

## 2018-05-03 RX ORDER — B-COMPLEX WITH VITAMIN C
1 TABLET ORAL 2 TIMES DAILY
Qty: 60 TABLET | Refills: 5 | Status: SHIPPED | OUTPATIENT
Start: 2018-05-03 | End: 2019-05-16 | Stop reason: SDUPTHER

## 2018-05-03 RX ORDER — ANASTROZOLE 1 MG/1
1 TABLET ORAL DAILY
Qty: 30 TABLET | Refills: 6 | Status: SHIPPED | OUTPATIENT
Start: 2018-05-03 | End: 2018-08-15 | Stop reason: ALTCHOICE

## 2018-05-03 RX ORDER — BENOXINATE HCL/FLUORESCEIN SOD 0.4%-0.25%
1 DROPS OPHTHALMIC (EYE) ONCE
Status: COMPLETED | OUTPATIENT
Start: 2018-05-03 | End: 2018-05-03

## 2018-05-03 RX ADMIN — Medication 1 DROP: at 14:39

## 2018-05-03 ASSESSMENT — TONOMETRY
IOP_METHOD: APPLANATION W FLURESS DROP
OS_IOP_MMHG: 16
OD_IOP_MMHG: 16

## 2018-05-03 ASSESSMENT — REFRACTION_MANIFEST
OS_CYLINDER: -0.75
OD_AXIS: 085
OD_SPHERE: +0.50
OS_SPHERE: -0.50
OS_ADD: +2.00
OD_CYLINDER: -0.75
OD_ADD: +2.00
OS_AXIS: 051

## 2018-05-03 ASSESSMENT — VISUAL ACUITY
OS_SC+: -1
OD_SC: 20/30
METHOD: SNELLEN - LINEAR
OS_SC: 20/50
OD_SC+: -1

## 2018-05-03 ASSESSMENT — SLIT LAMP EXAM - LIDS
COMMENTS: NORMAL
COMMENTS: NORMAL

## 2018-05-05 PROBLEM — E86.0 DEHYDRATION: Status: RESOLVED | Noted: 2018-01-09 | Resolved: 2018-05-05

## 2018-05-10 ENCOUNTER — HOSPITAL ENCOUNTER (OUTPATIENT)
Dept: INFUSION THERAPY | Age: 53
Discharge: HOME | End: 2018-05-10
Payer: MEDICARE

## 2018-05-10 VITALS
BODY MASS INDEX: 42.72 KG/M2 | TEMPERATURE: 97.7 F | WEIGHT: 265.8 LBS | HEART RATE: 74 BPM | RESPIRATION RATE: 16 BRPM | SYSTOLIC BLOOD PRESSURE: 116 MMHG | DIASTOLIC BLOOD PRESSURE: 75 MMHG | HEIGHT: 66 IN

## 2018-05-10 DIAGNOSIS — Z17.1 MALIGNANT NEOPLASM OF OVERLAPPING SITES OF RIGHT BREAST IN FEMALE, ESTROGEN RECEPTOR NEGATIVE (HCC): ICD-10-CM

## 2018-05-10 DIAGNOSIS — Z17.1 MALIGNANT NEOPLASM OF OVERLAPPING SITES OF RIGHT BREAST IN FEMALE, ESTROGEN RECEPTOR NEGATIVE (HCC): Primary | ICD-10-CM

## 2018-05-10 DIAGNOSIS — C50.811 MALIGNANT NEOPLASM OF OVERLAPPING SITES OF RIGHT BREAST IN FEMALE, ESTROGEN RECEPTOR NEGATIVE (HCC): ICD-10-CM

## 2018-05-10 DIAGNOSIS — C50.811 MALIGNANT NEOPLASM OF OVERLAPPING SITES OF RIGHT BREAST IN FEMALE, ESTROGEN RECEPTOR NEGATIVE (HCC): Primary | ICD-10-CM

## 2018-05-10 DIAGNOSIS — Z17.1 MALIGNANT NEOPLASM OF OVERLAPPING SITES OF BREAST IN FEMALE, ESTROGEN RECEPTOR NEGATIVE, UNSPECIFIED LATERALITY (HCC): ICD-10-CM

## 2018-05-10 DIAGNOSIS — C50.819 MALIGNANT NEOPLASM OF OVERLAPPING SITES OF BREAST IN FEMALE, ESTROGEN RECEPTOR NEGATIVE, UNSPECIFIED LATERALITY (HCC): ICD-10-CM

## 2018-05-10 LAB
ABSOLUTE EOS #: 0.1 K/UL (ref 0–0.4)
ABSOLUTE IMMATURE GRANULOCYTE: ABNORMAL K/UL (ref 0–0.3)
ABSOLUTE LYMPH #: 1.9 K/UL (ref 1–4.8)
ABSOLUTE MONO #: 0.5 K/UL (ref 0.1–1.2)
ALBUMIN SERPL-MCNC: 3.2 G/DL (ref 3.5–5.2)
ALBUMIN/GLOBULIN RATIO: 0.8 (ref 1–2.5)
ALP BLD-CCNC: 92 U/L (ref 35–104)
ALT SERPL-CCNC: 16 U/L (ref 5–33)
ANION GAP SERPL CALCULATED.3IONS-SCNC: 11 MMOL/L (ref 9–17)
AST SERPL-CCNC: 16 U/L
BASOPHILS # BLD: 1 % (ref 0–1)
BASOPHILS ABSOLUTE: 0 K/UL (ref 0–0.2)
BILIRUB SERPL-MCNC: 0.22 MG/DL (ref 0.3–1.2)
BUN BLDV-MCNC: 14 MG/DL (ref 6–20)
BUN/CREAT BLD: 12 (ref 9–20)
CALCIUM SERPL-MCNC: 9.4 MG/DL (ref 8.6–10.4)
CHLORIDE BLD-SCNC: 97 MMOL/L (ref 98–107)
CO2: 28 MMOL/L (ref 20–31)
CREAT SERPL-MCNC: 1.15 MG/DL (ref 0.5–0.9)
DIFFERENTIAL TYPE: ABNORMAL
EOSINOPHILS RELATIVE PERCENT: 2 % (ref 1–7)
GFR AFRICAN AMERICAN: >60 ML/MIN
GFR NON-AFRICAN AMERICAN: 50 ML/MIN
GFR SERPL CREATININE-BSD FRML MDRD: ABNORMAL ML/MIN/{1.73_M2}
GFR SERPL CREATININE-BSD FRML MDRD: ABNORMAL ML/MIN/{1.73_M2}
GLUCOSE BLD-MCNC: 98 MG/DL (ref 70–99)
HCT VFR BLD CALC: 29.4 % (ref 36–46)
HEMOGLOBIN: 9.7 G/DL (ref 12–16)
IMMATURE GRANULOCYTES: ABNORMAL %
LYMPHOCYTES # BLD: 32 % (ref 16–46)
MCH RBC QN AUTO: 33.6 PG (ref 26–34)
MCHC RBC AUTO-ENTMCNC: 33 G/DL (ref 31–37)
MCV RBC AUTO: 101.9 FL (ref 80–100)
MONOCYTES # BLD: 8 % (ref 4–11)
NRBC AUTOMATED: ABNORMAL PER 100 WBC
PDW BLD-RTO: 18.4 % (ref 11–14.5)
PLATELET # BLD: 189 K/UL (ref 140–450)
PLATELET ESTIMATE: ABNORMAL
PMV BLD AUTO: 7.5 FL (ref 6–12)
POTASSIUM SERPL-SCNC: 4.1 MMOL/L (ref 3.7–5.3)
RBC # BLD: 2.89 M/UL (ref 4–5.2)
RBC # BLD: ABNORMAL 10*6/UL
SEG NEUTROPHILS: 57 % (ref 43–77)
SEGMENTED NEUTROPHILS ABSOLUTE COUNT: 3.5 K/UL (ref 1.8–7.7)
SODIUM BLD-SCNC: 136 MMOL/L (ref 135–144)
TOTAL PROTEIN: 7.2 G/DL (ref 6.4–8.3)
WBC # BLD: 6.1 K/UL (ref 3.5–11)
WBC # BLD: ABNORMAL 10*3/UL

## 2018-05-10 PROCEDURE — 85025 COMPLETE CBC W/AUTO DIFF WBC: CPT

## 2018-05-10 PROCEDURE — 80053 COMPREHEN METABOLIC PANEL: CPT

## 2018-05-10 PROCEDURE — 2580000003 HC RX 258: Performed by: INTERNAL MEDICINE

## 2018-05-10 PROCEDURE — 36593 DECLOT VASCULAR DEVICE: CPT

## 2018-05-10 PROCEDURE — 36415 COLL VENOUS BLD VENIPUNCTURE: CPT

## 2018-05-10 PROCEDURE — 6360000002 HC RX W HCPCS: Performed by: INTERNAL MEDICINE

## 2018-05-10 PROCEDURE — 96413 CHEMO IV INFUSION 1 HR: CPT

## 2018-05-10 RX ORDER — SODIUM CHLORIDE 9 MG/ML
INJECTION, SOLUTION INTRAVENOUS ONCE
Status: CANCELLED | OUTPATIENT
Start: 2018-05-10 | End: 2018-05-10

## 2018-05-10 RX ORDER — SODIUM CHLORIDE 9 MG/ML
INJECTION, SOLUTION INTRAVENOUS CONTINUOUS
Status: CANCELLED | OUTPATIENT
Start: 2018-05-10

## 2018-05-10 RX ORDER — 0.9 % SODIUM CHLORIDE 0.9 %
1000 INTRAVENOUS SOLUTION INTRAVENOUS ONCE
Status: CANCELLED
Start: 2018-05-27 | End: 2018-05-27

## 2018-05-10 RX ORDER — SODIUM CHLORIDE 0.9 % (FLUSH) 0.9 %
10 SYRINGE (ML) INJECTION PRN
Status: DISCONTINUED | OUTPATIENT
Start: 2018-05-10 | End: 2018-05-11 | Stop reason: HOSPADM

## 2018-05-10 RX ORDER — EPINEPHRINE 1 MG/ML
0.3 INJECTION INTRAMUSCULAR; INTRAVENOUS; SUBCUTANEOUS PRN
Status: CANCELLED | OUTPATIENT
Start: 2018-05-10

## 2018-05-10 RX ORDER — 0.9 % SODIUM CHLORIDE 0.9 %
1000 INTRAVENOUS SOLUTION INTRAVENOUS ONCE
Status: CANCELLED
Start: 2018-05-13 | End: 2018-05-13

## 2018-05-10 RX ORDER — SODIUM CHLORIDE 0.9 % (FLUSH) 0.9 %
5 SYRINGE (ML) INJECTION PRN
Status: CANCELLED | OUTPATIENT
Start: 2018-05-10

## 2018-05-10 RX ORDER — 0.9 % SODIUM CHLORIDE 0.9 %
1000 INTRAVENOUS SOLUTION INTRAVENOUS ONCE
Status: CANCELLED
Start: 2018-05-20 | End: 2018-05-20

## 2018-05-10 RX ORDER — METHYLPREDNISOLONE SODIUM SUCCINATE 125 MG/2ML
125 INJECTION, POWDER, LYOPHILIZED, FOR SOLUTION INTRAMUSCULAR; INTRAVENOUS ONCE
Status: CANCELLED | OUTPATIENT
Start: 2018-05-10 | End: 2018-05-10

## 2018-05-10 RX ORDER — SODIUM CHLORIDE 9 MG/ML
INJECTION, SOLUTION INTRAVENOUS ONCE
Status: COMPLETED | OUTPATIENT
Start: 2018-05-10 | End: 2018-05-10

## 2018-05-10 RX ORDER — DIPHENHYDRAMINE HYDROCHLORIDE 50 MG/ML
50 INJECTION INTRAMUSCULAR; INTRAVENOUS ONCE
Status: CANCELLED | OUTPATIENT
Start: 2018-05-10 | End: 2018-05-10

## 2018-05-10 RX ORDER — HEPARIN SODIUM (PORCINE) LOCK FLUSH IV SOLN 100 UNIT/ML 100 UNIT/ML
500 SOLUTION INTRAVENOUS PRN
Status: DISCONTINUED | OUTPATIENT
Start: 2018-05-10 | End: 2018-05-11 | Stop reason: HOSPADM

## 2018-05-10 RX ADMIN — SODIUM CHLORIDE, PRESERVATIVE FREE 500 UNITS: 5 INJECTION INTRAVENOUS at 11:54

## 2018-05-10 RX ADMIN — Medication 10 ML: at 09:26

## 2018-05-10 RX ADMIN — TRASTUZUMAB 750 MG: 150 INJECTION, POWDER, LYOPHILIZED, FOR SOLUTION INTRAVENOUS at 11:09

## 2018-05-10 RX ADMIN — ALTEPLASE 2 MG: 2.2 INJECTION, POWDER, LYOPHILIZED, FOR SOLUTION INTRAVENOUS at 09:49

## 2018-05-10 RX ADMIN — Medication 10 ML: at 09:27

## 2018-05-10 RX ADMIN — SODIUM CHLORIDE, PRESERVATIVE FREE 500 UNITS: 5 INJECTION INTRAVENOUS at 10:05

## 2018-05-10 RX ADMIN — Medication 10 ML: at 09:28

## 2018-05-10 RX ADMIN — Medication 10 ML: at 09:25

## 2018-05-10 RX ADMIN — SODIUM CHLORIDE: 9 INJECTION, SOLUTION INTRAVENOUS at 10:20

## 2018-05-10 ASSESSMENT — PAIN SCALES - GENERAL: PAINLEVEL_OUTOF10: 0

## 2018-05-10 NOTE — PROGRESS NOTES
On unit for cycle 8 day 1 chemo treatment. Reports feeling better still tired but much better. Went to eye doctor for eye twitching in left eye and they are getting her new eye glasses. She has appointment with Dr. Mark Hernandez today at 1120. Dual port accessed bilateral.  No blood return on either side of port. Activase dwelling in distal port site. Patient able to taste saline and saline pushed easily through both sides. Did feel back of port with each access. Accessed each side of port per protocol with 1 inch mora needle.

## 2018-05-10 NOTE — PROGRESS NOTES
De accessed both port sites flushed distal port with heparin flush. 4x4 and Tegaderm placed on sites. Patient stable no complaints. D/C to home.

## 2018-05-10 NOTE — PROGRESS NOTES
Activase dwelled in distal port site and got blood return. IV infusing saline through the port. Awaiting lab results. Labs were drawn per  at 1000 on left arm.

## 2018-05-10 NOTE — PROGRESS NOTES
Flushed proximal/medial port with 5 ml of heparin flush. Activase dwelling in distal port. Patient eating breakfast daughter at chairside.

## 2018-05-15 ENCOUNTER — TELEPHONE (OUTPATIENT)
Dept: OPTOMETRY | Age: 53
End: 2018-05-15

## 2018-05-30 ENCOUNTER — TELEPHONE (OUTPATIENT)
Dept: INFUSION THERAPY | Facility: MEDICAL CENTER | Age: 53
End: 2018-05-30

## 2018-05-31 ENCOUNTER — HOSPITAL ENCOUNTER (OUTPATIENT)
Dept: GENERAL RADIOLOGY | Age: 53
Discharge: HOME | End: 2018-06-02
Payer: MEDICARE

## 2018-05-31 ENCOUNTER — OFFICE VISIT (OUTPATIENT)
Dept: ONCOLOGY | Age: 53
End: 2018-05-31
Payer: MEDICARE

## 2018-05-31 ENCOUNTER — HOSPITAL ENCOUNTER (OUTPATIENT)
Dept: INFUSION THERAPY | Age: 53
Discharge: HOME | End: 2018-05-31
Payer: MEDICARE

## 2018-05-31 VITALS
WEIGHT: 270.2 LBS | DIASTOLIC BLOOD PRESSURE: 73 MMHG | RESPIRATION RATE: 16 BRPM | HEIGHT: 66 IN | SYSTOLIC BLOOD PRESSURE: 123 MMHG | HEART RATE: 85 BPM | OXYGEN SATURATION: 97 % | BODY MASS INDEX: 43.42 KG/M2 | TEMPERATURE: 98.5 F

## 2018-05-31 VITALS
WEIGHT: 270.2 LBS | HEART RATE: 85 BPM | SYSTOLIC BLOOD PRESSURE: 123 MMHG | DIASTOLIC BLOOD PRESSURE: 73 MMHG | TEMPERATURE: 98.5 F | HEIGHT: 66 IN | BODY MASS INDEX: 43.42 KG/M2

## 2018-05-31 DIAGNOSIS — C50.811 MALIGNANT NEOPLASM OF OVERLAPPING SITES OF RIGHT BREAST IN FEMALE, ESTROGEN RECEPTOR NEGATIVE (HCC): ICD-10-CM

## 2018-05-31 DIAGNOSIS — C50.811 MALIGNANT NEOPLASM OF OVERLAPPING SITES OF RIGHT BREAST IN FEMALE, ESTROGEN RECEPTOR NEGATIVE (HCC): Primary | ICD-10-CM

## 2018-05-31 DIAGNOSIS — Z17.1 MALIGNANT NEOPLASM OF OVERLAPPING SITES OF BREAST IN FEMALE, ESTROGEN RECEPTOR NEGATIVE, UNSPECIFIED LATERALITY (HCC): ICD-10-CM

## 2018-05-31 DIAGNOSIS — Z17.1 MALIGNANT NEOPLASM OF OVERLAPPING SITES OF RIGHT BREAST IN FEMALE, ESTROGEN RECEPTOR NEGATIVE (HCC): ICD-10-CM

## 2018-05-31 DIAGNOSIS — C50.819 MALIGNANT NEOPLASM OF OVERLAPPING SITES OF BREAST IN FEMALE, ESTROGEN RECEPTOR NEGATIVE, UNSPECIFIED LATERALITY (HCC): ICD-10-CM

## 2018-05-31 DIAGNOSIS — D48.7 NEOPLASM OF UNCERTAIN BEHAVIOR OF OTHER SPECIFIED SITES: ICD-10-CM

## 2018-05-31 DIAGNOSIS — Z78.0 POSTMENOPAUSAL: ICD-10-CM

## 2018-05-31 DIAGNOSIS — Z17.1 MALIGNANT NEOPLASM OF OVERLAPPING SITES OF RIGHT BREAST IN FEMALE, ESTROGEN RECEPTOR NEGATIVE (HCC): Primary | ICD-10-CM

## 2018-05-31 LAB
ABSOLUTE EOS #: 0.5 K/UL (ref 0–0.4)
ABSOLUTE IMMATURE GRANULOCYTE: ABNORMAL K/UL (ref 0–0.3)
ABSOLUTE LYMPH #: 2.3 K/UL (ref 1–4.8)
ABSOLUTE MONO #: 0.6 K/UL (ref 0.1–1.2)
ALBUMIN SERPL-MCNC: 3.8 G/DL (ref 3.5–5.2)
ALBUMIN/GLOBULIN RATIO: 1.1 (ref 1–2.5)
ALP BLD-CCNC: 85 U/L (ref 35–104)
ALT SERPL-CCNC: 18 U/L (ref 5–33)
ANION GAP SERPL CALCULATED.3IONS-SCNC: 13 MMOL/L (ref 9–17)
AST SERPL-CCNC: 17 U/L
BASOPHILS # BLD: 1 % (ref 0–1)
BASOPHILS ABSOLUTE: 0.1 K/UL (ref 0–0.2)
BILIRUB SERPL-MCNC: 0.18 MG/DL (ref 0.3–1.2)
BUN BLDV-MCNC: 18 MG/DL (ref 6–20)
BUN/CREAT BLD: 17 (ref 9–20)
CALCIUM SERPL-MCNC: 9.5 MG/DL (ref 8.6–10.4)
CHLORIDE BLD-SCNC: 100 MMOL/L (ref 98–107)
CO2: 25 MMOL/L (ref 20–31)
CREAT SERPL-MCNC: 1.07 MG/DL (ref 0.5–0.9)
DIFFERENTIAL TYPE: ABNORMAL
EOSINOPHILS RELATIVE PERCENT: 8 % (ref 1–7)
GFR AFRICAN AMERICAN: >60 ML/MIN
GFR NON-AFRICAN AMERICAN: 54 ML/MIN
GFR SERPL CREATININE-BSD FRML MDRD: ABNORMAL ML/MIN/{1.73_M2}
GFR SERPL CREATININE-BSD FRML MDRD: ABNORMAL ML/MIN/{1.73_M2}
GLUCOSE BLD-MCNC: 110 MG/DL (ref 70–99)
HCT VFR BLD CALC: 32.2 % (ref 36–46)
HEMOGLOBIN: 10.5 G/DL (ref 12–16)
IMMATURE GRANULOCYTES: ABNORMAL %
LYMPHOCYTES # BLD: 36 % (ref 16–46)
MCH RBC QN AUTO: 34.3 PG (ref 26–34)
MCHC RBC AUTO-ENTMCNC: 32.8 G/DL (ref 31–37)
MCV RBC AUTO: 104.7 FL (ref 80–100)
MONOCYTES # BLD: 9 % (ref 4–11)
NRBC AUTOMATED: ABNORMAL PER 100 WBC
PDW BLD-RTO: 16.3 % (ref 11–14.5)
PLATELET # BLD: 152 K/UL (ref 140–450)
PLATELET ESTIMATE: ABNORMAL
PMV BLD AUTO: 7.6 FL (ref 6–12)
POTASSIUM SERPL-SCNC: 4.1 MMOL/L (ref 3.7–5.3)
RBC # BLD: 3.07 M/UL (ref 4–5.2)
RBC # BLD: ABNORMAL 10*6/UL
SEG NEUTROPHILS: 46 % (ref 43–77)
SEGMENTED NEUTROPHILS ABSOLUTE COUNT: 3 K/UL (ref 1.8–7.7)
SODIUM BLD-SCNC: 138 MMOL/L (ref 135–144)
TOTAL PROTEIN: 7.3 G/DL (ref 6.4–8.3)
WBC # BLD: 6.4 K/UL (ref 3.5–11)
WBC # BLD: ABNORMAL 10*3/UL

## 2018-05-31 PROCEDURE — G8417 CALC BMI ABV UP PARAM F/U: HCPCS | Performed by: INTERNAL MEDICINE

## 2018-05-31 PROCEDURE — 96413 CHEMO IV INFUSION 1 HR: CPT

## 2018-05-31 PROCEDURE — 85025 COMPLETE CBC W/AUTO DIFF WBC: CPT

## 2018-05-31 PROCEDURE — 72100 X-RAY EXAM L-S SPINE 2/3 VWS: CPT

## 2018-05-31 PROCEDURE — 1036F TOBACCO NON-USER: CPT | Performed by: INTERNAL MEDICINE

## 2018-05-31 PROCEDURE — 2580000003 HC RX 258: Performed by: INTERNAL MEDICINE

## 2018-05-31 PROCEDURE — 80053 COMPREHEN METABOLIC PANEL: CPT

## 2018-05-31 PROCEDURE — 99214 OFFICE O/P EST MOD 30 MIN: CPT | Performed by: INTERNAL MEDICINE

## 2018-05-31 PROCEDURE — 3017F COLORECTAL CA SCREEN DOC REV: CPT | Performed by: INTERNAL MEDICINE

## 2018-05-31 PROCEDURE — 6360000002 HC RX W HCPCS: Performed by: INTERNAL MEDICINE

## 2018-05-31 PROCEDURE — 36591 DRAW BLOOD OFF VENOUS DEVICE: CPT

## 2018-05-31 PROCEDURE — G8427 DOCREV CUR MEDS BY ELIG CLIN: HCPCS | Performed by: INTERNAL MEDICINE

## 2018-05-31 RX ORDER — SODIUM CHLORIDE 0.9 % (FLUSH) 0.9 %
10 SYRINGE (ML) INJECTION PRN
Status: DISCONTINUED | OUTPATIENT
Start: 2018-05-31 | End: 2018-06-01 | Stop reason: HOSPADM

## 2018-05-31 RX ORDER — METHYLPREDNISOLONE SODIUM SUCCINATE 125 MG/2ML
125 INJECTION, POWDER, LYOPHILIZED, FOR SOLUTION INTRAMUSCULAR; INTRAVENOUS ONCE
Status: CANCELLED | OUTPATIENT
Start: 2018-05-31 | End: 2018-05-31

## 2018-05-31 RX ORDER — HEPARIN SODIUM (PORCINE) LOCK FLUSH IV SOLN 100 UNIT/ML 100 UNIT/ML
500 SOLUTION INTRAVENOUS PRN
Status: DISCONTINUED | OUTPATIENT
Start: 2018-05-31 | End: 2018-06-01 | Stop reason: HOSPADM

## 2018-05-31 RX ORDER — 0.9 % SODIUM CHLORIDE 0.9 %
1000 INTRAVENOUS SOLUTION INTRAVENOUS ONCE
Status: CANCELLED
Start: 2018-06-03 | End: 2018-06-03

## 2018-05-31 RX ORDER — 0.9 % SODIUM CHLORIDE 0.9 %
10 VIAL (ML) INJECTION ONCE
Status: CANCELLED | OUTPATIENT
Start: 2018-05-31 | End: 2018-05-31

## 2018-05-31 RX ORDER — HEPARIN SODIUM (PORCINE) LOCK FLUSH IV SOLN 100 UNIT/ML 100 UNIT/ML
500 SOLUTION INTRAVENOUS PRN
Status: CANCELLED | OUTPATIENT
Start: 2018-05-31

## 2018-05-31 RX ORDER — SODIUM CHLORIDE 0.9 % (FLUSH) 0.9 %
5 SYRINGE (ML) INJECTION PRN
Status: CANCELLED | OUTPATIENT
Start: 2018-05-31

## 2018-05-31 RX ORDER — DIPHENHYDRAMINE HYDROCHLORIDE 50 MG/ML
50 INJECTION INTRAMUSCULAR; INTRAVENOUS ONCE
Status: CANCELLED | OUTPATIENT
Start: 2018-05-31 | End: 2018-05-31

## 2018-05-31 RX ORDER — SODIUM CHLORIDE 0.9 % (FLUSH) 0.9 %
10 SYRINGE (ML) INJECTION PRN
Status: CANCELLED | OUTPATIENT
Start: 2018-05-31

## 2018-05-31 RX ORDER — SODIUM CHLORIDE 9 MG/ML
INJECTION, SOLUTION INTRAVENOUS ONCE
Status: CANCELLED | OUTPATIENT
Start: 2018-05-31 | End: 2018-05-31

## 2018-05-31 RX ORDER — SODIUM CHLORIDE 9 MG/ML
INJECTION, SOLUTION INTRAVENOUS ONCE
Status: COMPLETED | OUTPATIENT
Start: 2018-05-31 | End: 2018-05-31

## 2018-05-31 RX ORDER — 0.9 % SODIUM CHLORIDE 0.9 %
1000 INTRAVENOUS SOLUTION INTRAVENOUS ONCE
Status: CANCELLED
Start: 2018-06-17 | End: 2018-06-17

## 2018-05-31 RX ORDER — SODIUM CHLORIDE 9 MG/ML
INJECTION, SOLUTION INTRAVENOUS CONTINUOUS
Status: CANCELLED | OUTPATIENT
Start: 2018-05-31

## 2018-05-31 RX ORDER — 0.9 % SODIUM CHLORIDE 0.9 %
1000 INTRAVENOUS SOLUTION INTRAVENOUS ONCE
Status: CANCELLED
Start: 2018-06-10 | End: 2018-06-10

## 2018-05-31 RX ADMIN — Medication 10 ML: at 10:46

## 2018-05-31 RX ADMIN — TRASTUZUMAB 750 MG: 150 INJECTION, POWDER, LYOPHILIZED, FOR SOLUTION INTRAVENOUS at 10:02

## 2018-05-31 RX ADMIN — HEPARIN SODIUM (PORCINE) LOCK FLUSH IV SOLN 100 UNIT/ML 500 UNITS: 100 SOLUTION at 10:46

## 2018-05-31 RX ADMIN — Medication 10 ML: at 08:58

## 2018-05-31 RX ADMIN — SODIUM CHLORIDE: 9 INJECTION, SOLUTION INTRAVENOUS at 09:00

## 2018-05-31 RX ADMIN — Medication 10 ML: at 09:00

## 2018-05-31 RX ADMIN — Medication 10 ML: at 08:57

## 2018-05-31 RX ADMIN — HEPARIN SODIUM (PORCINE) LOCK FLUSH IV SOLN 100 UNIT/ML 500 UNITS: 100 SOLUTION at 10:47

## 2018-05-31 RX ADMIN — Medication 10 ML: at 08:59

## 2018-05-31 ASSESSMENT — PAIN DESCRIPTION - DESCRIPTORS: DESCRIPTORS: CRAMPING

## 2018-05-31 ASSESSMENT — PAIN DESCRIPTION - ORIENTATION: ORIENTATION: LOWER

## 2018-05-31 ASSESSMENT — PAIN SCALES - GENERAL: PAINLEVEL_OUTOF10: 8

## 2018-05-31 ASSESSMENT — PAIN DESCRIPTION - LOCATION: LOCATION: BACK

## 2018-05-31 ASSESSMENT — PAIN DESCRIPTION - PAIN TYPE: TYPE: ACUTE PAIN

## 2018-05-31 NOTE — PROGRESS NOTES
Patient reports she did not get apartment is upset she has a  at [facility] and was encouraged to go to her for help finding living arrangements. She agreed to do that. She knows to go to xray after discharge for lumbar spine. [facility] set up appointments for echocardiogram and dexascan highlighted instruction to quit calcium 24-48 hours prior to dexascan verbalized understanding. Reviewed AVS with her she verbalized understanding. D/C to home.

## 2018-05-31 NOTE — PROGRESS NOTES
Patient comes to unit for cycle 9 day 1.  reports being stressed. She has been evicted from apartment and has appointments for social security to get that. She is trying to get into low income apartments in [address] [place] [address] [place] is 3rd on list.  Living in a hotel presently. Support needed and given. Daughter lives with her and been unable to keep a job long term and pay her bills. VAD accessed per protocol with 3/4 inch 20 gauge mora needle distal port . Flushed easily with saline 10 ml. Good blood return. Labs drawn. Proximal (medial port accessed with 1 inch mora needle. Saline 10 ml pushes easily no blood return. Secured accessed port with transparent dressing. IV infusing NSS through distal port. Let proximal port accessed until infusion complete then will flush with Saline and heparin as will do for  Distal port. Patient tolerated without complications.

## 2018-05-31 NOTE — PROGRESS NOTES
Patient saw Dr. Chantale Matthews and reported her back pain he is ordering dexascan and then also an echo cardiogram due to herceptin. [facility] LPN notified of visit.

## 2018-06-01 ENCOUNTER — OFFICE VISIT (OUTPATIENT)
Dept: NEUROLOGY | Age: 53
End: 2018-06-01
Payer: MEDICARE

## 2018-06-01 VITALS
WEIGHT: 268.2 LBS | DIASTOLIC BLOOD PRESSURE: 68 MMHG | SYSTOLIC BLOOD PRESSURE: 108 MMHG | HEART RATE: 80 BPM | HEIGHT: 66 IN | BODY MASS INDEX: 43.1 KG/M2

## 2018-06-01 DIAGNOSIS — R41.3 MEMORY PROBLEM: ICD-10-CM

## 2018-06-01 DIAGNOSIS — F32.A ANXIETY AND DEPRESSION: ICD-10-CM

## 2018-06-01 DIAGNOSIS — Z95.828 PORT CATHETER IN PLACE: ICD-10-CM

## 2018-06-01 DIAGNOSIS — G43.009 MIGRAINE WITHOUT AURA AND WITHOUT STATUS MIGRAINOSUS, NOT INTRACTABLE: ICD-10-CM

## 2018-06-01 DIAGNOSIS — Z17.1 MALIGNANT NEOPLASM OF OVERLAPPING SITES OF RIGHT BREAST IN FEMALE, ESTROGEN RECEPTOR NEGATIVE (HCC): ICD-10-CM

## 2018-06-01 DIAGNOSIS — G24.5 BLEPHAROSPASM: Primary | ICD-10-CM

## 2018-06-01 DIAGNOSIS — R25.3 MUSCLE TWITCHING: ICD-10-CM

## 2018-06-01 DIAGNOSIS — G47.9 SLEEP DIFFICULTIES: ICD-10-CM

## 2018-06-01 DIAGNOSIS — E86.0 DEHYDRATION: ICD-10-CM

## 2018-06-01 DIAGNOSIS — F41.9 ANXIETY AND DEPRESSION: ICD-10-CM

## 2018-06-01 DIAGNOSIS — F31.9 BIPOLAR 1 DISORDER (HCC): ICD-10-CM

## 2018-06-01 DIAGNOSIS — C50.811 MALIGNANT NEOPLASM OF OVERLAPPING SITES OF RIGHT BREAST IN FEMALE, ESTROGEN RECEPTOR NEGATIVE (HCC): ICD-10-CM

## 2018-06-01 DIAGNOSIS — E78.49 OTHER HYPERLIPIDEMIA: ICD-10-CM

## 2018-06-01 PROBLEM — G43.909 MIGRAINE: Status: ACTIVE | Noted: 2018-06-01

## 2018-06-01 PROCEDURE — 99205 OFFICE O/P NEW HI 60 MIN: CPT | Performed by: PSYCHIATRY & NEUROLOGY

## 2018-06-01 PROCEDURE — G8427 DOCREV CUR MEDS BY ELIG CLIN: HCPCS | Performed by: PSYCHIATRY & NEUROLOGY

## 2018-06-01 PROCEDURE — 1036F TOBACCO NON-USER: CPT | Performed by: PSYCHIATRY & NEUROLOGY

## 2018-06-01 PROCEDURE — 3017F COLORECTAL CA SCREEN DOC REV: CPT | Performed by: PSYCHIATRY & NEUROLOGY

## 2018-06-01 PROCEDURE — G8417 CALC BMI ABV UP PARAM F/U: HCPCS | Performed by: PSYCHIATRY & NEUROLOGY

## 2018-06-01 RX ORDER — CLONAZEPAM 0.5 MG/1
TABLET ORAL
Qty: 60 TABLET | Refills: 1 | Status: SHIPPED | OUTPATIENT
Start: 2018-06-01 | End: 2018-08-02 | Stop reason: SDUPTHER

## 2018-06-01 ASSESSMENT — ENCOUNTER SYMPTOMS
EYE ITCHING: 0
TROUBLE SWALLOWING: 0
BOWEL INCONTINENCE: 0
ABDOMINAL DISTENTION: 0
EYE PAIN: 0
EYE REDNESS: 0
FACIAL SWELLING: 0
VOMITING: 0
COUGH: 0
CONSTIPATION: 0
BACK PAIN: 0
CHEST TIGHTNESS: 0
NAUSEA: 0
WHEEZING: 0
VOICE CHANGE: 0
DIARRHEA: 0
APNEA: 0
SHORTNESS OF BREATH: 0
EYE DISCHARGE: 0
COLOR CHANGE: 0
ABDOMINAL PAIN: 0
CHOKING: 0
PHOTOPHOBIA: 0
BLOOD IN STOOL: 0
SINUS PRESSURE: 0
SORE THROAT: 0

## 2018-06-01 ASSESSMENT — PATIENT HEALTH QUESTIONNAIRE - PHQ9
1. LITTLE INTEREST OR PLEASURE IN DOING THINGS: 0
2. FEELING DOWN, DEPRESSED OR HOPELESS: 0
SUM OF ALL RESPONSES TO PHQ QUESTIONS 1-9: 0
SUM OF ALL RESPONSES TO PHQ9 QUESTIONS 1 & 2: 0

## 2018-06-04 ENCOUNTER — HOSPITAL ENCOUNTER (OUTPATIENT)
Dept: LAB | Age: 53
Setting detail: SPECIMEN
Discharge: HOME | End: 2018-06-04
Payer: MEDICARE

## 2018-06-04 DIAGNOSIS — R41.3 MEMORY PROBLEM: ICD-10-CM

## 2018-06-04 DIAGNOSIS — G43.009 MIGRAINE WITHOUT AURA AND WITHOUT STATUS MIGRAINOSUS, NOT INTRACTABLE: ICD-10-CM

## 2018-06-04 LAB
FOLATE: >20 NG/ML
TSH SERPL DL<=0.05 MIU/L-ACNC: 2.42 MIU/L (ref 0.3–5)
VITAMIN B-12: 580 PG/ML (ref 232–1245)

## 2018-06-04 PROCEDURE — 82746 ASSAY OF FOLIC ACID SERUM: CPT

## 2018-06-04 PROCEDURE — 36415 COLL VENOUS BLD VENIPUNCTURE: CPT

## 2018-06-04 PROCEDURE — 84443 ASSAY THYROID STIM HORMONE: CPT

## 2018-06-04 PROCEDURE — 82607 VITAMIN B-12: CPT

## 2018-06-06 ENCOUNTER — TELEPHONE (OUTPATIENT)
Dept: ONCOLOGY | Age: 53
End: 2018-06-06

## 2018-06-07 ENCOUNTER — HOSPITAL ENCOUNTER (OUTPATIENT)
Dept: NON INVASIVE DIAGNOSTICS | Age: 53
Discharge: HOME | End: 2018-06-07
Payer: MEDICARE

## 2018-06-07 ENCOUNTER — HOSPITAL ENCOUNTER (OUTPATIENT)
Dept: BONE DENSITY | Age: 53
Discharge: HOME | End: 2018-06-09
Payer: MEDICARE

## 2018-06-07 DIAGNOSIS — D48.7 NEOPLASM OF UNCERTAIN BEHAVIOR OF OTHER SPECIFIED SITES: ICD-10-CM

## 2018-06-07 DIAGNOSIS — Z17.1 MALIGNANT NEOPLASM OF OVERLAPPING SITES OF RIGHT BREAST IN FEMALE, ESTROGEN RECEPTOR NEGATIVE (HCC): ICD-10-CM

## 2018-06-07 DIAGNOSIS — C50.811 MALIGNANT NEOPLASM OF OVERLAPPING SITES OF RIGHT BREAST IN FEMALE, ESTROGEN RECEPTOR NEGATIVE (HCC): Primary | ICD-10-CM

## 2018-06-07 DIAGNOSIS — Z17.1 MALIGNANT NEOPLASM OF OVERLAPPING SITES OF RIGHT BREAST IN FEMALE, ESTROGEN RECEPTOR NEGATIVE (HCC): Primary | ICD-10-CM

## 2018-06-07 DIAGNOSIS — C50.811 MALIGNANT NEOPLASM OF OVERLAPPING SITES OF RIGHT BREAST IN FEMALE, ESTROGEN RECEPTOR NEGATIVE (HCC): ICD-10-CM

## 2018-06-07 DIAGNOSIS — Z78.0 POSTMENOPAUSAL: ICD-10-CM

## 2018-06-07 LAB
LV EF: 55 %
LVEF MODALITY: NORMAL

## 2018-06-07 PROCEDURE — 93306 TTE W/DOPPLER COMPLETE: CPT

## 2018-06-07 PROCEDURE — 77080 DXA BONE DENSITY AXIAL: CPT

## 2018-06-07 RX ORDER — TRAMADOL HYDROCHLORIDE 50 MG/1
50 TABLET ORAL EVERY 6 HOURS PRN
Qty: 20 TABLET | Refills: 0 | Status: SHIPPED | OUTPATIENT
Start: 2018-06-07 | End: 2018-06-14

## 2018-06-15 DIAGNOSIS — H20.9 IRITIS OF LEFT EYE: ICD-10-CM

## 2018-06-19 RX ORDER — PREDNISOLONE ACETATE 10 MG/ML
1 SUSPENSION/ DROPS OPHTHALMIC 4 TIMES DAILY
Qty: 5 ML | Refills: 2 | OUTPATIENT
Start: 2018-06-19 | End: 2018-07-03

## 2018-06-21 ENCOUNTER — HOSPITAL ENCOUNTER (OUTPATIENT)
Dept: INFUSION THERAPY | Age: 53
Discharge: HOME | End: 2018-06-21
Payer: MEDICARE

## 2018-06-21 ENCOUNTER — OFFICE VISIT (OUTPATIENT)
Dept: ONCOLOGY | Age: 53
End: 2018-06-21
Payer: MEDICARE

## 2018-06-21 VITALS
BODY MASS INDEX: 43.13 KG/M2 | TEMPERATURE: 97.7 F | DIASTOLIC BLOOD PRESSURE: 78 MMHG | HEART RATE: 78 BPM | HEIGHT: 66 IN | RESPIRATION RATE: 16 BRPM | SYSTOLIC BLOOD PRESSURE: 125 MMHG | WEIGHT: 268.4 LBS

## 2018-06-21 VITALS
BODY MASS INDEX: 43.13 KG/M2 | HEIGHT: 66 IN | SYSTOLIC BLOOD PRESSURE: 125 MMHG | HEART RATE: 78 BPM | RESPIRATION RATE: 16 BRPM | TEMPERATURE: 97.7 F | DIASTOLIC BLOOD PRESSURE: 78 MMHG | WEIGHT: 268.4 LBS

## 2018-06-21 DIAGNOSIS — Z17.1 MALIGNANT NEOPLASM OF OVERLAPPING SITES OF RIGHT BREAST IN FEMALE, ESTROGEN RECEPTOR NEGATIVE (HCC): Primary | ICD-10-CM

## 2018-06-21 DIAGNOSIS — Z17.1 MALIGNANT NEOPLASM OF OVERLAPPING SITES OF RIGHT BREAST IN FEMALE, ESTROGEN RECEPTOR NEGATIVE (HCC): ICD-10-CM

## 2018-06-21 DIAGNOSIS — C50.811 MALIGNANT NEOPLASM OF OVERLAPPING SITES OF RIGHT BREAST IN FEMALE, ESTROGEN RECEPTOR NEGATIVE (HCC): ICD-10-CM

## 2018-06-21 DIAGNOSIS — C50.811 MALIGNANT NEOPLASM OF OVERLAPPING SITES OF RIGHT BREAST IN FEMALE, ESTROGEN RECEPTOR NEGATIVE (HCC): Primary | ICD-10-CM

## 2018-06-21 LAB
ABSOLUTE EOS #: 0.2 K/UL (ref 0–0.4)
ABSOLUTE IMMATURE GRANULOCYTE: ABNORMAL K/UL (ref 0–0.3)
ABSOLUTE LYMPH #: 2.3 K/UL (ref 1–4.8)
ABSOLUTE MONO #: 0.5 K/UL (ref 0.1–1.2)
ALBUMIN SERPL-MCNC: 3.7 G/DL (ref 3.5–5.2)
ALBUMIN/GLOBULIN RATIO: 1.1 (ref 1–2.5)
ALP BLD-CCNC: 75 U/L (ref 35–104)
ALT SERPL-CCNC: 18 U/L (ref 5–33)
ANION GAP SERPL CALCULATED.3IONS-SCNC: 12 MMOL/L (ref 9–17)
AST SERPL-CCNC: 17 U/L
BASOPHILS # BLD: 1 % (ref 0–1)
BASOPHILS ABSOLUTE: 0 K/UL (ref 0–0.2)
BILIRUB SERPL-MCNC: 0.2 MG/DL (ref 0.3–1.2)
BUN BLDV-MCNC: 16 MG/DL (ref 6–20)
BUN/CREAT BLD: 12 (ref 9–20)
CALCIUM SERPL-MCNC: 9.3 MG/DL (ref 8.6–10.4)
CHLORIDE BLD-SCNC: 106 MMOL/L (ref 98–107)
CO2: 27 MMOL/L (ref 20–31)
CREAT SERPL-MCNC: 1.3 MG/DL (ref 0.5–0.9)
DIFFERENTIAL TYPE: ABNORMAL
EOSINOPHILS RELATIVE PERCENT: 4 % (ref 1–7)
GFR AFRICAN AMERICAN: 52 ML/MIN
GFR NON-AFRICAN AMERICAN: 43 ML/MIN
GFR SERPL CREATININE-BSD FRML MDRD: ABNORMAL ML/MIN/{1.73_M2}
GFR SERPL CREATININE-BSD FRML MDRD: ABNORMAL ML/MIN/{1.73_M2}
GLUCOSE BLD-MCNC: 117 MG/DL (ref 70–99)
HCT VFR BLD CALC: 31.1 % (ref 36–46)
HEMOGLOBIN: 10.4 G/DL (ref 12–16)
IMMATURE GRANULOCYTES: ABNORMAL %
LYMPHOCYTES # BLD: 43 % (ref 16–46)
MCH RBC QN AUTO: 34 PG (ref 26–34)
MCHC RBC AUTO-ENTMCNC: 33.4 G/DL (ref 31–37)
MCV RBC AUTO: 102 FL (ref 80–100)
MONOCYTES # BLD: 9 % (ref 4–11)
NRBC AUTOMATED: ABNORMAL PER 100 WBC
PDW BLD-RTO: 14.4 % (ref 11–14.5)
PLATELET # BLD: 148 K/UL (ref 140–450)
PLATELET ESTIMATE: ABNORMAL
PMV BLD AUTO: 7.2 FL (ref 6–12)
POTASSIUM SERPL-SCNC: 3.9 MMOL/L (ref 3.7–5.3)
RBC # BLD: 3.05 M/UL (ref 4–5.2)
RBC # BLD: ABNORMAL 10*6/UL
SEG NEUTROPHILS: 43 % (ref 43–77)
SEGMENTED NEUTROPHILS ABSOLUTE COUNT: 2.3 K/UL (ref 1.8–7.7)
SODIUM BLD-SCNC: 145 MMOL/L (ref 135–144)
TOTAL PROTEIN: 7 G/DL (ref 6.4–8.3)
WBC # BLD: 5.3 K/UL (ref 3.5–11)
WBC # BLD: ABNORMAL 10*3/UL

## 2018-06-21 PROCEDURE — 85025 COMPLETE CBC W/AUTO DIFF WBC: CPT

## 2018-06-21 PROCEDURE — 3017F COLORECTAL CA SCREEN DOC REV: CPT | Performed by: INTERNAL MEDICINE

## 2018-06-21 PROCEDURE — 36415 COLL VENOUS BLD VENIPUNCTURE: CPT

## 2018-06-21 PROCEDURE — 2580000003 HC RX 258: Performed by: INTERNAL MEDICINE

## 2018-06-21 PROCEDURE — 96413 CHEMO IV INFUSION 1 HR: CPT

## 2018-06-21 PROCEDURE — 6360000002 HC RX W HCPCS: Performed by: INTERNAL MEDICINE

## 2018-06-21 PROCEDURE — 99214 OFFICE O/P EST MOD 30 MIN: CPT | Performed by: INTERNAL MEDICINE

## 2018-06-21 PROCEDURE — 80053 COMPREHEN METABOLIC PANEL: CPT

## 2018-06-21 PROCEDURE — G8427 DOCREV CUR MEDS BY ELIG CLIN: HCPCS | Performed by: INTERNAL MEDICINE

## 2018-06-21 PROCEDURE — G8417 CALC BMI ABV UP PARAM F/U: HCPCS | Performed by: INTERNAL MEDICINE

## 2018-06-21 PROCEDURE — 36591 DRAW BLOOD OFF VENOUS DEVICE: CPT

## 2018-06-21 PROCEDURE — 1036F TOBACCO NON-USER: CPT | Performed by: INTERNAL MEDICINE

## 2018-06-21 RX ORDER — LETROZOLE 2.5 MG/1
2.5 TABLET, FILM COATED ORAL DAILY
Qty: 30 TABLET | Refills: 1 | Status: SHIPPED | OUTPATIENT
Start: 2018-06-21 | End: 2018-07-25 | Stop reason: SDUPTHER

## 2018-06-21 RX ORDER — SODIUM CHLORIDE 9 MG/ML
INJECTION, SOLUTION INTRAVENOUS ONCE
Status: CANCELLED | OUTPATIENT
Start: 2018-06-21 | End: 2018-06-21

## 2018-06-21 RX ORDER — SODIUM CHLORIDE 0.9 % (FLUSH) 0.9 %
5 SYRINGE (ML) INJECTION PRN
Status: CANCELLED | OUTPATIENT
Start: 2018-06-21

## 2018-06-21 RX ORDER — SODIUM CHLORIDE 9 MG/ML
INJECTION, SOLUTION INTRAVENOUS CONTINUOUS
Status: CANCELLED | OUTPATIENT
Start: 2018-06-21

## 2018-06-21 RX ORDER — SODIUM CHLORIDE 9 MG/ML
INJECTION, SOLUTION INTRAVENOUS ONCE
Status: COMPLETED | OUTPATIENT
Start: 2018-06-21 | End: 2018-06-21

## 2018-06-21 RX ORDER — SODIUM CHLORIDE 0.9 % (FLUSH) 0.9 %
10 SYRINGE (ML) INJECTION PRN
Status: CANCELLED | OUTPATIENT
Start: 2018-06-21

## 2018-06-21 RX ORDER — 0.9 % SODIUM CHLORIDE 0.9 %
1000 INTRAVENOUS SOLUTION INTRAVENOUS ONCE
Status: CANCELLED
Start: 2018-07-01 | End: 2018-07-01

## 2018-06-21 RX ORDER — 0.9 % SODIUM CHLORIDE 0.9 %
10 VIAL (ML) INJECTION ONCE
Status: CANCELLED | OUTPATIENT
Start: 2018-06-21 | End: 2018-06-21

## 2018-06-21 RX ORDER — DIPHENHYDRAMINE HYDROCHLORIDE 50 MG/ML
50 INJECTION INTRAMUSCULAR; INTRAVENOUS ONCE
Status: CANCELLED | OUTPATIENT
Start: 2018-06-21 | End: 2018-06-21

## 2018-06-21 RX ORDER — 0.9 % SODIUM CHLORIDE 0.9 %
1000 INTRAVENOUS SOLUTION INTRAVENOUS ONCE
Status: CANCELLED
Start: 2018-06-24 | End: 2018-06-24

## 2018-06-21 RX ORDER — HEPARIN SODIUM (PORCINE) LOCK FLUSH IV SOLN 100 UNIT/ML 100 UNIT/ML
500 SOLUTION INTRAVENOUS PRN
Status: CANCELLED | OUTPATIENT
Start: 2018-06-21

## 2018-06-21 RX ORDER — METHYLPREDNISOLONE SODIUM SUCCINATE 125 MG/2ML
125 INJECTION, POWDER, LYOPHILIZED, FOR SOLUTION INTRAMUSCULAR; INTRAVENOUS ONCE
Status: CANCELLED | OUTPATIENT
Start: 2018-06-21 | End: 2018-06-21

## 2018-06-21 RX ORDER — SODIUM CHLORIDE 0.9 % (FLUSH) 0.9 %
10 SYRINGE (ML) INJECTION PRN
Status: DISCONTINUED | OUTPATIENT
Start: 2018-06-21 | End: 2018-06-22 | Stop reason: HOSPADM

## 2018-06-21 RX ORDER — HEPARIN SODIUM (PORCINE) LOCK FLUSH IV SOLN 100 UNIT/ML 100 UNIT/ML
500 SOLUTION INTRAVENOUS PRN
Status: DISCONTINUED | OUTPATIENT
Start: 2018-06-21 | End: 2018-06-22 | Stop reason: HOSPADM

## 2018-06-21 RX ORDER — 0.9 % SODIUM CHLORIDE 0.9 %
1000 INTRAVENOUS SOLUTION INTRAVENOUS ONCE
Status: CANCELLED
Start: 2018-07-08 | End: 2018-07-08

## 2018-06-21 RX ADMIN — HEPARIN SODIUM (PORCINE) LOCK FLUSH IV SOLN 100 UNIT/ML 500 UNITS: 100 SOLUTION at 11:31

## 2018-06-21 RX ADMIN — Medication 10 ML: at 08:38

## 2018-06-21 RX ADMIN — Medication 10 ML: at 08:37

## 2018-06-21 RX ADMIN — TRASTUZUMAB 750 MG: 150 INJECTION, POWDER, LYOPHILIZED, FOR SOLUTION INTRAVENOUS at 10:36

## 2018-06-21 RX ADMIN — SODIUM CHLORIDE: 9 INJECTION, SOLUTION INTRAVENOUS at 08:35

## 2018-06-21 NOTE — PROGRESS NOTES
Attempted access of distal port. Medial (proximal) VAD accessed per protocol with 1 inch 20 gauge mora needle. Flushed easily with saline 10 ml. Good blood return. Labs drawn. Secured accessed port with transparent dressing. IV infusing NSS. Patient has no complaints.

## 2018-06-21 NOTE — PROGRESS NOTES
Chemotherapy infused and d/c'd. Mediport flushed with 30 ml NSS and 5 ml heparin. Patient tolerated treatment without any difficulty. Guerrero needle d/c'd, pressure dressing applied to site. Patient ambulated to the restroom and denies any complaints. Patient discharged to home.

## 2018-06-22 ENCOUNTER — TELEPHONE (OUTPATIENT)
Dept: INFUSION THERAPY | Facility: MEDICAL CENTER | Age: 53
End: 2018-06-22

## 2018-06-22 RX ORDER — POTASSIUM CHLORIDE 20 MEQ/1
TABLET, EXTENDED RELEASE ORAL
Qty: 30 TABLET | Refills: 1 | Status: SHIPPED | OUTPATIENT
Start: 2018-06-22 | End: 2018-08-20 | Stop reason: SDUPTHER

## 2018-07-12 ENCOUNTER — HOSPITAL ENCOUNTER (OUTPATIENT)
Dept: INFUSION THERAPY | Age: 53
Discharge: HOME | End: 2018-07-12
Payer: MEDICARE

## 2018-07-12 VITALS
WEIGHT: 268.4 LBS | TEMPERATURE: 97.8 F | SYSTOLIC BLOOD PRESSURE: 101 MMHG | OXYGEN SATURATION: 98 % | BODY MASS INDEX: 44.72 KG/M2 | DIASTOLIC BLOOD PRESSURE: 73 MMHG | HEIGHT: 65 IN | RESPIRATION RATE: 16 BRPM | HEART RATE: 76 BPM

## 2018-07-12 DIAGNOSIS — Z17.1 MALIGNANT NEOPLASM OF OVERLAPPING SITES OF RIGHT BREAST IN FEMALE, ESTROGEN RECEPTOR NEGATIVE (HCC): ICD-10-CM

## 2018-07-12 DIAGNOSIS — C50.811 MALIGNANT NEOPLASM OF OVERLAPPING SITES OF RIGHT BREAST IN FEMALE, ESTROGEN RECEPTOR NEGATIVE (HCC): ICD-10-CM

## 2018-07-12 DIAGNOSIS — Z17.1 MALIGNANT NEOPLASM OF OVERLAPPING SITES OF RIGHT BREAST IN FEMALE, ESTROGEN RECEPTOR NEGATIVE (HCC): Primary | ICD-10-CM

## 2018-07-12 DIAGNOSIS — C50.811 MALIGNANT NEOPLASM OF OVERLAPPING SITES OF RIGHT BREAST IN FEMALE, ESTROGEN RECEPTOR NEGATIVE (HCC): Primary | ICD-10-CM

## 2018-07-12 LAB
ABSOLUTE EOS #: 0.2 K/UL (ref 0–0.4)
ABSOLUTE IMMATURE GRANULOCYTE: ABNORMAL K/UL (ref 0–0.3)
ABSOLUTE LYMPH #: 2.4 K/UL (ref 1–4.8)
ABSOLUTE MONO #: 0.4 K/UL (ref 0.1–1.2)
ALBUMIN SERPL-MCNC: 4 G/DL (ref 3.5–5.2)
ALBUMIN/GLOBULIN RATIO: 1.1 (ref 1–2.5)
ALP BLD-CCNC: 69 U/L (ref 35–104)
ALT SERPL-CCNC: 17 U/L (ref 5–33)
ANION GAP SERPL CALCULATED.3IONS-SCNC: 13 MMOL/L (ref 9–17)
AST SERPL-CCNC: 17 U/L
BASOPHILS # BLD: 1 % (ref 0–1)
BASOPHILS ABSOLUTE: 0.1 K/UL (ref 0–0.2)
BILIRUB SERPL-MCNC: 0.3 MG/DL (ref 0.3–1.2)
BUN BLDV-MCNC: 21 MG/DL (ref 6–20)
BUN/CREAT BLD: 18 (ref 9–20)
CALCIUM SERPL-MCNC: 9.7 MG/DL (ref 8.6–10.4)
CHLORIDE BLD-SCNC: 102 MMOL/L (ref 98–107)
CO2: 26 MMOL/L (ref 20–31)
CREAT SERPL-MCNC: 1.2 MG/DL (ref 0.5–0.9)
DIFFERENTIAL TYPE: ABNORMAL
EOSINOPHILS RELATIVE PERCENT: 3 % (ref 1–7)
GFR AFRICAN AMERICAN: 57 ML/MIN
GFR NON-AFRICAN AMERICAN: 47 ML/MIN
GFR SERPL CREATININE-BSD FRML MDRD: ABNORMAL ML/MIN/{1.73_M2}
GFR SERPL CREATININE-BSD FRML MDRD: ABNORMAL ML/MIN/{1.73_M2}
GLUCOSE BLD-MCNC: 104 MG/DL (ref 70–99)
HCT VFR BLD CALC: 33.4 % (ref 36–46)
HEMOGLOBIN: 11.2 G/DL (ref 12–16)
IMMATURE GRANULOCYTES: ABNORMAL %
LYMPHOCYTES # BLD: 43 % (ref 16–46)
MCH RBC QN AUTO: 33.6 PG (ref 26–34)
MCHC RBC AUTO-ENTMCNC: 33.5 G/DL (ref 31–37)
MCV RBC AUTO: 100.1 FL (ref 80–100)
MONOCYTES # BLD: 7 % (ref 4–11)
NRBC AUTOMATED: ABNORMAL PER 100 WBC
PDW BLD-RTO: 13.7 % (ref 11–14.5)
PLATELET # BLD: 156 K/UL (ref 140–450)
PLATELET ESTIMATE: ABNORMAL
PMV BLD AUTO: 7.1 FL (ref 6–12)
POTASSIUM SERPL-SCNC: 4.1 MMOL/L (ref 3.7–5.3)
RBC # BLD: 3.33 M/UL (ref 4–5.2)
RBC # BLD: ABNORMAL 10*6/UL
SEG NEUTROPHILS: 46 % (ref 43–77)
SEGMENTED NEUTROPHILS ABSOLUTE COUNT: 2.6 K/UL (ref 1.8–7.7)
SODIUM BLD-SCNC: 141 MMOL/L (ref 135–144)
TOTAL PROTEIN: 7.6 G/DL (ref 6.4–8.3)
WBC # BLD: 5.7 K/UL (ref 3.5–11)
WBC # BLD: ABNORMAL 10*3/UL

## 2018-07-12 PROCEDURE — 96413 CHEMO IV INFUSION 1 HR: CPT

## 2018-07-12 PROCEDURE — 85025 COMPLETE CBC W/AUTO DIFF WBC: CPT

## 2018-07-12 PROCEDURE — 36591 DRAW BLOOD OFF VENOUS DEVICE: CPT

## 2018-07-12 PROCEDURE — 80053 COMPREHEN METABOLIC PANEL: CPT

## 2018-07-12 PROCEDURE — 36415 COLL VENOUS BLD VENIPUNCTURE: CPT

## 2018-07-12 PROCEDURE — 2580000003 HC RX 258: Performed by: INTERNAL MEDICINE

## 2018-07-12 PROCEDURE — 6360000002 HC RX W HCPCS: Performed by: INTERNAL MEDICINE

## 2018-07-12 RX ORDER — EPINEPHRINE 1 MG/ML
0.3 INJECTION INTRAMUSCULAR; INTRAVENOUS; SUBCUTANEOUS PRN
Status: CANCELLED | OUTPATIENT
Start: 2018-07-12

## 2018-07-12 RX ORDER — WATER 1000 ML/1000ML
2.2 INJECTION, SOLUTION INTRAVENOUS ONCE
Status: CANCELLED | OUTPATIENT
Start: 2018-07-12 | End: 2018-07-12

## 2018-07-12 RX ORDER — SODIUM CHLORIDE 0.9 % (FLUSH) 0.9 %
10 SYRINGE (ML) INJECTION PRN
Status: DISCONTINUED | OUTPATIENT
Start: 2018-07-12 | End: 2018-07-13 | Stop reason: HOSPADM

## 2018-07-12 RX ORDER — METHYLPREDNISOLONE SODIUM SUCCINATE 125 MG/2ML
125 INJECTION, POWDER, LYOPHILIZED, FOR SOLUTION INTRAMUSCULAR; INTRAVENOUS ONCE
Status: CANCELLED | OUTPATIENT
Start: 2018-07-12 | End: 2018-07-12

## 2018-07-12 RX ORDER — DIPHENHYDRAMINE HYDROCHLORIDE 50 MG/ML
50 INJECTION INTRAMUSCULAR; INTRAVENOUS ONCE
Status: CANCELLED | OUTPATIENT
Start: 2018-07-12 | End: 2018-07-12

## 2018-07-12 RX ORDER — SODIUM CHLORIDE 9 MG/ML
INJECTION, SOLUTION INTRAVENOUS ONCE
Status: CANCELLED | OUTPATIENT
Start: 2018-07-12 | End: 2018-07-12

## 2018-07-12 RX ORDER — 0.9 % SODIUM CHLORIDE 0.9 %
1000 INTRAVENOUS SOLUTION INTRAVENOUS ONCE
Status: CANCELLED
Start: 2018-07-22 | End: 2018-07-22

## 2018-07-12 RX ORDER — 0.9 % SODIUM CHLORIDE 0.9 %
1000 INTRAVENOUS SOLUTION INTRAVENOUS ONCE
Status: CANCELLED
Start: 2018-07-15 | End: 2018-07-15

## 2018-07-12 RX ORDER — SODIUM CHLORIDE 0.9 % (FLUSH) 0.9 %
5 SYRINGE (ML) INJECTION PRN
Status: CANCELLED | OUTPATIENT
Start: 2018-07-12

## 2018-07-12 RX ORDER — SODIUM CHLORIDE 9 MG/ML
INJECTION, SOLUTION INTRAVENOUS ONCE
Status: COMPLETED | OUTPATIENT
Start: 2018-07-12 | End: 2018-07-12

## 2018-07-12 RX ORDER — HEPARIN SODIUM (PORCINE) LOCK FLUSH IV SOLN 100 UNIT/ML 100 UNIT/ML
500 SOLUTION INTRAVENOUS PRN
Status: CANCELLED | OUTPATIENT
Start: 2018-07-12

## 2018-07-12 RX ORDER — 0.9 % SODIUM CHLORIDE 0.9 %
1000 INTRAVENOUS SOLUTION INTRAVENOUS ONCE
Status: CANCELLED
Start: 2018-07-29 | End: 2018-07-29

## 2018-07-12 RX ORDER — SODIUM CHLORIDE 9 MG/ML
INJECTION, SOLUTION INTRAVENOUS CONTINUOUS
Status: CANCELLED | OUTPATIENT
Start: 2018-07-12

## 2018-07-12 RX ORDER — SODIUM CHLORIDE 0.9 % (FLUSH) 0.9 %
10 SYRINGE (ML) INJECTION PRN
Status: CANCELLED | OUTPATIENT
Start: 2018-07-12

## 2018-07-12 RX ORDER — HEPARIN SODIUM (PORCINE) LOCK FLUSH IV SOLN 100 UNIT/ML 100 UNIT/ML
500 SOLUTION INTRAVENOUS PRN
Status: DISCONTINUED | OUTPATIENT
Start: 2018-07-12 | End: 2018-07-13 | Stop reason: HOSPADM

## 2018-07-12 RX ADMIN — Medication 10 ML: at 08:48

## 2018-07-12 RX ADMIN — Medication 10 ML: at 08:49

## 2018-07-12 RX ADMIN — SODIUM CHLORIDE: 9 INJECTION, SOLUTION INTRAVENOUS at 08:50

## 2018-07-12 RX ADMIN — HEPARIN SODIUM (PORCINE) LOCK FLUSH IV SOLN 100 UNIT/ML 500 UNITS: 100 SOLUTION at 11:42

## 2018-07-12 RX ADMIN — TRASTUZUMAB 750 MG: 150 INJECTION, POWDER, LYOPHILIZED, FOR SOLUTION INTRAVENOUS at 10:55

## 2018-07-12 ASSESSMENT — PAIN DESCRIPTION - ORIENTATION: ORIENTATION: RIGHT

## 2018-07-12 ASSESSMENT — PAIN DESCRIPTION - PAIN TYPE: TYPE: CHRONIC PAIN

## 2018-07-12 ASSESSMENT — PAIN SCALES - GENERAL: PAINLEVEL_OUTOF10: 8

## 2018-07-12 ASSESSMENT — PAIN DESCRIPTION - DESCRIPTORS: DESCRIPTORS: ACHING

## 2018-07-12 ASSESSMENT — PAIN DESCRIPTION - LOCATION: LOCATION: KNEE

## 2018-07-12 NOTE — PROGRESS NOTES
Patient at infusion center for chemotherapy. VAD accessed per protocol using 20 gauge 3/4\" mora needle. Flushes easily. Labs drawn. NSS infusing. Patient depression and anxiety are patients main complaints.

## 2018-07-12 NOTE — PROGRESS NOTES
Herceptin infused and d/c'd. Mediport flushed with 30 ml NSS and 5 ml heparin. Patient tolerated treatment without any difficulty. Guerrero needle d/c'd, pressure dressing applied to site. Patient denies any complaints. Patient discharged to home.

## 2018-07-12 NOTE — PROGRESS NOTES
notified of lab results. Orders signed and released, pharmacy notified. Patient eating breakfast, denies any complaints.

## 2018-07-18 ENCOUNTER — OFFICE VISIT (OUTPATIENT)
Dept: ONCOLOGY | Age: 53
End: 2018-07-18
Payer: MEDICARE

## 2018-07-18 VITALS
TEMPERATURE: 98.6 F | SYSTOLIC BLOOD PRESSURE: 118 MMHG | BODY MASS INDEX: 43.99 KG/M2 | HEIGHT: 65 IN | HEART RATE: 76 BPM | DIASTOLIC BLOOD PRESSURE: 76 MMHG | WEIGHT: 264 LBS

## 2018-07-18 DIAGNOSIS — C50.811 MALIGNANT NEOPLASM OF OVERLAPPING SITES OF RIGHT BREAST IN FEMALE, ESTROGEN RECEPTOR NEGATIVE (HCC): Primary | ICD-10-CM

## 2018-07-18 DIAGNOSIS — Z17.1 MALIGNANT NEOPLASM OF OVERLAPPING SITES OF RIGHT BREAST IN FEMALE, ESTROGEN RECEPTOR NEGATIVE (HCC): Primary | ICD-10-CM

## 2018-07-18 PROCEDURE — G8427 DOCREV CUR MEDS BY ELIG CLIN: HCPCS | Performed by: INTERNAL MEDICINE

## 2018-07-18 PROCEDURE — 99214 OFFICE O/P EST MOD 30 MIN: CPT | Performed by: INTERNAL MEDICINE

## 2018-07-18 PROCEDURE — 1036F TOBACCO NON-USER: CPT | Performed by: INTERNAL MEDICINE

## 2018-07-18 PROCEDURE — 3017F COLORECTAL CA SCREEN DOC REV: CPT | Performed by: INTERNAL MEDICINE

## 2018-07-18 PROCEDURE — G8417 CALC BMI ABV UP PARAM F/U: HCPCS | Performed by: INTERNAL MEDICINE

## 2018-07-18 RX ORDER — 0.9 % SODIUM CHLORIDE 0.9 %
1000 INTRAVENOUS SOLUTION INTRAVENOUS ONCE
Status: CANCELLED
Start: 2018-08-12 | End: 2018-08-12

## 2018-07-18 RX ORDER — SODIUM CHLORIDE 0.9 % (FLUSH) 0.9 %
5 SYRINGE (ML) INJECTION PRN
Status: CANCELLED | OUTPATIENT
Start: 2018-08-02

## 2018-07-18 RX ORDER — SODIUM CHLORIDE 9 MG/ML
INJECTION, SOLUTION INTRAVENOUS CONTINUOUS
Status: CANCELLED | OUTPATIENT
Start: 2018-08-02

## 2018-07-18 RX ORDER — SODIUM CHLORIDE 0.9 % (FLUSH) 0.9 %
10 SYRINGE (ML) INJECTION PRN
Status: CANCELLED | OUTPATIENT
Start: 2018-08-02

## 2018-07-18 RX ORDER — 0.9 % SODIUM CHLORIDE 0.9 %
1000 INTRAVENOUS SOLUTION INTRAVENOUS ONCE
Status: CANCELLED
Start: 2018-08-19 | End: 2018-08-19

## 2018-07-18 RX ORDER — 0.9 % SODIUM CHLORIDE 0.9 %
10 VIAL (ML) INJECTION ONCE
Status: CANCELLED | OUTPATIENT
Start: 2018-08-02 | End: 2018-08-02

## 2018-07-18 RX ORDER — 0.9 % SODIUM CHLORIDE 0.9 %
1000 INTRAVENOUS SOLUTION INTRAVENOUS ONCE
Status: CANCELLED
Start: 2018-08-05 | End: 2018-08-05

## 2018-07-18 RX ORDER — DIPHENHYDRAMINE HYDROCHLORIDE 50 MG/ML
50 INJECTION INTRAMUSCULAR; INTRAVENOUS ONCE
Status: CANCELLED | OUTPATIENT
Start: 2018-08-02 | End: 2018-08-02

## 2018-07-18 RX ORDER — METHYLPREDNISOLONE SODIUM SUCCINATE 125 MG/2ML
125 INJECTION, POWDER, LYOPHILIZED, FOR SOLUTION INTRAMUSCULAR; INTRAVENOUS ONCE
Status: CANCELLED | OUTPATIENT
Start: 2018-08-02 | End: 2018-08-02

## 2018-07-18 RX ORDER — HEPARIN SODIUM (PORCINE) LOCK FLUSH IV SOLN 100 UNIT/ML 100 UNIT/ML
500 SOLUTION INTRAVENOUS PRN
Status: CANCELLED | OUTPATIENT
Start: 2018-08-02

## 2018-07-18 RX ORDER — TRAMADOL HYDROCHLORIDE 50 MG/1
50 TABLET ORAL EVERY 6 HOURS PRN
Qty: 40 TABLET | Refills: 0 | Status: SHIPPED | OUTPATIENT
Start: 2018-07-18 | End: 2018-08-15 | Stop reason: SDUPTHER

## 2018-07-18 RX ORDER — SODIUM CHLORIDE 9 MG/ML
INJECTION, SOLUTION INTRAVENOUS ONCE
Status: CANCELLED | OUTPATIENT
Start: 2018-08-02 | End: 2018-08-02

## 2018-07-18 NOTE — PROGRESS NOTES
Milo Foote                                                                                                                  7/18/2018  MRN:   [id number]  YOB: 1965  PCP:                           MAGGIE Vera - CNP  Referring Physician: No ref. provider found  Treating Physician Name: Delia Serrano MD      Reason for visit:  Patient presents to the clinic for a follow-up visit to discuss further treatment plan. Current problems/ Active and recent treatments:  Stage IIa infiltrating ductal carcinoma of right breast, ER negative, MT positive and HER-2 amplified. Strong family history of breast cancer in sister and maternal aunt  Grade 2 diarrhea, nonbloody    Summary of Case/History:    Milo Foote a 48 y. o.female who presents to the hematology oncology office to establish care and for further recommendations for management of her recently diagnosed right breast cancer    Patient had a mammogram after many years in September 2017 which came back abnormal.  Subsequently patient had an ultrasound which confirmed a 1.2 cm lesion in the right breast at 1:00 position. There was another 4 mm lesion at 12:00 position    Patient underwent ultrasound-guided biopsy on 9/8/17. the lesion at 1:00 position shows invasive ductal carcinoma. ER negative and MT positive associated with high-grade DCIS. Lesion at 12:00 position showed fibrocystic disease and focal adenosis and hyperplasia. Patient underwent right sided mastectomy with sentinel lymph node biopsy on 10/19/17. Pathology report showed invasive ductal carcinoma, grade 3 out of 3, 2.8 cm in size with negative margins. pT2,pN0,M0  Tumor specimen was negative for ER receptor and weekly positive for MT receptor (5-10 percent). High-grade DCIS was also noted. One sentinel lymph node was sampled which was negative for metastasis    Patient started on neoadjuvant chemotherapy using TCHP regimen.     Cycle #1, day #1 on chest complaints. Acuity: Unknown Type of Exam: Initial Additional signs and symptoms: n/a Relevant Medical/Surgical History: breast cancer FINDINGS: The lungs are without acute focal process. There is no effusion or pneumothorax. The cardiomediastinal silhouette is stable. The osseous structures are stable. No acute process. Nm Lymphoscintigram    Result Date: 10/19/2017  EXAMINATION: LYMPHOSCINTIGRAPHY 10/19/2017 9:21 am TECHNIQUE: Sulfur colloid labeled with 0.920 mCi of Tc99 was administered in 4 subdermal wheals around the patient's right areolar region. Delayed images were obtained. HISTORY: ORDERING SYSTEM PROVIDED HISTORY: Surgery TECHNOLOGIST PROVIDED HISTORY: Reason for exam:->Surgery Ordering Physician Provided Reason for Exam: 0.920 mCi 99mTc filtered Sulfur Colloid injected subcutaneous around RT nipple. Acuity: Unknown Type of Exam: Unknown FINDINGS: There appears to be migration into the right axilla suggestive of the sentinel node. Migration of the radiotracer into the right axilla suggestive of the patient's sentinel node. MRI brain 2/5/18  Impression   1. Unremarkable MRI of the brain.  No evidence of metastatic disease. 2. Acute left sphenoid sinusitis. Impression:  Invasive ductal carcinoma of right breast, stage IIa. pT,pN0,M0. ER negative, DC weakly positive. HER-2 amplified. Status post right mastectomy with sentinel lymph node biopsy  Arthralgias  Family history of breast cancer  Lower back pain  Facial twitching left side: MR brain negative  Postmenopausal    Plan:  I had a detailed discussion with the patient and personally went over the results of her blood workup. Patient is tolerating Femara and Herceptin without any unexpected or severe side effects. Reviewed results of lab workup. Toxicity check performed. Patient was advised to continue calcium and vitamin D.  Echocardiogram shows preserved ejection fraction. Continue with Herceptin. Labs reviewed. Labs are adequate for treatment today. Reiterated treatment plan. Continue to treat with Herceptin to complete total of one your of therapy. Continue aromatase inhibitor for at least 5 years. continue annual screening mammogram for left breast.  Patient was encouraged parts patent aerobic exercises and try to achieve ideal body weight. Reiterated the potential side effects of Herceptin and aromatase inhibitor  NCCN guidelines were reviewed and discussed with the patient. The diagnosis and care plan were discussed with the patient in detail. I discussed the natural history of the disease, prognosis, risks and goals of therapy and answered all the patients questions to the best of my ability. Patient expressed understanding and was in agreement. Junior Tavares      I spent more than 25 minutes examining, evaluating, reviewing data, counseling the patient and coordinating care. Greater than 50% of time was spent face-to-face with the patient    This note is created with the assistance of a speech recognition program.  While intending to generate a document that actually reflects the content of the visit, the document can still have some errors including those of syntax and sound a like substitutions which may escape proof reading. It such instances, actual meaning can be extrapolated by contextual diversion.

## 2018-07-23 ENCOUNTER — HOSPITAL ENCOUNTER (EMERGENCY)
Age: 53
Discharge: HOME | End: 2018-07-23
Attending: SPECIALIST
Payer: MEDICARE

## 2018-07-23 ENCOUNTER — APPOINTMENT (OUTPATIENT)
Dept: GENERAL RADIOLOGY | Age: 53
End: 2018-07-23
Payer: MEDICARE

## 2018-07-23 VITALS
DIASTOLIC BLOOD PRESSURE: 80 MMHG | OXYGEN SATURATION: 98 % | RESPIRATION RATE: 14 BRPM | TEMPERATURE: 97.9 F | SYSTOLIC BLOOD PRESSURE: 121 MMHG | HEART RATE: 95 BPM

## 2018-07-23 DIAGNOSIS — Z95.828 PORT-A-CATH IN PLACE: Primary | ICD-10-CM

## 2018-07-23 PROCEDURE — 71046 X-RAY EXAM CHEST 2 VIEWS: CPT

## 2018-07-23 PROCEDURE — 99283 EMERGENCY DEPT VISIT LOW MDM: CPT

## 2018-07-23 RX ORDER — FOLIC ACID 1 MG/1
TABLET ORAL
Qty: 30 TABLET | Refills: 3 | Status: SHIPPED | OUTPATIENT
Start: 2018-07-23 | End: 2018-12-05 | Stop reason: SDUPTHER

## 2018-07-23 RX ORDER — PSYLLIUM HUSK 3.4 G/7G
POWDER ORAL
Qty: 30 TABLET | Refills: 3 | Status: SHIPPED | OUTPATIENT
Start: 2018-07-23 | End: 2018-12-05 | Stop reason: SDUPTHER

## 2018-07-23 ASSESSMENT — PAIN DESCRIPTION - ORIENTATION: ORIENTATION: LEFT

## 2018-07-23 ASSESSMENT — PAIN DESCRIPTION - LOCATION: LOCATION: SHOULDER

## 2018-07-23 ASSESSMENT — PAIN SCALES - GENERAL: PAINLEVEL_OUTOF10: 6

## 2018-07-23 ASSESSMENT — PAIN DESCRIPTION - PAIN TYPE: TYPE: ACUTE PAIN

## 2018-07-23 ASSESSMENT — PAIN DESCRIPTION - DESCRIPTORS: DESCRIPTORS: ACHING

## 2018-07-23 NOTE — ED PROVIDER NOTES
TRAMADOL (ULTRAM) 50 MG TABLET    Take 1 tablet by mouth every 6 hours as needed for Pain for up to 30 days. .    TRAZODONE (DESYREL) 100 MG TABLET    Take 50 mg by mouth nightly     VENTOLIN  (90 BASE) MCG/ACT INHALER    Inhale 2 puffs into the lungs every 4 hours as needed        ALLERGIES     has No Known Allergies. FAMILY HISTORY     indicated that the status of her mother is unknown. She indicated that one of her three sisters is . She indicated that the status of her maternal aunt is unknown. She indicated that the status of her neg hx is unknown. She indicated that the status of her maternal cousin is unknown.      family history includes Breast Cancer (age of onset: 54) in her sister; Breast Cancer (age of onset: 71) in her maternal aunt; Cataracts in her mother; Glaucoma in her mother; Other in her maternal cousin and sister; Uterine Cancer (age of onset: 32) in her sister. SOCIAL HISTORY      reports that she has never smoked. She has never used smokeless tobacco. She reports that she does not drink alcohol or use drugs. PHYSICAL EXAM     INITIAL VITALS:  tympanic temperature is 97.9 °F (36.6 °C). Her blood pressure is 121/80 and her pulse is 95. Her respiration is 14 and oxygen saturation is 98%. Physical Exam   Constitutional: She is oriented to person, place, and time. She appears well-developed and well-nourished. HENT:   Head: Normocephalic and atraumatic. Nose: Nose normal.   Mouth/Throat: Oropharynx is clear and moist.   Eyes: EOM are normal. Pupils are equal, round, and reactive to light. Neck: Normal range of motion. Neck supple. Cardiovascular: Normal rate, regular rhythm, normal heart sounds and intact distal pulses. No murmur heard. Pulmonary/Chest: Effort normal and breath sounds normal. No respiratory distress. Patient has a Port-A-Cath in place in the left upper chest and there is a suture material sticking out.   There is no evidence of any erythema,

## 2018-07-25 ENCOUNTER — TELEPHONE (OUTPATIENT)
Dept: SURGERY | Age: 53
End: 2018-07-25

## 2018-07-25 RX ORDER — LETROZOLE 2.5 MG/1
2.5 TABLET, FILM COATED ORAL DAILY
Qty: 30 TABLET | Refills: 5 | Status: SHIPPED | OUTPATIENT
Start: 2018-07-25 | End: 2019-03-15 | Stop reason: SDUPTHER

## 2018-07-25 NOTE — TELEPHONE ENCOUNTER
/25/2018 patient called wishing to cancel her appointment today with Dr. Farooq Holcomb to recheck port after patient was seen in the Emergency Room. Patient states the area looks good and the\"stitch\" has fallen out. She does not feel she needs to see the Dr. Adelita Mcclure today.

## 2018-07-27 ENCOUNTER — HOSPITAL ENCOUNTER (OUTPATIENT)
Dept: NEUROLOGY | Age: 53
Discharge: HOME | End: 2018-07-27
Payer: MEDICARE

## 2018-07-27 DIAGNOSIS — G43.009 MIGRAINE WITHOUT AURA AND WITHOUT STATUS MIGRAINOSUS, NOT INTRACTABLE: ICD-10-CM

## 2018-07-27 DIAGNOSIS — R41.3 MEMORY PROBLEM: ICD-10-CM

## 2018-07-27 PROCEDURE — 95813 EEG EXTND MNTR 61-119 MIN: CPT

## 2018-07-27 PROCEDURE — 95957 EEG DIGITAL ANALYSIS: CPT

## 2018-07-31 NOTE — PROCEDURES
cerebral  hemispheres. Intermittent sharp waves, slow sharp waves noted diffusely throughout this  recording. ACTIVATION PROCEDURES:    HYPERVENTILATION:  Hyperventilation was performed for 3 minutes. Patient  was cooperative with good effort. Also Post-Hyperventilation period was  monitored closely. Hyperventilation:  Unremarkable. PHOTIC STIMULATION:  Photic stimulation was performed at the following  frequencies: 1 Hz, 3 Hz, 6 Hz, 9 Hz, 12 Hz, 15 Hz, 18 Hz, 21 Hz, 25 Hz, 30  Hz and patient tolerated well. Photic stimulation:  No significant driving response noted. SLEEP:  Stage I.    EKG:  EKG showed normal sinus rhythm without any significant abnormality. IMPRESSION:      There is moderate irritable diffuse cerebral dysfunction that  is potentially epileptogenic in nature. Further clinical correlation and followup recommended.           Richerd Blizzard, MD  Board Certified Neurologist    D: 07/30/2018 15:04:51       T: 07/30/2018 15:35:21     [email or web address]  Job#: [id number]     Doc#: [id number]    CC:  Johnnie Ashley CNP

## 2018-08-02 ENCOUNTER — OFFICE VISIT (OUTPATIENT)
Dept: NEUROLOGY | Age: 53
End: 2018-08-02
Payer: MEDICARE

## 2018-08-02 ENCOUNTER — HOSPITAL ENCOUNTER (OUTPATIENT)
Dept: INFUSION THERAPY | Age: 53
Discharge: HOME | End: 2018-08-02
Payer: MEDICARE

## 2018-08-02 VITALS
WEIGHT: 267.2 LBS | TEMPERATURE: 97.6 F | SYSTOLIC BLOOD PRESSURE: 136 MMHG | OXYGEN SATURATION: 100 % | HEIGHT: 66 IN | HEART RATE: 79 BPM | DIASTOLIC BLOOD PRESSURE: 69 MMHG | BODY MASS INDEX: 42.94 KG/M2 | RESPIRATION RATE: 16 BRPM

## 2018-08-02 VITALS — HEART RATE: 94 BPM | OXYGEN SATURATION: 98 % | SYSTOLIC BLOOD PRESSURE: 128 MMHG | DIASTOLIC BLOOD PRESSURE: 72 MMHG

## 2018-08-02 DIAGNOSIS — Z17.1 MALIGNANT NEOPLASM OF OVERLAPPING SITES OF RIGHT BREAST IN FEMALE, ESTROGEN RECEPTOR NEGATIVE (HCC): ICD-10-CM

## 2018-08-02 DIAGNOSIS — G24.5 BLEPHAROSPASM: Primary | ICD-10-CM

## 2018-08-02 DIAGNOSIS — E78.49 OTHER HYPERLIPIDEMIA: ICD-10-CM

## 2018-08-02 DIAGNOSIS — F32.A ANXIETY AND DEPRESSION: ICD-10-CM

## 2018-08-02 DIAGNOSIS — F31.9 BIPOLAR 1 DISORDER (HCC): ICD-10-CM

## 2018-08-02 DIAGNOSIS — C50.811 MALIGNANT NEOPLASM OF OVERLAPPING SITES OF RIGHT BREAST IN FEMALE, ESTROGEN RECEPTOR NEGATIVE (HCC): ICD-10-CM

## 2018-08-02 DIAGNOSIS — E86.0 DEHYDRATION: ICD-10-CM

## 2018-08-02 DIAGNOSIS — G47.9 SLEEP DIFFICULTIES: ICD-10-CM

## 2018-08-02 DIAGNOSIS — F41.9 ANXIETY AND DEPRESSION: ICD-10-CM

## 2018-08-02 DIAGNOSIS — R41.3 MEMORY PROBLEM: ICD-10-CM

## 2018-08-02 DIAGNOSIS — Z95.828 PORT CATHETER IN PLACE: ICD-10-CM

## 2018-08-02 DIAGNOSIS — R25.3 MUSCLE TWITCHING: ICD-10-CM

## 2018-08-02 DIAGNOSIS — G43.009 MIGRAINE WITHOUT AURA AND WITHOUT STATUS MIGRAINOSUS, NOT INTRACTABLE: ICD-10-CM

## 2018-08-02 LAB
ABSOLUTE EOS #: 0.25 K/UL (ref 0–0.4)
ABSOLUTE IMMATURE GRANULOCYTE: ABNORMAL K/UL (ref 0–0.3)
ABSOLUTE LYMPH #: 2.48 K/UL (ref 1–4.8)
ABSOLUTE MONO #: 0.43 K/UL (ref 0.1–1.2)
ALBUMIN SERPL-MCNC: 3.5 G/DL (ref 3.5–5.2)
ALBUMIN/GLOBULIN RATIO: 1 (ref 1–2.5)
ALP BLD-CCNC: 64 U/L (ref 35–104)
ALT SERPL-CCNC: 12 U/L (ref 5–33)
ANION GAP SERPL CALCULATED.3IONS-SCNC: 13 MMOL/L (ref 9–17)
AST SERPL-CCNC: 19 U/L
BASOPHILS # BLD: 1 % (ref 0–1)
BASOPHILS ABSOLUTE: 0.06 K/UL (ref 0–0.2)
BILIRUB SERPL-MCNC: 0.26 MG/DL (ref 0.3–1.2)
BUN BLDV-MCNC: 19 MG/DL (ref 6–20)
BUN/CREAT BLD: 15 (ref 9–20)
CALCIUM SERPL-MCNC: 9.3 MG/DL (ref 8.6–10.4)
CHLORIDE BLD-SCNC: 106 MMOL/L (ref 98–107)
CO2: 22 MMOL/L (ref 20–31)
CREAT SERPL-MCNC: 1.26 MG/DL (ref 0.5–0.9)
DIFFERENTIAL TYPE: ABNORMAL
EOSINOPHILS RELATIVE PERCENT: 4 % (ref 1–7)
GFR AFRICAN AMERICAN: 54 ML/MIN
GFR NON-AFRICAN AMERICAN: 44 ML/MIN
GFR SERPL CREATININE-BSD FRML MDRD: ABNORMAL ML/MIN/{1.73_M2}
GFR SERPL CREATININE-BSD FRML MDRD: ABNORMAL ML/MIN/{1.73_M2}
GLUCOSE BLD-MCNC: 97 MG/DL (ref 70–99)
HCT VFR BLD CALC: 34 % (ref 36–46)
HEMOGLOBIN: 11.2 G/DL (ref 12–16)
IMMATURE GRANULOCYTES: ABNORMAL %
LYMPHOCYTES # BLD: 40 % (ref 16–46)
MCH RBC QN AUTO: 33.3 PG (ref 26–34)
MCHC RBC AUTO-ENTMCNC: 32.9 G/DL (ref 31–37)
MCV RBC AUTO: 101.3 FL (ref 80–100)
MONOCYTES # BLD: 7 % (ref 4–11)
MORPHOLOGY: ABNORMAL
NRBC AUTOMATED: ABNORMAL PER 100 WBC
PDW BLD-RTO: 13.3 % (ref 11–14.5)
PLATELET # BLD: 119 K/UL (ref 140–450)
PLATELET ESTIMATE: ABNORMAL
PMV BLD AUTO: 8.7 FL (ref 6–12)
POTASSIUM SERPL-SCNC: 4.9 MMOL/L (ref 3.7–5.3)
RBC # BLD: 3.35 M/UL (ref 4–5.2)
RBC # BLD: ABNORMAL 10*6/UL
SEG NEUTROPHILS: 48 % (ref 43–77)
SEGMENTED NEUTROPHILS ABSOLUTE COUNT: 2.98 K/UL (ref 1.8–7.7)
SODIUM BLD-SCNC: 141 MMOL/L (ref 135–144)
TOTAL PROTEIN: 7 G/DL (ref 6.4–8.3)
WBC # BLD: 6.2 K/UL (ref 3.5–11)
WBC # BLD: ABNORMAL 10*3/UL

## 2018-08-02 PROCEDURE — 80053 COMPREHEN METABOLIC PANEL: CPT

## 2018-08-02 PROCEDURE — 2580000003 HC RX 258: Performed by: INTERNAL MEDICINE

## 2018-08-02 PROCEDURE — 1036F TOBACCO NON-USER: CPT | Performed by: PSYCHIATRY & NEUROLOGY

## 2018-08-02 PROCEDURE — G8417 CALC BMI ABV UP PARAM F/U: HCPCS | Performed by: PSYCHIATRY & NEUROLOGY

## 2018-08-02 PROCEDURE — 96413 CHEMO IV INFUSION 1 HR: CPT

## 2018-08-02 PROCEDURE — G8427 DOCREV CUR MEDS BY ELIG CLIN: HCPCS | Performed by: PSYCHIATRY & NEUROLOGY

## 2018-08-02 PROCEDURE — 6360000002 HC RX W HCPCS: Performed by: INTERNAL MEDICINE

## 2018-08-02 PROCEDURE — 36415 COLL VENOUS BLD VENIPUNCTURE: CPT

## 2018-08-02 PROCEDURE — 99214 OFFICE O/P EST MOD 30 MIN: CPT | Performed by: PSYCHIATRY & NEUROLOGY

## 2018-08-02 PROCEDURE — 85025 COMPLETE CBC W/AUTO DIFF WBC: CPT

## 2018-08-02 PROCEDURE — 3017F COLORECTAL CA SCREEN DOC REV: CPT | Performed by: PSYCHIATRY & NEUROLOGY

## 2018-08-02 RX ORDER — HEPARIN SODIUM (PORCINE) LOCK FLUSH IV SOLN 100 UNIT/ML 100 UNIT/ML
500 SOLUTION INTRAVENOUS PRN
Status: DISCONTINUED | OUTPATIENT
Start: 2018-08-02 | End: 2018-08-03 | Stop reason: HOSPADM

## 2018-08-02 RX ORDER — CLONAZEPAM 0.5 MG/1
TABLET ORAL
Qty: 60 TABLET | Refills: 1 | Status: SHIPPED | OUTPATIENT
Start: 2018-08-02 | End: 2018-09-21 | Stop reason: SDUPTHER

## 2018-08-02 RX ORDER — SODIUM CHLORIDE 0.9 % (FLUSH) 0.9 %
10 SYRINGE (ML) INJECTION PRN
Status: DISCONTINUED | OUTPATIENT
Start: 2018-08-02 | End: 2018-08-03 | Stop reason: HOSPADM

## 2018-08-02 RX ORDER — SODIUM CHLORIDE 9 MG/ML
INJECTION, SOLUTION INTRAVENOUS ONCE
Status: COMPLETED | OUTPATIENT
Start: 2018-08-02 | End: 2018-08-02

## 2018-08-02 RX ADMIN — SODIUM CHLORIDE: 9 INJECTION, SOLUTION INTRAVENOUS at 09:50

## 2018-08-02 RX ADMIN — TRASTUZUMAB 750 MG: 150 INJECTION, POWDER, LYOPHILIZED, FOR SOLUTION INTRAVENOUS at 11:36

## 2018-08-02 ASSESSMENT — ENCOUNTER SYMPTOMS
VOICE CHANGE: 0
SHORTNESS OF BREATH: 0
TROUBLE SWALLOWING: 0
CHEST TIGHTNESS: 0
ABDOMINAL DISTENTION: 0
ABDOMINAL PAIN: 0
COLOR CHANGE: 0
APNEA: 0
BACK PAIN: 0
FACIAL SWELLING: 0
WHEEZING: 0
PHOTOPHOBIA: 0
DIARRHEA: 0
CHOKING: 0
NAUSEA: 0
SINUS PRESSURE: 0
EYE DISCHARGE: 0
BOWEL INCONTINENCE: 0
VOMITING: 0
EYE ITCHING: 0
CONSTIPATION: 0
SORE THROAT: 0
VISUAL CHANGE: 0
EYE PAIN: 0
COUGH: 0
EYE REDNESS: 0
BLOOD IN STOOL: 0

## 2018-08-02 NOTE — PROGRESS NOTES
Also   Additional   Symptoms   Present    As  Documented    In   The detailed    Review  Of  Systems   And    Please   Refer   To    Them for   Additional  Information. Any components  That are either  Unobtainable  Or  Limited  In   HPI, ROS  And/or PFSH   Are   Due   To   Patient's  Medical  Problems,  Clinical  Condition and/or lack of other  Alternate resources.           RECORDS   REVIEWED:  historical medical records     INFORMATION   REVIEWED:     MEDICAL   HISTORY,     SURGICAL   HISTORY,   MEDICATIONS   LIST,   ALLERGIES AND  DRUG  INTOLERANCES,     FAMILY   HISTORY,  SOCIAL  HISTORY,    PROBLEM  LIST   FOR  PATIENT  CARE   COORDINATION    Past Medical History:   Diagnosis Date    Bipolar 1 disorder ([facility] [address]     Breast cancer ([facility] [address] 2017    right side     Headache(784.0)     Hyperlipidemia     Migraine          Past Surgical History:   Procedure Laterality Date    DILATION AND CURETTAGE OF UTERUS N/A 10/13/2017    D & C HYSTEROSCOPY with polypectomy performed by Fernando Pal MD at [address] [place] [address] / REMOVAL / [address] [place] [address] Said Left 11/9/2017    PORT INSERTION performed by Jenifer Montiel MD at [address] [place] [address] / REMOVAL / [address] [place] [address] Said N/A 11/30/2017    Port Removal & Insertion performed by Jenifer Montiel MD at [address] [place] [address] Right 10/19/2017     [address] [facility]    MASTECTOMY Right 10/19/2017    Right Breast Mastectomy with  [place] Node Biopsy performed by Jenifer Montiel MD at [place] [address] [address] [facility] VAD W/SUBQ PORT AGE 5 YR/> Left 3/1/2018    PORT Exchange performed by Jenifer Montiel MD at [place] [address] Left 2014, 2015    x 2 surgeries total; Dr. Jim Alfaro TUNNELED VENOUS PORT PLACEMENT Left 11/30/2017    removal of et replacement of         Current Outpatient Prescriptions   Medication Sig Dispense Refill    clonazePAM (KLONOPIN) 0.5 MG tablet 1/2   TO  ONE  TABLET  TWICE DAILY  AS  NEEDED. 60 tablet 1    letrozole (FEMARA) 2.5 MG tablet Take 1 tablet by mouth daily 30 tablet 5    RA VITAMIN B-12 TR 1000 MCG TBCR take 1 tablet by mouth once daily 30 tablet 3    folic acid (FOLVITE) 1 MG tablet take 1 tablet by mouth once daily 30 tablet 3    traMADol (ULTRAM) 50 MG tablet Take 1 tablet by mouth every 6 hours as needed for Pain for up to 30 days. . 40 tablet 0    potassium chloride (KLOR-CON M) 20 MEQ extended release tablet take 1 tablet by mouth once daily 30 tablet 1    Cetirizine HCl (ZYRTEC ALLERGY) 10 MG CAPS Take by mouth      Calcium Carbonate-Vitamin D (OYSTER SHELL CALCIUM/D) 500-200 MG-UNIT TABS Take 1 tablet by mouth 2 times daily 60 tablet 5    nystatin (MYCOSTATIN) 058776 UNIT/ML suspension Take 5 mLs by mouth 4 times daily 1 Bottle 0    lidocaine-prilocaine (EMLA) 2.5-2.5 % cream Apply topically as needed. 1 Tube 1    ondansetron (ZOFRAN) 4 MG tablet Take 2 tablets by mouth every 6 hours as needed for Nausea or Vomiting 40 tablet 1    sodium chloride (ALTAMIST SPRAY) 0.65 % nasal spray 1 spray by Nasal route as needed for Congestion 1 Bottle 1    lactobacillus (CULTURELLE) CAPS capsule Take 1 capsule by mouth 3 times daily 90 capsule 0    omeprazole (PRILOSEC) 20 MG delayed release capsule Take 1 capsule by mouth 2 times daily 60 capsule 0    prochlorperazine (COMPAZINE) 10 MG tablet Take 10 mg by mouth every 6 hours as needed      HYDROcodone-acetaminophen (NORCO) 5-325 MG per tablet Take 1 tablet by mouth every 6 hours as needed for Pain .       VENTOLIN  (90 Base) MCG/ACT inhaler Inhale 2 puffs into the lungs every 4 hours as needed       butalbital-acetaminophen-caffeine (FIORICET, ESGIC) -40 MG per tablet Take 1-2 tablets by mouth every 4 hours as needed       fluticasone (FLONASE) 50 MCG/ACT nasal spray 1 spray by Nasal route Indications: as needed       hydrOXYzine (ATARAX) 10 MG tablet Take 10 mg by mouth daily       lamoTRIgine 07/12/18 268 lb 6.4 oz (121.7 kg)         BP Readings from Last 3 Encounters:   08/02/18 128/72   08/02/18 136/69   07/23/18 121/80       Hematology and Coagulation  Lab Results   Component Value Date    WBC 6.2 08/02/2018    RBC 3.35 08/02/2018    HGB 11.2 08/02/2018    HCT 34.0 08/02/2018    .3 08/02/2018    MCH 33.3 08/02/2018    MCHC 32.9 08/02/2018    RDW 13.3 08/02/2018     08/02/2018    MPV 8.7 08/02/2018       Chemistries  Lab Results   Component Value Date     08/02/2018    K 4.9 08/02/2018     08/02/2018    CO2 22 08/02/2018    BUN 19 08/02/2018    CREATININE 1.26 08/02/2018    CALCIUM 9.3 08/02/2018    PROT 7.0 08/02/2018    LABALBU 3.5 08/02/2018    BILITOT 0.26 08/02/2018    ALKPHOS 64 08/02/2018    AST 19 08/02/2018    ALT 12 08/02/2018     Lab Results   Component Value Date    ALKPHOS 64 08/02/2018    ALT 12 08/02/2018    AST 19 08/02/2018    PROT 7.0 08/02/2018    BILITOT 0.26 08/02/2018    BILIDIR 0.15 01/09/2018    LABALBU 3.5 08/02/2018     Lab Results   Component Value Date    BUN 19 08/02/2018    CREATININE 1.26 08/02/2018     Lab Results   Component Value Date    CALCIUM 9.3 08/02/2018    MG 1.7 04/06/2018     Lab Results   Component Value Date    AST 19 08/02/2018    ALT 12 08/02/2018       Lab Results   Component Value Date    CKTOTAL 41 09/25/2013     Lab Results   Component Value Date    CACBMKQO30 580 06/04/2018       Review of Systems   Constitutional: Negative for appetite change, chills, diaphoresis, fatigue, fever and unexpected weight change. HENT: Negative for congestion, dental problem, drooling, ear discharge, ear pain, facial swelling, hearing loss, mouth sores, nosebleeds, postnasal drip, sinus pressure, sore throat, tinnitus, trouble swallowing and voice change. Eyes: Negative for photophobia, pain, discharge, redness, itching and visual disturbance.    Respiratory: Negative for apnea, cough, choking, chest tightness, shortness of breath and wheezing. Cardiovascular: Negative for chest pain, palpitations, leg swelling and near-syncope. Gastrointestinal: Negative for abdominal distention, abdominal pain, blood in stool, bowel incontinence, constipation, diarrhea, nausea and vomiting. Endocrine: Negative for cold intolerance, heat intolerance, polydipsia, polyphagia and polyuria. Genitourinary: Negative for bladder incontinence. Musculoskeletal: Negative for arthralgias, back pain, gait problem, joint swelling, myalgias, neck pain and neck stiffness. Skin: Negative for color change, pallor, rash and wound. Allergic/Immunologic: Negative for environmental allergies, food allergies and immunocompromised state. Neurological: Positive for headaches. Negative for dizziness, vertigo, tremors, focal weakness, seizures, syncope, facial asymmetry, speech difficulty, weakness, light-headedness, numbness and loss of balance. Hematological: Negative for adenopathy. Does not bruise/bleed easily. Psychiatric/Behavioral: Positive for decreased concentration and memory loss. Negative for agitation, behavioral problems, confusion, dysphoric mood, hallucinations, self-injury, sleep disturbance and suicidal ideas. The patient is nervous/anxious. The patient is not hyperactive. OBJECTIVE:    Physical Exam   Constitutional: She appears well-developed and well-nourished. She is cooperative. HENT:   Head: Normocephalic and atraumatic. Head is without raccoon's eyes and without Varma's sign. Right Ear: External ear normal.   Left Ear: External ear normal.   Nose: Nose normal.   Mouth/Throat: Oropharynx is clear and moist.   Eyes: Conjunctivae are normal.   Neck: Normal range of motion. Neck supple. No muscular tenderness present. Carotid bruit is not present. No neck rigidity. No tracheal deviation and normal range of motion present. No Brudzinski's sign and no Kernig's sign noted. No thyroid mass and no thyromegaly present.    Cardiovascular: Normal rate and regular rhythm. Pulmonary/Chest: Effort normal.   Musculoskeletal: She exhibits no edema or tenderness. Skin: Skin is warm. No rash noted. No cyanosis or erythema. No pallor. Nails show no clubbing. Psychiatric: Her mood appears anxious. Her affect is not angry, not blunt, not labile and not inappropriate. She is not agitated, not aggressive, not hyperactive, not slowed, not withdrawn, not actively hallucinating and not combative. Thought content is not paranoid and not delusional. Cognition and memory are impaired. She does not express impulsivity or inappropriate judgment. She exhibits a depressed mood. She expresses no homicidal and no suicidal ideation. She expresses no suicidal plans and no homicidal plans. She exhibits normal recent memory and normal remote memory. She is attentive. Neurologic Exam    NEUROLOGICAL EXAMINATION :       A) MENTAL STATUS:                   Alert and  oriented  To time, place  And  Person. No Aphasia. No  Dysarthria. Able   To  Follow three  Step commands without   Any  Difficulty. No right  To left confusion. Normal  Speech  And language function. Insight and  Judgment ,Fund  Of  Knowledge   within normal limits              Recent  And  Remote memory  within normal limits              Attention &Concentration are within normal limits                                                 B) CRANIAL NERVES :             2 CN : Visual  Acuity  And  Visual fields  within normal limits                      Fundi  Could  Not  Be  Could  Not  Be  Evaluated. 3,4,6 CN : Both  Pupils are   Reactive and  Equal.                          Extraocular   Movements  Are  Intact. No  Nystagmus. No  YUMIKO. No  Afferent  Pupillary  Defect noted. 5 CN :  Normal  Facial sensations and Corneal  Reflexes         7 CN :  Normal  Facial  Symmetry  And  Strength. tolerance/dependence & alternative treatments discussed. Lluvia Ordonez MD)              Orders Placed This Encounter   Medications    clonazePAM (KLONOPIN) 0.5 MG tablet     Si/2   TO  ONE  TABLET  TWICE  DAILY  AS  NEEDED. Dispense:  60 tablet     Refill:  1                   *PATIENT   TO  FOLLOW  UP  WITH   PRIMARY  CARE   AND   OTHER  CONSULTANTS  AS  BEFORE.           *TO  FOLLOW  WITH   MENTAL  HEALTH  PROFESSIONALS ,  INCLUDING          PSYCHOLOGICAL  COUNSELING   AND  PSYCHIATRIC  EVALUTIONS        *  Maintain   Healthy  Life Style    With   Periodic  Monitoring  Of    Any  Medical  Conditions  Including   Elevated  Blood  Pressure,  Lipid  Profile,   Blood  Sugar levels  And   Heart  Disease. *   Period   Screening  For  Cancers  Involving  Breast,  Colon,  lungs  And  Other  Organs  As  Applicable,  In consultation   With  Your  Primary Care Providers. * Second  Neurological  Opinion  And  Evaluations  In  [facility]  Setting  If  Patient  Is  Interested. * Please   Contact   Neurology  Clinic   Early   If   Are  Any  New  Neurological                           Symptoms   And  Any neurological  Concerns. *  If  The  Patient remains  Neurologically  Stable   Return   To  [facility] Neurology Department   IN    2-3     MONTHS  TIME   FOR  FURTHER  FOLLOW UP.                 *  If   There is  Any  Significant  Worsening   Of  Current  Symptoms  And  Or  If patient  Develops   Any additional  New  Neurological  Symptoms  Or  Significant  Concerns   Should  Call  911 or  Go  To  Emergency  Department  For  Further  Immediate  Evaluation. *   The  Neurological   Findings,  Possible  Diagnosis,  Differential diagnoses   And  Options  For    Further   Investigations   And  management   Are  Discussed  Comprehensively. Medications   And  Prescription   Risks  And  Side effects  Are   Also  Discussed. thirsty. Eat at least 5 servings of fruits and vegetables every day. It may seem like a lot, but it is not hard to reach this goal. A serving or helping is 1 piece of fruit, 1 cup of vegetables, or 2 cups of leafy, raw vegetables. Have an apple or some carrot sticks as an afternoon snack instead of a candy bar. Try to have fruits and/or vegetables at every meal.  Make exercise part of your daily routine. You may want to start with simple activities, such as walking, bicycling, or slow swimming. Try to be active 30 to 60 minutes every day. You do not need to do all 30 to 60 minutes all at once. For example, you can exercise 3 times a day for 10 or 20 minutes. Moderate exercise is safe for most people, but it is always a good idea to talk to your doctor before starting an exercise program.  Keep moving. Magdy Dryer the lawn, work in the garden, or [email or web address]. Take the stairs instead of the elevator at work. If you smoke, quit. People who smoke have an increased risk for heart attack, stroke, cancer, and other lung illnesses. Quitting is hard, but there are ways to boost your chance of quitting tobacco for good. Use nicotine gum, patches, or lozenges. Ask your doctor about stop-smoking programs and medicines. Keep trying. In addition to reducing your risk of diseases in the future, you will notice some benefits soon after you stop using tobacco. If you have shortness of breath or asthma symptoms, they will likely get better within a few weeks after you quit. Limit how much alcohol you drink. Moderate amounts of alcohol (up to 2 drinks a day for men, 1 drink a day for women) are okay. But drinking too much can lead to liver problems, high blood pressure, and other health problems. Family health  If you have a family, there are many things you can do together to improve your health. Eat meals together as a family as often as possible. Eat healthy foods.  This includes fruits, vegetables, lean meats and dairy, and whole grains. Include your family in your fitness plan. Most people think of activities such as jogging or tennis as the way to fitness, but there are many ways you and your family can be more active. Anything that makes you breathe hard and gets your heart pumping is exercise. Here are some tips:  Walk to do errands or to take your child to school or the bus. Go for a family bike ride after dinner instead of watching TV. Where can you learn more? Go to [email or web address].[email or web address] org and sign in to your [employer] account. Enter C709 in the Baton box to learn more about \"A Healthy Lifestyle: Care Instructions. \"     If you do not have an account, please click on the \"Sign Up Now\" link. Current as of: July 26, 2016  Content Version: 11.2  © 2028-3799 [employer]. Care instructions adapted under license by [facility] ([facility]). If you have questions about a medical condition or this instruction, always ask your healthcare professional. [address] any warranty or liability for your use of this information.

## 2018-08-02 NOTE — PROGRESS NOTES
VAD accessed with a 3/4\" mora needle under sterile technique. Blood return was noted but was unable to obtain lab specimen. Flushed with 10ml of NS and secured with a tegaderm dressing. NS infusing at this time per order. Patient denies any needs at this time.

## 2018-08-02 NOTE — PROGRESS NOTES
Infusion completed. Patient tolerated well and discharged home with daughter at side. Patient denies any complaints at this time.

## 2018-08-15 ENCOUNTER — OFFICE VISIT (OUTPATIENT)
Dept: ONCOLOGY | Age: 53
End: 2018-08-15
Payer: MEDICARE

## 2018-08-15 VITALS
WEIGHT: 267.2 LBS | HEART RATE: 80 BPM | DIASTOLIC BLOOD PRESSURE: 68 MMHG | TEMPERATURE: 97.7 F | SYSTOLIC BLOOD PRESSURE: 124 MMHG | RESPIRATION RATE: 18 BRPM | BODY MASS INDEX: 42.94 KG/M2 | HEIGHT: 66 IN

## 2018-08-15 DIAGNOSIS — C50.811 MALIGNANT NEOPLASM OF OVERLAPPING SITES OF RIGHT BREAST IN FEMALE, ESTROGEN RECEPTOR NEGATIVE (HCC): Primary | ICD-10-CM

## 2018-08-15 DIAGNOSIS — Z17.1 MALIGNANT NEOPLASM OF OVERLAPPING SITES OF RIGHT BREAST IN FEMALE, ESTROGEN RECEPTOR NEGATIVE (HCC): Primary | ICD-10-CM

## 2018-08-15 PROCEDURE — 3017F COLORECTAL CA SCREEN DOC REV: CPT | Performed by: INTERNAL MEDICINE

## 2018-08-15 PROCEDURE — 99214 OFFICE O/P EST MOD 30 MIN: CPT | Performed by: INTERNAL MEDICINE

## 2018-08-15 PROCEDURE — G8427 DOCREV CUR MEDS BY ELIG CLIN: HCPCS | Performed by: INTERNAL MEDICINE

## 2018-08-15 PROCEDURE — 1036F TOBACCO NON-USER: CPT | Performed by: INTERNAL MEDICINE

## 2018-08-15 PROCEDURE — G8417 CALC BMI ABV UP PARAM F/U: HCPCS | Performed by: INTERNAL MEDICINE

## 2018-08-15 RX ORDER — 0.9 % SODIUM CHLORIDE 0.9 %
10 VIAL (ML) INJECTION ONCE
Status: CANCELLED | OUTPATIENT
Start: 2018-08-23 | End: 2018-08-23

## 2018-08-15 RX ORDER — SODIUM CHLORIDE 9 MG/ML
INJECTION, SOLUTION INTRAVENOUS ONCE
Status: CANCELLED | OUTPATIENT
Start: 2018-08-23 | End: 2018-08-23

## 2018-08-15 RX ORDER — DIPHENHYDRAMINE HYDROCHLORIDE 50 MG/ML
50 INJECTION INTRAMUSCULAR; INTRAVENOUS ONCE
Status: CANCELLED | OUTPATIENT
Start: 2018-08-23 | End: 2018-08-23

## 2018-08-15 RX ORDER — SODIUM CHLORIDE 9 MG/ML
INJECTION, SOLUTION INTRAVENOUS CONTINUOUS
Status: CANCELLED | OUTPATIENT
Start: 2018-08-23

## 2018-08-15 RX ORDER — TRAMADOL HYDROCHLORIDE 50 MG/1
50 TABLET ORAL EVERY 6 HOURS PRN
Qty: 40 TABLET | Refills: 0 | Status: SHIPPED | OUTPATIENT
Start: 2018-08-15 | End: 2018-09-13 | Stop reason: SDUPTHER

## 2018-08-15 RX ORDER — 0.9 % SODIUM CHLORIDE 0.9 %
1000 INTRAVENOUS SOLUTION INTRAVENOUS ONCE
Status: CANCELLED
Start: 2018-08-26 | End: 2018-08-26

## 2018-08-15 RX ORDER — METHYLPREDNISOLONE SODIUM SUCCINATE 125 MG/2ML
125 INJECTION, POWDER, LYOPHILIZED, FOR SOLUTION INTRAMUSCULAR; INTRAVENOUS ONCE
Status: CANCELLED | OUTPATIENT
Start: 2018-08-23 | End: 2018-08-23

## 2018-08-15 RX ORDER — HEPARIN SODIUM (PORCINE) LOCK FLUSH IV SOLN 100 UNIT/ML 100 UNIT/ML
500 SOLUTION INTRAVENOUS PRN
Status: CANCELLED | OUTPATIENT
Start: 2018-08-23

## 2018-08-15 RX ORDER — SODIUM CHLORIDE 0.9 % (FLUSH) 0.9 %
5 SYRINGE (ML) INJECTION PRN
Status: CANCELLED | OUTPATIENT
Start: 2018-08-23

## 2018-08-15 RX ORDER — 0.9 % SODIUM CHLORIDE 0.9 %
1000 INTRAVENOUS SOLUTION INTRAVENOUS ONCE
Status: CANCELLED
Start: 2018-09-02 | End: 2018-09-02

## 2018-08-15 RX ORDER — 0.9 % SODIUM CHLORIDE 0.9 %
1000 INTRAVENOUS SOLUTION INTRAVENOUS ONCE
Status: CANCELLED
Start: 2018-09-09 | End: 2018-09-09

## 2018-08-15 RX ORDER — SODIUM CHLORIDE 0.9 % (FLUSH) 0.9 %
10 SYRINGE (ML) INJECTION PRN
Status: CANCELLED | OUTPATIENT
Start: 2018-08-23

## 2018-08-15 NOTE — PROGRESS NOTES
Wilbur Herbert                                                                                                                  8/15/2018  MRN:   [id number]  YOB: 1965  PCP:                           MAGGIE Goodrich - CNP  Referring Physician: No ref. provider found  Treating Physician Name: Champ Myers MD      Reason for visit:  Patient presents to the clinic  for toxicity check and to review further treatment plan    Current problems/ Active and recent treatments:  Stage IIa infiltrating ductal carcinoma of right breast, ER negative, NV positive and HER-2 amplified. Strong family history of breast cancer in sister and maternal aunt  Grade 2 diarrhea, nonbloody    Summary of Case/History:    Wilbur Herbert a 48 y. o.female who presents to the hematology oncology office to establish care and for further recommendations for management of her recently diagnosed right breast cancer    Patient had a mammogram after many years in September 2017 which came back abnormal.  Subsequently patient had an ultrasound which confirmed a 1.2 cm lesion in the right breast at 1:00 position. There was another 4 mm lesion at 12:00 position    Patient underwent ultrasound-guided biopsy on 9/8/17. the lesion at 1:00 position shows invasive ductal carcinoma. ER negative and NV positive associated with high-grade DCIS. Lesion at 12:00 position showed fibrocystic disease and focal adenosis and hyperplasia. Patient underwent right sided mastectomy with sentinel lymph node biopsy on 10/19/17. Pathology report showed invasive ductal carcinoma, grade 3 out of 3, 2.8 cm in size with negative margins. pT2,pN0,M0  Tumor specimen was negative for ER receptor and weekly positive for NV receptor (5-10 percent). High-grade DCIS was also noted. One sentinel lymph node was sampled which was negative for metastasis    Patient started on neoadjuvant chemotherapy using TCHP regimen.     Cycle #1, day #1 on Dr. Lilian Bains TUNNELED VENOUS PORT PLACEMENT Left 11/30/2017    removal of et replacement of       Patient Family Social History:     Family History   Problem Relation Age of Onset    Breast Cancer Sister 54    Breast Cancer Maternal Aunt 71    Other Maternal Cousin         Atypical Ductal Hyperplasia     Uterine Cancer Sister 32    Other Sister         breast cyst    Cataracts Mother     Glaucoma Mother     Diabetes Neg Hx       Social History     Social History    Marital status: Single     Spouse name: N/A    Number of children: N/A    Years of education: N/A     Occupational History    Not on file. Social History Main Topics    Smoking status: Never Smoker    Smokeless tobacco: Never Used      Comment: Never smoker. TC, RRT 4/5/18    Alcohol use No    Drug use: No    Sexual activity: Yes     Partners: Male     Birth control/ protection: Surgical     Other Topics Concern    Not on file     Social History Narrative    No narrative on file      Current Medications:     Current Outpatient Prescriptions   Medication Sig Dispense Refill    clonazePAM (KLONOPIN) 0.5 MG tablet 1/2   TO  ONE  TABLET  TWICE  DAILY  AS  NEEDED. 60 tablet 1    letrozole (FEMARA) 2.5 MG tablet Take 1 tablet by mouth daily 30 tablet 5    RA VITAMIN B-12 TR 1000 MCG TBCR take 1 tablet by mouth once daily 30 tablet 3    folic acid (FOLVITE) 1 MG tablet take 1 tablet by mouth once daily 30 tablet 3    traMADol (ULTRAM) 50 MG tablet Take 1 tablet by mouth every 6 hours as needed for Pain for up to 30 days. . 40 tablet 0    potassium chloride (KLOR-CON M) 20 MEQ extended release tablet take 1 tablet by mouth once daily 30 tablet 1    Cetirizine HCl (ZYRTEC ALLERGY) 10 MG CAPS Take by mouth      Calcium Carbonate-Vitamin D (OYSTER SHELL CALCIUM/D) 500-200 MG-UNIT TABS Take 1 tablet by mouth 2 times daily 60 tablet 5    nystatin (MYCOSTATIN) 322643 UNIT/ML suspension Take 5 mLs by mouth 4 times daily 1 Bottle 0    9 - 20    Calcium 9.3 8.6 - 10.4 mg/dL    Sodium 141 135 - 144 mmol/L    Potassium 4.9 3.7 - 5.3 mmol/L    Chloride 106 98 - 107 mmol/L    CO2 22 20 - 31 mmol/L    Anion Gap 13 9 - 17 mmol/L    Alkaline Phosphatase 64 35 - 104 U/L    ALT 12 5 - 33 U/L    AST 19 <32 U/L    Total Bilirubin 0.26 (L) 0.3 - 1.2 mg/dL    Total Protein 7.0 6.4 - 8.3 g/dL    Alb 3.5 3.5 - 5.2 g/dL    Albumin/Globulin Ratio 1.0 1.0 - 2.5    GFR Non- 44 (L) >60 mL/min    GFR  54 (L) >60 mL/min    GFR Comment          GFR Staging NOT REPORTED    CBC Auto Differential   Result Value Ref Range    WBC 6.2 3.5 - 11.0 k/uL    RBC 3.35 (L) 4.0 - 5.2 m/uL    Hemoglobin 11.2 (L) 12.0 - 16.0 g/dL    Hematocrit 34.0 (L) 36 - 46 %    .3 (H) 80 - 100 fL    MCH 33.3 26 - 34 pg    MCHC 32.9 31 - 37 g/dL    RDW 13.3 11.0 - 14.5 %    Platelets 541 (L) 616 - 450 k/uL    MPV 8.7 6.0 - 12.0 fL    NRBC Automated NOT REPORTED per 100 WBC    Differential Type NOT REPORTED     Immature Granulocytes NOT REPORTED 0 %    Absolute Immature Granulocyte NOT REPORTED 0.00 - 0.30 k/uL    WBC Morphology NOT REPORTED     RBC Morphology NOT REPORTED     Platelet Estimate NOT REPORTED     Monocytes 7 4 - 11 %    Lymphocytes 40 16 - 46 %    Seg Neutrophils 48 43 - 77 %    Eosinophils % 4 1 - 7 %    Basophils 1 0 - 1 %    Absolute Mono # 0.43 0.1 - 1.2 k/uL    Absolute Lymph # 2.48 1.0 - 4.8 k/uL    Segs Absolute 2.98 1.8 - 7.7 k/uL    Absolute Eos # 0.25 0.0 - 0.4 k/uL    Basophils # 0.06 0.0 - 0.2 k/uL    Morphology Platelet scan shows Decreased Platelets      Xr Chest Standard (2 Vw)    Result Date: 10/10/2017  EXAMINATION: TWO VIEWS OF THE CHEST 10/10/2017 2:37 pm COMPARISON: 10/18/2011 HISTORY: ORDERING SYSTEM PROVIDED HISTORY: Invasive ductal carcinoma of breast, right [facility]) TECHNOLOGIST PROVIDED HISTORY: Reason for exam:->Pre op Ordering Physician Provided Reason for Exam: Pre op for right mastectomy. No chest complaints.  Acuity:

## 2018-08-20 RX ORDER — POTASSIUM CHLORIDE 20 MEQ/1
TABLET, EXTENDED RELEASE ORAL
Qty: 30 TABLET | Refills: 1 | Status: SHIPPED | OUTPATIENT
Start: 2018-08-20 | End: 2018-12-05 | Stop reason: SDUPTHER

## 2018-08-21 ENCOUNTER — TELEPHONE (OUTPATIENT)
Dept: INFUSION THERAPY | Facility: MEDICAL CENTER | Age: 53
End: 2018-08-21

## 2018-08-23 ENCOUNTER — HOSPITAL ENCOUNTER (OUTPATIENT)
Dept: INFUSION THERAPY | Age: 53
Discharge: HOME | End: 2018-08-23
Payer: MEDICARE

## 2018-08-23 VITALS
BODY MASS INDEX: 43.71 KG/M2 | WEIGHT: 272 LBS | OXYGEN SATURATION: 99 % | RESPIRATION RATE: 18 BRPM | SYSTOLIC BLOOD PRESSURE: 127 MMHG | TEMPERATURE: 97.9 F | DIASTOLIC BLOOD PRESSURE: 60 MMHG | HEIGHT: 66 IN | HEART RATE: 80 BPM

## 2018-08-23 DIAGNOSIS — C50.811 MALIGNANT NEOPLASM OF OVERLAPPING SITES OF RIGHT BREAST IN FEMALE, ESTROGEN RECEPTOR NEGATIVE (HCC): ICD-10-CM

## 2018-08-23 DIAGNOSIS — Z17.1 MALIGNANT NEOPLASM OF OVERLAPPING SITES OF RIGHT BREAST IN FEMALE, ESTROGEN RECEPTOR NEGATIVE (HCC): ICD-10-CM

## 2018-08-23 LAB
ABSOLUTE EOS #: 0.3 K/UL (ref 0–0.4)
ABSOLUTE IMMATURE GRANULOCYTE: ABNORMAL K/UL (ref 0–0.3)
ABSOLUTE LYMPH #: 2.7 K/UL (ref 1–4.8)
ABSOLUTE MONO #: 0.5 K/UL (ref 0.1–1.2)
ALBUMIN SERPL-MCNC: 3.3 G/DL (ref 3.5–5.2)
ALBUMIN/GLOBULIN RATIO: 0.8 (ref 1–2.5)
ALP BLD-CCNC: 64 U/L (ref 35–104)
ALT SERPL-CCNC: 15 U/L (ref 5–33)
ANION GAP SERPL CALCULATED.3IONS-SCNC: 12 MMOL/L (ref 9–17)
AST SERPL-CCNC: 17 U/L
BASOPHILS # BLD: 1 % (ref 0–1)
BASOPHILS ABSOLUTE: 0 K/UL (ref 0–0.2)
BILIRUB SERPL-MCNC: 0.15 MG/DL (ref 0.3–1.2)
BUN BLDV-MCNC: 24 MG/DL (ref 6–20)
BUN/CREAT BLD: 20 (ref 9–20)
CALCIUM SERPL-MCNC: 9.5 MG/DL (ref 8.6–10.4)
CHLORIDE BLD-SCNC: 104 MMOL/L (ref 98–107)
CO2: 26 MMOL/L (ref 20–31)
CREAT SERPL-MCNC: 1.19 MG/DL (ref 0.5–0.9)
DIFFERENTIAL TYPE: ABNORMAL
EOSINOPHILS RELATIVE PERCENT: 4 % (ref 1–7)
GFR AFRICAN AMERICAN: 58 ML/MIN
GFR NON-AFRICAN AMERICAN: 47 ML/MIN
GFR SERPL CREATININE-BSD FRML MDRD: ABNORMAL ML/MIN/{1.73_M2}
GFR SERPL CREATININE-BSD FRML MDRD: ABNORMAL ML/MIN/{1.73_M2}
GLUCOSE BLD-MCNC: 98 MG/DL (ref 70–99)
HCT VFR BLD CALC: 33.5 % (ref 36–46)
HEMOGLOBIN: 11 G/DL (ref 12–16)
IMMATURE GRANULOCYTES: ABNORMAL %
LYMPHOCYTES # BLD: 43 % (ref 16–46)
MCH RBC QN AUTO: 32.9 PG (ref 26–34)
MCHC RBC AUTO-ENTMCNC: 32.9 G/DL (ref 31–37)
MCV RBC AUTO: 100.2 FL (ref 80–100)
MONOCYTES # BLD: 9 % (ref 4–11)
NRBC AUTOMATED: ABNORMAL PER 100 WBC
PDW BLD-RTO: 13.3 % (ref 11–14.5)
PLATELET # BLD: 151 K/UL (ref 140–450)
PLATELET ESTIMATE: ABNORMAL
PMV BLD AUTO: 7.4 FL (ref 6–12)
POTASSIUM SERPL-SCNC: 4.4 MMOL/L (ref 3.7–5.3)
RBC # BLD: 3.35 M/UL (ref 4–5.2)
RBC # BLD: ABNORMAL 10*6/UL
SEG NEUTROPHILS: 43 % (ref 43–77)
SEGMENTED NEUTROPHILS ABSOLUTE COUNT: 2.7 K/UL (ref 1.8–7.7)
SODIUM BLD-SCNC: 142 MMOL/L (ref 135–144)
TOTAL PROTEIN: 7.4 G/DL (ref 6.4–8.3)
WBC # BLD: 6.1 K/UL (ref 3.5–11)
WBC # BLD: ABNORMAL 10*3/UL

## 2018-08-23 PROCEDURE — 96413 CHEMO IV INFUSION 1 HR: CPT

## 2018-08-23 PROCEDURE — 6360000002 HC RX W HCPCS: Performed by: INTERNAL MEDICINE

## 2018-08-23 PROCEDURE — 2580000003 HC RX 258: Performed by: INTERNAL MEDICINE

## 2018-08-23 PROCEDURE — 36415 COLL VENOUS BLD VENIPUNCTURE: CPT

## 2018-08-23 PROCEDURE — 85025 COMPLETE CBC W/AUTO DIFF WBC: CPT

## 2018-08-23 PROCEDURE — 80053 COMPREHEN METABOLIC PANEL: CPT

## 2018-08-23 PROCEDURE — 36593 DECLOT VASCULAR DEVICE: CPT

## 2018-08-23 RX ORDER — SODIUM CHLORIDE 0.9 % (FLUSH) 0.9 %
10 SYRINGE (ML) INJECTION PRN
Status: DISCONTINUED | OUTPATIENT
Start: 2018-08-23 | End: 2018-08-24 | Stop reason: HOSPADM

## 2018-08-23 RX ORDER — HEPARIN SODIUM (PORCINE) LOCK FLUSH IV SOLN 100 UNIT/ML 100 UNIT/ML
500 SOLUTION INTRAVENOUS PRN
Status: CANCELLED | OUTPATIENT
Start: 2018-08-23

## 2018-08-23 RX ORDER — 0.9 % SODIUM CHLORIDE 0.9 %
1000 INTRAVENOUS SOLUTION INTRAVENOUS ONCE
Status: CANCELLED
Start: 2018-08-23 | End: 2018-08-23

## 2018-08-23 RX ORDER — SODIUM CHLORIDE 9 MG/ML
INJECTION, SOLUTION INTRAVENOUS ONCE
Status: COMPLETED | OUTPATIENT
Start: 2018-08-23 | End: 2018-08-23

## 2018-08-23 RX ORDER — SODIUM CHLORIDE 0.9 % (FLUSH) 0.9 %
10 SYRINGE (ML) INJECTION PRN
Status: CANCELLED | OUTPATIENT
Start: 2018-08-23

## 2018-08-23 RX ORDER — HEPARIN SODIUM (PORCINE) LOCK FLUSH IV SOLN 100 UNIT/ML 100 UNIT/ML
500 SOLUTION INTRAVENOUS PRN
Status: DISCONTINUED | OUTPATIENT
Start: 2018-08-23 | End: 2018-08-24 | Stop reason: HOSPADM

## 2018-08-23 RX ADMIN — Medication 10 ML: at 09:35

## 2018-08-23 RX ADMIN — Medication 10 ML: at 09:16

## 2018-08-23 RX ADMIN — Medication 10 ML: at 09:15

## 2018-08-23 RX ADMIN — ALTEPLASE 2 MG: 2.2 INJECTION, POWDER, LYOPHILIZED, FOR SOLUTION INTRAVENOUS at 10:40

## 2018-08-23 RX ADMIN — WATER: 1 INJECTION INTRAMUSCULAR; INTRAVENOUS; SUBCUTANEOUS at 10:40

## 2018-08-23 RX ADMIN — TRASTUZUMAB 750 MG: 150 INJECTION, POWDER, LYOPHILIZED, FOR SOLUTION INTRAVENOUS at 12:26

## 2018-08-23 RX ADMIN — SODIUM CHLORIDE: 9 INJECTION, SOLUTION INTRAVENOUS at 09:35

## 2018-08-23 RX ADMIN — HEPARIN SODIUM (PORCINE) LOCK FLUSH IV SOLN 100 UNIT/ML 500 UNITS: 100 SOLUTION at 13:14

## 2018-08-23 RX ADMIN — Medication 10 ML: at 11:56

## 2018-08-23 RX ADMIN — HEPARIN SODIUM (PORCINE) LOCK FLUSH IV SOLN 100 UNIT/ML 500 UNITS: 100 SOLUTION at 11:56

## 2018-08-23 NOTE — PROGRESS NOTES
Was able to draw blood back from (medial) proximal port site. Pravin 5 ml of blood back discarded and flushed easily with 510 ml of saline and 5 ml of heparin. Will d/c mora needle at end of treatment when d/c distal port site.

## 2018-08-23 NOTE — PROGRESS NOTES
Treatment completed Flushed distal port with 5 ml of heparin flush per protocol. D/C'd both mora needles form medial and distal port sites. Dressing applied. Patient reports feeling tired and weak as she did when she arrived today not different form treatment. Patient stable and discharged to home.

## 2018-08-23 NOTE — PROGRESS NOTES
Attempting to slowly put activase in port proximal site (medial)  Not pushing in is occluded 0.1 ml at a time then clamp for 5-10 minutes

## 2018-08-23 NOTE — PROGRESS NOTES
Patient comes for cycle 13 day 1. Accesed Dual port with 1\"mora needle per protocol medial and distal both sides pushed easily with saline. Unable to get blood return from distal port flushed with saline and 5 ml of heparin flush D/C'd mora needle. Medial port flushed easily with saline and got good blood return but would not pull back for  Lab draw.  johnny blood from left antecubital.  When connected Port to IV it was occluded. Tried pushing saline unable to push or pull. D/C'd mora needle and re accessed medial port side have blood return unable to push saline did feel back of hub when accessed. Left in place. Re accessed distal port and was able to push 10 ml of saline easily patient reports tasting saline. Was able to get a good blood return of 2 cc then hooked distal port up IV. IV presently infusing NSS without difficulties. Patient in chair with feet elevated. Snacks  provided. Patient came into unit today saying she is not doing well due to her best friend dying Monday of an overdose. Depression, fatigue and anxiety rated 10 on scale of 0-10  Denies thoughts of suicide. Much support needed and given   She canceled appointment with therapist this week and does have one next week. Brandy Laurel is tomorrow.

## 2018-09-13 ENCOUNTER — HOSPITAL ENCOUNTER (OUTPATIENT)
Dept: INFUSION THERAPY | Age: 53
Discharge: HOME | End: 2018-09-13
Payer: MEDICARE

## 2018-09-13 VITALS
RESPIRATION RATE: 18 BRPM | TEMPERATURE: 98.1 F | DIASTOLIC BLOOD PRESSURE: 69 MMHG | OXYGEN SATURATION: 97 % | HEART RATE: 79 BPM | SYSTOLIC BLOOD PRESSURE: 142 MMHG | WEIGHT: 274.4 LBS | HEIGHT: 66 IN | BODY MASS INDEX: 44.1 KG/M2

## 2018-09-13 DIAGNOSIS — C50.811 MALIGNANT NEOPLASM OF OVERLAPPING SITES OF RIGHT BREAST IN FEMALE, ESTROGEN RECEPTOR NEGATIVE (HCC): ICD-10-CM

## 2018-09-13 DIAGNOSIS — Z17.1 MALIGNANT NEOPLASM OF OVERLAPPING SITES OF RIGHT BREAST IN FEMALE, ESTROGEN RECEPTOR NEGATIVE (HCC): Primary | ICD-10-CM

## 2018-09-13 DIAGNOSIS — Z17.1 MALIGNANT NEOPLASM OF OVERLAPPING SITES OF RIGHT BREAST IN FEMALE, ESTROGEN RECEPTOR NEGATIVE (HCC): ICD-10-CM

## 2018-09-13 DIAGNOSIS — C50.811 MALIGNANT NEOPLASM OF OVERLAPPING SITES OF RIGHT BREAST IN FEMALE, ESTROGEN RECEPTOR NEGATIVE (HCC): Primary | ICD-10-CM

## 2018-09-13 LAB
ABSOLUTE EOS #: 0.3 K/UL (ref 0–0.4)
ABSOLUTE IMMATURE GRANULOCYTE: ABNORMAL K/UL (ref 0–0.3)
ABSOLUTE LYMPH #: 2.5 K/UL (ref 1–4.8)
ABSOLUTE MONO #: 0.5 K/UL (ref 0.1–1.2)
ALBUMIN SERPL-MCNC: 3.6 G/DL (ref 3.5–5.2)
ALBUMIN/GLOBULIN RATIO: 1 (ref 1–2.5)
ALP BLD-CCNC: 63 U/L (ref 35–104)
ALT SERPL-CCNC: 12 U/L (ref 5–33)
ANION GAP SERPL CALCULATED.3IONS-SCNC: 11 MMOL/L (ref 9–17)
AST SERPL-CCNC: 17 U/L
BASOPHILS # BLD: 1 % (ref 0–1)
BASOPHILS ABSOLUTE: 0 K/UL (ref 0–0.2)
BILIRUB SERPL-MCNC: 0.24 MG/DL (ref 0.3–1.2)
BUN BLDV-MCNC: 19 MG/DL (ref 6–20)
BUN/CREAT BLD: 16 (ref 9–20)
CALCIUM SERPL-MCNC: 9.6 MG/DL (ref 8.6–10.4)
CHLORIDE BLD-SCNC: 104 MMOL/L (ref 98–107)
CO2: 27 MMOL/L (ref 20–31)
CREAT SERPL-MCNC: 1.18 MG/DL (ref 0.5–0.9)
DIFFERENTIAL TYPE: ABNORMAL
EOSINOPHILS RELATIVE PERCENT: 5 % (ref 1–7)
GFR AFRICAN AMERICAN: 58 ML/MIN
GFR NON-AFRICAN AMERICAN: 48 ML/MIN
GFR SERPL CREATININE-BSD FRML MDRD: ABNORMAL ML/MIN/{1.73_M2}
GFR SERPL CREATININE-BSD FRML MDRD: ABNORMAL ML/MIN/{1.73_M2}
GLUCOSE BLD-MCNC: 102 MG/DL (ref 70–99)
HCT VFR BLD CALC: 33 % (ref 36–46)
HEMOGLOBIN: 11.1 G/DL (ref 12–16)
IMMATURE GRANULOCYTES: ABNORMAL %
LYMPHOCYTES # BLD: 40 % (ref 16–46)
MCH RBC QN AUTO: 32.9 PG (ref 26–34)
MCHC RBC AUTO-ENTMCNC: 33.5 G/DL (ref 31–37)
MCV RBC AUTO: 98.3 FL (ref 80–100)
MONOCYTES # BLD: 8 % (ref 4–11)
NRBC AUTOMATED: ABNORMAL PER 100 WBC
PDW BLD-RTO: 13.6 % (ref 11–14.5)
PLATELET # BLD: 154 K/UL (ref 140–450)
PLATELET ESTIMATE: ABNORMAL
PMV BLD AUTO: 7.2 FL (ref 6–12)
POTASSIUM SERPL-SCNC: 4.2 MMOL/L (ref 3.7–5.3)
RBC # BLD: 3.36 M/UL (ref 4–5.2)
RBC # BLD: ABNORMAL 10*6/UL
SEG NEUTROPHILS: 46 % (ref 43–77)
SEGMENTED NEUTROPHILS ABSOLUTE COUNT: 3 K/UL (ref 1.8–7.7)
SODIUM BLD-SCNC: 142 MMOL/L (ref 135–144)
TOTAL PROTEIN: 7.1 G/DL (ref 6.4–8.3)
WBC # BLD: 6.4 K/UL (ref 3.5–11)
WBC # BLD: ABNORMAL 10*3/UL

## 2018-09-13 PROCEDURE — 36593 DECLOT VASCULAR DEVICE: CPT

## 2018-09-13 PROCEDURE — 96413 CHEMO IV INFUSION 1 HR: CPT

## 2018-09-13 PROCEDURE — 2580000003 HC RX 258: Performed by: INTERNAL MEDICINE

## 2018-09-13 PROCEDURE — 6360000002 HC RX W HCPCS: Performed by: INTERNAL MEDICINE

## 2018-09-13 PROCEDURE — 80053 COMPREHEN METABOLIC PANEL: CPT

## 2018-09-13 PROCEDURE — 85025 COMPLETE CBC W/AUTO DIFF WBC: CPT

## 2018-09-13 PROCEDURE — 36415 COLL VENOUS BLD VENIPUNCTURE: CPT

## 2018-09-13 RX ORDER — SODIUM CHLORIDE 9 MG/ML
INJECTION, SOLUTION INTRAVENOUS ONCE
Status: COMPLETED | OUTPATIENT
Start: 2018-09-13 | End: 2018-09-13

## 2018-09-13 RX ORDER — HEPARIN SODIUM (PORCINE) LOCK FLUSH IV SOLN 100 UNIT/ML 100 UNIT/ML
500 SOLUTION INTRAVENOUS PRN
Status: DISCONTINUED | OUTPATIENT
Start: 2018-09-13 | End: 2018-09-14 | Stop reason: HOSPADM

## 2018-09-13 RX ORDER — SODIUM CHLORIDE 9 MG/ML
INJECTION, SOLUTION INTRAVENOUS CONTINUOUS
Status: CANCELLED | OUTPATIENT
Start: 2018-09-13

## 2018-09-13 RX ORDER — MEPERIDINE HYDROCHLORIDE 50 MG/ML
12.5 INJECTION INTRAMUSCULAR; INTRAVENOUS; SUBCUTANEOUS ONCE
Status: CANCELLED | OUTPATIENT
Start: 2018-09-13 | End: 2018-09-13

## 2018-09-13 RX ORDER — METHYLPREDNISOLONE SODIUM SUCCINATE 125 MG/2ML
125 INJECTION, POWDER, LYOPHILIZED, FOR SOLUTION INTRAMUSCULAR; INTRAVENOUS ONCE
Status: CANCELLED | OUTPATIENT
Start: 2018-09-13 | End: 2018-09-13

## 2018-09-13 RX ORDER — SODIUM CHLORIDE 0.9 % (FLUSH) 0.9 %
10 SYRINGE (ML) INJECTION PRN
Status: CANCELLED | OUTPATIENT
Start: 2018-09-13

## 2018-09-13 RX ORDER — DIPHENHYDRAMINE HYDROCHLORIDE 50 MG/ML
50 INJECTION INTRAMUSCULAR; INTRAVENOUS ONCE
Status: CANCELLED | OUTPATIENT
Start: 2018-09-13

## 2018-09-13 RX ORDER — SODIUM CHLORIDE 9 MG/ML
INJECTION, SOLUTION INTRAVENOUS ONCE
Status: CANCELLED | OUTPATIENT
Start: 2018-09-13 | End: 2018-09-13

## 2018-09-13 RX ORDER — HEPARIN SODIUM (PORCINE) LOCK FLUSH IV SOLN 100 UNIT/ML 100 UNIT/ML
500 SOLUTION INTRAVENOUS PRN
Status: CANCELLED | OUTPATIENT
Start: 2018-09-13

## 2018-09-13 RX ORDER — SODIUM CHLORIDE 0.9 % (FLUSH) 0.9 %
5 SYRINGE (ML) INJECTION PRN
Status: CANCELLED | OUTPATIENT
Start: 2018-09-13

## 2018-09-13 RX ORDER — 0.9 % SODIUM CHLORIDE 0.9 %
10 VIAL (ML) INJECTION ONCE
Status: CANCELLED | OUTPATIENT
Start: 2018-09-13

## 2018-09-13 RX ORDER — DIPHENHYDRAMINE HYDROCHLORIDE 50 MG/ML
50 INJECTION INTRAMUSCULAR; INTRAVENOUS ONCE
Status: CANCELLED | OUTPATIENT
Start: 2018-09-13 | End: 2018-09-13

## 2018-09-13 RX ORDER — 0.9 % SODIUM CHLORIDE 0.9 %
10 VIAL (ML) INJECTION ONCE
Status: CANCELLED | OUTPATIENT
Start: 2018-09-13 | End: 2018-09-13

## 2018-09-13 RX ORDER — SODIUM CHLORIDE 0.9 % (FLUSH) 0.9 %
10 SYRINGE (ML) INJECTION PRN
Status: DISCONTINUED | OUTPATIENT
Start: 2018-09-13 | End: 2018-09-14 | Stop reason: HOSPADM

## 2018-09-13 RX ORDER — 0.9 % SODIUM CHLORIDE 0.9 %
1000 INTRAVENOUS SOLUTION INTRAVENOUS ONCE
Status: CANCELLED
Start: 2018-09-13 | End: 2018-09-13

## 2018-09-13 RX ADMIN — SODIUM CHLORIDE: 9 INJECTION, SOLUTION INTRAVENOUS at 09:56

## 2018-09-13 RX ADMIN — Medication 10 ML: at 09:25

## 2018-09-13 RX ADMIN — HEPARIN SODIUM (PORCINE) LOCK FLUSH IV SOLN 100 UNIT/ML 500 UNITS: 100 SOLUTION at 11:10

## 2018-09-13 RX ADMIN — HEPARIN SODIUM (PORCINE) LOCK FLUSH IV SOLN 100 UNIT/ML 500 UNITS: 100 SOLUTION at 09:56

## 2018-09-13 RX ADMIN — TRASTUZUMAB 750 MG: 150 INJECTION, POWDER, LYOPHILIZED, FOR SOLUTION INTRAVENOUS at 10:32

## 2018-09-13 RX ADMIN — Medication 10 ML: at 09:24

## 2018-09-13 RX ADMIN — Medication 10 ML: at 09:23

## 2018-09-13 RX ADMIN — ALTEPLASE 2 MG: 2.2 INJECTION, POWDER, LYOPHILIZED, FOR SOLUTION INTRAVENOUS at 09:10

## 2018-09-13 RX ADMIN — Medication 10 ML: at 09:22

## 2018-09-13 ASSESSMENT — PAIN SCALES - GENERAL: PAINLEVEL_OUTOF10: 0

## 2018-09-13 NOTE — PROGRESS NOTES
Got blood return from distal port site>  IV infusing NSS through distal port site. Medial port site flushed with 5 ml of heparin flush had been flushed with 10 ml of saline per Israel Frost.

## 2018-09-13 NOTE — PROGRESS NOTES
Spoke with  regarding with patient lab results and patients symptoms. OK to proceed with treatment.  would like patient to be seen by Ge Liriano before October for her eye twitching and vision loss. Appointment moved to Sept 21st at 1530. Patient aware.

## 2018-09-13 NOTE — PROGRESS NOTES
Herceptin infused and d/c'd. Mediport flushed with 30 ml NSS and 5 ml heparin. Patient tolerated infusion without any difficulty. Guerrero needles d/c'd, pressure dressing applied to sites. Patient denies any complaints. New AVS given and patient discharged to home.

## 2018-09-13 NOTE — PROGRESS NOTES
Patient at [facility] for chemotherapy. Both proximal and distal Dual mediport accessed  with 20 gauge 3/4\" mora needles. Both ports flush easily but negative for blood return. Activase given in distal port. Patient states that she has been feeling pretty good, but states that her left eye has got worse and is twitching more. States that even a couple days she lost vision in her left eye. Patient has appointment with Maverick Mac on 67/01/91 and  9/26/18. Patient states she will discuss this with the doctors at her appointments.

## 2018-09-18 RX ORDER — TRAMADOL HYDROCHLORIDE 50 MG/1
50 TABLET ORAL EVERY 6 HOURS PRN
Qty: 40 TABLET | Refills: 0 | Status: SHIPPED | OUTPATIENT
Start: 2018-09-18 | End: 2018-10-15 | Stop reason: SDUPTHER

## 2018-09-21 ENCOUNTER — OFFICE VISIT (OUTPATIENT)
Dept: NEUROLOGY | Age: 53
End: 2018-09-21
Payer: MEDICARE

## 2018-09-21 VITALS
SYSTOLIC BLOOD PRESSURE: 132 MMHG | BODY MASS INDEX: 44 KG/M2 | WEIGHT: 273.8 LBS | HEIGHT: 66 IN | DIASTOLIC BLOOD PRESSURE: 88 MMHG | HEART RATE: 88 BPM | RESPIRATION RATE: 14 BRPM

## 2018-09-21 DIAGNOSIS — G24.5 BLEPHAROSPASM: Primary | ICD-10-CM

## 2018-09-21 DIAGNOSIS — G47.9 SLEEP DIFFICULTIES: ICD-10-CM

## 2018-09-21 DIAGNOSIS — R41.3 MEMORY PROBLEM: ICD-10-CM

## 2018-09-21 DIAGNOSIS — C50.811 MALIGNANT NEOPLASM OF OVERLAPPING SITES OF RIGHT BREAST IN FEMALE, ESTROGEN RECEPTOR NEGATIVE (HCC): ICD-10-CM

## 2018-09-21 DIAGNOSIS — Z95.828 PORT CATHETER IN PLACE: ICD-10-CM

## 2018-09-21 DIAGNOSIS — Z17.1 MALIGNANT NEOPLASM OF OVERLAPPING SITES OF RIGHT BREAST IN FEMALE, ESTROGEN RECEPTOR NEGATIVE (HCC): ICD-10-CM

## 2018-09-21 DIAGNOSIS — R25.3 MUSCLE TWITCHING: ICD-10-CM

## 2018-09-21 DIAGNOSIS — G43.009 MIGRAINE WITHOUT AURA AND WITHOUT STATUS MIGRAINOSUS, NOT INTRACTABLE: ICD-10-CM

## 2018-09-21 DIAGNOSIS — E78.49 OTHER HYPERLIPIDEMIA: ICD-10-CM

## 2018-09-21 DIAGNOSIS — F31.9 BIPOLAR 1 DISORDER (HCC): ICD-10-CM

## 2018-09-21 DIAGNOSIS — C50.011 MALIGNANT NEOPLASM OF AREOLA OF RIGHT BREAST IN FEMALE, UNSPECIFIED ESTROGEN RECEPTOR STATUS (HCC): ICD-10-CM

## 2018-09-21 DIAGNOSIS — F32.A ANXIETY AND DEPRESSION: ICD-10-CM

## 2018-09-21 DIAGNOSIS — F41.9 ANXIETY AND DEPRESSION: ICD-10-CM

## 2018-09-21 PROCEDURE — 1036F TOBACCO NON-USER: CPT | Performed by: PSYCHIATRY & NEUROLOGY

## 2018-09-21 PROCEDURE — G8417 CALC BMI ABV UP PARAM F/U: HCPCS | Performed by: PSYCHIATRY & NEUROLOGY

## 2018-09-21 PROCEDURE — 99214 OFFICE O/P EST MOD 30 MIN: CPT | Performed by: PSYCHIATRY & NEUROLOGY

## 2018-09-21 PROCEDURE — G8427 DOCREV CUR MEDS BY ELIG CLIN: HCPCS | Performed by: PSYCHIATRY & NEUROLOGY

## 2018-09-21 PROCEDURE — 3017F COLORECTAL CA SCREEN DOC REV: CPT | Performed by: PSYCHIATRY & NEUROLOGY

## 2018-09-21 RX ORDER — CLONAZEPAM 0.5 MG/1
TABLET ORAL
Qty: 60 TABLET | Refills: 2 | Status: SHIPPED | OUTPATIENT
Start: 2018-09-21 | End: 2019-02-22

## 2018-09-21 ASSESSMENT — ENCOUNTER SYMPTOMS
TROUBLE SWALLOWING: 0
BOWEL INCONTINENCE: 0
CHEST TIGHTNESS: 0
BLOOD IN STOOL: 0
CHOKING: 0
DIARRHEA: 0
VOICE CHANGE: 0
EYE ITCHING: 0
FACIAL SWELLING: 0
PHOTOPHOBIA: 0
ABDOMINAL DISTENTION: 0
EYE DISCHARGE: 0
COLOR CHANGE: 0
EYE REDNESS: 0
EYE PAIN: 0
BACK PAIN: 0
VISUAL CHANGE: 0
APNEA: 0
ABDOMINAL PAIN: 0
SORE THROAT: 0
NAUSEA: 0
VOMITING: 0
WHEEZING: 0
SINUS PRESSURE: 0
CONSTIPATION: 0
COUGH: 0
SHORTNESS OF BREATH: 0

## 2018-09-21 NOTE — PROGRESS NOTES
ADDITIONAL  MEDICATIONS                                     OR  INJECTION  BOTOX                        15)    LEFT  EYE    WATERING    AND  INCREASED  EYE  TWITCHING                                PATIENT   SEE   EYE  DOCTORS  ASAP       FOR   LOCAL  ETIOLOGIES                             CONTRIBUTING  TO  THE SAME. DISCUSSED WITH  PATIENT  IN  DETAIL.                                                  PRECIPITATING  FACTORS: including  fever/infection, exertion/relaxation, position change, stress, weather change, medications/alcohol, time of day/darkness/light  Are    absent                                                             MODIFYING  FACTORS:  fever/infection, exertion/relaxation, position change, stress, weather change, medications/alcohol, time of day/darkness/light     Are  absent         Patient   Indicates   The  Presence   And  The  Absence  Of  The  Following  Associated  And   Additional  Neurological    Symptoms:                                Balance  And coordination problems  absent           Gait problems     absent            Headaches      absent              Migraines           present           Memory problems        present           Confusion        absent            Paresthesia numbness          absent           Seizures  And  Starring  Episodes           absent           Syncope,  Near  syncopal episodes         absent           Speech problems           absent             Swallowing  Problems      absent            Dizziness,  Light headedness           absent                        Vertigo        absent             Generalized   Weakness    absent              focal  Weakness     absent             Tremors         absent              Sleep  Problems     absent             History  Of   Recent   Head  Injury     absent             History  Of   Recent  TIA     absent             History  Of   Recent    Stroke     absent             Neck  Pain and Take 10 mg by mouth daily       lamoTRIgine (LAMICTAL) 100 MG tablet 50 mg nightly       lisinopril (PRINIVIL;ZESTRIL) 20 MG tablet 5 mg daily       loratadine (CLARITIN) 10 MG tablet Take 10 mg by mouth daily       pravastatin (PRAVACHOL) 40 MG tablet Take 40 mg by mouth daily       QUEtiapine (SEROQUEL) 300 MG tablet Take 150 mg by mouth daily Pt taking 100mg during day, 50 mg tablet at night      traZODone (DESYREL) 100 MG tablet Take 50 mg by mouth nightly       letrozole (FEMARA) 2.5 MG tablet Take 1 tablet by mouth daily 30 tablet 5     No current facility-administered medications for this visit. No Known Allergies      Family History   Problem Relation Age of Onset    Breast Cancer Sister 54    Breast Cancer Maternal Aunt 71    Other Maternal Cousin         Atypical Ductal Hyperplasia     Uterine Cancer Sister 32    Other Sister         breast cyst    Cataracts Mother     Glaucoma Mother     Diabetes Neg Hx          Social History     Social History    Marital status: Single     Spouse name: N/A    Number of children: N/A    Years of education: N/A     Occupational History    Not on file. Social History Main Topics    Smoking status: Never Smoker    Smokeless tobacco: Never Used      Comment: Never smoker.  TC, RRT 4/5/18    Alcohol use No    Drug use: No    Sexual activity: Yes     Partners: Male     Birth control/ protection: Surgical     Other Topics Concern    Not on file     Social History Narrative    No narrative on file       Vitals:    09/21/18 1552   BP: 132/88   Pulse: 88   Resp: 14         Wt Readings from Last 3 Encounters:   09/21/18 273 lb 12.8 oz (124.2 kg)   09/13/18 274 lb 6.4 oz (124.5 kg)   08/23/18 272 lb (123.4 kg)         BP Readings from Last 3 Encounters:   09/21/18 132/88   09/13/18 (!) 142/69   08/23/18 127/60       Hematology and Coagulation  Lab Results   Component Value Date    WBC 6.4 09/13/2018    RBC 3.36 09/13/2018    HGB 11.1 09/13/2018 cold intolerance, heat intolerance, polydipsia, polyphagia and polyuria. Genitourinary: Negative for bladder incontinence. Musculoskeletal: Negative for arthralgias, back pain, gait problem, joint swelling, myalgias, neck pain and neck stiffness. Skin: Negative for color change, pallor, rash and wound. Allergic/Immunologic: Negative for environmental allergies, food allergies and immunocompromised state. Neurological: Positive for headaches. Negative for dizziness, vertigo, tremors, focal weakness, seizures, syncope, facial asymmetry, speech difficulty, weakness, light-headedness, numbness and loss of balance. Hematological: Negative for adenopathy. Does not bruise/bleed easily. Psychiatric/Behavioral: Positive for decreased concentration and memory loss. Negative for agitation, behavioral problems, confusion, dysphoric mood, hallucinations, self-injury, sleep disturbance and suicidal ideas. The patient is nervous/anxious. The patient is not hyperactive. OBJECTIVE:    Physical Exam   Constitutional: She appears well-developed and well-nourished. She is cooperative. HENT:   Head: Normocephalic and atraumatic. Head is without raccoon's eyes and without Varma's sign. Right Ear: External ear normal.   Left Ear: External ear normal.   Nose: Nose normal.   Mouth/Throat: Oropharynx is clear and moist.   Eyes: Conjunctivae are normal.   Neck: Normal range of motion. Neck supple. No muscular tenderness present. Carotid bruit is not present. No neck rigidity. No tracheal deviation and normal range of motion present. No Brudzinski's sign and no Kernig's sign noted. No thyroid mass and no thyromegaly present. Cardiovascular: Normal rate and regular rhythm. Pulmonary/Chest: Effort normal.   Musculoskeletal: She exhibits no edema or tenderness. Skin: Skin is warm. No rash noted. No cyanosis or erythema. No pallor. Nails show no clubbing. Psychiatric: Her mood appears anxious.  Her affect is not tongue   Fasciculations or atrophy     C) MOTOR  EXAM:                 Strength  In upper  And  Lower extremities   within normal limits             No  Drift. No  Atrophy             Rapid alternating  And  repetitions  Movements  within normal limits               Muscle  Tone  In upper  And  Lower  Extremities  normal              No rigidity. No  Spasticity. Bradykinesia   absent               No  Asterixis. Sustention  Tremor , Resting  Tremor   absent                    LEFT  EYE  LIDS   SHOWS   BLEPHAROSPASM   INTERMITTENTLY                   D) SENSORY :             light touch, pinprick, position  And  Vibration  within normal limits      E) REFLEXES:                 Deep  Tendon  Reflexes normal                  No pathological  Reflexes  Bilaterally.                                 F) COORDINATION  AND  GAIT :                              Station and  Gait  normal                                      Romberg's test negative                        Ataxia negative      ASSESSMENT:    Patient Active Problem List   Diagnosis    Bipolar 1 disorder ([address]    Hyperlipidemia    Malignant neoplasm of overlapping sites of breast in female, estrogen receptor negative ([address]    Port catheter in place    Nausea and vomiting    Nausea with vomiting    Intractable vomiting with nausea    Migraine    Memory problem    Sleep difficulties    Anxiety and depression    Muscle twitching    Blepharospasm        MRI OF THE BRAIN WITHOUT AND WITH CONTRAST  2/5/2018 9:18 am       TECHNIQUE:   Multiplanar multisequence MRI of the head/brain was performed without and   with the administration of intravenous contrast.       COMPARISON:   Head CT on 09/25/2013.       HISTORY:   ORDERING SYSTEM PROVIDED HISTORY: Malignant neoplasm of overlapping sites of   right breast in female, estrogen receptor negative ([address]   TECHNOLOGIST PROVIDED HISTORY:   Ordering Physician Provided Reason for Exam:  Bad headaches for one month. Left eye twitching for two months and it is getting worse. History of breast   cancer. Acuity: Acute   Type of Exam: Initial   Additional signs and symptoms: Malignant neoplasm of overlapping sites of   right breast in female, estrogen receptor negative.       Initial evaluation.       FINDINGS:   INTRACRANIAL STRUCTURES/VENTRICLES: Chelsea Scarlet are no areas of restricted   diffusion to suggest an acute infarct.  The cerebral and cerebellar   parenchyma demonstrate normal volume and signal intensity.  There are no   abnormal extra-axial fluid collections.  The ventricles are normal in size. Normal major intracranial flow voids are noted.       There are no areas of abnormal contrast enhancement in the cerebral or   cerebellar parenchyma to suggest metastatic disease.       There are no areas of blooming artifact noted on the gradient echo sequences   to suggest sequela of acute or chronic hemorrhage.       ORBITS: The visualized portion of the orbits demonstrate no acute abnormality.       SINUSES: There is scattered mucosal thickening in the paranasal sinuses, with   greatest involvement in the ethmoid air cells and maxillary sinuses.  There   is an air-fluid level in the left sphenoid sinus, correlate with signs of   acute sinusitis.       The mastoid air cells are clear.       BONES/SOFT TISSUES: The bone marrow signal intensity appears normal. The soft   tissues demonstrate no acute abnormality.           Impression   1. Unremarkable MRI of the brain.  No evidence of metastatic disease. 2. Acute left sphenoid sinusitis.             VISITING DIAGNOSIS:      ICD-10-CM ICD-9-CM    1. Blepharospasm G24.5 333.81    2. Migraine without aura and without status migrainosus, not intractable G43.009 346.10 clonazePAM (KLONOPIN) 0.5 MG tablet   3. Memory problem R41.3 780.93 clonazePAM (KLONOPIN) 0.5 MG tablet   4. Sleep difficulties G47.9 780.50 clonazePAM (KLONOPIN) 0.5 MG tablet   5.  Bipolar 1 Problems. *   ADEQUATE   FLUID  INTAKE   AND  ELECTROLYTE  BALANCE         * KEEP  DAIRY  OF   THE  NEUROLOGICAL  SYMPTOMS      RECORDING THE    DURATION  AND  FREQUENCY. *  AVOID    CONDITIONS  AND  FACTORS   THAT  MAKE   NEUROLOGICAL  SYMPTOMS  WORSE. *  TO  MAINTAIN  REGULAR  SLEEP  WAKE  CYCLES. *   TO  HAVE  ADEQUATE  REST  AND   SLEEP    HOURS.          *    TO   AVOID   TO  SLEEP  IN   SUPINE  POSITION. *      WEIGHT   LOSS. *    AVOID  ANY USAGE OF                 TOBACCO,  EXCESSIVE  ALCOHOL  AND   ILLEGAL   SUBSTANCES      *  CONTINUE MEDICATIONS PRESCRIBED BY NEUROLOGIST AS    RECOMMENDED     *   Compliance   With  Medications   And  Instructions      * CURRENTLY  TOLERATING  THE  PRESCRIBED   MEDICATIONS. WITHOUT  ANY  SIGNIFICANT  SIDE  EFFECTS   &  GETTING BENEFIT. *    Prophylactic  Use   Of     Vitamin   B   Complex,  Folic  Acid,    Vitamin  B12    Multivitamin,   Calcium  With  magnesium  And  Vit D    Supplementations   Over  The  Counter  Discussed            *  EVALUATIONS  AND  FOLLOW UP:    IF  PATIENT  IS  WILLING                        * HEMATOLOGY/ONCOLOGY                    *   OPTHALMOLOGY                     *  PATIENT      ON  KLONOPIN       FOR  LEFT  BLEPHAROSPASM   SINCE    June 2018                                   -     IMPROVED  MORE  THAN   50 %   SUBJECTIVELY. *         PATIENT  NOT  INTERESTED  IN   ADDITIONAL  MEDICATIONS                                     OR  INJECTION  BOTOX                   *     LEFT  EYE    WATERING    AND  INCREASED  EYE  TWITCHING                                PATIENT   SEE   EYE  DOCTORS  ASAP       FOR   LOCAL  ETIOLOGIES                             CONTRIBUTING  TO  THE SAME. DISCUSSED WITH  PATIENT  IN  DETAIL. Controlled Substances Monitoring: Attestation: The Prescription Monitoring Report for this patient was reviewed today. Candy Holstein, MD)  Documentation: Possible medication side effects, risk of tolerance/dependence & alternative treatments discussed. Candy Holstein, MD)            Orders Placed This Encounter   Medications    clonazePAM (KLONOPIN) 0.5 MG tablet     Sig: ONE  TABLET  TWICE  DAILY  AS  NEEDED. Dispense:  60 tablet     Refill:  2               *PATIENT   TO  FOLLOW  UP  WITH   PRIMARY  CARE   AND   OTHER  CONSULTANTS  AS  BEFORE.           *TO  FOLLOW  WITH   MENTAL  HEALTH  PROFESSIONALS ,  INCLUDING          PSYCHOLOGICAL  COUNSELING   AND  PSYCHIATRIC  EVALUTIONS        *  Maintain   Healthy  Life Style    With   Periodic  Monitoring  Of    Any  Medical  Conditions  Including   Elevated  Blood  Pressure,  Lipid  Profile,   Blood  Sugar levels  And   Heart  Disease. *   Period   Screening  For  Cancers  Involving  Breast,  Colon,  lungs  And  Other  Organs  As  Applicable,  In consultation   With  Your  Primary Care Providers. * Second  Neurological  Opinion  And  Evaluations  In  [facility]  Setting  If  Patient  Is  Interested. * Please   Contact   Neurology  Clinic   Early   If   Are  Any  New  Neurological                           Symptoms   And  Any neurological  Concerns. *  If  The  Patient remains  Neurologically  Stable   Return   To  [facility] Neurology Department   IN    2-3     MONTHS  TIME   FOR  FURTHER  FOLLOW UP.                 *  If   There is  Any  Significant  Worsening   Of  Current  Symptoms  And  Or  If patient  Develops   Any additional  New  Neurological  Symptoms  Or  Significant  Concerns   Should  Call  911 or  Go  To  Emergency  Department  For  Further  Immediate  Evaluation. *   The  Neurological   Findings,  Possible  Diagnosis,  Differential diagnoses   And  Options  For    Further   Investigations   And  management   Are  Discussed  Comprehensively.      Medications   And healthy foods. This includes fruits, vegetables, lean meats and dairy, and whole grains. Include your family in your fitness plan. Most people think of activities such as jogging or tennis as the way to fitness, but there are many ways you and your family can be more active. Anything that makes you breathe hard and gets your heart pumping is exercise. Here are some tips:  Walk to do errands or to take your child to school or the bus. Go for a family bike ride after dinner instead of watching TV. Where can you learn more? Go to https://AudioTagpePinkelStareb.PaintZen. org and sign in to your [employer] account. Enter A630 in the Liberata box to learn more about \"A Healthy Lifestyle: Care Instructions. \"     If you do not have an account, please click on the \"Sign Up Now\" link. Current as of: July 26, 2016  Content Version: 11.2  © 5231-8435 [employer]. Care instructions adapted under license by [facility] ([facility]). If you have questions about a medical condition or this instruction, always ask your healthcare professional. [place] [address] any warranty or liability for your use of this information.

## 2018-09-26 ENCOUNTER — OFFICE VISIT (OUTPATIENT)
Dept: ONCOLOGY | Age: 53
End: 2018-09-26
Payer: MEDICARE

## 2018-09-26 VITALS
WEIGHT: 274.2 LBS | HEART RATE: 106 BPM | OXYGEN SATURATION: 97 % | HEIGHT: 66 IN | SYSTOLIC BLOOD PRESSURE: 132 MMHG | TEMPERATURE: 98.4 F | DIASTOLIC BLOOD PRESSURE: 80 MMHG | BODY MASS INDEX: 44.07 KG/M2

## 2018-09-26 DIAGNOSIS — R53.83 OTHER FATIGUE: ICD-10-CM

## 2018-09-26 DIAGNOSIS — Z17.1 MALIGNANT NEOPLASM OF OVERLAPPING SITES OF RIGHT BREAST IN FEMALE, ESTROGEN RECEPTOR NEGATIVE (HCC): Primary | ICD-10-CM

## 2018-09-26 DIAGNOSIS — R68.89 OTHER GENERAL SYMPTOMS AND SIGNS: ICD-10-CM

## 2018-09-26 DIAGNOSIS — Z12.31 ENCOUNTER FOR SCREENING MAMMOGRAM FOR MALIGNANT NEOPLASM OF BREAST: ICD-10-CM

## 2018-09-26 DIAGNOSIS — C50.811 MALIGNANT NEOPLASM OF OVERLAPPING SITES OF RIGHT BREAST IN FEMALE, ESTROGEN RECEPTOR NEGATIVE (HCC): Primary | ICD-10-CM

## 2018-09-26 DIAGNOSIS — R89.2 DRUG TOXICITY: ICD-10-CM

## 2018-09-26 PROCEDURE — G8417 CALC BMI ABV UP PARAM F/U: HCPCS | Performed by: INTERNAL MEDICINE

## 2018-09-26 PROCEDURE — 3017F COLORECTAL CA SCREEN DOC REV: CPT | Performed by: INTERNAL MEDICINE

## 2018-09-26 PROCEDURE — 1036F TOBACCO NON-USER: CPT | Performed by: INTERNAL MEDICINE

## 2018-09-26 PROCEDURE — G8427 DOCREV CUR MEDS BY ELIG CLIN: HCPCS | Performed by: INTERNAL MEDICINE

## 2018-09-26 PROCEDURE — 99214 OFFICE O/P EST MOD 30 MIN: CPT | Performed by: INTERNAL MEDICINE

## 2018-09-26 RX ORDER — 0.9 % SODIUM CHLORIDE 0.9 %
10 VIAL (ML) INJECTION ONCE
Status: CANCELLED | OUTPATIENT
Start: 2018-10-04

## 2018-09-26 RX ORDER — DIPHENHYDRAMINE HYDROCHLORIDE 50 MG/ML
50 INJECTION INTRAMUSCULAR; INTRAVENOUS ONCE
Status: CANCELLED | OUTPATIENT
Start: 2018-10-04

## 2018-09-26 RX ORDER — HEPARIN SODIUM (PORCINE) LOCK FLUSH IV SOLN 100 UNIT/ML 100 UNIT/ML
500 SOLUTION INTRAVENOUS PRN
Status: CANCELLED | OUTPATIENT
Start: 2018-10-04

## 2018-09-26 RX ORDER — DIPHENHYDRAMINE HYDROCHLORIDE 50 MG/ML
50 INJECTION INTRAMUSCULAR; INTRAVENOUS ONCE
Status: CANCELLED | OUTPATIENT
Start: 2018-10-04 | End: 2018-10-04

## 2018-09-26 RX ORDER — SODIUM CHLORIDE 9 MG/ML
INJECTION, SOLUTION INTRAVENOUS CONTINUOUS
Status: CANCELLED | OUTPATIENT
Start: 2018-10-04

## 2018-09-26 RX ORDER — SODIUM CHLORIDE 9 MG/ML
INJECTION, SOLUTION INTRAVENOUS ONCE
Status: CANCELLED | OUTPATIENT
Start: 2018-10-04 | End: 2018-10-04

## 2018-09-26 RX ORDER — MEPERIDINE HYDROCHLORIDE 50 MG/ML
12.5 INJECTION INTRAMUSCULAR; INTRAVENOUS; SUBCUTANEOUS ONCE
Status: CANCELLED | OUTPATIENT
Start: 2018-10-04 | End: 2018-10-04

## 2018-09-26 RX ORDER — SODIUM CHLORIDE 0.9 % (FLUSH) 0.9 %
5 SYRINGE (ML) INJECTION PRN
Status: CANCELLED | OUTPATIENT
Start: 2018-10-04

## 2018-09-26 RX ORDER — SODIUM CHLORIDE 0.9 % (FLUSH) 0.9 %
10 SYRINGE (ML) INJECTION PRN
Status: CANCELLED | OUTPATIENT
Start: 2018-10-04

## 2018-09-26 RX ORDER — METHYLPREDNISOLONE SODIUM SUCCINATE 125 MG/2ML
125 INJECTION, POWDER, LYOPHILIZED, FOR SOLUTION INTRAMUSCULAR; INTRAVENOUS ONCE
Status: CANCELLED | OUTPATIENT
Start: 2018-10-04 | End: 2018-10-04

## 2018-09-26 RX ORDER — 0.9 % SODIUM CHLORIDE 0.9 %
10 VIAL (ML) INJECTION ONCE
Status: CANCELLED | OUTPATIENT
Start: 2018-10-04 | End: 2018-10-04

## 2018-09-26 NOTE — PROGRESS NOTES
Duarte Short                                                                                                                  9/26/2018  MRN:   [id number]  YOB: 1965  PCP:                           MAGGIE Mcleod CNP  Referring Physician: No ref. provider found  Treating Physician Name: Kay Smyth MD      Reason for visit:  Patient presents to the clinic  for toxicity check and to review further treatment plan    Current problems/ Active and recent treatments:  Stage IIa infiltrating ductal carcinoma of right breast, ER negative, OH positive and HER-2 amplified. Strong family history of breast cancer in sister and maternal aunt  Grade 2 diarrhea, nonbloody    Summary of Case/History:    Duarte Short a 48 y. o.female who presents to the hematology oncology office to establish care and for further recommendations for management of her recently diagnosed right breast cancer    Patient had a mammogram after many years in September 2017 which came back abnormal.  Subsequently patient had an ultrasound which confirmed a 1.2 cm lesion in the right breast at 1:00 position. There was another 4 mm lesion at 12:00 position    Patient underwent ultrasound-guided biopsy on 9/8/17. the lesion at 1:00 position shows invasive ductal carcinoma. ER negative and OH positive associated with high-grade DCIS. Lesion at 12:00 position showed fibrocystic disease and focal adenosis and hyperplasia. Patient underwent right sided mastectomy with sentinel lymph node biopsy on 10/19/17. Pathology report showed invasive ductal carcinoma, grade 3 out of 3, 2.8 cm in size with negative margins. pT2,pN0,M0  Tumor specimen was negative for ER receptor and weekly positive for OH receptor (5-10 percent). High-grade DCIS was also noted. One sentinel lymph node was sampled which was negative for metastasis    Patient started on neoadjuvant chemotherapy using TCHP regimen.     Cycle #1, day #1 on 11/13/17    Patient underwent removal of port with replacement due to port malfunction on 11/30/17    Patient completed 6 cycles of TCHP and continued maintenance Herceptin. Started patient on anastrozole along with calcium and vitamin D in May 2018. Switched anti-hormonal therapy to Femara in June 2018 due to arthralgia    Interim History:    Patient presents to the clinic for a follow-up visit and for toxicity check and to review results of her blood workup. Patient complains of chest discomfort on and off. Discomfort is relieved by tramadol. It is not associated with exertion. Denies nausea vomiting fever chills. Patient has not had echocardiogram done since June 2018. She is also due for a mammogram for her left breast.  As ER visits or hospitalization. Continues to complain of twitching of left eye. During this visit patient's allergy, social, medical, surgical history and medications were reviewed and updated.     Past Medical History:   Past Medical History:   Diagnosis Date    Bipolar 1 disorder ([address]     Breast cancer [address] [address] 2017    right side     Headache(784.0)     Hyperlipidemia     Migraine        Past Surgical History:     Past Surgical History:   Procedure Laterality Date    DILATION AND CURETTAGE OF UTERUS N/A 10/13/2017    D & C HYSTEROSCOPY with polypectomy performed by Wolfgang Norman MD at [address] / REMOVAL / [facility] [address] Left 11/9/2017    PORT INSERTION performed by Cheli Castillo MD at [facility] N/A 11/30/2017    Port Removal & Insertion performed by Cheli Castillo MD at [address] Right 10/19/2017     [facility]    MASTECTOMY Right 10/19/2017    Right Breast Mastectomy with  [place] Node Biopsy performed by Cheli Castillo MD at [address] [facility] VAD W/SUBQ PORT AGE 5 YR/> Left 3/1/2018    PORT Exchange performed by Cheli Castillo MD at [facility] OR    SHOULDER SURGERY Left 2014, 2015    x 2 surgeries total; Dr. Erna Palomares    TUNNELED VENOUS PORT PLACEMENT Left 11/30/2017    removal of et replacement of       Patient Family Social History:     Family History   Problem Relation Age of Onset    Breast Cancer Sister 54    Breast Cancer Maternal Aunt 71    Other Maternal Cousin         Atypical Ductal Hyperplasia     Uterine Cancer Sister 32    Other Sister         breast cyst    Cataracts Mother     Glaucoma Mother     Diabetes Neg Hx       Social History     Social History    Marital status: Single     Spouse name: N/A    Number of children: N/A    Years of education: N/A     Occupational History    Not on file. Social History Main Topics    Smoking status: Never Smoker    Smokeless tobacco: Never Used      Comment: Never smoker. TC, RRT 4/5/18    Alcohol use No    Drug use: No    Sexual activity: Yes     Partners: Male     Birth control/ protection: Surgical     Other Topics Concern    Not on file     Social History Narrative    No narrative on file      Current Medications:     Current Outpatient Prescriptions   Medication Sig Dispense Refill    clonazePAM (KLONOPIN) 0.5 MG tablet ONE  TABLET  TWICE  DAILY  AS  NEEDED. 60 tablet 2    traMADol (ULTRAM) 50 MG tablet Take 1 tablet by mouth every 6 hours as needed for Pain for up to 30 days. . 40 tablet 0    potassium chloride (KLOR-CON M) 20 MEQ extended release tablet take 1 tablet by mouth once daily 30 tablet 1    RA VITAMIN B-12 TR 1000 MCG TBCR take 1 tablet by mouth once daily 30 tablet 3    folic acid (FOLVITE) 1 MG tablet take 1 tablet by mouth once daily 30 tablet 3    Cetirizine HCl (ZYRTEC ALLERGY) 10 MG CAPS Take by mouth      Calcium Carbonate-Vitamin D (OYSTER SHELL CALCIUM/D) 500-200 MG-UNIT TABS Take 1 tablet by mouth 2 times daily 60 tablet 5    nystatin (MYCOSTATIN) 940597 UNIT/ML suspension Take 5 mLs by mouth 4 times daily 1 Bottle 0    lidocaine-prilocaine (EMLA) 2.5-2.5 % cream Apply topically as needed. 1 Tube 1    ondansetron (ZOFRAN) 4 MG tablet Take 2 tablets by mouth every 6 hours as needed for Nausea or Vomiting 40 tablet 1    sodium chloride (ALTAMIST SPRAY) 0.65 % nasal spray 1 spray by Nasal route as needed for Congestion 1 Bottle 1    lactobacillus (CULTURELLE) CAPS capsule Take 1 capsule by mouth 3 times daily 90 capsule 0    prochlorperazine (COMPAZINE) 10 MG tablet Take 10 mg by mouth every 6 hours as needed      VENTOLIN  (90 Base) MCG/ACT inhaler Inhale 2 puffs into the lungs every 4 hours as needed       butalbital-acetaminophen-caffeine (FIORICET, ESGIC) -40 MG per tablet Take 1-2 tablets by mouth every 4 hours as needed       fluticasone (FLONASE) 50 MCG/ACT nasal spray 1 spray by Nasal route Indications: as needed       hydrOXYzine (ATARAX) 10 MG tablet Take 10 mg by mouth daily       lamoTRIgine (LAMICTAL) 100 MG tablet 50 mg nightly       lisinopril (PRINIVIL;ZESTRIL) 20 MG tablet 5 mg daily       loratadine (CLARITIN) 10 MG tablet Take 10 mg by mouth daily       pravastatin (PRAVACHOL) 40 MG tablet Take 40 mg by mouth daily       QUEtiapine (SEROQUEL) 300 MG tablet Take 150 mg by mouth daily Pt taking 100mg during day, 50 mg tablet at night      traZODone (DESYREL) 100 MG tablet Take 50 mg by mouth nightly       letrozole (FEMARA) 2.5 MG tablet Take 1 tablet by mouth daily 30 tablet 5     No current facility-administered medications for this visit. Allergies:   Patient has no known allergies. Review of Systems:    Constitutional: Negative for fever or chills. No night sweats, weight is stable now  Eyes: No eye discharge, double vision, or eye pain   HEENT: negative for sore mouth, sore throat, hoarseness and voice change . Complains of twitching of left eye.   Respiratory: negative for cough , sputum, dyspnea, wheezing, hemoptysis, chest pain   Cardiovascular: negative for chest pain, dyspnea, palpitations, orthopnea, PND   Gastrointestinal: Positive for nausea, vomiting, constipation, abdominal pain, Dysphagia, hematemesis and hematochezia . Genitourinary: negative for frequency, dysuria, nocturia, urinary incontinence, and hematuria   Integument: negative for rash, skin lesions, bruises. Hematologic/Lymphatic: negative for easy bruising, bleeding, lymphadenopathy, or petechiae   Endocrine: negative for heat or cold intolerance,weight changes, change in bowel habits and hair loss   Musculoskeletal: negative for myalgias, arthralgias, pain, joint swelling,and bone pain . Positive for knee pain  Neurological: negative for headaches, dizziness, seizures, weakness, numbness      Physical Exam:    Vitals:  Ht 5' 6\" (1.676 m)   Wt 274 lb 3.2 oz (124.4 kg)   LMP 02/28/2013   BMI 44.26 kg/m²    General appearance - patient not in acute distress  Mental status - AAO X3  Eyes - pupils equal and reactive, extraocular eye movements intact  Mouth - mucous membranes moist, pharynx normal without lesions  Neck - supple, no significant adenopathy  Lymphatics - no palpable lymphadenopathy, no hepatosplenomegaly  Chest - clear to auscultation, no wheezes, rales or rhonchi, symmetric air entry. Port in place.     Heart - normal rate, regular rhythm, normal S1, S2, no murmurs  Abdomen - soft, nontender, nondistended, no masses or organomegaly  Neurological - alert, oriented, normal speech, no focal findings or movement disorder noted  Extremities - peripheral pulses normal, no pedal edema, no clubbing or cyanosis  Skin - normal coloration and turgor, no rashes, no suspicious skin lesions noted       DATA:    Results for orders placed or performed during the hospital encounter of 09/13/18   CBC Auto Differential   Result Value Ref Range    WBC 6.4 3.5 - 11.0 k/uL    RBC 3.36 (L) 4.0 - 5.2 m/uL    Hemoglobin 11.1 (L) 12.0 - 16.0 g/dL    Hematocrit 33.0 (L) 36 - 46 %    MCV 98.3 80 - 100 fL    MCH 32.9 26 - 34 pg    MCHC 33.5 31 - weight. Reiterated the potential side effects of Herceptin and aromatase inhibitor  NCCN guidelines were reviewed and discussed with the patient. The diagnosis and care plan were discussed with the patient in detail. I discussed the natural history of the disease, prognosis, risks and goals of therapy and answered all the patients questions to the best of my ability. Patient expressed understanding and was in agreement. Eloisa Casas      I spent more than 25 minutes examining, evaluating, reviewing data, counseling the patient and coordinating care. Greater than 50% of time was spent face-to-face with the patient    This note is created with the assistance of a speech recognition program.  While intending to generate a document that actually reflects the content of the visit, the document can still have some errors including those of syntax and sound a like substitutions which may escape proof reading. It such instances, actual meaning can be extrapolated by contextual diversion.

## 2018-10-01 ENCOUNTER — OFFICE VISIT (OUTPATIENT)
Dept: OPHTHALMOLOGY | Age: 53
End: 2018-10-01
Payer: MEDICARE

## 2018-10-01 ENCOUNTER — TELEPHONE (OUTPATIENT)
Dept: ONCOLOGY | Age: 53
End: 2018-10-01

## 2018-10-01 DIAGNOSIS — G51.39 HEMIFACIAL SPASM: Primary | ICD-10-CM

## 2018-10-01 DIAGNOSIS — H01.02A SQUAMOUS BLEPHARITIS OF UPPER AND LOWER EYELIDS OF BOTH EYES: ICD-10-CM

## 2018-10-01 DIAGNOSIS — H01.02B SQUAMOUS BLEPHARITIS OF UPPER AND LOWER EYELIDS OF BOTH EYES: ICD-10-CM

## 2018-10-01 DIAGNOSIS — H25.813 COMBINED FORMS OF AGE-RELATED CATARACT OF BOTH EYES: ICD-10-CM

## 2018-10-01 PROCEDURE — 1036F TOBACCO NON-USER: CPT | Performed by: OPHTHALMOLOGY

## 2018-10-01 PROCEDURE — G8417 CALC BMI ABV UP PARAM F/U: HCPCS | Performed by: OPHTHALMOLOGY

## 2018-10-01 PROCEDURE — 99214 OFFICE O/P EST MOD 30 MIN: CPT | Performed by: OPHTHALMOLOGY

## 2018-10-01 PROCEDURE — 3017F COLORECTAL CA SCREEN DOC REV: CPT | Performed by: OPHTHALMOLOGY

## 2018-10-01 PROCEDURE — G8484 FLU IMMUNIZE NO ADMIN: HCPCS | Performed by: OPHTHALMOLOGY

## 2018-10-01 PROCEDURE — G8427 DOCREV CUR MEDS BY ELIG CLIN: HCPCS | Performed by: OPHTHALMOLOGY

## 2018-10-01 ASSESSMENT — TONOMETRY
OD_IOP_MMHG: 23
IOP_METHOD: NON-CONTACT AIR PUFF
OS_IOP_MMHG: 23

## 2018-10-01 ASSESSMENT — SLIT LAMP EXAM - LIDS
COMMENTS: MILD BLEPHARITIS. MILD MGD
COMMENTS: MILD BLEPHARITIS. MILD MGD

## 2018-10-01 ASSESSMENT — VISUAL ACUITY
OS_PH_SC: 20/40
OD_SC: 20/20
OS_SC: 20/50
METHOD: SNELLEN - LINEAR

## 2018-10-02 DIAGNOSIS — Z17.1 MALIGNANT NEOPLASM OF OVERLAPPING SITES OF LEFT BREAST IN FEMALE, ESTROGEN RECEPTOR NEGATIVE (HCC): Primary | ICD-10-CM

## 2018-10-02 DIAGNOSIS — C50.812 MALIGNANT NEOPLASM OF OVERLAPPING SITES OF LEFT BREAST IN FEMALE, ESTROGEN RECEPTOR NEGATIVE (HCC): Primary | ICD-10-CM

## 2018-10-02 DIAGNOSIS — G51.0 FACIAL PALSY: ICD-10-CM

## 2018-10-04 ENCOUNTER — HOSPITAL ENCOUNTER (OUTPATIENT)
Dept: INFUSION THERAPY | Age: 53
Discharge: HOME | End: 2018-10-04
Payer: MEDICARE

## 2018-10-04 VITALS
HEART RATE: 82 BPM | SYSTOLIC BLOOD PRESSURE: 152 MMHG | WEIGHT: 275.2 LBS | HEIGHT: 66 IN | DIASTOLIC BLOOD PRESSURE: 85 MMHG | RESPIRATION RATE: 16 BRPM | BODY MASS INDEX: 44.23 KG/M2 | TEMPERATURE: 98.8 F | OXYGEN SATURATION: 100 %

## 2018-10-04 DIAGNOSIS — Z17.1 MALIGNANT NEOPLASM OF OVERLAPPING SITES OF RIGHT BREAST IN FEMALE, ESTROGEN RECEPTOR NEGATIVE (HCC): ICD-10-CM

## 2018-10-04 DIAGNOSIS — Z17.1 MALIGNANT NEOPLASM OF OVERLAPPING SITES OF LEFT BREAST IN FEMALE, ESTROGEN RECEPTOR NEGATIVE (HCC): ICD-10-CM

## 2018-10-04 DIAGNOSIS — C50.811 MALIGNANT NEOPLASM OF OVERLAPPING SITES OF RIGHT BREAST IN FEMALE, ESTROGEN RECEPTOR NEGATIVE (HCC): ICD-10-CM

## 2018-10-04 DIAGNOSIS — C50.812 MALIGNANT NEOPLASM OF OVERLAPPING SITES OF LEFT BREAST IN FEMALE, ESTROGEN RECEPTOR NEGATIVE (HCC): ICD-10-CM

## 2018-10-04 LAB
ABSOLUTE EOS #: 0.3 K/UL (ref 0–0.4)
ABSOLUTE IMMATURE GRANULOCYTE: ABNORMAL K/UL (ref 0–0.3)
ABSOLUTE LYMPH #: 2.7 K/UL (ref 1–4.8)
ABSOLUTE MONO #: 0.5 K/UL (ref 0.1–1.2)
ALBUMIN SERPL-MCNC: 3.8 G/DL (ref 3.5–5.2)
ALBUMIN/GLOBULIN RATIO: 1.1 (ref 1–2.5)
ALP BLD-CCNC: 74 U/L (ref 35–104)
ALT SERPL-CCNC: 15 U/L (ref 5–33)
ANION GAP SERPL CALCULATED.3IONS-SCNC: 10 MMOL/L (ref 9–17)
AST SERPL-CCNC: 14 U/L
BASOPHILS # BLD: 1 % (ref 0–1)
BASOPHILS ABSOLUTE: 0.1 K/UL (ref 0–0.2)
BILIRUB SERPL-MCNC: 0.17 MG/DL (ref 0.3–1.2)
BUN BLDV-MCNC: 16 MG/DL (ref 6–20)
BUN/CREAT BLD: 14 (ref 9–20)
CALCIUM SERPL-MCNC: 9.5 MG/DL (ref 8.6–10.4)
CHLORIDE BLD-SCNC: 102 MMOL/L (ref 98–107)
CO2: 28 MMOL/L (ref 20–31)
CREAT SERPL-MCNC: 1.13 MG/DL (ref 0.5–0.9)
DIFFERENTIAL TYPE: ABNORMAL
EOSINOPHILS RELATIVE PERCENT: 4 % (ref 1–7)
GFR AFRICAN AMERICAN: >60 ML/MIN
GFR NON-AFRICAN AMERICAN: 50 ML/MIN
GFR SERPL CREATININE-BSD FRML MDRD: ABNORMAL ML/MIN/{1.73_M2}
GFR SERPL CREATININE-BSD FRML MDRD: ABNORMAL ML/MIN/{1.73_M2}
GLUCOSE BLD-MCNC: 109 MG/DL (ref 70–99)
HCT VFR BLD CALC: 34.5 % (ref 36–46)
HEMOGLOBIN: 11.2 G/DL (ref 12–16)
IMMATURE GRANULOCYTES: ABNORMAL %
LYMPHOCYTES # BLD: 41 % (ref 16–46)
MCH RBC QN AUTO: 31.2 PG (ref 26–34)
MCHC RBC AUTO-ENTMCNC: 32.5 G/DL (ref 31–37)
MCV RBC AUTO: 96.2 FL (ref 80–100)
MONOCYTES # BLD: 8 % (ref 4–11)
NRBC AUTOMATED: ABNORMAL PER 100 WBC
PDW BLD-RTO: 13.7 % (ref 11–14.5)
PLATELET # BLD: 151 K/UL (ref 140–450)
PLATELET ESTIMATE: ABNORMAL
PMV BLD AUTO: 7.9 FL (ref 6–12)
POTASSIUM SERPL-SCNC: 4.1 MMOL/L (ref 3.7–5.3)
RBC # BLD: 3.59 M/UL (ref 4–5.2)
RBC # BLD: ABNORMAL 10*6/UL
SEG NEUTROPHILS: 46 % (ref 43–77)
SEGMENTED NEUTROPHILS ABSOLUTE COUNT: 3 K/UL (ref 1.8–7.7)
SODIUM BLD-SCNC: 140 MMOL/L (ref 135–144)
TOTAL PROTEIN: 7.3 G/DL (ref 6.4–8.3)
WBC # BLD: 6.6 K/UL (ref 3.5–11)
WBC # BLD: ABNORMAL 10*3/UL

## 2018-10-04 PROCEDURE — 85025 COMPLETE CBC W/AUTO DIFF WBC: CPT

## 2018-10-04 PROCEDURE — 6360000002 HC RX W HCPCS: Performed by: INTERNAL MEDICINE

## 2018-10-04 PROCEDURE — 2580000003 HC RX 258: Performed by: INTERNAL MEDICINE

## 2018-10-04 PROCEDURE — 80053 COMPREHEN METABOLIC PANEL: CPT

## 2018-10-04 PROCEDURE — 96413 CHEMO IV INFUSION 1 HR: CPT

## 2018-10-04 PROCEDURE — 36415 COLL VENOUS BLD VENIPUNCTURE: CPT

## 2018-10-04 PROCEDURE — 36591 DRAW BLOOD OFF VENOUS DEVICE: CPT

## 2018-10-04 RX ORDER — SODIUM CHLORIDE 9 MG/ML
INJECTION, SOLUTION INTRAVENOUS ONCE
Status: COMPLETED | OUTPATIENT
Start: 2018-10-04 | End: 2018-10-04

## 2018-10-04 RX ORDER — HEPARIN SODIUM (PORCINE) LOCK FLUSH IV SOLN 100 UNIT/ML 100 UNIT/ML
500 SOLUTION INTRAVENOUS PRN
Status: DISCONTINUED | OUTPATIENT
Start: 2018-10-04 | End: 2018-10-05 | Stop reason: HOSPADM

## 2018-10-04 RX ORDER — SODIUM CHLORIDE 0.9 % (FLUSH) 0.9 %
10 SYRINGE (ML) INJECTION PRN
Status: DISCONTINUED | OUTPATIENT
Start: 2018-10-04 | End: 2018-10-05 | Stop reason: HOSPADM

## 2018-10-04 RX ADMIN — Medication 10 ML: at 09:24

## 2018-10-04 RX ADMIN — Medication 10 ML: at 09:22

## 2018-10-04 RX ADMIN — HEPARIN SODIUM (PORCINE) LOCK FLUSH IV SOLN 100 UNIT/ML 500 UNITS: 100 SOLUTION at 11:31

## 2018-10-04 RX ADMIN — Medication 10 ML: at 09:21

## 2018-10-04 RX ADMIN — HEPARIN SODIUM (PORCINE) LOCK FLUSH IV SOLN 100 UNIT/ML 500 UNITS: 100 SOLUTION at 11:30

## 2018-10-04 RX ADMIN — Medication 10 ML: at 09:23

## 2018-10-04 RX ADMIN — TRASTUZUMAB 750 MG: 150 INJECTION, POWDER, LYOPHILIZED, FOR SOLUTION INTRAVENOUS at 10:48

## 2018-10-04 RX ADMIN — Medication 10 ML: at 09:20

## 2018-10-04 RX ADMIN — SODIUM CHLORIDE: 9 INJECTION, SOLUTION INTRAVENOUS at 09:25

## 2018-10-04 ASSESSMENT — PAIN SCALES - GENERAL: PAINLEVEL_OUTOF10: 0

## 2018-10-04 NOTE — PROGRESS NOTES
VAD dual port  accessed per protocol with 3/4 inch 20 gauge mora needle X2. Both port sites flushed easily with saline 10 ml. Good blood return on proximal port no blood return on distal port. Labs drawn from proximal (medial) port. Secured accessed ports with transparent dressing. IV infusing NSS through proximal (medial) port.

## 2018-10-04 NOTE — PROGRESS NOTES
Patient's orders released. Dr. Carlos Alberto Butler informed labs within limits to treat. He does not want premedications released. Patient has 2 more treatments after today.

## 2018-10-04 NOTE — PROGRESS NOTES
Infusion complete. Flushed VAD and IV  with 40 ml of saline and  VAD with 5 ml of heparin flush. D/C mora needle. Band aid to site. Per Ayaan Dash RN. Patient stable and discharged to home.

## 2018-10-05 ENCOUNTER — HOSPITAL ENCOUNTER (OUTPATIENT)
Dept: MAMMOGRAPHY | Age: 53
Discharge: HOME | End: 2018-10-07
Payer: MEDICARE

## 2018-10-05 ENCOUNTER — HOSPITAL ENCOUNTER (OUTPATIENT)
Dept: NON INVASIVE DIAGNOSTICS | Age: 53
Discharge: HOME | End: 2018-10-05
Payer: MEDICARE

## 2018-10-05 DIAGNOSIS — Z17.1 MALIGNANT NEOPLASM OF OVERLAPPING SITES OF RIGHT BREAST IN FEMALE, ESTROGEN RECEPTOR NEGATIVE (HCC): ICD-10-CM

## 2018-10-05 DIAGNOSIS — Z12.31 ENCOUNTER FOR SCREENING MAMMOGRAM FOR MALIGNANT NEOPLASM OF BREAST: ICD-10-CM

## 2018-10-05 DIAGNOSIS — C50.811 MALIGNANT NEOPLASM OF OVERLAPPING SITES OF RIGHT BREAST IN FEMALE, ESTROGEN RECEPTOR NEGATIVE (HCC): ICD-10-CM

## 2018-10-05 DIAGNOSIS — R53.83 OTHER FATIGUE: ICD-10-CM

## 2018-10-05 DIAGNOSIS — R68.89 OTHER GENERAL SYMPTOMS AND SIGNS: ICD-10-CM

## 2018-10-05 DIAGNOSIS — R89.2 DRUG TOXICITY: ICD-10-CM

## 2018-10-05 LAB
LV EF: 55 %
LVEF MODALITY: NORMAL

## 2018-10-05 PROCEDURE — 93306 TTE W/DOPPLER COMPLETE: CPT

## 2018-10-05 PROCEDURE — G0279 TOMOSYNTHESIS, MAMMO: HCPCS

## 2018-10-08 ENCOUNTER — HOSPITAL ENCOUNTER (OUTPATIENT)
Dept: MRI IMAGING | Age: 53
Discharge: HOME | End: 2018-10-10
Payer: MEDICARE

## 2018-10-08 DIAGNOSIS — C50.812 MALIGNANT NEOPLASM OF OVERLAPPING SITES OF LEFT BREAST IN FEMALE, ESTROGEN RECEPTOR NEGATIVE (HCC): ICD-10-CM

## 2018-10-08 DIAGNOSIS — G51.0 FACIAL PALSY: ICD-10-CM

## 2018-10-08 DIAGNOSIS — Z17.1 MALIGNANT NEOPLASM OF OVERLAPPING SITES OF LEFT BREAST IN FEMALE, ESTROGEN RECEPTOR NEGATIVE (HCC): ICD-10-CM

## 2018-10-08 PROCEDURE — 6360000004 HC RX CONTRAST MEDICATION: Performed by: INTERNAL MEDICINE

## 2018-10-08 PROCEDURE — 70553 MRI BRAIN STEM W/O & W/DYE: CPT

## 2018-10-08 PROCEDURE — A9579 GAD-BASE MR CONTRAST NOS,1ML: HCPCS | Performed by: INTERNAL MEDICINE

## 2018-10-08 PROCEDURE — 2709999900 MRI ORBITS FACE NECK W WO CONTRAST

## 2018-10-08 RX ADMIN — GADOTERIDOL 15 ML: 279.3 INJECTION, SOLUTION INTRAVENOUS at 11:40

## 2018-10-09 ENCOUNTER — TELEPHONE (OUTPATIENT)
Dept: INFUSION THERAPY | Facility: MEDICAL CENTER | Age: 53
End: 2018-10-09

## 2018-10-10 ENCOUNTER — TELEPHONE (OUTPATIENT)
Dept: INFUSION THERAPY | Facility: MEDICAL CENTER | Age: 53
End: 2018-10-10

## 2018-10-15 DIAGNOSIS — Z17.1 MALIGNANT NEOPLASM OF OVERLAPPING SITES OF RIGHT BREAST IN FEMALE, ESTROGEN RECEPTOR NEGATIVE (HCC): ICD-10-CM

## 2018-10-15 DIAGNOSIS — C50.811 MALIGNANT NEOPLASM OF OVERLAPPING SITES OF RIGHT BREAST IN FEMALE, ESTROGEN RECEPTOR NEGATIVE (HCC): ICD-10-CM

## 2018-10-16 RX ORDER — TRAMADOL HYDROCHLORIDE 50 MG/1
50 TABLET ORAL EVERY 6 HOURS PRN
Qty: 40 TABLET | Refills: 0 | Status: SHIPPED | OUTPATIENT
Start: 2018-10-16 | End: 2019-07-10 | Stop reason: SDUPTHER

## 2018-10-18 ENCOUNTER — OFFICE VISIT (OUTPATIENT)
Dept: ONCOLOGY | Age: 53
End: 2018-10-18
Payer: MEDICARE

## 2018-10-18 VITALS
BODY MASS INDEX: 44.36 KG/M2 | DIASTOLIC BLOOD PRESSURE: 76 MMHG | TEMPERATURE: 98.7 F | WEIGHT: 276 LBS | HEART RATE: 84 BPM | SYSTOLIC BLOOD PRESSURE: 130 MMHG | HEIGHT: 66 IN

## 2018-10-18 DIAGNOSIS — Z17.1 MALIGNANT NEOPLASM OF OVERLAPPING SITES OF RIGHT BREAST IN FEMALE, ESTROGEN RECEPTOR NEGATIVE (HCC): Primary | ICD-10-CM

## 2018-10-18 DIAGNOSIS — C50.811 MALIGNANT NEOPLASM OF OVERLAPPING SITES OF RIGHT BREAST IN FEMALE, ESTROGEN RECEPTOR NEGATIVE (HCC): Primary | ICD-10-CM

## 2018-10-18 PROCEDURE — G8417 CALC BMI ABV UP PARAM F/U: HCPCS | Performed by: INTERNAL MEDICINE

## 2018-10-18 PROCEDURE — 3017F COLORECTAL CA SCREEN DOC REV: CPT | Performed by: INTERNAL MEDICINE

## 2018-10-18 PROCEDURE — 99214 OFFICE O/P EST MOD 30 MIN: CPT | Performed by: INTERNAL MEDICINE

## 2018-10-18 PROCEDURE — 1036F TOBACCO NON-USER: CPT | Performed by: INTERNAL MEDICINE

## 2018-10-18 PROCEDURE — G8427 DOCREV CUR MEDS BY ELIG CLIN: HCPCS | Performed by: INTERNAL MEDICINE

## 2018-10-18 PROCEDURE — G8484 FLU IMMUNIZE NO ADMIN: HCPCS | Performed by: INTERNAL MEDICINE

## 2018-10-18 RX ORDER — MEPERIDINE HYDROCHLORIDE 50 MG/ML
12.5 INJECTION INTRAMUSCULAR; INTRAVENOUS; SUBCUTANEOUS ONCE
Status: CANCELLED | OUTPATIENT
Start: 2018-10-25 | End: 2018-10-25

## 2018-10-18 RX ORDER — 0.9 % SODIUM CHLORIDE 0.9 %
10 VIAL (ML) INJECTION ONCE
Status: CANCELLED | OUTPATIENT
Start: 2018-10-25

## 2018-10-18 RX ORDER — SODIUM CHLORIDE 0.9 % (FLUSH) 0.9 %
10 SYRINGE (ML) INJECTION PRN
Status: CANCELLED | OUTPATIENT
Start: 2018-10-25

## 2018-10-18 RX ORDER — DIPHENHYDRAMINE HYDROCHLORIDE 50 MG/ML
50 INJECTION INTRAMUSCULAR; INTRAVENOUS ONCE
Status: CANCELLED | OUTPATIENT
Start: 2018-10-25 | End: 2018-10-25

## 2018-10-18 RX ORDER — SODIUM CHLORIDE 9 MG/ML
INJECTION, SOLUTION INTRAVENOUS CONTINUOUS
Status: CANCELLED | OUTPATIENT
Start: 2018-10-25

## 2018-10-18 RX ORDER — SODIUM CHLORIDE 0.9 % (FLUSH) 0.9 %
5 SYRINGE (ML) INJECTION PRN
Status: CANCELLED | OUTPATIENT
Start: 2018-10-25

## 2018-10-18 RX ORDER — METHYLPREDNISOLONE SODIUM SUCCINATE 125 MG/2ML
125 INJECTION, POWDER, LYOPHILIZED, FOR SOLUTION INTRAMUSCULAR; INTRAVENOUS ONCE
Status: CANCELLED | OUTPATIENT
Start: 2018-10-25 | End: 2018-10-25

## 2018-10-18 RX ORDER — DIPHENHYDRAMINE HYDROCHLORIDE 50 MG/ML
50 INJECTION INTRAMUSCULAR; INTRAVENOUS ONCE
Status: CANCELLED | OUTPATIENT
Start: 2018-10-25

## 2018-10-18 RX ORDER — HEPARIN SODIUM (PORCINE) LOCK FLUSH IV SOLN 100 UNIT/ML 100 UNIT/ML
500 SOLUTION INTRAVENOUS PRN
Status: CANCELLED | OUTPATIENT
Start: 2018-10-25

## 2018-10-18 RX ORDER — SODIUM CHLORIDE 9 MG/ML
INJECTION, SOLUTION INTRAVENOUS ONCE
Status: CANCELLED | OUTPATIENT
Start: 2018-10-25 | End: 2018-10-25

## 2018-10-18 RX ORDER — 0.9 % SODIUM CHLORIDE 0.9 %
10 VIAL (ML) INJECTION ONCE
Status: CANCELLED | OUTPATIENT
Start: 2018-10-25 | End: 2018-10-25

## 2018-10-18 NOTE — PROGRESS NOTES
(H) 70 - 99 mg/dL    BUN 16 6 - 20 mg/dL    CREATININE 1.13 (H) 0.50 - 0.90 mg/dL    Bun/Cre Ratio 14 9 - 20    Calcium 9.5 8.6 - 10.4 mg/dL    Sodium 140 135 - 144 mmol/L    Potassium 4.1 3.7 - 5.3 mmol/L    Chloride 102 98 - 107 mmol/L    CO2 28 20 - 31 mmol/L    Anion Gap 10 9 - 17 mmol/L    Alkaline Phosphatase 74 35 - 104 U/L    ALT 15 5 - 33 U/L    AST 14 <32 U/L    Total Bilirubin 0.17 (L) 0.3 - 1.2 mg/dL    Total Protein 7.3 6.4 - 8.3 g/dL    Alb 3.8 3.5 - 5.2 g/dL    Albumin/Globulin Ratio 1.1 1.0 - 2.5    GFR Non- 50 (L) >60 mL/min    GFR African American >60 >60 mL/min    GFR Comment          GFR Staging NOT REPORTED    CBC Auto Differential   Result Value Ref Range    WBC 6.6 3.5 - 11.0 k/uL    RBC 3.59 (L) 4.0 - 5.2 m/uL    Hemoglobin 11.2 (L) 12.0 - 16.0 g/dL    Hematocrit 34.5 (L) 36 - 46 %    MCV 96.2 80 - 100 fL    MCH 31.2 26 - 34 pg    MCHC 32.5 31 - 37 g/dL    RDW 13.7 11.0 - 14.5 %    Platelets 400 333 - 756 k/uL    MPV 7.9 6.0 - 12.0 fL    NRBC Automated NOT REPORTED per 100 WBC    Differential Type NOT REPORTED     Immature Granulocytes NOT REPORTED 0 %    Absolute Immature Granulocyte NOT REPORTED 0.00 - 0.30 k/uL    WBC Morphology NOT REPORTED     RBC Morphology NOT REPORTED     Platelet Estimate NOT REPORTED     Seg Neutrophils 46 43 - 77 %    Lymphocytes 41 16 - 46 %    Monocytes 8 4 - 11 %    Eosinophils % 4 1 - 7 %    Basophils 1 0 - 1 %    Segs Absolute 3.00 1.8 - 7.7 k/uL    Absolute Lymph # 2.70 1.0 - 4.8 k/uL    Absolute Mono # 0.50 0.1 - 1.2 k/uL    Absolute Eos # 0.30 0.0 - 0.4 k/uL    Basophils # 0.10 0.0 - 0.2 k/uL     Xr Chest Standard (2 Vw)    Result Date: 10/10/2017  EXAMINATION: TWO VIEWS OF THE CHEST 10/10/2017 2:37 pm COMPARISON: 10/18/2011 HISTORY: ORDERING SYSTEM PROVIDED HISTORY: Invasive ductal carcinoma of breast, right [facility]) TECHNOLOGIST PROVIDED HISTORY: Reason for exam:->Pre op Ordering Physician Provided Reason for Exam: Pre op for right

## 2018-10-23 ENCOUNTER — TELEPHONE (OUTPATIENT)
Dept: CASE MANAGEMENT | Age: 53
End: 2018-10-23

## 2018-10-25 ENCOUNTER — HOSPITAL ENCOUNTER (OUTPATIENT)
Dept: INFUSION THERAPY | Age: 53
Discharge: HOME | End: 2018-10-25
Payer: MEDICARE

## 2018-10-25 VITALS
WEIGHT: 279 LBS | BODY MASS INDEX: 44.84 KG/M2 | HEART RATE: 91 BPM | DIASTOLIC BLOOD PRESSURE: 72 MMHG | TEMPERATURE: 98.1 F | HEIGHT: 66 IN | RESPIRATION RATE: 16 BRPM | OXYGEN SATURATION: 100 % | SYSTOLIC BLOOD PRESSURE: 135 MMHG

## 2018-10-25 DIAGNOSIS — C50.811 MALIGNANT NEOPLASM OF OVERLAPPING SITES OF RIGHT BREAST IN FEMALE, ESTROGEN RECEPTOR NEGATIVE (HCC): ICD-10-CM

## 2018-10-25 DIAGNOSIS — Z17.1 MALIGNANT NEOPLASM OF OVERLAPPING SITES OF RIGHT BREAST IN FEMALE, ESTROGEN RECEPTOR NEGATIVE (HCC): ICD-10-CM

## 2018-10-25 DIAGNOSIS — Z17.1 MALIGNANT NEOPLASM OF OVERLAPPING SITES OF LEFT BREAST IN FEMALE, ESTROGEN RECEPTOR NEGATIVE (HCC): ICD-10-CM

## 2018-10-25 DIAGNOSIS — C50.812 MALIGNANT NEOPLASM OF OVERLAPPING SITES OF LEFT BREAST IN FEMALE, ESTROGEN RECEPTOR NEGATIVE (HCC): ICD-10-CM

## 2018-10-25 LAB
ABSOLUTE EOS #: 0.2 K/UL (ref 0–0.4)
ABSOLUTE IMMATURE GRANULOCYTE: ABNORMAL K/UL (ref 0–0.3)
ABSOLUTE LYMPH #: 2.6 K/UL (ref 1–4.8)
ABSOLUTE MONO #: 0.6 K/UL (ref 0.1–1.2)
ALBUMIN SERPL-MCNC: 4 G/DL (ref 3.5–5.2)
ALBUMIN/GLOBULIN RATIO: 1.1 (ref 1–2.5)
ALP BLD-CCNC: 80 U/L (ref 35–104)
ALT SERPL-CCNC: 15 U/L (ref 5–33)
ANION GAP SERPL CALCULATED.3IONS-SCNC: 12 MMOL/L (ref 9–17)
AST SERPL-CCNC: 13 U/L
BASOPHILS # BLD: 1 % (ref 0–1)
BASOPHILS ABSOLUTE: 0.1 K/UL (ref 0–0.2)
BILIRUB SERPL-MCNC: 0.22 MG/DL (ref 0.3–1.2)
BUN BLDV-MCNC: 17 MG/DL (ref 6–20)
BUN/CREAT BLD: 14 (ref 9–20)
CALCIUM SERPL-MCNC: 9.7 MG/DL (ref 8.6–10.4)
CHLORIDE BLD-SCNC: 100 MMOL/L (ref 98–107)
CO2: 28 MMOL/L (ref 20–31)
CREAT SERPL-MCNC: 1.22 MG/DL (ref 0.5–0.9)
DIFFERENTIAL TYPE: ABNORMAL
EOSINOPHILS RELATIVE PERCENT: 3 % (ref 1–7)
GFR AFRICAN AMERICAN: 56 ML/MIN
GFR NON-AFRICAN AMERICAN: 46 ML/MIN
GFR SERPL CREATININE-BSD FRML MDRD: ABNORMAL ML/MIN/{1.73_M2}
GFR SERPL CREATININE-BSD FRML MDRD: ABNORMAL ML/MIN/{1.73_M2}
GLUCOSE BLD-MCNC: 109 MG/DL (ref 70–99)
HCT VFR BLD CALC: 35.8 % (ref 36–46)
HEMOGLOBIN: 11.7 G/DL (ref 12–16)
IMMATURE GRANULOCYTES: ABNORMAL %
LYMPHOCYTES # BLD: 35 % (ref 16–46)
MCH RBC QN AUTO: 31.3 PG (ref 26–34)
MCHC RBC AUTO-ENTMCNC: 32.7 G/DL (ref 31–37)
MCV RBC AUTO: 95.9 FL (ref 80–100)
MONOCYTES # BLD: 8 % (ref 4–11)
NRBC AUTOMATED: ABNORMAL PER 100 WBC
PDW BLD-RTO: 14.2 % (ref 11–14.5)
PLATELET # BLD: 157 K/UL (ref 140–450)
PLATELET ESTIMATE: ABNORMAL
PMV BLD AUTO: 7.5 FL (ref 6–12)
POTASSIUM SERPL-SCNC: 3.9 MMOL/L (ref 3.7–5.3)
RBC # BLD: 3.73 M/UL (ref 4–5.2)
RBC # BLD: ABNORMAL 10*6/UL
SEG NEUTROPHILS: 53 % (ref 43–77)
SEGMENTED NEUTROPHILS ABSOLUTE COUNT: 4 K/UL (ref 1.8–7.7)
SODIUM BLD-SCNC: 140 MMOL/L (ref 135–144)
TOTAL PROTEIN: 7.8 G/DL (ref 6.4–8.3)
WBC # BLD: 7.5 K/UL (ref 3.5–11)
WBC # BLD: ABNORMAL 10*3/UL

## 2018-10-25 PROCEDURE — 2580000003 HC RX 258: Performed by: INTERNAL MEDICINE

## 2018-10-25 PROCEDURE — 6360000002 HC RX W HCPCS: Performed by: INTERNAL MEDICINE

## 2018-10-25 PROCEDURE — 36415 COLL VENOUS BLD VENIPUNCTURE: CPT

## 2018-10-25 PROCEDURE — 96413 CHEMO IV INFUSION 1 HR: CPT

## 2018-10-25 PROCEDURE — 80053 COMPREHEN METABOLIC PANEL: CPT

## 2018-10-25 PROCEDURE — 85025 COMPLETE CBC W/AUTO DIFF WBC: CPT

## 2018-10-25 PROCEDURE — 36591 DRAW BLOOD OFF VENOUS DEVICE: CPT

## 2018-10-25 RX ORDER — SODIUM CHLORIDE 9 MG/ML
INJECTION, SOLUTION INTRAVENOUS ONCE
Status: COMPLETED | OUTPATIENT
Start: 2018-10-25 | End: 2018-10-25

## 2018-10-25 RX ORDER — HEPARIN SODIUM (PORCINE) LOCK FLUSH IV SOLN 100 UNIT/ML 100 UNIT/ML
500 SOLUTION INTRAVENOUS PRN
Status: DISCONTINUED | OUTPATIENT
Start: 2018-10-25 | End: 2018-10-26 | Stop reason: HOSPADM

## 2018-10-25 RX ORDER — SODIUM CHLORIDE 0.9 % (FLUSH) 0.9 %
10 SYRINGE (ML) INJECTION PRN
Status: DISCONTINUED | OUTPATIENT
Start: 2018-10-25 | End: 2018-10-26 | Stop reason: HOSPADM

## 2018-10-25 RX ADMIN — Medication 10 ML: at 09:11

## 2018-10-25 RX ADMIN — Medication 10 ML: at 09:12

## 2018-10-25 RX ADMIN — Medication 10 ML: at 09:13

## 2018-10-25 RX ADMIN — HEPARIN SODIUM (PORCINE) LOCK FLUSH IV SOLN 100 UNIT/ML 500 UNITS: 100 SOLUTION at 10:32

## 2018-10-25 RX ADMIN — Medication 10 ML: at 09:14

## 2018-10-25 RX ADMIN — HEPARIN SODIUM (PORCINE) LOCK FLUSH IV SOLN 100 UNIT/ML 500 UNITS: 100 SOLUTION at 09:15

## 2018-10-25 RX ADMIN — TRASTUZUMAB 750 MG: 150 INJECTION, POWDER, LYOPHILIZED, FOR SOLUTION INTRAVENOUS at 09:56

## 2018-10-25 RX ADMIN — SODIUM CHLORIDE: 9 INJECTION, SOLUTION INTRAVENOUS at 09:15

## 2018-10-25 ASSESSMENT — PAIN SCALES - GENERAL: PAINLEVEL_OUTOF10: 0

## 2018-10-25 NOTE — PROGRESS NOTES
Patient at infusion center for chemotherapy. Distal VAD accessed per protocol using 20 gauge 1\" mora needle, flushed easily, negative for blood return. Proximal VAD accessed per protocol using 20 gauge 1\" mora needle. Flushes easily. Labs drawn. NSS infusing. Patient is in good spirits and states she has been feeling pretty good.

## 2018-10-25 NOTE — PROGRESS NOTES
Herceptin infused and d/c'd. Mediport flushed with 30 ml NSS and 5 ml heparin. Patient tolerated infusion without any difficulty. Guerrero needle d/c'd, band aid to site. Patient denies any complaints and discharged to home.

## 2018-10-31 ENCOUNTER — OFFICE VISIT (OUTPATIENT)
Dept: OPHTHALMOLOGY | Age: 53
End: 2018-10-31
Payer: MEDICARE

## 2018-10-31 DIAGNOSIS — G24.5 BLEPHAROSPASM: Primary | ICD-10-CM

## 2018-10-31 PROCEDURE — 92083 EXTENDED VISUAL FIELD XM: CPT | Performed by: OPHTHALMOLOGY

## 2018-11-06 ENCOUNTER — TELEPHONE (OUTPATIENT)
Dept: CASE MANAGEMENT | Age: 53
End: 2018-11-06

## 2018-11-19 ENCOUNTER — HOSPITAL ENCOUNTER (OUTPATIENT)
Dept: INTERVENTIONAL RADIOLOGY/VASCULAR | Age: 53
Discharge: HOME | End: 2018-11-21
Payer: MEDICARE

## 2018-11-19 ENCOUNTER — OFFICE VISIT (OUTPATIENT)
Dept: PRIMARY CARE CLINIC | Age: 53
End: 2018-11-19
Payer: MEDICARE

## 2018-11-19 ENCOUNTER — OFFICE VISIT (OUTPATIENT)
Dept: OPHTHALMOLOGY | Age: 53
End: 2018-11-19
Payer: MEDICARE

## 2018-11-19 ENCOUNTER — HOSPITAL ENCOUNTER (OUTPATIENT)
Age: 53
Discharge: HOME | End: 2018-11-21
Payer: MEDICARE

## 2018-11-19 VITALS
HEART RATE: 92 BPM | HEIGHT: 66 IN | BODY MASS INDEX: 43.94 KG/M2 | OXYGEN SATURATION: 96 % | TEMPERATURE: 99.1 F | SYSTOLIC BLOOD PRESSURE: 118 MMHG | DIASTOLIC BLOOD PRESSURE: 88 MMHG | WEIGHT: 273.4 LBS

## 2018-11-19 DIAGNOSIS — G51.39 HEMIFACIAL SPASM: Primary | ICD-10-CM

## 2018-11-19 DIAGNOSIS — Z17.0 MALIGNANT NEOPLASM OF BREAST IN FEMALE, ESTROGEN RECEPTOR POSITIVE, UNSPECIFIED LATERALITY, UNSPECIFIED SITE OF BREAST (HCC): Primary | ICD-10-CM

## 2018-11-19 DIAGNOSIS — I82.621 ACUTE DEEP VEIN THROMBOSIS (DVT) OF RIGHT UPPER EXTREMITY, UNSPECIFIED VEIN (HCC): Primary | ICD-10-CM

## 2018-11-19 DIAGNOSIS — M79.601 PAIN AND SWELLING OF RIGHT UPPER EXTREMITY: ICD-10-CM

## 2018-11-19 DIAGNOSIS — C50.919 MALIGNANT NEOPLASM OF BREAST IN FEMALE, ESTROGEN RECEPTOR POSITIVE, UNSPECIFIED LATERALITY, UNSPECIFIED SITE OF BREAST (HCC): Primary | ICD-10-CM

## 2018-11-19 DIAGNOSIS — M79.89 PAIN AND SWELLING OF RIGHT UPPER EXTREMITY: ICD-10-CM

## 2018-11-19 PROCEDURE — G8417 CALC BMI ABV UP PARAM F/U: HCPCS | Performed by: FAMILY MEDICINE

## 2018-11-19 PROCEDURE — G8484 FLU IMMUNIZE NO ADMIN: HCPCS | Performed by: OPHTHALMOLOGY

## 2018-11-19 PROCEDURE — 1036F TOBACCO NON-USER: CPT | Performed by: FAMILY MEDICINE

## 2018-11-19 PROCEDURE — 3017F COLORECTAL CA SCREEN DOC REV: CPT | Performed by: FAMILY MEDICINE

## 2018-11-19 PROCEDURE — G8484 FLU IMMUNIZE NO ADMIN: HCPCS | Performed by: FAMILY MEDICINE

## 2018-11-19 PROCEDURE — 99213 OFFICE O/P EST LOW 20 MIN: CPT | Performed by: OPHTHALMOLOGY

## 2018-11-19 PROCEDURE — G8417 CALC BMI ABV UP PARAM F/U: HCPCS | Performed by: OPHTHALMOLOGY

## 2018-11-19 PROCEDURE — 99214 OFFICE O/P EST MOD 30 MIN: CPT | Performed by: FAMILY MEDICINE

## 2018-11-19 PROCEDURE — 1036F TOBACCO NON-USER: CPT | Performed by: OPHTHALMOLOGY

## 2018-11-19 PROCEDURE — 3017F COLORECTAL CA SCREEN DOC REV: CPT | Performed by: OPHTHALMOLOGY

## 2018-11-19 PROCEDURE — G8427 DOCREV CUR MEDS BY ELIG CLIN: HCPCS | Performed by: OPHTHALMOLOGY

## 2018-11-19 PROCEDURE — 93971 EXTREMITY STUDY: CPT

## 2018-11-19 PROCEDURE — G8427 DOCREV CUR MEDS BY ELIG CLIN: HCPCS | Performed by: FAMILY MEDICINE

## 2018-11-19 RX ORDER — TRAMADOL HYDROCHLORIDE 50 MG/1
50 TABLET ORAL EVERY 6 HOURS PRN
COMMUNITY
End: 2018-11-19 | Stop reason: SDUPTHER

## 2018-11-19 RX ORDER — HYDROCODONE BITARTRATE AND ACETAMINOPHEN 5; 325 MG/1; MG/1
1 TABLET ORAL EVERY 6 HOURS PRN
Qty: 15 TABLET | Refills: 0 | Status: SHIPPED | OUTPATIENT
Start: 2018-11-19 | End: 2018-11-22

## 2018-11-19 ASSESSMENT — VISUAL ACUITY
OS_PH_SC: 20/40
OS_SC: 20/50
OD_PH_SC: 20/25
METHOD: SNELLEN - LINEAR
OD_SC: 20/25

## 2018-11-19 ASSESSMENT — TONOMETRY
IOP_METHOD: NON-CONTACT AIR PUFF
OD_IOP_MMHG: 26
OS_IOP_MMHG: 21

## 2018-11-19 ASSESSMENT — SLIT LAMP EXAM - LIDS
COMMENTS: MILD BLEPHARITIS. MILD MGD
COMMENTS: MILD BLEPHARITIS. MILD MGD

## 2018-11-19 NOTE — PROGRESS NOTES
[address]  [address], [address] [place]  [phone]      Betty Erazo is a 48 y.o. female who is c/o of Edema (right arm swelling, post breast CA 1 year ago)      HPI:     HPI  Pt here today for R arm swelling. Pt first noticed this yesterday; feels it is approx 50% worse today even. Notes swelling and \"sharp pains\" over the whole extremity. States her upper arm felt warm earlier today, but unsure that it does now; no focal skin color changes, but entire arm looks darker in color. Pt is currently being treated for R-sided breast CA. Pt had mastectomy of R breast with sentinel lymph node biopsy in 10/2017; currently undergoing chemo treatments every 3 weeks - has her last treatment tomorrow. Seeing Dr. Oly Saini for Oncology. Has also been having pain over R mastectomy site since yesterday - also a \"sharp, stabbing\" pain. Had another episode of swelling in the arm approx 6 months ago - was not as bad as today's per pt. Pt states that she had US of the extremity - was negative for clot per pt, and swelling gradually resolved on its own. Cannot find documentation of this in Epic under \"Imaging\" or OV's from Dr. Oly Saini. Has tried Ibupfrofen and Tramadol already today without relief.         Subjective:      Past Medical History:   Diagnosis Date    Bipolar 1 disorder ([address]     Breast cancer ([facility] [address] 2017    right side     Headache(784.0)     Hyperlipidemia     Migraine       Past Surgical History:   Procedure Laterality Date    DILATION AND CURETTAGE OF UTERUS N/A 10/13/2017    D & C HYSTEROSCOPY with polypectomy performed by Otto Chavez MD at [address] [place] [address] [facility] [place] / REMOVAL / [address] Left 11/9/2017    PORT INSERTION performed by Kassi Hubbard MD at [address] [place] [address] [email or web address] / REMOVAL / REPLACEMENT VENOUS ACCESS CATHETER N/A 11/30/2017    Port Removal & Insertion performed by Kassi Hubbard MD at [address] [place] [address]

## 2018-11-20 ENCOUNTER — HOSPITAL ENCOUNTER (OUTPATIENT)
Dept: INFUSION THERAPY | Age: 53
Discharge: HOME | End: 2018-11-20
Payer: MEDICARE

## 2018-11-20 ENCOUNTER — APPOINTMENT (OUTPATIENT)
Dept: CT IMAGING | Age: 53
End: 2018-11-20
Payer: MEDICARE

## 2018-11-20 ENCOUNTER — HOSPITAL ENCOUNTER (EMERGENCY)
Age: 53
Discharge: HOME | End: 2018-11-20
Attending: SPECIALIST
Payer: MEDICARE

## 2018-11-20 VITALS
TEMPERATURE: 99.9 F | WEIGHT: 268 LBS | HEART RATE: 84 BPM | DIASTOLIC BLOOD PRESSURE: 68 MMHG | RESPIRATION RATE: 16 BRPM | SYSTOLIC BLOOD PRESSURE: 130 MMHG | BODY MASS INDEX: 43.26 KG/M2 | OXYGEN SATURATION: 98 %

## 2018-11-20 DIAGNOSIS — I82.621 ACUTE DEEP VEIN THROMBOSIS (DVT) OF RIGHT UPPER EXTREMITY, UNSPECIFIED VEIN (HCC): Primary | ICD-10-CM

## 2018-11-20 DIAGNOSIS — N39.0 URINARY TRACT INFECTION WITHOUT HEMATURIA, SITE UNSPECIFIED: ICD-10-CM

## 2018-11-20 LAB
-: ABNORMAL
ABSOLUTE EOS #: 0.1 K/UL (ref 0–0.4)
ABSOLUTE IMMATURE GRANULOCYTE: ABNORMAL K/UL (ref 0–0.3)
ABSOLUTE LYMPH #: 2.9 K/UL (ref 1–4.8)
ABSOLUTE MONO #: 1.2 K/UL (ref 0.1–1.2)
AMORPHOUS: ABNORMAL
ANION GAP SERPL CALCULATED.3IONS-SCNC: 12 MMOL/L (ref 9–17)
BACTERIA: ABNORMAL
BASOPHILS # BLD: 1 % (ref 0–1)
BASOPHILS ABSOLUTE: 0.1 K/UL (ref 0–0.2)
BILIRUBIN URINE: NEGATIVE
BUN BLDV-MCNC: 27 MG/DL (ref 6–20)
BUN/CREAT BLD: 20 (ref 9–20)
CALCIUM SERPL-MCNC: 9.4 MG/DL (ref 8.6–10.4)
CASTS UA: ABNORMAL /LPF (ref 0–2)
CHLORIDE BLD-SCNC: 100 MMOL/L (ref 98–107)
CO2: 27 MMOL/L (ref 20–31)
COLOR: ABNORMAL
COMMENT UA: ABNORMAL
CREAT SERPL-MCNC: 1.33 MG/DL (ref 0.5–0.9)
CRYSTALS, UA: ABNORMAL /HPF
DIFFERENTIAL TYPE: ABNORMAL
EOSINOPHILS RELATIVE PERCENT: 1 % (ref 1–7)
EPITHELIAL CELLS UA: ABNORMAL /HPF (ref 0–5)
GFR AFRICAN AMERICAN: 51 ML/MIN
GFR NON-AFRICAN AMERICAN: 42 ML/MIN
GFR SERPL CREATININE-BSD FRML MDRD: ABNORMAL ML/MIN/{1.73_M2}
GFR SERPL CREATININE-BSD FRML MDRD: ABNORMAL ML/MIN/{1.73_M2}
GLUCOSE BLD-MCNC: 123 MG/DL (ref 70–99)
GLUCOSE URINE: NEGATIVE
HCT VFR BLD CALC: 37.9 % (ref 36–46)
HEMOGLOBIN: 12.2 G/DL (ref 12–16)
IMMATURE GRANULOCYTES: ABNORMAL %
KETONES, URINE: NEGATIVE
LEUKOCYTE ESTERASE, URINE: ABNORMAL
LYMPHOCYTES # BLD: 27 % (ref 16–46)
MCH RBC QN AUTO: 31 PG (ref 26–34)
MCHC RBC AUTO-ENTMCNC: 32.2 G/DL (ref 31–37)
MCV RBC AUTO: 96.3 FL (ref 80–100)
MONOCYTES # BLD: 11 % (ref 4–11)
MUCUS: ABNORMAL
NITRITE, URINE: NEGATIVE
NRBC AUTOMATED: ABNORMAL PER 100 WBC
OTHER OBSERVATIONS UA: ABNORMAL
PDW BLD-RTO: 14.3 % (ref 11–14.5)
PH UA: 5.5 (ref 5–6)
PLATELET # BLD: 151 K/UL (ref 140–450)
PLATELET ESTIMATE: ABNORMAL
PMV BLD AUTO: 8 FL (ref 6–12)
POTASSIUM SERPL-SCNC: 4.4 MMOL/L (ref 3.7–5.3)
PROTEIN UA: NEGATIVE
RBC # BLD: 3.93 M/UL (ref 4–5.2)
RBC # BLD: ABNORMAL 10*6/UL
RBC UA: ABNORMAL /HPF (ref 0–4)
RENAL EPITHELIAL, UA: ABNORMAL /HPF
SEG NEUTROPHILS: 60 % (ref 43–77)
SEGMENTED NEUTROPHILS ABSOLUTE COUNT: 6.3 K/UL (ref 1.8–7.7)
SODIUM BLD-SCNC: 139 MMOL/L (ref 135–144)
SPECIFIC GRAVITY UA: 1.02 (ref 1.01–1.02)
TRICHOMONAS: ABNORMAL
TURBIDITY: ABNORMAL
URINE HGB: NEGATIVE
UROBILINOGEN, URINE: NORMAL
WBC # BLD: 10.5 K/UL (ref 3.5–11)
WBC # BLD: ABNORMAL 10*3/UL
WBC UA: ABNORMAL /HPF (ref 0–4)
YEAST: ABNORMAL

## 2018-11-20 PROCEDURE — 6360000004 HC RX CONTRAST MEDICATION: Performed by: SPECIALIST

## 2018-11-20 PROCEDURE — 96360 HYDRATION IV INFUSION INIT: CPT

## 2018-11-20 PROCEDURE — 81001 URINALYSIS AUTO W/SCOPE: CPT

## 2018-11-20 PROCEDURE — 2580000003 HC RX 258: Performed by: SPECIALIST

## 2018-11-20 PROCEDURE — 85025 COMPLETE CBC W/AUTO DIFF WBC: CPT

## 2018-11-20 PROCEDURE — 36415 COLL VENOUS BLD VENIPUNCTURE: CPT

## 2018-11-20 PROCEDURE — 6370000000 HC RX 637 (ALT 250 FOR IP): Performed by: SPECIALIST

## 2018-11-20 PROCEDURE — 80048 BASIC METABOLIC PNL TOTAL CA: CPT

## 2018-11-20 PROCEDURE — 71260 CT THORAX DX C+: CPT

## 2018-11-20 PROCEDURE — 99284 EMERGENCY DEPT VISIT MOD MDM: CPT

## 2018-11-20 RX ORDER — TRAMADOL HYDROCHLORIDE 50 MG/1
50 TABLET ORAL EVERY 6 HOURS PRN
Qty: 60 TABLET | Refills: 0 | Status: SHIPPED | OUTPATIENT
Start: 2018-11-20 | End: 2019-02-14 | Stop reason: SDUPTHER

## 2018-11-20 RX ORDER — CEPHALEXIN 250 MG/1
500 CAPSULE ORAL ONCE
Status: COMPLETED | OUTPATIENT
Start: 2018-11-20 | End: 2018-11-20

## 2018-11-20 RX ORDER — SODIUM CHLORIDE 9 MG/ML
INJECTION, SOLUTION INTRAVENOUS CONTINUOUS
Status: DISCONTINUED | OUTPATIENT
Start: 2018-11-20 | End: 2018-11-20 | Stop reason: HOSPADM

## 2018-11-20 RX ORDER — CEPHALEXIN 500 MG/1
500 CAPSULE ORAL 3 TIMES DAILY
Qty: 21 CAPSULE | Refills: 0 | Status: SHIPPED | OUTPATIENT
Start: 2018-11-20 | End: 2019-01-02

## 2018-11-20 RX ORDER — ACETAMINOPHEN 325 MG/1
650 TABLET ORAL ONCE
Status: COMPLETED | OUTPATIENT
Start: 2018-11-20 | End: 2018-11-20

## 2018-11-20 RX ORDER — 0.9 % SODIUM CHLORIDE 0.9 %
500 INTRAVENOUS SOLUTION INTRAVENOUS ONCE
Status: DISCONTINUED | OUTPATIENT
Start: 2018-11-20 | End: 2018-11-20 | Stop reason: HOSPADM

## 2018-11-20 RX ADMIN — ACETAMINOPHEN 650 MG: 325 TABLET ORAL at 11:00

## 2018-11-20 RX ADMIN — SODIUM CHLORIDE: 9 INJECTION, SOLUTION INTRAVENOUS at 10:42

## 2018-11-20 RX ADMIN — IOPAMIDOL 80 ML: 755 INJECTION, SOLUTION INTRAVENOUS at 12:11

## 2018-11-20 RX ADMIN — CEPHALEXIN 500 MG: 250 CAPSULE ORAL at 14:43

## 2018-11-20 ASSESSMENT — ENCOUNTER SYMPTOMS: COLOR CHANGE: 1

## 2018-11-20 ASSESSMENT — PAIN DESCRIPTION - ORIENTATION: ORIENTATION: RIGHT

## 2018-11-20 ASSESSMENT — PAIN SCALES - GENERAL
PAINLEVEL_OUTOF10: 10

## 2018-11-20 ASSESSMENT — PAIN DESCRIPTION - LOCATION: LOCATION: ARM;BREAST

## 2018-11-20 NOTE — PROGRESS NOTES
Patient came to [facility] center for chemotherapy. Patient was seen at urgent care yesterday for right arm pain. Venous ultrasound was done and thrombosis noted.  called and notified and stated that it was OK to proceed with treatment. Patient states that pain and swelling in right arm is worse today and warm to the touch. Patient sent to the ER for further evaluation.

## 2018-11-20 NOTE — PROGRESS NOTES
Dr. Napoleon Low notified patient in ER. If she is admitted he said he will see her tomorrow as a consult if not he wants to see prior to last treatment. Sayra called and patient will see Dr. Napoleon Low in office Thursday November 29th at 1240. Did call ER and report this to Holly Obrien RN if patient is admitted to have PCU consult Dr. Napoleon Low for tomorrow. She verbalized understanding.

## 2018-11-21 ASSESSMENT — ENCOUNTER SYMPTOMS
SHORTNESS OF BREATH: 0
COLOR CHANGE: 1

## 2018-11-27 ENCOUNTER — OFFICE VISIT (OUTPATIENT)
Dept: FAMILY MEDICINE CLINIC | Age: 53
End: 2018-11-27
Payer: MEDICARE

## 2018-11-27 VITALS
DIASTOLIC BLOOD PRESSURE: 82 MMHG | HEIGHT: 66 IN | OXYGEN SATURATION: 96 % | TEMPERATURE: 99.4 F | WEIGHT: 282 LBS | HEART RATE: 106 BPM | BODY MASS INDEX: 45.32 KG/M2 | SYSTOLIC BLOOD PRESSURE: 128 MMHG

## 2018-11-27 DIAGNOSIS — I82.A11 ACUTE DEEP VEIN THROMBOSIS (DVT) OF AXILLARY VEIN OF RIGHT UPPER EXTREMITY (HCC): Primary | ICD-10-CM

## 2018-11-27 PROCEDURE — G8484 FLU IMMUNIZE NO ADMIN: HCPCS | Performed by: NURSE PRACTITIONER

## 2018-11-27 PROCEDURE — 3017F COLORECTAL CA SCREEN DOC REV: CPT | Performed by: NURSE PRACTITIONER

## 2018-11-27 PROCEDURE — 1036F TOBACCO NON-USER: CPT | Performed by: NURSE PRACTITIONER

## 2018-11-27 PROCEDURE — G8417 CALC BMI ABV UP PARAM F/U: HCPCS | Performed by: NURSE PRACTITIONER

## 2018-11-27 PROCEDURE — G8427 DOCREV CUR MEDS BY ELIG CLIN: HCPCS | Performed by: NURSE PRACTITIONER

## 2018-11-27 PROCEDURE — 99214 OFFICE O/P EST MOD 30 MIN: CPT | Performed by: NURSE PRACTITIONER

## 2018-11-27 ASSESSMENT — ENCOUNTER SYMPTOMS
COUGH: 0
NAUSEA: 0
ALLERGIC/IMMUNOLOGIC NEGATIVE: 1
SINUS PRESSURE: 0
DIARRHEA: 0
RESPIRATORY NEGATIVE: 1
TROUBLE SWALLOWING: 0
ABDOMINAL PAIN: 0
CONSTIPATION: 0
GASTROINTESTINAL NEGATIVE: 1
EYES NEGATIVE: 1
VOMITING: 0
SHORTNESS OF BREATH: 0
CHEST TIGHTNESS: 0

## 2018-11-27 NOTE — PROGRESS NOTES
Atypical Ductal Hyperplasia     Uterine Cancer Sister 32    Other Sister         breast cyst    Cataracts Mother     Glaucoma Mother     Diabetes Neg Hx        No Known Allergies    Current Outpatient Prescriptions   Medication Sig Dispense Refill    [START ON 12/11/2018] rivaroxaban (XARELTO) 20 MG TABS tablet Take 1 tablet by mouth daily (with breakfast) 90 tablet 1    traMADol (ULTRAM) 50 MG tablet Take 1 tablet by mouth every 6 hours as needed for Pain for up to 30 days. . 60 tablet 0    cephALEXin (KEFLEX) 500 MG capsule Take 1 capsule by mouth 3 times daily 21 capsule 0    rivaroxaban (XARELTO) 15 MG TABS tablet Take 1 tablet by mouth 2 times daily (with meals) for 21 days 42 tablet 0    clonazePAM (KLONOPIN) 0.5 MG tablet ONE  TABLET  TWICE  DAILY  AS  NEEDED. 60 tablet 2    potassium chloride (KLOR-CON M) 20 MEQ extended release tablet take 1 tablet by mouth once daily 30 tablet 1    letrozole (FEMARA) 2.5 MG tablet Take 1 tablet by mouth daily 30 tablet 5    RA VITAMIN B-12 TR 1000 MCG TBCR take 1 tablet by mouth once daily 30 tablet 3    folic acid (FOLVITE) 1 MG tablet take 1 tablet by mouth once daily 30 tablet 3    Cetirizine HCl (ZYRTEC ALLERGY) 10 MG CAPS Take by mouth      Calcium Carbonate-Vitamin D (OYSTER SHELL CALCIUM/D) 500-200 MG-UNIT TABS Take 1 tablet by mouth 2 times daily 60 tablet 5    nystatin (MYCOSTATIN) 033185 UNIT/ML suspension Take 5 mLs by mouth 4 times daily 1 Bottle 0    lidocaine-prilocaine (EMLA) 2.5-2.5 % cream Apply topically as needed.  1 Tube 1    ondansetron (ZOFRAN) 4 MG tablet Take 2 tablets by mouth every 6 hours as needed for Nausea or Vomiting 40 tablet 1    sodium chloride (ALTAMIST SPRAY) 0.65 % nasal spray 1 spray by Nasal route as needed for Congestion 1 Bottle 1    lactobacillus (CULTURELLE) CAPS capsule Take 1 capsule by mouth 3 times daily 90 capsule 0    prochlorperazine (COMPAZINE) 10 MG tablet Take 10 mg by mouth every 6 hours as needed

## 2018-11-29 ENCOUNTER — HOSPITAL ENCOUNTER (OUTPATIENT)
Dept: LAB | Age: 53
Discharge: HOME | End: 2018-11-29
Payer: MEDICARE

## 2018-11-29 ENCOUNTER — HOSPITAL ENCOUNTER (OUTPATIENT)
Dept: INFUSION THERAPY | Age: 53
Discharge: HOME | End: 2018-11-29
Payer: MEDICARE

## 2018-11-29 ENCOUNTER — OFFICE VISIT (OUTPATIENT)
Dept: ONCOLOGY | Age: 53
End: 2018-11-29
Payer: MEDICARE

## 2018-11-29 VITALS
HEART RATE: 98 BPM | DIASTOLIC BLOOD PRESSURE: 80 MMHG | TEMPERATURE: 99.6 F | SYSTOLIC BLOOD PRESSURE: 132 MMHG | WEIGHT: 282 LBS | BODY MASS INDEX: 45.32 KG/M2 | HEIGHT: 66 IN

## 2018-11-29 DIAGNOSIS — C50.811 MALIGNANT NEOPLASM OF OVERLAPPING SITES OF RIGHT BREAST IN FEMALE, ESTROGEN RECEPTOR NEGATIVE (HCC): ICD-10-CM

## 2018-11-29 DIAGNOSIS — C50.812 MALIGNANT NEOPLASM OF OVERLAPPING SITES OF LEFT BREAST IN FEMALE, ESTROGEN RECEPTOR NEGATIVE (HCC): ICD-10-CM

## 2018-11-29 DIAGNOSIS — Z17.0 MALIGNANT NEOPLASM OF BREAST IN FEMALE, ESTROGEN RECEPTOR POSITIVE, UNSPECIFIED LATERALITY, UNSPECIFIED SITE OF BREAST (HCC): Primary | ICD-10-CM

## 2018-11-29 DIAGNOSIS — I82.A11 ACUTE DEEP VEIN THROMBOSIS (DVT) OF AXILLARY VEIN OF RIGHT UPPER EXTREMITY (HCC): ICD-10-CM

## 2018-11-29 DIAGNOSIS — Z17.1 MALIGNANT NEOPLASM OF OVERLAPPING SITES OF RIGHT BREAST IN FEMALE, ESTROGEN RECEPTOR NEGATIVE (HCC): ICD-10-CM

## 2018-11-29 DIAGNOSIS — C50.811 MALIGNANT NEOPLASM OF OVERLAPPING SITES OF RIGHT FEMALE BREAST, UNSPECIFIED ESTROGEN RECEPTOR STATUS (HCC): ICD-10-CM

## 2018-11-29 DIAGNOSIS — Z17.1 MALIGNANT NEOPLASM OF OVERLAPPING SITES OF LEFT BREAST IN FEMALE, ESTROGEN RECEPTOR NEGATIVE (HCC): ICD-10-CM

## 2018-11-29 DIAGNOSIS — C50.919 MALIGNANT NEOPLASM OF BREAST IN FEMALE, ESTROGEN RECEPTOR POSITIVE, UNSPECIFIED LATERALITY, UNSPECIFIED SITE OF BREAST (HCC): Primary | ICD-10-CM

## 2018-11-29 DIAGNOSIS — C50.811 MALIGNANT NEOPLASM OF OVERLAPPING SITES OF RIGHT FEMALE BREAST, UNSPECIFIED ESTROGEN RECEPTOR STATUS (HCC): Primary | ICD-10-CM

## 2018-11-29 LAB
ABSOLUTE EOS #: 0.2 K/UL (ref 0–0.4)
ABSOLUTE IMMATURE GRANULOCYTE: ABNORMAL K/UL (ref 0–0.3)
ABSOLUTE LYMPH #: 2.8 K/UL (ref 1–4.8)
ABSOLUTE MONO #: 0.5 K/UL (ref 0.1–1.2)
ALBUMIN SERPL-MCNC: 3.9 G/DL (ref 3.5–5.2)
ALBUMIN/GLOBULIN RATIO: 1 (ref 1–2.5)
ALP BLD-CCNC: 71 U/L (ref 35–104)
ALT SERPL-CCNC: 11 U/L (ref 5–33)
ANION GAP SERPL CALCULATED.3IONS-SCNC: 15 MMOL/L (ref 9–17)
AST SERPL-CCNC: 13 U/L
BASOPHILS # BLD: 1 % (ref 0–1)
BASOPHILS ABSOLUTE: 0.1 K/UL (ref 0–0.2)
BILIRUB SERPL-MCNC: 0.27 MG/DL (ref 0.3–1.2)
BUN BLDV-MCNC: 23 MG/DL (ref 6–20)
BUN/CREAT BLD: 18 (ref 9–20)
CALCIUM SERPL-MCNC: 9.7 MG/DL (ref 8.6–10.4)
CHLORIDE BLD-SCNC: 103 MMOL/L (ref 98–107)
CO2: 25 MMOL/L (ref 20–31)
CREAT SERPL-MCNC: 1.26 MG/DL (ref 0.5–0.9)
DIFFERENTIAL TYPE: ABNORMAL
EOSINOPHILS RELATIVE PERCENT: 3 % (ref 1–7)
GFR AFRICAN AMERICAN: 54 ML/MIN
GFR NON-AFRICAN AMERICAN: 44 ML/MIN
GFR SERPL CREATININE-BSD FRML MDRD: ABNORMAL ML/MIN/{1.73_M2}
GFR SERPL CREATININE-BSD FRML MDRD: ABNORMAL ML/MIN/{1.73_M2}
GLUCOSE BLD-MCNC: 150 MG/DL (ref 70–99)
HCT VFR BLD CALC: 35.4 % (ref 36–46)
HEMOGLOBIN: 11.4 G/DL (ref 12–16)
IMMATURE GRANULOCYTES: ABNORMAL %
LYMPHOCYTES # BLD: 33 % (ref 16–46)
MCH RBC QN AUTO: 30.9 PG (ref 26–34)
MCHC RBC AUTO-ENTMCNC: 32.1 G/DL (ref 31–37)
MCV RBC AUTO: 96 FL (ref 80–100)
MONOCYTES # BLD: 6 % (ref 4–11)
NRBC AUTOMATED: ABNORMAL PER 100 WBC
PDW BLD-RTO: 13.7 % (ref 11–14.5)
PLATELET # BLD: 297 K/UL (ref 140–450)
PLATELET ESTIMATE: ABNORMAL
PMV BLD AUTO: 7.7 FL (ref 6–12)
POTASSIUM SERPL-SCNC: 4 MMOL/L (ref 3.7–5.3)
RBC # BLD: 3.69 M/UL (ref 4–5.2)
RBC # BLD: ABNORMAL 10*6/UL
SEG NEUTROPHILS: 57 % (ref 43–77)
SEGMENTED NEUTROPHILS ABSOLUTE COUNT: 4.9 K/UL (ref 1.8–7.7)
SODIUM BLD-SCNC: 143 MMOL/L (ref 135–144)
TOTAL PROTEIN: 8 G/DL (ref 6.4–8.3)
WBC # BLD: 8.4 K/UL (ref 3.5–11)
WBC # BLD: ABNORMAL 10*3/UL

## 2018-11-29 PROCEDURE — G8484 FLU IMMUNIZE NO ADMIN: HCPCS | Performed by: INTERNAL MEDICINE

## 2018-11-29 PROCEDURE — G8417 CALC BMI ABV UP PARAM F/U: HCPCS | Performed by: INTERNAL MEDICINE

## 2018-11-29 PROCEDURE — 6360000002 HC RX W HCPCS: Performed by: INTERNAL MEDICINE

## 2018-11-29 PROCEDURE — G8427 DOCREV CUR MEDS BY ELIG CLIN: HCPCS | Performed by: INTERNAL MEDICINE

## 2018-11-29 PROCEDURE — 85025 COMPLETE CBC W/AUTO DIFF WBC: CPT

## 2018-11-29 PROCEDURE — 3017F COLORECTAL CA SCREEN DOC REV: CPT | Performed by: INTERNAL MEDICINE

## 2018-11-29 PROCEDURE — 96413 CHEMO IV INFUSION 1 HR: CPT

## 2018-11-29 PROCEDURE — 1036F TOBACCO NON-USER: CPT | Performed by: INTERNAL MEDICINE

## 2018-11-29 PROCEDURE — 99215 OFFICE O/P EST HI 40 MIN: CPT | Performed by: INTERNAL MEDICINE

## 2018-11-29 PROCEDURE — 80053 COMPREHEN METABOLIC PANEL: CPT

## 2018-11-29 PROCEDURE — 2580000003 HC RX 258: Performed by: INTERNAL MEDICINE

## 2018-11-29 PROCEDURE — 36415 COLL VENOUS BLD VENIPUNCTURE: CPT

## 2018-11-29 RX ORDER — SODIUM CHLORIDE 9 MG/ML
INJECTION, SOLUTION INTRAVENOUS ONCE
Status: COMPLETED | OUTPATIENT
Start: 2018-11-29 | End: 2018-11-29

## 2018-11-29 RX ORDER — DIPHENHYDRAMINE HYDROCHLORIDE 50 MG/ML
50 INJECTION INTRAMUSCULAR; INTRAVENOUS ONCE
Status: CANCELLED | OUTPATIENT
Start: 2018-11-29 | End: 2018-11-29

## 2018-11-29 RX ORDER — HEPARIN SODIUM (PORCINE) LOCK FLUSH IV SOLN 100 UNIT/ML 100 UNIT/ML
500 SOLUTION INTRAVENOUS PRN
Status: CANCELLED | OUTPATIENT
Start: 2018-11-29

## 2018-11-29 RX ORDER — MEPERIDINE HYDROCHLORIDE 50 MG/ML
12.5 INJECTION INTRAMUSCULAR; INTRAVENOUS; SUBCUTANEOUS ONCE
Status: CANCELLED | OUTPATIENT
Start: 2018-11-29 | End: 2018-11-29

## 2018-11-29 RX ORDER — 0.9 % SODIUM CHLORIDE 0.9 %
10 VIAL (ML) INJECTION ONCE
Status: CANCELLED | OUTPATIENT
Start: 2018-11-29 | End: 2018-11-29

## 2018-11-29 RX ORDER — SODIUM CHLORIDE 9 MG/ML
INJECTION, SOLUTION INTRAVENOUS ONCE
Status: CANCELLED | OUTPATIENT
Start: 2018-11-29 | End: 2018-11-29

## 2018-11-29 RX ORDER — SODIUM CHLORIDE 9 MG/ML
INJECTION, SOLUTION INTRAVENOUS CONTINUOUS
Status: CANCELLED | OUTPATIENT
Start: 2018-11-29

## 2018-11-29 RX ORDER — SODIUM CHLORIDE 0.9 % (FLUSH) 0.9 %
10 SYRINGE (ML) INJECTION PRN
Status: CANCELLED | OUTPATIENT
Start: 2018-11-29

## 2018-11-29 RX ORDER — HEPARIN SODIUM (PORCINE) LOCK FLUSH IV SOLN 100 UNIT/ML 100 UNIT/ML
500 SOLUTION INTRAVENOUS PRN
Status: DISCONTINUED | OUTPATIENT
Start: 2018-11-29 | End: 2018-11-30 | Stop reason: HOSPADM

## 2018-11-29 RX ORDER — DIPHENHYDRAMINE HYDROCHLORIDE 50 MG/ML
50 INJECTION INTRAMUSCULAR; INTRAVENOUS ONCE
Status: CANCELLED | OUTPATIENT
Start: 2018-11-29

## 2018-11-29 RX ORDER — METHYLPREDNISOLONE SODIUM SUCCINATE 125 MG/2ML
125 INJECTION, POWDER, LYOPHILIZED, FOR SOLUTION INTRAMUSCULAR; INTRAVENOUS ONCE
Status: CANCELLED | OUTPATIENT
Start: 2018-11-29 | End: 2018-11-29

## 2018-11-29 RX ORDER — SODIUM CHLORIDE 0.9 % (FLUSH) 0.9 %
10 SYRINGE (ML) INJECTION PRN
Status: DISCONTINUED | OUTPATIENT
Start: 2018-11-29 | End: 2018-11-30 | Stop reason: HOSPADM

## 2018-11-29 RX ORDER — 0.9 % SODIUM CHLORIDE 0.9 %
10 VIAL (ML) INJECTION ONCE
Status: CANCELLED | OUTPATIENT
Start: 2018-11-29

## 2018-11-29 RX ORDER — SODIUM CHLORIDE 0.9 % (FLUSH) 0.9 %
5 SYRINGE (ML) INJECTION PRN
Status: CANCELLED | OUTPATIENT
Start: 2018-11-29

## 2018-11-29 RX ADMIN — Medication 10 ML: at 13:55

## 2018-11-29 RX ADMIN — HEPARIN SODIUM (PORCINE) LOCK FLUSH IV SOLN 100 UNIT/ML 500 UNITS: 100 SOLUTION at 15:14

## 2018-11-29 RX ADMIN — HEPARIN SODIUM (PORCINE) LOCK FLUSH IV SOLN 100 UNIT/ML 500 UNITS: 100 SOLUTION at 15:13

## 2018-11-29 RX ADMIN — SODIUM CHLORIDE: 9 INJECTION, SOLUTION INTRAVENOUS at 14:00

## 2018-11-29 RX ADMIN — Medication 10 ML: at 13:58

## 2018-11-29 RX ADMIN — TRASTUZUMAB 750 MG: 150 INJECTION, POWDER, LYOPHILIZED, FOR SOLUTION INTRAVENOUS at 14:34

## 2018-11-29 NOTE — PROGRESS NOTES
Reason for Exam: 0.920 mCi 99mTc filtered Sulfur Colloid injected subcutaneous around RT nipple. Acuity: Unknown Type of Exam: Unknown FINDINGS: There appears to be migration into the right axilla suggestive of the sentinel node. Migration of the radiotracer into the right axilla suggestive of the patient's sentinel node. MRI brain 2/5/18  Impression   1. Unremarkable MRI of the brain.  No evidence of metastatic disease. 2. Acute left sphenoid sinusitis. [facility] W Or Wo Cad Bilateral: 10/5/2018    No mammographic evidence of malignancy. 2. Postoperative changes of the right breast. BIRADS: BIRADS - CATEGORY 2 Benign, no evidence of malignancy. Normal interval follow-up is recommended in 12 months. OVERALL ASSESSMENT - BENIGN A letter of notification will be sent to the patient regarding the results. The [employer] recommends annual mammograms for women 40 years and older. Mri Orbits Face Neck W Wo Contrast: 10/11/2018    No convincing evidence of abnormal intracranial enhancement to suggest intracranial metastasis. 2. Bilateral internal auditory canals, cisternal segment of cranial nerve V, and fluid-filled inner ear structures are unremarkable. 3. Borderline enlarged right level II lymph node measuring 13 mm. there are additional nonenlarged upper neck lymph nodes. These are nonspecific by imaging and may be reactive. Given history of breast cancer, clinical correlation is recommended. Follow-up CT neck may be obtained as clinically warranted. Impression:  Invasive ductal carcinoma of right breast, stage IIa. pT,pN0,M0. ER negative, AR weakly positive. HER-2 amplified.   Status post right mastectomy with sentinel lymph node biopsy  Twitching of left eye, MRI negative  Arthralgias  Family history of breast cancer  Lower back pain  Postmenopausal  Right upper extremity DVT of the axillary and basilic veins and currently on Xarelto    Plan:  I had a

## 2018-11-29 NOTE — PROGRESS NOTES
Patient on unit for cycle 17. Her last treatment. Dual port accessed both sides both sides flush easily with saline. Good blood return on proximal (medial port) Using medial port. Distal port had slight blood return. Medial port infusing NSS without complaint form patient. Patient recently been treated for DVT of right arm. Dr. Nick Khan  Had seen patient in office prior to coming to unit. He gave verbal ok to patient to precede with last treatment. He did toxicity assessment. No further complaints from patient.

## 2018-11-29 NOTE — PROGRESS NOTES
Herceptin infused and d/c'd. Proximal Mediport flushed with 30 ml NSS and 5 ml heparin. Distal port flushed with 5 ml heparin. Patient tolerated infusion without any difficulty. Guerrero needles d/c'd, pressure dressing applies to site. Completion certificate given to patient along with AVS.  Patient denies any complaints and discharged to home.

## 2018-12-04 PROBLEM — Z17.0 MALIGNANT NEOPLASM OF BREAST IN FEMALE, ESTROGEN RECEPTOR POSITIVE (HCC): Status: ACTIVE | Noted: 2018-12-04

## 2018-12-04 PROBLEM — I82.A11 ACUTE DEEP VEIN THROMBOSIS (DVT) OF AXILLARY VEIN OF RIGHT UPPER EXTREMITY (HCC): Status: ACTIVE | Noted: 2018-12-04

## 2018-12-04 PROBLEM — C50.919 MALIGNANT NEOPLASM OF BREAST IN FEMALE, ESTROGEN RECEPTOR POSITIVE (HCC): Status: ACTIVE | Noted: 2018-12-04

## 2018-12-05 DIAGNOSIS — C50.919 MALIGNANT NEOPLASM OF FEMALE BREAST, UNSPECIFIED ESTROGEN RECEPTOR STATUS, UNSPECIFIED LATERALITY, UNSPECIFIED SITE OF BREAST (HCC): Primary | ICD-10-CM

## 2018-12-07 RX ORDER — FOLIC ACID 1 MG/1
TABLET ORAL
Qty: 30 TABLET | Refills: 3 | Status: SHIPPED | OUTPATIENT
Start: 2018-12-07 | End: 2019-06-30 | Stop reason: SDUPTHER

## 2018-12-07 RX ORDER — POTASSIUM CHLORIDE 20 MEQ/1
TABLET, EXTENDED RELEASE ORAL
Qty: 30 TABLET | Refills: 1 | Status: SHIPPED | OUTPATIENT
Start: 2018-12-07 | End: 2019-03-06 | Stop reason: SDUPTHER

## 2018-12-11 ENCOUNTER — TELEPHONE (OUTPATIENT)
Dept: CASE MANAGEMENT | Age: 53
End: 2018-12-11

## 2018-12-14 ENCOUNTER — OFFICE VISIT (OUTPATIENT)
Dept: PRIMARY CARE CLINIC | Age: 53
End: 2018-12-14
Payer: MEDICARE

## 2018-12-14 VITALS
HEART RATE: 86 BPM | DIASTOLIC BLOOD PRESSURE: 80 MMHG | OXYGEN SATURATION: 96 % | BODY MASS INDEX: 43.78 KG/M2 | WEIGHT: 272.4 LBS | RESPIRATION RATE: 18 BRPM | SYSTOLIC BLOOD PRESSURE: 116 MMHG | HEIGHT: 66 IN | TEMPERATURE: 98.4 F

## 2018-12-14 DIAGNOSIS — J02.9 PHARYNGITIS, UNSPECIFIED ETIOLOGY: ICD-10-CM

## 2018-12-14 DIAGNOSIS — J01.40 ACUTE NON-RECURRENT PANSINUSITIS: Primary | ICD-10-CM

## 2018-12-14 PROCEDURE — G8417 CALC BMI ABV UP PARAM F/U: HCPCS | Performed by: FAMILY MEDICINE

## 2018-12-14 PROCEDURE — 99214 OFFICE O/P EST MOD 30 MIN: CPT | Performed by: FAMILY MEDICINE

## 2018-12-14 PROCEDURE — G8484 FLU IMMUNIZE NO ADMIN: HCPCS | Performed by: FAMILY MEDICINE

## 2018-12-14 PROCEDURE — G8427 DOCREV CUR MEDS BY ELIG CLIN: HCPCS | Performed by: FAMILY MEDICINE

## 2018-12-14 PROCEDURE — 1036F TOBACCO NON-USER: CPT | Performed by: FAMILY MEDICINE

## 2018-12-14 PROCEDURE — 3017F COLORECTAL CA SCREEN DOC REV: CPT | Performed by: FAMILY MEDICINE

## 2018-12-14 RX ORDER — CEFUROXIME AXETIL 500 MG/1
500 TABLET ORAL 2 TIMES DAILY
Qty: 20 TABLET | Refills: 0 | Status: SHIPPED | OUTPATIENT
Start: 2018-12-14 | End: 2019-01-02

## 2018-12-14 ASSESSMENT — ENCOUNTER SYMPTOMS
COUGH: 1
CONSTIPATION: 0
RHINORRHEA: 1
SINUS PAIN: 1
EYE DISCHARGE: 0
SHORTNESS OF BREATH: 0
TROUBLE SWALLOWING: 0
NAUSEA: 0
SORE THROAT: 1
SINUS PRESSURE: 1
DIARRHEA: 0
EYE REDNESS: 0
VOMITING: 0
WHEEZING: 0
ABDOMINAL PAIN: 0

## 2019-01-02 ENCOUNTER — OFFICE VISIT (OUTPATIENT)
Dept: ONCOLOGY | Age: 54
End: 2019-01-02
Payer: MEDICARE

## 2019-01-02 ENCOUNTER — HOSPITAL ENCOUNTER (OUTPATIENT)
Dept: INFUSION THERAPY | Age: 54
Discharge: HOME | End: 2019-01-02
Payer: MEDICARE

## 2019-01-02 VITALS
BODY MASS INDEX: 44.2 KG/M2 | OXYGEN SATURATION: 97 % | WEIGHT: 275 LBS | SYSTOLIC BLOOD PRESSURE: 120 MMHG | HEART RATE: 92 BPM | HEIGHT: 66 IN | TEMPERATURE: 99.6 F | DIASTOLIC BLOOD PRESSURE: 70 MMHG

## 2019-01-02 DIAGNOSIS — C50.919 MALIGNANT NEOPLASM OF BREAST IN FEMALE, ESTROGEN RECEPTOR POSITIVE, UNSPECIFIED LATERALITY, UNSPECIFIED SITE OF BREAST (HCC): ICD-10-CM

## 2019-01-02 DIAGNOSIS — C50.811 MALIGNANT NEOPLASM OF OVERLAPPING SITES OF RIGHT BREAST IN FEMALE, ESTROGEN RECEPTOR NEGATIVE (HCC): ICD-10-CM

## 2019-01-02 DIAGNOSIS — Z17.1 MALIGNANT NEOPLASM OF OVERLAPPING SITES OF RIGHT BREAST IN FEMALE, ESTROGEN RECEPTOR NEGATIVE (HCC): ICD-10-CM

## 2019-01-02 DIAGNOSIS — I82.A11 ACUTE DEEP VEIN THROMBOSIS (DVT) OF AXILLARY VEIN OF RIGHT UPPER EXTREMITY (HCC): Primary | ICD-10-CM

## 2019-01-02 DIAGNOSIS — Z17.0 MALIGNANT NEOPLASM OF BREAST IN FEMALE, ESTROGEN RECEPTOR POSITIVE, UNSPECIFIED LATERALITY, UNSPECIFIED SITE OF BREAST (HCC): ICD-10-CM

## 2019-01-02 PROCEDURE — 96523 IRRIG DRUG DELIVERY DEVICE: CPT

## 2019-01-02 PROCEDURE — 3017F COLORECTAL CA SCREEN DOC REV: CPT | Performed by: INTERNAL MEDICINE

## 2019-01-02 PROCEDURE — 6360000002 HC RX W HCPCS: Performed by: INTERNAL MEDICINE

## 2019-01-02 PROCEDURE — 2580000003 HC RX 258: Performed by: INTERNAL MEDICINE

## 2019-01-02 PROCEDURE — 99214 OFFICE O/P EST MOD 30 MIN: CPT | Performed by: INTERNAL MEDICINE

## 2019-01-02 PROCEDURE — 1036F TOBACCO NON-USER: CPT | Performed by: INTERNAL MEDICINE

## 2019-01-02 PROCEDURE — G8417 CALC BMI ABV UP PARAM F/U: HCPCS | Performed by: INTERNAL MEDICINE

## 2019-01-02 PROCEDURE — G8427 DOCREV CUR MEDS BY ELIG CLIN: HCPCS | Performed by: INTERNAL MEDICINE

## 2019-01-02 PROCEDURE — G8484 FLU IMMUNIZE NO ADMIN: HCPCS | Performed by: INTERNAL MEDICINE

## 2019-01-02 RX ORDER — HEPARIN SODIUM (PORCINE) LOCK FLUSH IV SOLN 100 UNIT/ML 100 UNIT/ML
500 SOLUTION INTRAVENOUS PRN
Status: DISCONTINUED | OUTPATIENT
Start: 2019-01-02 | End: 2019-01-03 | Stop reason: HOSPADM

## 2019-01-02 RX ORDER — SODIUM CHLORIDE 0.9 % (FLUSH) 0.9 %
10 SYRINGE (ML) INJECTION PRN
Status: DISCONTINUED | OUTPATIENT
Start: 2019-01-02 | End: 2019-01-03 | Stop reason: HOSPADM

## 2019-01-02 RX ORDER — 0.9 % SODIUM CHLORIDE 0.9 %
1000 INTRAVENOUS SOLUTION INTRAVENOUS ONCE
Status: CANCELLED
Start: 2019-01-02 | End: 2019-01-02

## 2019-01-02 RX ORDER — HEPARIN SODIUM (PORCINE) LOCK FLUSH IV SOLN 100 UNIT/ML 100 UNIT/ML
500 SOLUTION INTRAVENOUS PRN
Status: CANCELLED | OUTPATIENT
Start: 2019-01-02

## 2019-01-02 RX ORDER — SODIUM CHLORIDE 0.9 % (FLUSH) 0.9 %
10 SYRINGE (ML) INJECTION PRN
Status: CANCELLED | OUTPATIENT
Start: 2019-01-02

## 2019-01-02 RX ADMIN — Medication 10 ML: at 16:08

## 2019-01-02 RX ADMIN — HEPARIN SODIUM (PORCINE) LOCK FLUSH IV SOLN 100 UNIT/ML 500 UNITS: 100 SOLUTION at 16:06

## 2019-01-02 RX ADMIN — Medication 10 ML: at 16:06

## 2019-01-02 RX ADMIN — HEPARIN SODIUM (PORCINE) LOCK FLUSH IV SOLN 100 UNIT/ML 500 UNITS: 100 SOLUTION at 16:08

## 2019-01-02 NOTE — PROGRESS NOTES
Dual VAD ACCESSED and flushe with saline 10 ml each site and 5 ml of heparin flush. Distal port appeared hard and slightly swollen after flush She reported tasting saline when it flushed. Patient stable and discharged to home.

## 2019-01-04 ENCOUNTER — INITIAL CONSULT (OUTPATIENT)
Dept: SURGERY | Age: 54
End: 2019-01-04
Payer: MEDICARE

## 2019-01-04 ENCOUNTER — TELEPHONE (OUTPATIENT)
Dept: SURGERY | Age: 54
End: 2019-01-04

## 2019-01-04 VITALS
HEART RATE: 68 BPM | TEMPERATURE: 99.3 F | HEIGHT: 66 IN | SYSTOLIC BLOOD PRESSURE: 122 MMHG | DIASTOLIC BLOOD PRESSURE: 64 MMHG | WEIGHT: 275 LBS | BODY MASS INDEX: 44.2 KG/M2

## 2019-01-04 DIAGNOSIS — C50.911 INVASIVE DUCTAL CARCINOMA OF BREAST, RIGHT (HCC): Primary | ICD-10-CM

## 2019-01-04 PROCEDURE — 3017F COLORECTAL CA SCREEN DOC REV: CPT | Performed by: SURGERY

## 2019-01-04 PROCEDURE — 99213 OFFICE O/P EST LOW 20 MIN: CPT | Performed by: SURGERY

## 2019-01-04 PROCEDURE — G8427 DOCREV CUR MEDS BY ELIG CLIN: HCPCS | Performed by: SURGERY

## 2019-01-04 PROCEDURE — G8484 FLU IMMUNIZE NO ADMIN: HCPCS | Performed by: SURGERY

## 2019-01-04 PROCEDURE — G8417 CALC BMI ABV UP PARAM F/U: HCPCS | Performed by: SURGERY

## 2019-01-04 PROCEDURE — 1036F TOBACCO NON-USER: CPT | Performed by: SURGERY

## 2019-01-04 ASSESSMENT — PATIENT HEALTH QUESTIONNAIRE - PHQ9
2. FEELING DOWN, DEPRESSED OR HOPELESS: 0
SUM OF ALL RESPONSES TO PHQ QUESTIONS 1-9: 0
SUM OF ALL RESPONSES TO PHQ9 QUESTIONS 1 & 2: 0
1. LITTLE INTEREST OR PLEASURE IN DOING THINGS: 0
SUM OF ALL RESPONSES TO PHQ QUESTIONS 1-9: 0

## 2019-01-07 ENCOUNTER — TELEPHONE (OUTPATIENT)
Dept: SURGERY | Age: 54
End: 2019-01-07

## 2019-01-24 ENCOUNTER — OFFICE VISIT (OUTPATIENT)
Dept: FAMILY MEDICINE CLINIC | Age: 54
End: 2019-01-24
Payer: MEDICARE

## 2019-01-24 VITALS
SYSTOLIC BLOOD PRESSURE: 110 MMHG | BODY MASS INDEX: 44.39 KG/M2 | WEIGHT: 276.2 LBS | HEART RATE: 84 BPM | RESPIRATION RATE: 16 BRPM | OXYGEN SATURATION: 98 % | HEIGHT: 66 IN | DIASTOLIC BLOOD PRESSURE: 70 MMHG

## 2019-01-24 DIAGNOSIS — E66.01 MORBID OBESITY WITH BMI OF 40.0-44.9, ADULT (HCC): ICD-10-CM

## 2019-01-24 DIAGNOSIS — B96.89 ACUTE BACTERIAL SINUSITIS: ICD-10-CM

## 2019-01-24 DIAGNOSIS — Z17.1 MALIGNANT NEOPLASM OF OVERLAPPING SITES OF RIGHT BREAST IN FEMALE, ESTROGEN RECEPTOR NEGATIVE (HCC): ICD-10-CM

## 2019-01-24 DIAGNOSIS — F31.9 BIPOLAR 1 DISORDER (HCC): ICD-10-CM

## 2019-01-24 DIAGNOSIS — J30.2 SEASONAL ALLERGIES: ICD-10-CM

## 2019-01-24 DIAGNOSIS — Z23 NEED FOR INFLUENZA VACCINATION: ICD-10-CM

## 2019-01-24 DIAGNOSIS — Z00.00 ROUTINE HEALTH MAINTENANCE: Primary | ICD-10-CM

## 2019-01-24 DIAGNOSIS — E78.49 OTHER HYPERLIPIDEMIA: ICD-10-CM

## 2019-01-24 DIAGNOSIS — J01.90 ACUTE BACTERIAL SINUSITIS: ICD-10-CM

## 2019-01-24 DIAGNOSIS — C50.811 MALIGNANT NEOPLASM OF OVERLAPPING SITES OF RIGHT BREAST IN FEMALE, ESTROGEN RECEPTOR NEGATIVE (HCC): ICD-10-CM

## 2019-01-24 DIAGNOSIS — I82.A11 ACUTE DEEP VEIN THROMBOSIS (DVT) OF AXILLARY VEIN OF RIGHT UPPER EXTREMITY (HCC): ICD-10-CM

## 2019-01-24 PROCEDURE — G0438 PPPS, INITIAL VISIT: HCPCS | Performed by: NURSE PRACTITIONER

## 2019-01-24 PROCEDURE — 90686 IIV4 VACC NO PRSV 0.5 ML IM: CPT | Performed by: NURSE PRACTITIONER

## 2019-01-24 PROCEDURE — G8484 FLU IMMUNIZE NO ADMIN: HCPCS | Performed by: NURSE PRACTITIONER

## 2019-01-24 PROCEDURE — G0008 ADMIN INFLUENZA VIRUS VAC: HCPCS | Performed by: NURSE PRACTITIONER

## 2019-01-24 RX ORDER — AMOXICILLIN 500 MG/1
500 CAPSULE ORAL 3 TIMES DAILY
Qty: 30 CAPSULE | Refills: 0 | Status: SHIPPED | OUTPATIENT
Start: 2019-01-24 | End: 2019-02-22 | Stop reason: ALTCHOICE

## 2019-01-24 RX ORDER — LORATADINE 10 MG/1
10 TABLET ORAL DAILY
Qty: 30 TABLET | Refills: 5 | Status: SHIPPED | OUTPATIENT
Start: 2019-01-24 | End: 2019-02-23

## 2019-01-24 ASSESSMENT — PATIENT HEALTH QUESTIONNAIRE - PHQ9
SUM OF ALL RESPONSES TO PHQ QUESTIONS 1-9: 1
SUM OF ALL RESPONSES TO PHQ9 QUESTIONS 1 & 2: 1
1. LITTLE INTEREST OR PLEASURE IN DOING THINGS: 0
2. FEELING DOWN, DEPRESSED OR HOPELESS: 1
SUM OF ALL RESPONSES TO PHQ QUESTIONS 1-9: 1

## 2019-01-24 ASSESSMENT — ENCOUNTER SYMPTOMS
SINUS PAIN: 1
DIARRHEA: 0
SINUS PRESSURE: 1
ALLERGIC/IMMUNOLOGIC NEGATIVE: 1
NAUSEA: 0
GASTROINTESTINAL NEGATIVE: 1
SORE THROAT: 1
EYES NEGATIVE: 1
CONSTIPATION: 0
VOMITING: 0
COUGH: 1

## 2019-02-11 ENCOUNTER — HOSPITAL ENCOUNTER (OUTPATIENT)
Dept: LAB | Age: 54
Discharge: HOME | End: 2019-02-11
Payer: MEDICARE

## 2019-02-11 DIAGNOSIS — E78.49 OTHER HYPERLIPIDEMIA: ICD-10-CM

## 2019-02-11 DIAGNOSIS — Z00.00 ROUTINE HEALTH MAINTENANCE: ICD-10-CM

## 2019-02-11 LAB
ABSOLUTE EOS #: 0.1 K/UL (ref 0–0.4)
ABSOLUTE IMMATURE GRANULOCYTE: ABNORMAL K/UL (ref 0–0.3)
ABSOLUTE LYMPH #: 2.1 K/UL (ref 1–4.8)
ABSOLUTE MONO #: 0.5 K/UL (ref 0.1–1.2)
ALBUMIN SERPL-MCNC: 4.1 G/DL (ref 3.5–5.2)
ALBUMIN/GLOBULIN RATIO: 1.1 (ref 1–2.5)
ALP BLD-CCNC: 75 U/L (ref 35–104)
ALT SERPL-CCNC: 9 U/L (ref 5–33)
ANION GAP SERPL CALCULATED.3IONS-SCNC: 14 MMOL/L (ref 9–17)
AST SERPL-CCNC: 9 U/L
BASOPHILS # BLD: 1 % (ref 0–1)
BASOPHILS ABSOLUTE: 0 K/UL (ref 0–0.2)
BILIRUB SERPL-MCNC: 0.25 MG/DL (ref 0.3–1.2)
BUN BLDV-MCNC: 23 MG/DL (ref 6–20)
BUN/CREAT BLD: 21 (ref 9–20)
CALCIUM SERPL-MCNC: 10 MG/DL (ref 8.6–10.4)
CHLORIDE BLD-SCNC: 103 MMOL/L (ref 98–107)
CHOLESTEROL/HDL RATIO: 5.3
CHOLESTEROL: 216 MG/DL
CO2: 25 MMOL/L (ref 20–31)
CREAT SERPL-MCNC: 1.08 MG/DL (ref 0.5–0.9)
DIFFERENTIAL TYPE: ABNORMAL
EOSINOPHILS RELATIVE PERCENT: 2 % (ref 1–7)
GFR AFRICAN AMERICAN: >60 ML/MIN
GFR NON-AFRICAN AMERICAN: 53 ML/MIN
GFR SERPL CREATININE-BSD FRML MDRD: ABNORMAL ML/MIN/{1.73_M2}
GFR SERPL CREATININE-BSD FRML MDRD: ABNORMAL ML/MIN/{1.73_M2}
GLUCOSE BLD-MCNC: 109 MG/DL (ref 70–99)
HCT VFR BLD CALC: 40.5 % (ref 36–46)
HDLC SERPL-MCNC: 41 MG/DL
HEMOGLOBIN: 13 G/DL (ref 12–16)
HEPATITIS C ANTIBODY: NONREACTIVE
HIV AG/AB: NONREACTIVE
IMMATURE GRANULOCYTES: ABNORMAL %
LDL CHOLESTEROL: 138 MG/DL (ref 0–130)
LYMPHOCYTES # BLD: 30 % (ref 16–46)
MCH RBC QN AUTO: 30.5 PG (ref 26–34)
MCHC RBC AUTO-ENTMCNC: 32.1 G/DL (ref 31–37)
MCV RBC AUTO: 94.9 FL (ref 80–100)
MONOCYTES # BLD: 7 % (ref 4–11)
NRBC AUTOMATED: ABNORMAL PER 100 WBC
PDW BLD-RTO: 15 % (ref 11–14.5)
PLATELET # BLD: 205 K/UL (ref 140–450)
PLATELET ESTIMATE: ABNORMAL
PMV BLD AUTO: 7.7 FL (ref 6–12)
POTASSIUM SERPL-SCNC: 4.3 MMOL/L (ref 3.7–5.3)
RBC # BLD: 4.26 M/UL (ref 4–5.2)
RBC # BLD: ABNORMAL 10*6/UL
SEG NEUTROPHILS: 60 % (ref 43–77)
SEGMENTED NEUTROPHILS ABSOLUTE COUNT: 4.5 K/UL (ref 1.8–7.7)
SODIUM BLD-SCNC: 142 MMOL/L (ref 135–144)
TOTAL PROTEIN: 8 G/DL (ref 6.4–8.3)
TRIGL SERPL-MCNC: 184 MG/DL
VLDLC SERPL CALC-MCNC: ABNORMAL MG/DL (ref 1–30)
WBC # BLD: 7.3 K/UL (ref 3.5–11)
WBC # BLD: ABNORMAL 10*3/UL

## 2019-02-11 PROCEDURE — 80061 LIPID PANEL: CPT

## 2019-02-11 PROCEDURE — 83036 HEMOGLOBIN GLYCOSYLATED A1C: CPT

## 2019-02-11 PROCEDURE — 87389 HIV-1 AG W/HIV-1&-2 AB AG IA: CPT

## 2019-02-11 PROCEDURE — 80053 COMPREHEN METABOLIC PANEL: CPT

## 2019-02-11 PROCEDURE — 36415 COLL VENOUS BLD VENIPUNCTURE: CPT

## 2019-02-11 PROCEDURE — 85025 COMPLETE CBC W/AUTO DIFF WBC: CPT

## 2019-02-11 PROCEDURE — 86803 HEPATITIS C AB TEST: CPT

## 2019-02-12 ENCOUNTER — TELEPHONE (OUTPATIENT)
Dept: FAMILY MEDICINE CLINIC | Age: 54
End: 2019-02-12

## 2019-02-12 DIAGNOSIS — E78.49 OTHER HYPERLIPIDEMIA: Primary | ICD-10-CM

## 2019-02-12 LAB
ESTIMATED AVERAGE GLUCOSE: 111 MG/DL
HBA1C MFR BLD: 5.5 % (ref 4.8–5.9)

## 2019-02-12 RX ORDER — PRAVASTATIN SODIUM 20 MG
20 TABLET ORAL DAILY
Qty: 90 TABLET | Refills: 1 | Status: SHIPPED | OUTPATIENT
Start: 2019-02-12 | End: 2019-02-22 | Stop reason: DRUGHIGH

## 2019-02-13 ENCOUNTER — HOSPITAL ENCOUNTER (OUTPATIENT)
Dept: INFUSION THERAPY | Age: 54
Discharge: HOME | End: 2019-02-13
Payer: MEDICARE

## 2019-02-13 DIAGNOSIS — Z17.1 MALIGNANT NEOPLASM OF OVERLAPPING SITES OF RIGHT BREAST IN FEMALE, ESTROGEN RECEPTOR NEGATIVE (HCC): ICD-10-CM

## 2019-02-13 DIAGNOSIS — C50.811 MALIGNANT NEOPLASM OF OVERLAPPING SITES OF RIGHT BREAST IN FEMALE, ESTROGEN RECEPTOR NEGATIVE (HCC): ICD-10-CM

## 2019-02-13 PROCEDURE — 2580000003 HC RX 258: Performed by: INTERNAL MEDICINE

## 2019-02-13 PROCEDURE — 6360000002 HC RX W HCPCS: Performed by: INTERNAL MEDICINE

## 2019-02-13 PROCEDURE — 96523 IRRIG DRUG DELIVERY DEVICE: CPT

## 2019-02-13 RX ORDER — SODIUM CHLORIDE 0.9 % (FLUSH) 0.9 %
10 SYRINGE (ML) INJECTION PRN
Status: CANCELLED | OUTPATIENT
Start: 2019-02-13

## 2019-02-13 RX ORDER — HEPARIN SODIUM (PORCINE) LOCK FLUSH IV SOLN 100 UNIT/ML 100 UNIT/ML
500 SOLUTION INTRAVENOUS PRN
Status: CANCELLED | OUTPATIENT
Start: 2019-02-13

## 2019-02-13 RX ORDER — SODIUM CHLORIDE 0.9 % (FLUSH) 0.9 %
10 SYRINGE (ML) INJECTION PRN
Status: DISCONTINUED | OUTPATIENT
Start: 2019-02-13 | End: 2019-02-14 | Stop reason: HOSPADM

## 2019-02-13 RX ORDER — HEPARIN SODIUM (PORCINE) LOCK FLUSH IV SOLN 100 UNIT/ML 100 UNIT/ML
500 SOLUTION INTRAVENOUS PRN
Status: DISCONTINUED | OUTPATIENT
Start: 2019-02-13 | End: 2019-02-14 | Stop reason: HOSPADM

## 2019-02-13 RX ADMIN — HEPARIN SODIUM (PORCINE) LOCK FLUSH IV SOLN 100 UNIT/ML 500 UNITS: 100 SOLUTION at 13:52

## 2019-02-13 RX ADMIN — HEPARIN SODIUM (PORCINE) LOCK FLUSH IV SOLN 100 UNIT/ML 500 UNITS: 100 SOLUTION at 13:53

## 2019-02-13 RX ADMIN — Medication 10 ML: at 13:53

## 2019-02-13 RX ADMIN — Medication 10 ML: at 13:52

## 2019-02-13 NOTE — PROGRESS NOTES
Proximal and distal VADs accessed per protocol using 20 gauge 3/4\" mora needles. Both ports flush easily but Negative for blood return in each. Flushed both ports  with 10 ml normal saline and 5 ml Heplock solution. Mora needles dc'd. Pressure dressing applied to site. Patient tolerated procedure with out difficulty. Patient discharged to home.

## 2019-02-14 DIAGNOSIS — Z17.0 MALIGNANT NEOPLASM OF BREAST IN FEMALE, ESTROGEN RECEPTOR POSITIVE, UNSPECIFIED LATERALITY, UNSPECIFIED SITE OF BREAST (HCC): ICD-10-CM

## 2019-02-14 DIAGNOSIS — C50.919 MALIGNANT NEOPLASM OF BREAST IN FEMALE, ESTROGEN RECEPTOR POSITIVE, UNSPECIFIED LATERALITY, UNSPECIFIED SITE OF BREAST (HCC): ICD-10-CM

## 2019-02-19 RX ORDER — TRAMADOL HYDROCHLORIDE 50 MG/1
50 TABLET ORAL EVERY 6 HOURS PRN
Qty: 60 TABLET | Refills: 0 | Status: SHIPPED | OUTPATIENT
Start: 2019-02-19 | End: 2019-03-21

## 2019-02-22 ENCOUNTER — OFFICE VISIT (OUTPATIENT)
Dept: SURGERY | Age: 54
End: 2019-02-22
Payer: MEDICARE

## 2019-02-22 VITALS
HEART RATE: 84 BPM | HEIGHT: 67 IN | DIASTOLIC BLOOD PRESSURE: 90 MMHG | SYSTOLIC BLOOD PRESSURE: 120 MMHG | WEIGHT: 275.4 LBS | BODY MASS INDEX: 43.22 KG/M2 | RESPIRATION RATE: 18 BRPM

## 2019-02-22 DIAGNOSIS — Z17.1 MALIGNANT NEOPLASM OF OVERLAPPING SITES OF RIGHT BREAST IN FEMALE, ESTROGEN RECEPTOR NEGATIVE (HCC): Primary | ICD-10-CM

## 2019-02-22 DIAGNOSIS — C50.811 MALIGNANT NEOPLASM OF OVERLAPPING SITES OF RIGHT BREAST IN FEMALE, ESTROGEN RECEPTOR NEGATIVE (HCC): Primary | ICD-10-CM

## 2019-02-22 DIAGNOSIS — Z90.11 H/O RIGHT MASTECTOMY: ICD-10-CM

## 2019-02-22 PROCEDURE — 1036F TOBACCO NON-USER: CPT | Performed by: PLASTIC SURGERY

## 2019-02-22 PROCEDURE — 99203 OFFICE O/P NEW LOW 30 MIN: CPT | Performed by: PLASTIC SURGERY

## 2019-02-22 PROCEDURE — G8417 CALC BMI ABV UP PARAM F/U: HCPCS | Performed by: PLASTIC SURGERY

## 2019-02-22 PROCEDURE — G8427 DOCREV CUR MEDS BY ELIG CLIN: HCPCS | Performed by: PLASTIC SURGERY

## 2019-02-22 PROCEDURE — 3017F COLORECTAL CA SCREEN DOC REV: CPT | Performed by: PLASTIC SURGERY

## 2019-02-22 PROCEDURE — G8482 FLU IMMUNIZE ORDER/ADMIN: HCPCS | Performed by: PLASTIC SURGERY

## 2019-02-22 RX ORDER — LISINOPRIL 20 MG/1
5 TABLET ORAL DAILY
Qty: 30 TABLET | Status: CANCELLED | OUTPATIENT
Start: 2019-02-22

## 2019-02-22 RX ORDER — PRAVASTATIN SODIUM 40 MG
TABLET ORAL
Refills: 0 | COMMUNITY
Start: 2019-02-13 | End: 2019-07-09 | Stop reason: SDUPTHER

## 2019-02-22 RX ORDER — LISINOPRIL 5 MG/1
5 TABLET ORAL DAILY
COMMUNITY
End: 2019-02-26 | Stop reason: SDUPTHER

## 2019-02-22 RX ORDER — QUETIAPINE FUMARATE 100 MG/1
100 TABLET, FILM COATED ORAL NIGHTLY
COMMUNITY

## 2019-02-26 RX ORDER — LISINOPRIL 5 MG/1
5 TABLET ORAL DAILY
Qty: 30 TABLET | Refills: 4 | Status: SHIPPED | OUTPATIENT
Start: 2019-02-26 | End: 2019-08-23 | Stop reason: SDUPTHER

## 2019-03-06 DIAGNOSIS — C50.919 MALIGNANT NEOPLASM OF FEMALE BREAST, UNSPECIFIED ESTROGEN RECEPTOR STATUS, UNSPECIFIED LATERALITY, UNSPECIFIED SITE OF BREAST (HCC): ICD-10-CM

## 2019-03-15 RX ORDER — LETROZOLE 2.5 MG/1
2.5 TABLET, FILM COATED ORAL DAILY
Qty: 30 TABLET | Refills: 5 | Status: SHIPPED | OUTPATIENT
Start: 2019-03-15 | End: 2019-11-21 | Stop reason: SDUPTHER

## 2019-03-15 RX ORDER — POTASSIUM CHLORIDE 20 MEQ/1
TABLET, EXTENDED RELEASE ORAL
Qty: 30 TABLET | Refills: 2 | Status: SHIPPED | OUTPATIENT
Start: 2019-03-15 | End: 2019-07-10 | Stop reason: SDUPTHER

## 2019-03-27 ENCOUNTER — HOSPITAL ENCOUNTER (OUTPATIENT)
Dept: INFUSION THERAPY | Age: 54
Discharge: HOME | End: 2019-03-27
Payer: MEDICARE

## 2019-03-27 DIAGNOSIS — Z17.1 MALIGNANT NEOPLASM OF OVERLAPPING SITES OF RIGHT BREAST IN FEMALE, ESTROGEN RECEPTOR NEGATIVE (HCC): Primary | ICD-10-CM

## 2019-03-27 DIAGNOSIS — C50.811 MALIGNANT NEOPLASM OF OVERLAPPING SITES OF RIGHT BREAST IN FEMALE, ESTROGEN RECEPTOR NEGATIVE (HCC): Primary | ICD-10-CM

## 2019-03-27 PROCEDURE — 6360000002 HC RX W HCPCS: Performed by: INTERNAL MEDICINE

## 2019-03-27 PROCEDURE — 2580000003 HC RX 258: Performed by: INTERNAL MEDICINE

## 2019-03-27 PROCEDURE — 96523 IRRIG DRUG DELIVERY DEVICE: CPT

## 2019-03-27 RX ORDER — BENZTROPINE MESYLATE 0.5 MG/1
0.5 TABLET ORAL DAILY
COMMUNITY

## 2019-03-27 RX ORDER — SODIUM CHLORIDE 0.9 % (FLUSH) 0.9 %
10 SYRINGE (ML) INJECTION PRN
Status: DISCONTINUED | OUTPATIENT
Start: 2019-03-27 | End: 2019-03-28 | Stop reason: HOSPADM

## 2019-03-27 RX ORDER — HEPARIN SODIUM (PORCINE) LOCK FLUSH IV SOLN 100 UNIT/ML 100 UNIT/ML
500 SOLUTION INTRAVENOUS PRN
Status: CANCELLED | OUTPATIENT
Start: 2019-03-27

## 2019-03-27 RX ORDER — SODIUM CHLORIDE 0.9 % (FLUSH) 0.9 %
10 SYRINGE (ML) INJECTION PRN
Status: CANCELLED | OUTPATIENT
Start: 2019-03-27

## 2019-03-27 RX ORDER — HEPARIN SODIUM (PORCINE) LOCK FLUSH IV SOLN 100 UNIT/ML 100 UNIT/ML
500 SOLUTION INTRAVENOUS PRN
Status: DISCONTINUED | OUTPATIENT
Start: 2019-03-27 | End: 2019-03-28 | Stop reason: HOSPADM

## 2019-03-27 RX ADMIN — Medication 10 ML: at 13:55

## 2019-03-27 RX ADMIN — Medication 10 ML: at 13:54

## 2019-03-27 RX ADMIN — HEPARIN SODIUM (PORCINE) LOCK FLUSH IV SOLN 100 UNIT/ML 500 UNITS: 100 SOLUTION at 13:55

## 2019-03-27 RX ADMIN — HEPARIN SODIUM (PORCINE) LOCK FLUSH IV SOLN 100 UNIT/ML 500 UNITS: 100 SOLUTION at 13:54

## 2019-03-27 NOTE — PROGRESS NOTES
Patient at infusion center for dual mediport flush. Both port sites accessed with 20 gauge 3/4 \" mora needles. Both flushed easily with 10 ml NSS and negative for blood return. Flushed with 5 ml heparin. Mora needles d/c'd and pressure dressing applied to site. Patient tolerated procedure without difficulty. Patient discharge to home.

## 2019-04-01 ENCOUNTER — OFFICE VISIT (OUTPATIENT)
Dept: OPHTHALMOLOGY | Age: 54
End: 2019-04-01
Payer: MEDICARE

## 2019-04-01 DIAGNOSIS — G51.39 HEMIFACIAL SPASM: Primary | ICD-10-CM

## 2019-04-01 DIAGNOSIS — F31.9 BIPOLAR 1 DISORDER (HCC): ICD-10-CM

## 2019-04-01 DIAGNOSIS — H25.813 COMBINED FORMS OF AGE-RELATED CATARACT OF BOTH EYES: ICD-10-CM

## 2019-04-01 PROCEDURE — G8427 DOCREV CUR MEDS BY ELIG CLIN: HCPCS | Performed by: OPHTHALMOLOGY

## 2019-04-01 PROCEDURE — 1036F TOBACCO NON-USER: CPT | Performed by: OPHTHALMOLOGY

## 2019-04-01 PROCEDURE — 3017F COLORECTAL CA SCREEN DOC REV: CPT | Performed by: OPHTHALMOLOGY

## 2019-04-01 PROCEDURE — G8417 CALC BMI ABV UP PARAM F/U: HCPCS | Performed by: OPHTHALMOLOGY

## 2019-04-01 PROCEDURE — 99214 OFFICE O/P EST MOD 30 MIN: CPT | Performed by: OPHTHALMOLOGY

## 2019-04-01 RX ORDER — TROPICAMIDE 10 MG/ML
1 SOLUTION/ DROPS OPHTHALMIC ONCE
Status: COMPLETED | OUTPATIENT
Start: 2019-04-01 | End: 2019-04-01

## 2019-04-01 RX ORDER — BENZTROPINE MESYLATE 0.5 MG/1
0.5 TABLET ORAL 2 TIMES DAILY
COMMUNITY
End: 2019-04-04 | Stop reason: SDUPTHER

## 2019-04-01 RX ORDER — PHENYLEPHRINE HCL 2.5 %
1 DROPS OPHTHALMIC (EYE) ONCE
Status: COMPLETED | OUTPATIENT
Start: 2019-04-01 | End: 2019-04-01

## 2019-04-01 RX ADMIN — TROPICAMIDE 1 DROP: 10 SOLUTION/ DROPS OPHTHALMIC at 15:44

## 2019-04-01 RX ADMIN — Medication 1 DROP: at 15:44

## 2019-04-01 ASSESSMENT — TONOMETRY
IOP_METHOD: NON-CONTACT AIR PUFF
OD_IOP_MMHG: 21
OS_IOP_MMHG: 23

## 2019-04-01 ASSESSMENT — ENCOUNTER SYMPTOMS
EYES NEGATIVE: 0
RESPIRATORY NEGATIVE: 0
GASTROINTESTINAL NEGATIVE: 0
ALLERGIC/IMMUNOLOGIC NEGATIVE: 0

## 2019-04-01 ASSESSMENT — SLIT LAMP EXAM - LIDS
COMMENTS: MILD BLEPHARITIS. MILD MGD
COMMENTS: MILD BLEPHARITIS. MILD MGD

## 2019-04-01 ASSESSMENT — CONF VISUAL FIELD
OD_NORMAL: 1
OS_NORMAL: 1

## 2019-04-01 ASSESSMENT — VISUAL ACUITY
OS_PH_SC: 20/30
METHOD: SNELLEN - LINEAR
OD_SC: 20/20
OS_SC: 20/50

## 2019-04-01 NOTE — PROGRESS NOTES
Xiao Weems onedya 48 y.o. female here for a complete eye exam.      Chief Complaint   Patient presents with    Ophthalmic Testing       HPI     Patient is here for a 4 month follow up from 11/19/18, dilation and OCT glaucoma completed. Pt states that both of her eyes are mildly blurry for several months with no improvement with eyeglasses. Review of Systems  ROS     Positive for: HENT    Negative for: Constitutional, Gastrointestinal, Neurological, Skin, Genitourinary, Musculoskeletal, Endocrine, Cardiovascular, Eyes, Respiratory, Psychiatric, Allergic/Imm, Heme/Lymph          Main Ophthalmology Exam     External Exam       Right Left    External  Left Hemifacial Spasm          Slit Lamp Exam       Right Left    Lids/Lashes Mild Blepharitis. Mild MGD Mild Blepharitis.  Mild MGD    Conjunctiva/Sclera Clear and white Clear and white    Cornea Clear Clear    Anterior Chamber Deep, no cells, no flare Deep, no cells, no flare    Iris Round and reactive Round and reactive    Lens 1+ Nuclear sclerosis, 1+ Cortical cataract 1+ Nuclear sclerosis, 1+ Cortical cataract          Fundus Exam       Right Left    Vitreous Vitreous syneresis Vitreous syneresis    Disc No edema and no pallor No edema and no pallor    C/D Ratio Vertical 0.2 0.2    C/D Ratio Horizontal 0.2 0.2    Macula Normal architecture Normal architecture    Vessels Normal course and caliber Normal course and caliber    Periphery No breaks, tears, or detachments No breaks, tears, or detachments                   Tonometry     Tonometry (Non-contact air puff, 3:02 PM)       Right Left    Pressure 21 23              Visual Acuity     Visual Acuity (Snellen - Linear)       Right Left    Dist sc 20/20 20/50    Dist ph sc  20/30               Not recorded         Confrontational Visual Fields     Visual Fields       Right Left     Full Full               Extraocular Movement     Extraocular Movement       Right Left     Full, Ortho Full, Ortho the lungs every 4 hours as needed , Disp: , Rfl:     butalbital-acetaminophen-caffeine (FIORICET, ESGIC) -40 MG per tablet, Take 1-2 tablets by mouth every 4 hours as needed , Disp: , Rfl:     fluticasone (FLONASE) 50 MCG/ACT nasal spray, 1 spray by Nasal route daily , Disp: , Rfl:     hydrOXYzine (ATARAX) 10 MG tablet, Take 10 mg by mouth daily , Disp: , Rfl:     lamoTRIgine (LAMICTAL) 100 MG tablet, 50 mg nightly , Disp: , Rfl:     traZODone (DESYREL) 100 MG tablet, Take 50 mg by mouth nightly , Disp: , Rfl:      No Known Allergies     IMPRESSION:  1. Hemifacial spasm    2. Bipolar 1 disorder ([address]    3. Combined forms of age-related cataract of both eyes        PLAN:    1. Hemifacial spasm    Pt had been on Trazodone for many years for Bipolar Disorder. Her Psychiatrist stopped the Trazodone a few weeks ago and  pt states that she has noted a decrease in OS twitching. Observation in office today does not demonstrate the Left  Hemifacial Spasm that was noted a few months ago. Observe. 2. Bipolar 1 disorder ([address]    As above. 3. Combined forms of age-related cataract of both eyes    Counseled pt regarding Cataracts. Counseled pt regarding the importance of routine   ophthalmic examinations to monitor cataracts. Advised pt to exercise caution while operating a   motor vehicle or performing other critical visual tasks. Follow.     Electronically signed by Archana Reza MD on 4/1/2019 at 3:22 PM

## 2019-04-04 ENCOUNTER — OFFICE VISIT (OUTPATIENT)
Dept: ONCOLOGY | Age: 54
End: 2019-04-04
Payer: MEDICARE

## 2019-04-04 VITALS
WEIGHT: 284.2 LBS | OXYGEN SATURATION: 97 % | SYSTOLIC BLOOD PRESSURE: 118 MMHG | BODY MASS INDEX: 45.67 KG/M2 | HEIGHT: 66 IN | DIASTOLIC BLOOD PRESSURE: 72 MMHG | HEART RATE: 110 BPM | TEMPERATURE: 99.8 F

## 2019-04-04 DIAGNOSIS — C50.919 MALIGNANT NEOPLASM OF FEMALE BREAST, UNSPECIFIED ESTROGEN RECEPTOR STATUS, UNSPECIFIED LATERALITY, UNSPECIFIED SITE OF BREAST (HCC): Primary | ICD-10-CM

## 2019-04-04 PROCEDURE — G8427 DOCREV CUR MEDS BY ELIG CLIN: HCPCS | Performed by: INTERNAL MEDICINE

## 2019-04-04 PROCEDURE — 3017F COLORECTAL CA SCREEN DOC REV: CPT | Performed by: INTERNAL MEDICINE

## 2019-04-04 PROCEDURE — G8417 CALC BMI ABV UP PARAM F/U: HCPCS | Performed by: INTERNAL MEDICINE

## 2019-04-04 PROCEDURE — 1036F TOBACCO NON-USER: CPT | Performed by: INTERNAL MEDICINE

## 2019-04-04 PROCEDURE — 99214 OFFICE O/P EST MOD 30 MIN: CPT | Performed by: INTERNAL MEDICINE

## 2019-04-04 NOTE — PROGRESS NOTES
Darren Burnett                                                                                                                  4/4/2019  MRN:   [id number]  YOB: 1965  PCP:                           [facility] - [facility]  Referring Physician: No ref. provider found  Treating Physician Name: Yarely Gray MD      Reason for visit:  Patient presents to the clinic for follow-up visit and to discuss results of her blood workup and for active surveillance. Current problems/ Active and recent treatments:  Stage IIa infiltrating ductal carcinoma of right breast, ER negative, MD positive and HER-2 amplified. DVT of right upper extremity (11/20/18)  Strong family history of breast cancer in sister and maternal aunt      Summary of Case/History:    Darren Burnett a 48 y. o.female who presents to the hematology oncology office to establish care and for further recommendations for management of her recently diagnosed right breast cancer    Patient had a mammogram after many years in September 2017 which came back abnormal.  Subsequently patient had an ultrasound which confirmed a 1.2 cm lesion in the right breast at 1:00 position. There was another 4 mm lesion at 12:00 position    Patient underwent ultrasound-guided biopsy on 9/8/17. the lesion at 1:00 position shows invasive ductal carcinoma. ER negative and MD positive associated with high-grade DCIS. Lesion at 12:00 position showed fibrocystic disease and focal adenosis and hyperplasia. Patient underwent right sided mastectomy with sentinel lymph node biopsy on 10/19/17. Pathology report showed invasive ductal carcinoma, grade 3 out of 3, 2.8 cm in size with negative margins. pT2,pN0,M0  Tumor specimen was negative for ER receptor and weekly positive for MD receptor (5-10 percent). High-grade DCIS was also noted.   One sentinel lymph node was sampled which was negative for metastasis    Patient started on neoadjuvant chemotherapy using TCHP regimen. Cycle #1, day #1 on 11/13/17    Patient underwent removal of port with replacement due to port malfunction on 11/30/17    Patient completed 6 cycles of TCHP and continued maintenance Herceptin. Started patient on anastrozole along with calcium and vitamin D in May 2018. Switched anti-hormonal therapy to Femara in June 2018 due to arthralgia    Herceptin last cycle completed 11/29/18    Interim History:    Patient presents to the clinic for follow-up visit and for active surveillance. Patient denies any new complaint or interval event. She has chronic back pain. Patient wishes a port to be removed. Continues to be in [facility] without any bleeding episode. Patient continues to have twitching of left eye. Continues to take calcium and vitamin D. Right arm swelling and pain has significantly improved since she's been on blood thinners. During this visit patient's allergy, social, medical, surgical history and medications were reviewed and updated.     Past Medical History:   Past Medical History:   Diagnosis Date    B[facility] 1 disorder ([facility]     Breast cancer ([facility] 2017    right side     DVT of axillary vein, acute right ([address]     Eye twitch 2019    Headache(784.0)     Hyperlipidemia     Migraine      Past Surgical History:     Past Surgical History:   Procedure Laterality Date    DILATION AND CURETTAGE OF UTERUS N/A 10/13/2017    D & C HYSTEROSCOPY with polypectomy performed by Gregg Summers MD at [address] / REMOVAL / [address] Left 11/9/2017    PORT INSERTION performed by Zack Cortes MD at [address] / REMOVAL / [address] N/A 11/30/2017    Port Removal & Insertion performed by Zack Cortes MD at [address] Right 10/19/2017     [facility]    MASTECTOMY Right 10/19/2017    Right Breast Mastectomy with  [place] Node Biopsy [facility] [address] [facility] AGE [facility]/> Left 3/1/2018    PORT Exchange performed by Pooja Balbuena MD at [place] [address] Left 2014, 2015    x 2 surgeries total; Dr. Yassine Martínez    TUNNELED VENOUS PORT PLACEMENT Left 11/30/2017    removal of et replacement of       Patient Family Social History:     Family History   Problem Relation Age of Onset    Breast Cancer Sister 54    Breast Cancer Maternal Aunt 71    Other Maternal Cousin         Atypical Ductal Hyperplasia     Uterine Cancer Sister 32    Other Sister         breast cyst    Cataracts Mother     Glaucoma Mother     Heart Disease Father     High Blood Pressure Father     High Cholesterol Father     Mental Retardation Father     Diabetes Neg Hx       Social History     Socioeconomic History    Marital status: Single     Spouse name: Not on file    Number of children: Not on file    Years of education: Not on file    Highest education level: Not on file   Occupational History    Not on file   Social Needs    Financial resource strain: Not on file    Food insecurity:     Worry: Not on file     Inability: Not on file    Transportation needs:     Medical: Not on file     Non-medical: Not on file   Tobacco Use    Smoking status: Never Smoker    Smokeless tobacco: Never Used    Tobacco comment: Never smoker.  TC, RRT 4/5/18   Substance and Sexual Activity    Alcohol use: No    Drug use: No    Sexual activity: Yes     Partners: Male     Birth control/protection: Surgical   Lifestyle    Physical activity:     Days per week: Not on file     Minutes per session: Not on file    Stress: Not on file   Relationships    Social connections:     Talks on phone: Not on file     Gets together: Not on file     Attends Mandaeism service: Not on file     Active member of club or organization: Not on file     Attends meetings of clubs or organizations: Not on file     Relationship status: Not on file    Intimate partner violence:     Fear of current or ex partner: Not on file     Emotionally abused: Not on file     Physically abused: Not on file     Forced sexual activity: Not on file   Other Topics Concern    Not on file   Social History Narrative    Not on file      Current Medications:     Current Outpatient Medications   Medication Sig Dispense Refill    benztropine (COGENTIN) 0.5 MG tablet Take 0.5 mg by mouth daily      potassium chloride (KLOR-CON M) 20 MEQ extended release tablet take 1 tablet by mouth once daily 30 tablet 2    letrozole (FEMARA) 2.5 MG tablet Take 1 tablet by mouth daily 30 tablet 5    lisinopril (PRINIVIL;ZESTRIL) 5 MG tablet Take 1 tablet by mouth daily 30 tablet 4    pravastatin (PRAVACHOL) 40 MG tablet take 1 tablet by mouth once daily  0    QUEtiapine (SEROQUEL) 100 MG tablet Take 50 mg by mouth nightly      Cyanocobalamin ER (RA VITAMIN B-12 TR) 1000 MCG TBCR take 1 tablet by mouth once daily 30 tablet 3    folic acid (FOLVITE) 1 MG tablet take 1 tablet by mouth once daily 30 tablet 3    rivaroxaban (XARELTO) 20 MG TABS tablet Take 1 tablet by mouth daily (with breakfast) 90 tablet 1    Calcium Carbonate-Vitamin D (OYSTER SHELL CALCIUM/D) 500-200 MG-UNIT TABS Take 1 tablet by mouth 2 times daily 60 tablet 5    nystatin (MYCOSTATIN) 963173 UNIT/ML suspension Take 5 mLs by mouth 4 times daily (Patient taking differently: Take 5 mLs by mouth as needed ) 1 Bottle 0    ondansetron (ZOFRAN) 4 MG tablet Take 2 tablets by mouth every 6 hours as needed for Nausea or Vomiting 40 tablet 1    sodium chloride (ALTAMIST SPRAY) 0.65 % nasal spray 1 spray by Nasal route as needed for Congestion 1 Bottle 1    VENTOLIN  (90 Base) MCG/ACT inhaler Inhale 2 puffs into the lungs every 4 hours as needed       butalbital-acetaminophen-caffeine (FIORICET, ESGIC) -40 MG per tablet Take 1-2 tablets by mouth every 4 hours as needed       fluticasone (FLONASE) 50 MCG/ACT nasal spray 1 spray by Nasal route daily       hydrOXYzine (ATARAX) 10 MG tablet Take 10 mg by mouth daily       lamoTRIgine (LAMICTAL) 100 MG tablet 50 mg nightly        No current facility-administered medications for this visit. Allergies:   Patient has no known allergies. Review of Systems:    Constitutional: Negative for fever or chills. No night sweats, weight is stable now  Eyes: No eye discharge, double vision, or eye pain . Twitching of left eye  HEENT: negative for sore mouth, sore throat, hoarseness and voice change . Complains of twitching of left eye. Improved  Respiratory: negative for cough , sputum, dyspnea, wheezing, hemoptysis, chest pain   Cardiovascular: negative for chest pain, dyspnea, palpitations, orthopnea, PND   Gastrointestinal: Positive for nausea, vomiting, constipation, abdominal pain, Dysphagia, hematemesis and hematochezia . Genitourinary: negative for frequency, dysuria, nocturia, urinary incontinence, and hematuria   Integument: negative for rash, skin lesions, bruises. Hematologic/Lymphatic: negative for easy bruising, bleeding, lymphadenopathy, or petechiae   Endocrine: negative for heat or cold intolerance,weight changes, change in bowel habits and hair loss   Musculoskeletal: negative for myalgias, arthralgias, pain, joint swelling,and bone pain . Positive for discomfort in right arm.   Neurological: negative for headaches, dizziness, seizures, weakness, numbness      Physical Exam:    Vitals:  /72 (Site: Left Upper Arm, Position: Sitting, Cuff Size: Medium Adult)   Pulse 110   Temp 99.8 °F (37.7 °C) (Tympanic)   Ht 5' 6.5\" (1.689 m)   Wt 284 lb 3.2 oz (128.9 kg)   LMP 02/28/2013   SpO2 97%   BMI 45.19 kg/m²    General appearance - patient not in acute distress  Mental status - AAO X3  Eyes - pupils equal and reactive, extraocular eye movements intact  Mouth - mucous membranes moist, pharynx normal without lesions  Neck - supple, no significant adenopathy  Lymphatics - no palpable lymphadenopathy, no hepatosplenomegaly  Chest - clear to auscultation, no wheezes, rales or rhonchi, symmetric air entry. Port in place. Heart - normal rate, regular rhythm, normal S1, S2, no murmurs  Abdomen - soft, nontender, nondistended, no masses or organomegaly  Neurological - alert, oriented, normal speech, no focal findings .   Twitching of left eye noted  Extremities - peripheral pulses normal, no pedal edema, no clubbing or cyanosis  Skin - normal coloration and turgor, no rashes, no suspicious skin lesions noted       DATA:    Results for orders placed or performed during the hospital encounter of 02/11/19   CBC Auto Differential   Result Value Ref Range    WBC 7.3 3.5 - 11.0 k/uL    RBC 4.26 4.0 - 5.2 m/uL    Hemoglobin 13.0 12.0 - 16.0 g/dL    Hematocrit 40.5 36 - 46 %    MCV 94.9 80 - 100 fL    MCH 30.5 26 - 34 pg    MCHC 32.1 31 - 37 g/dL    RDW 15.0 (H) 11.0 - 14.5 %    Platelets 716 013 - 850 k/uL    MPV 7.7 6.0 - 12.0 fL    NRBC Automated NOT REPORTED per 100 WBC    Differential Type NOT REPORTED     Immature Granulocytes NOT REPORTED 0 %    Absolute Immature Granulocyte NOT REPORTED 0.00 - 0.30 k/uL    WBC Morphology NOT REPORTED     RBC Morphology NOT REPORTED     Platelet Estimate NOT REPORTED     Seg Neutrophils 60 43 - 77 %    Lymphocytes 30 16 - 46 %    Monocytes 7 4 - 11 %    Eosinophils % 2 1 - 7 %    Basophils 1 0 - 1 %    Segs Absolute 4.50 1.8 - 7.7 k/uL    Absolute Lymph # 2.10 1.0 - 4.8 k/uL    Absolute Mono # 0.50 0.1 - 1.2 k/uL    Absolute Eos # 0.10 0.0 - 0.4 k/uL    Basophils # 0.00 0.0 - 0.2 k/uL   Hemoglobin A1C   Result Value Ref Range    Hemoglobin A1C 5.5 4.8 - 5.9 %    Estimated Avg Glucose 111 mg/dL   Lipid Panel   Result Value Ref Range    Cholesterol 216 (H) <200 mg/dL    HDL 41 >40 mg/dL    LDL Cholesterol 138 (H) 0 - 130 mg/dL    Chol/HDL Ratio 5.3 (H) <5    Triglycerides 184 (H) <150 mg/dL    VLDL NOT REPORTED (H) 1 - 30 mg/dL   Comprehensive Metabolic Panel   Result Value Ref Range    Glucose 109 (H) 70 - 99 mg/dL    BUN 23 (H) 6 - 20 mg/dL    CREATININE 1.08 (H) 0.50 - 0.90 mg/dL    Bun/Cre Ratio 21 (H) 9 - 20    Calcium 10.0 8.6 - 10.4 mg/dL    Sodium 142 135 - 144 mmol/L    Potassium 4.3 3.7 - 5.3 mmol/L    Chloride 103 98 - 107 mmol/L    CO2 25 20 - 31 mmol/L    Anion Gap 14 9 - 17 mmol/L    Alkaline Phosphatase 75 35 - 104 U/L    ALT 9 5 - 33 U/L    AST 9 <32 U/L    Total Bilirubin 0.25 (L) 0.3 - 1.2 mg/dL    Total Protein 8.0 6.4 - 8.3 g/dL    Alb 4.1 3.5 - 5.2 g/dL    Albumin/Globulin Ratio 1.1 1.0 - 2.5    GFR Non-African American 53 (L) >60 mL/min    GFR African American >60 >60 mL/min    GFR Comment          GFR Staging NOT REPORTED    HIV Screen   Result Value Ref Range    HIV Ag/Ab NONREACTIVE NONREACTIVE   Hepatitis C Antibody   Result Value Ref Range    Hepatitis C Ab NONREACTIVE NONREACTIVE     Xr Chest Standard (2 Vw)    Result Date: 10/10/2017  EXAMINATION: TWO VIEWS OF THE CHEST 10/10/2017 2:37 pm COMPARISON: 10/18/2011 HISTORY: ORDERING SYSTEM PROVIDED HISTORY: Invasive ductal carcinoma of breast, right [facility]) TECHNOLOGIST PROVIDED HISTORY: Reason for exam:->Pre op Ordering Physician Provided Reason for Exam: Pre op for right mastectomy. No chest complaints. Acuity: Unknown Type of Exam: Initial Additional signs and symptoms: n/a Relevant Medical/Surgical History: breast cancer FINDINGS: The lungs are without acute focal process. There is no effusion or pneumothorax. The cardiomediastinal silhouette is stable. The osseous structures are stable. No acute process. Nm Lymphoscintigram    Result Date: 10/19/2017  EXAMINATION: LYMPHOSCINTIGRAPHY 10/19/2017 9:21 am TECHNIQUE: Sulfur colloid labeled with 0.920 mCi of Tc99 was administered in 4 subdermal wheals around the patient's right areolar region. Delayed images were obtained.  HISTORY: ORDERING SYSTEM PROVIDED HISTORY: Surgery TECHNOLOGIST PROVIDED HISTORY: Reason for exam:->Surgery Ordering Physician Provided Reason for Exam: 0.920 mCi 99mTc filtered Sulfur Colloid injected subcutaneous around RT nipple. Acuity: Unknown Type of Exam: Unknown FINDINGS: There appears to be migration into the right axilla suggestive of the sentinel node. Migration of the radiotracer into the right axilla suggestive of the patient's sentinel node. MRI brain 2/5/18  Impression   1. Unremarkable MRI of the brain.  No evidence of metastatic disease. 2. Acute left sphenoid sinusitis. [facility] W Or Wo Cad Bilateral: 10/5/2018    No mammographic evidence of malignancy. 2. Postoperative changes of the right breast. BIRADS: BIRADS - CATEGORY 2 Benign, no evidence of malignancy. Normal interval follow-up is recommended in 12 months. OVERALL ASSESSMENT - BENIGN A letter of notification will be sent to the patient regarding the results. The [employer] recommends annual mammograms for women 40 years and older. Mri Orbits Face Neck W Wo Contrast: 10/11/2018    No convincing evidence of abnormal intracranial enhancement to suggest intracranial metastasis. 2. Bilateral internal auditory canals, cisternal segment of cranial nerve V, and fluid-filled inner ear structures are unremarkable. 3. Borderline enlarged right level II lymph node measuring 13 mm. there are additional nonenlarged upper neck lymph nodes. These are nonspecific by imaging and may be reactive. Given history of breast cancer, clinical correlation is recommended. Follow-up CT neck may be obtained as clinically warranted. CT chest 11/20/18:  Impression   1. No evidence of pulmonary embolism or focal airspace consolidation. 2. Extensive edema and inflammatory stranding throughout the right chest   wall, axilla and supraclavicular soft tissues which appears to be tracking   along the vascular pathway likely secondary to known deep venous thrombosis.    Apparent hypodensity in the distal SVC suggestive of filling defects/thrombosis.  Additionally proximal SVC as well as the right IJ appear   mildly prominent underlying heterogeneous soft tissue density also suspicious   for underlying thrombosis. 3. Diffuse skin thickening along the right mastectomy site which may be   sequela of post treatment change of please correlate for prior radiation. Adjacent elongated oblong shaped subcutaneous fluid collection as measured   above suggestive of seroma possibly chronic and postoperative in nature. 4. Extensive contrast throughout the left hepatic lobe likely due to reflux   of IV contrast.         Impression:  Invasive ductal carcinoma of right breast, stage IIa. pT2,pN0,M0. ER negative, [place] weakly positive. HER-2 amplified. Status post right mastectomy with sentinel lymph node biopsy  Twitching of left eye, MRI negative  Family history of breast cancer  Lower back pain  Postmenopausal  Right upper extremity DVT of the axillary and basilic veins and currently on Xarelto    Plan:  I had a detailed discussion with the patient and personally went over the results of her blood workup. Discussedtreatment of breast cancer. Clinically patient continues to do well with respect to breast cancer. Discussed surveillance plan. Continue annual mammograms. Continue DEXA scan every 2 years. Continue Femara to complete at least 5 years of anti-hormonal therapy. Anticipating to discontinue Xeralto after completion of 6 months of anti-correlation in mid-of May. Next and patient will be referred to surgery for port removal.  Patient is also planning to follow-up with plastic surgery for reconstruction of right breast.  NCCN guidelines were reviewed and discussed with the patient. The diagnosis and care plan were discussed with the patient in detail. I discussed the natural history of the disease, prognosis, risks and goals of therapy and answered all the patients questions to the best of my ability.   Patient expressed understanding and was in agreement. Jac Garcia I spent more than 25 minutes examining, evaluating, reviewing data, counseling the patient and coordinating care. Greater than 50% of time was spent face-to-face with the patient    This note is created with the assistance of a speech recognition program.  While intending to generate a document that actually reflects the content of the visit, the document can still have some errors including those of syntax and sound a like substitutions which may escape proof reading. It such instances, actual meaning can be extrapolated by contextual diversion.

## 2019-04-15 ENCOUNTER — OFFICE VISIT (OUTPATIENT)
Dept: FAMILY MEDICINE CLINIC | Age: 54
End: 2019-04-15
Payer: MEDICARE

## 2019-04-15 VITALS
HEART RATE: 82 BPM | TEMPERATURE: 97.9 F | BODY MASS INDEX: 45.32 KG/M2 | OXYGEN SATURATION: 94 % | SYSTOLIC BLOOD PRESSURE: 102 MMHG | DIASTOLIC BLOOD PRESSURE: 62 MMHG | WEIGHT: 282 LBS | HEIGHT: 66 IN

## 2019-04-15 DIAGNOSIS — J01.90 ACUTE BACTERIAL SINUSITIS: Primary | ICD-10-CM

## 2019-04-15 DIAGNOSIS — B96.89 ACUTE BACTERIAL SINUSITIS: Primary | ICD-10-CM

## 2019-04-15 DIAGNOSIS — K21.9 GASTROESOPHAGEAL REFLUX DISEASE WITHOUT ESOPHAGITIS: ICD-10-CM

## 2019-04-15 PROCEDURE — G8427 DOCREV CUR MEDS BY ELIG CLIN: HCPCS | Performed by: NURSE PRACTITIONER

## 2019-04-15 PROCEDURE — 99213 OFFICE O/P EST LOW 20 MIN: CPT | Performed by: NURSE PRACTITIONER

## 2019-04-15 PROCEDURE — G8417 CALC BMI ABV UP PARAM F/U: HCPCS | Performed by: NURSE PRACTITIONER

## 2019-04-15 PROCEDURE — 3017F COLORECTAL CA SCREEN DOC REV: CPT | Performed by: NURSE PRACTITIONER

## 2019-04-15 PROCEDURE — 1036F TOBACCO NON-USER: CPT | Performed by: NURSE PRACTITIONER

## 2019-04-15 RX ORDER — AZITHROMYCIN 250 MG/1
TABLET, FILM COATED ORAL
Qty: 1 PACKET | Refills: 0 | Status: SHIPPED | OUTPATIENT
Start: 2019-04-15 | End: 2019-04-20

## 2019-04-15 ASSESSMENT — ENCOUNTER SYMPTOMS
COUGH: 1
HEARTBURN: 1
WHEEZING: 0
RHINORRHEA: 1
SORE THROAT: 0
SHORTNESS OF BREATH: 1

## 2019-04-15 NOTE — PROGRESS NOTES
4/15/2019     Monae Hernandez (:  1965) is a 48 y.o. female, here for evaluation of the following medical concerns:    Pt states that she has been having a lot of heartburn. She states that she just finished breast cancer treatment and has been eating a lot of pizza because she is feeling so much better. Pt has tried tums which has helped a little bit. Cough   This is a new problem. The current episode started 1 to 4 weeks ago. The problem has been unchanged. The problem occurs every few minutes. Associated symptoms include ear pain, headaches, heartburn, nasal congestion, postnasal drip, rhinorrhea and shortness of breath. Pertinent negatives include no fever, sore throat or wheezing. Associated symptoms comments: Eyes watering. The symptoms are aggravated by lying down. Treatments tried: allergy medication  The treatment provided mild relief. Her past medical history is significant for environmental allergies.        Past Medical History:   Diagnosis Date    Bipolar 1 disorder ([address]     Breast cancer ([address] 2017    right side     DVT of axillary vein, acute right ([address]     Eye twitch 2019    Headache(784.0)     Hyperlipidemia     Migraine        Past Surgical History:   Procedure Laterality Date    DILATION AND CURETTAGE OF UTERUS N/A 10/13/2017    D & C HYSTEROSCOPY with polypectomy performed by Gil Singletary MD at [address] [facility] / [facility] [address] [facility] [address] Left 2017    PORT INSERTION performed by Huong Noel MD at [address] [facility] / REMOVAL / [address] [place] N/A 2017    Port Removal & Insertion performed by Huong Noel MD at [address] Right 10/19/2017     [facility]    MASTECTOMY Right 10/19/2017    Right Breast Mastectomy with  [place] Node Biopsy performed by Huong Noel MD at [address] [facility] CTR VAD W/SUBQ PORT AGE 5 YR/> Left 3/1/2018    PORT Exchange performed by Christiano Pike Gonzalo Brown MD at [place] [address] Left 2014, 2015    x 2 surgeries total; Dr. Odin Hidalgo TUNNELED VENOUS PORT PLACEMENT Left 11/30/2017    removal of et replacement of       Social History     Socioeconomic History    Marital status: Single     Spouse name: None    Number of children: None    Years of education: None    Highest education level: None   Occupational History    None   Social Needs    Financial resource strain: None    Food insecurity:     Worry: None     Inability: None    Transportation needs:     Medical: None     Non-medical: None   Tobacco Use    Smoking status: Never Smoker    Smokeless tobacco: Never Used    Tobacco comment: Never smoker.  TC, RRT 4/5/18   Substance and Sexual Activity    Alcohol use: No    Drug use: No    Sexual activity: Yes     Partners: Male     Birth control/protection: Surgical   Lifestyle    Physical activity:     Days per week: None     Minutes per session: None    Stress: None   Relationships    Social connections:     Talks on phone: None     Gets together: None     Attends Sikhism service: None     Active member of club or organization: None     Attends meetings of clubs or organizations: None     Relationship status: None    Intimate partner violence:     Fear of current or ex partner: None     Emotionally abused: None     Physically abused: None     Forced sexual activity: None   Other Topics Concern    None   Social History Narrative    None       Family History   Problem Relation Age of Onset    Breast Cancer Sister 54    Breast Cancer Maternal Aunt 71    Other Maternal Cousin         Atypical Ductal Hyperplasia     Uterine Cancer Sister 32    Other Sister         breast cyst    Cataracts Mother     Glaucoma Mother     Heart Disease Father     High Blood Pressure Father     High Cholesterol Father     Mental Retardation Father     Diabetes Neg Hx        No Known Allergies    Current Outpatient Medications   Medication Sig Dispense Refill    azithromycin (ZITHROMAX Z-ASHVIN) 250 MG tablet Two pills daily first day, then one pill daily for 4 days. Take with food.  1 packet 0    benztropine (COGENTIN) 0.5 MG tablet Take 0.5 mg by mouth daily      potassium chloride (KLOR-CON M) 20 MEQ extended release tablet take 1 tablet by mouth once daily 30 tablet 2    letrozole (FEMARA) 2.5 MG tablet Take 1 tablet by mouth daily 30 tablet 5    lisinopril (PRINIVIL;ZESTRIL) 5 MG tablet Take 1 tablet by mouth daily 30 tablet 4    pravastatin (PRAVACHOL) 40 MG tablet take 1 tablet by mouth once daily  0    QUEtiapine (SEROQUEL) 100 MG tablet Take 50 mg by mouth nightly      Cyanocobalamin ER (RA VITAMIN B-12 TR) 1000 MCG TBCR take 1 tablet by mouth once daily 30 tablet 3    folic acid (FOLVITE) 1 MG tablet take 1 tablet by mouth once daily 30 tablet 3    rivaroxaban (XARELTO) 20 MG TABS tablet Take 1 tablet by mouth daily (with breakfast) 90 tablet 1    Calcium Carbonate-Vitamin D (OYSTER SHELL CALCIUM/D) 500-200 MG-UNIT TABS Take 1 tablet by mouth 2 times daily 60 tablet 5    nystatin (MYCOSTATIN) 052420 UNIT/ML suspension Take 5 mLs by mouth 4 times daily (Patient taking differently: Take 5 mLs by mouth as needed ) 1 Bottle 0    ondansetron (ZOFRAN) 4 MG tablet Take 2 tablets by mouth every 6 hours as needed for Nausea or Vomiting 40 tablet 1    sodium chloride (ALTAMIST SPRAY) 0.65 % nasal spray 1 spray by Nasal route as needed for Congestion 1 Bottle 1    VENTOLIN  (90 Base) MCG/ACT inhaler Inhale 2 puffs into the lungs every 4 hours as needed       butalbital-acetaminophen-caffeine (FIORICET, ESGIC) -40 MG per tablet Take 1-2 tablets by mouth every 4 hours as needed       fluticasone (FLONASE) 50 MCG/ACT nasal spray 1 spray by Nasal route daily       hydrOXYzine (ATARAX) 10 MG tablet Take 10 mg by mouth daily       lamoTRIgine (LAMICTAL) 100 MG tablet 50 mg nightly        No current facility-administered Nausea or Vomiting  Lamont Hogue MD   sodium chloride (ALTAMIST SPRAY) 0.65 % nasal spray 1 spray by Nasal route as needed for Congestion  Lamont Hogue MD   VENTOLIN  (90 Base) MCG/ACT inhaler Inhale 2 puffs into the lungs every 4 hours as needed   Historical Provider, MD   butalbital-acetaminophen-caffeine (FIORICET, ESGIC) -40 MG per tablet Take 1-2 tablets by mouth every 4 hours as needed   Historical Provider, MD   fluticasone (FLONASE) 50 MCG/ACT nasal spray 1 spray by Nasal route daily   Historical Provider, MD   hydrOXYzine (ATARAX) 10 MG tablet Take 10 mg by mouth daily   Historical Provider, MD   lamoTRIgine (LAMICTAL) 100 MG tablet 50 mg nightly   Historical Provider, MD        Social History     Tobacco Use    Smoking status: Never Smoker    Smokeless tobacco: Never Used    Tobacco comment: Never smoker. TC, RRT 4/5/18   Substance Use Topics    Alcohol use: No        Vitals:    04/15/19 1439   BP: 102/62   Site: Left Lower Arm   Position: Sitting   Cuff Size: Large Adult   Pulse: 82   Temp: 97.9 °F (36.6 °C)   TempSrc: Tympanic   SpO2: 94%   Weight: 282 lb (127.9 kg)   Height: 5' 6\" (1.676 m)     Estimated body mass index is 45.52 kg/m² as calculated from the following:    Height as of this encounter: 5' 6\" (1.676 m). Weight as of this encounter: 282 lb (127.9 kg). Physical Exam   Constitutional: She is oriented to person, place, and time. She appears well-developed and well-nourished. No distress. HENT:   Head: Normocephalic and atraumatic. Right Ear: Tympanic membrane is bulging. Left Ear: Tympanic membrane is erythematous and bulging. Mouth/Throat: Uvula is midline and mucous membranes are normal. Posterior oropharyngeal erythema present. Neck: Neck supple. Cardiovascular: Normal rate, regular rhythm and normal heart sounds. Pulmonary/Chest: Effort normal and breath sounds normal.   Neurological: She is alert and oriented to person, place, and time.    Nursing note and vitals reviewed. ASSESSMENT/PLAN:  1. Acute bacterial sinusitis  zpak as directed  Supportive care  Push fluids    2. Gastroesophageal reflux disease without esophagitis  Decrease tomato based foods along with acidic and greasy foods  Start prilosec OTC     Return if symptoms do not improve or worsen   Return PRN       No follow-ups on file. An electronic signature was used to authenticate this note.     --Jose Carrington, MAGGIE - CNP on 4/15/2019 at 2:51 PM

## 2019-04-22 DIAGNOSIS — C50.919 MALIGNANT NEOPLASM OF FEMALE BREAST, UNSPECIFIED ESTROGEN RECEPTOR STATUS, UNSPECIFIED LATERALITY, UNSPECIFIED SITE OF BREAST (HCC): Primary | ICD-10-CM

## 2019-04-22 RX ORDER — TRAMADOL HYDROCHLORIDE 50 MG/1
50 TABLET ORAL EVERY 6 HOURS PRN
COMMUNITY
End: 2019-04-22 | Stop reason: SDUPTHER

## 2019-04-23 RX ORDER — TRAMADOL HYDROCHLORIDE 50 MG/1
50 TABLET ORAL EVERY 6 HOURS PRN
Qty: 60 TABLET | Refills: 0 | Status: SHIPPED | OUTPATIENT
Start: 2019-04-23 | End: 2019-05-23

## 2019-05-08 ENCOUNTER — HOSPITAL ENCOUNTER (OUTPATIENT)
Dept: INFUSION THERAPY | Age: 54
Discharge: HOME | End: 2019-05-08
Payer: MEDICAID

## 2019-05-08 DIAGNOSIS — Z17.1 MALIGNANT NEOPLASM OF OVERLAPPING SITES OF RIGHT BREAST IN FEMALE, ESTROGEN RECEPTOR NEGATIVE (HCC): Primary | ICD-10-CM

## 2019-05-08 DIAGNOSIS — C50.811 MALIGNANT NEOPLASM OF OVERLAPPING SITES OF RIGHT BREAST IN FEMALE, ESTROGEN RECEPTOR NEGATIVE (HCC): Primary | ICD-10-CM

## 2019-05-08 PROCEDURE — 2580000003 HC RX 258: Performed by: INTERNAL MEDICINE

## 2019-05-08 PROCEDURE — 6360000002 HC RX W HCPCS: Performed by: INTERNAL MEDICINE

## 2019-05-08 PROCEDURE — 96523 IRRIG DRUG DELIVERY DEVICE: CPT

## 2019-05-08 RX ORDER — HEPARIN SODIUM (PORCINE) LOCK FLUSH IV SOLN 100 UNIT/ML 100 UNIT/ML
500 SOLUTION INTRAVENOUS PRN
Status: CANCELLED | OUTPATIENT
Start: 2019-05-08

## 2019-05-08 RX ORDER — SODIUM CHLORIDE 0.9 % (FLUSH) 0.9 %
10 SYRINGE (ML) INJECTION PRN
Status: DISCONTINUED | OUTPATIENT
Start: 2019-05-08 | End: 2019-05-09 | Stop reason: HOSPADM

## 2019-05-08 RX ORDER — SODIUM CHLORIDE 0.9 % (FLUSH) 0.9 %
10 SYRINGE (ML) INJECTION PRN
Status: CANCELLED | OUTPATIENT
Start: 2019-05-08

## 2019-05-08 RX ORDER — HEPARIN SODIUM (PORCINE) LOCK FLUSH IV SOLN 100 UNIT/ML 100 UNIT/ML
500 SOLUTION INTRAVENOUS PRN
Status: DISCONTINUED | OUTPATIENT
Start: 2019-05-08 | End: 2019-05-09 | Stop reason: HOSPADM

## 2019-05-08 RX ADMIN — Medication 10 ML: at 15:15

## 2019-05-08 RX ADMIN — HEPARIN SODIUM (PORCINE) LOCK FLUSH IV SOLN 100 UNIT/ML 500 UNITS: 100 SOLUTION at 15:16

## 2019-05-08 RX ADMIN — Medication 10 ML: at 15:16

## 2019-05-08 RX ADMIN — HEPARIN SODIUM (PORCINE) LOCK FLUSH IV SOLN 100 UNIT/ML 500 UNITS: 100 SOLUTION at 15:15

## 2019-05-08 NOTE — PROGRESS NOTES
Dual port accessed both sites both pushed easily with 10 ml of saline and flushed each with 5 ml of heparin flush. No blood return on either port site. Patient to see Dr. Daron Paulino May 22nd to inquire about port removal.  No complaints except seasonal allergies. Stable and discharged to home will return in 6 weeks if port still in place.

## 2019-05-16 DIAGNOSIS — C50.919 MALIGNANT NEOPLASM OF FEMALE BREAST, UNSPECIFIED ESTROGEN RECEPTOR STATUS, UNSPECIFIED LATERALITY, UNSPECIFIED SITE OF BREAST (HCC): Primary | ICD-10-CM

## 2019-05-16 RX ORDER — B-COMPLEX WITH VITAMIN C
1 TABLET ORAL 2 TIMES DAILY
Qty: 60 TABLET | Refills: 5 | Status: SHIPPED | OUTPATIENT
Start: 2019-05-16 | End: 2020-01-21 | Stop reason: SDUPTHER

## 2019-05-17 ENCOUNTER — OFFICE VISIT (OUTPATIENT)
Dept: FAMILY MEDICINE CLINIC | Age: 54
End: 2019-05-17
Payer: MEDICAID

## 2019-05-17 VITALS
TEMPERATURE: 99.5 F | OXYGEN SATURATION: 92 % | WEIGHT: 282.6 LBS | DIASTOLIC BLOOD PRESSURE: 80 MMHG | RESPIRATION RATE: 12 BRPM | HEIGHT: 66 IN | SYSTOLIC BLOOD PRESSURE: 110 MMHG | HEART RATE: 101 BPM | BODY MASS INDEX: 45.42 KG/M2

## 2019-05-17 DIAGNOSIS — J01.90 ACUTE BACTERIAL SINUSITIS: Primary | ICD-10-CM

## 2019-05-17 DIAGNOSIS — B96.89 ACUTE BACTERIAL SINUSITIS: Primary | ICD-10-CM

## 2019-05-17 DIAGNOSIS — J30.9 ALLERGIC RHINITIS, UNSPECIFIED SEASONALITY, UNSPECIFIED TRIGGER: ICD-10-CM

## 2019-05-17 PROCEDURE — 99213 OFFICE O/P EST LOW 20 MIN: CPT | Performed by: NURSE PRACTITIONER

## 2019-05-17 RX ORDER — FEXOFENADINE HCL 180 MG/1
180 TABLET ORAL DAILY
Qty: 30 TABLET | Refills: 0 | Status: SHIPPED | OUTPATIENT
Start: 2019-05-17 | End: 2019-06-28 | Stop reason: SDUPTHER

## 2019-05-17 RX ORDER — AMOXICILLIN AND CLAVULANATE POTASSIUM 875; 125 MG/1; MG/1
1 TABLET, FILM COATED ORAL 2 TIMES DAILY
Qty: 20 TABLET | Refills: 0 | Status: SHIPPED | OUTPATIENT
Start: 2019-05-17 | End: 2019-05-27

## 2019-05-17 RX ORDER — METHYLPREDNISOLONE 4 MG/1
TABLET ORAL
Qty: 1 KIT | Refills: 0 | Status: ON HOLD | OUTPATIENT
Start: 2019-05-17 | End: 2019-06-06 | Stop reason: ALTCHOICE

## 2019-05-17 ASSESSMENT — ENCOUNTER SYMPTOMS
STRIDOR: 0
COUGH: 1
VOMITING: 0
DIARRHEA: 0
CONSTIPATION: 0
SHORTNESS OF BREATH: 0
RHINORRHEA: 1
NAUSEA: 0
PHOTOPHOBIA: 1
SINUS PRESSURE: 1

## 2019-05-17 NOTE — PROGRESS NOTES
[facility]    Subjective:     Patient ID: Xiao Weems is a 48 y.o. y.o. female. Patient in for office for migraine headache. She was treated for sinus about a month ago with Z-nilda. She states that she does not feel like it got any better. Migraine    This is a new problem. The current episode started 1 to 4 weeks ago. The problem has been waxing and waning. The pain is located in the temporal region. Quality: pounding. Associated symptoms include coughing (mild for the past 2-3 days, especially at night), dizziness, photophobia, rhinorrhea and sinus pressure. Pertinent negatives include no fever, nausea or vomiting. Associated symptoms comments: Watery eyes. The symptoms are aggravated by weather changes. Treatments tried: she cont on her allergy medication. The treatment provided mild relief. Her past medical history is significant for migraine headaches.          Past Medical History:   Diagnosis Date    Bipolar 1 disorder ([address]     Breast cancer ([address] 2017    right side     DVT of axillary vein, acute right ([address]     Eye twitch 2019    Headache(784.0)     Hyperlipidemia     Migraine        Past Surgical History:   Procedure Laterality Date    DILATION AND CURETTAGE OF UTERUS N/A 10/13/2017    D & C HYSTEROSCOPY with polypectomy performed by Beulah Townsend MD at [facility] / REMOVAL / [address] Left 11/9/2017    PORT INSERTION performed by Beckie Lara MD at [address] [facility] / REMOVAL / [address] N/A 11/30/2017    Port Removal & Insertion performed by Beckie Lara MD at [address] Right 10/19/2017     [address] [facility]    MASTECTOMY Right 10/19/2017    Right Breast Mastectomy with  [place] Node Biopsy performed by Beckie Lara MD at [address] [facility] CTR VAD W/SUBQ PORT AGE 5 YR/> Left 3/1/2018    PORT Exchange performed by Beckie Lara MD at [place] [address] Left 2014, 2015    x 2 surgeries total; Dr. Ben Ray    TUNNELED VENOUS PORT PLACEMENT Left 11/30/2017    removal of et replacement of       Family History   Problem Relation Age of Onset    Breast Cancer Sister 54    Breast Cancer Maternal Aunt 71    Other Maternal Cousin         Atypical Ductal Hyperplasia     Uterine Cancer Sister 32    Other Sister         breast cyst    Cataracts Mother     Glaucoma Mother     Heart Disease Father     High Blood Pressure Father     High Cholesterol Father     Mental Retardation Father     Diabetes Neg Hx         No Known Allergies    Current Outpatient Medications   Medication Sig Dispense Refill    fexofenadine (ALLEGRA) 180 MG tablet Take 1 tablet by mouth daily 30 tablet 0    methylPREDNISolone (MEDROL DOSEPACK) 4 MG tablet Take as directed 1 kit 0    amoxicillin-clavulanate (AUGMENTIN) 875-125 MG per tablet Take 1 tablet by mouth 2 times daily for 10 days 20 tablet 0    Calcium Carbonate-Vitamin D (OYSTER SHELL CALCIUM/D) 500-200 MG-UNIT TABS Take 1 tablet by mouth 2 times daily 60 tablet 5    traMADol (ULTRAM) 50 MG tablet Take 1 tablet by mouth every 6 hours as needed for Pain for up to 30 days.  60 tablet 0    benztropine (COGENTIN) 0.5 MG tablet Take 0.5 mg by mouth daily      potassium chloride (KLOR-CON M) 20 MEQ extended release tablet take 1 tablet by mouth once daily 30 tablet 2    letrozole (FEMARA) 2.5 MG tablet Take 1 tablet by mouth daily 30 tablet 5    lisinopril (PRINIVIL;ZESTRIL) 5 MG tablet Take 1 tablet by mouth daily 30 tablet 4    pravastatin (PRAVACHOL) 40 MG tablet take 1 tablet by mouth once daily  0    QUEtiapine (SEROQUEL) 100 MG tablet Take 50 mg by mouth nightly      Cyanocobalamin ER (RA VITAMIN B-12 TR) 1000 MCG TBCR take 1 tablet by mouth once daily 30 tablet 3    folic acid (FOLVITE) 1 MG tablet take 1 tablet by mouth once daily 30 tablet 3    nystatin (MYCOSTATIN) 327516 UNIT/ML suspension Take 5 mLs by mouth 4 times daily (Patient taking differently: Take 5 mLs by mouth as needed ) 1 Bottle 0    ondansetron (ZOFRAN) 4 MG tablet Take 2 tablets by mouth every 6 hours as needed for Nausea or Vomiting 40 tablet 1    sodium chloride (ALTAMIST SPRAY) 0.65 % nasal spray 1 spray by Nasal route as needed for Congestion 1 Bottle 1    butalbital-acetaminophen-caffeine (FIORICET, ESGIC) -40 MG per tablet Take 1-2 tablets by mouth every 4 hours as needed       fluticasone (FLONASE) 50 MCG/ACT nasal spray 1 spray by Nasal route daily       hydrOXYzine (ATARAX) 10 MG tablet Take 10 mg by mouth daily       lamoTRIgine (LAMICTAL) 100 MG tablet 50 mg nightly       VENTOLIN  (90 Base) MCG/ACT inhaler Inhale 2 puffs into the lungs every 4 hours as needed        No current facility-administered medications for this visit. Review of Systems   Constitutional: Negative. Negative for activity change, appetite change and fever. HENT: Positive for congestion, rhinorrhea and sinus pressure. Eyes: Positive for photophobia. Respiratory: Positive for cough (mild for the past 2-3 days, especially at night). Negative for shortness of breath and stridor. Gastrointestinal: Negative for constipation, diarrhea, nausea and vomiting. Allergic/Immunologic: Positive for environmental allergies. Neurological: Positive for dizziness. Objective:       /80 (Site: Right Upper Arm, Position: Sitting, Cuff Size: Large Adult)   Pulse 101   Temp 99.5 °F (37.5 °C) (Tympanic)   Resp 12   Ht 5' 5.98\" (1.676 m)   Wt 282 lb 9.6 oz (128.2 kg)   LMP 02/28/2013   SpO2 92%   Breastfeeding? No   BMI 45.63 kg/m²     Physical Exam   Constitutional: She is oriented to person, place, and time. Vital signs are normal. She appears well-developed and well-nourished. She is active and cooperative. HENT:   Head: Normocephalic and atraumatic. Right Ear: Hearing and external ear normal. A middle ear effusion is present.    Left Ear: Hearing and external ear normal. A middle ear effusion is present. Nose: Rhinorrhea present. Right sinus exhibits maxillary sinus tenderness and frontal sinus tenderness. Left sinus exhibits maxillary sinus tenderness and frontal sinus tenderness. Mouth/Throat: Posterior oropharyngeal erythema present. Cloudy fluid noted behind TM bilat, PND+   Eyes: Pupils are equal, round, and reactive to light. Conjunctivae and EOM are normal.   Neck: Normal range of motion. Cardiovascular: Normal rate, regular rhythm and normal heart sounds. Pulmonary/Chest: Effort normal and breath sounds normal.   Musculoskeletal: Normal range of motion. Neurological: She is alert and oriented to person, place, and time. Skin: Skin is warm and dry. Psychiatric: She has a normal mood and affect. Her behavior is normal. Judgment and thought content normal.   Nursing note and vitals reviewed. Assessment & Plan:      1. Acute bacterial sinusitis-new  Advised patient to continue supportive care. They also need to increase fluids and rest. Advised that patient can use OTC Tylenol and/or Ibuprofen as needed for discomfort or fever. If patient get significantly worse over the next three days (uncontrolled fever of 101 or higher, respiratory distress. Shaffer Challenger ) they then can call my office for reevaluation or go to Urgent Care. Patient agrees with plan of care. - methylPREDNISolone (MEDROL DOSEPACK) 4 MG tablet; Take as directed  Dispense: 1 kit; Refill: 0  - amoxicillin-clavulanate (AUGMENTIN) 875-125 MG per tablet; Take 1 tablet by mouth 2 times daily for 10 days  Dispense: 20 tablet; Refill: 0    2. Allergic rhinitis, unspecified seasonality, unspecified trigger-new  - fexofenadine (ALLEGRA) 180 MG tablet; Take 1 tablet by mouth daily  Dispense: 30 tablet; Refill: 0  - amoxicillin-clavulanate (AUGMENTIN) 875-125 MG per tablet; Take 1 tablet by mouth 2 times daily for 10 days  Dispense: 20 tablet;  Refill: 0        Lubna HENRY MAGGIE Marte - CNP   5/17/2019 2:42 PM

## 2019-05-21 ENCOUNTER — TELEPHONE (OUTPATIENT)
Dept: FAMILY MEDICINE CLINIC | Age: 54
End: 2019-05-21

## 2019-05-22 ENCOUNTER — INITIAL CONSULT (OUTPATIENT)
Dept: SURGERY | Age: 54
End: 2019-05-22
Payer: MEDICAID

## 2019-05-22 VITALS
HEART RATE: 84 BPM | SYSTOLIC BLOOD PRESSURE: 122 MMHG | HEIGHT: 66 IN | TEMPERATURE: 99.3 F | DIASTOLIC BLOOD PRESSURE: 70 MMHG | WEIGHT: 288.8 LBS | BODY MASS INDEX: 46.41 KG/M2

## 2019-05-22 DIAGNOSIS — Z79.899 NEED FOR PROPHYLACTIC CHEMOTHERAPY: ICD-10-CM

## 2019-05-22 DIAGNOSIS — Z90.11 STATUS POST RIGHT MASTECTOMY: Primary | ICD-10-CM

## 2019-05-22 PROCEDURE — 99213 OFFICE O/P EST LOW 20 MIN: CPT | Performed by: SURGERY

## 2019-05-22 NOTE — PROGRESS NOTES
tablet by mouth daily 30 tablet 0    methylPREDNISolone (MEDROL DOSEPACK) 4 MG tablet Take as directed 1 kit 0    amoxicillin-clavulanate (AUGMENTIN) 875-125 MG per tablet Take 1 tablet by mouth 2 times daily for 10 days 20 tablet 0    Calcium Carbonate-Vitamin D (OYSTER SHELL CALCIUM/D) 500-200 MG-UNIT TABS Take 1 tablet by mouth 2 times daily 60 tablet 5    traMADol (ULTRAM) 50 MG tablet Take 1 tablet by mouth every 6 hours as needed for Pain for up to 30 days.  60 tablet 0    benztropine (COGENTIN) 0.5 MG tablet Take 0.5 mg by mouth daily      potassium chloride (KLOR-CON M) 20 MEQ extended release tablet take 1 tablet by mouth once daily 30 tablet 2    lisinopril (PRINIVIL;ZESTRIL) 5 MG tablet Take 1 tablet by mouth daily 30 tablet 4    pravastatin (PRAVACHOL) 40 MG tablet take 1 tablet by mouth once daily  0    QUEtiapine (SEROQUEL) 100 MG tablet Take 50 mg by mouth nightly      Cyanocobalamin ER (RA VITAMIN B-12 TR) 1000 MCG TBCR take 1 tablet by mouth once daily 30 tablet 3    folic acid (FOLVITE) 1 MG tablet take 1 tablet by mouth once daily 30 tablet 3    nystatin (MYCOSTATIN) 749679 UNIT/ML suspension Take 5 mLs by mouth 4 times daily (Patient taking differently: Take 5 mLs by mouth as needed ) 1 Bottle 0    ondansetron (ZOFRAN) 4 MG tablet Take 2 tablets by mouth every 6 hours as needed for Nausea or Vomiting 40 tablet 1    sodium chloride (ALTAMIST SPRAY) 0.65 % nasal spray 1 spray by Nasal route as needed for Congestion 1 Bottle 1    VENTOLIN  (90 Base) MCG/ACT inhaler Inhale 2 puffs into the lungs every 4 hours as needed       butalbital-acetaminophen-caffeine (FIORICET, ESGIC) -40 MG per tablet Take 1-2 tablets by mouth every 4 hours as needed       fluticasone (FLONASE) 50 MCG/ACT nasal spray 1 spray by Nasal route daily       hydrOXYzine (ATARAX) 10 MG tablet Take 10 mg by mouth daily       lamoTRIgine (LAMICTAL) 100 MG tablet 50 mg nightly       letrozole [facility]) 2.5 MG tablet Take 1 tablet by mouth daily 30 tablet 5     No current facility-administered medications for this visit. No Known Allergies    Family History   Problem Relation Age of Onset    Breast Cancer Sister 54    Breast Cancer Maternal Aunt 71    Other Maternal Cousin         Atypical Ductal Hyperplasia     Uterine Cancer Sister 32    Other Sister         breast cyst    Cataracts Mother     Glaucoma Mother     Heart Disease Father     High Blood Pressure Father     High Cholesterol Father     Mental Retardation Father     Diabetes Neg Hx        Social History     Socioeconomic History    Marital status: Single     Spouse name: Not on file    Number of children: Not on file    Years of education: Not on file    Highest education level: Not on file   Occupational History    Not on file   Social Needs    Financial resource strain: Not on file    Food insecurity:     Worry: Not on file     Inability: Not on file    Transportation needs:     Medical: Not on file     Non-medical: Not on file   Tobacco Use    Smoking status: Never Smoker    Smokeless tobacco: Never Used    Tobacco comment: Never smoker.  TC, RRT 4/5/18   Substance and Sexual Activity    Alcohol use: No    Drug use: No    Sexual activity: Yes     Partners: Male     Birth control/protection: Surgical   Lifestyle    Physical activity:     Days per week: Not on file     Minutes per session: Not on file    Stress: Not on file   Relationships    Social connections:     Talks on phone: Not on file     Gets together: Not on file     Attends Alevism service: Not on file     Active member of club or organization: Not on file     Attends meetings of clubs or organizations: Not on file     Relationship status: Not on file    Intimate partner violence:     Fear of current or ex partner: Not on file     Emotionally abused: Not on file     Physically abused: Not on file     Forced sexual activity: Not on file   Other Topics Concern    Not on file   Social History Narrative    Not on file       ROS:   Review of Systems - General ROS: negative  Psychological ROS: negative  Ophthalmic ROS: negative  ENT ROS: negative  Allergy and Immunology ROS: negative  Hematological and Lymphatic ROS: negative  Endocrine ROS: negative  Respiratory ROS: no cough, shortness of breath, or wheezing  Cardiovascular ROS: no chest pain or dyspnea on exertion  Gastrointestinal ROS: no abdominal pain, change in bowel habits, or black or bloody stools  Genito-Urinary ROS: no dysuria, trouble voiding, or hematuria  Musculoskeletal ROS: negative      Objective   Vitals:    05/22/19 1405   BP: 122/70   Pulse: 84   Temp: 99.3 °F (37.4 °C)     General:in no apparent distress, well developed and well nourished, alert and oriented times 3  Eyes: PERRL, EOMI and Sclera nonicteric  Ears, Nose, Throat: external ear and ear canal normal bilaterally, oropharynx clear and moist with normal mucous membranes  Neck: neck supple and non tender without mass  Lungs: clear to auscultation without wheezes or rales   Heart: S1S2, no mumurs, RRR  Chest:  There is a well healed scar at the left upper chest with palpable port device in subcutaneous tissue. No erythema noted. Abdomen: soft, nontender, no HSM, no guarding, no rebound, no masses  Extremity: negative  Neuro: CN II-XII grossly intact    Previous CXRs reviewed. Pt is noted to have Left subclavian catheter placement. Assessment     3  59-year-old female with history of breast cancer status post surgery and chemoradiation therapy. Now presenting for port removal.    Plan     1. As the patient is now off of her blood thinners we will proceed with excision of the port device at the earliest available time. Risks benefits and alternatives of the procedure were explained to the patient and written informed consent is obtained.       Electronically signed by Ana Resendez DO on 5/22/2019 at 2:10 PM

## 2019-06-05 NOTE — H&P
Expand All Collapse All    Subjective   Blanca Benitez is a 48 y.o. female who presents today for consideration of port removal.  Patient is previously been seen in this office and was noted to have a history of breast cancer and she was status post chemotherapy treatment. At the time of initial evaluation which was approximately 4 months ago the patient was on Eliquis for DVT/PE and could not be hold off of her medication at that time. Since then she is completed her therapy for her DVT/PE and is no longer on blood thinners.   She has not been using the port for some time and re-presents today for consideration of removal.  No new issues since she was last seen.       Past Medical History        Past Medical History:   Diagnosis Date    Bipolar 1 disorder ([address]      Breast cancer ([address] 2017     right side     DVT of axillary vein, acute right ([address]      Eye twitch 2019    Headache(784.0)      Hyperlipidemia      Migraine              Past Surgical History         Past Surgical History:   Procedure Laterality Date    DILATION AND CURETTAGE OF UTERUS N/A 10/13/2017     D & C HYSTEROSCOPY with polypectomy performed by Darcie Malloy MD at [address] / REMOVAL / [address] Left 11/9/2017     PORT INSERTION performed by Robert Hinton MD at [address] / REMOVAL / [address] N/A 11/30/2017     Port Removal & Insertion performed by Robert Hinton MD at [address] [facility] Right 10/19/2017      [address] [facility]    MASTECTOMY Right 10/19/2017     Right Breast Mastectomy with  [place] Node Biopsy performed by Robert Hinton MD at . [facility] [address] [facility] CTR VAD W/SUBQ PORT AGE 5 YR/> Left 3/1/2018     PORT Exchange performed by Robert Hinton MD at [place] [address] Left 2014, 2015     x 2 surgeries total; Dr. Jihan Marie TUNNELED VENOUS PORT PLACEMENT Left 11/30/2017     removal of et replacement of       Current Facility-Administered Medications          Current Outpatient Medications   Medication Sig Dispense Refill    fexofenadine (ALLEGRA) 180 MG tablet Take 1 tablet by mouth daily 30 tablet 0    methylPREDNISolone (MEDROL DOSEPACK) 4 MG tablet Take as directed 1 kit 0    amoxicillin-clavulanate (AUGMENTIN) 875-125 MG per tablet Take 1 tablet by mouth 2 times daily for 10 days 20 tablet 0    Calcium Carbonate-Vitamin D (OYSTER SHELL CALCIUM/D) 500-200 MG-UNIT TABS Take 1 tablet by mouth 2 times daily 60 tablet 5    traMADol (ULTRAM) 50 MG tablet Take 1 tablet by mouth every 6 hours as needed for Pain for up to 30 days.  60 tablet 0    benztropine (COGENTIN) 0.5 MG tablet Take 0.5 mg by mouth daily        potassium chloride (KLOR-CON M) 20 MEQ extended release tablet take 1 tablet by mouth once daily 30 tablet 2    lisinopril (PRINIVIL;ZESTRIL) 5 MG tablet Take 1 tablet by mouth daily 30 tablet 4    pravastatin (PRAVACHOL) 40 MG tablet take 1 tablet by mouth once daily   0    QUEtiapine (SEROQUEL) 100 MG tablet Take 50 mg by mouth nightly        Cyanocobalamin ER (RA VITAMIN B-12 TR) 1000 MCG TBCR take 1 tablet by mouth once daily 30 tablet 3    folic acid (FOLVITE) 1 MG tablet take 1 tablet by mouth once daily 30 tablet 3    nystatin (MYCOSTATIN) 987896 UNIT/ML suspension Take 5 mLs by mouth 4 times daily (Patient taking differently: Take 5 mLs by mouth as needed ) 1 Bottle 0    ondansetron (ZOFRAN) 4 MG tablet Take 2 tablets by mouth every 6 hours as needed for Nausea or Vomiting 40 tablet 1    sodium chloride (ALTAMIST SPRAY) 0.65 % nasal spray 1 spray by Nasal route as needed for Congestion 1 Bottle 1    VENTOLIN  (90 Base) MCG/ACT inhaler Inhale 2 puffs into the lungs every 4 hours as needed         butalbital-acetaminophen-caffeine (FIORICET, ESGIC) -40 MG per tablet Take 1-2 tablets by mouth every 4 hours as needed         fluticasone (FLONASE) 50 MCG/ACT nasal spray 1 spray by Nasal route daily         hydrOXYzine (ATARAX) 10 MG tablet Take 10 mg by mouth daily         lamoTRIgine (LAMICTAL) 100 MG tablet 50 mg nightly         letrozole (FEMARA) 2.5 MG tablet Take 1 tablet by mouth daily 30 tablet 5      No current facility-administered medications for this visit.             No Known Allergies     Family History         Family History   Problem Relation Age of Onset    Breast Cancer Sister 54    Breast Cancer Maternal Aunt 71    Other Maternal Cousin           Atypical Ductal Hyperplasia     Uterine Cancer Sister 32   [facility] Other Sister           breast cyst    Cataracts Mother      Glaucoma Mother      Heart Disease Father      High Blood Pressure Father      High Cholesterol Father      Mental Retardation Father      Diabetes Neg Hx              Social History               Socioeconomic History    Marital status: Single       Spouse name: Not on file    Number of children: Not on file    Years of education: Not on file    Highest education level: Not on file   Occupational History    Not on file   Social Needs    Financial resource strain: Not on file    Food insecurity:       Worry: Not on file       Inability: Not on file    Transportation needs:       Medical: Not on file       Non-medical: Not on file   Tobacco Use    Smoking status: Never Smoker    Smokeless tobacco: Never Used    Tobacco comment: Never smoker.  TC, RRT 4/5/18   Substance and Sexual Activity    Alcohol use: No    Drug use: No    Sexual activity: Yes       Partners: Male       Birth control/protection: Surgical   Lifestyle    Physical activity:       Days per week: Not on file       Minutes per session: Not on file    Stress: Not on file   Relationships    Social connections:       Talks on phone: Not on file       Gets together: Not on file       Attends [employer]ism service: Not on file       Active member of club or organization: Not on file       Attends meetings of clubs or organizations: Not on file       Relationship status: Not on file    Intimate partner violence:       Fear of current or ex partner: Not on file       Emotionally abused: Not on file       Physically abused: Not on file       Forced sexual activity: Not on file   Other Topics Concern    Not on file   Social History Narrative    Not on file            ROS:   Review of Systems - General ROS: negative  Psychological ROS: negative  Ophthalmic ROS: negative  ENT ROS: negative  Allergy and Immunology ROS: negative  Hematological and Lymphatic ROS: negative  Endocrine ROS: negative  Respiratory ROS: no cough, shortness of breath, or wheezing  Cardiovascular ROS: no chest pain or dyspnea on exertion  Gastrointestinal ROS: no abdominal pain, change in bowel habits, or black or bloody stools  Genito-Urinary ROS: no dysuria, trouble voiding, or hematuria  Musculoskeletal ROS: negative        Objective       Vitals:     05/22/19 1405   BP: 122/70   Pulse: 84   Temp: 99.3 °F (37.4 °C)      General:in no apparent distress, well developed and well nourished, alert and oriented times 3  Eyes: PERRL, EOMI and Sclera nonicteric  Ears, Nose, Throat: external ear and ear canal normal bilaterally, oropharynx clear and moist with normal mucous membranes  Neck: neck supple and non tender without mass  Lungs: clear to auscultation without wheezes or rales   Heart: S1S2, no mumurs, RRR  Chest:  There is a well healed scar at the left upper chest with palpable port device in subcutaneous tissue. No erythema noted. Abdomen: soft, nontender, no HSM, no guarding, no rebound, no masses  Extremity: negative  Neuro: CN II-XII grossly intact     Previous CXRs reviewed. Pt is noted to have Left subclavian catheter placement.        Assessment      3  57-year-old female with history of breast cancer status post surgery and chemoradiation therapy. Now presenting for port removal.     Plan      1.   As the patient is now off of her blood thinners we will proceed with excision of the port device at the earliest available time.   Risks benefits and alternatives of the procedure were explained to the patient and written informed consent is obtained.        Electronically signed by Chan Aguirre DO on 5/22/2019 at 2:10 PM       The patient was interviewed and examined and there have been no changes since the above History and Physical.    Electronically signed by Chan Aguirre DO on 6/5/2019 at 12:44 PM

## 2019-06-06 ENCOUNTER — HOSPITAL ENCOUNTER (OUTPATIENT)
Age: 54
Setting detail: OUTPATIENT SURGERY
Discharge: HOME | End: 2019-06-06
Attending: SURGERY | Admitting: SURGERY
Payer: MEDICAID

## 2019-06-06 ENCOUNTER — ANESTHESIA EVENT (OUTPATIENT)
Dept: OPERATING ROOM | Age: 54
End: 2019-06-06
Payer: MEDICAID

## 2019-06-06 ENCOUNTER — ANESTHESIA (OUTPATIENT)
Dept: OPERATING ROOM | Age: 54
End: 2019-06-06
Payer: MEDICAID

## 2019-06-06 VITALS
SYSTOLIC BLOOD PRESSURE: 94 MMHG | OXYGEN SATURATION: 91 % | RESPIRATION RATE: 21 BRPM | DIASTOLIC BLOOD PRESSURE: 57 MMHG | TEMPERATURE: 98.2 F

## 2019-06-06 VITALS
HEIGHT: 66 IN | TEMPERATURE: 98.2 F | HEART RATE: 77 BPM | OXYGEN SATURATION: 99 % | WEIGHT: 270 LBS | DIASTOLIC BLOOD PRESSURE: 87 MMHG | RESPIRATION RATE: 16 BRPM | BODY MASS INDEX: 43.39 KG/M2 | SYSTOLIC BLOOD PRESSURE: 128 MMHG

## 2019-06-06 DIAGNOSIS — G89.18 POST-OP PAIN: Primary | ICD-10-CM

## 2019-06-06 PROCEDURE — 3700000000 HC ANESTHESIA ATTENDED CARE: Performed by: SURGERY

## 2019-06-06 PROCEDURE — 3600000002 HC SURGERY LEVEL 2 BASE: Performed by: SURGERY

## 2019-06-06 PROCEDURE — 3700000001 HC ADD 15 MINUTES (ANESTHESIA): Performed by: SURGERY

## 2019-06-06 PROCEDURE — 2580000003 HC RX 258: Performed by: SURGERY

## 2019-06-06 PROCEDURE — 7100000010 HC PHASE II RECOVERY - FIRST 15 MIN: Performed by: SURGERY

## 2019-06-06 PROCEDURE — 6360000002 HC RX W HCPCS: Performed by: NURSE ANESTHETIST, CERTIFIED REGISTERED

## 2019-06-06 PROCEDURE — 3600000012 HC SURGERY LEVEL 2 ADDTL 15MIN: Performed by: SURGERY

## 2019-06-06 PROCEDURE — 36590 REMOVAL TUNNELED CV CATH: CPT | Performed by: SURGERY

## 2019-06-06 PROCEDURE — 2709999900 HC NON-CHARGEABLE SUPPLY: Performed by: SURGERY

## 2019-06-06 PROCEDURE — 2500000003 HC RX 250 WO HCPCS: Performed by: SURGERY

## 2019-06-06 PROCEDURE — 7100000011 HC PHASE II RECOVERY - ADDTL 15 MIN: Performed by: SURGERY

## 2019-06-06 PROCEDURE — 2500000003 HC RX 250 WO HCPCS: Performed by: NURSE ANESTHETIST, CERTIFIED REGISTERED

## 2019-06-06 PROCEDURE — 6360000002 HC RX W HCPCS: Performed by: SURGERY

## 2019-06-06 RX ORDER — TRAMADOL HYDROCHLORIDE 50 MG/1
50 TABLET ORAL EVERY 6 HOURS PRN
Qty: 12 TABLET | Refills: 0 | Status: SHIPPED | OUTPATIENT
Start: 2019-06-06 | End: 2019-06-09

## 2019-06-06 RX ORDER — SODIUM CHLORIDE 0.9 % (FLUSH) 0.9 %
10 SYRINGE (ML) INJECTION PRN
Status: DISCONTINUED | OUTPATIENT
Start: 2019-06-06 | End: 2019-06-06 | Stop reason: HOSPADM

## 2019-06-06 RX ORDER — SODIUM CHLORIDE, SODIUM LACTATE, POTASSIUM CHLORIDE, CALCIUM CHLORIDE 600; 310; 30; 20 MG/100ML; MG/100ML; MG/100ML; MG/100ML
INJECTION, SOLUTION INTRAVENOUS CONTINUOUS
Status: DISCONTINUED | OUTPATIENT
Start: 2019-06-06 | End: 2019-06-06 | Stop reason: HOSPADM

## 2019-06-06 RX ORDER — SODIUM CHLORIDE 0.9 % (FLUSH) 0.9 %
10 SYRINGE (ML) INJECTION EVERY 12 HOURS SCHEDULED
Status: DISCONTINUED | OUTPATIENT
Start: 2019-06-06 | End: 2019-06-06 | Stop reason: HOSPADM

## 2019-06-06 RX ORDER — PROPOFOL 10 MG/ML
INJECTION, EMULSION INTRAVENOUS CONTINUOUS PRN
Status: DISCONTINUED | OUTPATIENT
Start: 2019-06-06 | End: 2019-06-06 | Stop reason: SDUPTHER

## 2019-06-06 RX ORDER — LIDOCAINE HYDROCHLORIDE 20 MG/ML
INJECTION, SOLUTION EPIDURAL; INFILTRATION; INTRACAUDAL; PERINEURAL PRN
Status: DISCONTINUED | OUTPATIENT
Start: 2019-06-06 | End: 2019-06-06 | Stop reason: SDUPTHER

## 2019-06-06 RX ADMIN — Medication 3 G: at 07:29

## 2019-06-06 RX ADMIN — PROPOFOL 200 MCG/KG/MIN: 10 INJECTION, EMULSION INTRAVENOUS at 07:31

## 2019-06-06 RX ADMIN — SODIUM CHLORIDE, POTASSIUM CHLORIDE, SODIUM LACTATE AND CALCIUM CHLORIDE: 600; 310; 30; 20 INJECTION, SOLUTION INTRAVENOUS at 06:50

## 2019-06-06 RX ADMIN — LIDOCAINE HYDROCHLORIDE 100 MG: 20 INJECTION, SOLUTION EPIDURAL; INFILTRATION; INTRACAUDAL; PERINEURAL at 07:31

## 2019-06-06 RX ADMIN — SODIUM CHLORIDE, POTASSIUM CHLORIDE, SODIUM LACTATE AND CALCIUM CHLORIDE: 600; 310; 30; 20 INJECTION, SOLUTION INTRAVENOUS at 07:26

## 2019-06-06 ASSESSMENT — PAIN SCALES - GENERAL
PAINLEVEL_OUTOF10: 0

## 2019-06-06 NOTE — ANESTHESIA POSTPROCEDURE EVALUATION
Department of Anesthesiology  Postprocedure Note    Patient: Monae Hernandez  MRN: [id number]  YOB: 1965  Date of evaluation: 6/6/2019  Time:  8:07 AM     Procedure Summary     Date:  06/06/19 Room / Location:  [address] [place] [address] / [facility]    Anesthesia Start:  [address] Anesthesia Stop:  1286    Procedure:  PORT REMOVAL (Left ) Diagnosis:  (breast cancer)    Surgeon:  Galen Milian DO Responsible Provider:  MAGGIE Torres CRNA    Anesthesia Type:  general ASA Status:  3          Anesthesia Type: general    Chris Phase I: Chris Score: 10    Chris Phase II:      Last vitals: Reviewed and per EMR flowsheets.        Anesthesia Post Evaluation    Patient location during evaluation: PACU  Patient participation: complete - patient participated  Level of consciousness: awake and alert  Pain score: 0  Airway patency: patent  Nausea & Vomiting: no nausea and no vomiting  Complications: no  Cardiovascular status: blood pressure returned to baseline and hemodynamically stable  Respiratory status: acceptable, spontaneous ventilation and room air  Hydration status: euvolemic

## 2019-06-06 NOTE — ANESTHESIA PRE PROCEDURE
Department of Anesthesiology  Preprocedure Note       Name:  Manfred Haro   Age:  48 y.o.  :  1965                                          MRN:  [id number]         Date:  2019      Surgeon: Lolis Parks):  Amy Medellin DO    Procedure: Procedure(s):  Port Removal     Medications prior to admission:   Prior to Admission medications    Medication Sig Start Date End Date Taking?  Authorizing Provider   fexofenadine (ALLEGRA) 180 MG tablet Take 1 tablet by mouth daily 19  MAGGIE Munoz CNP   Calcium Carbonate-Vitamin D (OYSTER SHELL CALCIUM/D) 500-200 MG-UNIT TABS Take 1 tablet by mouth 2 times daily 5/16/19 5/15/20  Robbi Robertson MD   benztropine (COGENTIN) 0.5 MG tablet Take 0.5 mg by mouth daily    Historical Provider, MD   potassium chloride (KLOR-CON M) 20 MEQ extended release tablet take 1 tablet by mouth once daily 3/15/19   Robbi Robertson MD   letrozole [facility]) 2.5 MG tablet Take 1 tablet by mouth daily 3/15/19 5/17/19  Robbi Robertson MD   lisinopril (PRINIVIL;ZESTRIL) 5 MG tablet Take 1 tablet by mouth daily 19   MAGGIE Smith CNP   pravastatin (PRAVACHOL) 40 MG tablet take 1 tablet by mouth once daily 19   Historical Provider, MD   QUEtiapine (SEROQUEL) 100 MG tablet Take 50 mg by mouth nightly    Historical Provider, MD   Cyanocobalamin ER (RA VITAMIN B-12 TR) 1000 MCG TBCR take 1 tablet by mouth once daily 18   Robbi Robertson MD   folic acid (FOLVITE) 1 MG tablet take 1 tablet by mouth once daily 18   Robbi Robertson MD   nystatin (MYCOSTATIN) 844872 UNIT/ML suspension Take 5 mLs by mouth 4 times daily  Patient taking differently: Take 5 mLs by mouth as needed  18   Vivek Roa MD   ondansetron (ZOFRAN) 4 MG tablet Take 2 tablets by mouth every 6 hours as needed for Nausea or Vomiting 18   Robbi Robertson MD   sodium chloride (ALTAMIST SPRAY) 0.65 % nasal spray 1 spray by Nasal route as needed for Congestion eyes H25.813    Squamous blepharitis of upper and lower eyelids of both eyes H01.02A, H01. 02B    Acute deep vein thrombosis (DVT) of axillary vein of right upper extremity (HCC) I82. A11    Malignant neoplasm of breast in female, estrogen receptor positive ([facility] C50.919, Z17.0    Seasonal allergies J30.2    H/O right mastectomy Z90.11       Past Medical History:        Diagnosis Date    Bipolar 1 disorder ([facility]     Breast cancer ([facility] 2017    right side     DVT of axillary vein, acute right ([facility]     Eye twitch 2019    Headache(784.0)     Hyperlipidemia     Migraine        Past Surgical History:        Procedure Laterality Date    DILATION AND CURETTAGE OF UTERUS N/A 10/13/2017    D & C HYSTEROSCOPY with polypectomy performed by Pa Lazo MD at [address] [facility] / REMOVAL / [address] Left 11/9/2017    PORT INSERTION performed by Celsa Nicholas MD at [facility] / REMOVAL / [address] N/A 11/30/2017    Port Removal & Insertion performed by Celsa Nicholas MD at [address] [facility] Right 10/19/2017     [address] [facility]    MASTECTOMY Right 10/19/2017    Right Breast Mastectomy with  [place] Node Biopsy performed by Celsa Nicholas MD at . [facility] [address] [facility] CTR VAD W/SUBQ PORT AGE 5 YR/> Left 3/1/2018    PORT Exchange performed by Celsa Nicholas MD at [address] [facility] Left 2014, 2015    x 2 surgeries total; Dr. Huyen Orozco TUNNELED VENOUS PORT PLACEMENT Left 11/30/2017    removal of et replacement of       Social History:    Social History     Tobacco Use    Smoking status: Never Smoker    Smokeless tobacco: Never Used    Tobacco comment: Never smoker. TC, RRT 4/5/18   Substance Use Topics    Alcohol use: No                                Counseling given: Not Answered  Comment: Never smoker. TC, RRT 4/5/18      Vital Signs (Current): There were no vitals filed for this visit. test reviewed       Beta Blocker:  Not on Beta Blocker         Neuro/Psych:   (+) headaches:, psychiatric history:            GI/Hepatic/Renal: Neg GI/Hepatic/Renal ROS  (+) morbid obesity          Endo/Other: Negative Endo/Other ROS   (+) malignancy/cancer. Abdominal:           Vascular: negative vascular ROS. Anesthesia Plan      general     ASA 3       Induction: intravenous. Anesthetic plan and risks discussed with patient.       Plan discussed with surgical team.                  MAGGIE Cade - UHBER   6/6/2019

## 2019-06-07 NOTE — OP NOTE
[address]                 [address] [place] [address]                                OPERATIVE REPORT    PATIENT NAME: Rere Carmichael                   :        1965  MED REC NO:   [id number]                             ROOM:  ACCOUNT NO:   [de-identified]                           ADMIT DATE: 2019  PROVIDER:     Amy Kim    DATE OF PROCEDURE:  2019    SURGEON:  Amy Kim DO    ASSISTANT:  None. PREOPERATIVE DIAGNOSES:  1. History of breast cancer. 2.  Prior placement of MediPort. POSTOPERATIVE DIAGNOSES:  1. History of breast cancer. 2.  Prior placement of MediPort. PROCEDURE:  Excision of left chest MediPort and capsule. ANESTHESIA:  MAC with local.    ESTIMATED BLOOD LOSS:  5 mL. FLUIDS:  700 mL crystalloid. COMPLICATIONS:  None. SPECIMENS:  None. INDICATIONS FOR PROCEDURE:  The patient is a 19-year-old female who was  referred to my office from her oncologist with need for removal of the  port. The patient has previously been diagnosed with breast cancer and  has undergone treatment which included chemotherapy. She has concluded  her chemotherapy and had good response and for that reason, is no longer  requiring central venous access. She was sent from her Oncology office  to have her port removed. After the patient was evaluated in the  office, we decided to proceed with removal and prior to the date of  procedure, risks of the procedure were explained and consent was  obtained. DESCRIPTION OF PROCEDURE:  The patient was brought to the operative  suite, placed on the operative table in supine position. Monitoring  devices and SCD cuffs were placed. MAC anesthesia was induced. After  induction of anesthesia, time-out was performed and correct patient and  procedure were verified. The patient received 2 gm of Ancef prior to  time of incision.   Left chest was prepped and draped in sterile fashion. The left chest was inspected and the port device was  palpable just underneath the skin. There were two scars in the left  upper chest from prior port placements, and I elected to go through the  upper of the two scars as this was more conducive to removal.  The skin  at the area was anesthetized with local mixture of 1% lidocaine with  epinephrine, 0.25% Marcaine plain in 1:1 ratio. Incision was made  through the anesthetized region of skin and dissection was carried down  to the level of the capsule containing the port device. The capsule was  opened, and the sutures securing the port in place were cut and removed. The port was then delivered through the incision and gentle traction was  placed on the catheter and the catheter was removed easily. Inspection  of the catheter did reveal that it was removed whole. There was no  breakage during removal.  Pressure was held on the left upper chest  along the tract of the catheter for several minutes and good hemostasis  was obtained. Next, the capsule that was containing the port was  removed using electrocautery. After this was done, good hemostasis was  obtained within the wound bed. The wound was then gently irrigated with  normal saline and inspected and again there was noted to be good  hemostasis and no evidence of active bleeding. At this point, the  incision was closed in layered fashion first using 3-0 Vicryl sutures in  deep dermal fashion and then a 4-0 Monocryl was used to close the skin  edges in a running subcuticular fashion. At the conclusion of  procedure, all sponge, instrument, needle counts were correct. The  patient was awoken from anesthesia and taken to the PACU in stable  condition.         Gaetano Miller    D: 06/07/2019 7:49:33       T: 06/07/2019 7:58:43     [email or web address]  Job#: [id number]     Doc#: [id number]    CC:  Primary Care

## 2019-06-19 ENCOUNTER — OFFICE VISIT (OUTPATIENT)
Dept: SURGERY | Age: 54
End: 2019-06-19
Payer: MEDICAID

## 2019-06-19 VITALS — SYSTOLIC BLOOD PRESSURE: 122 MMHG | TEMPERATURE: 99.9 F | HEART RATE: 76 BPM | DIASTOLIC BLOOD PRESSURE: 74 MMHG

## 2019-06-19 DIAGNOSIS — C50.911 INVASIVE DUCTAL CARCINOMA OF BREAST, RIGHT (HCC): ICD-10-CM

## 2019-06-19 DIAGNOSIS — Z09 POSTOP CHECK: Primary | ICD-10-CM

## 2019-06-19 PROCEDURE — 99213 OFFICE O/P EST LOW 20 MIN: CPT | Performed by: SURGERY

## 2019-06-21 NOTE — PROGRESS NOTES
Subjective   Ramírez Schwarz is a 48 y.o. female who presents today for up from port removal 1 week ago. Patient states that since port removal she has been doing well. There is been no induration or erythema in the incision site. She does report a small amount of swelling but does not have any significant pain. Her complaints today.     Past Medical History:   Diagnosis Date    Bipolar 1 disorder ([address]     Breast cancer ([address] 2017    right side     DVT of axillary vein, acute right ([address]     Eye twitch 2019    Headache(784.0)     Hyperlipidemia     Migraine        Past Surgical History:   Procedure Laterality Date    CATHETER REMOVAL Left 6/6/2019    PORT REMOVAL performed by Leslie Geller DO at [address] N/A 10/13/2017    D & C HYSTEROSCOPY with polypectomy performed by Edenilson Boone MD at [facility] / REMOVAL / [address] Left 11/9/2017    PORT INSERTION performed by Carmela Cooper MD at [facility] / REMOVAL / [address] N/A 11/30/2017    Port Removal & Insertion performed by Carmela Cooper MD at [address] Right 10/19/2017     [address] [facility]    MASTECTOMY Right 10/19/2017    Right Breast Mastectomy with  Eighty Four Node Biopsy performed by Carmela Cooper MD at . [facility] CTR VAD W/SUBQ PORT AGE 5 YR/> Left 3/1/2018    PORT Exchange performed by Carmela Cooper MD at [place] [address] Left 2014, 2015    x 2 surgeries total; Dr. Parisa Krueger TUNNELED VENOUS PORT PLACEMENT Left 11/30/2017    removal of et replacement of       Current Outpatient Medications   Medication Sig Dispense Refill    Calcium Carbonate-Vitamin D (OYSTER SHELL CALCIUM/D) 500-200 MG-UNIT TABS Take 1 tablet by mouth 2 times daily 60 tablet 5    benztropine (COGENTIN) 0.5 MG tablet Take 0.5 mg by mouth daily      potassium chloride (KLOR-CON M) 20 MEQ extended release tablet take 1 tablet by mouth once daily 30 tablet 2    lisinopril (PRINIVIL;ZESTRIL) 5 MG tablet Take 1 tablet by mouth daily 30 tablet 4    pravastatin (PRAVACHOL) 40 MG tablet take 1 tablet by mouth once daily  0    QUEtiapine (SEROQUEL) 100 MG tablet Take 50 mg by mouth nightly      Cyanocobalamin ER (RA VITAMIN B-12 TR) 1000 MCG TBCR take 1 tablet by mouth once daily 30 tablet 3    folic acid (FOLVITE) 1 MG tablet take 1 tablet by mouth once daily 30 tablet 3    nystatin (MYCOSTATIN) 862847 UNIT/ML suspension Take 5 mLs by mouth 4 times daily (Patient taking differently: Take 5 mLs by mouth as needed ) 1 Bottle 0    ondansetron (ZOFRAN) 4 MG tablet Take 2 tablets by mouth every 6 hours as needed for Nausea or Vomiting 40 tablet 1    sodium chloride (ALTAMIST SPRAY) 0.65 % nasal spray 1 spray by Nasal route as needed for Congestion 1 Bottle 1    VENTOLIN  (90 Base) MCG/ACT inhaler Inhale 2 puffs into the lungs every 4 hours as needed       butalbital-acetaminophen-caffeine (FIORICET, ESGIC) -40 MG per tablet Take 1-2 tablets by mouth every 4 hours as needed       fluticasone (FLONASE) 50 MCG/ACT nasal spray 1 spray by Nasal route daily       hydrOXYzine (ATARAX) 10 MG tablet Take 10 mg by mouth daily       lamoTRIgine (LAMICTAL) 100 MG tablet 50 mg nightly       letrozole (FEMARA) 2.5 MG tablet Take 1 tablet by mouth daily 30 tablet 5     No current facility-administered medications for this visit.         No Known Allergies    Family History   Problem Relation Age of Onset    Breast Cancer Sister 54    Breast Cancer Maternal Aunt 71    Other Maternal Cousin         Atypical Ductal Hyperplasia     Uterine Cancer Sister 32    Other Sister         breast cyst    Cataracts Mother     Glaucoma Mother     Heart Disease Father     High Blood Pressure Father     High Cholesterol Father     Mental Retardation Father     Diabetes Neg Hx        Social History     Socioeconomic History    Marital status: Single     Spouse name: Not on file    Number of children: Not on file    Years of education: Not on file    Highest education level: Not on file   Occupational History    Not on file   Social Needs    Financial resource strain: Not on file    Food insecurity:     Worry: Not on file     Inability: Not on file    Transportation needs:     Medical: Not on file     Non-medical: Not on file   Tobacco Use    Smoking status: Never Smoker    Smokeless tobacco: Never Used    Tobacco comment: Never smoker.  TC, RRT 4/5/18   Substance and Sexual Activity    Alcohol use: No    Drug use: No    Sexual activity: Yes     Partners: Male     Birth control/protection: Surgical   Lifestyle    Physical activity:     Days per week: Not on file     Minutes per session: Not on file    Stress: Not on file   Relationships    Social connections:     Talks on phone: Not on file     Gets together: Not on file     Attends Temple service: Not on file     Active member of club or organization: Not on file     Attends meetings of clubs or organizations: Not on file     Relationship status: Not on file    Intimate partner violence:     Fear of current or ex partner: Not on file     Emotionally abused: Not on file     Physically abused: Not on file     Forced sexual activity: Not on file   Other Topics Concern    Not on file   Social History Narrative    Not on file       ROS:   Review of Systems - Negative except as noted in HPI      Objective   Vitals:    06/19/19 1640   BP: 122/74   Pulse: 76   Temp: 99.9 °F (37.7 °C)     General:in no apparent distress, well developed and well nourished, alert and oriented times 3  Eyes: PERRL and Sclera nonicteric  Ears, Nose, Throat: external ear and ear canal normal bilaterally, oropharynx clear and moist with normal mucous membranes  Neck: neck supple and non tender without mass  Lungs: clear to auscultation without wheezes or rales   Heart: S1S2, no mumurs, RRR  Chest wall:  Incision at L chest intact, no erythema or induration. Small amount of fluctuance consistent with seroma. Abdomen: soft, nontender, no HSM, no guarding, no rebound, no masses  Extremity: negative  Neuro: CN II-XII grossly intact      Assessment     1. S post excision of left chest medi port      Plan     1. I discussed with patient that the swelling is likely representing a seroma which should resolve over the next few weeks.   Otherwise it appears to be no complications with the recent removal.  2.  Follow up as needed    Electronically signed by Concepcion Thao DO on 6/21/2019 at 8:37 AM      (Please note that portions of this note were completed with a voice recognition program.  Efforts were made to edit the dictations but occasionally words are mis-transcribed.)

## 2019-06-27 DIAGNOSIS — C50.919 MALIGNANT NEOPLASM OF FEMALE BREAST, UNSPECIFIED ESTROGEN RECEPTOR STATUS, UNSPECIFIED LATERALITY, UNSPECIFIED SITE OF BREAST (HCC): ICD-10-CM

## 2019-06-27 DIAGNOSIS — J30.9 ALLERGIC RHINITIS, UNSPECIFIED SEASONALITY, UNSPECIFIED TRIGGER: ICD-10-CM

## 2019-06-27 NOTE — TELEPHONE ENCOUNTER
Patient had come to the office and request refill for Folic Acid and Tramadol to [facility]

## 2019-06-28 RX ORDER — FEXOFENADINE HCL 180 MG/1
180 TABLET ORAL DAILY
Qty: 30 TABLET | Refills: 0 | Status: SHIPPED | OUTPATIENT
Start: 2019-06-28 | End: 2019-07-28

## 2019-06-30 RX ORDER — FOLIC ACID 1 MG/1
TABLET ORAL
Qty: 30 TABLET | Refills: 3 | Status: SHIPPED | OUTPATIENT
Start: 2019-06-30 | End: 2019-11-21 | Stop reason: SDUPTHER

## 2019-07-09 ENCOUNTER — OFFICE VISIT (OUTPATIENT)
Dept: FAMILY MEDICINE CLINIC | Age: 54
End: 2019-07-09
Payer: MEDICAID

## 2019-07-09 VITALS
SYSTOLIC BLOOD PRESSURE: 126 MMHG | DIASTOLIC BLOOD PRESSURE: 84 MMHG | WEIGHT: 291 LBS | HEART RATE: 68 BPM | HEIGHT: 65 IN | BODY MASS INDEX: 48.48 KG/M2

## 2019-07-09 DIAGNOSIS — E78.2 MIXED HYPERLIPIDEMIA: ICD-10-CM

## 2019-07-09 DIAGNOSIS — C50.919 MALIGNANT NEOPLASM OF FEMALE BREAST, UNSPECIFIED ESTROGEN RECEPTOR STATUS, UNSPECIFIED LATERALITY, UNSPECIFIED SITE OF BREAST (HCC): ICD-10-CM

## 2019-07-09 DIAGNOSIS — R06.83 SNORING: ICD-10-CM

## 2019-07-09 DIAGNOSIS — G43.009 MIGRAINE WITHOUT AURA AND WITHOUT STATUS MIGRAINOSUS, NOT INTRACTABLE: Primary | ICD-10-CM

## 2019-07-09 DIAGNOSIS — G47.19 EXCESSIVE DAYTIME SLEEPINESS: ICD-10-CM

## 2019-07-09 DIAGNOSIS — Z17.1 MALIGNANT NEOPLASM OF OVERLAPPING SITES OF RIGHT BREAST IN FEMALE, ESTROGEN RECEPTOR NEGATIVE (HCC): ICD-10-CM

## 2019-07-09 DIAGNOSIS — C50.811 MALIGNANT NEOPLASM OF OVERLAPPING SITES OF RIGHT BREAST IN FEMALE, ESTROGEN RECEPTOR NEGATIVE (HCC): ICD-10-CM

## 2019-07-09 PROCEDURE — 99214 OFFICE O/P EST MOD 30 MIN: CPT | Performed by: NURSE PRACTITIONER

## 2019-07-09 RX ORDER — BUTALBITAL, ACETAMINOPHEN AND CAFFEINE 50; 325; 40 MG/1; MG/1; MG/1
1 TABLET ORAL 3 TIMES DAILY PRN
Qty: 90 TABLET | Refills: 0 | Status: SHIPPED | OUTPATIENT
Start: 2019-07-09 | End: 2019-09-13 | Stop reason: SDUPTHER

## 2019-07-09 RX ORDER — PRAVASTATIN SODIUM 40 MG
TABLET ORAL
Qty: 30 TABLET | Refills: 5 | Status: SHIPPED | OUTPATIENT
Start: 2019-07-09 | End: 2020-02-13

## 2019-07-09 ASSESSMENT — ENCOUNTER SYMPTOMS
NAUSEA: 1
PHOTOPHOBIA: 1

## 2019-07-09 NOTE — TELEPHONE ENCOUNTER
Patient stopped into office and requested refill of Ultram and Potassium to [facility]

## 2019-07-09 NOTE — PROGRESS NOTES
Effort normal and breath sounds normal.   Musculoskeletal: Normal range of motion. Neurological: She is alert and oriented to person, place, and time. Skin: Skin is warm and dry. Psychiatric: She has a normal mood and affect. Her behavior is normal. Judgment and thought content normal.   Nursing note and vitals reviewed. Assessment & Plan:      1. Migraine without aura and without status migrainosus, not intractable-chronic worsening  trial medication. Advised to use sparingly. - butalbital-acetaminophen-caffeine (FIORICET, ESGIC) -40 MG per tablet; Take 1 tablet by mouth 3 times daily as needed for Headaches or Migraine  Dispense: 90 tablet; Refill: 0    2. Mixed hyperlipidemia-chronic stable on medication    - pravastatin (PRAVACHOL) 40 MG tablet; take 1 tablet by mouth once daily  Dispense: 30 tablet; Refill: 5    3. Snoring-new problem    - Baseline Diagnostic Sleep Study; Future  - [address]    4. Excessive daytime sleepiness-new problem    - Baseline Diagnostic Sleep Study; Future  - [address] all of the patient's questions. Agrees with plan of care. Follow up PRN.        MAGGIE Hurd - CNP   7/9/2019 1:47 PM

## 2019-07-10 RX ORDER — TRAMADOL HYDROCHLORIDE 50 MG/1
50 TABLET ORAL EVERY 6 HOURS PRN
Qty: 40 TABLET | Refills: 0 | Status: SHIPPED | OUTPATIENT
Start: 2019-07-10 | End: 2019-08-09

## 2019-07-10 RX ORDER — POTASSIUM CHLORIDE 20 MEQ/1
TABLET, EXTENDED RELEASE ORAL
Qty: 30 TABLET | Refills: 2 | Status: SHIPPED | OUTPATIENT
Start: 2019-07-10 | End: 2019-10-29 | Stop reason: SDUPTHER

## 2019-07-11 ENCOUNTER — OFFICE VISIT (OUTPATIENT)
Dept: ONCOLOGY | Age: 54
End: 2019-07-11
Payer: MEDICAID

## 2019-07-11 VITALS
BODY MASS INDEX: 48.82 KG/M2 | TEMPERATURE: 99.7 F | DIASTOLIC BLOOD PRESSURE: 72 MMHG | HEIGHT: 65 IN | OXYGEN SATURATION: 97 % | HEART RATE: 97 BPM | SYSTOLIC BLOOD PRESSURE: 120 MMHG | WEIGHT: 293 LBS

## 2019-07-11 DIAGNOSIS — C50.919 MALIGNANT NEOPLASM OF FEMALE BREAST, UNSPECIFIED ESTROGEN RECEPTOR STATUS, UNSPECIFIED LATERALITY, UNSPECIFIED SITE OF BREAST (HCC): Primary | ICD-10-CM

## 2019-07-11 PROCEDURE — 99214 OFFICE O/P EST MOD 30 MIN: CPT | Performed by: INTERNAL MEDICINE

## 2019-07-11 NOTE — PROGRESS NOTES
Joaquin Malagon                                                                                                                  7/11/2019  MRN:   [id number]  YOB: 1965  PCP:                           MAGGIE Dacosta - CNP  Referring Physician: No ref. provider found  Treating Physician Name: Jose Enrique Kuo MD      Reason for visit:  Active surveillance for breast cancer. Itching over the incision site. Current problems/ Active and recent treatments:  Stage IIa infiltrating ductal carcinoma of right breast, ER negative, MA positive and HER-2 amplified. DVT of right upper extremity (11/20/18)  Strong family history of breast cancer in sister and maternal aunt      Summary of Case/History:    Joaquin Malagon a 47 y. o.female who presents to the hematology oncology office to establish care and for further recommendations for management of her recently diagnosed right breast cancer    Patient had a mammogram after many years in September 2017 which came back abnormal.  Subsequently patient had an ultrasound which confirmed a 1.2 cm lesion in the right breast at 1:00 position. There was another 4 mm lesion at 12:00 position    Patient underwent ultrasound-guided biopsy on 9/8/17. the lesion at 1:00 position shows invasive ductal carcinoma. ER negative and MA positive associated with high-grade DCIS. Lesion at 12:00 position showed fibrocystic disease and focal adenosis and hyperplasia. Patient underwent right sided mastectomy with sentinel lymph node biopsy on 10/19/17. Pathology report showed invasive ductal carcinoma, grade 3 out of 3, 2.8 cm in size with negative margins. pT2,pN0,M0  Tumor specimen was negative for ER receptor and weekly positive for MA receptor (5-10 percent). High-grade DCIS was also noted. One sentinel lymph node was sampled which was negative for metastasis    Patient started on neoadjuvant chemotherapy using TCHP regimen.     Cycle #1, day #1 on VENTOLIN  (90 Base) MCG/ACT inhaler Inhale 2 puffs into the lungs every 4 hours as needed       fluticasone (FLONASE) 50 MCG/ACT nasal spray 1 spray by Nasal route daily       hydrOXYzine (ATARAX) 10 MG tablet Take 10 mg by mouth daily       lamoTRIgine (LAMICTAL) 100 MG tablet 50 mg nightly       letrozole (FEMARA) 2.5 MG tablet Take 1 tablet by mouth daily 30 tablet 5     No current facility-administered medications for this visit. Allergies:   Patient has no known allergies. Review of Systems:    Constitutional: Negative for fever or chills. No night sweats, weight is stable now  Eyes: No eye discharge, double vision, or eye pain . Twitching of left eye  HEENT: negative for sore mouth, sore throat, hoarseness and voice change . Complains of twitching of left eye. Improved  Respiratory: negative for cough , sputum, dyspnea, wheezing, hemoptysis, chest pain   Cardiovascular: negative for chest pain, dyspnea, palpitations, orthopnea, PND   Gastrointestinal: Positive for nausea, vomiting, constipation, abdominal pain, Dysphagia, hematemesis and hematochezia . Genitourinary: negative for frequency, dysuria, nocturia, urinary incontinence, and hematuria   Integument: negative for rash, skin lesions, bruises. Hematologic/Lymphatic: negative for easy bruising, bleeding, lymphadenopathy, or petechiae   Endocrine: negative for heat or cold intolerance,weight changes, change in bowel habits and hair loss   Musculoskeletal: negative for myalgias, arthralgias, pain, joint swelling,and bone pain . Positive for discomfort in right arm.   Neurological: negative for headaches, dizziness, seizures, weakness, numbness      Physical Exam:    Vitals:  /72 (Site: Left Lower Arm, Position: Sitting, Cuff Size: Large Adult)   Pulse 97   Temp 99.7 °F (37.6 °C)   Ht 5' 5\" (1.651 m)   Wt 293 lb 4.8 oz (133 kg)   LMP 02/28/2013   SpO2 97%   BMI 48.81 kg/m²    General appearance - patient not in acute stranding throughout the right chest   wall, axilla and supraclavicular soft tissues which appears to be tracking   along the vascular pathway likely secondary to known deep venous thrombosis. Apparent hypodensity in the distal SVC suggestive of filling   defects/thrombosis.  Additionally proximal SVC as well as the right IJ appear   mildly prominent underlying heterogeneous soft tissue density also suspicious   for underlying thrombosis. 3. Diffuse skin thickening along the right mastectomy site which may be   sequela of post treatment change of please correlate for prior radiation. Adjacent elongated oblong shaped subcutaneous fluid collection as measured   above suggestive of seroma possibly chronic and postoperative in nature. 4. Extensive contrast throughout the left hepatic lobe likely due to reflux   of IV contrast.         Impression:  Invasive ductal carcinoma of right breast, stage IIa. pT2,pN0,M0. ER negative, SC weakly positive. HER-2 amplified. Status post right mastectomy with sentinel lymph node biopsy  Twitching of left eye, MRI negative  Family history of breast cancer  Lower back pain  Postmenopausal  Right upper extremity DVT of the axillary and basilic veins and currently on Xarelto    Plan:  I had a detailed discussion with the patient and personally went over the results of her blood workup. Clinically patient is doing well. Examination of the incision site does not reveal any redness or any palpable mass. She was advised to use Benadryl cream for the itching or if itching persist use steroid cream.  Discussed natural history of breast cancer.   Based on SC positive hormone status and minimal toxicity patient is experiencing continue Arimidex  Continue calcium and vitamin D  DEXA scan every 2 years  Screening mammogram every year  Patient is also planning to follow-up with plastic surgery for reconstruction of right breast.  NCCN guidelines were reviewed and discussed with the

## 2019-07-29 ENCOUNTER — TELEPHONE (OUTPATIENT)
Dept: FAMILY MEDICINE CLINIC | Age: 54
End: 2019-07-29

## 2019-08-06 ENCOUNTER — OFFICE VISIT (OUTPATIENT)
Dept: FAMILY MEDICINE CLINIC | Age: 54
End: 2019-08-06
Payer: MEDICAID

## 2019-08-06 VITALS
TEMPERATURE: 99 F | HEIGHT: 65 IN | WEIGHT: 293 LBS | BODY MASS INDEX: 48.82 KG/M2 | DIASTOLIC BLOOD PRESSURE: 80 MMHG | OXYGEN SATURATION: 97 % | RESPIRATION RATE: 16 BRPM | SYSTOLIC BLOOD PRESSURE: 124 MMHG | HEART RATE: 90 BPM

## 2019-08-06 DIAGNOSIS — R09.89 CHRONIC SINUS COMPLAINTS: ICD-10-CM
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
## 2019-08-06 DIAGNOSIS — G43.009 MIGRAINE WITHOUT AURA AND WITHOUT STATUS MIGRAINOSUS, NOT INTRACTABLE: ICD-10-CM

## 2019-08-06 DIAGNOSIS — J30.2 SEASONAL ALLERGIES: ICD-10-CM

## 2019-08-06 DIAGNOSIS — Z23 NEED FOR PROPHYLACTIC VACCINATION AND INOCULATION AGAINST VARICELLA: ICD-10-CM

## 2019-08-06 DIAGNOSIS — Z12.11 SCREENING FOR COLON CANCER: ICD-10-CM

## 2019-08-06 DIAGNOSIS — Z23 NEED FOR PROPHYLACTIC VACCINATION AGAINST DIPHTHERIA-TETANUS-PERTUSSIS (DTP): ICD-10-CM

## 2019-08-06 DIAGNOSIS — E78.49 OTHER HYPERLIPIDEMIA: Primary | ICD-10-CM

## 2019-08-06 PROCEDURE — 90471 IMMUNIZATION ADMIN: CPT | Performed by: NURSE PRACTITIONER

## 2019-08-06 PROCEDURE — 90715 TDAP VACCINE 7 YRS/> IM: CPT | Performed by: NURSE PRACTITIONER

## 2019-08-06 PROCEDURE — 99214 OFFICE O/P EST MOD 30 MIN: CPT | Performed by: NURSE PRACTITIONER

## 2019-08-06 PROCEDURE — 96160 PT-FOCUSED HLTH RISK ASSMT: CPT | Performed by: NURSE PRACTITIONER

## 2019-08-06 ASSESSMENT — PATIENT HEALTH QUESTIONNAIRE - PHQ9
2. FEELING DOWN, DEPRESSED OR HOPELESS: 1
7. TROUBLE CONCENTRATING ON THINGS, SUCH AS READING THE NEWSPAPER OR WATCHING TELEVISION: 0
9. THOUGHTS THAT YOU WOULD BE BETTER OFF DEAD, OR OF HURTING YOURSELF: 0
SUM OF ALL RESPONSES TO PHQ QUESTIONS 1-9: 5
10. IF YOU CHECKED OFF ANY PROBLEMS, HOW DIFFICULT HAVE THESE PROBLEMS MADE IT FOR YOU TO DO YOUR WORK, TAKE CARE OF THINGS AT HOME, OR GET ALONG WITH OTHER PEOPLE: 1
5. POOR APPETITE OR OVEREATING: 0
3. TROUBLE FALLING OR STAYING ASLEEP: 1
6. FEELING BAD ABOUT YOURSELF - OR THAT YOU ARE A FAILURE OR HAVE LET YOURSELF OR YOUR FAMILY DOWN: 0
SUM OF ALL RESPONSES TO PHQ9 QUESTIONS 1 & 2: 2
SUM OF ALL RESPONSES TO PHQ QUESTIONS 1-9: 2
4. FEELING TIRED OR HAVING LITTLE ENERGY: 1
SUM OF ALL RESPONSES TO PHQ QUESTIONS 1-9: 5
8. MOVING OR SPEAKING SO SLOWLY THAT OTHER PEOPLE COULD HAVE NOTICED. OR THE OPPOSITE, BEING SO FIGETY OR RESTLESS THAT YOU HAVE BEEN MOVING AROUND A LOT MORE THAN USUAL: 0
1. LITTLE INTEREST OR PLEASURE IN DOING THINGS: 1
1. LITTLE INTEREST OR PLEASURE IN DOING THINGS: 1
SUM OF ALL RESPONSES TO PHQ9 QUESTIONS 1 & 2: 3
SUM OF ALL RESPONSES TO PHQ QUESTIONS 1-9: 2
2. FEELING DOWN, DEPRESSED OR HOPELESS: 2

## 2019-08-06 ASSESSMENT — ENCOUNTER SYMPTOMS
GASTROINTESTINAL NEGATIVE: 1
RESPIRATORY NEGATIVE: 1
EYE DISCHARGE: 1
SINUS PAIN: 1
SINUS PRESSURE: 1

## 2019-08-06 NOTE — PROGRESS NOTES
Sonia received counseling on the following healthy behaviors: nutrition  Reviewed prior labs and health maintenance  Continue current medications except where noted below, diet and exercise. Discussed use, benefit, and side effects of prescribed medications. Barriers to medication compliance addressed. Patient given educational materials - see patient instructions  Was a self-tracking handout given in paper form or via [employer]? Yes    Requested Prescriptions      No prescriptions requested or ordered in this encounter       All patient questions answered. Patient voiced understanding. Quality Measures    There is no height or weight on file to calculate BMI. Elevated. Weight control planned discussed: Healthy diet and regular exercise      Blood pressure is normal. Treatment plan consists of: see progress note below.       Lab Results   Component Value Date    LDLCHOLESTEROL 138 (H) 02/11/2019    (goal LDL reduction with dx if diabetes is 50% LDL reduction)      PHQ Scores 1/24/2019 1/4/2019 6/1/2018   PHQ2 Score 1 0 0   PHQ9 Score 1 0 0     Interpretation of Total Score Depression Severity: 1-4 = Minimal depression, 5-9 = Mild depression, 10-14 = Moderate depression, 15-19 = Moderately severe depression, 20-27 = Severe depression
500-200 MG-UNIT TABS Take 1 tablet by mouth 2 times daily 60 tablet 5    benztropine (COGENTIN) 0.5 MG tablet Take 0.5 mg by mouth daily      lisinopril (PRINIVIL;ZESTRIL) 5 MG tablet Take 1 tablet by mouth daily 30 tablet 4    QUEtiapine (SEROQUEL) 100 MG tablet Take 50 mg by mouth nightly      Cyanocobalamin ER (RA VITAMIN B-12 TR) 1000 MCG TBCR take 1 tablet by mouth once daily 30 tablet 3    nystatin (MYCOSTATIN) 208229 UNIT/ML suspension Take 5 mLs by mouth 4 times daily (Patient taking differently: Take 5 mLs by mouth as needed ) 1 Bottle 0    ondansetron (ZOFRAN) 4 MG tablet Take 2 tablets by mouth every 6 hours as needed for Nausea or Vomiting 40 tablet 1    sodium chloride (ALTAMIST SPRAY) 0.65 % nasal spray 1 spray by Nasal route as needed for Congestion 1 Bottle 1    VENTOLIN  (90 Base) MCG/ACT inhaler Inhale 2 puffs into the lungs every 4 hours as needed       fluticasone (FLONASE) 50 MCG/ACT nasal spray 1 spray by Nasal route daily       hydrOXYzine (ATARAX) 10 MG tablet Take 10 mg by mouth daily       lamoTRIgine (LAMICTAL) 100 MG tablet 50 mg nightly       letrozole (FEMARA) 2.5 MG tablet Take 1 tablet by mouth daily 30 tablet 5     No current facility-administered medications for this visit. Review of Systems   Constitutional: Negative. Negative for activity change, appetite change and fever. HENT: Positive for congestion, postnasal drip, sinus pressure and sinus pain. Eyes: Positive for discharge (watery eyes from allergies). Respiratory: Negative. Cardiovascular: Negative. Gastrointestinal: Negative. Musculoskeletal: Negative. Skin:        Bug bites on arms from recent infestation   Allergic/Immunologic: Positive for environmental allergies. Neurological: Negative. Psychiatric/Behavioral: Negative.           Objective:       /80 (Site: Left Lower Arm, Position: Sitting, Cuff Size: Large Adult)   Pulse 90   Temp 99 °F (37.2 °C) (Tympanic)

## 2019-08-14 ENCOUNTER — OFFICE VISIT (OUTPATIENT)
Dept: OTOLARYNGOLOGY | Age: 54
End: 2019-08-14
Payer: MEDICAID

## 2019-08-14 VITALS
HEART RATE: 98 BPM | TEMPERATURE: 98.8 F | BODY MASS INDEX: 47.09 KG/M2 | WEIGHT: 293 LBS | DIASTOLIC BLOOD PRESSURE: 78 MMHG | HEIGHT: 66 IN | OXYGEN SATURATION: 98 % | SYSTOLIC BLOOD PRESSURE: 126 MMHG

## 2019-08-14 DIAGNOSIS — H91.93 BILATERAL HEARING LOSS, UNSPECIFIED HEARING LOSS TYPE: Primary | ICD-10-CM

## 2019-08-14 PROCEDURE — 99203 OFFICE O/P NEW LOW 30 MIN: CPT | Performed by: OTOLARYNGOLOGY

## 2019-08-14 NOTE — PROGRESS NOTES
by mouth once daily, Disp: 30 tablet, Rfl: 5    folic acid (FOLVITE) 1 MG tablet, take 1 tablet by mouth once daily, Disp: 30 tablet, Rfl: 3    Calcium Carbonate-Vitamin D (OYSTER SHELL CALCIUM/D) 500-200 MG-UNIT TABS, Take 1 tablet by mouth 2 times daily, Disp: 60 tablet, Rfl: 5    benztropine (COGENTIN) 0.5 MG tablet, Take 0.5 mg by mouth daily, Disp: , Rfl:     lisinopril (PRINIVIL;ZESTRIL) 5 MG tablet, Take 1 tablet by mouth daily, Disp: 30 tablet, Rfl: 4    QUEtiapine (SEROQUEL) 100 MG tablet, Take 50 mg by mouth nightly, Disp: , Rfl:     Cyanocobalamin ER (RA VITAMIN B-12 TR) 1000 MCG TBCR, take 1 tablet by mouth once daily, Disp: 30 tablet, Rfl: 3    nystatin (MYCOSTATIN) 845185 UNIT/ML suspension, Take 5 mLs by mouth 4 times daily (Patient taking differently: Take 5 mLs by mouth as needed ), Disp: 1 Bottle, Rfl: 0    ondansetron (ZOFRAN) 4 MG tablet, Take 2 tablets by mouth every 6 hours as needed for Nausea or Vomiting, Disp: 40 tablet, Rfl: 1    sodium chloride (ALTAMIST SPRAY) 0.65 % nasal spray, 1 spray by Nasal route as needed for Congestion, Disp: 1 Bottle, Rfl: 1    VENTOLIN  (90 Base) MCG/ACT inhaler, Inhale 2 puffs into the lungs every 4 hours as needed , Disp: , Rfl:     fluticasone (FLONASE) 50 MCG/ACT nasal spray, 1 spray by Nasal route daily , Disp: , Rfl:     hydrOXYzine (ATARAX) 10 MG tablet, Take 10 mg by mouth daily , Disp: , Rfl:     lamoTRIgine (LAMICTAL) 100 MG tablet, 50 mg nightly , Disp: , Rfl:     butalbital-acetaminophen-caffeine (FIORICET, ESGIC) -40 MG per tablet, Take 1 tablet by mouth 3 times daily as needed for Headaches or Migraine, Disp: 90 tablet, Rfl: 0    letrozole (FEMARA) 2.5 MG tablet, Take 1 tablet by mouth daily, Disp: 30 tablet, Rfl: 5     ROS:   CV: neg   Endocrine:    Resp:     GI:       Neuro: bipolar  PE:     General appearance:  Normal                 Ability to Communicate:  Normal       Head & Face:  Normal   Salivary Glands:  Normal Facial Strength:  Normal   Ears:    Pinna:  Normal            EAC:  Normal      TMs:  AD thick tympanosclerosis, no fluid,AS mod TS, no fluid   [place]:  [facility]    Finger Rub     Nose:    External: Normal    Septum:  Normal    Turbinates: Normal             Nasal Cavity: Normal         Naso Pharyngoscopy:     Nasal Endoscopy:      Oral Cavity & Oral Pharynx:    Tongue:  Normal    Teeth & Gums:             Palate:  Sulema     Tonsils:      FOM:  Normal     Other:      Neck:    Thyroid:Normal       Lymph nodes: Normal           Trachea:  Normal      Masses:  Normal    Other:        Eyes:    EOMs:      Nystagmus:      Neurological:    CN V:      CN VII:       Gait & Station:      Romberg:      Tandem Gait:      [place]:       Oriented x 3: Normal     Affect:  Normal    Data reviewed:      ASSESSMENT:   Diagnosis Orders   1. Bilateral hearing loss, unspecified hearing loss type  External Referral To Audiology       PLAN:audio,see after  Return for After Audiogram.    No orders of the defined types were placed in this encounter.         Palomo Arias MD

## 2019-08-19 ENCOUNTER — HOSPITAL ENCOUNTER (OUTPATIENT)
Dept: SLEEP CENTER | Age: 54
Discharge: HOME | End: 2019-08-21
Payer: MEDICAID

## 2019-08-19 DIAGNOSIS — R06.83 SNORING: ICD-10-CM

## 2019-08-19 DIAGNOSIS — G47.19 EXCESSIVE DAYTIME SLEEPINESS: ICD-10-CM

## 2019-08-19 PROCEDURE — 95810 POLYSOM 6/> YRS 4/> PARAM: CPT

## 2019-08-22 NOTE — PROGRESS NOTES
[address]                 [address] [place] [address]                                  SLEEP STUDY  PATIENT NAME: Bree De La Torre                   :        1965  MED REC NO:   [id number]                             ROOM:  ACCOUNT NO:   [de-identified]                           ADMIT DATE: 2019  PROVIDER:     Karol Lang    [facility]  INTERPRETATION OF CLINICAL POLYSOMNOGRAPHY    DATE OF STUDY:  2019    REFERRING PROVIDER:  Bertin Busch CNP    CLINICAL IMPRESSION:  1. Obstructive sleep apnea syndrome. 2.  Depression. 3.  Headache. PROCEDURE STANDARD:  It was a 1-night sleep study. PROCEDURE:  The sleep/wake evaluation consisted of an intensive intake  interview, one night of clinical polysomnography, a nocturnal  respiratory battery, left and right leg anterior tibialis surface  electromyography. The standard montage for clinical polysomnography included the  electroencephalogram, the electro-oculogram, mentalis surface  electromyography, and modified Lead II cardiography. The respiratory  battery consisted of measurements of oral/nasal airflow by heat  thermistor, nasal pressure transducer, thoracic and abdominal effort by  Respiratory Inductance Plethysmography. Nocturnal oxyhemoglobin  saturations were obtained by finger oximetry. Polysomnography revealed that the patient spent 461 minutes in bed with  a total sleep time of 382 minutes. Sleep efficiency was reduced to 83%. Latency to sleep onset was normal at 19 minutes with 168 sleep stage  changes. There were 37 awakenings during the night. Sleep architecture was abnormal with 13% stage I, 81% stage II, 1%  delta, and 5% REM. Latency to various sleep stages were normal other than prolonged latency  to REM, which was 176 minutes. Polysomnography did not reveal any other abnormality.     Electrocardiogram did not reveal any arrhythmia. Respiratory evaluation revealed the patient had a total of 614  respiratory events, out of which 600 were complete obstruction, 5 were  mixed, 9 were partial obstruction. The apnea-hypopnea index was 96 per  hour. The lowest oxygen saturation during the night being 51%. Movement disorder evaluation did not reveal any periodic leg movements. IMPRESSION:  1. Obstructive sleep apnea syndrome. 2.  Depression. 3.  Headache. RECOMMENDATIONS:  The patient has a severe degree of sleep apnea  syndrome. It is recommended that the patient should return to the sleep  center for treatment with nasal CPAP or BiPAP thereby relieving the  apnea. It should improve her daytime symptoms and also protect the  patient from the morbidity related with the apnea. Treatment of apnea may also improve the symptoms of depression. It may also improve the headache. If the patient is overweight, she should be encouraged to lose weight. The patient should be instructed on proper sleep hygiene techniques.         Olive Cain    D: 08/21/2019 9:30:28       T: 08/21/2019 12:33:10     [email or web address]  Job#: [id number]     Doc#: [id number]    CC:  Arcelia Gross

## 2019-08-26 DIAGNOSIS — G47.33 OSA (OBSTRUCTIVE SLEEP APNEA): Primary | ICD-10-CM

## 2019-08-26 RX ORDER — LISINOPRIL 5 MG/1
TABLET ORAL
Qty: 90 TABLET | Refills: 1 | Status: ON HOLD | OUTPATIENT
Start: 2019-08-26 | End: 2020-04-15 | Stop reason: HOSPADM

## 2019-08-28 DIAGNOSIS — C50.919 MALIGNANT NEOPLASM OF FEMALE BREAST, UNSPECIFIED ESTROGEN RECEPTOR STATUS, UNSPECIFIED LATERALITY, UNSPECIFIED SITE OF BREAST (HCC): ICD-10-CM

## 2019-08-28 RX ORDER — TRAMADOL HYDROCHLORIDE 50 MG/1
50 TABLET ORAL EVERY 6 HOURS PRN
Qty: 20 TABLET | Refills: 0 | Status: SHIPPED | OUTPATIENT
Start: 2019-08-28 | End: 2019-10-17 | Stop reason: SDUPTHER

## 2019-09-03 ENCOUNTER — HOSPITAL ENCOUNTER (OUTPATIENT)
Dept: SLEEP CENTER | Age: 54
Discharge: HOME | End: 2019-09-05
Payer: MEDICAID

## 2019-09-03 DIAGNOSIS — G47.33 OSA (OBSTRUCTIVE SLEEP APNEA): ICD-10-CM

## 2019-09-03 PROCEDURE — 95811 POLYSOM 6/>YRS CPAP 4/> PARM: CPT

## 2019-09-04 ENCOUNTER — HOSPITAL ENCOUNTER (OUTPATIENT)
Dept: LAB | Age: 54
Discharge: HOME | End: 2019-09-04
Payer: MEDICAID

## 2019-09-04 ENCOUNTER — OFFICE VISIT (OUTPATIENT)
Dept: FAMILY MEDICINE CLINIC | Age: 54
End: 2019-09-04
Payer: MEDICAID

## 2019-09-04 VITALS
HEIGHT: 66 IN | TEMPERATURE: 100.1 F | HEART RATE: 108 BPM | WEIGHT: 293 LBS | DIASTOLIC BLOOD PRESSURE: 84 MMHG | BODY MASS INDEX: 47.09 KG/M2 | SYSTOLIC BLOOD PRESSURE: 130 MMHG

## 2019-09-04 DIAGNOSIS — T78.40XA ALLERGIC STATE, INITIAL ENCOUNTER: Primary | ICD-10-CM

## 2019-09-04 DIAGNOSIS — T78.40XA ALLERGIC STATE, INITIAL ENCOUNTER: ICD-10-CM

## 2019-09-04 PROCEDURE — 86003 ALLG SPEC IGE CRUDE XTRC EA: CPT

## 2019-09-04 PROCEDURE — 36415 COLL VENOUS BLD VENIPUNCTURE: CPT

## 2019-09-04 PROCEDURE — 82785 ASSAY OF IGE: CPT

## 2019-09-04 PROCEDURE — 99242 OFF/OP CONSLTJ NEW/EST SF 20: CPT | Performed by: NURSE PRACTITIONER

## 2019-09-06 LAB
2000687N OAK TREE IGE: <0.34 KU/L (ref 0–0.34)
ALLERGEN BERMUDA GRASS IGE: <0.34 KU/L (ref 0–0.34)
ALLERGEN BIRCH IGE: <0.34 KU/L (ref 0–0.34)
ALLERGEN COW MILK IGE: <0.34 KU/L (ref 0–0.34)
ALLERGEN DOG DANDER IGE: <0.34 KU/L (ref 0–0.34)
ALLERGEN GERMAN COCKROACH IGE: <0.34 KU/L (ref 0–0.34)
ALLERGEN HORMODENDRUM IGE: <0.34 KUL/L (ref 0–0.34)
ALLERGEN MOUSE EPITHELIA IGE: <0.34 KU/L (ref 0–0.34)
ALLERGEN PEANUT (F13) IGE: <0.34 KU/L (ref 0–0.34)
ALLERGEN PECAN TREE IGE: <0.34 KU/L (ref 0–0.34)
ALLERGEN PIGWEED ROUGH IGE: <0.34 KU/L (ref 0–0.34)
ALLERGEN SHEEP SORREL (W18) IGE: <0.34 KU/L (ref 0–0.34)
ALLERGEN TREE SYCAMORE: <0.34 KU/L (ref 0–0.34)
ALLERGEN WALNUT TREE IGE: <0.34 KU/L (ref 0–0.34)
ALLERGEN WHITE MULBERRY TREE, IGE: <0.34 KU/L (ref 0–0.34)
ALLERGEN, TREE, WHITE ASH IGE: <0.34 KU/L (ref 0–0.34)
ALTERNARIA ALTERNATA: <0.34 KU/L (ref 0–0.34)
ASPERGILLUS FUMIGATUS: <0.34 KU/L (ref 0–0.34)
CAT DANDER ANTIBODY: <0.34 KU/L (ref 0–0.34)
COTTONWOOD TREE: <0.34 KU/L (ref 0–0.34)
D. FARINAE: 0.57 KU/L (ref 0–0.34)
D. PTERONYSSINUS: 0.73 KU/L (ref 0–0.34)
ELM TREE: <0.34 KU/L (ref 0–0.34)
IGE: 168 IU/ML
MAPLE/BOXELDER TREE: <0.34 KU/L (ref 0–0.34)
MOUNTAIN CEDAR TREE: <0.34 KU/L (ref 0–0.34)
MUCOR RACEMOSUS: <0.34 KU/L (ref 0–0.34)
P. NOTATUM: <0.34 KU/L (ref 0–0.34)
RUSSIAN THISTLE: <0.34 KU/L (ref 0–0.34)
SHORT RAGWD(A ARTEMIS.) IGE: <0.34 KU/L (ref 0–0.34)
TIMOTHY GRASS: <0.34 KU/L (ref 0–0.34)

## 2019-09-09 NOTE — PROGRESS NOTES
periodic leg  movements. IMPRESSION:  1. Obstructive sleep apnea syndrome. 2.  Residual hypoxemia. RECOMMENDATION:  The patient has significant degree of sleep apnea,  which has responded well to the use of BiPAP with a final pressure of 25  cm during inspiration and 21 cm during expiration. It is recommended  that the patient should continue the use of BiPAP at the present  setting. BiPAP, by relieving the apnea, should improve her daytime  symptoms and also protect the patient from the morbidity associated with  the apnea. The patient was initially treated with CPAP but the apnea could not be  relieved, so the patient was changed to BiPAP. The patient is advised to use humidification along with the BiPAP  machine to prevent upper airway dryness. The patient has significant degree of desaturation on BiPAP. It is  recommended that the patient should be treated with supplemental oxygen  at 2 liters per minute along with the BiPAP. The patient should be instructed in proper sleep hygiene techniques. The patient should be advised not to consume any alcohol or hypnotics at  bedtime, that may worsen the apnea.         Sanjana Gotti    D: 09/05/2019 14:20:17       T: 09/05/2019 14:48:10     [email or web address]  Job#: [id number]     Doc#: [id number]    CC:  Elissa Mujica

## 2019-09-13 ENCOUNTER — HOSPITAL ENCOUNTER (OUTPATIENT)
Dept: LAB | Age: 54
Discharge: HOME | End: 2019-09-13
Payer: MEDICAID

## 2019-09-13 ENCOUNTER — OFFICE VISIT (OUTPATIENT)
Dept: NEUROLOGY | Age: 54
End: 2019-09-13
Payer: MEDICAID

## 2019-09-13 VITALS
BODY MASS INDEX: 47.09 KG/M2 | WEIGHT: 293 LBS | HEIGHT: 66 IN | HEART RATE: 88 BPM | SYSTOLIC BLOOD PRESSURE: 124 MMHG | DIASTOLIC BLOOD PRESSURE: 82 MMHG

## 2019-09-13 DIAGNOSIS — R11.2 NAUSEA AND VOMITING, INTRACTABILITY OF VOMITING NOT SPECIFIED, UNSPECIFIED VOMITING TYPE: ICD-10-CM

## 2019-09-13 DIAGNOSIS — Z85.3 HX: BREAST CANCER: ICD-10-CM

## 2019-09-13 DIAGNOSIS — G47.9 SLEEP DIFFICULTIES: ICD-10-CM

## 2019-09-13 DIAGNOSIS — G43.009 MIGRAINE WITHOUT AURA AND WITHOUT STATUS MIGRAINOSUS, NOT INTRACTABLE: Primary | ICD-10-CM

## 2019-09-13 DIAGNOSIS — F31.9 BIPOLAR 1 DISORDER (HCC): ICD-10-CM

## 2019-09-13 DIAGNOSIS — R25.3 MUSCLE TWITCHING: ICD-10-CM

## 2019-09-13 DIAGNOSIS — Z17.1 MALIGNANT NEOPLASM OF OVERLAPPING SITES OF RIGHT BREAST IN FEMALE, ESTROGEN RECEPTOR NEGATIVE (HCC): ICD-10-CM

## 2019-09-13 DIAGNOSIS — Z90.11 H/O RIGHT MASTECTOMY: ICD-10-CM

## 2019-09-13 DIAGNOSIS — C50.811 MALIGNANT NEOPLASM OF OVERLAPPING SITES OF RIGHT BREAST IN FEMALE, ESTROGEN RECEPTOR NEGATIVE (HCC): ICD-10-CM

## 2019-09-13 DIAGNOSIS — F32.A ANXIETY AND DEPRESSION: ICD-10-CM

## 2019-09-13 DIAGNOSIS — G51.39 HEMIFACIAL SPASM: ICD-10-CM

## 2019-09-13 DIAGNOSIS — H01.02A SQUAMOUS BLEPHARITIS OF UPPER AND LOWER EYELIDS OF BOTH EYES: ICD-10-CM

## 2019-09-13 DIAGNOSIS — F41.9 ANXIETY AND DEPRESSION: ICD-10-CM

## 2019-09-13 DIAGNOSIS — R41.3 MEMORY PROBLEM: ICD-10-CM

## 2019-09-13 DIAGNOSIS — G43.009 MIGRAINE WITHOUT AURA AND WITHOUT STATUS MIGRAINOSUS, NOT INTRACTABLE: ICD-10-CM

## 2019-09-13 DIAGNOSIS — E78.49 OTHER HYPERLIPIDEMIA: ICD-10-CM

## 2019-09-13 DIAGNOSIS — C50.919 MALIGNANT NEOPLASM OF BREAST IN FEMALE, ESTROGEN RECEPTOR POSITIVE, UNSPECIFIED LATERALITY, UNSPECIFIED SITE OF BREAST (HCC): ICD-10-CM

## 2019-09-13 DIAGNOSIS — Z17.0 MALIGNANT NEOPLASM OF BREAST IN FEMALE, ESTROGEN RECEPTOR POSITIVE, UNSPECIFIED LATERALITY, UNSPECIFIED SITE OF BREAST (HCC): ICD-10-CM

## 2019-09-13 DIAGNOSIS — H01.02B SQUAMOUS BLEPHARITIS OF UPPER AND LOWER EYELIDS OF BOTH EYES: ICD-10-CM

## 2019-09-13 LAB
ANION GAP SERPL CALCULATED.3IONS-SCNC: 10 MMOL/L (ref 9–17)
CHLORIDE BLD-SCNC: 97 MMOL/L (ref 98–107)
CO2: 30 MMOL/L (ref 20–31)
CREAT SERPL-MCNC: 1.05 MG/DL (ref 0.5–0.9)
GFR AFRICAN AMERICAN: >60 ML/MIN
GFR NON-AFRICAN AMERICAN: 55 ML/MIN
GFR SERPL CREATININE-BSD FRML MDRD: ABNORMAL ML/MIN/{1.73_M2}
GFR SERPL CREATININE-BSD FRML MDRD: ABNORMAL ML/MIN/{1.73_M2}
HCT VFR BLD CALC: 39.9 % (ref 36–46)
HEMOGLOBIN: 13.1 G/DL (ref 12–16)
MCH RBC QN AUTO: 30.8 PG (ref 26–34)
MCHC RBC AUTO-ENTMCNC: 32.8 G/DL (ref 31–37)
MCV RBC AUTO: 93.8 FL (ref 80–100)
NRBC AUTOMATED: NORMAL PER 100 WBC
PDW BLD-RTO: 14.3 % (ref 11–14.5)
PLATELET # BLD: 242 K/UL (ref 140–450)
PMV BLD AUTO: 7.7 FL (ref 6–12)
POTASSIUM SERPL-SCNC: 4.6 MMOL/L (ref 3.7–5.3)
RBC # BLD: 4.25 M/UL (ref 4–5.2)
SEDIMENTATION RATE, ERYTHROCYTE: 23 MM (ref 0–30)
SODIUM BLD-SCNC: 137 MMOL/L (ref 135–144)
WBC # BLD: 7.9 K/UL (ref 3.5–11)

## 2019-09-13 PROCEDURE — 86147 CARDIOLIPIN ANTIBODY EA IG: CPT

## 2019-09-13 PROCEDURE — 85027 COMPLETE CBC AUTOMATED: CPT

## 2019-09-13 PROCEDURE — 85651 RBC SED RATE NONAUTOMATED: CPT

## 2019-09-13 PROCEDURE — 85613 RUSSELL VIPER VENOM DILUTED: CPT

## 2019-09-13 PROCEDURE — 86038 ANTINUCLEAR ANTIBODIES: CPT

## 2019-09-13 PROCEDURE — 82565 ASSAY OF CREATININE: CPT

## 2019-09-13 PROCEDURE — 36415 COLL VENOUS BLD VENIPUNCTURE: CPT

## 2019-09-13 PROCEDURE — 80051 ELECTROLYTE PANEL: CPT

## 2019-09-13 PROCEDURE — 85730 THROMBOPLASTIN TIME PARTIAL: CPT

## 2019-09-13 PROCEDURE — 99215 OFFICE O/P EST HI 40 MIN: CPT | Performed by: PSYCHIATRY & NEUROLOGY

## 2019-09-13 PROCEDURE — 85610 PROTHROMBIN TIME: CPT

## 2019-09-13 RX ORDER — BUTALBITAL, ACETAMINOPHEN AND CAFFEINE 50; 325; 40 MG/1; MG/1; MG/1
TABLET ORAL
Qty: 90 TABLET | Refills: 1 | Status: SHIPPED | OUTPATIENT
Start: 2019-09-13 | End: 2020-11-20 | Stop reason: SDUPTHER

## 2019-09-13 RX ORDER — SUMATRIPTAN 100 MG/1
TABLET, FILM COATED ORAL
Qty: 12 TABLET | Refills: 2 | Status: ON HOLD | OUTPATIENT
Start: 2019-09-13 | End: 2020-04-09

## 2019-09-13 ASSESSMENT — ENCOUNTER SYMPTOMS
EYE ITCHING: 0
EYE DISCHARGE: 0
APNEA: 0
BACK PAIN: 0
CHOKING: 0
DIARRHEA: 0
BLOOD IN STOOL: 0
SHORTNESS OF BREATH: 0
SINUS PRESSURE: 0
WHEEZING: 0
BOWEL INCONTINENCE: 0
FACIAL SWELLING: 0
EYE REDNESS: 0
ABDOMINAL DISTENTION: 0
BLURRED VISION: 0
VISUAL CHANGE: 0
COLOR CHANGE: 0
CHEST TIGHTNESS: 0
VOICE CHANGE: 0
VOMITING: 1
SCALP TENDERNESS: 0
TROUBLE SWALLOWING: 0
NAUSEA: 1
COUGH: 0
FACIAL SWEATING: 0
EYE PAIN: 0
PHOTOPHOBIA: 1
CONSTIPATION: 0
EYE WATERING: 1
ABDOMINAL PAIN: 0
SORE THROAT: 0
SWOLLEN GLANDS: 0
RHINORRHEA: 0

## 2019-09-13 NOTE — PROGRESS NOTES
[facility]  Neurology  [address] [place] [address]  Phone: [phone]   Fax: [phone]    SUBJECTIVE:     PATIENT ID:  Indra Matias is a  RIGHT  HANDED 47 y.o. female. Migraine    This is a chronic problem. Episode onset: FOR  MORE  THAN   4  YEARS. The problem occurs intermittently. The problem has been gradually worsening. The pain is located in the bilateral and vertex region. The pain does not radiate. The pain quality is similar to prior headaches. The quality of the pain is described as aching, dull and throbbing. The pain is at a severity of 3/10. The pain is mild. Associated symptoms include eye watering, insomnia, nausea, phonophobia, photophobia and vomiting. Pertinent negatives include no abdominal pain, abnormal behavior, anorexia, back pain, blurred vision, coughing, dizziness, drainage, ear pain, eye pain, eye redness, facial sweating, fever, hearing loss, loss of balance, muscle aches, neck pain, numbness, rhinorrhea, scalp tenderness, seizures, sinus pressure, sore throat, swollen glands, tingling, tinnitus, visual change, weakness or weight loss. The symptoms are aggravated by unknown. She has tried acetaminophen and Excedrin for the symptoms. The treatment provided no relief. Her past medical history is significant for migraine headaches and obesity. There is no history of cancer, cluster headaches, hypertension, immunosuppression, migraines in the family, pseudotumor cerebri, recent head traumas, sinus disease or TMJ. Neurologic Problem   The patient's primary symptoms include memory loss. The patient's pertinent negatives include no altered mental status, clumsiness, focal sensory loss, focal weakness, loss of balance, near-syncope, slurred speech, syncope, visual change or weakness. Primary symptoms comment: LEFT  EYE  TWITCHING  AND  SPAMS. This is a chronic problem. Episode onset: SINCE  APRIL 2017.  The neurological problem developed insidiously. The problem has been waxing and waning since onset. There was facial and left-sided focality noted. Associated symptoms include headaches, nausea and vomiting. Pertinent negatives include no abdominal pain, auditory change, aura, back pain, bladder incontinence, bowel incontinence, chest pain, confusion, diaphoresis, dizziness, fatigue, fever, light-headedness, neck pain, palpitations, shortness of breath or vertigo. Treatments tried: Carylon Boop. The treatment provided moderate relief. There is no history of a bleeding disorder, a clotting disorder, a CVA, dementia, head trauma, liver disease, mood changes or seizures. History obtained from  The patient      and other  available medical records were  Also  reviewed. The  Duration,  Quality,  Severity,  Location,  Timing,  Context,  Modifying  Factors   Of   The   Chief   Complaint         And  Present  Illness   Was   Reviewed   In   Chronological   Manner. PATIENT'S  MAIN  CONCERNS INCLUDE :                       1)  H/O   LEFT  EYE  TWITCHING     SINCE      April 2017                                  INTERMITTENTLY  . NO  PAIN                                -BLEPHAROSPASM    LEFT  EYE. 2)    Andrew Marie. 2018                            ANXIETY   ALSO  A  CONTRIBUTING FACTOR                          3)    H/O    BREAST    CANCER  RIGHT                                H/O   BREAST  CANCER   SURGERY  IN  NOV. 2017                           4)       HAD    CHEMO     IN   THE  PAST                                      ALSO  ON   ARIMIDEX   DAILY                             BEING  FOLLOWED  BY   HEMATOLOGY /  ONCOLOGY                           5)    H/O   CHRONIC   MIGRAINES       FOR    4  YEARS                                                              ON   FIORICET    AS  NEEDED                               6)   H/O   CHRONIC  ANXIETY,   DEPRESSION, WEEKLY                                             17)      IN  VIEW  OF  THE  PATIENT'S    MULTIPLE   NEUROLOGICAL  SYMPTOMS                           AND    MULTIPLE   CO MORBID  MEDICAL  CONDITIONS,                           PATIENT    NEEDS  NEURO  DIAGNOSTIC  EVALUATIONS                      FOR   ANY   NEUROLOGICAL  PATHOLOGIES    AND  OTHER                        CORRECTABLE   ETIOLOGIES;     AND                              PATIENT  REQUESTS   THE  SAME.                                                                                         PRECIPITATING  FACTORS: including  fever/infection, exertion/relaxation, position change, stress,     weather change, medications/alcohol, time of day/darkness/light  Are    Absent                                                                 MODIFYING  FACTORS:  fever/infection, exertion/relaxation, position change, stress, weather change,     medications/alcohol, time of day/darkness/light    Are  absent             Patient   Indicates   The  Presence   And  The  Absence  Of  The  Following  Associated  And   Additional  Neurological    Symptoms:                                Balance  And coordination problems  absent           Gait problems     absent            Headaches      absent              Migraines           present           Memory problems        Present             Confusion        absent            Paresthesia numbness          absent           Seizures  And  Starring  Episodes           absent           Syncope,  Near  syncopal episodes         absent           Speech problems           absent             Swallowing  Problems      absent            Dizziness,  Light headedness           absent                        Vertigo        absent             Generalized   Weakness    absent              focal  Weakness     absent             Tremors         absent              Sleep  Problems     absent             History  Of   Recent   Head  Injury N/A 11/30/2017    Port Removal & Insertion performed by Adalberto Pike MD at [address] Right 10/19/2017     [facility]    MASTECTOMY Right 10/19/2017    Right Breast Mastectomy with  [place] Node Biopsy performed by Adalberto Pike MD at . [address] CTR VAD W/SUBQ PORT AGE 5 YR/> Left 3/1/2018    PORT Exchange performed by Adalberto Pike MD at [place] [address] Left 2014, 2015    x 2 surgeries total; Dr. Rebecca Diaz TUNNELED VENOUS PORT PLACEMENT Left 11/30/2017    removal of et replacement of         Current Outpatient Medications   Medication Sig Dispense Refill    butalbital-acetaminophen-caffeine (FIORICET, ESGIC) -40 MG per tablet 1-2   TABLET  TWICE  DAILY  AS  NEEDED 90 tablet 1    SUMAtriptan (IMITREX) 100 MG tablet ONE  TABLET  TWICE  DAILY 12 tablet 2    traMADol (ULTRAM) 50 MG tablet Take 1 tablet by mouth every 6 hours as needed for Pain for up to 60 days. Intended supply: 5 days.  Take lowest dose possible to manage pain 20 tablet 0    Cyanocobalamin ER (RA VITAMIN B-12 TR) 1000 MCG TBCR take 1 tablet by mouth once daily 30 tablet 3    lisinopril (PRINIVIL;ZESTRIL) 5 MG tablet take 1 tablet by mouth once daily 90 tablet 1    potassium chloride (KLOR-CON M) 20 MEQ extended release tablet take 1 tablet by mouth once daily 30 tablet 2    pravastatin (PRAVACHOL) 40 MG tablet take 1 tablet by mouth once daily 30 tablet 5    folic acid (FOLVITE) 1 MG tablet take 1 tablet by mouth once daily 30 tablet 3    Calcium Carbonate-Vitamin D (OYSTER SHELL CALCIUM/D) 500-200 MG-UNIT TABS Take 1 tablet by mouth 2 times daily 60 tablet 5    benztropine (COGENTIN) 0.5 MG tablet Take 0.5 mg by mouth daily      QUEtiapine (SEROQUEL) 100 MG tablet Take 50 mg by mouth nightly      ondansetron (ZOFRAN) 4 MG tablet Take 2 tablets by mouth every 6 hours as needed for Nausea or Vomiting 40 tablet 1    sodium chloride (ALTAMIST SPRAY) 0.65 % nasal spray 1 spray The patient is not hyperactive. OBJECTIVE:    Physical Exam   Constitutional: She appears well-developed and well-nourished. She is cooperative. HENT:   Head: Normocephalic and atraumatic. Head is without raccoon's eyes and without Varam's sign. Right Ear: External ear normal.   Left Ear: External ear normal.   Nose: Nose normal.   Mouth/Throat: Oropharynx is clear and moist.   Eyes: Conjunctivae are normal.   Neck: Normal range of motion. Neck supple. No muscular tenderness present. Carotid bruit is not present. No neck rigidity. No tracheal deviation and normal range of motion present. No Brudzinski's sign and no Kernig's sign noted. No thyroid mass and no thyromegaly present. Cardiovascular: Normal rate and regular rhythm. Pulmonary/Chest: Effort normal.   Musculoskeletal: She exhibits no edema or tenderness. Skin: Skin is warm. No rash noted. No cyanosis or erythema. No pallor. Nails show no clubbing. Psychiatric: Her mood appears anxious. Her affect is not angry, not blunt, not labile and not inappropriate. She is not agitated, not aggressive, not hyperactive, not slowed, not withdrawn, not actively hallucinating and not combative. Thought content is not paranoid and not delusional. Cognition and memory are impaired. She does not express impulsivity or inappropriate judgment. She exhibits a depressed mood. She expresses no homicidal and no suicidal ideation. She expresses no suicidal plans and no homicidal plans. She exhibits normal recent memory and normal remote memory. She is attentive. Neurologic Exam    NEUROLOGICAL EXAMINATION :       A) MENTAL STATUS:                   Alert and  oriented  To time, place  And  Person. No Aphasia. No  Dysarthria. Able   To  Follow three  Step commands without   Any  Difficulty. No right  To left confusion. Normal  Speech  And language function.                  Insight and Judgment ,Fund  Of  Knowledge   within normal limits                Recent  And  Remote memory  within normal limits                Attention &Concentration are within normal limits                                                   B) CRANIAL NERVES :             2 CN : Visual  Acuity  And  Visual fields  within normal limits                      Fundi  Could  Not  Be  Could  Not  Be  Evaluated. 3,4,6 CN : Both  Pupils are   Reactive and  Equal.                          Extraocular   Movements  Are  Intact. No  Nystagmus. No  YUMIKO. No  Afferent  Pupillary  Defect noted. 5 CN :  Normal  Facial sensations and Corneal  Reflexes         7 CN :  Normal  Facial  Symmetry  And  Strength. No facial  Weakness. 8 CN :  Hearing  Appears within normal limits        9, 10 CN: Normal spontaneous, reflex palate movements       11 CN:   Normal  Shoulder shrug and  strength       12 CN :   Normal  Tongue movements and  Tongue  In midline                      No tongue   Fasciculations or atrophy         C) MOTOR  EXAM:                 Strength  In upper  And  Lower extremities   within normal limits               No  Drift. No  Atrophy               Rapid alternating  And  repetitions  Movements  within normal limits               Muscle  Tone  In upper  And  Lower  Extremities  Normal                No rigidity. No  Spasticity. Bradykinesia   Absent                 No  Asterixis. Sustention  Tremor , Resting  Tremor   absent                      LEFT  EYE  LIDS   SHOWS   BLEPHAROSPASM   INTERMITTENTLY                       D) SENSORY :               light touch, pinprick, position  And  Vibration  within normal limits        E) REFLEXES:                   Deep  Tendon  Reflexes normal                    No pathological  Reflexes  Bilaterally.                                     F) COORDINATION  AND  GAIT :                                Station and With  Medications   And  Instructions          * CURRENTLY  TOLERATING  THE  PRESCRIBED   MEDICATIONS. WITHOUT  ANY  SIGNIFICANT  SIDE  EFFECTS   &  GETTING BENEFIT. *    Prophylactic  Use   Of     Vitamin   B   Complex,  Folic  Acid,    Vitamin  B12    Multivitamin,       Calcium  With  magnesium  And  Vit D    Supplementations   Over  The  Counter  Discussed            *  EVALUATIONS  AND  FOLLOW UP:    IF  PATIENT  IS  WILLING                        * HEMATOLOGY/ONCOLOGY                    *   OPTHALMOLOGY                         *  PATIENT   WAS  ON   KLONOPIN       FOR  LEFT  BLEPHAROSPASM   IN      June 2018                                   -     IMPROVED  MORE  THAN   50 %   SUBJECTIVELY. *         PATIENT  NOT  INTERESTED  IN   ADDITIONAL  MEDICATIONS                                     OR  INJECTION  BOTOX                              *       WORSENING  OF  MIGRAINES    SINCE     July 2019                                               2 -3    TIMES  WEEKLY                                            *       IN  VIEW  OF  THE  PATIENT'S    MULTIPLE   NEUROLOGICAL  SYMPTOMS                           AND    MULTIPLE   CO MORBID  MEDICAL  CONDITIONS,                           PATIENT    NEEDS  NEURO  DIAGNOSTIC  EVALUATIONS                      FOR   ANY   NEUROLOGICAL  PATHOLOGIES    AND  OTHER                        CORRECTABLE   ETIOLOGIES;     AND                              PATIENT  REQUESTS   THE  SAME. Controlled Substances Monitoring: Periodic Controlled Substance Monitoring: Possible medication side effects, risk of tolerance/dependence & alternative treatments discussed. , Assessed functional status.  Jimmy Alexander MD)            Orders Placed This Encounter   Procedures    MRI Brain W WO Contrast    CBC    Sedimentation Rate    TIMBO Screen with Reflex    Electrolyte Panel    Lupus Anticoagulant    Creatinine, Serum       Orders Placed This moderation. Start small to improve your eating habits. Pay attention to portion sizes, drink less juice and soda pop, and eat more fruits and vegetables. Eat a healthy amount of food. A 3-ounce serving of meat, for example, is about the size of a deck of cards. Fill the rest of your plate with vegetables and whole grains. Limit the amount of soda and sports drinks you have every day. Drink more water when you are thirsty. Eat at least 5 servings of fruits and vegetables every day. It may seem like a lot, but it is not hard to reach this goal. A serving or helping is 1 piece of fruit, 1 cup of vegetables, or 2 cups of leafy, raw vegetables. Have an apple or some carrot sticks as an afternoon snack instead of a candy bar. Try to have fruits and/or vegetables at every meal.  Make exercise part of your daily routine. You may want to start with simple activities, such as walking, bicycling, or slow swimming. Try to be active 30 to 60 minutes every day. You do not need to do all 30 to 60 minutes all at once. For example, you can exercise 3 times a day for 10 or 20 minutes. Moderate exercise is safe for most people, but it is always a good idea to talk to your doctor before starting an exercise program.  Keep moving. [place] the lawn, work in the garden, or [place]. Take the stairs instead of the elevator at work. If you smoke, quit. People who smoke have an increased risk for heart attack, stroke, cancer, and other lung illnesses. Quitting is hard, but there are ways to boost your chance of quitting tobacco for good. Use nicotine gum, patches, or lozenges. Ask your doctor about stop-smoking programs and medicines. Keep trying. In addition to reducing your risk of diseases in the future, you will notice some benefits soon after you stop using tobacco. If you have shortness of breath or asthma symptoms, they will likely get better within a few weeks after you quit. Limit how much alcohol you drink.  Moderate

## 2019-09-16 LAB — ANTI-NUCLEAR ANTIBODY (ANA): NEGATIVE

## 2019-09-17 LAB
ANTICARDIOLIPIN IGA ANTIBODY: 2.1 APU
ANTICARDIOLIPIN IGG ANTIBODY: 1.3 GPU
CARDIOLIPIN AB IGM: 2.9 MPU
DILUTE RUSSELL VIPER VENOM TIME: NORMAL
INR BLD: 1
LUPUS ANTICOAG: NORMAL
PARTIAL THROMBOPLASTIN TIME: 23.4 SEC (ref 20.5–30.5)
PROTHROMBIN TIME: 10.7 SEC (ref 9–12)

## 2019-10-02 ENCOUNTER — IMMUNIZATION (OUTPATIENT)
Dept: LAB | Age: 54
End: 2019-10-02
Payer: MEDICAID

## 2019-10-02 ENCOUNTER — HOSPITAL ENCOUNTER (OUTPATIENT)
Dept: MRI IMAGING | Age: 54
Discharge: HOME | End: 2019-10-04
Payer: MEDICAID

## 2019-10-02 DIAGNOSIS — G43.009 MIGRAINE WITHOUT AURA AND WITHOUT STATUS MIGRAINOSUS, NOT INTRACTABLE: ICD-10-CM

## 2019-10-02 DIAGNOSIS — R25.3 MUSCLE TWITCHING: ICD-10-CM

## 2019-10-02 DIAGNOSIS — R41.3 MEMORY PROBLEM: ICD-10-CM

## 2019-10-02 DIAGNOSIS — G47.9 SLEEP DIFFICULTIES: ICD-10-CM

## 2019-10-02 PROCEDURE — 90686 IIV4 VACC NO PRSV 0.5 ML IM: CPT | Performed by: NURSE PRACTITIONER

## 2019-10-02 PROCEDURE — A9579 GAD-BASE MR CONTRAST NOS,1ML: HCPCS | Performed by: PSYCHIATRY & NEUROLOGY

## 2019-10-02 PROCEDURE — 2709999900 MRI BRAIN W WO CONTRAST

## 2019-10-02 PROCEDURE — 6360000004 HC RX CONTRAST MEDICATION: Performed by: PSYCHIATRY & NEUROLOGY

## 2019-10-02 PROCEDURE — 90471 IMMUNIZATION ADMIN: CPT | Performed by: NURSE PRACTITIONER

## 2019-10-02 RX ADMIN — GADOTERIDOL 15 ML: 279.3 INJECTION, SOLUTION INTRAVENOUS at 14:05

## 2019-10-05 ENCOUNTER — TELEPHONE (OUTPATIENT)
Dept: GENERAL RADIOLOGY | Age: 54
End: 2019-10-05

## 2019-10-07 DIAGNOSIS — Z12.31 SCREENING MAMMOGRAM, ENCOUNTER FOR: Primary | ICD-10-CM

## 2019-10-10 ENCOUNTER — HOSPITAL ENCOUNTER (OUTPATIENT)
Dept: MAMMOGRAPHY | Age: 54
Discharge: HOME | End: 2019-10-12
Payer: MEDICAID

## 2019-10-10 DIAGNOSIS — Z12.31 SCREENING MAMMOGRAM, ENCOUNTER FOR: ICD-10-CM

## 2019-10-10 PROCEDURE — 77063 BREAST TOMOSYNTHESIS BI: CPT

## 2019-10-17 ENCOUNTER — OFFICE VISIT (OUTPATIENT)
Dept: ONCOLOGY | Age: 54
End: 2019-10-17
Payer: MEDICAID

## 2019-10-17 ENCOUNTER — OFFICE VISIT (OUTPATIENT)
Dept: NEUROLOGY | Age: 54
End: 2019-10-17
Payer: MEDICAID

## 2019-10-17 VITALS
DIASTOLIC BLOOD PRESSURE: 86 MMHG | HEIGHT: 65 IN | BODY MASS INDEX: 48.82 KG/M2 | SYSTOLIC BLOOD PRESSURE: 124 MMHG | HEART RATE: 84 BPM | TEMPERATURE: 99 F | WEIGHT: 293 LBS

## 2019-10-17 VITALS
DIASTOLIC BLOOD PRESSURE: 84 MMHG | HEIGHT: 65 IN | BODY MASS INDEX: 48.82 KG/M2 | RESPIRATION RATE: 14 BRPM | WEIGHT: 293 LBS | SYSTOLIC BLOOD PRESSURE: 124 MMHG | HEART RATE: 90 BPM

## 2019-10-17 DIAGNOSIS — F41.9 ANXIETY AND DEPRESSION: ICD-10-CM

## 2019-10-17 DIAGNOSIS — F32.A ANXIETY AND DEPRESSION: ICD-10-CM

## 2019-10-17 DIAGNOSIS — G43.009 MIGRAINE WITHOUT AURA AND WITHOUT STATUS MIGRAINOSUS, NOT INTRACTABLE: Primary | ICD-10-CM

## 2019-10-17 DIAGNOSIS — G51.39 HEMIFACIAL SPASM: ICD-10-CM

## 2019-10-17 DIAGNOSIS — C50.919 MALIGNANT NEOPLASM OF BREAST IN FEMALE, ESTROGEN RECEPTOR POSITIVE, UNSPECIFIED LATERALITY, UNSPECIFIED SITE OF BREAST (HCC): ICD-10-CM

## 2019-10-17 DIAGNOSIS — E78.49 OTHER HYPERLIPIDEMIA: ICD-10-CM

## 2019-10-17 DIAGNOSIS — Z17.0 MALIGNANT NEOPLASM OF BREAST IN FEMALE, ESTROGEN RECEPTOR POSITIVE, UNSPECIFIED LATERALITY, UNSPECIFIED SITE OF BREAST (HCC): ICD-10-CM

## 2019-10-17 DIAGNOSIS — F31.9 BIPOLAR 1 DISORDER (HCC): ICD-10-CM

## 2019-10-17 DIAGNOSIS — G47.9 SLEEP DIFFICULTIES: ICD-10-CM

## 2019-10-17 DIAGNOSIS — H25.813 COMBINED FORMS OF AGE-RELATED CATARACT OF BOTH EYES: ICD-10-CM

## 2019-10-17 DIAGNOSIS — R41.3 MEMORY PROBLEM: ICD-10-CM

## 2019-10-17 DIAGNOSIS — C50.919 MALIGNANT NEOPLASM OF FEMALE BREAST, UNSPECIFIED ESTROGEN RECEPTOR STATUS, UNSPECIFIED LATERALITY, UNSPECIFIED SITE OF BREAST (HCC): Primary | ICD-10-CM

## 2019-10-17 DIAGNOSIS — Z85.3 HX: BREAST CANCER: ICD-10-CM

## 2019-10-17 DIAGNOSIS — C50.811 MALIGNANT NEOPLASM OF OVERLAPPING SITES OF RIGHT BREAST IN FEMALE, ESTROGEN RECEPTOR NEGATIVE (HCC): ICD-10-CM

## 2019-10-17 DIAGNOSIS — Z17.1 MALIGNANT NEOPLASM OF OVERLAPPING SITES OF RIGHT BREAST IN FEMALE, ESTROGEN RECEPTOR NEGATIVE (HCC): ICD-10-CM

## 2019-10-17 DIAGNOSIS — G24.5 BLEPHAROSPASM: ICD-10-CM

## 2019-10-17 DIAGNOSIS — J30.2 SEASONAL ALLERGIES: ICD-10-CM

## 2019-10-17 DIAGNOSIS — Z90.11 H/O RIGHT MASTECTOMY: ICD-10-CM

## 2019-10-17 DIAGNOSIS — R25.3 MUSCLE TWITCHING: ICD-10-CM

## 2019-10-17 PROCEDURE — 99214 OFFICE O/P EST MOD 30 MIN: CPT | Performed by: INTERNAL MEDICINE

## 2019-10-17 PROCEDURE — 99214 OFFICE O/P EST MOD 30 MIN: CPT | Performed by: PSYCHIATRY & NEUROLOGY

## 2019-10-17 RX ORDER — TOPIRAMATE 50 MG/1
TABLET, FILM COATED ORAL
Qty: 60 TABLET | Refills: 3 | Status: SHIPPED | OUTPATIENT
Start: 2019-10-17 | End: 2019-12-19 | Stop reason: SDUPTHER

## 2019-10-17 RX ORDER — TRAMADOL HYDROCHLORIDE 50 MG/1
50 TABLET ORAL EVERY 6 HOURS PRN
Qty: 40 TABLET | Refills: 0 | Status: SHIPPED | OUTPATIENT
Start: 2019-10-17 | End: 2019-11-21 | Stop reason: SDUPTHER

## 2019-10-17 ASSESSMENT — ENCOUNTER SYMPTOMS
BACK PAIN: 0
RHINORRHEA: 0
ABDOMINAL PAIN: 0
CHOKING: 0
VISUAL CHANGE: 0
CHEST TIGHTNESS: 0
SWOLLEN GLANDS: 0
EYE PAIN: 0
NAUSEA: 1
COUGH: 0
TROUBLE SWALLOWING: 0
EYE DISCHARGE: 0
PHOTOPHOBIA: 1
BLURRED VISION: 0
EYE ITCHING: 0
SHORTNESS OF BREATH: 0
EYE REDNESS: 0
BOWEL INCONTINENCE: 0
VOMITING: 1
FACIAL SWEATING: 0
BLOOD IN STOOL: 0
WHEEZING: 0
DIARRHEA: 0
ABDOMINAL DISTENTION: 0
APNEA: 0
SCALP TENDERNESS: 0
SORE THROAT: 0
CONSTIPATION: 0
EYE WATERING: 1
COLOR CHANGE: 0
SINUS PRESSURE: 0
FACIAL SWELLING: 0
VOICE CHANGE: 0

## 2019-10-18 ENCOUNTER — TELEPHONE (OUTPATIENT)
Dept: GENERAL RADIOLOGY | Age: 54
End: 2019-10-18

## 2019-10-29 DIAGNOSIS — C50.919 MALIGNANT NEOPLASM OF FEMALE BREAST, UNSPECIFIED ESTROGEN RECEPTOR STATUS, UNSPECIFIED LATERALITY, UNSPECIFIED SITE OF BREAST (HCC): ICD-10-CM

## 2019-10-29 RX ORDER — POTASSIUM CHLORIDE 20 MEQ/1
TABLET, EXTENDED RELEASE ORAL
Qty: 30 TABLET | Refills: 2 | Status: SHIPPED | OUTPATIENT
Start: 2019-10-29 | End: 2020-02-13

## 2019-10-30 ENCOUNTER — OFFICE VISIT (OUTPATIENT)
Dept: NUTRITION | Age: 54
End: 2019-10-30
Payer: MEDICAID

## 2019-10-30 DIAGNOSIS — E66.09 OBESITY DUE TO EXCESS CALORIES, UNSPECIFIED CLASSIFICATION, UNSPECIFIED WHETHER SERIOUS COMORBIDITY PRESENT: Primary | ICD-10-CM

## 2019-10-30 PROCEDURE — 97802 MEDICAL NUTRITION INDIV IN: CPT | Performed by: DIETITIAN, REGISTERED

## 2019-11-07 ENCOUNTER — OFFICE VISIT (OUTPATIENT)
Dept: FAMILY MEDICINE CLINIC | Age: 54
End: 2019-11-07
Payer: MEDICAID

## 2019-11-07 VITALS
OXYGEN SATURATION: 99 % | DIASTOLIC BLOOD PRESSURE: 72 MMHG | HEART RATE: 82 BPM | BODY MASS INDEX: 48.82 KG/M2 | HEIGHT: 65 IN | WEIGHT: 293 LBS | RESPIRATION RATE: 18 BRPM | TEMPERATURE: 99.1 F | SYSTOLIC BLOOD PRESSURE: 118 MMHG

## 2019-11-07 DIAGNOSIS — R19.7 DIARRHEA, UNSPECIFIED TYPE: ICD-10-CM

## 2019-11-07 DIAGNOSIS — J30.9 ALLERGIC RHINITIS, UNSPECIFIED SEASONALITY, UNSPECIFIED TRIGGER: ICD-10-CM

## 2019-11-07 DIAGNOSIS — J30.2 SEASONAL ALLERGIES: Primary | ICD-10-CM

## 2019-11-07 DIAGNOSIS — J45.20 MILD INTERMITTENT EXTRINSIC ASTHMA WITHOUT COMPLICATION: ICD-10-CM

## 2019-11-07 PROCEDURE — 99213 OFFICE O/P EST LOW 20 MIN: CPT | Performed by: NURSE PRACTITIONER

## 2019-11-07 RX ORDER — FEXOFENADINE HCL 180 MG/1
180 TABLET ORAL DAILY
Qty: 30 TABLET | Refills: 3 | Status: SHIPPED | OUTPATIENT
Start: 2019-11-07 | End: 2020-03-04 | Stop reason: SDUPTHER

## 2019-11-07 ASSESSMENT — ENCOUNTER SYMPTOMS
DIARRHEA: 1
COUGH: 1
WHEEZING: 1
SORE THROAT: 1
SHORTNESS OF BREATH: 0
RHINORRHEA: 0

## 2019-11-07 ASSESSMENT — PATIENT HEALTH QUESTIONNAIRE - PHQ9
SUM OF ALL RESPONSES TO PHQ9 QUESTIONS 1 & 2: 0
SUM OF ALL RESPONSES TO PHQ QUESTIONS 1-9: 0
2. FEELING DOWN, DEPRESSED OR HOPELESS: 0
SUM OF ALL RESPONSES TO PHQ QUESTIONS 1-9: 0
1. LITTLE INTEREST OR PLEASURE IN DOING THINGS: 0

## 2019-11-15 ENCOUNTER — TELEPHONE (OUTPATIENT)
Dept: FAMILY MEDICINE CLINIC | Age: 54
End: 2019-11-15

## 2019-11-20 DIAGNOSIS — C50.919 MALIGNANT NEOPLASM OF FEMALE BREAST, UNSPECIFIED ESTROGEN RECEPTOR STATUS, UNSPECIFIED LATERALITY, UNSPECIFIED SITE OF BREAST (HCC): ICD-10-CM

## 2019-11-21 DIAGNOSIS — C50.919 MALIGNANT NEOPLASM OF FEMALE BREAST, UNSPECIFIED ESTROGEN RECEPTOR STATUS, UNSPECIFIED LATERALITY, UNSPECIFIED SITE OF BREAST (HCC): ICD-10-CM

## 2019-11-21 RX ORDER — FOLIC ACID 1 MG/1
TABLET ORAL
Qty: 30 TABLET | Refills: 3 | Status: SHIPPED | OUTPATIENT
Start: 2019-11-21 | End: 2020-04-17

## 2019-11-21 RX ORDER — TRAMADOL HYDROCHLORIDE 50 MG/1
50 TABLET ORAL EVERY 6 HOURS PRN
Qty: 40 TABLET | Refills: 0 | Status: SHIPPED | OUTPATIENT
Start: 2019-11-21 | End: 2019-12-20 | Stop reason: SDUPTHER

## 2019-11-21 RX ORDER — LETROZOLE 2.5 MG/1
2.5 TABLET, FILM COATED ORAL DAILY
Qty: 30 TABLET | Refills: 5 | Status: SHIPPED | OUTPATIENT
Start: 2019-11-21 | End: 2020-06-30 | Stop reason: SDUPTHER

## 2019-12-19 ENCOUNTER — OFFICE VISIT (OUTPATIENT)
Dept: NEUROLOGY | Age: 54
End: 2019-12-19
Payer: MEDICAID

## 2019-12-19 VITALS
HEART RATE: 80 BPM | SYSTOLIC BLOOD PRESSURE: 128 MMHG | DIASTOLIC BLOOD PRESSURE: 74 MMHG | HEIGHT: 65 IN | RESPIRATION RATE: 16 BRPM | WEIGHT: 293 LBS | BODY MASS INDEX: 48.82 KG/M2

## 2019-12-19 DIAGNOSIS — C50.919 MALIGNANT NEOPLASM OF FEMALE BREAST, UNSPECIFIED ESTROGEN RECEPTOR STATUS, UNSPECIFIED LATERALITY, UNSPECIFIED SITE OF BREAST (HCC): ICD-10-CM

## 2019-12-19 DIAGNOSIS — G24.5 BLEPHAROSPASM: ICD-10-CM

## 2019-12-19 DIAGNOSIS — G47.9 SLEEP DIFFICULTIES: ICD-10-CM

## 2019-12-19 DIAGNOSIS — Z90.11 H/O RIGHT MASTECTOMY: ICD-10-CM

## 2019-12-19 DIAGNOSIS — F41.9 ANXIETY AND DEPRESSION: ICD-10-CM

## 2019-12-19 DIAGNOSIS — F31.9 BIPOLAR 1 DISORDER (HCC): ICD-10-CM

## 2019-12-19 DIAGNOSIS — R11.2 NAUSEA AND VOMITING, INTRACTABILITY OF VOMITING NOT SPECIFIED, UNSPECIFIED VOMITING TYPE: ICD-10-CM

## 2019-12-19 DIAGNOSIS — R25.3 MUSCLE TWITCHING: ICD-10-CM

## 2019-12-19 DIAGNOSIS — G51.39 HEMIFACIAL SPASM: ICD-10-CM

## 2019-12-19 DIAGNOSIS — C50.919 MALIGNANT NEOPLASM OF BREAST IN FEMALE, ESTROGEN RECEPTOR POSITIVE, UNSPECIFIED LATERALITY, UNSPECIFIED SITE OF BREAST (HCC): ICD-10-CM

## 2019-12-19 DIAGNOSIS — Z85.3 HX: BREAST CANCER: ICD-10-CM

## 2019-12-19 DIAGNOSIS — F32.A ANXIETY AND DEPRESSION: ICD-10-CM

## 2019-12-19 DIAGNOSIS — R41.3 MEMORY PROBLEM: ICD-10-CM

## 2019-12-19 DIAGNOSIS — J30.2 SEASONAL ALLERGIES: ICD-10-CM

## 2019-12-19 DIAGNOSIS — Z17.0 MALIGNANT NEOPLASM OF BREAST IN FEMALE, ESTROGEN RECEPTOR POSITIVE, UNSPECIFIED LATERALITY, UNSPECIFIED SITE OF BREAST (HCC): ICD-10-CM

## 2019-12-19 DIAGNOSIS — G43.009 MIGRAINE WITHOUT AURA AND WITHOUT STATUS MIGRAINOSUS, NOT INTRACTABLE: Primary | ICD-10-CM

## 2019-12-19 DIAGNOSIS — E78.49 OTHER HYPERLIPIDEMIA: ICD-10-CM

## 2019-12-19 PROCEDURE — 99214 OFFICE O/P EST MOD 30 MIN: CPT | Performed by: PSYCHIATRY & NEUROLOGY

## 2019-12-19 RX ORDER — TOPIRAMATE 50 MG/1
TABLET, FILM COATED ORAL
Qty: 60 TABLET | Refills: 3 | Status: SHIPPED | OUTPATIENT
Start: 2019-12-19 | End: 2020-05-21 | Stop reason: SDUPTHER

## 2019-12-19 ASSESSMENT — ENCOUNTER SYMPTOMS
BLURRED VISION: 0
SINUS PRESSURE: 0
WHEEZING: 0
EYE REDNESS: 0
NAUSEA: 1
COLOR CHANGE: 0
FACIAL SWELLING: 0
SWOLLEN GLANDS: 0
EYE PAIN: 0
EYE ITCHING: 0
FACIAL SWEATING: 0
VOICE CHANGE: 0
APNEA: 0
SHORTNESS OF BREATH: 0
PHOTOPHOBIA: 1
BACK PAIN: 0
EYE WATERING: 1
TROUBLE SWALLOWING: 0
SCALP TENDERNESS: 0
CONSTIPATION: 0
CHEST TIGHTNESS: 0
ABDOMINAL DISTENTION: 0
COUGH: 0
SORE THROAT: 0
BOWEL INCONTINENCE: 0
RHINORRHEA: 0
EYE DISCHARGE: 0
VOMITING: 1
VISUAL CHANGE: 0
DIARRHEA: 0
ABDOMINAL PAIN: 0
CHOKING: 0
BLOOD IN STOOL: 0

## 2019-12-20 RX ORDER — TRAMADOL HYDROCHLORIDE 50 MG/1
50 TABLET ORAL EVERY 6 HOURS PRN
Qty: 40 TABLET | Refills: 0 | Status: SHIPPED | OUTPATIENT
Start: 2019-12-20 | End: 2020-01-21 | Stop reason: SDUPTHER

## 2020-01-07 ENCOUNTER — HOSPITAL ENCOUNTER (OUTPATIENT)
Dept: INTERVENTIONAL RADIOLOGY/VASCULAR | Age: 55
Discharge: HOME | End: 2020-01-09
Payer: MEDICAID

## 2020-01-07 ENCOUNTER — OFFICE VISIT (OUTPATIENT)
Dept: FAMILY MEDICINE CLINIC | Age: 55
End: 2020-01-07
Payer: MEDICAID

## 2020-01-07 VITALS
WEIGHT: 293 LBS | HEART RATE: 100 BPM | HEIGHT: 65 IN | OXYGEN SATURATION: 98 % | BODY MASS INDEX: 48.82 KG/M2 | TEMPERATURE: 98.9 F | RESPIRATION RATE: 16 BRPM | SYSTOLIC BLOOD PRESSURE: 124 MMHG | DIASTOLIC BLOOD PRESSURE: 78 MMHG

## 2020-01-07 PROCEDURE — 93971 EXTREMITY STUDY: CPT

## 2020-01-07 PROCEDURE — 99214 OFFICE O/P EST MOD 30 MIN: CPT | Performed by: NURSE PRACTITIONER

## 2020-01-07 RX ORDER — PREDNISONE 50 MG/1
50 TABLET ORAL DAILY
Qty: 5 TABLET | Refills: 0 | Status: SHIPPED | OUTPATIENT
Start: 2020-01-07 | End: 2020-01-12

## 2020-01-07 ASSESSMENT — ENCOUNTER SYMPTOMS
GASTROINTESTINAL NEGATIVE: 1
RESPIRATORY NEGATIVE: 1
SHORTNESS OF BREATH: 0

## 2020-01-07 NOTE — PROGRESS NOTES
[facility]    Subjective:     Patient ID: Alethea Franz is a 47 y.o. y.o. female. HPI Patient in for office for right lower leg pain for the past week. She state that it has been getting progressively more painful. She does have a history of DVT. She states that she has not had any injury. She had tried to take OTC IBU without effect. Her ankle is swollen today. She states that pain is reproduced with weight bearing.      Past Medical History:   Diagnosis Date    Bipolar 1 disorder ([address]     Breast cancer ([address] 2017    right side     DVT of axillary vein, acute right ([address]     Eye twitch 2019    Headache(784.0)     Hyperlipidemia     Migraine        Past Surgical History:   Procedure Laterality Date    CATHETER REMOVAL Left 6/6/2019    PORT REMOVAL performed by Jina Colorado DO at [address] N/A 10/13/2017    D & C HYSTEROSCOPY with polypectomy performed by Matthew Calero MD at [address] [place] [address] / [facility] 11/9/2017    PORT INSERTION performed by Marshall Burnett MD at [address] [place] [address] / [facility]A 11/30/2017    [facility]     [facility]    MASTECTOMY Right 10/19/2017    Right Breast Mastectomy with  [place] Node Biopsy performed by Marshall Burnett MD at . [facility] CTR VAD W/SUBQ PORT AGE 5 YR/> Left 3/1/2018    PORT Exchange performed by Marshall Burnett MD at [place] [address] Left 2014, 2015    x 2 surgeries total; Dr. Yosvany Emanuel TUNNELED VENOUS PORT PLACEMENT Left 11/30/2017    removal of et replacement of       Family History   Problem Relation Age of Onset    Breast Cancer Sister 54    Breast Cancer Maternal Aunt 71    Other Maternal Cousin         Atypical Ductal Hyperplasia     Uterine Cancer Sister 32    Other Sister 1    fluticasone (FLONASE) 50 MCG/ACT nasal spray 1 spray by Nasal route daily       hydrOXYzine (ATARAX) 10 MG tablet Take 10 mg by mouth daily       lamoTRIgine (LAMICTAL) 100 MG tablet 150 mg nightly        No current facility-administered medications for this visit. Review of Systems   Constitutional: Negative. Negative for activity change, appetite change and fever. Respiratory: Negative. Negative for shortness of breath. Cardiovascular: Negative. Negative for chest pain. Gastrointestinal: Negative. Musculoskeletal: Positive for myalgias (right lower leg pain and swelling). Objective:       /78 (Site: Left Upper Arm, Position: Sitting, Cuff Size: Large Adult) Comment (Cuff Size): extra large  Pulse 100   Temp 98.9 °F (37.2 °C) (Tympanic)   Resp 16   Ht 5' 5\" (1.651 m)   Wt (!) 303 lb 6.4 oz (137.6 kg)   LMP 02/28/2013   SpO2 98%   Breastfeeding? No   BMI 50.49 kg/m²     Physical Exam  Vitals signs and nursing note reviewed. Constitutional:       Appearance: She is well-developed. She is obese. HENT:      Head: Normocephalic and atraumatic. Right Ear: External ear normal.      Left Ear: External ear normal.      Nose: Nose normal.   Eyes:      Conjunctiva/sclera: Conjunctivae normal.      Pupils: Pupils are equal, round, and reactive to light. Neck:      Musculoskeletal: Normal range of motion. Cardiovascular:      Rate and Rhythm: Normal rate and regular rhythm. Heart sounds: Normal heart sounds. Pulmonary:      Effort: Pulmonary effort is normal.      Breath sounds: Normal breath sounds. No wheezing or rhonchi. Chest:      Chest wall: No tenderness. Musculoskeletal: Normal range of motion. Right lower leg: She exhibits tenderness. Edema (mild non pitting. she has lymphedema in both legs) present. Comments: She describes it as starting in her upper thigh and radiates down. Difficult to visualize if her ankle is swollen.  She has a positive gilbert's in the right thigh and calf. No redness or bruising noted. No warmth in pin point area either. Skin:     General: Skin is warm and dry. Neurological:      Mental Status: She is alert and oriented to person, place, and time. Psychiatric:         Behavior: Behavior normal. Behavior is cooperative. Thought Content: Thought content normal.         Judgment: Judgment normal.     Vl Dup Lower Extremity Venous Right    Result Date: 1/7/2020  EXAMINATION: DUPLEX VENOUS ULTRASOUND OF THE RIGHT LOWER EXTREMITY, 1/7/2020 11:46 am TECHNIQUE: Duplex ultrasound and Doppler images were obtained of the right lower extremity. COMPARISON: None. HISTORY: ORDERING SYSTEM PROVIDED HISTORY: Acute pain of right lower extremity TECHNOLOGIST PROVIDED HISTORY: rigth leg pain, hx of blood clots in upper right ext, + Homans sign Reason for Exam: right leg pain Acuity: Acute Type of Exam: Initial FINDINGS: The visualized veins of the right lower extremity are patent and free of echogenic thrombus. The veins are normally compressible and demonstrate flow. No evidence of DVT in the right lower extremity. Assessment & Plan:      1. Acute pain of right lower extremity    - VL DUP LOWER EXTREMITY VENOUS RIGHT; Future    2. Homans sign present    - VL DUP LOWER EXTREMITY VENOUS RIGHT; Future  Reviewed results with patient today. Advised to cont supportive care. She is on Ultram but this does not help. Will give her a pred burst to see if this will help with the pain. Answered all of the patient's questions. Agrees with plan of care. Follow up PRN.          MAGGIE Sanchez - CNP   1/7/2020 11:16 AM

## 2020-01-16 ENCOUNTER — OFFICE VISIT (OUTPATIENT)
Dept: FAMILY MEDICINE CLINIC | Age: 55
End: 2020-01-16
Payer: MEDICAID

## 2020-01-16 VITALS
OXYGEN SATURATION: 99 % | WEIGHT: 293 LBS | RESPIRATION RATE: 18 BRPM | SYSTOLIC BLOOD PRESSURE: 132 MMHG | HEIGHT: 65 IN | HEART RATE: 82 BPM | DIASTOLIC BLOOD PRESSURE: 78 MMHG | TEMPERATURE: 98.9 F | BODY MASS INDEX: 48.82 KG/M2

## 2020-01-16 PROCEDURE — 99214 OFFICE O/P EST MOD 30 MIN: CPT | Performed by: NURSE PRACTITIONER

## 2020-01-16 RX ORDER — AMOXICILLIN AND CLAVULANATE POTASSIUM 875; 125 MG/1; MG/1
1 TABLET, FILM COATED ORAL 2 TIMES DAILY
Qty: 20 TABLET | Refills: 0 | Status: SHIPPED | OUTPATIENT
Start: 2020-01-16 | End: 2020-01-26

## 2020-01-16 RX ORDER — GUAIFENESIN AND CODEINE PHOSPHATE 100; 10 MG/5ML; MG/5ML
5 SOLUTION ORAL EVERY 4 HOURS PRN
Qty: 150 ML | Refills: 0 | Status: SHIPPED | OUTPATIENT
Start: 2020-01-16 | End: 2020-01-21

## 2020-01-16 ASSESSMENT — ENCOUNTER SYMPTOMS
WHEEZING: 0
SHORTNESS OF BREATH: 0
COUGH: 1
SINUS PRESSURE: 1
SINUS PAIN: 1
SORE THROAT: 0
GASTROINTESTINAL NEGATIVE: 1

## 2020-01-16 NOTE — PROGRESS NOTES
INSERTION performed by Neema Corcoran MD at [facility] / REMOVAL / [address] Said N/A 11/30/2017    Port Removal & Insertion performed by Neema Corcoran MD at [address] Right 10/19/2017     [address] [facility]    MASTECTOMY Right 10/19/2017    Right Breast Mastectomy with  [place] Node Biopsy performed by Neema Corcoran MD at [address] [facility] VAD W/SUBQ PORT AGE 5 YR/> Left 3/1/2018    PORT Exchange performed by Neema Corcoran MD at [facility] Left 2014, 2015    x 2 surgeries total; Dr. River Smith TUNNELED VENOUS PORT PLACEMENT Left 11/30/2017    removal of et replacement of       Family History   Problem Relation Age of Onset    Breast Cancer Sister 54    Breast Cancer Maternal Aunt 71    Other Maternal Cousin         Atypical Ductal Hyperplasia     Uterine Cancer Sister 32    Other Sister         breast cyst    Cataracts Mother     Glaucoma Mother     Heart Disease Father     High Blood Pressure Father     High Cholesterol Father     Mental Retardation Father     Diabetes Neg Hx         No Known Allergies    Current Outpatient Medications   Medication Sig Dispense Refill    amoxicillin-clavulanate (AUGMENTIN) 875-125 MG per tablet Take 1 tablet by mouth 2 times daily for 10 days 20 tablet 0    guaiFENesin-codeine (CHERATUSSIN AC) 100-10 MG/5ML syrup Take 5 mLs by mouth every 4 hours as needed for Cough for up to 5 days. 150 mL 0    traMADol (ULTRAM) 50 MG tablet Take 1 tablet by mouth every 6 hours as needed for Pain for up to 60 days. Intended supply: 5 days.  Take lowest dose possible to manage pain 40 tablet 0    topiramate (TOPAMAX) 50 MG tablet ONE  TABLET  TWICE  DAILY 60 tablet 3    folic acid (FOLVITE) 1 MG tablet take 1 tablet by mouth once daily 30 tablet 3    VENTOLIN  (90 Base) MCG/ACT inhaler Inhale 2 puffs into the lungs every 4 hours as needed for Wheezing 1 Inhaler 3    potassium chloride (KLOR-CON M) 20 MEQ extended release tablet take 1 tablet by mouth once daily 30 tablet 2    butalbital-acetaminophen-caffeine (FIORICET, ESGIC) -40 MG per tablet 1-2   TABLET  TWICE  DAILY  AS  NEEDED 90 tablet 1    SUMAtriptan (IMITREX) 100 MG tablet ONE  TABLET  TWICE  DAILY 12 tablet 2    Cyanocobalamin ER (RA VITAMIN B-12 TR) 1000 MCG TBCR take 1 tablet by mouth once daily 30 tablet 3    lisinopril (PRINIVIL;ZESTRIL) 5 MG tablet take 1 tablet by mouth once daily 90 tablet 1    pravastatin (PRAVACHOL) 40 MG tablet take 1 tablet by mouth once daily 30 tablet 5    Calcium Carbonate-Vitamin D (OYSTER SHELL CALCIUM/D) 500-200 MG-UNIT TABS Take 1 tablet by mouth 2 times daily 60 tablet 5    benztropine (COGENTIN) 0.5 MG tablet Take 0.5 mg by mouth daily      QUEtiapine (SEROQUEL) 100 MG tablet Take 50 mg by mouth nightly      ondansetron (ZOFRAN) 4 MG tablet Take 2 tablets by mouth every 6 hours as needed for Nausea or Vomiting 40 tablet 1    sodium chloride (ALTAMIST SPRAY) 0.65 % nasal spray 1 spray by Nasal route as needed for Congestion 1 Bottle 1    fluticasone (FLONASE) 50 MCG/ACT nasal spray 1 spray by Nasal route daily       hydrOXYzine (ATARAX) 10 MG tablet Take 10 mg by mouth daily       lamoTRIgine (LAMICTAL) 100 MG tablet 150 mg nightly       letrozole (FEMARA) 2.5 MG tablet Take 1 tablet by mouth daily 30 tablet 5     No current facility-administered medications for this visit. Review of Systems   Constitutional: Negative. Negative for chills and fever. HENT: Positive for congestion, postnasal drip, sinus pressure and sinus pain. Negative for ear discharge, ear pain, sneezing and sore throat. Ear fullness     Respiratory: Positive for cough (dry, worse at night). Negative for shortness of breath and wheezing. Cardiovascular: Negative. Gastrointestinal: Negative. Musculoskeletal: Positive for gait problem (leg giving out) and myalgias (right leg). Allergic/Immunologic: Negative for environmental allergies. Neurological: Positive for headaches. Objective:       /78 (Site: Left Upper Arm, Position: Sitting, Cuff Size: Large Adult)   Pulse 82   Temp 98.9 °F (37.2 °C) (Tympanic)   Resp 18   Ht 5' 5\" (1.651 m)   Wt (!) 305 lb 6.4 oz (138.5 kg)   LMP 02/28/2013   SpO2 99%   Breastfeeding? No   BMI 50.82 kg/m²     Physical Exam  Vitals signs and nursing note reviewed. Constitutional:       Appearance: Normal appearance. She is well-developed. HENT:      Head: Normocephalic and atraumatic. Right Ear: Hearing normal. A middle ear effusion is present. Left Ear: Hearing normal. A middle ear effusion is present. Nose: Congestion present. Right Sinus: Maxillary sinus tenderness and frontal sinus tenderness present. Left Sinus: Maxillary sinus tenderness and frontal sinus tenderness present. Mouth/Throat:      Mouth: Mucous membranes are moist.      Pharynx: Posterior oropharyngeal erythema present. Eyes:      Conjunctiva/sclera: Conjunctivae normal.      Pupils: Pupils are equal, round, and reactive to light. Neck:      Musculoskeletal: Normal range of motion and neck supple. Cardiovascular:      Rate and Rhythm: Normal rate and regular rhythm. Heart sounds: Normal heart sounds. Pulmonary:      Effort: Pulmonary effort is normal.      Breath sounds: Normal breath sounds. Abdominal:      General: Bowel sounds are normal.      Palpations: Abdomen is soft. Musculoskeletal: Normal range of motion. Legs:       Comments: Area of pain, she states that she notices it is swollen, she is obese so it is difficult to tell. Her legs are the same size today   Skin:     General: Skin is warm and dry. Neurological:      Mental Status: She is alert and oriented to person, place, and time. Psychiatric:         Behavior: Behavior normal. Behavior is cooperative. Thought Content:  Thought content

## 2020-01-21 RX ORDER — B-COMPLEX WITH VITAMIN C
1 TABLET ORAL 2 TIMES DAILY
Qty: 60 TABLET | Refills: 5 | Status: SHIPPED | OUTPATIENT
Start: 2020-01-21 | End: 2020-08-04

## 2020-01-21 RX ORDER — TRAMADOL HYDROCHLORIDE 50 MG/1
50 TABLET ORAL EVERY 6 HOURS PRN
Qty: 40 TABLET | Refills: 0 | Status: SHIPPED | OUTPATIENT
Start: 2020-01-21 | End: 2020-02-26 | Stop reason: SDUPTHER

## 2020-01-28 ENCOUNTER — HOSPITAL ENCOUNTER (OUTPATIENT)
Dept: PHYSICAL THERAPY | Age: 55
Setting detail: THERAPIES SERIES
Discharge: HOME | End: 2020-01-28
Payer: MEDICAID

## 2020-01-28 PROCEDURE — 97110 THERAPEUTIC EXERCISES: CPT

## 2020-01-28 PROCEDURE — 97161 PT EVAL LOW COMPLEX 20 MIN: CPT

## 2020-01-28 ASSESSMENT — PAIN SCALES - GENERAL: PAINLEVEL_OUTOF10: 5

## 2020-01-28 ASSESSMENT — PAIN DESCRIPTION - ORIENTATION: ORIENTATION: RIGHT

## 2020-01-28 ASSESSMENT — PAIN DESCRIPTION - LOCATION: LOCATION: LEG

## 2020-01-28 ASSESSMENT — PAIN DESCRIPTION - DESCRIPTORS: DESCRIPTORS: THROBBING;SHARP

## 2020-01-28 NOTE — PROGRESS NOTES
PF  Spine  Lumbar: Trunk AROM 24 cm from floor with significant stretch felt in HS, 25 degrees ext, SB WNL, Roation WNL bilat pain free. Strength RLE  Comment: 4/5 hip flexion and knee ext with pain in posterior lower leg, 5/5 DF with posterior lower leg pain, 5/5 PF with posterior lower leg pain 4/5 abd 4=/5 add with right lower leg pain, 4-/5 HS with significant distal HS pain. 4/5 hip ext with limited movement and lower leg pain  Strength LLE  Comment: 4+/5 hip flexion, 5/5 knee ext. 5/5 PF/DF, 4/5 hip abd, 5/5 hip add, 4+/5 HS 4+/5 hip ext     Additional Measures  Special Tests: TTP proximal gastroc at posterior knee, medial insertion of HS. - Slump bilat, - SLR (sharp in post knee, but not shooting/radiating down leg.) Repeated ext x10: mild increase in pain. HS 90/90: 30 degrees lacking Right, 15 Left. 15 second TUG. Other: 23/80 LEFS     Bed mobility  Rolling to Left: Independent  Rolling to Right: Independent  Supine to Sit: Independent  Sit to Supine: Independent  Transfers  Sit to Stand: Independent  Stand to sit: Independent  Car Transfer: Independent     Assessment   Conditions Requiring Skilled Therapeutic Intervention  Body structures, Functions, Activity limitations: Decreased functional mobility ; Decreased high-level IADLs;Decreased ROM; Decreased strength; Increased pain;Decreased balance;Decreased endurance  Assessment: Pt will benefit from skilled therapy to reduce pain and improve strength and ROM to increase functional mobility  Treatment Diagnosis: Rt leg pain  Prognosis: Good  Decision Making: Low Complexity  Activity Tolerance  Activity Tolerance: Patient Tolerated treatment well;Patient limited by pain         Plan   Plan  Times per week: 2  Plan weeks: 4 weeks  Specific instructions for Next Treatment: Progress as tolerated  Current Treatment Recommendations: Strengthening, ROM, Balance Training, Functional Mobility Training, IADL Training, Gait Training, Stair training,

## 2020-01-28 NOTE — PLAN OF CARE
goal 6: Pt iwll be indep with HEP    Frequency/Duration:  # Days per week: [] 1 day # Weeks: [] 1 week [] 5 weeks     [x] 2 days? [] 2 weeks [] 6 weeks     [] 3 days   [] 3 weeks [] 7 weeks     [] 4 days   [x] 4 weeks [] 8 weeks    Rehab Potential: [] Excellent [x] Good [] Fair  [] Poor     Electronically signed by:  Dariana Beavers PT    If you have any questions or concerns, please don't hesitate to call.   Thank you for your referral.      Physician Signature:________________________________Date:__________________  By signing above, therapists plan is approved by physician

## 2020-01-30 ENCOUNTER — HOSPITAL ENCOUNTER (OUTPATIENT)
Dept: PHYSICAL THERAPY | Age: 55
Setting detail: THERAPIES SERIES
Discharge: HOME | End: 2020-01-30
Payer: MEDICAID

## 2020-01-30 PROCEDURE — 97110 THERAPEUTIC EXERCISES: CPT

## 2020-02-04 ENCOUNTER — HOSPITAL ENCOUNTER (OUTPATIENT)
Dept: PHYSICAL THERAPY | Age: 55
Setting detail: THERAPIES SERIES
Discharge: HOME | End: 2020-02-04
Payer: MEDICAID

## 2020-02-04 PROCEDURE — 97110 THERAPEUTIC EXERCISES: CPT

## 2020-02-04 NOTE — FLOWSHEET NOTE
Physical Therapy Daily Treatment Note    Date:  2020    Patient Name:  Rocky Lorenzana    :  1965  MRN: [id number]  Restrictions/Precautions:   None  Medical/Treatment Diagnosis Information:   · Diagnosis: Right leg pain  · Treatment Diagnosis: Rt leg pain  Insurance/Certification information:     Physician Information:  Referring Practitioner: Travis CHAN  Plan of care signed (Y/N): Y   Visit# / total visits:  3/8  Pain level: 10/10     Time In: 1:16   Time Out: 1:45    Progress Note: []  Yes  [x]  No  Next due by: Visit #10      Subjective:  Patient reports she felt good after leaving her previous appointment. She notes sitting and playing cards over the weekend for several hours then feeling severe R leg pain the next day which continues into today. Objective:   Observation:   No reported decrease or increase in pain with today's session  Severe pain levels limited abilities this date  Hx Of Cancer    Test measurements:    Lumbar mechanical exam performed by Jairo Menard, PT  + SLR 30 deg   + R Slump test, all into foot  Mechanical lumbar exam indicative of post derangement    Exercises:   Exercise/Equipment Resistance/Repetitions Other comments   Lay Prone [place] 5'    IAN 5'    Press Ups 10x x3    PKB 10x x3    Prone Hip Extension Unable d/t pain              Modified Extensionx 10x x2    Back Bends 10x x2    Standing hip abd/ext 10x ea B                        [x] Provided verbal/tactile cueing for activities related to strengthening, flexibility, endurance, ROM. (63545)  [] Provided verbal/tactile cueing for activities related to improving balance, coordination, kinesthetic sense, posture, motor skill, proprioception. (35159)    Therapeutic Activities:     [] Therapeutic activities, direct (one-on-one) patient contact (use of dynamic activities to improve functional performance). (40949)    Gait:   [] Provided training and instruction to the patient for ambulation re-education. Alter current plan (see comments)  [] Plan of care initiated [] Hold pending MD visit [] Discharge    Plan for Next Session: Continue with lumbar extension based therex.     Electronically signed by:  Werner Cardona PTA

## 2020-02-11 ENCOUNTER — HOSPITAL ENCOUNTER (OUTPATIENT)
Dept: PHYSICAL THERAPY | Age: 55
Setting detail: THERAPIES SERIES
Discharge: HOME | End: 2020-02-11
Payer: MEDICAID

## 2020-02-11 PROCEDURE — 97110 THERAPEUTIC EXERCISES: CPT

## 2020-02-13 ENCOUNTER — HOSPITAL ENCOUNTER (OUTPATIENT)
Dept: PHYSICAL THERAPY | Age: 55
Setting detail: THERAPIES SERIES
Discharge: HOME | End: 2020-02-13
Payer: MEDICAID

## 2020-02-13 RX ORDER — POTASSIUM CHLORIDE 20 MEQ/1
TABLET, EXTENDED RELEASE ORAL
Qty: 30 TABLET | Refills: 2 | Status: ON HOLD | OUTPATIENT
Start: 2020-02-13 | End: 2020-04-15 | Stop reason: HOSPADM

## 2020-02-13 RX ORDER — PRAVASTATIN SODIUM 40 MG
TABLET ORAL
Qty: 30 TABLET | Refills: 5 | Status: SHIPPED | OUTPATIENT
Start: 2020-02-13 | End: 2020-08-21

## 2020-02-13 NOTE — PROGRESS NOTES
Outpatient Physical Therapy    [] [place]  Phone: [phone]  Fax: [phone]      [] [place]  Phone: [phone]  Fax: [phone]    Physical Therapy  Cancellation/No-show Note  Patient Name:  Mabel Tanner  :  1965   Date:  2020  Cancelled visits to date: 1  No-shows to date: 0    For today's appointment patient:  [x]  Cancelled  []  Rescheduled appointment  []  No-show     Reason given by patient:  [x]  Patient ill  []  Conflicting appointment  []  No transportation    []  Conflict with work  []  No reason given  []  Other:     Comments:      Electronically signed by:  Zander Pedraza PTA

## 2020-02-18 ENCOUNTER — OFFICE VISIT (OUTPATIENT)
Dept: FAMILY MEDICINE CLINIC | Age: 55
End: 2020-02-18
Payer: MEDICAID

## 2020-02-18 ENCOUNTER — HOSPITAL ENCOUNTER (OUTPATIENT)
Dept: PHYSICAL THERAPY | Age: 55
Setting detail: THERAPIES SERIES
Discharge: HOME | End: 2020-02-18
Payer: MEDICAID

## 2020-02-18 ENCOUNTER — OFFICE VISIT (OUTPATIENT)
Dept: ONCOLOGY | Age: 55
End: 2020-02-18
Payer: MEDICAID

## 2020-02-18 VITALS
HEIGHT: 65 IN | RESPIRATION RATE: 20 BRPM | OXYGEN SATURATION: 98 % | HEART RATE: 86 BPM | WEIGHT: 293 LBS | SYSTOLIC BLOOD PRESSURE: 122 MMHG | BODY MASS INDEX: 48.82 KG/M2 | TEMPERATURE: 99.8 F | DIASTOLIC BLOOD PRESSURE: 70 MMHG

## 2020-02-18 VITALS
OXYGEN SATURATION: 98 % | TEMPERATURE: 99.5 F | DIASTOLIC BLOOD PRESSURE: 70 MMHG | WEIGHT: 293 LBS | SYSTOLIC BLOOD PRESSURE: 122 MMHG | HEIGHT: 65 IN | BODY MASS INDEX: 48.82 KG/M2 | HEART RATE: 86 BPM

## 2020-02-18 LAB
INFLUENZA A ANTIBODY: ABNORMAL
INFLUENZA B ANTIBODY: ABNORMAL

## 2020-02-18 PROCEDURE — 99212 OFFICE O/P EST SF 10 MIN: CPT

## 2020-02-18 PROCEDURE — 87804 INFLUENZA ASSAY W/OPTIC: CPT | Performed by: FAMILY MEDICINE

## 2020-02-18 PROCEDURE — 99214 OFFICE O/P EST MOD 30 MIN: CPT

## 2020-02-18 PROCEDURE — 99214 OFFICE O/P EST MOD 30 MIN: CPT | Performed by: FAMILY MEDICINE

## 2020-02-18 PROCEDURE — 99214 OFFICE O/P EST MOD 30 MIN: CPT | Performed by: INTERNAL MEDICINE

## 2020-02-18 RX ORDER — PREDNISONE 20 MG/1
20 TABLET ORAL 2 TIMES DAILY
Qty: 10 TABLET | Refills: 0 | Status: SHIPPED | OUTPATIENT
Start: 2020-02-18 | End: 2020-02-23

## 2020-02-18 RX ORDER — AMOXICILLIN 500 MG/1
500 CAPSULE ORAL 3 TIMES DAILY
Qty: 30 CAPSULE | Refills: 0 | Status: SHIPPED | OUTPATIENT
Start: 2020-02-18 | End: 2020-03-04

## 2020-02-18 ASSESSMENT — ENCOUNTER SYMPTOMS
NAUSEA: 0
CHOKING: 0
WHEEZING: 1
TROUBLE SWALLOWING: 0
COUGH: 1
SINUS PRESSURE: 0
SHORTNESS OF BREATH: 1
CONSTIPATION: 0
DIARRHEA: 0
SORE THROAT: 1
CHEST TIGHTNESS: 0

## 2020-02-18 NOTE — PROGRESS NOTES
underwent removal of port with replacement due to port malfunction on 11/30/17    Patient completed 6 cycles of TCHP and continued maintenance Herceptin. Started patient on anastrozole along with calcium and vitamin D in May 2018. Switched anti-hormonal therapy to Femara in June 2018 due to arthralgia    Herceptin last cycle completed 11/29/18    Interim History:    Patient presents to the clinic for a follow-up visit and to discuss results of lab work-up and other relevant clinical data. Patient continues to struggle with back pain. Denies any significant pain in the small joints. Continues to be on Femara. Continues to be on calcium and vitamin D. Denies nausea vomiting fever chills. Denies hospitalization or ER visit. She is also planning to proceed with breast reconstruction surgery. Denies any swollen glands. Denies any mass in the breast.    During this visit patient's allergy, social, medical, surgical history and medications were reviewed and updated.     Past Medical History:   Past Medical History:   Diagnosis Date    Bipolar 1 disorder ([address]     Breast cancer ([address] 2017    right side     DVT of axillary vein, acute right ([address]     Eye twitch 2019    Headache(784.0)     Hyperlipidemia     Migraine      Past Surgical History:     Past Surgical History:   Procedure Laterality Date    CATHETER REMOVAL Left 6/6/2019    PORT REMOVAL performed by Asa Adame DO at [address] [facility] [place] 10/13/2017    D & C HYSTEROSCOPY with polypectomy performed by Isauro Bean MD at [address] [facility] [address] / REMOVAL / [address] Left 11/9/2017    PORT INSERTION performed by Neema Corcoran MD at [address] [facility] [place] / REMOVAL / [address] N/A 11/30/2017    Port Removal & Insertion performed by Neema Corcoran MD at [address] Right 10/19/2017     [facility]    MASTECTOMY Right 10/19/2017    Right Breast Mastectomy with  [place] Node Biopsy performed by Lelo Ramachandran MD at [address] [facility] V[facility] W/SUBQ PORT AGE 5 YR/> Left 3/1/2018    PORT Exchange performed by Lelo Ram[facility]ran MD [facility] 2014, 2015    x 2 surgeries total; Dr. Noe Valverde    TUNNELED VENOUS PORT PLACEMENT Left 11/30/2017    removal of et replacement of       Patient Family Social History:     Family History   Problem Relation Age of Onset    Breast Cancer Sister 54    Breast Cancer Maternal Aunt 71    Other Maternal Cousin         Atypical Ductal Hyperplasia     Uterine Cancer Sister 32    Other Sister         breast cyst    Cataracts Mother     Glaucoma Mother     Heart Disease Father     High Blood Pressure Father     High Cholesterol Father     Mental Retardation Father     Diabetes Neg Hx       Social History     Socioeconomic History    Marital status: Single     Spouse name: Not on file    Number of children: Not on file    Years of education: Not on file    Highest education level: Not on file   Occupational History    Not on file   Social Needs    Financial resource strain: Not on file    Food insecurity:     Worry: Not on file     Inability: Not on file    Transportation needs:     Medical: Not on file     Non-medical: Not on file   Tobacco Use    Smoking status: Never Smoker    Smokeless tobacco: Never Used    Tobacco comment: Never smoker.  TC, RRT 4/5/18   Substance and Sexual Activity    Alcohol use: No    Drug use: No    Sexual activity: Yes     Partners: Male     Birth control/protection: Surgical   Lifestyle    Physical activity:     Days per week: Not on file     Minutes per session: Not on file    Stress: Not on file   Relationships    Social connections:     Talks on phone: Not on file     Gets together: Not on file     Attends Christianity service: Not on file     Active member of club or organization: Not on file     Attends meetings of clubs or organizations: Not on file     Relationship status: Not on file    Intimate partner violence:     Fear of current or ex partner: Not on file     Emotionally abused: Not on file     Physically abused: Not on file     Forced sexual activity: Not on file   Other Topics Concern    Not on file   Social History Narrative    Not on file      Current Medications:     Current Outpatient Medications   Medication Sig Dispense Refill    pravastatin (PRAVACHOL) 40 MG tablet take 1 tablet by mouth once daily 30 tablet 5    potassium chloride (KLOR-CON M) 20 MEQ extended release tablet take 1 tablet by mouth once daily 30 tablet 2    Calcium Carbonate-Vitamin D (OYSTER SHELL CALCIUM/D) 500-200 MG-UNIT TABS Take 1 tablet by mouth 2 times daily 60 tablet 5    traMADol (ULTRAM) 50 MG tablet Take 1 tablet by mouth every 6 hours as needed for Pain for up to 60 days. Intended supply: 5 days.  Take lowest dose possible to manage pain 40 tablet 0    Cyanocobalamin ER (RA VITAMIN B-12 TR) 1000 MCG TBCR take 1 tablet by mouth once daily 30 tablet 3    topiramate (TOPAMAX) 50 MG tablet ONE  TABLET  TWICE  DAILY 60 tablet 3    folic acid (FOLVITE) 1 MG tablet take 1 tablet by mouth once daily 30 tablet 3    letrozole (FEMARA) 2.5 MG tablet Take 1 tablet by mouth daily 30 tablet 5    VENTOLIN  (90 Base) MCG/ACT inhaler Inhale 2 puffs into the lungs every 4 hours as needed for Wheezing 1 Inhaler 3    butalbital-acetaminophen-caffeine (FIORICET, ESGIC) -40 MG per tablet 1-2   TABLET  TWICE  DAILY  AS  NEEDED 90 tablet 1    SUMAtriptan (IMITREX) 100 MG tablet ONE  TABLET  TWICE  DAILY 12 tablet 2    lisinopril (PRINIVIL;ZESTRIL) 5 MG tablet take 1 tablet by mouth once daily 90 tablet 1    benztropine (COGENTIN) 0.5 MG tablet Take 0.5 mg by mouth daily      QUEtiapine (SEROQUEL) 100 MG tablet Take 50 mg by mouth nightly      ondansetron (ZOFRAN) 4 MG tablet Take 2 tablets by mouth every 6 hours as needed for Nausea or Vomiting 40 tablet 1    sodium chloride (ALTAMIST SPRAY) 0.65 % nasal spray 1 spray by Nasal route as needed for Congestion 1 Bottle 1    fluticasone (FLONASE) 50 MCG/ACT nasal spray 1 spray by Nasal route daily       hydrOXYzine (ATARAX) 10 MG tablet Take 10 mg by mouth daily       lamoTRIgine (LAMICTAL) 100 MG tablet 150 mg nightly        No current facility-administered medications for this visit. Allergies:   Patient has no known allergies. Review of Systems:    Constitutional: Negative for fever or chills. No night sweats, weight is stable now  Eyes: No eye discharge, double vision, or eye pain . Twitching of left eye  HEENT: negative for sore mouth, sore throat, hoarseness and voice change . Complains of twitching of left eye. Improved  Respiratory: negative for cough , sputum, dyspnea, wheezing, hemoptysis, chest pain   Cardiovascular: negative for chest pain, dyspnea, palpitations, orthopnea, PND   Gastrointestinal: Positive for nausea, vomiting, constipation, abdominal pain, Dysphagia, hematemesis and hematochezia . Genitourinary: negative for frequency, dysuria, nocturia, urinary incontinence, and hematuria   Integument: negative for rash, skin lesions, bruises. Hematologic/Lymphatic: negative for easy bruising, bleeding, lymphadenopathy, or petechiae   Endocrine: negative for heat or cold intolerance,weight changes, change in bowel habits and hair loss   Musculoskeletal: negative for myalgias, arthralgias, pain, joint swelling,and bone pain . Positive for discomfort in right arm.   Neurological: negative for headaches, dizziness, seizures, weakness, numbness      Physical Exam:    Vitals:  /70 (Site: Left Upper Arm, Position: Sitting, Cuff Size: Large Adult)   Pulse 86   Temp 99.5 °F (37.5 °C)   Ht 5' 5\" (1.651 m)   Wt 297 lb 3.4 oz (134.8 kg)   LMP 02/28/2013   SpO2 98%   BMI 49.46 kg/m²    General appearance - patient not in acute distress  Mental status - AAO X3  Eyes - pupils equal and reactive, extraocular eye movements intact  Mouth - mucous membranes moist, pharynx normal without lesions  Neck - supple, no significant adenopathy  Lymphatics - no palpable lymphadenopathy, no hepatosplenomegaly  Chest - clear to auscultation, no wheezes, rales or rhonchi, symmetric air entry. Heart - normal rate, regular rhythm, normal S1, S2, no murmurs  Abdomen - soft, nontender, nondistended, no masses or organomegaly  Neurological - alert, oriented, normal speech, no focal findings . Twitching of left eye noted  Extremities - peripheral pulses normal, no pedal edema, no clubbing or cyanosis  Skin - normal coloration and turgor, no rashes, no suspicious skin lesions noted   Breast-examination performed in the presence of a female nurse. Patient right chest wall shows postsurgical changes.   There is no palpable mass in the left breast.    DATA:    Results for orders placed or performed during the hospital encounter of 09/13/19   Creatinine, Serum   Result Value Ref Range    CREATININE 1.05 (H) 0.50 - 0.90 mg/dL    GFR Non-African American 55 (L) >60 mL/min    GFR African American >60 >60 mL/min    GFR Comment          GFR Staging NOT REPORTED    Lupus Anticoagulant   Result Value Ref Range    Lupus Anticoag NOT REPORTED     Protime 10.7 9.0 - 12.0 sec    INR 1.0     PTT 23.4 20.5 - 30.5 sec    Anticardiolipin IgG 1.3 <20 GPU    Cardiolipin Ab IgM 2.9 <20 MPU    Anticardiolipin IgA 2.1 <12 APU    Dilute Viper Venom Time NEGATIVE for Lupus Anticoagulant NEGATIVE for Lupus Anticoagulant   Electrolyte Panel   Result Value Ref Range    Sodium 137 135 - 144 mmol/L    Potassium 4.6 3.7 - 5.3 mmol/L    Chloride 97 (L) 98 - 107 mmol/L    CO2 30 20 - 31 mmol/L    Anion Gap 10 9 - 17 mmol/L   TIMBO Screen with Reflex   Result Value Ref Range    TIMBO NEGATIVE NEGATIVE   Sedimentation Rate   Result Value Ref Range    Sed Rate 23 0 - 30 mm   CBC   Result Value Ref Range    WBC 7.9 3.5 - 11.0 k/uL    RBC 4.25 4.0 - 5.2 m/uL    Hemoglobin 13.1 12.0 - 16.0 g/dL    Hematocrit 39.9 36 - 46 %    MCV 93.8 80 - 100 fL    MCH 30.8 26 - 34 pg    MCHC 32.8 31 - 37 g/dL    RDW 14.3 11.0 - 14.5 %    Platelets 587 656 - 600 k/uL    MPV 7.7 6.0 - 12.0 fL    NRBC Automated NOT REPORTED per 100 WBC     Xr Chest Standard (2 Vw)    Result Date: 10/10/2017  EXAMINATION: TWO VIEWS OF THE CHEST 10/10/2017 2:37 pm COMPARISON: 10/18/2011 HISTORY: ORDERING SYSTEM PROVIDED HISTORY: Invasive ductal carcinoma of breast, right [facility]) TECHNOLOGIST PROVIDED HISTORY: Reason for exam:->Pre op Ordering Physician Provided Reason for Exam: Pre op for right mastectomy. No chest complaints. Acuity: Unknown Type of Exam: Initial Additional signs and symptoms: n/a Relevant Medical/Surgical History: breast cancer FINDINGS: The lungs are without acute focal process. There is no effusion or pneumothorax. The cardiomediastinal silhouette is stable. The osseous structures are stable. No acute process. Nm Lymphoscintigram    Result Date: 10/19/2017  EXAMINATION: LYMPHOSCINTIGRAPHY 10/19/2017 9:21 am TECHNIQUE: Sulfur colloid labeled with 0.920 mCi of Tc99 was administered in 4 subdermal wheals around the patient's right areolar region. Delayed images were obtained. HISTORY: ORDERING SYSTEM PROVIDED HISTORY: Surgery TECHNOLOGIST PROVIDED HISTORY: Reason for exam:->Surgery Ordering Physician Provided Reason for Exam: 0.920 mCi 99mTc filtered Sulfur Colloid injected subcutaneous around RT nipple. Acuity: Unknown Type of Exam: Unknown FINDINGS: There appears to be migration into the right axilla suggestive of the sentinel node. Migration of the radiotracer into the right axilla suggestive of the patient's sentinel node. MRI brain 2/5/18  Impression   1. Unremarkable MRI of the brain.  No evidence of metastatic disease. 2. Acute left sphenoid sinusitis. [facility] W Or Wo Cad Bilateral: 10/5/2018    No mammographic evidence of malignancy.

## 2020-02-18 NOTE — PROGRESS NOTES
[facility]FR[facility]  [address] [address]  Dept: [phone]  Dept Fax: [phone]  Loc: [phone]    Betzy London is a 47 y.o. female who presents today for her medical conditions/complaints as noted below. Betzy London is c/o of   Chief Complaint   Patient presents with    Cough     since friday, low grade fever, dry cough    Congestion     body aches, chill    Migraine     since friday       HPI:     Here today for a cough. Cough   This is a new problem. The current episode started in the past 7 days (5 days). The problem has been gradually improving. The problem occurs every few minutes. The cough is non-productive. Associated symptoms include chills, ear congestion, ear pain, headaches, nasal congestion, postnasal drip, a sore throat, shortness of breath and wheezing. Pertinent negatives include no chest pain, fever (low grade) or rash. Associated symptoms comments: No diarrhea. The symptoms are aggravated by lying down and exercise. She has tried OTC cough suppressant and a beta-agonist inhaler for the symptoms. The treatment provided moderate relief. Her past medical history is significant for asthma and environmental allergies. There is no history of COPD. Past Medical History:   Diagnosis Date    Bipolar 1 disorder ([facility] [address]     Breast cancer ([facility] [address] 2017    right side     DVT of axillary vein, acute right ([address]     Eye twitch 2019    Headache(784.0)     Hyperlipidemia     Migraine           Social History     Tobacco Use    Smoking status: Never Smoker    Smokeless tobacco: Never Used    Tobacco comment: Never smoker.  TC, RRT 4/5/18   Substance Use Topics    Alcohol use: No     Current Outpatient Medications   Medication Sig Dispense Refill    amoxicillin (AMOXIL) 500 MG capsule Take 1 capsule by mouth 3 times daily 30 capsule 0    predniSONE (DELTASONE) 20 MG tablet Take 1 tablet by mouth 2 times daily for 5 days 10 tablet 0    pravastatin (PRAVACHOL) 40 MG tablet take 1 tablet by mouth once daily 30 tablet 5    potassium chloride (KLOR-CON M) 20 MEQ extended release tablet take 1 tablet by mouth once daily 30 tablet 2    Calcium Carbonate-Vitamin D (OYSTER SHELL CALCIUM/D) 500-200 MG-UNIT TABS Take 1 tablet by mouth 2 times daily 60 tablet 5    traMADol (ULTRAM) 50 MG tablet Take 1 tablet by mouth every 6 hours as needed for Pain for up to 60 days. Intended supply: 5 days.  Take lowest dose possible to manage pain 40 tablet 0    Cyanocobalamin ER (RA VITAMIN B-12 TR) 1000 MCG TBCR take 1 tablet by mouth once daily 30 tablet 3    topiramate (TOPAMAX) 50 MG tablet ONE  TABLET  TWICE  DAILY 60 tablet 3    folic acid (FOLVITE) 1 MG tablet take 1 tablet by mouth once daily 30 tablet 3    letrozole (FEMARA) 2.5 MG tablet Take 1 tablet by mouth daily 30 tablet 5    VENTOLIN  (90 Base) MCG/ACT inhaler Inhale 2 puffs into the lungs every 4 hours as needed for Wheezing 1 Inhaler 3    butalbital-acetaminophen-caffeine (FIORICET, ESGIC) -40 MG per tablet 1-2   TABLET  TWICE  DAILY  AS  NEEDED 90 tablet 1    SUMAtriptan (IMITREX) 100 MG tablet ONE  TABLET  TWICE  DAILY 12 tablet 2    lisinopril (PRINIVIL;ZESTRIL) 5 MG tablet take 1 tablet by mouth once daily 90 tablet 1    benztropine (COGENTIN) 0.5 MG tablet Take 0.5 mg by mouth daily      QUEtiapine (SEROQUEL) 100 MG tablet Take 50 mg by mouth nightly      ondansetron (ZOFRAN) 4 MG tablet Take 2 tablets by mouth every 6 hours as needed for Nausea or Vomiting 40 tablet 1    sodium chloride (ALTAMIST SPRAY) 0.65 % nasal spray 1 spray by Nasal route as needed for Congestion 1 Bottle 1    fluticasone (FLONASE) 50 MCG/ACT nasal spray 1 spray by Nasal route daily       hydrOXYzine (ATARAX) 10 MG tablet Take 10 mg by mouth daily       lamoTRIgine (LAMICTAL) 100 MG tablet 150 mg nightly        No current facility-administered medications for this visit. No Known Allergies    Subjective:     Review of Systems   Constitutional: Positive for chills and fatigue. Negative for activity change, appetite change and fever (low grade). HENT: Positive for congestion, ear pain, postnasal drip and sore throat. Negative for sinus pressure, sneezing and trouble swallowing. Eyes: Negative for visual disturbance. Respiratory: Positive for cough, shortness of breath and wheezing. Negative for choking and chest tightness. Cardiovascular: Negative for chest pain, palpitations and leg swelling. Gastrointestinal: Negative for constipation, diarrhea and nausea. Skin: Negative for rash. Allergic/Immunologic: Positive for environmental allergies. Neurological: Positive for headaches. Objective:      Physical Exam  Vitals signs and nursing note reviewed. Constitutional:       General: She is not in acute distress. Appearance: She is well-developed. HENT:      Right Ear: Tympanic membrane, ear canal and external ear normal.      Left Ear: Tympanic membrane, ear canal and external ear normal.      Nose: Mucosal edema present. Right Sinus: Frontal sinus tenderness present. No maxillary sinus tenderness. Left Sinus: Frontal sinus tenderness present. No maxillary sinus tenderness. Mouth/Throat:      Pharynx: No oropharyngeal exudate. Eyes:      Conjunctiva/sclera: Conjunctivae normal.   Neck:      Musculoskeletal: Normal range of motion and neck supple. Cardiovascular:      Rate and Rhythm: Normal rate and regular rhythm. Heart sounds: Normal heart sounds. No murmur. Pulmonary:      Effort: Pulmonary effort is normal. No respiratory distress. Breath sounds: Normal breath sounds. No wheezing or rales. Lymphadenopathy:      Cervical: No cervical adenopathy. Skin:     General: Skin is warm and dry. Findings: No rash.    Neurological:      Mental Status: She is alert and oriented to person, place, and time. Psychiatric:         Behavior: Behavior normal.         Judgment: Judgment normal.       /70 (Site: Left Upper Arm, Position: Sitting, Cuff Size: Large Adult)   Pulse 86   Temp 99.8 °F (37.7 °C)   Resp 20   Ht 5' 5\" (1.651 m)   Wt 297 lb (134.7 kg)   LMP 02/28/2013   SpO2 98%   BMI 49.42 kg/m²     Assessment:       Diagnosis Orders   1. Acute bacterial sinusitis     2. Fever, unspecified fever cause  POCT Influenza A/B   3. Cough  POCT Influenza A/B      Office Visit on 02/18/2020   Component Date Value Ref Range Status    Influenza A Ab 02/18/2020 neg*  Final    Influenza B Ab 02/18/2020 neg*  Final              Plan:        Sinusitis: New; I will treat with amoxicillin. she was also advised to use flonase, nasal saline and mucinex for her congestion. Return if symptoms worsen or fail to improve. Orders Placed This Encounter   Procedures    POCT Influenza A/B     Orders Placed This Encounter   Medications    amoxicillin (AMOXIL) 500 MG capsule     Sig: Take 1 capsule by mouth 3 times daily     Dispense:  30 capsule     Refill:  0    predniSONE (DELTASONE) 20 MG tablet     Sig: Take 1 tablet by mouth 2 times daily for 5 days     Dispense:  10 tablet     Refill:  0       Patientgiven educational materials - see patient instructions. Discussed use, benefit,and side effects of prescribed medications. All patient questions answered. Ptvoiced understanding. Reviewed health maintenance. Instructed to continue currentmedications, diet and exercise. Patient agreed with treatment plan. Follow up asdirected.      Electronically signed by Prince Wilson MD on 2/18/2020 at 2:40 PM

## 2020-02-20 ENCOUNTER — HOSPITAL ENCOUNTER (OUTPATIENT)
Dept: PHYSICAL THERAPY | Age: 55
Setting detail: THERAPIES SERIES
Discharge: HOME | End: 2020-02-20
Payer: MEDICAID

## 2020-02-20 PROCEDURE — 97110 THERAPEUTIC EXERCISES: CPT

## 2020-02-20 NOTE — FLOWSHEET NOTE
(97460)    Gait:   [] Provided training and instruction to the patient for ambulation re-education. (63540)    Self-Care/ADL's  [] Self-care/home management training and compensatory training, meal preparation, safety procedures, and instructions in use of assistive technology devices/adaptive equipment, direct one-on-one contact. (14271)    Home Exercise Program:   Lumbar post derangement progressions  [x] Reviewed/Progressed HEP activities related to strengthening, flexibility, endurance, ROM. (17542)  [] Reviewed/Progressed HEP activities related to improving balance, coordination, kinesthetic sense, posture, motor skill, proprioception.  (82445)    Manual Treatments:    [] Provided manual therapy to mobilize soft tissue/joints for the purpose of modulating pain, promoting relaxation,  increasing ROM, reducing/eliminating soft tissue swelling/inflammation/restriction, improving soft tissue extensibility.  (61932)    Service Based Modalities:      Timed Code Treatment Minutes:   32' there ex    Total Treatment Minutes:   26'    Treatment/Activity Tolerance:  [x] Patient tolerated treatment well [] Patient limited by fatique  [x] Patient limited by pain  [] Patient limited by other medical complications  [] Other:     Prognosis: [x] Good [] Fair  [] Poor    Patient Requires Follow-up:  [x]Yes  [] No      Goals:  Short term goals  Time Frame for Short term goals: 2 weeks  Short term goal 1: Pt will report 4/10 pain to increase functional tolerance not met    Long term goals  Time Frame for Long term goals : 4 weeks  Long term goal 1: Pt will report 2/10 pain to increase functional tolerance  Long term goal 2: Pt will score >/=50/80 LEFS to increase funcitonal tolerance  Long term goal 3: Pt will achieve 4+/5 LLE strength all planes to increase functional strength  Long term goal 4: Pt will acheive Rt knee AROM >/= Lt knee to increase functional mobility  Long term goal 5: Pt will achieve 12 second TUG to increase functional mobility  Long term goal 6: Pt will be indep with HEP    Plan:   [x] Continue per plan of care [] Alter current plan (see comments)  [] Plan of care initiated [] Hold pending MD visit [] Discharge    Plan for Next Session: Continue with lumbar extension based therex.     Electronically signed by:  Setphany Ray PTA

## 2020-02-25 ENCOUNTER — HOSPITAL ENCOUNTER (OUTPATIENT)
Dept: PHYSICAL THERAPY | Age: 55
Setting detail: THERAPIES SERIES
Discharge: HOME | End: 2020-02-25
Payer: MEDICAID

## 2020-02-25 PROCEDURE — 97110 THERAPEUTIC EXERCISES: CPT

## 2020-02-25 NOTE — FLOWSHEET NOTE
Physical Therapy Daily Treatment Note    Date:  2020    Patient Name:  Amita Toth    :  1965  MRN: [id number]  Restrictions/Precautions:   None  Medical/Treatment Diagnosis Information:   · Diagnosis: Right leg pain  · Treatment Diagnosis: Rt leg pain  Insurance/Certification information:     Physician Information:  Referring Practitioner: Poppy SERRANO-CNP  Plan of care signed (Y/N): Y   Visit# / total visits:    Pain level: 7/10     Time In: 3:40   Time Out: 4:18    Progress Note: []  Yes  [x]  No  Next due by: Visit #10      Subjective:  Patient reports her leg pain has been less and is more centralized to her low back. She's been battling sinus infections/illnesses for the past week. She's been using a lumbar roll while sitting which feels better than not using one.      Objective:  Observation:   Lack of facial pain cues/signs do not correlate with subjective pain levels  Decreased pain levels by end of today's treatment  Poor Posture (obese)  Hx Of Cancer  Test measurements:    Exhibits centralization with current program.  Prone press up through 90%  Standing Lumbar extension mod loss    Exercises:   Exercise/Equipment Resistance/Repetitions Other comments   Lay Prone [place] 5'    IAN 5'    Press Ups 10x x3    PKB 10x x3    Prone Hip Extension 10x x3    IAN with PA mobs          Modified Extensionx 10x x2    Back Bends 10x x2    Standing hip abd/ext/HS curl 15x ea B    Retro Treadmill 0.5mph, x5'    Ed on lumbar roll and HEP Pt verbalizes understanding              [x] Provided verbal/tactile cueing for activities related to strengthening, flexibility, endurance, ROM. (20379)  [] Provided verbal/tactile cueing for activities related to improving balance, coordination, kinesthetic sense, posture, motor skill, proprioception. (76603)    Therapeutic Activities:     [] Therapeutic activities, direct (one-on-one) patient contact (use of dynamic activities to improve functional

## 2020-02-26 RX ORDER — TRAMADOL HYDROCHLORIDE 50 MG/1
50 TABLET ORAL EVERY 6 HOURS PRN
Qty: 40 TABLET | Refills: 0 | Status: SHIPPED | OUTPATIENT
Start: 2020-02-26 | End: 2020-06-24 | Stop reason: SDUPTHER

## 2020-02-27 ENCOUNTER — HOSPITAL ENCOUNTER (OUTPATIENT)
Dept: PHYSICAL THERAPY | Age: 55
Setting detail: THERAPIES SERIES
Discharge: HOME | End: 2020-02-27
Payer: MEDICAID

## 2020-03-03 ENCOUNTER — HOSPITAL ENCOUNTER (OUTPATIENT)
Dept: PHYSICAL THERAPY | Age: 55
Setting detail: THERAPIES SERIES
Discharge: HOME | End: 2020-03-03
Payer: MEDICAID

## 2020-03-03 PROCEDURE — 97110 THERAPEUTIC EXERCISES: CPT | Performed by: PHYSICAL THERAPIST

## 2020-03-03 NOTE — PLAN OF CARE
met  Long term goal 2: Pt will score >/=50/80 LEFS to increase funcitonal tolerance- part met  Long term goal 3: Pt will achieve 4+/5 LLE strength all planes to increase functional strength- part met  Long term goal 4: Pt will acheive Rt knee AROM >/= Lt knee to increase functional mobility- met  Long term goal 5: Pt will achieve 12 second TUG to increase functional mobility  Long term goal 6: Pt will be indep with HEP           Current Frequency/Duration:3/3/20 - 4/2/20  # Days per week: [] 1 day # Weeks: [] 1 week [x] 4 weeks      [x] 2 days? [] 2 weeks [] 5 weeks      [] 3 days   [] 3 weeks [] 6 weeks     Rehab Potential: [] Excellent [x] Good [] Fair  [] Poor     Goal Status:  [] Achieved [x] Partially Achieved  [] Not Achieved     Patient Status: [] Continue per initial plan of Care     [] Patient now discharged     [x] Additional visits requested, Please re-certify for additional visits:      Requested frequency/duration:  2X/week for4 weeks    Electronically signed by:  Negro Bhardwaj PT      If you have any questions or concerns, please don't hesitate to call.   Thank you for your referral.    Physician Signature:________________________________Date:__________________  By signing above, therapists plan is approved by physician

## 2020-03-03 NOTE — FLOWSHEET NOTE
Physical Therapy Daily Treatment Note    Date:  3/3/2020    Patient Name:  Ria Rollins    :  1965  MRN: [id number]  Restrictions/Precautions:   None  Medical/Treatment Diagnosis Information:   · Diagnosis: Right leg pain  · Treatment Diagnosis: Rt leg pain  Insurance/Certification information:   Medicaid  Physician Information:  Referring Practitioner: Zheng CHAN  Plan of care signed (Y/N): Y   Visit# / total visits:    Pain level: 5/10 across back    Time In: 1:48  Time Out: 2:21    Progress Note: [x]  Yes  []  No  Next due by: Visit #10 , or 20     Subjective: Is doing better than 1 mon ago. Leg pain is no longer constant. Walks and moves much better. Objective:  Observation:   Pain is mostly centralized. Follows a lumbar post derangement pattern  Responding well to extension directional preference program  Hx Of Cancer  Test measurements:    Exhibits centralization with current program.  Prone press up through 100%  Pain free in prone R side-glide. Extension in standing mild back central pain. Radiating pain resolved  R SLR 60 deg, negative dural tension. Exercises:   Exercise/Equipment Resistance/Repetitions Other comments   Lay Prone [place] 5'    IAN 5'    Press Ups 10x x3 2 set PT OP   PKF 10x x3    Prone Hip Extension 10x x3    IAN with PA mobs          Modified Extension 10x x2    Back Bends 10x x2    Standing hip abd/ext/HS curl 15x ea B    Retro Treadmill 0.7mph, x5'    Ed on lumbar roll   Is compliant             [x] Provided verbal/tactile cueing for activities related to strengthening, flexibility, endurance, ROM. (07754)  [] Provided verbal/tactile cueing for activities related to improving balance, coordination, kinesthetic sense, posture, motor skill, proprioception. (49198)    Therapeutic Activities:     [] Therapeutic activities, direct (one-on-one) patient contact (use of dynamic activities to improve functional performance).  (81284)    Gait:   [] increase functional mobility  Long term goal 6: Pt will be indep with HEP    Plan:   [x] Continue per plan of care [] Alter current plan (see comments)  [] Plan of care initiated [] Hold pending MD visit [] Discharge    Plan for Next Session: Continue with lumbar extension based therex.     Electronically signed by:  Kelly Pollock PT

## 2020-03-04 ENCOUNTER — OFFICE VISIT (OUTPATIENT)
Dept: FAMILY MEDICINE CLINIC | Age: 55
End: 2020-03-04
Payer: MEDICAID

## 2020-03-04 VITALS
BODY MASS INDEX: 47.09 KG/M2 | SYSTOLIC BLOOD PRESSURE: 118 MMHG | DIASTOLIC BLOOD PRESSURE: 86 MMHG | OXYGEN SATURATION: 98 % | HEIGHT: 66 IN | HEART RATE: 98 BPM | WEIGHT: 293 LBS

## 2020-03-04 PROCEDURE — 99211 OFF/OP EST MAY X REQ PHY/QHP: CPT

## 2020-03-04 PROCEDURE — 99213 OFFICE O/P EST LOW 20 MIN: CPT | Performed by: FAMILY MEDICINE

## 2020-03-04 RX ORDER — FEXOFENADINE HCL 180 MG/1
180 TABLET ORAL DAILY
Qty: 30 TABLET | Refills: 5 | Status: SHIPPED | OUTPATIENT
Start: 2020-03-04 | End: 2020-11-09 | Stop reason: SDUPTHER

## 2020-03-04 RX ORDER — OXCARBAZEPINE 150 MG/1
TABLET, FILM COATED ORAL 2 TIMES DAILY
COMMUNITY
Start: 2020-02-25

## 2020-03-04 RX ORDER — BUSPIRONE HYDROCHLORIDE 7.5 MG/1
TABLET ORAL
COMMUNITY
Start: 2020-02-25 | End: 2021-05-10

## 2020-03-04 RX ORDER — CEFDINIR 300 MG/1
300 CAPSULE ORAL 2 TIMES DAILY
Qty: 20 CAPSULE | Refills: 0 | Status: SHIPPED | OUTPATIENT
Start: 2020-03-04 | End: 2020-03-14

## 2020-03-04 ASSESSMENT — ENCOUNTER SYMPTOMS
TROUBLE SWALLOWING: 0
EYE DISCHARGE: 1
SINUS PRESSURE: 1
HOARSE VOICE: 1
DIARRHEA: 0
SORE THROAT: 1
SHORTNESS OF BREATH: 1
COUGH: 0
WHEEZING: 0
CONSTIPATION: 0
CHEST TIGHTNESS: 0
CHOKING: 0
NAUSEA: 0

## 2020-03-04 NOTE — PROGRESS NOTES
Removal & Insertion performed by Lio Martinez MD at [address] Right 10/19/2017     [address] [facility]    MASTECTOMY Right 10/19/2017    Right Breast Mastectomy with  [place] Node Biopsy performed by Lio Martinez MD at [address] [facility] CTR VAD W/SUBQ PORT AGE 5 YR/> Left 3/1/2018    PORT Exchange performed by Lio Martinez MD at [address] Left 2014, 2015    x 2 surgeries total; Dr. Lois Stinson TUNNELED VENOUS PORT PLACEMENT Left 11/30/2017    removal of et replacement of       Family History   Problem Relation Age of Onset    Breast Cancer Sister 54    Breast Cancer Maternal Aunt 71    Other Maternal Cousin         Atypical Ductal Hyperplasia     Uterine Cancer Sister 32    Other Sister         breast cyst    Cataracts Mother     Glaucoma Mother     Heart Disease Father     High Blood Pressure Father     High Cholesterol Father     Mental Retardation Father     Diabetes Neg Hx        Social History     Tobacco Use    Smoking status: Never Smoker    Smokeless tobacco: Never Used    Tobacco comment: Never smoker. TC, RRT 4/5/18   Substance Use Topics    Alcohol use: No      Prior to Visit Medications    Medication Sig Taking? Authorizing Provider   busPIRone (BUSPAR) 7.5 MG tablet take 1 tablet by mouth twice a day Yes Historical Provider, MD   OXcarbazepine (TRILEPTAL) 150 MG tablet take 1 tablet by mouth every morning BEFORE NOON Yes Historical Provider, MD   traMADol (ULTRAM) 50 MG tablet Take 1 tablet by mouth every 6 hours as needed for Pain for up to 60 days. Intended supply: 5 days.  Take lowest dose possible to manage pain Yes Sam Orozco MD   pravastatin (PRAVACHOL) 40 MG tablet take 1 tablet by mouth once daily Yes Lubna Marte APRN - CNP   potassium chloride (KLOR-CON M) 20 MEQ extended release tablet take 1 tablet by mouth once daily Yes Sam Orozco MD   Calcium Carbonate-Vitamin D (OYSTER SHELL CALCIUM/D) 500-200 MG-UNIT TABS pain, hoarse voice, postnasal drip, sinus pressure and sore throat. Negative for sneezing and trouble swallowing. Eyes: Positive for discharge (watering). Negative for visual disturbance. Respiratory: Positive for shortness of breath. Negative for cough (for the 1st week but is now gone), choking, chest tightness and wheezing. Cardiovascular: Negative for chest pain, palpitations and leg swelling. Gastrointestinal: Negative for constipation, diarrhea and nausea. Genitourinary: Negative for difficulty urinating. Musculoskeletal: Negative for neck pain. Skin: Negative for rash. Allergic/Immunologic: Negative for environmental allergies. Neurological: Positive for headaches. Negative for dizziness, syncope, weakness and light-headedness. Objective:     Physical Exam  Vitals signs and nursing note reviewed. Constitutional:       General: She is not in acute distress. Appearance: She is well-developed. HENT:      Right Ear: Tympanic membrane, ear canal and external ear normal.      Left Ear: Tympanic membrane, ear canal and external ear normal.      Nose: Mucosal edema present. Right Sinus: Maxillary sinus tenderness and frontal sinus tenderness present. Left Sinus: Maxillary sinus tenderness and frontal sinus tenderness present. Mouth/Throat:      Pharynx: No oropharyngeal exudate. Eyes:      Conjunctiva/sclera: Conjunctivae normal.   Neck:      Musculoskeletal: Normal range of motion and neck supple. Cardiovascular:      Rate and Rhythm: Normal rate and regular rhythm. Heart sounds: Normal heart sounds. No murmur. Pulmonary:      Effort: Pulmonary effort is normal. No respiratory distress. Breath sounds: Normal breath sounds. No wheezing or rales. Lymphadenopathy:      Cervical: No cervical adenopathy. Skin:     General: Skin is warm and dry. Findings: No rash.    Neurological:      Mental Status: She is alert and oriented to person, place, and past medical history, past surgical history, allergies, medications, social and family history as documented unless otherwise noted below.      Electronically signed by Paul Becerril MD on 3/4/2020 at 10:10 AM

## 2020-03-05 ENCOUNTER — HOSPITAL ENCOUNTER (OUTPATIENT)
Dept: PHYSICAL THERAPY | Age: 55
Setting detail: THERAPIES SERIES
Discharge: HOME | End: 2020-03-05
Payer: MEDICAID

## 2020-03-05 PROCEDURE — 97110 THERAPEUTIC EXERCISES: CPT

## 2020-03-10 ENCOUNTER — HOSPITAL ENCOUNTER (OUTPATIENT)
Dept: PHYSICAL THERAPY | Age: 55
Setting detail: THERAPIES SERIES
Discharge: HOME | End: 2020-03-10
Payer: MEDICAID

## 2020-03-10 PROCEDURE — 97110 THERAPEUTIC EXERCISES: CPT

## 2020-03-10 NOTE — FLOWSHEET NOTE
Physical Therapy Daily Treatment Note    Date:  3/10/2020    Patient Name:  Brandt Barrientos    :  1965  MRN: [id number]  Restrictions/Precautions:   None  Medical/Treatment Diagnosis Information:   · Diagnosis: Right leg pain  · Treatment Diagnosis: Rt leg pain  Insurance/Certification information:   Medicaid  Physician Information:  Referring Practitioner: Long CHAN  Plan of care signed (Y/N): Y   Visit# / total visits:  of 2nd POC, 8 total visits  Pain level: 8/10 across back    Time In: 1:54   Time Out: 2:26    Progress Note: []  Yes  [x]  No  Next due by: Visit #10 , or 20     Subjective: Presents with 8/10 R sided Low Back pain. She reports no pain in the leg currently. Patient also reports she is moving back into her own home and will discontinue taking care of her aunt. \"So hopefully this gives me a chance to do my exercises and let things calm down. \"    Objective:  Observation:   Pain is mostly centralized. Follows a lumbar post derangement pattern  Responding well to extension directional preference program  Hx Of Cancer  Test measurements:    Exhibits centralization with current program.  Prone press up through 100%  Pain free in prone R side-glide. Extension in standing mild back central pain. Radiating pain resolved  R SLR 60 deg, negative dural tension.     Exercises:   Exercise/Equipment Resistance/Repetitions Other comments   Lay Prone [place] 5'    IAN 5'    Press Ups 10x x3 2 set PT OP   PKF 10x x3    Prone Hip Extension 10x x3    IAN with PA mobs          Modified Extension 10x x2    Back Bends 10x x2    Standing hip abd/ext/HS curl 15x ea B x2    Retro Treadmill 0.7 mph, x5'    Ed on lumbar roll   Is compliant             [x] Provided verbal/tactile cueing for activities related to strengthening, flexibility, endurance, ROM. (04092)  [] Provided verbal/tactile cueing for activities related to improving balance, coordination, kinesthetic sense, posture, motor strength all planes to increase functional strength- part met  Long term goal 4: Pt will acheive Rt knee AROM >/= Lt knee to increase functional mobility- met  Long term goal 5: Pt will achieve 12 second TUG to increase functional mobility  Long term goal 6: Pt will be indep with HEP    Plan:   [x] Continue per plan of care [] Alter current plan (see comments)  [] Plan of care initiated [] Hold pending MD visit [] Discharge    Plan for Next Session: Continue with lumbar extension based therex.     Electronically signed by:  Claribel Mcelroy PTA

## 2020-03-17 ENCOUNTER — HOSPITAL ENCOUNTER (OUTPATIENT)
Dept: PHYSICAL THERAPY | Age: 55
Setting detail: THERAPIES SERIES
Discharge: HOME | End: 2020-03-17
Payer: MEDICAID

## 2020-04-09 ENCOUNTER — HOSPITAL ENCOUNTER (INPATIENT)
Age: 55
LOS: 1 days | Discharge: ANOTHER HOSPITAL | DRG: 134 | End: 2020-04-09
Attending: EMERGENCY MEDICINE | Admitting: INTERNAL MEDICINE
Payer: MEDICAID

## 2020-04-09 ENCOUNTER — APPOINTMENT (OUTPATIENT)
Dept: GENERAL RADIOLOGY | Age: 55
DRG: 134 | End: 2020-04-09
Payer: MEDICAID

## 2020-04-09 ENCOUNTER — APPOINTMENT (OUTPATIENT)
Dept: CT IMAGING | Age: 55
DRG: 134 | End: 2020-04-09
Payer: MEDICAID

## 2020-04-09 VITALS
TEMPERATURE: 96.4 F | HEART RATE: 100 BPM | DIASTOLIC BLOOD PRESSURE: 76 MMHG | BODY MASS INDEX: 47.09 KG/M2 | RESPIRATION RATE: 22 BRPM | HEIGHT: 66 IN | WEIGHT: 293 LBS | SYSTOLIC BLOOD PRESSURE: 113 MMHG | OXYGEN SATURATION: 99 %

## 2020-04-09 PROBLEM — I48.91 A-FIB (HCC): Status: ACTIVE | Noted: 2020-04-09

## 2020-04-09 PROBLEM — I26.99 ACUTE PULMONARY EMBOLISM (HCC): Status: ACTIVE | Noted: 2020-04-09

## 2020-04-09 LAB
-: NORMAL
ABSOLUTE EOS #: 0.19 K/UL (ref 0–0.44)
ABSOLUTE IMMATURE GRANULOCYTE: 0.22 K/UL (ref 0–0.3)
ABSOLUTE LYMPH #: 3.26 K/UL (ref 1.1–3.7)
ABSOLUTE MONO #: 0.83 K/UL (ref 0.1–1.2)
ADENOVIRUS PCR: NOT DETECTED
ALBUMIN SERPL-MCNC: 4 G/DL (ref 3.5–5.2)
ALBUMIN/GLOBULIN RATIO: 1.1 (ref 1–2.5)
ALP BLD-CCNC: 84 U/L (ref 35–104)
ALT SERPL-CCNC: 40 U/L (ref 5–33)
ANION GAP SERPL CALCULATED.3IONS-SCNC: 14 MMOL/L (ref 9–17)
ANION GAP SERPL CALCULATED.3IONS-SCNC: 17 MMOL/L (ref 9–17)
AST SERPL-CCNC: 30 U/L
BASOPHILS # BLD: 0 % (ref 0–2)
BASOPHILS ABSOLUTE: 0.05 K/UL (ref 0–0.2)
BILIRUB SERPL-MCNC: 0.29 MG/DL (ref 0.3–1.2)
BILIRUBIN DIRECT: 0.12 MG/DL
BILIRUBIN, INDIRECT: 0.17 MG/DL (ref 0–1)
BNP INTERPRETATION: ABNORMAL
BORDETELLA PARAPERTUSSIS: NOT DETECTED
BORDETELLA PERTUSSIS PCR: NOT DETECTED
BUN BLDV-MCNC: 41 MG/DL (ref 6–20)
BUN BLDV-MCNC: 41 MG/DL (ref 6–20)
BUN/CREAT BLD: 23 (ref 9–20)
BUN/CREAT BLD: 25 (ref 9–20)
CALCIUM SERPL-MCNC: 9.1 MG/DL (ref 8.6–10.4)
CALCIUM SERPL-MCNC: 9.4 MG/DL (ref 8.6–10.4)
CHLAMYDIA PNEUMONIAE BY PCR: NOT DETECTED
CHLORIDE BLD-SCNC: 104 MMOL/L (ref 98–107)
CHLORIDE BLD-SCNC: 107 MMOL/L (ref 98–107)
CK MB: 1.9 NG/ML
CO2: 14 MMOL/L (ref 20–31)
CO2: 18 MMOL/L (ref 20–31)
CORONAVIRUS 229E PCR: NOT DETECTED
CORONAVIRUS HKU1 PCR: NOT DETECTED
CORONAVIRUS NL63 PCR: NOT DETECTED
CORONAVIRUS OC43 PCR: NOT DETECTED
CREAT SERPL-MCNC: 1.64 MG/DL (ref 0.5–0.9)
CREAT SERPL-MCNC: 1.81 MG/DL (ref 0.5–0.9)
D DIMER: 3470 NG/ML
DIFFERENTIAL TYPE: ABNORMAL
DIRECT EXAM: NORMAL
EKG ATRIAL RATE: 100 BPM
EKG ATRIAL RATE: 126 BPM
EKG P AXIS: 58 DEGREES
EKG P AXIS: 65 DEGREES
EKG P-R INTERVAL: 154 MS
EKG P-R INTERVAL: 158 MS
EKG Q-T INTERVAL: 286 MS
EKG Q-T INTERVAL: 362 MS
EKG QRS DURATION: 78 MS
EKG QRS DURATION: 78 MS
EKG QTC CALCULATION (BAZETT): 414 MS
EKG QTC CALCULATION (BAZETT): 466 MS
EKG R AXIS: 116 DEGREES
EKG R AXIS: 123 DEGREES
EKG T AXIS: -27 DEGREES
EKG T AXIS: -30 DEGREES
EKG VENTRICULAR RATE: 100 BPM
EKG VENTRICULAR RATE: 126 BPM
EOSINOPHILS RELATIVE PERCENT: 1 % (ref 1–4)
GFR AFRICAN AMERICAN: 35 ML/MIN
GFR AFRICAN AMERICAN: 40 ML/MIN
GFR NON-AFRICAN AMERICAN: 29 ML/MIN
GFR NON-AFRICAN AMERICAN: 33 ML/MIN
GFR SERPL CREATININE-BSD FRML MDRD: ABNORMAL ML/MIN/{1.73_M2}
GLOBULIN: 3.5 G/DL (ref 1.5–3.8)
GLUCOSE BLD-MCNC: 117 MG/DL (ref 70–99)
GLUCOSE BLD-MCNC: 237 MG/DL (ref 70–99)
HCT VFR BLD CALC: 39 % (ref 36.3–47.1)
HCT VFR BLD CALC: 40.8 % (ref 36.3–47.1)
HEMOGLOBIN: 12.2 G/DL (ref 11.9–15.1)
HEMOGLOBIN: 12.7 G/DL (ref 11.9–15.1)
HUMAN METAPNEUMOVIRUS PCR: NOT DETECTED
IMMATURE GRANULOCYTES: 2 %
INFLUENZA A BY PCR: NOT DETECTED
INFLUENZA A H1 (2009) PCR: NORMAL
INFLUENZA A H1 PCR: NORMAL
INFLUENZA A H3 PCR: NORMAL
INFLUENZA B BY PCR: NOT DETECTED
INR BLD: 1.1
LACTIC ACID, SEPSIS WHOLE BLOOD: ABNORMAL MMOL/L (ref 0.5–1.9)
LACTIC ACID, SEPSIS WHOLE BLOOD: ABNORMAL MMOL/L (ref 0.5–1.9)
LACTIC ACID, SEPSIS: 2.1 MMOL/L (ref 0.5–1.9)
LACTIC ACID, SEPSIS: 3.4 MMOL/L (ref 0.5–1.9)
LV EF: 65 %
LVEF MODALITY: NORMAL
LYMPHOCYTES # BLD: 23 % (ref 24–43)
Lab: NORMAL
MAGNESIUM: 1.8 MG/DL (ref 1.6–2.6)
MCH RBC QN AUTO: 30.8 PG (ref 25.2–33.5)
MCH RBC QN AUTO: 31 PG (ref 25.2–33.5)
MCHC RBC AUTO-ENTMCNC: 31.1 G/DL (ref 25.2–33.5)
MCHC RBC AUTO-ENTMCNC: 31.3 G/DL (ref 25.2–33.5)
MCV RBC AUTO: 99 FL (ref 82.6–102.9)
MCV RBC AUTO: 99 FL (ref 82.6–102.9)
MONOCYTES # BLD: 6 % (ref 3–12)
MYCOPLASMA PNEUMONIAE PCR: NOT DETECTED
MYOGLOBIN: 39 NG/ML (ref 25–58)
NRBC AUTOMATED: 0 PER 100 WBC
NRBC AUTOMATED: 0 PER 100 WBC
PARAINFLUENZA 1 PCR: NOT DETECTED
PARAINFLUENZA 2 PCR: NOT DETECTED
PARAINFLUENZA 3 PCR: NOT DETECTED
PARAINFLUENZA 4 PCR: NOT DETECTED
PARTIAL THROMBOPLASTIN TIME: 26.3 SEC (ref 27–35)
PARTIAL THROMBOPLASTIN TIME: 26.8 SEC (ref 27–35)
PDW BLD-RTO: 14.4 % (ref 11.8–14.4)
PDW BLD-RTO: 14.6 % (ref 11.8–14.4)
PLATELET # BLD: 168 K/UL (ref 138–453)
PLATELET # BLD: 181 K/UL (ref 138–453)
PLATELET ESTIMATE: ABNORMAL
PMV BLD AUTO: 11 FL (ref 8.1–13.5)
PMV BLD AUTO: 11.9 FL (ref 8.1–13.5)
POTASSIUM SERPL-SCNC: 4 MMOL/L (ref 3.7–5.3)
POTASSIUM SERPL-SCNC: 4.3 MMOL/L (ref 3.7–5.3)
PRO-BNP: 9635 PG/ML
PROTHROMBIN TIME: 11.4 SEC (ref 9.4–11.3)
RBC # BLD: 3.94 M/UL (ref 3.95–5.11)
RBC # BLD: 4.12 M/UL (ref 3.95–5.11)
RBC # BLD: ABNORMAL 10*6/UL
REASON FOR REJECTION: NORMAL
RESP SYNCYTIAL VIRUS PCR: NOT DETECTED
RHINO/ENTEROVIRUS PCR: NOT DETECTED
SEG NEUTROPHILS: 69 % (ref 36–65)
SEGMENTED NEUTROPHILS ABSOLUTE COUNT: 9.94 K/UL (ref 1.5–8.1)
SODIUM BLD-SCNC: 135 MMOL/L (ref 135–144)
SODIUM BLD-SCNC: 139 MMOL/L (ref 135–144)
SPECIMEN DESCRIPTION: NORMAL
SPECIMEN DESCRIPTION: NORMAL
TOTAL CK: 27 U/L (ref 26–192)
TOTAL PROTEIN: 7.5 G/DL (ref 6.4–8.3)
TROPONIN INTERP: ABNORMAL
TROPONIN T: ABNORMAL NG/ML
TROPONIN, HIGH SENSITIVITY: 26 NG/L (ref 0–14)
TROPONIN, HIGH SENSITIVITY: 40 NG/L (ref 0–14)
TROPONIN, HIGH SENSITIVITY: 41 NG/L (ref 0–14)
WBC # BLD: 13.7 K/UL (ref 3.5–11.3)
WBC # BLD: 14.5 K/UL (ref 3.5–11.3)
WBC # BLD: ABNORMAL 10*3/UL
ZZ NTE CLEAN UP: ORDERED TEST: NORMAL
ZZ NTE WITH NAME CLEAN UP: SPECIMEN SOURCE: NORMAL

## 2020-04-09 PROCEDURE — 85379 FIBRIN DEGRADATION QUANT: CPT

## 2020-04-09 PROCEDURE — 82553 CREATINE MB FRACTION: CPT

## 2020-04-09 PROCEDURE — 87804 INFLUENZA ASSAY W/OPTIC: CPT

## 2020-04-09 PROCEDURE — 93005 ELECTROCARDIOGRAM TRACING: CPT | Performed by: EMERGENCY MEDICINE

## 2020-04-09 PROCEDURE — 0100U HC RESPIRPTHGN MULT REV TRANS & AMP PRB TECH 21 TRGT: CPT

## 2020-04-09 PROCEDURE — 84484 ASSAY OF TROPONIN QUANT: CPT

## 2020-04-09 PROCEDURE — 85610 PROTHROMBIN TIME: CPT

## 2020-04-09 PROCEDURE — 2580000003 HC RX 258: Performed by: EMERGENCY MEDICINE

## 2020-04-09 PROCEDURE — 85730 THROMBOPLASTIN TIME PARTIAL: CPT

## 2020-04-09 PROCEDURE — 80076 HEPATIC FUNCTION PANEL: CPT

## 2020-04-09 PROCEDURE — 36415 COLL VENOUS BLD VENIPUNCTURE: CPT

## 2020-04-09 PROCEDURE — 6370000000 HC RX 637 (ALT 250 FOR IP): Performed by: INTERNAL MEDICINE

## 2020-04-09 PROCEDURE — 71045 X-RAY EXAM CHEST 1 VIEW: CPT

## 2020-04-09 PROCEDURE — 99285 EMERGENCY DEPT VISIT HI MDM: CPT

## 2020-04-09 PROCEDURE — 94761 N-INVAS EAR/PLS OXIMETRY MLT: CPT

## 2020-04-09 PROCEDURE — 83874 ASSAY OF MYOGLOBIN: CPT

## 2020-04-09 PROCEDURE — 2700000000 HC OXYGEN THERAPY PER DAY

## 2020-04-09 PROCEDURE — 83880 ASSAY OF NATRIURETIC PEPTIDE: CPT

## 2020-04-09 PROCEDURE — 6360000002 HC RX W HCPCS: Performed by: NURSE PRACTITIONER

## 2020-04-09 PROCEDURE — 6360000002 HC RX W HCPCS: Performed by: EMERGENCY MEDICINE

## 2020-04-09 PROCEDURE — 82550 ASSAY OF CK (CPK): CPT

## 2020-04-09 PROCEDURE — 83605 ASSAY OF LACTIC ACID: CPT

## 2020-04-09 PROCEDURE — 87040 BLOOD CULTURE FOR BACTERIA: CPT

## 2020-04-09 PROCEDURE — 93005 ELECTROCARDIOGRAM TRACING: CPT | Performed by: INTERNAL MEDICINE

## 2020-04-09 PROCEDURE — 2580000003 HC RX 258: Performed by: INTERNAL MEDICINE

## 2020-04-09 PROCEDURE — 2709999900 CT CHEST PULMONARY EMBOLISM W CONTRAST

## 2020-04-09 PROCEDURE — 2060000000 HC ICU INTERMEDIATE R&B

## 2020-04-09 PROCEDURE — 99223 1ST HOSP IP/OBS HIGH 75: CPT | Performed by: INTERNAL MEDICINE

## 2020-04-09 PROCEDURE — 85025 COMPLETE CBC W/AUTO DIFF WBC: CPT

## 2020-04-09 PROCEDURE — 85027 COMPLETE CBC AUTOMATED: CPT

## 2020-04-09 PROCEDURE — 83735 ASSAY OF MAGNESIUM: CPT

## 2020-04-09 PROCEDURE — 96365 THER/PROPH/DIAG IV INF INIT: CPT

## 2020-04-09 PROCEDURE — 6360000004 HC RX CONTRAST MEDICATION: Performed by: INTERNAL MEDICINE

## 2020-04-09 PROCEDURE — 93306 TTE W/DOPPLER COMPLETE: CPT

## 2020-04-09 PROCEDURE — 80048 BASIC METABOLIC PNL TOTAL CA: CPT

## 2020-04-09 RX ORDER — ONDANSETRON 4 MG/1
4 TABLET, ORALLY DISINTEGRATING ORAL EVERY 4 HOURS PRN
Status: DISCONTINUED | OUTPATIENT
Start: 2020-04-09 | End: 2020-04-10 | Stop reason: HOSPADM

## 2020-04-09 RX ORDER — BENZTROPINE MESYLATE 1 MG/1
0.5 TABLET ORAL DAILY
Status: DISCONTINUED | OUTPATIENT
Start: 2020-04-09 | End: 2020-04-10 | Stop reason: HOSPADM

## 2020-04-09 RX ORDER — ACETAMINOPHEN 325 MG/1
650 TABLET ORAL EVERY 4 HOURS PRN
Status: DISCONTINUED | OUTPATIENT
Start: 2020-04-09 | End: 2020-04-10 | Stop reason: HOSPADM

## 2020-04-09 RX ORDER — BUSPIRONE HYDROCHLORIDE 5 MG/1
7.5 TABLET ORAL 2 TIMES DAILY
Status: DISCONTINUED | OUTPATIENT
Start: 2020-04-09 | End: 2020-04-10 | Stop reason: HOSPADM

## 2020-04-09 RX ORDER — HEPARIN SODIUM 1000 [USP'U]/ML
10000 INJECTION, SOLUTION INTRAVENOUS; SUBCUTANEOUS PRN
Status: DISCONTINUED | OUTPATIENT
Start: 2020-04-09 | End: 2020-04-10 | Stop reason: HOSPADM

## 2020-04-09 RX ORDER — TRAMADOL HYDROCHLORIDE 50 MG/1
50 TABLET ORAL EVERY 6 HOURS PRN
Status: DISCONTINUED | OUTPATIENT
Start: 2020-04-09 | End: 2020-04-10 | Stop reason: HOSPADM

## 2020-04-09 RX ORDER — HEPARIN SODIUM 1000 [USP'U]/ML
2000 INJECTION, SOLUTION INTRAVENOUS; SUBCUTANEOUS PRN
Status: DISCONTINUED | OUTPATIENT
Start: 2020-04-09 | End: 2020-04-09

## 2020-04-09 RX ORDER — FAMOTIDINE 20 MG/1
20 TABLET, FILM COATED ORAL 2 TIMES DAILY
Status: DISCONTINUED | OUTPATIENT
Start: 2020-04-09 | End: 2020-04-10 | Stop reason: HOSPADM

## 2020-04-09 RX ORDER — OXCARBAZEPINE 300 MG/1
150 TABLET, FILM COATED ORAL DAILY
Status: DISCONTINUED | OUTPATIENT
Start: 2020-04-09 | End: 2020-04-10 | Stop reason: HOSPADM

## 2020-04-09 RX ORDER — 0.9 % SODIUM CHLORIDE 0.9 %
30 INTRAVENOUS SOLUTION INTRAVENOUS ONCE
Status: COMPLETED | OUTPATIENT
Start: 2020-04-09 | End: 2020-04-09

## 2020-04-09 RX ORDER — 0.9 % SODIUM CHLORIDE 0.9 %
125 INTRAVENOUS SOLUTION INTRAVENOUS ONCE
Status: DISCONTINUED | OUTPATIENT
Start: 2020-04-09 | End: 2020-04-09

## 2020-04-09 RX ORDER — SODIUM CHLORIDE 0.9 % (FLUSH) 0.9 %
10 SYRINGE (ML) INJECTION PRN
Status: DISCONTINUED | OUTPATIENT
Start: 2020-04-09 | End: 2020-04-10 | Stop reason: HOSPADM

## 2020-04-09 RX ORDER — HEPARIN SODIUM 1000 [USP'U]/ML
4000 INJECTION, SOLUTION INTRAVENOUS; SUBCUTANEOUS ONCE
Status: DISCONTINUED | OUTPATIENT
Start: 2020-04-09 | End: 2020-04-09

## 2020-04-09 RX ORDER — HYDROXYZINE HYDROCHLORIDE 10 MG/1
10 TABLET, FILM COATED ORAL DAILY
Status: DISCONTINUED | OUTPATIENT
Start: 2020-04-09 | End: 2020-04-10 | Stop reason: HOSPADM

## 2020-04-09 RX ORDER — TOPIRAMATE 100 MG/1
50 TABLET, FILM COATED ORAL 2 TIMES DAILY
Status: DISCONTINUED | OUTPATIENT
Start: 2020-04-09 | End: 2020-04-10 | Stop reason: HOSPADM

## 2020-04-09 RX ORDER — HEPARIN SODIUM 1000 [USP'U]/ML
INJECTION, SOLUTION INTRAVENOUS; SUBCUTANEOUS
Status: DISCONTINUED
Start: 2020-04-09 | End: 2020-04-09 | Stop reason: WASHOUT

## 2020-04-09 RX ORDER — HEPARIN SODIUM 1000 [USP'U]/ML
4000 INJECTION, SOLUTION INTRAVENOUS; SUBCUTANEOUS PRN
Status: DISCONTINUED | OUTPATIENT
Start: 2020-04-09 | End: 2020-04-09

## 2020-04-09 RX ORDER — POLYETHYLENE GLYCOL 3350 17 G/17G
17 POWDER, FOR SOLUTION ORAL DAILY PRN
Status: DISCONTINUED | OUTPATIENT
Start: 2020-04-09 | End: 2020-04-10 | Stop reason: HOSPADM

## 2020-04-09 RX ORDER — SODIUM CHLORIDE 9 MG/ML
INJECTION, SOLUTION INTRAVENOUS CONTINUOUS
Status: DISCONTINUED | OUTPATIENT
Start: 2020-04-09 | End: 2020-04-10 | Stop reason: HOSPADM

## 2020-04-09 RX ORDER — ONDANSETRON 2 MG/ML
4 INJECTION INTRAMUSCULAR; INTRAVENOUS EVERY 4 HOURS PRN
Status: DISCONTINUED | OUTPATIENT
Start: 2020-04-09 | End: 2020-04-10 | Stop reason: HOSPADM

## 2020-04-09 RX ORDER — FOLIC ACID 1 MG/1
1 TABLET ORAL DAILY
Status: DISCONTINUED | OUTPATIENT
Start: 2020-04-09 | End: 2020-04-10 | Stop reason: HOSPADM

## 2020-04-09 RX ORDER — PRAVASTATIN SODIUM 20 MG
40 TABLET ORAL NIGHTLY
Status: DISCONTINUED | OUTPATIENT
Start: 2020-04-09 | End: 2020-04-10 | Stop reason: HOSPADM

## 2020-04-09 RX ORDER — 0.9 % SODIUM CHLORIDE 0.9 %
500 INTRAVENOUS SOLUTION INTRAVENOUS ONCE
Status: COMPLETED | OUTPATIENT
Start: 2020-04-09 | End: 2020-04-09

## 2020-04-09 RX ORDER — SODIUM CHLORIDE 0.9 % (FLUSH) 0.9 %
10 SYRINGE (ML) INJECTION EVERY 12 HOURS SCHEDULED
Status: DISCONTINUED | OUTPATIENT
Start: 2020-04-09 | End: 2020-04-10 | Stop reason: HOSPADM

## 2020-04-09 RX ORDER — QUETIAPINE FUMARATE 25 MG/1
50 TABLET, FILM COATED ORAL NIGHTLY
Status: DISCONTINUED | OUTPATIENT
Start: 2020-04-09 | End: 2020-04-10 | Stop reason: HOSPADM

## 2020-04-09 RX ORDER — POTASSIUM CHLORIDE 20 MEQ/1
20 TABLET, EXTENDED RELEASE ORAL
Status: DISCONTINUED | OUTPATIENT
Start: 2020-04-10 | End: 2020-04-10 | Stop reason: HOSPADM

## 2020-04-09 RX ORDER — HEPARIN SODIUM 10000 [USP'U]/100ML
2100 INJECTION, SOLUTION INTRAVENOUS CONTINUOUS
Status: DISCONTINUED | OUTPATIENT
Start: 2020-04-09 | End: 2020-04-10 | Stop reason: HOSPADM

## 2020-04-09 RX ORDER — HEPARIN SODIUM 1000 [USP'U]/ML
5000 INJECTION, SOLUTION INTRAVENOUS; SUBCUTANEOUS PRN
Status: DISCONTINUED | OUTPATIENT
Start: 2020-04-09 | End: 2020-04-10 | Stop reason: HOSPADM

## 2020-04-09 RX ORDER — HEPARIN SODIUM 10000 [USP'U]/100ML
1000 INJECTION, SOLUTION INTRAVENOUS CONTINUOUS
Status: DISCONTINUED | OUTPATIENT
Start: 2020-04-09 | End: 2020-04-09

## 2020-04-09 RX ORDER — HEPARIN SODIUM 1000 [USP'U]/ML
10000 INJECTION, SOLUTION INTRAVENOUS; SUBCUTANEOUS ONCE
Status: COMPLETED | OUTPATIENT
Start: 2020-04-09 | End: 2020-04-09

## 2020-04-09 RX ORDER — OYSTER SHELL CALCIUM WITH VITAMIN D 500; 200 MG/1; [IU]/1
1 TABLET, FILM COATED ORAL 2 TIMES DAILY
Status: DISCONTINUED | OUTPATIENT
Start: 2020-04-09 | End: 2020-04-10 | Stop reason: HOSPADM

## 2020-04-09 RX ADMIN — FAMOTIDINE 20 MG: 20 TABLET ORAL at 20:55

## 2020-04-09 RX ADMIN — SODIUM CHLORIDE: 9 INJECTION, SOLUTION INTRAVENOUS at 19:35

## 2020-04-09 RX ADMIN — SODIUM CHLORIDE 1000 ML: 9 INJECTION, SOLUTION INTRAVENOUS at 11:59

## 2020-04-09 RX ADMIN — BUSPIRONE HYDROCHLORIDE 7.5 MG: 5 TABLET ORAL at 20:54

## 2020-04-09 RX ADMIN — HEPARIN SODIUM 2100 UNITS/HR: 10000 INJECTION, SOLUTION INTRAVENOUS at 20:29

## 2020-04-09 RX ADMIN — OXCARBAZEPINE 150 MG: 300 TABLET, FILM COATED ORAL at 17:28

## 2020-04-09 RX ADMIN — BENZTROPINE MESYLATE 0.5 MG: 1 TABLET ORAL at 17:29

## 2020-04-09 RX ADMIN — IOPAMIDOL 100 ML: 755 INJECTION, SOLUTION INTRAVENOUS at 20:00

## 2020-04-09 RX ADMIN — PIPERACILLIN SODIUM,TAZOBACTAM SODIUM 4.5 G: 4; .5 INJECTION, POWDER, FOR SOLUTION INTRAVENOUS at 12:00

## 2020-04-09 RX ADMIN — PRAVASTATIN SODIUM 40 MG: 20 TABLET ORAL at 20:54

## 2020-04-09 RX ADMIN — QUETIAPINE FUMARATE 50 MG: 25 TABLET ORAL at 20:54

## 2020-04-09 RX ADMIN — FOLIC ACID 1 MG: 1 TABLET ORAL at 17:29

## 2020-04-09 RX ADMIN — VANCOMYCIN HYDROCHLORIDE 1500 MG: 1 INJECTION, POWDER, LYOPHILIZED, FOR SOLUTION INTRAVENOUS at 13:56

## 2020-04-09 RX ADMIN — HEPARIN SODIUM 10000 UNITS: 1000 INJECTION, SOLUTION INTRAVENOUS; SUBCUTANEOUS at 20:30

## 2020-04-09 RX ADMIN — LAMOTRIGINE 150 MG: 100 TABLET ORAL at 20:56

## 2020-04-09 RX ADMIN — TOPIRAMATE 50 MG: 100 TABLET, FILM COATED ORAL at 20:56

## 2020-04-09 RX ADMIN — Medication 1 TABLET: at 20:55

## 2020-04-09 RX ADMIN — SODIUM CHLORIDE 500 ML: 9 INJECTION, SOLUTION INTRAVENOUS at 15:22

## 2020-04-09 ASSESSMENT — PAIN DESCRIPTION - DESCRIPTORS: DESCRIPTORS: SHARP

## 2020-04-09 ASSESSMENT — PAIN SCALES - GENERAL
PAINLEVEL_OUTOF10: 0
PAINLEVEL_OUTOF10: 0
PAINLEVEL_OUTOF10: 8
PAINLEVEL_OUTOF10: 0

## 2020-04-09 ASSESSMENT — PAIN DESCRIPTION - LOCATION: LOCATION: CHEST

## 2020-04-09 ASSESSMENT — PAIN DESCRIPTION - PAIN TYPE: TYPE: ACUTE PAIN

## 2020-04-09 ASSESSMENT — PAIN DESCRIPTION - ORIENTATION: ORIENTATION: MID

## 2020-04-09 NOTE — ED NOTES
Speaking with Dr. Isaías Sepulveda at this time regarding admission.       Tree Ziegler RN  04/09/20 6910

## 2020-04-09 NOTE — ED NOTES
Speaking with Dr. Salguero,cardiology, regarding admission. Okay to stay here.      Tree Ziegler RN  04/09/20 [id number]

## 2020-04-09 NOTE — ED PROVIDER NOTES
[address] [facility] [address] ENCOUNTER      Pt Name: Wen Grimaldo  MRN: [id number]  [place] 1965  Date of evaluation: 4/9/2020  Provider: Santi Velázquez MD    CHIEF COMPLAINT     Chief Complaint   Patient presents with    Chest Pain     since saturday    Shortness of Breath     since saturday         HISTORY OF PRESENT ILLNESS   (Location/Symptom, Timing/Onset, Context/Setting,Quality, Duration, Modifying Factors, Severity)  Note limiting factors. Wen Grimaldo is a 47 y.o. female who presents to the emergency department with a 6-day history of chest pain and shortness of breath. Patient uses albuterol inhaler intermittently. Her last usage was around 9 AM today. Paramedics gave her a nitroglycerin tablet with some relief in her chest discomfort. She is treated for hypertension and does not have a history of heart disease and is not diabetic. She has a history of cancer of the right breast and has undergone right-sided mastectomy followed by chemotherapy. Patient also reports a cough for the last 3 weeks. Patient is not a cigarette smoker but is exposed to secondhand cigarette smoke at home. She has been staying at home with her 72-year-old daughter who lives with her has been going out to get groceries. The history is provided by the patient, the EMS personnel and medical records. Nursing Notes werereviewed. REVIEW OF SYSTEMS    (2-9 systems for level 4, 10 or more for level 5)     Review of Systems   All other systems reviewed and are negative. Except as noted above the remainder of the review of systems was reviewed and negative.        PAST MEDICAL HISTORY     Past Medical History:   Diagnosis Date    Bipolar 1 disorder ([address]     Breast cancer [facility] [address] [facility] 2017    right side     DVT of axillary vein, acute right ([address]     Eye twitch 2019    Headache(784.0)     Hyperlipidemia     Migraine          SURGICALHISTORY       Past Surgical History:   Procedure 10 MG tablet Take 10 mg by mouth daily       lamoTRIgine (LAMICTAL) 100 MG tablet 150 mg nightly       letrozole (FEMARA) 2.5 MG tablet Take 1 tablet by mouth daily  Qty: 30 tablet, Refills: 5    Associated Diagnoses: Malignant neoplasm of female breast, unspecified estrogen receptor status, unspecified laterality, unspecified site of breast (HCC)      ondansetron (ZOFRAN) 4 MG tablet Take 2 tablets by mouth every 6 hours as needed for Nausea or Vomiting  Qty: 40 tablet, Refills: 1      sodium chloride (ALTAMIST SPRAY) 0.65 % nasal spray 1 spray by Nasal route as needed for Congestion  Qty: 1 Bottle, Refills: 1             ALLERGIES     Prednisone    FAMILY HISTORY       Family History   Problem Relation Age of Onset    Breast Cancer Sister 54    Breast Cancer Maternal Aunt 71    Other Maternal Cousin         Atypical Ductal Hyperplasia     Uterine Cancer Sister 32    Other Sister         breast cyst    Cataracts Mother     Glaucoma Mother     Heart Disease Father     High Blood Pressure Father     High Cholesterol Father     Mental Retardation Father     Diabetes Neg Hx           SOCIAL HISTORY       Social History     Socioeconomic History    Marital status: Single     Spouse name: None    Number of children: None    Years of education: None    Highest education level: None   Occupational History    None   Social Needs    Financial resource strain: None    Food insecurity     Worry: None     Inability: None    Transportation needs     Medical: None     Non-medical: None   Tobacco Use    Smoking status: Never Smoker    Smokeless tobacco: Never Used    Tobacco comment: Never smoker.  TC, RRT 4/5/18   Substance and Sexual Activity    Alcohol use: No    Drug use: No    Sexual activity: Yes     Partners: Male     Birth control/protection: Surgical   Lifestyle    Physical activity     Days per week: None     Minutes per session: None    Stress: None   Relationships    Social connections Talks on phone: None     Gets together: None     Attends Sikh service: None     Active member of club or organization: None     Attends meetings of clubs or organizations: None     Relationship status: None    Intimate partner violence     Fear of current or ex partner: None     Emotionally abused: None     Physically abused: None     Forced sexual activity: None   Other Topics Concern    None   Social History Narrative    None       SCREENINGS    Meridian Coma Scale  Eye Opening: Spontaneous  Best Verbal Response: Oriented  Best Motor Response: Obeys commands  Virgilio Coma Scale Score: 15        PHYSICAL EXAM    (up to 7 for level 4, 8 or more for level 5)     ED Triage Vitals [04/09/20 1013]   BP Temp Temp Source Pulse Resp SpO2 Height Weight   (!) 183/115 99.5 °F (37.5 °C) Tympanic 122 (!) 36 95 % 5' 6\" (1.676 m) 300 lb (136.1 kg)       Physical Exam  Constitutional:       General: She is not in acute distress. Appearance: She is obese. She is ill-appearing. Comments: Tachycardic with a heart rate of 122 beats upon arrival with elevated blood pressure of 183/115 and a temp of 99.5. Patient is tachypneic. Oxygen saturation is 91 to 95% on room air. HENT:      Head: Normocephalic. Right Ear: External ear normal.      Left Ear: External ear normal.      Nose: Nose normal.      Mouth/Throat:      Comments: Patient is wearing a mask. Eyes:      Extraocular Movements: Extraocular movements intact. Neck:      Musculoskeletal: Neck supple. Cardiovascular:      Rate and Rhythm: Regular rhythm. Tachycardia present. Pulmonary:      Effort: Pulmonary effort is normal. Prolonged expiration present. No accessory muscle usage or respiratory distress. Breath sounds: No rhonchi or rales. Abdominal:      Palpations: Abdomen is soft. Tenderness: There is no abdominal tenderness. Musculoskeletal: Normal range of motion. General: No tenderness.    Skin:     General: Skin is warm and dry. Coloration: Skin is not pale. Neurological:      General: No focal deficit present. Mental Status: She is alert. Mental status is at baseline. DIAGNOSTIC RESULTS     EKG: All EKG's are interpreted by the Emergency Department Physician who either signs orCo-signs this chart in the absence of a cardiologist.    Sinus tachycardia with a heart rate of 126 beats a minute. Right axis deviation. Inferior wall and lateral wall T wave inversions. RADIOLOGY:   Non-plain film images such as CT, Ultrasound and MRI are read by the radiologist. Plain radiographic images are visualized and preliminarily interpreted by the emergency physician with the below findings:    Interpretation per the Radiologist below, ifavailable at the time of this note:    XR CHEST PORTABLE   Final Result   No acute cardiopulmonary process. No significant change from prior   examination.          XR CHEST PORTABLE    (Results Pending)   CT CHEST PULMONARY EMBOLISM W CONTRAST    (Results Pending)         ED BEDSIDE ULTRASOUND:   Performed by ED Physician - none    LABS:  Labs Reviewed   APTT - Abnormal; Notable for the following components:       Result Value    PTT 26.3 (*)     All other components within normal limits   PROTIME-INR - Abnormal; Notable for the following components:    Protime 11.4 (*)     All other components within normal limits   BASIC METABOLIC PANEL - Abnormal; Notable for the following components:    Glucose 237 (*)     BUN 41 (*)     CREATININE 1.81 (*)     Bun/Cre Ratio 23 (*)     CO2 18 (*)     GFR Non- 29 (*)     GFR  35 (*)     All other components within normal limits   BRAIN NATRIURETIC PEPTIDE - Abnormal; Notable for the following components:    Pro-BNP 9,635 (*)     All other components within normal limits   CBC WITH AUTO DIFFERENTIAL - Abnormal; Notable for the following components:    WBC 14.5 (*)     Seg Neutrophils 69 (*)     Lymphocytes 23 (*) Immature Granulocytes 2 (*)     Segs Absolute 9.94 (*)     All other components within normal limits   LACTATE, SEPSIS - Abnormal; Notable for the following components:    Lactic Acid, Sepsis 3.4 (*)     All other components within normal limits   LACTATE, SEPSIS - Abnormal; Notable for the following components:    Lactic Acid, Sepsis 2.1 (*)     All other components within normal limits   HEPATIC FUNCTION PANEL - Abnormal; Notable for the following components:    ALT 40 (*)     Total Bilirubin 0.29 (*)     All other components within normal limits   TROPONIN - Abnormal; Notable for the following components:    Troponin, High Sensitivity 26 (*)     All other components within normal limits   TROPONIN - Abnormal; Notable for the following components:    Troponin, High Sensitivity 40 (*)     All other components within normal limits   D-DIMER, RAPID - Abnormal; Notable for the following components:    D-Dimer, Quant 3470 (*)     All other components within normal limits   TROPONIN - Abnormal; Notable for the following components:    Troponin, High Sensitivity 41 (*)     All other components within normal limits   BASIC METABOLIC PANEL - Abnormal; Notable for the following components:    Glucose 117 (*)     BUN 41 (*)     CREATININE 1.64 (*)     Bun/Cre Ratio 25 (*)     CO2 14 (*)     GFR Non- 33 (*)     GFR  40 (*)     All other components within normal limits   RAPID INFLUENZA A/B ANTIGENS   CULTURE, BLOOD 1   CULTURE, BLOOD 1   RESPIRATORY VIRUS PCR PANEL   CK-MB   CK   MAGNESIUM   MYOGLOBIN, SERUM   SPECIMEN REJECTION   COVID-19   TROPONIN   APTT   COMPREHENSIVE METABOLIC PANEL W/ REFLEX TO MG FOR LOW K   CBC WITH AUTO DIFFERENTIAL   APTT       All other labs were within normal range ornot returned as of this dictation.     EMERGENCY DEPARTMENT COURSE and DIFFERENTIAL DIAGNOSIS/MDM:   Vitals:    Vitals:    04/09/20 1200 04/09/20 1203 04/09/20 1315 04/09/20 1333   BP:  117/71 113/68 folic acid (FOLVITE) tablet 1 mg (1 mg Oral Given 4/9/20 1729)   hydrOXYzine (ATARAX) tablet 10 mg (has no administration in time range)   OXcarbazepine (TRILEPTAL) tablet 150 mg (150 mg Oral Given 4/9/20 1728)   potassium chloride (KLOR-CON M) extended release tablet 20 mEq (has no administration in time range)   pravastatin (PRAVACHOL) tablet 40 mg (has no administration in time range)   QUEtiapine (SEROQUEL) tablet 50 mg (has no administration in time range)   topiramate (TOPAMAX) tablet 50 mg (has no administration in time range)   traMADol (ULTRAM) tablet 50 mg (has no administration in time range)   lamoTRIgine (LAMICTAL) tablet 150 mg (has no administration in time range)   0.9 % sodium chloride infusion (has no administration in time range)   heparin (porcine) injection 4,000 Units (has no administration in time range)   heparin (porcine) injection 4,000 Units (has no administration in time range)   heparin (porcine) injection 2,000 Units (has no administration in time range)   heparin 25,000 units in dextrose 5% 250 mL infusion (has no administration in time range)   0.9 % sodium chloride bolus (0 mLs Intravenous Stopped 4/9/20 1503)   piperacillin-tazobactam (ZOSYN) 4.5 g in dextrose 5 % 100 mL IVPB (mini-bag) (0 g Intravenous Stopped 4/9/20 1241)       New Prescriptions from this visit:    Current Discharge Medication List          Follow-up:  MAGGIE Garcia - [place] [id number]  [phone]          Sahara Muñoz, [address] [facility] [address]  [phone]              Final Impression:   1. Sepsis with acute renal failure without septic shock, due to unspecified organism, unspecified acute renal failure type ([address]    2. Acute kidney injury (nontraumatic) ([address]    3.  NSTEMI (non-ST elevated myocardial infarction) ([address]               (Please note that portions of this note were completed with a voice recognitionprogram.  Efforts were made to

## 2020-04-09 NOTE — H&P
History:   Diagnosis Date    [facility] [facility]     Breast cancer ([facility] [address] 2017    right side     DVT of axillary vein, acute right ([address]     Eye twitch 2019    Headache(784.0)     Hyperlipidemia     Migraine            Past Surgical History:   Procedure Laterality Date    CATHETER REMOVAL Left 6/6/2019    PORT REMOVAL performed by Clarisa Stinson DO at [address] N/A 10/13/2017    D & C HYSTEROSCOPY with polypectomy performed by Mil Mcadams MD at [address] [facility] / REMOVAL / [address] Left 11/9/2017    PORT INSERTION performed by Ann Brandon MD at [facility] / REMOVAL / [address] N/A 11/30/2017    [facility] & [facility] 10/19/2017     [address] [facility]    MASTECTOMY Right 10/19/2017    Right Breast Mastectomy with  [place] Node Biopsy performed by Ann Brandon MD at . [facility] [address] [facility] [address] W/SUBQ PORT AGE 5 YR/> Left 3/1/2018    PORT Exchange performed by Ann Brandon MD at [place] [address] Left 2014, 2015    x 2 surgeries total; Dr. Marcos Collado TUNNELED VENOUS PORT PLACEMENT Left 11/30/2017    removal of et replacement of       Medications Prior to Admission:    Medications Prior to Admission: busPIRone (BUSPAR) 7.5 MG tablet, take 1 tablet by mouth twice a day  OXcarbazepine (TRILEPTAL) 150 MG tablet, take 1 tablet by mouth every morning BEFORE NOON  traMADol (ULTRAM) 50 MG tablet, Take 1 tablet by mouth every 6 hours as needed for Pain for up to 60 days. Intended supply: 5 days.  Take lowest dose possible to manage pain  pravastatin (PRAVACHOL) 40 MG tablet, take 1 tablet by mouth once daily  potassium chloride (KLOR-CON M) 20 MEQ extended release tablet, take 1 tablet by mouth once daily  Calcium Carbonate-Vitamin D (OYSTER SHELL CALCIUM/D) 500-200 MG-UNIT TABS, Take 1 tablet by mouth 2 times

## 2020-04-09 NOTE — ED NOTES
Report given to Jena Martínez RN from PCU. Pt will be going to room 210.       Tree Ziegler RN  04/09/20 3457

## 2020-04-10 ENCOUNTER — HOSPITAL ENCOUNTER (INPATIENT)
Age: 55
LOS: 5 days | Discharge: HOME | DRG: 134 | End: 2020-04-15
Attending: INTERNAL MEDICINE | Admitting: HOSPITALIST
Payer: MEDICAID

## 2020-04-10 PROBLEM — Z99.89 OSA ON CPAP: Status: ACTIVE | Noted: 2020-04-10

## 2020-04-10 PROBLEM — I27.20 MODERATE TO SEVERE PULMONARY HYPERTENSION (HCC): Status: ACTIVE | Noted: 2020-04-10

## 2020-04-10 PROBLEM — G47.33 OSA ON CPAP: Status: ACTIVE | Noted: 2020-04-10

## 2020-04-10 PROBLEM — J96.01 ACUTE RESPIRATORY FAILURE WITH HYPOXIA (HCC): Status: ACTIVE | Noted: 2020-04-10

## 2020-04-10 PROBLEM — Z20.822 SUSPECTED 2019 NOVEL CORONAVIRUS INFECTION: Status: ACTIVE | Noted: 2020-04-10

## 2020-04-10 PROBLEM — N17.9 AKI (ACUTE KIDNEY INJURY) (HCC): Status: ACTIVE | Noted: 2020-04-10

## 2020-04-10 LAB
ABSOLUTE EOS #: 0.11 K/UL (ref 0–0.44)
ABSOLUTE IMMATURE GRANULOCYTE: 0.13 K/UL (ref 0–0.3)
ABSOLUTE LYMPH #: 3.06 K/UL (ref 1.1–3.7)
ABSOLUTE MONO #: 1 K/UL (ref 0.1–1.2)
ADENOVIRUS PCR: NOT DETECTED
ANION GAP SERPL CALCULATED.3IONS-SCNC: 18 MMOL/L (ref 9–17)
BASOPHILS # BLD: 1 % (ref 0–2)
BASOPHILS ABSOLUTE: 0.07 K/UL (ref 0–0.2)
BORDETELLA PARAPERTUSSIS: NOT DETECTED
BORDETELLA PERTUSSIS PCR: NOT DETECTED
BUN BLDV-MCNC: 34 MG/DL (ref 6–20)
BUN/CREAT BLD: ABNORMAL (ref 9–20)
C-REACTIVE PROTEIN: 41.3 MG/L (ref 0–5)
CALCIUM SERPL-MCNC: 8.5 MG/DL (ref 8.6–10.4)
CHLAMYDIA PNEUMONIAE BY PCR: NOT DETECTED
CHLORIDE BLD-SCNC: 106 MMOL/L (ref 98–107)
CO2: 16 MMOL/L (ref 20–31)
CORONAVIRUS 229E PCR: NOT DETECTED
CORONAVIRUS HKU1 PCR: NOT DETECTED
CORONAVIRUS NL63 PCR: NOT DETECTED
CORONAVIRUS OC43 PCR: NOT DETECTED
CREAT SERPL-MCNC: 1.48 MG/DL (ref 0.5–0.9)
DIFFERENTIAL TYPE: ABNORMAL
EOSINOPHILS RELATIVE PERCENT: 1 % (ref 1–4)
FERRITIN: 835 UG/L (ref 13–150)
GFR AFRICAN AMERICAN: 45 ML/MIN
GFR NON-AFRICAN AMERICAN: 37 ML/MIN
GFR SERPL CREATININE-BSD FRML MDRD: ABNORMAL ML/MIN/{1.73_M2}
GFR SERPL CREATININE-BSD FRML MDRD: ABNORMAL ML/MIN/{1.73_M2}
GLUCOSE BLD-MCNC: 158 MG/DL (ref 70–99)
HCT VFR BLD CALC: 33.5 % (ref 36.3–47.1)
HEMOGLOBIN: 10.6 G/DL (ref 11.9–15.1)
HUMAN METAPNEUMOVIRUS PCR: NOT DETECTED
IMMATURE GRANULOCYTES: 1 %
INFLUENZA A BY PCR: NOT DETECTED
INFLUENZA A H1 (2009) PCR: NORMAL
INFLUENZA A H1 PCR: NORMAL
INFLUENZA A H3 PCR: NORMAL
INFLUENZA B BY PCR: NOT DETECTED
LACTATE DEHYDROGENASE: 696 U/L (ref 135–214)
LYMPHOCYTES # BLD: 25 % (ref 24–43)
MAGNESIUM: 1.8 MG/DL (ref 1.6–2.6)
MCH RBC QN AUTO: 31.7 PG (ref 25.2–33.5)
MCHC RBC AUTO-ENTMCNC: 31.6 G/DL (ref 28.4–34.8)
MCV RBC AUTO: 100.3 FL (ref 82.6–102.9)
MONOCYTES # BLD: 8 % (ref 3–12)
MRSA, DNA, NASAL: ABNORMAL
MYCOPLASMA PNEUMONIAE PCR: NOT DETECTED
NRBC AUTOMATED: 0 PER 100 WBC
PARAINFLUENZA 1 PCR: NOT DETECTED
PARAINFLUENZA 2 PCR: NOT DETECTED
PARAINFLUENZA 3 PCR: NOT DETECTED
PARAINFLUENZA 4 PCR: NOT DETECTED
PARTIAL THROMBOPLASTIN TIME: >120 SEC (ref 20.5–30.5)
PARTIAL THROMBOPLASTIN TIME: >153 SEC (ref 27–35)
PDW BLD-RTO: 14.6 % (ref 11.8–14.4)
PLATELET # BLD: 163 K/UL (ref 138–453)
PLATELET ESTIMATE: ABNORMAL
PMV BLD AUTO: 11.8 FL (ref 8.1–13.5)
POTASSIUM SERPL-SCNC: 4.2 MMOL/L (ref 3.7–5.3)
PROCALCITONIN: 0.18 NG/ML
RBC # BLD: 3.34 M/UL (ref 3.95–5.11)
RBC # BLD: ABNORMAL 10*6/UL
RESP SYNCYTIAL VIRUS PCR: NOT DETECTED
RHINO/ENTEROVIRUS PCR: NOT DETECTED
SARS-COV-2, NAA: NORMAL
SARS-COV-2, NAA: NORMAL
SARS-COV-2, PCR: NORMAL
SARS-COV-2, PCR: NORMAL
SARS-COV-2: NOT DETECTED
SARS-COV-2: NOT DETECTED
SEG NEUTROPHILS: 64 % (ref 36–65)
SEGMENTED NEUTROPHILS ABSOLUTE COUNT: 7.67 K/UL (ref 1.5–8.1)
SODIUM BLD-SCNC: 140 MMOL/L (ref 135–144)
SOURCE: NORMAL
SOURCE: NORMAL
SPECIMEN DESCRIPTION: ABNORMAL
SPECIMEN DESCRIPTION: NORMAL
TROPONIN INTERP: ABNORMAL
TROPONIN T: ABNORMAL NG/ML
TROPONIN, HIGH SENSITIVITY: 60 NG/L (ref 0–14)
WBC # BLD: 12 K/UL (ref 3.5–11.3)
WBC # BLD: ABNORMAL 10*3/UL

## 2020-04-10 PROCEDURE — 2000000000 HC ICU R&B

## 2020-04-10 PROCEDURE — 85730 THROMBOPLASTIN TIME PARTIAL: CPT

## 2020-04-10 PROCEDURE — 0100U HC RESPIRPTHGN MULT REV TRANS & AMP PRB TECH 21 TRGT: CPT

## 2020-04-10 PROCEDURE — 02CR3ZZ EXTIRPATION OF MATTER FROM LEFT PULMONARY ARTERY, PERCUTANEOUS APPROACH: ICD-10-PCS | Performed by: SURGERY

## 2020-04-10 PROCEDURE — C1751 CATH, INF, PER/CENT/MIDLINE: HCPCS

## 2020-04-10 PROCEDURE — 99254 IP/OBS CNSLTJ NEW/EST MOD 60: CPT | Performed by: INTERNAL MEDICINE

## 2020-04-10 PROCEDURE — C1894 INTRO/SHEATH, NON-LASER: HCPCS

## 2020-04-10 PROCEDURE — 99291 CRITICAL CARE FIRST HOUR: CPT | Performed by: INTERNAL MEDICINE

## 2020-04-10 PROCEDURE — 75743 ARTERY X-RAYS LUNGS: CPT | Performed by: SURGERY

## 2020-04-10 PROCEDURE — C1760 CLOSURE DEV, VASC: HCPCS

## 2020-04-10 PROCEDURE — C1757 CATH, THROMBECTOMY/EMBOLECT: HCPCS

## 2020-04-10 PROCEDURE — 2500000003 HC RX 250 WO HCPCS

## 2020-04-10 PROCEDURE — 36014 PLACE CATHETER IN ARTERY: CPT | Performed by: SURGERY

## 2020-04-10 PROCEDURE — 6360000002 HC RX W HCPCS: Performed by: STUDENT IN AN ORGANIZED HEALTH CARE EDUCATION/TRAINING PROGRAM

## 2020-04-10 PROCEDURE — 6360000004 HC RX CONTRAST MEDICATION

## 2020-04-10 PROCEDURE — 36556 INSERT NON-TUNNEL CV CATH: CPT | Performed by: SURGERY

## 2020-04-10 PROCEDURE — 37184 PRIM ART M-THRMBC 1ST VSL: CPT | Performed by: SURGERY

## 2020-04-10 PROCEDURE — 80048 BASIC METABOLIC PNL TOTAL CA: CPT

## 2020-04-10 PROCEDURE — 2709999900 HC NON-CHARGEABLE SUPPLY

## 2020-04-10 PROCEDURE — 76937 US GUIDE VASCULAR ACCESS: CPT | Performed by: SURGERY

## 2020-04-10 PROCEDURE — 87449 NOS EACH ORGANISM AG IA: CPT

## 2020-04-10 PROCEDURE — 83615 LACTATE (LD) (LDH) ENZYME: CPT

## 2020-04-10 PROCEDURE — 99223 1ST HOSP IP/OBS HIGH 75: CPT | Performed by: SURGERY

## 2020-04-10 PROCEDURE — B31U1ZZ FLUOROSCOPY OF PULMONARY TRUNK USING LOW OSMOLAR CONTRAST: ICD-10-PCS | Performed by: SURGERY

## 2020-04-10 PROCEDURE — 02CQ3ZZ EXTIRPATION OF MATTER FROM RIGHT PULMONARY ARTERY, PERCUTANEOUS APPROACH: ICD-10-PCS | Performed by: SURGERY

## 2020-04-10 PROCEDURE — 2580000003 HC RX 258: Performed by: STUDENT IN AN ORGANIZED HEALTH CARE EDUCATION/TRAINING PROGRAM

## 2020-04-10 PROCEDURE — 84145 PROCALCITONIN (PCT): CPT

## 2020-04-10 PROCEDURE — 06HN33Z INSERTION OF INFUSION DEVICE INTO LEFT FEMORAL VEIN, PERCUTANEOUS APPROACH: ICD-10-PCS | Performed by: SURGERY

## 2020-04-10 PROCEDURE — 83735 ASSAY OF MAGNESIUM: CPT

## 2020-04-10 PROCEDURE — C1769 GUIDE WIRE: HCPCS

## 2020-04-10 PROCEDURE — 86140 C-REACTIVE PROTEIN: CPT

## 2020-04-10 PROCEDURE — 2720000010 HC SURG SUPPLY STERILE

## 2020-04-10 PROCEDURE — 87641 MR-STAPH DNA AMP PROBE: CPT

## 2020-04-10 PROCEDURE — 87899 AGENT NOS ASSAY W/OPTIC: CPT

## 2020-04-10 PROCEDURE — 84484 ASSAY OF TROPONIN QUANT: CPT

## 2020-04-10 PROCEDURE — 85025 COMPLETE CBC W/AUTO DIFF WBC: CPT

## 2020-04-10 PROCEDURE — 82728 ASSAY OF FERRITIN: CPT

## 2020-04-10 PROCEDURE — 77001 FLUOROGUIDE FOR VEIN DEVICE: CPT | Performed by: SURGERY

## 2020-04-10 PROCEDURE — 6360000002 HC RX W HCPCS: Performed by: INTERNAL MEDICINE

## 2020-04-10 PROCEDURE — 6360000002 HC RX W HCPCS

## 2020-04-10 PROCEDURE — C1887 CATHETER, GUIDING: HCPCS

## 2020-04-10 PROCEDURE — B54CZZA ULTRASONOGRAPHY OF LEFT LOWER EXTREMITY VEINS, GUIDANCE: ICD-10-PCS | Performed by: SURGERY

## 2020-04-10 PROCEDURE — 2580000003 HC RX 258: Performed by: INTERNAL MEDICINE

## 2020-04-10 PROCEDURE — 86738 MYCOPLASMA ANTIBODY: CPT

## 2020-04-10 RX ORDER — HEPARIN SODIUM 1000 [USP'U]/ML
10000 INJECTION, SOLUTION INTRAVENOUS; SUBCUTANEOUS PRN
Status: DISCONTINUED | OUTPATIENT
Start: 2020-04-10 | End: 2020-04-10

## 2020-04-10 RX ORDER — HEPARIN SODIUM 1000 [USP'U]/ML
5000 INJECTION, SOLUTION INTRAVENOUS; SUBCUTANEOUS PRN
Status: DISCONTINUED | OUTPATIENT
Start: 2020-04-10 | End: 2020-04-10

## 2020-04-10 RX ORDER — SODIUM CHLORIDE 0.9 % (FLUSH) 0.9 %
10 SYRINGE (ML) INJECTION EVERY 12 HOURS SCHEDULED
Status: DISCONTINUED | OUTPATIENT
Start: 2020-04-10 | End: 2020-04-15 | Stop reason: HOSPADM

## 2020-04-10 RX ORDER — POLYETHYLENE GLYCOL 3350 17 G/17G
17 POWDER, FOR SOLUTION ORAL DAILY PRN
Status: DISCONTINUED | OUTPATIENT
Start: 2020-04-10 | End: 2020-04-15 | Stop reason: HOSPADM

## 2020-04-10 RX ORDER — ACETAMINOPHEN 650 MG/1
650 SUPPOSITORY RECTAL EVERY 6 HOURS PRN
Status: DISCONTINUED | OUTPATIENT
Start: 2020-04-10 | End: 2020-04-15 | Stop reason: HOSPADM

## 2020-04-10 RX ORDER — ACETAMINOPHEN 325 MG/1
650 TABLET ORAL EVERY 6 HOURS PRN
Status: DISCONTINUED | OUTPATIENT
Start: 2020-04-10 | End: 2020-04-15 | Stop reason: HOSPADM

## 2020-04-10 RX ORDER — SODIUM CHLORIDE 9 MG/ML
INJECTION, SOLUTION INTRAVENOUS CONTINUOUS
Status: DISCONTINUED | OUTPATIENT
Start: 2020-04-10 | End: 2020-04-15 | Stop reason: HOSPADM

## 2020-04-10 RX ORDER — HEPARIN SODIUM 10000 [USP'U]/100ML
14.97 INJECTION, SOLUTION INTRAVENOUS CONTINUOUS
Status: DISCONTINUED | OUTPATIENT
Start: 2020-04-10 | End: 2020-04-10

## 2020-04-10 RX ORDER — SODIUM CHLORIDE 0.9 % (FLUSH) 0.9 %
10 SYRINGE (ML) INJECTION PRN
Status: DISCONTINUED | OUTPATIENT
Start: 2020-04-10 | End: 2020-04-15 | Stop reason: HOSPADM

## 2020-04-10 RX ADMIN — ENOXAPARIN SODIUM 135 MG: 150 INJECTION SUBCUTANEOUS at 16:55

## 2020-04-10 RX ADMIN — CEFTRIAXONE SODIUM 1 G: 500 INJECTION, POWDER, FOR SOLUTION INTRAMUSCULAR; INTRAVENOUS at 12:05

## 2020-04-10 RX ADMIN — HEPARIN SODIUM 15 UNITS/KG/HR: 10000 INJECTION, SOLUTION INTRAVENOUS at 05:15

## 2020-04-10 RX ADMIN — SODIUM CHLORIDE, PRESERVATIVE FREE 10 ML: 5 INJECTION INTRAVENOUS at 21:41

## 2020-04-10 RX ADMIN — SODIUM CHLORIDE: 9 INJECTION, SOLUTION INTRAVENOUS at 23:14

## 2020-04-10 RX ADMIN — SODIUM CHLORIDE: 9 INJECTION, SOLUTION INTRAVENOUS at 05:14

## 2020-04-10 RX ADMIN — SODIUM CHLORIDE, PRESERVATIVE FREE 10 ML: 5 INJECTION INTRAVENOUS at 08:48

## 2020-04-10 ASSESSMENT — PAIN SCALES - GENERAL
PAINLEVEL_OUTOF10: 0

## 2020-04-10 NOTE — CONSULTS
Prednisone    Social History:     Tobacco:    reports that she has never smoked. She has never used smokeless tobacco.  Alcohol:      reports no history of alcohol use. Drug Use:  reports no history of drug use.     Family History:     Family History   Problem Relation Age of Onset    Breast Cancer Sister 54    Breast Cancer Maternal Aunt 71    Other Maternal Cousin         Atypical Ductal Hyperplasia     Uterine Cancer Sister 32    Other Sister         breast cyst    Cataracts Mother     Glaucoma Mother     Heart Disease Father     High Blood Pressure Father     High Cholesterol Father     Mental Retardation Father     Diabetes Neg Hx        Review of Systems:     Positive and Negative as described in HPI    Constitutional: Positive for chills  HEENT:  negative for vision or hearing changes  Respiratory: Positive for shortness of breath  Cardiovascular: positive for chest pain  Gastrointestinal:  negative for nausea, vomiting, diarrhea  Genitourinary:  negative for frequency, dysuria  Integument:  negative for rash, skin lesions  Chest/Breast: Positive for recent pain when breathing  Musculoskeletal:  negative for muscle aches or joint pain  Neurological:  negative for headaches, numbness, pain and tingling extremities  Lymphatics: no lymphadenopathy or painful masses  Behavior/Psych:  negative for depression and anxiety    Physical Exam:     Vitals:  /69   Pulse 96   Temp 98.2 °F (36.8 °C) (Oral)   Resp 26   Ht 5' 6\" (1.676 m)   Wt (!) 309 lb 1.4 oz (140.2 kg)   LMP 02/28/2013   SpO2 99%   BMI 49.89 kg/m²     General appearance - alert, well appearing and in no acute distress  Mental status - oriented to person, place and time with normal affect  Head - normocephalic and atraumatic  Eyes - conjunctiva clear  Ears - hearing appears to be intact  Nose - no drainage noted  Mouth - mucous membranes moist  Neck - supple, trachea midline  Chest - clear to auscultation, normal effort  Heart - +S1/S2  Abdomen - soft, non-distended  Neurological - normal speech, no focal findings or movement disorder noted  Extremities - no cyanosis; RLE with TTP along the right calf  Skin - no gross lesions, rashes, or induration noted      Labs   Labs are always being added and updated. At the time of this note pertinent and available labs have been reviewed. Imaging:       Ct Chest Pulmonary Embolism W Contrast  Result Date: 4/9/2020  1. Massive pulmonary embolus burden involving the right and left pulmonary arteries, extending into the segmental and subsegmental branches bilaterally. 2. Right ventricular strain, within RV to LV ratio measuring 1.8 3. 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 4.  Critical results were called by Dr. Gilles Nixon to Dr Jarrod Wasserman on 4/9/2020 at 20:48. Assessment:     Jules Joy is a 47 y.o.  female who presents with massive b/l pulmonary emboli. PE w/ right heart strain  R/o upper respiratory infection-Covid19  Obesity  Hx of Breast Ca w/ adjuvant chemotx      Plan:     1. Discussed case with the patient and critical care team. Reviewed imaging with attending, Dr. Maddie Aldana as well. Patient is currently stable and resting fairly comfortably on nasal cannula 5L and currently states that she isn't very short of breath at rest. We discussed that the patient is likely a good candidate for attempted removal of clot burden with Inari in the cath lab. The patient is aware that this may improve her symptoms significantly and is open to interventions. 2. Continue high dose heparin gtt  3. Monitor respiratory status per crit care team- please call if patient requires intubation.    4. Will plan to evaluate with attending early in am and possible intervention for later in the morning.       ----------------------------------------    Electronically signed by Melquiades Peñaloza DO on

## 2020-04-10 NOTE — PROGRESS NOTES
Pt back from cath lab. R groin assessed with cath lab nurse present. Surrounding area soft and supple, free from hematoma. No bleeding noted. Will continue to monitor pt.  Pt instructed to keep leg straight and bed rest.

## 2020-04-10 NOTE — PROGRESS NOTES
Spoke with Demetrio Martinez NP regarding transfer. She stated she will talk with [facility] to see if they will accept. If not she will go to [facility].

## 2020-04-10 NOTE — PROGRESS NOTES
Critical care team - Resident sign-out to medicine service      Date and time: 4/10/2020 3:59 PM  Patient's name:  [address] Record Number: [id number]  Patient's account/billing number: [de-identified]  Patient's YOB: 1965  Age: 47 y.o. Date of Admission: 4/10/2020 12:29 AM  Length of stay during current admission: 0    Primary Care Physician: Barabara Siemens, APRN - CNP    Code Status: Full Code    Mode of physician to physician communication:        [x] Via telephone   [] In person     Date and time of sign-out: 4/10/2020 3:59 PM    Accepting Medicine team: nellie CAVAZOS, Mo Mandujano     Accepting team's attending: Dr. Alberto Trejo     Patient's current ICU Bed:  [phone]     Patient's assigned bed on floor:  235        [] Med-Surg Monitored [] Step-down       [] Psychiatry ICU       [] Psych floor     Reason for ICU admission:     biLateral massive PE    ICU course summary: This is a 43-year-old female with known history of stage IIa ductal carcinoma of breast status post mastectomy and chemotherapy in 2017, right upper extremity DVT in 2018 was on Eliquis for 1 year, hypertension, TAY on CPAP was transferred from [place] due to massive bilateral pulmonary embolism with right heart strain. Patient was transferred to covid ICU for concern for covid. Echo was showing D sign. On arrival to ICU, patient was hemodynamically stable and saturating well on 5 L nasal cannula. Vascular surgery was consulted and patient was taken for pulmonary artery thrombectomy yesterday. Hemodynamically stable, on minimal nasal oxygen.        Procedures during patient's ICU stay:     Pulmonary artery thrombectomy    Current Vitals:     /64   Pulse 93   Temp 97.7 °F (36.5 °C) (Oral)   Resp 23   Ht 5' 6\" (1.676 m)   Wt (!) 309 lb 1.4 oz (140.2 kg)   LMP 02/28/2013   SpO2 100%   BMI 49.89 kg/m²       Cultures:     Blood cultures:                 [] None drawn      [] Negative             []  Positive (Details:  )  Urine Culture:                   [] None drawn      [] Negative             []  Positive (Details:  )  Sputum Culture:               [] None drawn       [] Negative             []  Positive (Details:  )   Endotracheal aspirate:     [] None drawn       [] Negative             []  Positive (Details:  )       Consults:     1. Vascular surgery  Infectious disease    Assessment:     Principal Problem:    Acute massive pulmonary embolism (HCC)  Active Problems:    Bipolar 1 disorder ([facility] [address]    Hyperlipidemia    Malignant neoplasm of overlapping sites of right breast in female, estrogen receptor negative ([facility] [address]    Migraine    H/O right mastectomy    Moderate to severe pulmonary hypertension ([facility] [address]    CARYN (acute kidney injury) ([facility] [address]    Suspected 2019 novel coronavirus infection    TAY on CPAP    Acute respiratory failure with hypoxia ([facility] [address]  Resolved Problems:    * No resolved hospital problems. *    Recommended Follow-up:     1. Transitioned to oral anticoagulant, on eliquis   2. On rocephin for right lower lobe pneumonia vs infarction as per ID  3. COVID negative   4. Okay to get out of bed today. 5. Reconcile home medications       Above mentioned assessment and plan was discussed by me with the admitting medicine resident. The medicine team assigned to the patient by medicine admitting resident will be following up the patient from now onwards on the floor. Calvin Alexis M.D.   Internal Medicine PGY3  4/10/2020, 3:59 PM

## 2020-04-10 NOTE — CONSULTS
Luis Antonio Dalton DO at [address] OF UTERUS N/A 10/13/2017    D & C HYSTEROSCOPY with polypectomy performed by Vivi Ortiz MD at [address] [facility] / REMOVAL / [address] Said Left 11/9/2017    PORT INSERTION performed by Dipika Maddox MD at [address] [facility] / REMOVAL / [address] Said N/A 11/30/2017    Port Removal & Insertion performed by Dipika Maddox MD at [address] Right 10/19/2017     [address] [facility]    MASTECTOMY Right 10/19/2017    Right Breast Mastectomy with  [place] Node Biopsy performed by Dipika Maddox MD at [address] CTR VAD W/SUBQ PORT AGE 5 YR/> Left 3/1/2018    PORT Exchange performed by Dipika Maddox MD at [place] [address] Left 2014, 2015    x 2 surgeries total; Dr. Ashwini Sim TUNNELED VENOUS PORT PLACEMENT Left 11/30/2017    removal of et replacement of       Medications:      sodium chloride flush  10 mL Intravenous 2 times per day       Social History:     Social History     Socioeconomic History    Marital status: Single     Spouse name: Not on file    Number of children: Not on file    Years of education: Not on file    Highest education level: Not on file   Occupational History    Not on file   Social Needs    Financial resource strain: Not on file    Food insecurity     Worry: Not on file     Inability: Not on file    Transportation needs     Medical: Not on file     Non-medical: Not on file   Tobacco Use    Smoking status: Never Smoker    Smokeless tobacco: Never Used    Tobacco comment: Never smoker.  TC, RRT 4/5/18   Substance and Sexual Activity    Alcohol use: No    Drug use: No    Sexual activity: Yes     Partners: Male     Birth control/protection: Surgical   Lifestyle    Physical activity     Days per week: Not on file     Minutes per session: Not on file    Stress: Not on file   Relationships    Social connections     Talks on phone: Not on file     Gets together: Not on file     Attends Restoration service: Not on file     Active member of club or organization: Not on file     Attends meetings of clubs or organizations: Not on file     Relationship status: Not on file    Intimate partner violence     Fear of current or ex partner: Not on file     Emotionally abused: Not on file     Physically abused: Not on file     Forced sexual activity: Not on file   Other Topics Concern    Not on file   Social History Narrative    Not on file       Family History:     Family History   Problem Relation Age of Onset    Breast Cancer Sister 54    Breast Cancer Maternal Aunt 71    Other Maternal Cousin         Atypical Ductal Hyperplasia     Uterine Cancer Sister 32    Other Sister         breast cyst    Cataracts Mother     Glaucoma Mother     Heart Disease Father     High Blood Pressure Father     High Cholesterol Father     Mental Retardation Father     Diabetes Neg Hx         Allergies:   Prednisone     Review of Systems:     Review of Systems   Constitutional: Positive for activity change. HENT: Negative for congestion. Eyes: Negative for pain. Respiratory: Negative for choking. Cardiovascular: Negative for chest pain. Gastrointestinal: Negative for abdominal pain. Endocrine: Negative for polydipsia. Genitourinary: Negative for dysuria and flank pain. Musculoskeletal: Negative for back pain. Skin: Negative for color change. Allergic/Immunologic: Negative for immunocompromised state. Neurological: Negative for numbness. Hematological: Negative for adenopathy. Psychiatric/Behavioral: Negative for behavioral problems.        Physical Examination :     Patient Vitals for the past 8 hrs:   BP Temp Temp src Pulse Resp SpO2 Weight   04/10/20 0850 (!) 122/56 97.7 °F (36.5 °C) Oral 92 23 96 % --   04/10/20 0700 103/68 -- -- 90 25 99 % --   04/10/20 0600 102/68 -- -- 92 24 96 % (!) 309 lb 1.4 oz (140.2 kg)   04/10/20 0500 81/68 -- -- HIV in the last 72 hours. No results for input(s): BC in the last 72 hours. Lab Results   Component Value Date    CREATININE 1.64 04/09/2020    GLUCOSE 117 04/09/2020       Detailed results: Thank you for allowing us to participate in the care of this patient. Please call with questions. This note is created with the assistance of a speech recognition program.  While intending to generate adocument that actually reflects the content of the visit, the document can still have some errors including those of syntax and sound a like substitutions which may escape proof reading. It such instances, actual meaningcan be extrapolated by contextual diversion.     Benjamin Chandra MD  Office: [phone]  Perfect serve / office [phone]

## 2020-04-10 NOTE — BRIEF OP NOTE
Brief Postoperative Note      Patient: Freddie Schaumann  YOB: 1965  MRN: [id number]    Date of Procedure: 4/10/2020    Pre-Op Diagnosis: Massive bilateral pulmonary embolus with right heart strain    Post-Op Diagnosis: Same       Procedure:  1) US guided vascular access of right common femoral vein  2) Pulmonary artery angiogram with measurement of PA pressures  3) Mechanical thrombectomy of right and left pulmonary arteries  4) US guided vascular access of left common femoral vein  5) Left ilial caval venogram  6) Placement of CV non-tunneled catheter    Surgeon: Dr. Demetria Kaur    Assistant: Dr. Dino Buerger    EBL: 500 ml    Contrast: 30 ml    Anesthesia: local with IV sedation    Complications: None    Specimens: Pulmonary artery thrombus           Implants: left femoral central line      Drains: none    Findings: Elevated PA pressures before and after intervention. Significant clot burden, unable to remove clot from right lower lobe pulmonary artery. Clinical improvement with decreased work of breathing and improved O2 saturations. Plan:  Ok to transition to therapeutic lovenox today and oral NOAC tomorrow. Ok to be out of bed after 1 hour.     Electronically signed by Caprice Epperson MD on 4/10/2020 at 12:00 PM

## 2020-04-11 LAB
ANION GAP SERPL CALCULATED.3IONS-SCNC: 16 MMOL/L (ref 9–17)
BUN BLDV-MCNC: 32 MG/DL (ref 6–20)
BUN/CREAT BLD: ABNORMAL (ref 9–20)
CALCIUM SERPL-MCNC: 8.6 MG/DL (ref 8.6–10.4)
CHLORIDE BLD-SCNC: 106 MMOL/L (ref 98–107)
CO2: 17 MMOL/L (ref 20–31)
CREAT SERPL-MCNC: 1.37 MG/DL (ref 0.5–0.9)
GFR AFRICAN AMERICAN: 49 ML/MIN
GFR NON-AFRICAN AMERICAN: 40 ML/MIN
GFR SERPL CREATININE-BSD FRML MDRD: ABNORMAL ML/MIN/{1.73_M2}
GFR SERPL CREATININE-BSD FRML MDRD: ABNORMAL ML/MIN/{1.73_M2}
GLUCOSE BLD-MCNC: 126 MG/DL (ref 70–99)
LEGIONELLA PNEUMOPHILIA AG, URINE: NEGATIVE
POTASSIUM SERPL-SCNC: 4 MMOL/L (ref 3.7–5.3)
SODIUM BLD-SCNC: 139 MMOL/L (ref 135–144)
SOURCE: NORMAL
STREP PNEUMONIAE ANTIGEN: NEGATIVE

## 2020-04-11 PROCEDURE — 2580000003 HC RX 258: Performed by: STUDENT IN AN ORGANIZED HEALTH CARE EDUCATION/TRAINING PROGRAM

## 2020-04-11 PROCEDURE — 99232 SBSQ HOSP IP/OBS MODERATE 35: CPT | Performed by: SURGERY

## 2020-04-11 PROCEDURE — 99291 CRITICAL CARE FIRST HOUR: CPT | Performed by: INTERNAL MEDICINE

## 2020-04-11 PROCEDURE — 99254 IP/OBS CNSLTJ NEW/EST MOD 60: CPT | Performed by: HOSPITALIST

## 2020-04-11 PROCEDURE — 86738 MYCOPLASMA ANTIBODY: CPT

## 2020-04-11 PROCEDURE — 6370000000 HC RX 637 (ALT 250 FOR IP): Performed by: HOSPITALIST

## 2020-04-11 PROCEDURE — 6370000000 HC RX 637 (ALT 250 FOR IP): Performed by: STUDENT IN AN ORGANIZED HEALTH CARE EDUCATION/TRAINING PROGRAM

## 2020-04-11 PROCEDURE — 99232 SBSQ HOSP IP/OBS MODERATE 35: CPT | Performed by: INTERNAL MEDICINE

## 2020-04-11 PROCEDURE — 2580000003 HC RX 258: Performed by: INTERNAL MEDICINE

## 2020-04-11 PROCEDURE — 1200000000 HC SEMI PRIVATE

## 2020-04-11 PROCEDURE — 80048 BASIC METABOLIC PNL TOTAL CA: CPT

## 2020-04-11 PROCEDURE — 6360000002 HC RX W HCPCS: Performed by: INTERNAL MEDICINE

## 2020-04-11 PROCEDURE — 6360000002 HC RX W HCPCS: Performed by: STUDENT IN AN ORGANIZED HEALTH CARE EDUCATION/TRAINING PROGRAM

## 2020-04-11 RX ORDER — GUAIFENESIN DEXTROMETHORPHAN HYDROBROMIDE ORAL SOLUTION 10; 100 MG/5ML; MG/5ML
10 SOLUTION ORAL EVERY 4 HOURS PRN
Status: DISCONTINUED | OUTPATIENT
Start: 2020-04-11 | End: 2020-04-15 | Stop reason: HOSPADM

## 2020-04-11 RX ADMIN — APIXABAN 10 MG: 5 TABLET, FILM COATED ORAL at 21:38

## 2020-04-11 RX ADMIN — Medication 10 ML: at 19:12

## 2020-04-11 RX ADMIN — Medication 10 ML: at 23:52

## 2020-04-11 RX ADMIN — BENZOCAINE AND MENTHOL 1 LOZENGE: 15; 3.6 LOZENGE ORAL at 17:54

## 2020-04-11 RX ADMIN — CEFTRIAXONE SODIUM 1 G: 500 INJECTION, POWDER, FOR SOLUTION INTRAMUSCULAR; INTRAVENOUS at 09:09

## 2020-04-11 RX ADMIN — SODIUM CHLORIDE, PRESERVATIVE FREE 10 ML: 5 INJECTION INTRAVENOUS at 07:55

## 2020-04-11 RX ADMIN — SODIUM CHLORIDE, PRESERVATIVE FREE 10 ML: 5 INJECTION INTRAVENOUS at 22:07

## 2020-04-11 RX ADMIN — ENOXAPARIN SODIUM 135 MG: 150 INJECTION SUBCUTANEOUS at 07:54

## 2020-04-11 ASSESSMENT — ENCOUNTER SYMPTOMS
EYE PAIN: 0
CHOKING: 0
BACK PAIN: 0
ABDOMINAL PAIN: 0
COLOR CHANGE: 0

## 2020-04-11 ASSESSMENT — PAIN SCALES - GENERAL
PAINLEVEL_OUTOF10: 0
PAINLEVEL_OUTOF10: 0

## 2020-04-11 NOTE — PROGRESS NOTES
Pt transferred in stable condition to room 235. Nurse to nurse report given to [facility]. Pt transported in bed with monitor.

## 2020-04-11 NOTE — PROGRESS NOTES
Infusions:   sodium chloride 50 mL/hr at 04/10/20 2314     PRN Meds:   benzocaine-menthol, 1 lozenge, Q2H PRN  sodium chloride flush, 10 mL, PRN  acetaminophen, 650 mg, Q6H PRN    Or  acetaminophen, 650 mg, Q6H PRN  polyethylene glycol, 17 g, Daily PRN         Additional Respiratory  Assessments  Pulse: 92  Resp: 26  SpO2: 96 %      Laboratory findings:    Complete Blood Count:   Recent Labs     04/09/20  1043 04/09/20  2104 04/10/20  1329   WBC 14.5* 13.7* 12.0*   HGB 12.7 12.2 10.6*   HCT 40.8 39.0 33.5*    168 163        Last 3 Blood Glucose:   Recent Labs     04/09/20  1655 04/10/20  1329 04/11/20  0433   GLUCOSE 117* 158* 126*        PT/INR:    Lab Results   Component Value Date    PROTIME 11.4 04/09/2020    INR 1.1 04/09/2020     PTT:    Lab Results   Component Value Date    APTT >120.0 04/10/2020       Comprehensive Metabolic Profile:   Recent Labs     04/09/20  1043 04/09/20  1655 04/10/20  1329 04/11/20  0433    135 140 139   K 4.0 4.3 4.2 4.0    107 106 106   CO2 18* 14* 16* 17*   BUN 41* 41* 34* 32*   CREATININE 1.81* 1.64* 1.48* 1.37*   GLUCOSE 237* 117* 158* 126*   CALCIUM 9.4 9.1 8.5* 8.6   PROT 7.5  --   --   --    LABALBU 4.0  --   --   --    BILITOT 0.29*  --   --   --    ALKPHOS 84  --   --   --    AST 30  --   --   --    ALT 40*  --   --   --       Magnesium:   Lab Results   Component Value Date    MG 1.8 04/10/2020     Phosphorus: No results found for: PHOS  Ionized Calcium: No results found for: CAION       Troponin: No results for input(s): TROPONINI in the last 72 hours.     Microbiology:    Cultures during this admission:     Blood cultures:                 [x] None drawn      [] Negative             []  Positive (Details:  )  Urine Culture:                   [x] None drawn      [] Negative             []  Positive (Details:  )  Sputum Culture:               [] None drawn       [x] Negative             []  Positive (Details:  )   Endotracheal aspirate:     [x] None drawn

## 2020-04-11 NOTE — CARE COORDINATION
Eliquis sent to [facility] in [place] charge-will be ready on Monday. Tried to call patient in the room-no answer.

## 2020-04-11 NOTE — PROGRESS NOTES
attention. She has had a cough some fibers shortness fatigue shortness of breath muscle aches and weakness no vomiting. She feels she is had for about 2 weeks and upper respiratory tract infection, she was admitted to the [facility] unit to be ruled out -decreased appetite, lactic acid elevated when she came in, CT of the chest shows a massive right and left pulmonary artery pulmonary emboli. Acute renal failure. Interval changes  4/11/2020     COVID neg   No fever  BOP stable  On O2 NC  Tolerated thrombectomy on 4/10  Pulse ox 96  Moving to the floor  Creat 1.37        Summary of relevant labs:  Labs:  WBC 14-13  Micro:    Respiratory PCR panel negative  COV ID pending  Imaging:  CT of the chest shows 4/9  Massive pulmonary embolus burden involving the right and left pulmonary   arteries, extending into the segmental and subsegmental branches bilaterally. 2. Right ventricular strain, within RV to LV ratio measuring 1.8   3. 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 Airspace disease is seen within the right lower lobe, and may   represent an area of pulmonary infarct, or pneumonia.  Pleural based opacity   seen anteriorly within the right middle lobe, and may represent a small area   of pulmonary infarct         I have personally reviewed the past medical history, past surgical history, medications, social history, and family history, and I haveupdated the database accordingly.   Past Medical History:     Past Medical History:   Diagnosis Date    Bipolar 1 disorder ([address]     Breast cancer ([facility] 2017    right side     DVT of axillary vein, acute right ([address]     Eye twitch 2019    Headache(784.0)     Hyperlipidemia     Migraine        Past Surgical  History:     Past Surgical History:   Procedure Laterality Date    CATHETER REMOVAL Left 6/6/2019    PORT REMOVAL performed by Alisa London DO at per session: Not on file    Stress: Not on file   Relationships    Social connections     Talks on phone: Not on file     Gets together: Not on file     Attends Yazidism service: Not on file     Active member of club or organization: Not on file     Attends meetings of clubs or organizations: Not on file     Relationship status: Not on file    Intimate partner violence     Fear of current or ex partner: Not on file     Emotionally abused: Not on file     Physically abused: Not on file     Forced sexual activity: Not on file   Other Topics Concern    Not on file   Social History Narrative    Not on file       Family History:     Family History   Problem Relation Age of Onset    Breast Cancer Sister 54    Breast Cancer Maternal Aunt 71    Other Maternal Cousin         Atypical Ductal Hyperplasia     Uterine Cancer Sister 32    Other Sister         breast cyst    Cataracts Mother     Glaucoma Mother     Heart Disease Father     High Blood Pressure Father     High Cholesterol Father     Mental Retardation Father     Diabetes Neg Hx         Allergies:   Prednisone     Review of Systems:     Review of Systems   Constitutional: Positive for activity change. Negative for appetite change. HENT: Negative for congestion. Eyes: Negative for pain. Respiratory: Negative for choking. Cardiovascular: Negative for chest pain. Gastrointestinal: Negative for abdominal pain. Endocrine: Negative for heat intolerance. Genitourinary: Negative for dysuria. Musculoskeletal: Negative for back pain. Skin: Negative for pallor. Allergic/Immunologic: Negative for immunocompromised state. Neurological: Negative for numbness. Hematological: Negative for adenopathy. Psychiatric/Behavioral: Negative for confusion.        Physical Examination :     Patient Vitals for the past 8 hrs:   BP Temp Temp src Pulse Resp SpO2   04/11/20 1615 -- 98.5 °F (36.9 °C) Oral -- 16 91 %   04/11/20 1315 (!) 121/92 98.2 °F IBILI 0.17   BILITOT 0.29*   ALKPHOS 84   ALT 40*   AST 30     No results for input(s): RPR in the last 72 hours. No results for input(s): HIV in the last 72 hours. No results for input(s): BC in the last 72 hours. Lab Results   Component Value Date    CREATININE 1.37 04/11/2020    GLUCOSE 126 04/11/2020       Detailed results: Thank you for allowing us to participate in the care of this patient. Please call with questions. This note is created with the assistance of a speech recognition program.  While intending to generate adocument that actually reflects the content of the visit, the document can still have some errors including those of syntax and sound a like substitutions which may escape proof reading. It such instances, actual meaningcan be extrapolated by contextual diversion.     Lianne Fairchild MD  Office: [phone]  Perfect serve / office [phone]

## 2020-04-11 NOTE — PROGRESS NOTES
11/9/2017    PORT INSERTION performed by Vishnu Chase MD at [address] [facility] [address] [facility] / REMOVAL / REPLACEMENT VENOUS ACCESS CATHETER N/A 11/30/2017    Port Removal & Insertion performed by Vishnu Chase MD at [address] Right 10/19/2017     [address] [facility]    MASTECTOMY Right 10/19/2017    Right Breast Mastectomy with  [place] Node Biopsy performed by Vishnu Chase MD at [address] [facility] CTR VAD W/SUBQ PORT AGE 5 YR/> Left 3/1/2018    PORT Exchange performed by Vishnu Chase MD at [facility] Left 2014, 2015    x 2 surgeries total; Dr. Theodore Salazar TUNNELED VENOUS PORT PLACEMENT Left 11/30/2017    removal of et replacement of        Medications Prior to Admission:     Prior to Admission medications    Medication Sig Start Date End Date Taking? Authorizing Provider   apixaban (ELIQUIS DVT/PE STARTER PACK) 5 MG TABS tablet Take 10 mg (2 tablets) orally twice daily for 7 days, then take 5 mg (1 tablet) orally twice daily thereafter. 4/11/20  Yes Ale Reyes MD   busPIRone (BUSPAR) 7.5 MG tablet take 1 tablet by mouth twice a day 2/25/20   Historical Provider, MD   OXcarbazepine (TRILEPTAL) 150 MG tablet take 1 tablet by mouth every morning BEFORE NOON 2/25/20   Historical Provider, MD   traMADol (ULTRAM) 50 MG tablet Take 1 tablet by mouth every 6 hours as needed for Pain for up to 60 days. Intended supply: 5 days.  Take lowest dose possible to manage pain 2/26/20 4/26/20  [facility], MD   pravastatin (PRAVACHOL) 40 MG tablet take 1 tablet by mouth once daily 2/13/20   MAGGIE Chow - CNP   potassium chloride (KLOR-CON M) 20 MEQ extended release tablet take 1 tablet by mouth once daily 2/13/20   [facility], MD   Calcium Carbonate-Vitamin D (OYSTER SHELL CALCIUM/D) 500-200 MG-UNIT TABS Take 1 tablet by mouth 2 times daily 1/21/20 1/20/21  [facility], MD   Cyanocobalamin ER (RA VITAMIN B-12 TR) 1000 MCG TBCR take 1 tablet by mouth once daily 1/17/20 normocephalic, atraumatic. Eye: no icterus, redness, pupils equal and reactive, extraocular eye movements intact, conjunctiva clear  Ear: normal external ear, no discharge, hearing intact  Nose:  no drainage noted  Mouth: mucous membranes moist  Neck: supple, no carotid bruits, thyroid not palpable  Lungs: Bilateral equal air entry, clear to ausculation, no wheezing, rales or rhonchi, normal effort  Cardiovascular: normal rate, regular rhythm, no murmur, gallop, rub. Abdomen: Soft, nontender, nondistended, normal bowel sounds, no hepatomegaly or splenomegaly  Neurologic: There are no new focal motor or sensory deficits, normal muscle tone and bulk, no abnormal sensation, normal speech, cranial nerves II through XII grossly intact  Skin: No gross lesions, rashes, bruising or bleeding on exposed skin area  Extremities:  peripheral pulses palpable, no pedal edema or calf pain with palpation  Psych: normal affect    Investigations:      Laboratory Testing:  Recent Results (from the past 24 hour(s))   Basic Metabolic Panel w/ Reflex to MG    Collection Time: 04/11/20  4:33 AM   Result Value Ref Range    Glucose 126 (H) 70 - 99 mg/dL    BUN 32 (H) 6 - 20 mg/dL    CREATININE 1.37 (H) 0.50 - 0.90 mg/dL    Bun/Cre Ratio NOT REPORTED 9 - 20    Calcium 8.6 8.6 - 10.4 mg/dL    Sodium 139 135 - 144 mmol/L    Potassium 4.0 3.7 - 5.3 mmol/L    Chloride 106 98 - 107 mmol/L    CO2 17 (L) 20 - 31 mmol/L    Anion Gap 16 9 - 17 mmol/L    GFR Non-African American 40 (L) >60 mL/min    GFR  49 (L) >60 mL/min    GFR Comment          GFR Staging NOT REPORTED        Imaging/Diagonstics:  Xr Chest Portable    Result Date: 4/9/2020  No acute cardiopulmonary process. No significant change from prior examination. Ct Chest Pulmonary Embolism W Contrast    Result Date: 4/9/2020  1.  Massive pulmonary embolus burden involving the right and left pulmonary arteries, extending into the segmental and subsegmental branches consolidation. This may be a wedge section from patient's PE. Patient currently on Rocephin. ID on board. 5. History of breast cancer currently on hormone treatment- patient has a history of breast cancer status post mastectomy. Patient is currently on hormone treatment. This most likely predispose patient to PE. Patient will need to most likely be lifelong anticoagulation. Vascular surgery recommends that patient be on anticoagulation for at least a year. 6. Acute kidney injury-patient noted to have elevation in creatinine on admission. Patient's creatinine has trended down. Patient received IV fluid hydration. 7. Hyperlipidemia- patient with history of hyperlipidemia. Patient to be restarted on her home statin. 8. Migraines-patient has a history of migraine. Patient will be continued on Fioricet for if she has any migraines. 9. Bipolar disorder-patient has a history of bipolar disorder. Patient will be continued on her psych medications.     Consultations:   IP CONSULT TO INFECTIOUS DISEASES  IP CONSULT TO VASCULAR SURGERY  IP CONSULT TO Alex Steel MD  4/11/2020  5:32 PM    Copy sent to MAGGIE Gary - CNP

## 2020-04-11 NOTE — PLAN OF CARE
Problem: Falls - Risk of:  Goal: Will remain free from falls  Description: Will remain free from falls  4/11/2020 1647 by Sanjana Lancaster RN  Outcome: Ongoing  4/11/2020 0904 by Tad Alvarenga RN  Outcome: Ongoing  Goal: Absence of physical injury  Description: Absence of physical injury  4/11/2020 1647 by Sanjana Lancastre RN  Outcome: Ongoing  4/11/2020 0904 by Tad Alvarenga RN  Outcome: Ongoing

## 2020-04-12 LAB
ABSOLUTE EOS #: 0.13 K/UL (ref 0–0.44)
ABSOLUTE IMMATURE GRANULOCYTE: 0.13 K/UL (ref 0–0.3)
ABSOLUTE LYMPH #: 3.14 K/UL (ref 1.1–3.7)
ABSOLUTE MONO #: 1.31 K/UL (ref 0.1–1.2)
ANION GAP SERPL CALCULATED.3IONS-SCNC: 14 MMOL/L (ref 9–17)
BASOPHILS # BLD: 0 % (ref 0–2)
BASOPHILS ABSOLUTE: 0 K/UL (ref 0–0.2)
BUN BLDV-MCNC: 28 MG/DL (ref 6–20)
BUN/CREAT BLD: ABNORMAL (ref 9–20)
CALCIUM SERPL-MCNC: 8.3 MG/DL (ref 8.6–10.4)
CHLORIDE BLD-SCNC: 108 MMOL/L (ref 98–107)
CO2: 16 MMOL/L (ref 20–31)
CREAT SERPL-MCNC: 1.18 MG/DL (ref 0.5–0.9)
DIFFERENTIAL TYPE: ABNORMAL
EOSINOPHILS RELATIVE PERCENT: 1 % (ref 1–4)
GFR AFRICAN AMERICAN: 58 ML/MIN
GFR NON-AFRICAN AMERICAN: 48 ML/MIN
GFR SERPL CREATININE-BSD FRML MDRD: ABNORMAL ML/MIN/{1.73_M2}
GFR SERPL CREATININE-BSD FRML MDRD: ABNORMAL ML/MIN/{1.73_M2}
GLUCOSE BLD-MCNC: 126 MG/DL (ref 70–99)
HCT VFR BLD CALC: 30.8 % (ref 36.3–47.1)
HEMOGLOBIN: 10 G/DL (ref 11.9–15.1)
IMMATURE GRANULOCYTES: 1 %
LYMPHOCYTES # BLD: 24 % (ref 24–43)
MCH RBC QN AUTO: 31.3 PG (ref 25.2–33.5)
MCHC RBC AUTO-ENTMCNC: 32.5 G/DL (ref 28.4–34.8)
MCV RBC AUTO: 96.3 FL (ref 82.6–102.9)
MONOCYTES # BLD: 10 % (ref 3–12)
MORPHOLOGY: ABNORMAL
MORPHOLOGY: ABNORMAL
NRBC AUTOMATED: 0.4 PER 100 WBC
PDW BLD-RTO: 14.8 % (ref 11.8–14.4)
PLATELET # BLD: ABNORMAL K/UL (ref 138–453)
PLATELET ESTIMATE: ABNORMAL
PLATELET, FLUORESCENCE: NORMAL K/UL (ref 138–453)
PMV BLD AUTO: ABNORMAL FL (ref 8.1–13.5)
POTASSIUM SERPL-SCNC: 4.4 MMOL/L (ref 3.7–5.3)
RBC # BLD: 3.2 M/UL (ref 3.95–5.11)
RBC # BLD: ABNORMAL 10*6/UL
SEG NEUTROPHILS: 64 % (ref 36–65)
SEGMENTED NEUTROPHILS ABSOLUTE COUNT: 8.39 K/UL (ref 1.5–8.1)
SODIUM BLD-SCNC: 138 MMOL/L (ref 135–144)
WBC # BLD: 13.1 K/UL (ref 3.5–11.3)
WBC # BLD: ABNORMAL 10*3/UL

## 2020-04-12 PROCEDURE — 99233 SBSQ HOSP IP/OBS HIGH 50: CPT | Performed by: INTERNAL MEDICINE

## 2020-04-12 PROCEDURE — 6360000002 HC RX W HCPCS: Performed by: INTERNAL MEDICINE

## 2020-04-12 PROCEDURE — 36415 COLL VENOUS BLD VENIPUNCTURE: CPT

## 2020-04-12 PROCEDURE — 99232 SBSQ HOSP IP/OBS MODERATE 35: CPT | Performed by: INTERNAL MEDICINE

## 2020-04-12 PROCEDURE — 99233 SBSQ HOSP IP/OBS HIGH 50: CPT | Performed by: HOSPITALIST

## 2020-04-12 PROCEDURE — 2580000003 HC RX 258: Performed by: INTERNAL MEDICINE

## 2020-04-12 PROCEDURE — 1200000000 HC SEMI PRIVATE

## 2020-04-12 PROCEDURE — 2580000003 HC RX 258: Performed by: STUDENT IN AN ORGANIZED HEALTH CARE EDUCATION/TRAINING PROGRAM

## 2020-04-12 PROCEDURE — 6370000000 HC RX 637 (ALT 250 FOR IP): Performed by: HOSPITALIST

## 2020-04-12 PROCEDURE — 85055 RETICULATED PLATELET ASSAY: CPT

## 2020-04-12 PROCEDURE — 6370000000 HC RX 637 (ALT 250 FOR IP): Performed by: STUDENT IN AN ORGANIZED HEALTH CARE EDUCATION/TRAINING PROGRAM

## 2020-04-12 PROCEDURE — 80048 BASIC METABOLIC PNL TOTAL CA: CPT

## 2020-04-12 PROCEDURE — 85025 COMPLETE CBC W/AUTO DIFF WBC: CPT

## 2020-04-12 RX ORDER — TOPIRAMATE 50 MG/1
50 TABLET, FILM COATED ORAL 2 TIMES DAILY
Status: DISCONTINUED | OUTPATIENT
Start: 2020-04-12 | End: 2020-04-15 | Stop reason: HOSPADM

## 2020-04-12 RX ORDER — FOLIC ACID 1 MG/1
1 TABLET ORAL DAILY
Status: DISCONTINUED | OUTPATIENT
Start: 2020-04-12 | End: 2020-04-15 | Stop reason: HOSPADM

## 2020-04-12 RX ORDER — BENZTROPINE MESYLATE 0.5 MG/1
0.5 TABLET ORAL DAILY
Status: DISCONTINUED | OUTPATIENT
Start: 2020-04-12 | End: 2020-04-15 | Stop reason: HOSPADM

## 2020-04-12 RX ORDER — FLUTICASONE PROPIONATE 50 MCG
1 SPRAY, SUSPENSION (ML) NASAL DAILY
Status: DISCONTINUED | OUTPATIENT
Start: 2020-04-12 | End: 2020-04-15 | Stop reason: HOSPADM

## 2020-04-12 RX ORDER — OXCARBAZEPINE 150 MG/1
150 TABLET, FILM COATED ORAL DAILY
Status: DISCONTINUED | OUTPATIENT
Start: 2020-04-12 | End: 2020-04-15 | Stop reason: HOSPADM

## 2020-04-12 RX ORDER — ALBUTEROL SULFATE 2.5 MG/3ML
2.5 SOLUTION RESPIRATORY (INHALATION) EVERY 6 HOURS PRN
Status: DISCONTINUED | OUTPATIENT
Start: 2020-04-12 | End: 2020-04-15 | Stop reason: HOSPADM

## 2020-04-12 RX ORDER — QUETIAPINE FUMARATE 25 MG/1
50 TABLET, FILM COATED ORAL NIGHTLY
Status: DISCONTINUED | OUTPATIENT
Start: 2020-04-12 | End: 2020-04-15 | Stop reason: HOSPADM

## 2020-04-12 RX ORDER — HYDROXYZINE HYDROCHLORIDE 10 MG/1
10 TABLET, FILM COATED ORAL DAILY
Status: DISCONTINUED | OUTPATIENT
Start: 2020-04-12 | End: 2020-04-15 | Stop reason: HOSPADM

## 2020-04-12 RX ORDER — BUTALBITAL, ACETAMINOPHEN AND CAFFEINE 50; 325; 40 MG/1; MG/1; MG/1
1 TABLET ORAL EVERY 4 HOURS PRN
Status: DISCONTINUED | OUTPATIENT
Start: 2020-04-12 | End: 2020-04-15 | Stop reason: HOSPADM

## 2020-04-12 RX ORDER — PRAVASTATIN SODIUM 20 MG
40 TABLET ORAL NIGHTLY
Status: DISCONTINUED | OUTPATIENT
Start: 2020-04-12 | End: 2020-04-15 | Stop reason: HOSPADM

## 2020-04-12 RX ORDER — BUSPIRONE HYDROCHLORIDE 5 MG/1
7.5 TABLET ORAL 2 TIMES DAILY
Status: DISCONTINUED | OUTPATIENT
Start: 2020-04-12 | End: 2020-04-15 | Stop reason: HOSPADM

## 2020-04-12 RX ORDER — ECHINACEA PURPUREA EXTRACT 125 MG
1 TABLET ORAL PRN
Status: DISCONTINUED | OUTPATIENT
Start: 2020-04-12 | End: 2020-04-15 | Stop reason: HOSPADM

## 2020-04-12 RX ORDER — LETROZOLE 2.5 MG/1
2.5 TABLET, FILM COATED ORAL DAILY
Status: DISCONTINUED | OUTPATIENT
Start: 2020-04-12 | End: 2020-04-15 | Stop reason: HOSPADM

## 2020-04-12 RX ORDER — TRAMADOL HYDROCHLORIDE 50 MG/1
50 TABLET ORAL EVERY 6 HOURS PRN
Status: DISCONTINUED | OUTPATIENT
Start: 2020-04-12 | End: 2020-04-15 | Stop reason: HOSPADM

## 2020-04-12 RX ADMIN — TOPIRAMATE 50 MG: 50 TABLET, FILM COATED ORAL at 10:24

## 2020-04-12 RX ADMIN — CEFTRIAXONE SODIUM 1 G: 500 INJECTION, POWDER, FOR SOLUTION INTRAMUSCULAR; INTRAVENOUS at 10:20

## 2020-04-12 RX ADMIN — BUSPIRONE HYDROCHLORIDE 7.5 MG: 5 TABLET ORAL at 20:38

## 2020-04-12 RX ADMIN — BUSPIRONE HYDROCHLORIDE 7.5 MG: 5 TABLET ORAL at 11:23

## 2020-04-12 RX ADMIN — BENZTROPINE MESYLATE 0.5 MG: 0.5 TABLET ORAL at 11:23

## 2020-04-12 RX ADMIN — PRAVASTATIN SODIUM 40 MG: 20 TABLET ORAL at 20:38

## 2020-04-12 RX ADMIN — APIXABAN 10 MG: 5 TABLET, FILM COATED ORAL at 20:38

## 2020-04-12 RX ADMIN — APIXABAN 10 MG: 5 TABLET, FILM COATED ORAL at 08:32

## 2020-04-12 RX ADMIN — Medication 1 TABLET: at 10:24

## 2020-04-12 RX ADMIN — LAMOTRIGINE 150 MG: 100 TABLET ORAL at 20:38

## 2020-04-12 RX ADMIN — Medication 10 ML: at 22:16

## 2020-04-12 RX ADMIN — Medication 1 TABLET: at 20:37

## 2020-04-12 RX ADMIN — BENZOCAINE AND MENTHOL 1 LOZENGE: 15; 3.6 LOZENGE ORAL at 08:32

## 2020-04-12 RX ADMIN — TOPIRAMATE 50 MG: 50 TABLET, FILM COATED ORAL at 20:37

## 2020-04-12 RX ADMIN — BENZOCAINE AND MENTHOL 1 LOZENGE: 15; 3.6 LOZENGE ORAL at 17:32

## 2020-04-12 RX ADMIN — Medication 10 ML: at 17:32

## 2020-04-12 RX ADMIN — HYDROXYZINE HYDROCHLORIDE 10 MG: 10 TABLET ORAL at 20:38

## 2020-04-12 RX ADMIN — FLUTICASONE PROPIONATE 1 SPRAY: 50 SPRAY, METERED NASAL at 10:24

## 2020-04-12 RX ADMIN — OXCARBAZEPINE 150 MG: 150 TABLET, FILM COATED ORAL at 11:23

## 2020-04-12 RX ADMIN — LETROZOLE 2.5 MG: 2.5 TABLET ORAL at 10:24

## 2020-04-12 RX ADMIN — SODIUM CHLORIDE, PRESERVATIVE FREE 10 ML: 5 INJECTION INTRAVENOUS at 20:38

## 2020-04-12 RX ADMIN — QUETIAPINE FUMARATE 50 MG: 25 TABLET ORAL at 20:39

## 2020-04-12 RX ADMIN — BENZOCAINE AND MENTHOL 1 LOZENGE: 15; 3.6 LOZENGE ORAL at 10:20

## 2020-04-12 RX ADMIN — SODIUM CHLORIDE: 9 INJECTION, SOLUTION INTRAVENOUS at 08:32

## 2020-04-12 RX ADMIN — FOLIC ACID 1 MG: 1 TABLET ORAL at 11:23

## 2020-04-12 ASSESSMENT — PAIN DESCRIPTION - ONSET: ONSET: ON-GOING

## 2020-04-12 ASSESSMENT — ENCOUNTER SYMPTOMS
ABDOMINAL PAIN: 0
BACK PAIN: 0
CHOKING: 0
SHORTNESS OF BREATH: 1
EYE PAIN: 0

## 2020-04-12 ASSESSMENT — PAIN DESCRIPTION - PAIN TYPE: TYPE: ACUTE PAIN

## 2020-04-12 ASSESSMENT — PAIN DESCRIPTION - PROGRESSION: CLINICAL_PROGRESSION: NOT CHANGED

## 2020-04-12 ASSESSMENT — PAIN DESCRIPTION - FREQUENCY: FREQUENCY: INTERMITTENT

## 2020-04-12 ASSESSMENT — PAIN DESCRIPTION - LOCATION: LOCATION: GROIN

## 2020-04-12 ASSESSMENT — PAIN SCALES - GENERAL: PAINLEVEL_OUTOF10: 5

## 2020-04-12 ASSESSMENT — PAIN DESCRIPTION - DESCRIPTORS: DESCRIPTORS: DISCOMFORT;DULL

## 2020-04-12 ASSESSMENT — PAIN DESCRIPTION - ORIENTATION: ORIENTATION: RIGHT

## 2020-04-12 NOTE — PROGRESS NOTES
[facility] -   Infectious diseases evaluation  admission date 4/10/2020    reason for consultation:   COVUD    Impression :   Current:  · Massive bilateral pulmonary emboli  · Possible CO VID  · Right lower lobe pneumonia versus wedge embolized area  · Leukocytosis  · Respiratory failure  · Acute renal failure    Other:  · Comorbidities, diabetes type 2, hypertension,  Discussion / summary of stay / plan of care   · Come for a chest pain atypical  · 2-week history of cough feverishness fatigue shortness of breath muscle aches decreased appetite  · CT of the chest shows massive bilateral pulmonary emboli, a consolidation on the right lower lung, possibly wedge area from the pulmonary emboli rather than infection  · Thrombectomy 4/10  · COVID neg  Recommendations       · Anticoagulation per critical care  · Ceftriaxone day 3 of 7possible right lower lobe pneumonia  · Discussed with critical care    Infection Control Recommendations   · [place] Precautions  · Contact Isolation   · Airborne isolation  · Droplet Isolation      Antimicrobial Stewardship Recommendations   · Simplification of therapy  · Targeted therapy  ·     Coordination ofOutpatient Care:   · Estimated Length of IV antimicrobials:  · Patient will need Midline / picc Catheter Insertion:   · Patient will need SNF:  · Patient will need outpatient wound care:     History of Present Illness:   Initial history:  Ryan Kumar is a 47y.o.-year-old female who presented because of a left chest discomfort with pain with inspiration and expiration, tenderness to palpation, brought in by her daughter through EMS to the ER. She was started on heparin. On 4/4 it seems she has had a chest pain that was bad while she was grocery shopping felt she was so weak and short of breath she could not even stand up ultimately made her way back to the car but did not seek attention.     She has had a cough some fibers PORT REMOVAL performed by Marisa Farley DO at [address] N/A 10/13/2017    D & C HYSTEROSCOPY with polypectomy performed by Nahun Leon MD at [address] / REMOVAL / [address] Left 11/9/2017    PORT INSERTION performed by Rocio Thomson MD at [address] [facility] / REMOVAL / [address] N/A 11/30/2017    Port Removal & Insertion performed by Rocio Thomson MD at [address] Right 10/19/2017     [facility]    MASTECTOMY Right 10/19/2017    Right Breast Mastectomy with  Blackstone Node Biopsy performed by Rocio Thomson MD at [address] CTR VAD W/SUBQ PORT AGE 5 YR/> Left 3/1/2018    PORT Exchange performed by Rocio Thomson MD at [address] Left 2014, 2015    x 2 surgeries total; Dr. Braxton Dillard TUNNELED VENOUS PORT PLACEMENT Left 11/30/2017    removal of et replacement of       Medications:      benztropine  0.5 mg Oral Daily    busPIRone  7.5 mg Oral BID    calcium carbonate-vitamin D  1 tablet Oral BID    fluticasone  1 spray Nasal Daily    folic acid  1 mg Oral Daily    hydrOXYzine  10 mg Oral Daily    lamoTRIgine  150 mg Oral Nightly    letrozole  2.5 mg Oral Daily    OXcarbazepine  150 mg Oral Daily    pravastatin  40 mg Oral Nightly    QUEtiapine  50 mg Oral Nightly    topiramate  50 mg Oral BID    apixaban  10 mg Oral BID    [START ON 4/18/2020] apixaban  5 mg Oral BID    sodium chloride flush  10 mL Intravenous 2 times per day    cefTRIAXone (ROCEPHIN) IV  1 g Intravenous Q24H       Social History:     Social History     Socioeconomic History    Marital status: Single     Spouse name: Not on file    Number of children: Not on file    Years of education: Not on file    Highest education level: Not on file   Occupational History    Not on file   Social Needs    Financial resource strain: Not on file    Food insecurity Worry: Not on file     Inability: Not on file    Transportation needs     Medical: Not on file     Non-medical: Not on file   Tobacco Use    Smoking status: Never Smoker    Smokeless tobacco: Never Used    Tobacco comment: Never smoker. TC, RRT 4/5/18   Substance and Sexual Activity    Alcohol use: No    Drug use: No    Sexual activity: Yes     Partners: Male     Birth control/protection: Surgical   Lifestyle    Physical activity     Days per week: Not on file     Minutes per session: Not on file    Stress: Not on file   Relationships    Social connections     Talks on phone: Not on file     Gets together: Not on file     Attends Hindu service: Not on file     Active member of club or organization: Not on file     Attends meetings of clubs or organizations: Not on file     Relationship status: Not on file    Intimate partner violence     Fear of current or ex partner: Not on file     Emotionally abused: Not on file     Physically abused: Not on file     Forced sexual activity: Not on file   Other Topics Concern    Not on file   Social History Narrative    Not on file       Family History:     Family History   Problem Relation Age of Onset    Breast Cancer Sister 54    Breast Cancer Maternal Aunt 71    Other Maternal Cousin         Atypical Ductal Hyperplasia     Uterine Cancer Sister 32    Other Sister         breast cyst    Cataracts Mother     Glaucoma Mother     Heart Disease Father     High Blood Pressure Father     High Cholesterol Father     Mental Retardation Father     Diabetes Neg Hx         Allergies:   Prednisone     Review of Systems:     Review of Systems   Constitutional: Positive for activity change. Negative for appetite change. HENT: Negative for congestion. Eyes: Negative for pain. Respiratory: Negative for choking. Cardiovascular: Negative for chest pain. Gastrointestinal: Negative for abdominal pain. Genitourinary: Negative for dysuria.

## 2020-04-12 NOTE — PLAN OF CARE
Problem: Falls - Risk of:  Goal: Will remain free from falls  Description: Will remain free from falls  4/12/2020 0540 by Raul Aguilar RN  Outcome: Ongoing  4/11/2020 1647 by Mp Smith RN  Outcome: Ongoing  Goal: Absence of physical injury  Description: Absence of physical injury  4/12/2020 0540 by Raul Aguilar RN  Outcome: Ongoing  4/11/2020 1647 by Mp Smith RN  Outcome: Ongoing

## 2020-04-12 NOTE — PROGRESS NOTES
edema    Neurological-grossly normal cranial nerves.   No overt motor deficit         MEDICATIONS:    Scheduled Meds:   benztropine  0.5 mg Oral Daily    busPIRone  7.5 mg Oral BID    calcium carbonate-vitamin D  1 tablet Oral BID    fluticasone  1 spray Nasal Daily    folic acid  1 mg Oral Daily    hydrOXYzine  10 mg Oral Daily    lamoTRIgine  150 mg Oral Nightly    letrozole  2.5 mg Oral Daily    OXcarbazepine  150 mg Oral Daily    pravastatin  40 mg Oral Nightly    QUEtiapine  50 mg Oral Nightly    topiramate  50 mg Oral BID    apixaban  10 mg Oral BID    [START ON 4/18/2020] apixaban  5 mg Oral BID    sodium chloride flush  10 mL Intravenous 2 times per day    cefTRIAXone (ROCEPHIN) IV  1 g Intravenous Q24H     Continuous Infusions:   sodium chloride 50 mL/hr at 04/12/20 0832     PRN Meds:   butalbital-acetaminophen-caffeine, 1 tablet, Q4H PRN  sodium chloride, 1 spray, PRN  traMADol, 50 mg, Q6H PRN  albuterol, 2.5 mg, Q6H PRN  benzocaine-menthol, 1 lozenge, Q2H PRN  dextromethorphan-guaiFENesin, 10 mL, Q4H PRN  sodium chloride flush, 10 mL, PRN  acetaminophen, 650 mg, Q6H PRN    Or  acetaminophen, 650 mg, Q6H PRN  polyethylene glycol, 17 g, Daily PRN         Additional Respiratory  Assessments  Pulse: 90  Resp: 20  SpO2: 95 %          ASSESSMENT:     Patient Active Problem List    Diagnosis Date Noted    Leukocytosis     [facility] acquired pneumonia of right lower lobe of lung [facility]     Moderate to severe pulmonary hypertension ([facility] 04/10/2020    CARYN (acute kidney injury) ([facility] 04/10/2020    Suspected COVID-19 virus infection 04/10/2020    TAY on CPAP 04/10/2020    Acute respiratory failure with hypoxia ([address] 04/10/2020    Acute massive pulmonary embolism ([address] 04/09/2020    HX: breast cancer     H/O right mastectomy 02/22/2019    Seasonal allergies 01/24/2019    Acute deep vein thrombosis (DVT) of axillary vein of right upper extremity ([address] 12/04/2018    Malignant neoplasm of

## 2020-04-12 NOTE — PROGRESS NOTES
redness, tenderness in the calves  Skin:  no gross lesions, rashes, induration    Assessment:        Hospital Problems           Last Modified POA    * (Principal) Acute massive pulmonary embolism ([address] 4/11/2020 Yes    Moderate to severe pulmonary hypertension ([address] 4/11/2020 Yes    Suspected COVID-19 virus infection 4/11/2020 Yes    Acute respiratory failure with hypoxia ([address] 4/11/2020 Yes    Community acquired pneumonia of right lower lobe of lung ([address] 4/12/2020 Yes    Malignant neoplasm of overlapping sites of right breast in female, estrogen receptor negative ([address] 4/11/2020 Yes    CARYN (acute kidney injury) ([address] 4/11/2020 Yes    TAY on CPAP 4/11/2020 Yes    Leukocytosis 4/12/2020 Yes    Bipolar 1 disorder ([address] 4/11/2020 Yes    Hyperlipidemia 4/11/2020 Yes    Migraine 4/11/2020 Yes    H/O right mastectomy 4/11/2020 Yes          Plan:        1. Acute pulmonary embolism- patient presented with shortness of breath x1 week. Patient found to have a massive PE. Patient is status post thrombectomy on 4/10/2020. Patient was placed on a heparin drip initially. Patient currently on Eliquis. Patient continues to have shortness of breath. We will continue to monitor. 2. Acute respiratory failure with hypoxia-patient is not on home oxygen. Patient is noted to be on oxygen at this time which is secondary to massive PE. Patient will be weaned off of oxygen as able. Patient may require oxygen on discharge. 3. Suspected COVID infection- patient reports having dry cough and fatigue x3 weeks prior to admission. Patient was thought to have possible CO VID. ID on board. Patient has tested negative. 4. Community-acquired pneumonia of right lower lobe- patient noted to have possible consolidation. This may be a wedge section from patient's PE. Patient currently on Rocephin. ID on board. 5. History of breast cancer currently on hormone treatment- patient has a history of breast cancer status post mastectomy. Patient is currently on hormone treatment. This most likely predispose patient to PE. Patient will need to most likely be lifelong anticoagulation. Vascular surgery recommends that patient be on anticoagulation for at least a year. 6. Acute kidney injury-patient noted to have elevation in creatinine on admission. Patient's creatinine has trended down. Patient received IV fluid hydration. 7. Hyperlipidemia- patient with history of hyperlipidemia. Patient to be restarted on her home statin. 8. Migraines-patient has a history of migraine. Patient will be continued on Fioricet for if she has any migraines. 9. Bipolar disorder-patient has a history of bipolar disorder. Patient will be continued on her psych medications.     Saturnino Simon MD  4/12/2020  11:00 AM

## 2020-04-13 LAB
ANION GAP SERPL CALCULATED.3IONS-SCNC: 12 MMOL/L (ref 9–17)
BUN BLDV-MCNC: 23 MG/DL (ref 6–20)
BUN/CREAT BLD: ABNORMAL (ref 9–20)
CALCIUM SERPL-MCNC: 8.5 MG/DL (ref 8.6–10.4)
CHLORIDE BLD-SCNC: 108 MMOL/L (ref 98–107)
CO2: 17 MMOL/L (ref 20–31)
CREAT SERPL-MCNC: 1.09 MG/DL (ref 0.5–0.9)
GFR AFRICAN AMERICAN: >60 ML/MIN
GFR NON-AFRICAN AMERICAN: 52 ML/MIN
GFR SERPL CREATININE-BSD FRML MDRD: ABNORMAL ML/MIN/{1.73_M2}
GFR SERPL CREATININE-BSD FRML MDRD: ABNORMAL ML/MIN/{1.73_M2}
GLUCOSE BLD-MCNC: 109 MG/DL (ref 70–99)
INTERVENTION: NORMAL
MYCOPLASMA PNEUMONIAE IGM: 0.6
POTASSIUM SERPL-SCNC: 4.1 MMOL/L (ref 3.7–5.3)
SODIUM BLD-SCNC: 137 MMOL/L (ref 135–144)

## 2020-04-13 PROCEDURE — 80048 BASIC METABOLIC PNL TOTAL CA: CPT

## 2020-04-13 PROCEDURE — 1200000000 HC SEMI PRIVATE

## 2020-04-13 PROCEDURE — 6370000000 HC RX 637 (ALT 250 FOR IP): Performed by: HOSPITALIST

## 2020-04-13 PROCEDURE — 6360000002 HC RX W HCPCS: Performed by: INTERNAL MEDICINE

## 2020-04-13 PROCEDURE — 36415 COLL VENOUS BLD VENIPUNCTURE: CPT

## 2020-04-13 PROCEDURE — 2580000003 HC RX 258: Performed by: STUDENT IN AN ORGANIZED HEALTH CARE EDUCATION/TRAINING PROGRAM

## 2020-04-13 PROCEDURE — 6370000000 HC RX 637 (ALT 250 FOR IP): Performed by: STUDENT IN AN ORGANIZED HEALTH CARE EDUCATION/TRAINING PROGRAM

## 2020-04-13 PROCEDURE — 2580000003 HC RX 258: Performed by: INTERNAL MEDICINE

## 2020-04-13 PROCEDURE — 99233 SBSQ HOSP IP/OBS HIGH 50: CPT | Performed by: HOSPITALIST

## 2020-04-13 PROCEDURE — 94660 CPAP INITIATION&MGMT: CPT

## 2020-04-13 PROCEDURE — 99232 SBSQ HOSP IP/OBS MODERATE 35: CPT | Performed by: INTERNAL MEDICINE

## 2020-04-13 PROCEDURE — 94760 N-INVAS EAR/PLS OXIMETRY 1: CPT

## 2020-04-13 RX ADMIN — BUSPIRONE HYDROCHLORIDE 7.5 MG: 5 TABLET ORAL at 08:05

## 2020-04-13 RX ADMIN — BENZTROPINE MESYLATE 0.5 MG: 0.5 TABLET ORAL at 08:05

## 2020-04-13 RX ADMIN — BUSPIRONE HYDROCHLORIDE 7.5 MG: 5 TABLET ORAL at 20:32

## 2020-04-13 RX ADMIN — QUETIAPINE FUMARATE 50 MG: 25 TABLET ORAL at 20:32

## 2020-04-13 RX ADMIN — TOPIRAMATE 50 MG: 50 TABLET, FILM COATED ORAL at 20:29

## 2020-04-13 RX ADMIN — Medication 1 TABLET: at 08:06

## 2020-04-13 RX ADMIN — OXCARBAZEPINE 150 MG: 150 TABLET, FILM COATED ORAL at 08:06

## 2020-04-13 RX ADMIN — SODIUM CHLORIDE: 9 INJECTION, SOLUTION INTRAVENOUS at 04:33

## 2020-04-13 RX ADMIN — FOLIC ACID 1 MG: 1 TABLET ORAL at 08:06

## 2020-04-13 RX ADMIN — PRAVASTATIN SODIUM 40 MG: 20 TABLET ORAL at 20:30

## 2020-04-13 RX ADMIN — FLUTICASONE PROPIONATE 1 SPRAY: 50 SPRAY, METERED NASAL at 08:06

## 2020-04-13 RX ADMIN — APIXABAN 10 MG: 5 TABLET, FILM COATED ORAL at 20:30

## 2020-04-13 RX ADMIN — APIXABAN 10 MG: 5 TABLET, FILM COATED ORAL at 08:05

## 2020-04-13 RX ADMIN — LAMOTRIGINE 150 MG: 100 TABLET ORAL at 20:29

## 2020-04-13 RX ADMIN — TOPIRAMATE 50 MG: 50 TABLET, FILM COATED ORAL at 08:06

## 2020-04-13 RX ADMIN — Medication 1 TABLET: at 20:30

## 2020-04-13 RX ADMIN — Medication 10 ML: at 17:14

## 2020-04-13 RX ADMIN — HYDROXYZINE HYDROCHLORIDE 10 MG: 10 TABLET ORAL at 08:06

## 2020-04-13 RX ADMIN — LETROZOLE 2.5 MG: 2.5 TABLET ORAL at 08:06

## 2020-04-13 RX ADMIN — CEFTRIAXONE SODIUM 1 G: 500 INJECTION, POWDER, FOR SOLUTION INTRAMUSCULAR; INTRAVENOUS at 09:54

## 2020-04-13 ASSESSMENT — ENCOUNTER SYMPTOMS
GASTROINTESTINAL NEGATIVE: 1
SHORTNESS OF BREATH: 1
COUGH: 1

## 2020-04-13 ASSESSMENT — PAIN DESCRIPTION - LOCATION: LOCATION: GENERALIZED

## 2020-04-13 ASSESSMENT — PAIN DESCRIPTION - PROGRESSION

## 2020-04-13 ASSESSMENT — PAIN DESCRIPTION - DESCRIPTORS: DESCRIPTORS: ACHING;DISCOMFORT

## 2020-04-13 ASSESSMENT — PAIN SCALES - GENERAL: PAINLEVEL_OUTOF10: 4

## 2020-04-13 ASSESSMENT — PAIN DESCRIPTION - PAIN TYPE: TYPE: ACUTE PAIN

## 2020-04-13 NOTE — PROGRESS NOTES
[facility]    Progress Note    4/13/2020    1:12 PM    Name:   Beena Holliday  MRN:     [id number]     Acct:      [de-identified]   Room:   [address] [place] [address] Day:  3  Admit Date:  4/10/2020 12:29 AM    PCP:   MAGGIE Goodman CNP  Code Status:  Full Code    Subjective:   Patient reports that she continues to have significant shortness of breath on exertion. RN notes that patient seems very winded when ambulating to the bathroom. Patient is concerned that if she goes home in this condition, she will not be able to get around her home. Patient states that she slept better when she had her BiPAP at night. Review of Systems:     Constitutional:  negative for chills, fevers, sweats  Respiratory: Positive for shortness of breath and cough, positive for dyspnea on exertion; negative for wheezing  Cardiovascular:  negative for chest pain, chest pressure/discomfort, lower extremity edema, palpitations  Gastrointestinal:  negative for abdominal pain, constipation, diarrhea, nausea, vomiting  Neurological:  negative for dizziness, headache    Medications: Allergies:     Allergies   Allergen Reactions    Prednisone Swelling     Whole side of her face swelled when she took it, difficulty breathing       Current Meds:   Scheduled Meds:    benztropine  0.5 mg Oral Daily    busPIRone  7.5 mg Oral BID    calcium carbonate-vitamin D  1 tablet Oral BID    fluticasone  1 spray Nasal Daily    folic acid  1 mg Oral Daily    hydrOXYzine  10 mg Oral Daily    lamoTRIgine  150 mg Oral Nightly    letrozole  2.5 mg Oral Daily    OXcarbazepine  150 mg Oral Daily    pravastatin  40 mg Oral Nightly    QUEtiapine  50 mg Oral Nightly    topiramate  50 mg Oral BID    apixaban  10 mg Oral BID    [START ON 4/18/2020] apixaban  5 mg Oral BID    sodium chloride flush  10 mL Intravenous 2 times per day    cefTRIAXone (ROCEPHIN) IV  1 g Intravenous

## 2020-04-13 NOTE — PROGRESS NOTES
Daily Progress Note      Date and time: 2020 2:01 PM  Patient's name:  [address] Record Number: [id number]  Patient's account/billing number: [de-identified]  Patient's YOB: 1965  Age: 47 y.o. Date of Admission: 4/10/2020 12:29 AM  Length of stay during current admission: 3      Primary Care Physician: MAGGIE Gamino CNP      Code Status: Full Code    Reason for  admission: Shortness of breath and chest pain      SUBJECTIVE:     OVERNIGHT EVENTS:       Sob with ex   o2 now 2l   No wheeze       Review of Systems   Constitutional: Positive for fatigue. Negative for fever. HENT: Negative. Respiratory: Positive for shortness of breath. Cardiovascular: Negative. Endocrine: Negative. Genitourinary: Negative. Musculoskeletal: Negative. OBJECTIVE:     VITAL SIGNS:  /77   Pulse 88   Temp 98.8 °F (37.1 °C) (Oral)   Resp 20   Ht 5' 6\" (1.676 m)   Wt (!) 307 lb 9.6 oz (139.5 kg)   LMP 2013   SpO2 99%   BMI 49.65 kg/m²     Tmax over 24 hours:  Temp (24hrs), Av.3 °F (36.8 °C), Min:97.8 °F (36.6 °C), Max:98.8 °F (37.1 °C)      No data found. Intake/Output Summary (Last 24 hours) at 2020 1401  Last data filed at 2020 1359  Gross per 24 hour   Intake 1365 ml   Output 625 ml   Net 740 ml     Wt Readings from Last 2 Encounters:   20 (!) 307 lb 9.6 oz (139.5 kg)   20 (!) 305 lb 14.4 oz (138.8 kg)     Body mass index is 49.65 kg/m². PHYSICAL EXAMINATION:  Head and neck atraumatic, normocephalic    Lymph nodes-no cervical, supraclavicular lymphadenopathy    Neck-no JVP elevation    Lungs - Ventilating all lobes without any rales, rhonchi, wheezes or dullness. No bronchial breath sounds. Chest expansion equal bilaterally    CVS- S1, S2 regular. p2 loud     Abdomen-nontender, nondistended. Bowel sounds are present.   No organomegaly    Lower extremity-+edema    Upper extremity-no edema    Neurological-grossly

## 2020-04-13 NOTE — PROGRESS NOTES
[facility]  Progress Note    Loren Strauss  MRN: [id number]  Date: 4/13/2020    Reason for F/U :   Right lower lobe pneumonia    Impression :   1. Respiratory failure with hypoxia secondary to the pulmonary emboli  2. Large bilateral pulmonary emboli  3. Right lower lobe pneumonia versus wedge infarct  4. Acute kidney injury    Recommendations:   · Stop Rocephin  · Continue supplemental oxygen for the pulmonary emboli  · The right lower lobe changes are likely related to the infarct rather than pneumonia  · Infectious disease wise the patient is okay for discharge    Infection Control Recommendations:   Universal precautions    Discharge Planning:   Patient will need Midline Catheter Insertion/ PICC line Insertion: No  Patient will need: Home IV , [place],  SNF,  LTAC: Undetermined  Patient willneed outpatient wound care: No    MedicalDecision making / Summary of Stay:   Loren Strauss is a 47y.o.-year-old female who presented because of a left chest discomfort with pain with inspiration and expiration, tenderness to palpation, brought in by her daughter through EMS to the ER. She was started on heparin. On 4/4 it seems she has had a chest pain that was bad while she was grocery shopping felt she was so weak and short of breath she could not even stand up ultimately made her way back to the car but did not seek attention.     She has had a cough some fibers shortness fatigue shortness of breath muscle aches and weakness no vomiting. She feels she is had for about 2 weeks and upper respiratory tract infection, she was admitted to the CO VID unit to be ruled out -decreased appetite, lactic acid elevated when she came in, CT of the chest shows a massive right and left pulmonary artery pulmonary emboli. Acute renal failure.     Current evaluation:4/13/2020    /77   Pulse 88   Temp 98.8 °F (37.1 °C) (Oral)   Resp 20   Ht 5' 6\" (1.676 m)   Wt (!) 307 lb 9.6 oz (139.5 kg) [facility] NASOPHARYNGEAL SWAB    Adenovirus PCR Not Detected    Coronavirus 229E PCR Not Detected    Comment: Coronoviruses detected by this panel are those associated with the clinical common cold. This test will not detect 2019-SARS-COV-2 or other novel coronaviruses. Coronavirus HKU1 PCR Not Detected    Comment: Coronoviruses detected by this panel are those associated with the clinical common cold. This test will not detect 2019-SARS-COV-2 or other novel coronaviruses. Coronavirus NL63 PCR Not Detected    Comment: Coronoviruses detected by this panel are those associated with the clinical common cold. This test will not detect 2019-SARS-COV-2 or other novel coronaviruses. Coronavirus OC43 PCR Not Detected    Comment: Coronoviruses detected by this panel are those associated with the clinical common cold. This test will not detect 2019-SARS-COV-2 or other novel coronaviruses. Human Metapneumovirus PCR Not Detected    Rhino/Enterovirus PCR Not Detected    Influenza A by PCR Not Detected    Influenza A H1 PCR NOT REPORTED    Influenza A H1 (2009) PCR NOT REPORTED    Influenza A H3 PCR NOT REPORTED    Influenza B by PCR Not Detected    Parainfluenza 1 PCR Not Detected    Parainfluenza 2 PCR Not Detected    Parainfluenza 3 PCR Not Detected    Parainfluenza 4 PCR Not Detected    Resp Syncytial Virus PCR Not Detected    Bordetella Parapertussis Not Detected    B Pertussis by PCR Not Detected    Chlamydia pneumoniae By PCR Not Detected    Mycoplasma pneumo by PCR Not Detected    Comment: Performed by multiplexed nucleic acid assay.             Medications:      benztropine  0.5 mg Oral Daily    busPIRone  7.5 mg Oral BID    calcium carbonate-vitamin D  1 tablet Oral BID    fluticasone  1 spray Nasal Daily    folic acid  1 mg Oral Daily    hydrOXYzine  10 mg Oral Daily    lamoTRIgine  150 mg Oral Nightly    letrozole  2.5 mg Oral Daily    OXcarbazepine  150 mg Oral Daily    pravastatin

## 2020-04-14 ENCOUNTER — TELEPHONE (OUTPATIENT)
Dept: VASCULAR SURGERY | Age: 55
End: 2020-04-14

## 2020-04-14 LAB
ABSOLUTE EOS #: 0.42 K/UL (ref 0–0.44)
ABSOLUTE IMMATURE GRANULOCYTE: 0.12 K/UL (ref 0–0.3)
ABSOLUTE LYMPH #: 2.26 K/UL (ref 1.1–3.7)
ABSOLUTE MONO #: 0.62 K/UL (ref 0.1–1.2)
ANION GAP SERPL CALCULATED.3IONS-SCNC: 13 MMOL/L (ref 9–17)
BASOPHILS # BLD: 1 % (ref 0–2)
BASOPHILS ABSOLUTE: 0.04 K/UL (ref 0–0.2)
BUN BLDV-MCNC: 21 MG/DL (ref 6–20)
BUN/CREAT BLD: ABNORMAL (ref 9–20)
CALCIUM SERPL-MCNC: 8.8 MG/DL (ref 8.6–10.4)
CHLORIDE BLD-SCNC: 112 MMOL/L (ref 98–107)
CO2: 17 MMOL/L (ref 20–31)
CREAT SERPL-MCNC: 1.04 MG/DL (ref 0.5–0.9)
DIFFERENTIAL TYPE: ABNORMAL
EOSINOPHILS RELATIVE PERCENT: 5 % (ref 1–4)
GFR AFRICAN AMERICAN: >60 ML/MIN
GFR NON-AFRICAN AMERICAN: 55 ML/MIN
GFR SERPL CREATININE-BSD FRML MDRD: ABNORMAL ML/MIN/{1.73_M2}
GFR SERPL CREATININE-BSD FRML MDRD: ABNORMAL ML/MIN/{1.73_M2}
GLUCOSE BLD-MCNC: 106 MG/DL (ref 70–99)
HCT VFR BLD CALC: 31.7 % (ref 36.3–47.1)
HEMOGLOBIN: 9.4 G/DL (ref 11.9–15.1)
IMMATURE GRANULOCYTES: 2 %
LYMPHOCYTES # BLD: 28 % (ref 24–43)
MCH RBC QN AUTO: 31.2 PG (ref 25.2–33.5)
MCHC RBC AUTO-ENTMCNC: 29.7 G/DL (ref 28.4–34.8)
MCV RBC AUTO: 105.3 FL (ref 82.6–102.9)
MONOCYTES # BLD: 8 % (ref 3–12)
NRBC AUTOMATED: 0 PER 100 WBC
PDW BLD-RTO: 15.4 % (ref 11.8–14.4)
PLATELET # BLD: 149 K/UL (ref 138–453)
PLATELET ESTIMATE: ABNORMAL
PMV BLD AUTO: 10.8 FL (ref 8.1–13.5)
POTASSIUM SERPL-SCNC: 4 MMOL/L (ref 3.7–5.3)
RBC # BLD: 3.01 M/UL (ref 3.95–5.11)
RBC # BLD: ABNORMAL 10*6/UL
SEG NEUTROPHILS: 58 % (ref 36–65)
SEGMENTED NEUTROPHILS ABSOLUTE COUNT: 4.76 K/UL (ref 1.5–8.1)
SODIUM BLD-SCNC: 142 MMOL/L (ref 135–144)
WBC # BLD: 8.2 K/UL (ref 3.5–11.3)
WBC # BLD: ABNORMAL 10*3/UL

## 2020-04-14 PROCEDURE — 99231 SBSQ HOSP IP/OBS SF/LOW 25: CPT | Performed by: INTERNAL MEDICINE

## 2020-04-14 PROCEDURE — 2580000003 HC RX 258: Performed by: STUDENT IN AN ORGANIZED HEALTH CARE EDUCATION/TRAINING PROGRAM

## 2020-04-14 PROCEDURE — 1200000000 HC SEMI PRIVATE

## 2020-04-14 PROCEDURE — 6370000000 HC RX 637 (ALT 250 FOR IP): Performed by: HOSPITALIST

## 2020-04-14 PROCEDURE — 94660 CPAP INITIATION&MGMT: CPT

## 2020-04-14 PROCEDURE — 80048 BASIC METABOLIC PNL TOTAL CA: CPT

## 2020-04-14 PROCEDURE — 99232 SBSQ HOSP IP/OBS MODERATE 35: CPT | Performed by: INTERNAL MEDICINE

## 2020-04-14 PROCEDURE — 99232 SBSQ HOSP IP/OBS MODERATE 35: CPT | Performed by: HOSPITALIST

## 2020-04-14 PROCEDURE — 36415 COLL VENOUS BLD VENIPUNCTURE: CPT

## 2020-04-14 PROCEDURE — 85025 COMPLETE CBC W/AUTO DIFF WBC: CPT

## 2020-04-14 PROCEDURE — 6370000000 HC RX 637 (ALT 250 FOR IP): Performed by: STUDENT IN AN ORGANIZED HEALTH CARE EDUCATION/TRAINING PROGRAM

## 2020-04-14 RX ADMIN — LAMOTRIGINE 150 MG: 100 TABLET ORAL at 21:13

## 2020-04-14 RX ADMIN — TOPIRAMATE 50 MG: 50 TABLET, FILM COATED ORAL at 09:07

## 2020-04-14 RX ADMIN — SODIUM CHLORIDE, PRESERVATIVE FREE 10 ML: 5 INJECTION INTRAVENOUS at 21:17

## 2020-04-14 RX ADMIN — BUSPIRONE HYDROCHLORIDE 7.5 MG: 5 TABLET ORAL at 09:07

## 2020-04-14 RX ADMIN — BUSPIRONE HYDROCHLORIDE 7.5 MG: 5 TABLET ORAL at 21:12

## 2020-04-14 RX ADMIN — BENZOCAINE AND MENTHOL 1 LOZENGE: 15; 3.6 LOZENGE ORAL at 19:43

## 2020-04-14 RX ADMIN — FLUTICASONE PROPIONATE 1 SPRAY: 50 SPRAY, METERED NASAL at 09:06

## 2020-04-14 RX ADMIN — QUETIAPINE FUMARATE 50 MG: 25 TABLET ORAL at 21:13

## 2020-04-14 RX ADMIN — BENZTROPINE MESYLATE 0.5 MG: 0.5 TABLET ORAL at 09:07

## 2020-04-14 RX ADMIN — Medication 10 ML: at 19:43

## 2020-04-14 RX ADMIN — LETROZOLE 2.5 MG: 2.5 TABLET ORAL at 12:46

## 2020-04-14 RX ADMIN — Medication 10 ML: at 09:07

## 2020-04-14 RX ADMIN — Medication 1 TABLET: at 09:07

## 2020-04-14 RX ADMIN — SODIUM CHLORIDE: 9 INJECTION, SOLUTION INTRAVENOUS at 03:07

## 2020-04-14 RX ADMIN — OXCARBAZEPINE 150 MG: 150 TABLET, FILM COATED ORAL at 09:07

## 2020-04-14 RX ADMIN — APIXABAN 10 MG: 5 TABLET, FILM COATED ORAL at 09:07

## 2020-04-14 RX ADMIN — APIXABAN 10 MG: 5 TABLET, FILM COATED ORAL at 21:13

## 2020-04-14 RX ADMIN — PRAVASTATIN SODIUM 40 MG: 20 TABLET ORAL at 21:13

## 2020-04-14 RX ADMIN — FOLIC ACID 1 MG: 1 TABLET ORAL at 09:07

## 2020-04-14 RX ADMIN — TOPIRAMATE 50 MG: 50 TABLET, FILM COATED ORAL at 21:13

## 2020-04-14 RX ADMIN — Medication 1 TABLET: at 21:12

## 2020-04-14 RX ADMIN — HYDROXYZINE HYDROCHLORIDE 10 MG: 10 TABLET ORAL at 09:07

## 2020-04-14 ASSESSMENT — PAIN DESCRIPTION - ONSET: ONSET: GRADUAL

## 2020-04-14 ASSESSMENT — ENCOUNTER SYMPTOMS
COUGH: 1
GASTROINTESTINAL NEGATIVE: 1
SHORTNESS OF BREATH: 1

## 2020-04-14 ASSESSMENT — PAIN DESCRIPTION - PROGRESSION

## 2020-04-14 ASSESSMENT — PAIN DESCRIPTION - FREQUENCY: FREQUENCY: INTERMITTENT

## 2020-04-14 ASSESSMENT — PAIN SCALES - GENERAL: PAINLEVEL_OUTOF10: 0

## 2020-04-14 ASSESSMENT — PAIN - FUNCTIONAL ASSESSMENT: PAIN_FUNCTIONAL_ASSESSMENT: ACTIVITIES ARE NOT PREVENTED

## 2020-04-14 NOTE — PROGRESS NOTES
day     Continuous Infusions:    sodium chloride 50 mL/hr at 20 0307     PRN Meds: butalbital-acetaminophen-caffeine, sodium chloride, traMADol, albuterol, benzocaine-menthol, dextromethorphan-guaiFENesin, sodium chloride flush, acetaminophen **OR** acetaminophen, polyethylene glycol    Data:     Past Medical History:   has a past medical history of Bipolar 1 disorder ([facility] Breast cancer ([facility] DVT of axillary vein, acute right ([facility] Eye twitch, Headache(784.0), Hyperlipidemia, and Migraine. Social History:   reports that she has never smoked. She has never used smokeless tobacco. She reports that she does not drink alcohol or use drugs. Family History:   Family History   Problem Relation Age of Onset    Breast Cancer Sister 54    Breast Cancer Maternal Aunt 71    Other Maternal Cousin         Atypical Ductal Hyperplasia     Uterine Cancer Sister 32    Other Sister         breast cyst    Cataracts Mother     Glaucoma Mother     Heart Disease Father     High Blood Pressure Father     High Cholesterol Father     Mental Retardation Father     Diabetes Neg Hx        Vitals:  /77   Pulse 86   Temp 99.1 °F (37.3 °C) (Oral)   Resp 16   Ht 5' 6\" (1.676 m)   Wt (!) 316 lb 4.8 oz (143.5 kg)   LMP 2013   SpO2 94%   BMI 51.05 kg/m²   Temp (24hrs), Av.3 °F (37.4 °C), Min:99.1 °F (37.3 °C), Max:99.5 °F (37.5 °C)    No results for input(s): POCGLU in the last 72 hours. I/O (24Hr):     Intake/Output Summary (Last 24 hours) at 2020 0857  Last data filed at 2020 0554  Gross per 24 hour   Intake 540 ml   Output 925 ml   Net -385 ml       Labs:  Hematology:  Recent Labs     20  0523 20  0555   WBC 13.1* 8.2   RBC 3.20* 3.01*   HGB 10.0* 9.4*   HCT 30.8* 31.7*   MCV 96.3 105.3*   MCH 31.3 31.2   MCHC 32.5 29.7   RDW 14.8* 15.4*   PLT See Reflexed IPF Result 149   MPV NOT REPORTED 10.8     Chemistry:  Recent Labs     20  0523 20  0450 20  0555

## 2020-04-14 NOTE — PROGRESS NOTES
04/14/20 0313   NIV Type   NIV Started/Stopped On   Equipment Type V60   Mode Bilevel   Mask Type Full face mask   Mask Size Medium   Settings/Measurements   IPAP 12 cmH20   CPAP/EPAP 8 cmH2O   Rate Ordered 14   Resp 15   FiO2  30 %   Vt Exhaled 586 mL   Minute Volume 5.2 Liters   Mask Leak (lpm) 17 lpm     Pt switched to Bipap on the above charted settings due to periods of apnea on CPAP. Pt tolerating well.

## 2020-04-14 NOTE — PROGRESS NOTES
Daily Progress Note      Date and time: 2020 2:22 PM  Patient's name:  Desirae Bashir [address] Record Number: [id number]  Patient's account/billing number: [de-identified]  Patient's YOB: 1965  Age: 47 y.o. Date of Admission: 4/10/2020 12:29 AM  Length of stay during current admission: 4      Primary Care Physician: MAGGIE Mclean CNP      Code Status: Full Code    Reason for  admission: Shortness of breath and chest pain      SUBJECTIVE:     OVERNIGHT EVENTS:       On 1.5  l nc feels better breathing better   Started on bilevel last night          Review of Systems   Constitutional: Positive for fatigue. Negative for fever. HENT: Negative. Respiratory: Positive for shortness of breath. Cardiovascular: Negative. Endocrine: Negative. Genitourinary: Negative. Musculoskeletal: Negative. OBJECTIVE:     VITAL SIGNS:  /77   Pulse 86   Temp 99.1 °F (37.3 °C) (Oral)   Resp 16   Ht 5' 6\" (1.676 m)   Wt (!) 316 lb 4.8 oz (143.5 kg)   LMP 2013   SpO2 94%   BMI 51.05 kg/m²     Tmax over 24 hours:  Temp (24hrs), Av.3 °F (37.4 °C), Min:99.1 °F (37.3 °C), Max:99.5 °F (37.5 °C)      No data found. Intake/Output Summary (Last 24 hours) at 2020 1422  Last data filed at 2020 0554  Gross per 24 hour   Intake 540 ml   Output 300 ml   Net 240 ml     Wt Readings from Last 2 Encounters:   20 (!) 316 lb 4.8 oz (143.5 kg)   20 (!) 305 lb 14.4 oz (138.8 kg)     Body mass index is 51.05 kg/m². PHYSICAL EXAMINATION:  Head and neck atraumatic, normocephalic    Lymph nodes-no cervical, supraclavicular lymphadenopathy    Neck-no JVP elevation    Lungs - Ventilating all lobes without any rales, rhonchi, wheezes or dullness. No bronchial breath sounds. Chest expansion equal bilaterally    CVS- S1, S2 regular. Abdomen-nontender, nondistended. Bowel sounds are present.   No organomegaly    Lower extremity-no edema    Upper Malignant neoplasm of breast in female, estrogen receptor positive ([address] 12/04/2018    Hemifacial spasm 10/01/2018    Combined forms of age-related cataract of both eyes 10/01/2018    Squamous blepharitis of upper and lower eyelids of both eyes 10/01/2018    Migraine 06/01/2018    Memory problem 06/01/2018    Sleep difficulties 06/01/2018    Anxiety and depression 06/01/2018    Muscle twitching 06/01/2018    Nausea with vomiting 01/09/2018    Intractable vomiting with nausea     Nausea and vomiting     Port-A-Cath in place     Malignant neoplasm of overlapping sites of right breast in female, estrogen receptor negative ([address] 11/01/2017    Bipolar 1 disorder ([address]     Hyperlipidemia      Acute pe with cor pulmonale   Principal Problem:    Acute massive pulmonary embolism (HCC)  Active Problems:    Bipolar 1 disorder ([address]    Hyperlipidemia    Malignant neoplasm of overlapping sites of right breast in female, estrogen receptor negative ([address]    Migraine    H/O right mastectomy    Moderate to severe pulmonary hypertension ([address]    CARYN (acute kidney injury) ([address]    Suspected COVID-19 virus infection    TAY on CPAP    Acute respiratory failure with hypoxia ([address]    Leukocytosis    Community acquired pneumonia of right lower lobe of lung ([address]  Resolved Problems:    * No resolved hospital problems.  *       PLAN:   Continue life long anticoagulation   Home o2 eval prior to dc   Cont bipap   Follow up in clinic in 2 months in [facility]     Electronically signed by Janell Palmer MD on 4/14/2020 at 2:22 PM

## 2020-04-15 VITALS
BODY MASS INDEX: 47.09 KG/M2 | DIASTOLIC BLOOD PRESSURE: 71 MMHG | HEIGHT: 66 IN | SYSTOLIC BLOOD PRESSURE: 113 MMHG | TEMPERATURE: 97.8 F | OXYGEN SATURATION: 96 % | RESPIRATION RATE: 16 BRPM | WEIGHT: 293 LBS | HEART RATE: 80 BPM

## 2020-04-15 LAB
ANION GAP SERPL CALCULATED.3IONS-SCNC: 15 MMOL/L (ref 9–17)
BUN BLDV-MCNC: 22 MG/DL (ref 6–20)
BUN/CREAT BLD: ABNORMAL (ref 9–20)
CALCIUM SERPL-MCNC: 8.9 MG/DL (ref 8.6–10.4)
CHLORIDE BLD-SCNC: 111 MMOL/L (ref 98–107)
CO2: 18 MMOL/L (ref 20–31)
CREAT SERPL-MCNC: 1.3 MG/DL (ref 0.5–0.9)
CULTURE: NORMAL
CULTURE: NORMAL
GFR AFRICAN AMERICAN: 52 ML/MIN
GFR NON-AFRICAN AMERICAN: 43 ML/MIN
GFR SERPL CREATININE-BSD FRML MDRD: ABNORMAL ML/MIN/{1.73_M2}
GFR SERPL CREATININE-BSD FRML MDRD: ABNORMAL ML/MIN/{1.73_M2}
GLUCOSE BLD-MCNC: 110 MG/DL (ref 70–99)
Lab: NORMAL
Lab: NORMAL
POTASSIUM SERPL-SCNC: 4.2 MMOL/L (ref 3.7–5.3)
SODIUM BLD-SCNC: 144 MMOL/L (ref 135–144)
SPECIMEN DESCRIPTION: NORMAL
SPECIMEN DESCRIPTION: NORMAL

## 2020-04-15 PROCEDURE — 94761 N-INVAS EAR/PLS OXIMETRY MLT: CPT

## 2020-04-15 PROCEDURE — 6370000000 HC RX 637 (ALT 250 FOR IP): Performed by: STUDENT IN AN ORGANIZED HEALTH CARE EDUCATION/TRAINING PROGRAM

## 2020-04-15 PROCEDURE — 94660 CPAP INITIATION&MGMT: CPT

## 2020-04-15 PROCEDURE — 99232 SBSQ HOSP IP/OBS MODERATE 35: CPT | Performed by: INTERNAL MEDICINE

## 2020-04-15 PROCEDURE — 99239 HOSP IP/OBS DSCHRG MGMT >30: CPT | Performed by: INTERNAL MEDICINE

## 2020-04-15 PROCEDURE — 80048 BASIC METABOLIC PNL TOTAL CA: CPT

## 2020-04-15 PROCEDURE — 6370000000 HC RX 637 (ALT 250 FOR IP): Performed by: HOSPITALIST

## 2020-04-15 PROCEDURE — 36415 COLL VENOUS BLD VENIPUNCTURE: CPT

## 2020-04-15 RX ADMIN — OXCARBAZEPINE 150 MG: 150 TABLET, FILM COATED ORAL at 09:25

## 2020-04-15 RX ADMIN — APIXABAN 10 MG: 5 TABLET, FILM COATED ORAL at 09:24

## 2020-04-15 RX ADMIN — Medication 1 TABLET: at 09:25

## 2020-04-15 RX ADMIN — BENZTROPINE MESYLATE 0.5 MG: 0.5 TABLET ORAL at 09:24

## 2020-04-15 RX ADMIN — TOPIRAMATE 50 MG: 50 TABLET, FILM COATED ORAL at 09:25

## 2020-04-15 RX ADMIN — LETROZOLE 2.5 MG: 2.5 TABLET ORAL at 09:25

## 2020-04-15 RX ADMIN — HYDROXYZINE HYDROCHLORIDE 10 MG: 10 TABLET ORAL at 09:25

## 2020-04-15 RX ADMIN — BUSPIRONE HYDROCHLORIDE 7.5 MG: 5 TABLET ORAL at 09:24

## 2020-04-15 RX ADMIN — FLUTICASONE PROPIONATE 1 SPRAY: 50 SPRAY, METERED NASAL at 09:25

## 2020-04-15 RX ADMIN — FOLIC ACID 1 MG: 1 TABLET ORAL at 09:25

## 2020-04-15 ASSESSMENT — PAIN DESCRIPTION - PROGRESSION
CLINICAL_PROGRESSION: NOT CHANGED

## 2020-04-15 ASSESSMENT — PAIN DESCRIPTION - LOCATION
LOCATION: GROIN
LOCATION: CHEST

## 2020-04-15 ASSESSMENT — PAIN DESCRIPTION - ORIENTATION: ORIENTATION: RIGHT

## 2020-04-15 ASSESSMENT — PAIN SCALES - GENERAL
PAINLEVEL_OUTOF10: 0
PAINLEVEL_OUTOF10: 3
PAINLEVEL_OUTOF10: 0

## 2020-04-15 ASSESSMENT — PAIN DESCRIPTION - ONSET: ONSET: ON-GOING

## 2020-04-15 ASSESSMENT — PAIN - FUNCTIONAL ASSESSMENT: PAIN_FUNCTIONAL_ASSESSMENT: ACTIVITIES ARE NOT PREVENTED

## 2020-04-15 ASSESSMENT — ENCOUNTER SYMPTOMS: SHORTNESS OF BREATH: 1

## 2020-04-15 ASSESSMENT — PAIN DESCRIPTION - PAIN TYPE
TYPE: ACUTE PAIN
TYPE: ACUTE PAIN

## 2020-04-15 ASSESSMENT — PAIN DESCRIPTION - FREQUENCY: FREQUENCY: INTERMITTENT

## 2020-04-15 ASSESSMENT — PAIN DESCRIPTION - DESCRIPTORS: DESCRIPTORS: ACHING;CONSTANT;DISCOMFORT

## 2020-04-15 NOTE — PLAN OF CARE
Problem: Falls - Risk of:  Goal: Will remain free from falls  Description: Will remain free from falls  4/15/2020 0659 by Daljit Chapman RN  Outcome: Ongoing  4/15/2020 0225 by Daljit Chapman RN  Outcome: Ongoing  Goal: Absence of physical injury  Description: Absence of physical injury  4/15/2020 0659 by Daljit Chapman RN  Outcome: Ongoing  4/15/2020 0225 by Daljit Chapman RN  Outcome: Ongoing

## 2020-04-15 NOTE — DISCHARGE SUMMARY
Sonny [facility] [address]    Discharge Summary     Patient ID: Cassia Albarado  :  1965   MRN: [id number]     ACCOUNT:  [de-identified]   Patient's PCP: MAGGIE Moody CNP  Admit Date: 4/10/2020   Discharge Date: 4/15/2020    Length of Stay: 5  Code Status:  Full Code  Admitting Physician: Kassi Oconnell MD  Discharge Physician: Chantale Rhodes MD     Active Discharge Diagnoses:     Hospital Problem Lists:  Principal Problem:    Acute massive pulmonary embolism ([address]  Active Problems:    Bipolar 1 disorder ([address]    Hyperlipidemia    Malignant neoplasm of overlapping sites of right breast in female, estrogen receptor negative ([address]    Migraine    H/O right mastectomy    Moderate to severe pulmonary hypertension ([address]    CARYN (acute kidney injury) ([address]    Suspected COVID-19 virus infection    TAY on CPAP    Acute respiratory failure with hypoxia ([address]    Leukocytosis    Community acquired pneumonia of right lower lobe of lung ([address]  Resolved Problems:    * No resolved hospital problems. *      Admission Condition:  poor     Discharged Condition: fair    Hospital Stay:     Hospital Course:  Cassia Albarado is a 47 y.o. female who was admitted for the management of   Acute massive pulmonary embolism ([address] , presented to ER with chest pain and progressively worsening shortness of breath.       Per the medical record:  \"Patient is a 60-year-old  female with PMH of right breast cancer stage IIa, right upper extremity DVT, hypertension, TAY who was admitted from [facility] ED with bilateral massive PE. Patient was shown to have right heart strain and underwent thrombectomy on 4/10/ 2020 by vascular surgery. Patient was initially admitted to the intensive care unit for acute hypoxic respiratory failure. She was also found to have a small right lower lobe pneumonia and treated for community-acquired pneumonia.   There was a concern that

## 2020-04-15 NOTE — PROGRESS NOTES
Sonny Cruz [address]    Progress Note    4/15/2020    7:46 AM    Name:   Wen Grimaldo  MRN:     [id number]     Acct:      [de-identified]   Room:   [address] [place] [address] Day:  5  Admit Date:  4/10/2020 12:29 AM    PCP:   MAGGIE Dillard CNP  Code Status:  Full Code    Subjective:     C/C:     Interval History Status: improved. Patient is more comfortable at rest.  However she does continue to have dyspnea on exertion. She denies any chest pain. She is eating lunch and wants to go home today. Brief History:     Per the medical record:  \"Patient is a 66-year-old  female with PMH of right breast cancer stage IIa, right upper extremity DVT, hypertension, TAY who was admitted from [address] [place] [address] ED with bilateral massive PE. Patient was shown to have right heart strain and underwent thrombectomy on 4/10/ 2020 by vascular surgery. Patient was initially admitted to the intensive care unit for acute hypoxic respiratory failure. She was also found to have a small right lower lobe pneumonia and treated for community-acquired pneumonia. There was a concern that she may be COVID 19, but testing was negative. She was also found to have acute kidney injury and was treated with IV fluids, her ACE inhibitor was stopped. \"    Review of Systems:     Constitutional:  negative for chills, fevers, sweats  Respiratory:  negative for cough, positive dyspnea on exertion, shortness of breath, no wheezing  Cardiovascular:  negative for chest pain, chest pressure/discomfort, lower extremity edema, palpitations  Gastrointestinal:  negative for abdominal pain, constipation, diarrhea, nausea, vomiting  Neurological:  negative for dizziness, headache    Medications: Allergies:     Allergies   Allergen Reactions    Prednisone Swelling     Whole side of her face swelled when she took it, difficulty breathing       Current Meds:   Scheduled Meds:    benztropine 0.5 mg Oral Daily    busPIRone  7.5 mg Oral BID    calcium carbonate-vitamin D  1 tablet Oral BID    fluticasone  1 spray Nasal Daily    folic acid  1 mg Oral Daily    hydrOXYzine  10 mg Oral Daily    lamoTRIgine  150 mg Oral Nightly    letrozole  2.5 mg Oral Daily    OXcarbazepine  150 mg Oral Daily    pravastatin  40 mg Oral Nightly    QUEtiapine  50 mg Oral Nightly    topiramate  50 mg Oral BID    apixaban  10 mg Oral BID    [START ON 2020] apixaban  5 mg Oral BID    sodium chloride flush  10 mL Intravenous 2 times per day     Continuous Infusions:    sodium chloride 50 mL/hr at 20 0307     PRN Meds: butalbital-acetaminophen-caffeine, sodium chloride, traMADol, albuterol, benzocaine-menthol, dextromethorphan-guaiFENesin, sodium chloride flush, acetaminophen **OR** acetaminophen, polyethylene glycol    Data:     Past Medical History:   has a past medical history of Bipolar 1 disorder ([facility] Breast cancer ([facility] DVT of axillary vein, acute right ([facility] Eye twitch, Headache(784.0), Hyperlipidemia, and Migraine. Social History:   reports that she has never smoked. She has never used smokeless tobacco. She reports that she does not drink alcohol or use drugs.      Family History:   Family History   Problem Relation Age of Onset    Breast Cancer Sister 54    Breast Cancer Maternal Aunt 71    Other Maternal Cousin         Atypical Ductal Hyperplasia     Uterine Cancer Sister 32    Other Sister         breast cyst    Cataracts Mother     Glaucoma Mother     Heart Disease Father     High Blood Pressure Father     High Cholesterol Father     Mental Retardation Father     Diabetes Neg Hx        Vitals:  BP 97/60   Pulse 86   Temp 97.3 °F (36.3 °C) (Axillary)   Resp 19   Ht 5' 6\" (1.676 m)   Wt (!) 316 lb 4.8 oz (143.5 kg)   LMP 2013   SpO2 93%   BMI 51.05 kg/m²   Temp (24hrs), Av °F (36.7 °C), Min:97.3 °F (36.3 °C), Max:98.4 °F (36.9 °C)    No results for input(s):

## 2020-04-15 NOTE — CARE COORDINATION
Care Transition  Met with patient, discussed oxygen needs and freedom of choice provided. She chose [facility].

## 2020-04-15 NOTE — PROGRESS NOTES
Daily Progress Note      Date and time: 4/15/2020 9:42 AM  Patient's name:  Desirae Bashir Rd Record Number: [id number]  Patient's account/billing number: [de-identified]  Patient's YOB: 1965  Age: 47 y.o. Date of Admission: 4/10/2020 12:29 AM  Length of stay during current admission: 5      Primary Care Physician: MAGGIE Little CNP      Code Status: Full Code    Reason for  admission: Shortness of breath and chest pain      SUBJECTIVE:     OVERNIGHT EVENTS:       Off o2   Sob slowly improving   Afebrile   No cough or sputum          Review of Systems   Constitutional: Positive for fatigue. Negative for fever. HENT: Negative. Respiratory: Positive for shortness of breath. Cardiovascular: Negative. Endocrine: Negative. Genitourinary: Negative. Musculoskeletal: Negative. OBJECTIVE:     VITAL SIGNS:  /71   Pulse 80   Temp 97.8 °F (36.6 °C) (Oral)   Resp 16   Ht 5' 6\" (1.676 m)   Wt (!) 316 lb 4.8 oz (143.5 kg)   LMP 2013   SpO2 97%   BMI 51.05 kg/m²     Tmax over 24 hours:  Temp (24hrs), Av °F (36.7 °C), Min:97.3 °F (36.3 °C), Max:98.4 °F (36.9 °C)      Patient Vitals for the past 6 hrs:   BP Temp Temp src Pulse Resp SpO2   04/15/20 0800 113/71 97.8 °F (36.6 °C) Oral 80 16 97 %   04/15/20 0422 97/60 97.3 °F (36.3 °C) Axillary -- 19 --         Intake/Output Summary (Last 24 hours) at 4/15/2020 0942  Last data filed at 2020 2117  Gross per 24 hour   Intake 10 ml   Output --   Net 10 ml     Wt Readings from Last 2 Encounters:   20 (!) 316 lb 4.8 oz (143.5 kg)   20 (!) 305 lb 14.4 oz (138.8 kg)     Body mass index is 51.05 kg/m². PHYSICAL EXAMINATION:  Head and neck atraumatic, normocephalic    Lymph nodes-no cervical, supraclavicular lymphadenopathy    Neck-no JVP elevation    Lungs - Ventilating all lobes without any rales, rhonchi, wheezes or dullness. No bronchial breath sounds.   Chest expansion equal

## 2020-04-16 ENCOUNTER — CARE COORDINATION (OUTPATIENT)
Dept: CASE MANAGEMENT | Age: 55
End: 2020-04-16

## 2020-04-16 NOTE — CARE COORDINATION
you feel like you have everything you need to keep you well at home?:  Yes (Comment: daughter is staying with patient)  Care Transitions Interventions         COVID-19 Screening Initial Follow-up Note    Patient contacted regarding Shavon Zeng. Care Transition Nurse/ Ambulatory Care Manager contacted the patient by telephone to perform post discharge assessment. Verified name and  with patient as identifiers. Provided introduction to self, and explanation of the CTN/ACM role, and reason for call due to risk factors for infection and/or exposure to COVID-19. Symptoms reviewed with patient who verbalized the following symptoms:   Fever-no  Fatigue-yes  pain or aching joints-no  Cough-occasional - no expectorant  shortness of breath-yes - ongoing treatment for massive PE - did not qualify for oxygen at discharge  confusion or unusual change in mental status-no  chills or shaking or sweating-no  fast heart rate or fast breathing-no  Dizziness/lightheadedness-no  less urine output-no  cold, clammy, and pale skin-no  low body temperature-no  no new/worsening symptoms       Due to no new or worsening symptoms encounter was not routed to provider for escalation. Patient has following risk factors of: pneumonia\",\"sepsis\",\"acute respiratory failure\",\"immunocompromised\"     CTN reviewed discharge instructions, medical action plan and red flags such as increased shortness of breath, increasing fever and signs of decompensation with patient who verbalized understanding. Discussed exposure protocols and quarantine with CDC Guidelines What to do if you are sick with coronavirus disease 2019.  Patient was given an opportunity for questions and concerns. The family agrees to contact the [facility] exposure line [phone], [facility] department [address]: ([phone]) and PCP office for questions related to their healthcare. CTN provided contact information for future reference.     Reviewed and

## 2020-04-17 RX ORDER — FOLIC ACID 1 MG/1
TABLET ORAL
Qty: 30 TABLET | Refills: 3 | Status: SHIPPED | OUTPATIENT
Start: 2020-04-17 | End: 2020-08-21

## 2020-04-20 ENCOUNTER — TELEPHONE (OUTPATIENT)
Dept: PHYSICAL THERAPY | Age: 55
End: 2020-04-20

## 2020-04-20 ENCOUNTER — TELEPHONE (OUTPATIENT)
Dept: FAMILY MEDICINE CLINIC | Age: 55
End: 2020-04-20

## 2020-04-20 NOTE — TELEPHONE ENCOUNTER
[facility] Transitions Initial Follow Up Call    Outreach made within 2 business days of discharge: Yes    Patient: Darwin Poplar Patient : 1965   MRN: [id number]  Reason for Admission: shortness of breath, pulmonary embolism  Discharge Date: 4/15/20       Spoke with: Catherine Almodovar    Discharge department/facility: [facility]    TCM Interactive Patient Contact:  Was patient able to fill all prescriptions: Yes  Was patient instructed to bring all medications to the follow-up visit: Yes  Is patient taking all medications as directed in the discharge summary?  Yes  Does patient understand their discharge instructions: Yes  Does patient have questions or concerns that need addressed prior to 7-14 day follow up office visit: no    Scheduled appointment with PCP within 7-14 days    Follow Up  Future Appointments   Date Time Provider Lee Kelley   2020  7:57 PM Christen Curry MD [facility]   2020  1:00 PM [facility]   2020  1:00  [facility] [place]   2020  1:40 PM Loma Fothergill, MD [facility]       Mikell Bosworth, LPN

## 2020-04-20 NOTE — TELEPHONE ENCOUNTER
Writer spoke with patient who states she has been hospitalized with blood clots in lungs and has recently returned to home. Encouraged patient to hold all therapy exercises until given approval by supervising physician and to call for outpatient therapy evaluation for previous low back pain when scheduling resumes and cleared by physician. Discussed DC at this time with patient vocalizing understanding.      Jules Marcano PTA

## 2020-04-21 ENCOUNTER — CARE COORDINATION (OUTPATIENT)
Dept: CASE MANAGEMENT | Age: 55
End: 2020-04-21

## 2020-04-21 NOTE — CARE COORDINATION
concerns. The patient agrees to contact the COVID-19 hotline [phone] or PCP office for questions related to their healthcare. CTN provided contact information for future reference. From CDC: Are you at higher risk for severe illness?  Wash your hands often.  Avoid close contact (6 feet, which is about two arm lengths) with people who are sick.  Put distance between yourself and other people if COVID-19 is spreading in your community.  Clean and disinfect frequently touched surfaces.  Avoid all cruise travel and non-essential air travel.  Call your healthcare professional if you have concerns about COVID-19 and your underlying condition or if you are sick. For more information on steps you can take to protect yourself, see CDC's How to Protect Yourself      Pt will be further monitored by COVID Loop Team based on severity of symptoms and risk factors.     Follow Up  Future Appointments   Date Time Provider Lee Allie   4/24/2020  1:40 PM MAGGIE Moody - CNP [facility]   4/27/2020  5:92 PM Sanjuana Gowers, MD [facility]   4/28/2020  1:00 PM Emilee Martinez MD [facility]   6/8/2020  2:45 PM [facility]   6/9/2020  1:00 PM KEVEN INIGUEZ [facility] [place]   6/23/2020  1:40 PM [facility]       Mahendra Alva RN

## 2020-04-24 ENCOUNTER — OFFICE VISIT (OUTPATIENT)
Dept: FAMILY MEDICINE CLINIC | Age: 55
End: 2020-04-24
Payer: MEDICAID

## 2020-04-24 VITALS
OXYGEN SATURATION: 98 % | WEIGHT: 293 LBS | SYSTOLIC BLOOD PRESSURE: 130 MMHG | DIASTOLIC BLOOD PRESSURE: 86 MMHG | TEMPERATURE: 99.3 F | HEART RATE: 80 BPM | BODY MASS INDEX: 47.09 KG/M2 | HEIGHT: 66 IN

## 2020-04-24 PROCEDURE — 99214 OFFICE O/P EST MOD 30 MIN: CPT | Performed by: NURSE PRACTITIONER

## 2020-04-24 PROCEDURE — 1111F DSCHRG MED/CURRENT MED MERGE: CPT | Performed by: NURSE PRACTITIONER

## 2020-04-24 RX ORDER — BLOOD PRESSURE TEST KIT
1 KIT MISCELLANEOUS DAILY PRN
Qty: 1 KIT | Refills: 0 | Status: SHIPPED | OUTPATIENT
Start: 2020-04-24 | End: 2020-11-09

## 2020-04-24 RX ORDER — FLUTICASONE PROPIONATE 50 MCG
1 SPRAY, SUSPENSION (ML) NASAL DAILY
Qty: 1 BOTTLE | Refills: 11 | Status: SHIPPED | OUTPATIENT
Start: 2020-04-24 | End: 2021-05-10 | Stop reason: SDUPTHER

## 2020-04-24 ASSESSMENT — PATIENT HEALTH QUESTIONNAIRE - PHQ9
1. LITTLE INTEREST OR PLEASURE IN DOING THINGS: 0
SUM OF ALL RESPONSES TO PHQ9 QUESTIONS 1 & 2: 0
SUM OF ALL RESPONSES TO PHQ QUESTIONS 1-9: 0
SUM OF ALL RESPONSES TO PHQ QUESTIONS 1-9: 0
2. FEELING DOWN, DEPRESSED OR HOPELESS: 0

## 2020-04-24 ASSESSMENT — ENCOUNTER SYMPTOMS
RESPIRATORY NEGATIVE: 1
GASTROINTESTINAL NEGATIVE: 1

## 2020-04-24 NOTE — PATIENT INSTRUCTIONS
Patient Education        Seasonal Allergies: Care Instructions  Your Care Instructions  Allergies occur when your body's defense system (immune system) overreacts to certain substances. The immune system treats a harmless substance as if it were a harmful germ or virus. Many things can cause this to happen. Examples include pollens, medicine, food, dust, animal dander, and mold. Your allergies are seasonal if you have symptoms just at certain times of the year. In that case, you are probably allergic to pollens from certain trees, grasses, or weeds. Allergies can be mild or severe. Over-the-counter allergy medicine may help with some symptoms. Read and follow all instructions on the label. Managing your allergies is an important part of staying healthy. Your doctor may suggest that you have tests to help find the cause of your allergies. When you know what things trigger your symptoms, you can avoid them. This can prevent allergy symptoms and other health problems. In some cases, immunotherapy might help. For this treatment, you get shots or use pills that have a small amount of certain allergens in them. Your body \"gets used to\" the allergen, so you react less to it over time. This kind of treatment may help prevent or reduce some allergy symptoms. Follow-up care is a key part of your treatment and safety. Be sure to make and go to all appointments, and call your doctor if you are having problems. It's also a good idea to know your test results and keep a list of the medicines you take. How can you care for yourself at home? · Be safe with medicines. Take your medicines exactly as prescribed. Call your doctor if you think you are having a problem with your medicine. · During your allergy season, keep windows closed. If you need to use air-conditioning, change or clean all filters every month. Take a shower and change your clothes after you have been outside. · Stay inside when pollen counts are high. Vacuum once or twice a week. Use a vacuum  with a HEPA filter or a double-thickness filter. When should you call for help? Give an epinephrine shot if:    · You think you are having a severe allergic reaction.    After giving an epinephrine shot, call  911, even if you feel better.   Call 911 if:    · You have symptoms of a severe allergic reaction. These may include:  ? Sudden raised, red areas (hives) all over your body. ? Swelling of the throat, mouth, lips, or tongue. ? Trouble breathing. ? Passing out (losing consciousness). Or you may feel very lightheaded or suddenly feel weak, confused, or restless.     · You have been given an epinephrine shot, even if you feel better.    Call your doctor now or seek immediate medical care if:    · You have symptoms of an allergic reaction, such as:  ? A rash or hives (raised, red areas on the skin). ? Itching. ? Swelling. ? Belly pain, nausea, or vomiting.    Watch closely for changes in your health, and be sure to contact your doctor if:    · You do not get better as expected. Where can you learn more? Go to https://[employer]ceptions.[employer]. org and sign in to your [employer] account. Enter J912 in the [facility] box to learn more about \"Seasonal Allergies: Care Instructions. \"     If you do not have an account, please click on the \"Sign Up Now\" link. Current as of: October 6, 2019Content Version: 12.4  © 9024-7916 [employer], Inc[employer]. Care instructions adapted under license by [facility] If you have questions about a medical condition or this instruction, always ask your healthcare professional. [place] [address] any warranty or liability for your use of this information.

## 2020-04-24 NOTE — PROGRESS NOTES
Post-Discharge Transitional Care Management Services or Hospital Follow Up      Ghazala Nguyen   YOB: 1965    Date of Office Visit:  4/24/2020  Date of Hospital Admission: 4/10/20  Date of Hospital Discharge: 4/15/20  Readmission Risk Score(high >=14%.  Medium >=10%):Readmission Risk Score: 24      Care management risk score Rising risk (score 2-5) and Complex Care (Scores >=6): 8     Non face to face  following discharge, date last encounter closed (first attempt may have been earlier): 4/20/2020 11:54 AM 4/20/2020 11:54 AM    Call initiated 2 business days of discharge: Yes     Patient Active Problem List   Diagnosis    Bipolar 1 disorder ([address]    Hyperlipidemia    Malignant neoplasm of overlapping sites of right breast in female, estrogen receptor negative ([address]    Port-A-Cath in place    Nausea and vomiting    Nausea with vomiting    Intractable vomiting with nausea    Migraine    Memory problem    Sleep difficulties    Anxiety and depression    Muscle twitching    Hemifacial spasm    Combined forms of age-related cataract of both eyes    Squamous blepharitis of upper and lower eyelids of both eyes    Acute deep vein thrombosis (DVT) of axillary vein of right upper extremity ([address]    Malignant neoplasm of breast in female, estrogen receptor positive ([address]    Seasonal allergies    H/O right mastectomy    HX: breast cancer    Acute massive pulmonary embolism ([address]    Moderate to severe pulmonary hypertension ([address]    CARYN (acute kidney injury) ([address]    Suspected COVID-19 virus infection    TAY on CPAP    Acute respiratory failure with hypoxia ([address]    Leukocytosis    Community acquired pneumonia of right lower lobe of lung ([address]       Allergies   Allergen Reactions    Prednisone Swelling     Whole side of her face swelled when she took it, difficulty breathing       Medications listed as ordered at the time of discharge from hospital   [address] [place] [address] for up to 60 days. Intended supply: 5 days. Take lowest dose possible to manage pain             VENTOLIN  (90 Base) MCG/ACT inhaler  Inhale 2 puffs into the lungs every 4 hours as needed for Wheezing                   Medications marked \"taking\" at this time  Outpatient Medications Marked as Taking for the 4/24/20 encounter (Office Visit) with Stephanie Marte APRN - CNP   Medication Sig Dispense Refill    fluticasone (FLONASE) 50 MCG/ACT nasal spray 1 spray by Nasal route daily Indications: as needed 1 Bottle 11    Blood Pressure KIT 1 kit by Does not apply route daily as needed (bp check) 1 kit 0    Compression Stockings MISC by Does not apply route 15mm/Hg knee high stocking on in Am off at night 2 each 0    folic acid (FOLVITE) 1 MG tablet take 1 tablet by mouth once daily 30 tablet 3    apixaban (ELIQUIS DVT/PE STARTER PACK) 5 MG TABS tablet Take 10 mg (2 tablets) orally twice daily for 7 days, then take 5 mg (1 tablet) orally twice daily thereafter.  74 tablet 0    busPIRone (BUSPAR) 7.5 MG tablet take 1 tablet by mouth twice a day      OXcarbazepine (TRILEPTAL) 150 MG tablet take 1 tablet by mouth every morning BEFORE NOON      pravastatin (PRAVACHOL) 40 MG tablet take 1 tablet by mouth once daily 30 tablet 5    Calcium Carbonate-Vitamin D (OYSTER SHELL CALCIUM/D) 500-200 MG-UNIT TABS Take 1 tablet by mouth 2 times daily 60 tablet 5    Cyanocobalamin ER (RA VITAMIN B-12 TR) 1000 MCG TBCR take 1 tablet by mouth once daily 30 tablet 3    topiramate (TOPAMAX) 50 MG tablet ONE  TABLET  TWICE  DAILY 60 tablet 3    letrozole (FEMARA) 2.5 MG tablet Take 1 tablet by mouth daily 30 tablet 5    VENTOLIN  (90 Base) MCG/ACT inhaler Inhale 2 puffs into the lungs every 4 hours as needed for Wheezing 1 Inhaler 3    QUEtiapine (SEROQUEL) 100 MG tablet Take 50 mg by mouth nightly      sodium chloride (ALTAMIST SPRAY) 0.65 % nasal spray 1 spray by Nasal route as needed for Congestion 1 Bottle 1 exhibits swelling (mild non 1+ edema, no redness noted). Skin:     General: Skin is warm and dry. Neurological:      Mental Status: She is alert and oriented to person, place, and time. Psychiatric:         Behavior: Behavior normal. Behavior is cooperative. Thought Content: Thought content normal.         Judgment: Judgment normal.             Assessment/Plan:  1. Sepsis with acute renal failure without septic shock, due to unspecified organism, unspecified acute renal failure type (HCC)-improving  Follow up with specialists  - [place] DISCHARGE MEDS RECONCILED W/ CURRENT OUTPATIENT MED LIST  - Blood Pressure KIT; 1 kit by Does not apply route daily as needed (bp check)  Dispense: 1 kit; Refill: 0  - Comprehensive Metabolic Panel; Future  - CBC With Auto Differential; Future    2. Seasonal allergies-chronic    - fluticasone (FLONASE) 50 MCG/ACT nasal spray; 1 spray by Nasal route daily Indications: as needed  Dispense: 1 Bottle; Refill: 11    3. Bilateral lower extremity edema-new  Advised to keep legs elevated. - Compression Stockings MISC; by Does not apply route 15mm/Hg knee high stocking on in Am off at night  Dispense: 2 each; Refill: 0      Answered all of the patient's questions. Agrees with plan of care. Follow up at next scheduled visit.      Medical Decision Making: high complexity

## 2020-04-27 NOTE — DISCHARGE SUMMARY
[facility]    [x] [place]  Phone: [phone]  Fax: [phone]      [] [place]  Phone: [phone]  Fax: [phone]    Physical Therapy Discharge Note  Date: 2020        Patient Name:  Robert Alcala    :  1965  MRN: [id number]  Restrictions/Precautions:      Medical/Treatment Diagnosis Information:  ·   Treatment Diagnosis: Rt leg pain  ·    Insurance/Certification information:     Medicaid  Physician Information:    Mimichelsey Norrisel APRN-CNP  Plan of care signed (Y/N):y  Visit# / total visits: 8  Pain level:        Time Period for Report:   Cancels/No-shows to date: 5    Plan of Care/Treatment to date:  [x] Therapeutic Exercise    [] Modalities:  [] Therapeutic Activity     [] Ultrasound  [] Electrical Stimulation  [] Gait Training      [] Cervical Traction    [] Lumbar Traction  [] Neuromuscular Re-education  [] Cold/hotpack [] Iontophoresis  [x] Instruction in HEP      Other:  [x] Manual Therapy       []    [] Aquatic Therapy       []                    ? Subjective:     Low Back pain. She reports no pain in the leg currently. Objective:  ·   Pain is mostly centralized. · Follows a lumbar post derangement pattern  Responding well to extension directional preference program  ·   Exhibits centralization with current program.  · Prone press up through 100%  · Pain free in prone R side-glide. · Extension in standing mild back central pain. Radiating pain resolved  R SLR 60 deg, negative dural tension.         Plan:    d/c       Goals:    Short term goals  Time Frame for Short term goals: 2 weeks  Short term goal 1: Pt will report 4/10 pain to increase functional tolerance not met     Long term goals  Time Frame for Long term goals : 4 weeks  Long term goal 1: Pt will report 2/10 pain to increase functional tolerance- part met  Long term goal 2: Pt will score >/=50/80 LEFS to increase funcitonal tolerance- part met  Long term goal 3: Pt will achieve 4+/5 LLE strength all planes to increase functional strength- part met  Long term goal 4: Pt will acheive Rt knee AROM >/= Lt knee to increase functional mobility- met  Long term goal 5: Pt will achieve 12 second TUG to increase functional mobility  Long term goal 6: Pt will be indep with HEP         Percentage of Goals Met: 90            Discharge Prognosis: [] Excellent [x] Good [] Fair  [] Poor     Goal Status:  [x] Achieved [] Partially Achieved  [] Not Achieved       Electronically signed by:  Nadine Colvin, PT

## 2020-04-28 ENCOUNTER — CARE COORDINATION (OUTPATIENT)
Dept: CASE MANAGEMENT | Age: 55
End: 2020-04-28

## 2020-04-28 ENCOUNTER — HOSPITAL ENCOUNTER (OUTPATIENT)
Dept: LAB | Age: 55
Discharge: HOME | End: 2020-04-28
Payer: MEDICAID

## 2020-04-28 ENCOUNTER — OFFICE VISIT (OUTPATIENT)
Dept: ONCOLOGY | Age: 55
End: 2020-04-28
Payer: MEDICAID

## 2020-04-28 VITALS
OXYGEN SATURATION: 95 % | WEIGHT: 293 LBS | BODY MASS INDEX: 47.09 KG/M2 | HEIGHT: 66 IN | HEART RATE: 85 BPM | TEMPERATURE: 98.5 F | DIASTOLIC BLOOD PRESSURE: 74 MMHG | SYSTOLIC BLOOD PRESSURE: 128 MMHG

## 2020-04-28 LAB
ABSOLUTE EOS #: 0.23 K/UL (ref 0–0.44)
ABSOLUTE IMMATURE GRANULOCYTE: <0.03 K/UL (ref 0–0.3)
ABSOLUTE LYMPH #: 2.48 K/UL (ref 1.1–3.7)
ABSOLUTE MONO #: 0.59 K/UL (ref 0.1–1.2)
ALBUMIN SERPL-MCNC: 4.4 G/DL (ref 3.5–5.2)
ALBUMIN/GLOBULIN RATIO: 1.2 (ref 1–2.5)
ALP BLD-CCNC: 85 U/L (ref 35–104)
ALT SERPL-CCNC: 18 U/L (ref 5–33)
ANION GAP SERPL CALCULATED.3IONS-SCNC: 15 MMOL/L (ref 9–17)
AST SERPL-CCNC: 17 U/L
BASOPHILS # BLD: 1 % (ref 0–2)
BASOPHILS ABSOLUTE: 0.04 K/UL (ref 0–0.2)
BILIRUB SERPL-MCNC: 0.39 MG/DL (ref 0.3–1.2)
BUN BLDV-MCNC: 17 MG/DL (ref 6–20)
BUN/CREAT BLD: 14 (ref 9–20)
CALCIUM SERPL-MCNC: 10.2 MG/DL (ref 8.6–10.4)
CHLORIDE BLD-SCNC: 106 MMOL/L (ref 98–107)
CO2: 22 MMOL/L (ref 20–31)
CREAT SERPL-MCNC: 1.21 MG/DL (ref 0.5–0.9)
DIFFERENTIAL TYPE: ABNORMAL
EOSINOPHILS RELATIVE PERCENT: 3 % (ref 1–4)
GFR AFRICAN AMERICAN: 56 ML/MIN
GFR NON-AFRICAN AMERICAN: 46 ML/MIN
GFR SERPL CREATININE-BSD FRML MDRD: ABNORMAL ML/MIN/{1.73_M2}
GFR SERPL CREATININE-BSD FRML MDRD: ABNORMAL ML/MIN/{1.73_M2}
GLUCOSE BLD-MCNC: 108 MG/DL (ref 70–99)
HCT VFR BLD CALC: 38.9 % (ref 36.3–47.1)
HEMOGLOBIN: 11.9 G/DL (ref 11.9–15.1)
IMMATURE GRANULOCYTES: 0 %
LYMPHOCYTES # BLD: 33 % (ref 24–43)
MCH RBC QN AUTO: 30.2 PG (ref 25.2–33.5)
MCHC RBC AUTO-ENTMCNC: 30.6 G/DL (ref 25.2–33.5)
MCV RBC AUTO: 98.7 FL (ref 82.6–102.9)
MONOCYTES # BLD: 8 % (ref 3–12)
NRBC AUTOMATED: ABNORMAL PER 100 WBC
PDW BLD-RTO: 14 % (ref 11.8–14.4)
PLATELET # BLD: 273 K/UL (ref 138–453)
PLATELET ESTIMATE: ABNORMAL
PMV BLD AUTO: 9.4 FL (ref 8.1–13.5)
POTASSIUM SERPL-SCNC: 3.9 MMOL/L (ref 3.7–5.3)
RBC # BLD: 3.94 M/UL (ref 3.95–5.11)
RBC # BLD: ABNORMAL 10*6/UL
SEG NEUTROPHILS: 56 % (ref 36–65)
SEGMENTED NEUTROPHILS ABSOLUTE COUNT: 4.24 K/UL (ref 1.5–8.1)
SODIUM BLD-SCNC: 143 MMOL/L (ref 135–144)
TOTAL PROTEIN: 8 G/DL (ref 6.4–8.3)
WBC # BLD: 7.6 K/UL (ref 3.5–11.3)
WBC # BLD: ABNORMAL 10*3/UL

## 2020-04-28 PROCEDURE — 1036F TOBACCO NON-USER: CPT | Performed by: INTERNAL MEDICINE

## 2020-04-28 PROCEDURE — 99214 OFFICE O/P EST MOD 30 MIN: CPT | Performed by: INTERNAL MEDICINE

## 2020-04-28 PROCEDURE — 85025 COMPLETE CBC W/AUTO DIFF WBC: CPT

## 2020-04-28 PROCEDURE — G8417 CALC BMI ABV UP PARAM F/U: HCPCS | Performed by: INTERNAL MEDICINE

## 2020-04-28 PROCEDURE — G8427 DOCREV CUR MEDS BY ELIG CLIN: HCPCS | Performed by: INTERNAL MEDICINE

## 2020-04-28 PROCEDURE — 1111F DSCHRG MED/CURRENT MED MERGE: CPT | Performed by: INTERNAL MEDICINE

## 2020-04-28 PROCEDURE — 80053 COMPREHEN METABOLIC PANEL: CPT

## 2020-04-28 PROCEDURE — 36415 COLL VENOUS BLD VENIPUNCTURE: CPT

## 2020-04-28 PROCEDURE — 3017F COLORECTAL CA SCREEN DOC REV: CPT | Performed by: INTERNAL MEDICINE

## 2020-04-28 NOTE — PROGRESS NOTES
with polypectomy performed by Barbara Buenrostro MD at [address] / REMOVAL / [address] Left 11/9/2017    PORT INSERTION performed by Janae Wagoner MD at [address] / REMOVAL / [address] N/A 11/30/2017    Port Removal & Insertion performed by Janae Wagoner MD at [address] Right 10/19/2017     [address] [facility]    MASTECTOMY Right 10/19/2017    Right Breast Mastectomy with  [place] Node Biopsy performed by Janae Wagoner MD at . [address] [facility] V[facility] W/SUBQ PORT AGE 5 YR/> Left 3/1/2018    PORT Exchange performed by Janae Wagoner MD at [place] [address] Left 2014, 2015    x 2 surgeries total; Dr. Etta Ramirez TUNNELED VENOUS PORT PLACEMENT Left 11/30/2017    removal of et replacement of       Patient Family Social History:     Family History   Problem Relation Age of Onset    Breast Cancer Sister 54    Breast Cancer Maternal Aunt 71    Other Maternal Cousin         Atypical Ductal Hyperplasia     Uterine Cancer Sister 32    Other Sister         breast cyst    Cataracts Mother     Glaucoma Mother     Heart Disease Father     High Blood Pressure Father     High Cholesterol Father     Mental Retardation Father     Diabetes Neg Hx       Social History     Socioeconomic History    Marital status: Single     Spouse name: Not on file    Number of children: Not on file    Years of education: Not on file    Highest education level: Not on file   Occupational History    Not on file   Social Needs    Financial resource strain: Not on file    Food insecurity     Worry: Not on file     Inability: Not on file    Transportation needs     Medical: Not on file     Non-medical: Not on file   Tobacco Use    Smoking status: Never Smoker    Smokeless tobacco: Never Used    Tobacco comment: Never smoker.  TC, RRT 4/5/18   Substance and Sexual Activity    Alcohol use: No    Drug use: No    Sexual activity: Yes     Partners: Male     Birth control/protection: Surgical   Lifestyle    Physical activity     Days per week: Not on file     Minutes per session: Not on file    Stress: Not on file   Relationships    Social connections     Talks on phone: Not on file     Gets together: Not on file     Attends Sikh service: Not on file     Active member of club or organization: Not on file     Attends meetings of clubs or organizations: Not on file     Relationship status: Not on file    Intimate partner violence     Fear of current or ex partner: Not on file     Emotionally abused: Not on file     Physically abused: Not on file     Forced sexual activity: Not on file   Other Topics Concern    Not on file   Social History Narrative    Not on file      Current Medications:     Current Outpatient Medications   Medication Sig Dispense Refill    fluticasone (FLONASE) 50 MCG/ACT nasal spray 1 spray by Nasal route daily Indications: as needed 1 Bottle 11    folic acid (FOLVITE) 1 MG tablet take 1 tablet by mouth once daily 30 tablet 3    apixaban (ELIQUIS DVT/PE STARTER PACK) 5 MG TABS tablet Take 10 mg (2 tablets) orally twice daily for 7 days, then take 5 mg (1 tablet) orally twice daily thereafter.  74 tablet 0    busPIRone (BUSPAR) 7.5 MG tablet take 1 tablet by mouth twice a day      OXcarbazepine (TRILEPTAL) 150 MG tablet take 1 tablet by mouth every morning BEFORE NOON      pravastatin (PRAVACHOL) 40 MG tablet take 1 tablet by mouth once daily 30 tablet 5    Calcium Carbonate-Vitamin D (OYSTER SHELL CALCIUM/D) 500-200 MG-UNIT TABS Take 1 tablet by mouth 2 times daily 60 tablet 5    Cyanocobalamin ER (RA VITAMIN B-12 TR) 1000 MCG TBCR take 1 tablet by mouth once daily 30 tablet 3    topiramate (TOPAMAX) 50 MG tablet ONE  TABLET  TWICE  DAILY 60 tablet 3    letrozole (FEMARA) 2.5 MG tablet Take 1 tablet by mouth daily 30 tablet 5    VENTOLIN  (90 Base) MCG/ACT inhaler Inhale 2 puffs into the lungs every 4 hours as needed for Wheezing 1 Inhaler 3    benztropine (COGENTIN) 0.5 MG tablet Take 0.5 mg by mouth daily      QUEtiapine (SEROQUEL) 100 MG tablet Take 50 mg by mouth nightly      lamoTRIgine (LAMICTAL) 100 MG tablet 150 mg nightly       Blood Pressure KIT 1 kit by Does not apply route daily as needed (bp check) (Patient not taking: Reported on 4/28/2020) 1 kit 0    Compression Stockings MISC by Does not apply route 15mm/Hg knee high stocking on in Am off at night (Patient not taking: Reported on 4/28/2020) 2 each 0    butalbital-acetaminophen-caffeine (FIORICET, ESGIC) -40 MG per tablet 1-2   TABLET  TWICE  DAILY  AS  NEEDED (Patient not taking: Reported on 4/24/2020) 90 tablet 1    sodium chloride (ALTAMIST SPRAY) 0.65 % nasal spray 1 spray by Nasal route as needed for Congestion (Patient not taking: Reported on 4/28/2020) 1 Bottle 1    hydrOXYzine (ATARAX) 10 MG tablet Take 10 mg by mouth daily        No current facility-administered medications for this visit. Allergies:   Prednisone    Review of Systems:    Constitutional: Negative for fever or chills. No night sweats, weight is stable now  Eyes: No eye discharge, double vision, or eye pain . Twitching of left eye  HEENT: negative for sore mouth, sore throat, hoarseness and voice change . Complains of twitching of left eye. Improved  Respiratory: negative for cough , sputum, dyspnea, wheezing, hemoptysis, chest pain   Cardiovascular: negative for chest pain, dyspnea, palpitations, orthopnea, PND   Gastrointestinal: Positive for nausea, vomiting, constipation, abdominal pain, Dysphagia, hematemesis and hematochezia . Genitourinary: negative for frequency, dysuria, nocturia, urinary incontinence, and hematuria   Integument: negative for rash, skin lesions, bruises.    Hematologic/Lymphatic: negative for easy bruising, bleeding, lymphadenopathy, or petechiae   Endocrine: negative for heat or cold intolerance,weight changes, 3.95 - 5.11 m/uL    Hemoglobin 10.6 (L) 11.9 - 15.1 g/dL    Hematocrit 33.5 (L) 36.3 - 47.1 %    .3 82.6 - 102.9 fL    MCH 31.7 25.2 - 33.5 pg    MCHC 31.6 28.4 - 34.8 g/dL    RDW 14.6 (H) 11.8 - 14.4 %    Platelets 990 314 - 629 k/uL    MPV 11.8 8.1 - 13.5 fL    NRBC Automated 0.0 0.0 per 100 WBC    Differential Type NOT REPORTED     Seg Neutrophils 64 36 - 65 %    Lymphocytes 25 24 - 43 %    Monocytes 8 3 - 12 %    Eosinophils % 1 1 - 4 %    Basophils 1 0 - 2 %    Immature Granulocytes 1 (H) 0 %    Segs Absolute 7.67 1.50 - 8.10 k/uL    Absolute Lymph # 3.06 1.10 - 3.70 k/uL    Absolute Mono # 1.00 0.10 - 1.20 k/uL    Absolute Eos # 0.11 0.00 - 0.44 k/uL    Basophils Absolute 0.07 0.00 - 0.20 k/uL    Absolute Immature Granulocyte 0.13 0.00 - 0.30 k/uL    WBC Morphology NOT REPORTED     RBC Morphology ANISOCYTOSIS PRESENT     Platelet Estimate NOT REPORTED    COVID-19   Result Value Ref Range    SARS-CoV-2 Not Detected Not Detected    Source . NASOPHARYNGEAL SWAB     SARS-CoV-2, PCR          SARS-CoV-2, TRISTIAN         APTT   Result Value Ref Range    PTT >120.0 (HH) 20.5 - 30.5 sec   C-Reactive Protein   Result Value Ref Range    CRP 41.3 (H) 0.0 - 5.0 mg/L   Ferritin   Result Value Ref Range    Ferritin 835 (H) 13 - 150 ug/L   Lactate Dehydrogenase   Result Value Ref Range     (H) 135 - 214 U/L   Magnesium   Result Value Ref Range    Magnesium 1.8 1.6 - 2.6 mg/dL   Procalcitonin   Result Value Ref Range    Procalcitonin 0.18 (H) <0.09 ng/mL   Troponin   Result Value Ref Range    Troponin, High Sensitivity 60 (HH) 0 - 14 ng/L    Troponin T NOT REPORTED <0.03 ng/mL    Troponin Interp NOT REPORTED    Basic Metabolic Panel w/ Reflex to MG   Result Value Ref Range    Glucose 126 (H) 70 - 99 mg/dL    BUN 32 (H) 6 - 20 mg/dL    CREATININE 1.37 (H) 0.50 - 0.90 mg/dL    Bun/Cre Ratio NOT REPORTED 9 - 20    Calcium 8.6 8.6 - 10.4 mg/dL    Sodium 139 135 - 144 mmol/L    Potassium 4.0 3.7 - 5.3 mmol/L    Chloride 106 98 - 107 mmol/L    CO2 17 (L) 20 - 31 mmol/L    Anion Gap 16 9 - 17 mmol/L    GFR Non-African American 40 (L) >60 mL/min    GFR  49 (L) >60 mL/min    GFR Comment          GFR Staging NOT REPORTED    Mycoplasma pneumoniae antibody, IgM   Result Value Ref Range    Mycoplasma pneumo IgM 0.60 <4.61   Basic Metabolic Panel w/ Reflex to MG   Result Value Ref Range    Glucose 126 (H) 70 - 99 mg/dL    BUN 28 (H) 6 - 20 mg/dL    CREATININE 1.18 (H) 0.50 - 0.90 mg/dL    Bun/Cre Ratio NOT REPORTED 9 - 20    Calcium 8.3 (L) 8.6 - 10.4 mg/dL    Sodium 138 135 - 144 mmol/L    Potassium 4.4 3.7 - 5.3 mmol/L    Chloride 108 (H) 98 - 107 mmol/L    CO2 16 (L) 20 - 31 mmol/L    Anion Gap 14 9 - 17 mmol/L    GFR Non-African American 48 (L) >60 mL/min    GFR  58 (L) >60 mL/min    GFR Comment          GFR Staging NOT REPORTED    CBC auto differential   Result Value Ref Range    WBC 13.1 (H) 3.5 - 11.3 k/uL    RBC 3.20 (L) 3.95 - 5.11 m/uL    Hemoglobin 10.0 (L) 11.9 - 15.1 g/dL    Hematocrit 30.8 (L) 36.3 - 47.1 %    MCV 96.3 82.6 - 102.9 fL    MCH 31.3 25.2 - 33.5 pg    MCHC 32.5 28.4 - 34.8 g/dL    RDW 14.8 (H) 11.8 - 14.4 %    Platelets See Reflexed IPF Result 138 - 453 k/uL    MPV NOT REPORTED 8.1 - 13.5 fL    NRBC Automated 0.4 (H) 0.0 per 100 WBC    Differential Type NOT REPORTED     WBC Morphology NOT REPORTED     RBC Morphology ANISOCYTOSIS PRESENT     Platelet Estimate NOT REPORTED     Seg Neutrophils 64 36 - 65 %    Lymphocytes 24 24 - 43 %    Monocytes 10 3 - 12 %    Eosinophils % 1 1 - 4 %    Basophils 0 0 - 2 %    Immature Granulocytes 1 (H) 0 %    Segs Absolute 8.39 (H) 1.50 - 8.10 k/uL    Absolute Lymph # 3.14 1.10 - 3.70 k/uL    Absolute Mono # 1.31 (H) 0.10 - 1.20 k/uL    Absolute Eos # 0.13 0.00 - 0.44 k/uL    Basophils Absolute 0.00 0.00 - 0.20 k/uL    Absolute Immature Granulocyte 0.13 0.00 - 0.30 k/uL    Morphology ANISOCYTOSIS PRESENT     Morphology 1+ POLYCHROMASIA    Immature k/uL    Absolute Lymph # 2.26 1.10 - 3.70 k/uL    Absolute Mono # 0.62 0.10 - 1.20 k/uL    Absolute Eos # 0.42 0.00 - 0.44 k/uL    Basophils Absolute 0.04 0.00 - 0.20 k/uL    Absolute Immature Granulocyte 0.12 0.00 - 0.30 k/uL   Basic Metabolic Panel w/ Reflex to MG   Result Value Ref Range    Glucose 110 (H) 70 - 99 mg/dL    BUN 22 (H) 6 - 20 mg/dL    CREATININE 1.30 (H) 0.50 - 0.90 mg/dL    Bun/Cre Ratio NOT REPORTED 9 - 20    Calcium 8.9 8.6 - 10.4 mg/dL    Sodium 144 135 - 144 mmol/L    Potassium 4.2 3.7 - 5.3 mmol/L    Chloride 111 (H) 98 - 107 mmol/L    CO2 18 (L) 20 - 31 mmol/L    Anion Gap 15 9 - 17 mmol/L    GFR Non-African American 43 (L) >60 mL/min    GFR  52 (L) >60 mL/min    GFR Comment          GFR Staging NOT REPORTED      Xr Chest Standard (2 Vw)    Result Date: 10/10/2017  EXAMINATION: TWO VIEWS OF THE CHEST 10/10/2017 2:37 pm COMPARISON: 10/18/2011 HISTORY: ORDERING SYSTEM PROVIDED HISTORY: Invasive ductal carcinoma of breast, right [facility]) TECHNOLOGIST PROVIDED HISTORY: Reason for exam:->Pre op Ordering Physician Provided Reason for Exam: Pre op for right mastectomy. No chest complaints. Acuity: Unknown Type of Exam: Initial Additional signs and symptoms: n/a Relevant Medical/Surgical History: breast cancer FINDINGS: The lungs are without acute focal process. There is no effusion or pneumothorax. The cardiomediastinal silhouette is stable. The osseous structures are stable. No acute process. Nm Lymphoscintigram    Result Date: 10/19/2017  EXAMINATION: LYMPHOSCINTIGRAPHY 10/19/2017 9:21 am TECHNIQUE: Sulfur colloid labeled with 0.920 mCi of Tc99 was administered in 4 subdermal wheals around the patient's right areolar region. Delayed images were obtained.  HISTORY: ORDERING SYSTEM PROVIDED HISTORY: Surgery TECHNOLOGIST PROVIDED HISTORY: Reason for exam:->Surgery Ordering Physician Provided Reason for Exam: 0.920 mCi 99mTc filtered Sulfur Colloid injected subcutaneous around RT nipple. Acuity: Unknown Type of Exam: Unknown FINDINGS: There appears to be migration into the right axilla suggestive of the sentinel node. Migration of the radiotracer into the right axilla suggestive of the patient's sentinel node. MRI brain 2/5/18  Impression   1. Unremarkable MRI of the brain.  No evidence of metastatic disease. 2. Acute left sphenoid sinusitis. [facility] W Or Wo Cad Bilateral: 10/5/2018    No mammographic evidence of malignancy. 2. Postoperative changes of the right breast. BIRADS: BIRADS - CATEGORY 2 Benign, no evidence of malignancy. Normal interval follow-up is recommended in 12 months. OVERALL ASSESSMENT - BENIGN A letter of notification will be sent to the patient regarding the results. The [address] [place] [address] of Radiology recommends annual mammograms for women 40 years and older. Mri Orbits Face Neck W Wo Contrast: 10/11/2018    No convincing evidence of abnormal intracranial enhancement to suggest intracranial metastasis. 2. Bilateral internal auditory canals, cisternal segment of cranial nerve V, and fluid-filled inner ear structures are unremarkable. 3. Borderline enlarged right level II lymph node measuring 13 mm. there are additional nonenlarged upper neck lymph nodes. These are nonspecific by imaging and may be reactive. Given history of breast cancer, clinical correlation is recommended. Follow-up CT neck may be obtained as clinically warranted. CT chest 11/20/18:  Impression   1. No evidence of pulmonary embolism or focal airspace consolidation. 2. Extensive edema and inflammatory stranding throughout the right chest   wall, axilla and supraclavicular soft tissues which appears to be tracking   along the vascular pathway likely secondary to known deep venous thrombosis.    Apparent hypodensity in the distal SVC suggestive of filling   defects/thrombosis.  Additionally proximal SVC as well as the right IJ appear   mildly prominent underlying

## 2020-04-30 ENCOUNTER — CARE COORDINATION (OUTPATIENT)
Dept: CASE MANAGEMENT | Age: 55
End: 2020-04-30

## 2020-04-30 ENCOUNTER — TELEPHONE (OUTPATIENT)
Dept: FAMILY MEDICINE CLINIC | Age: 55
End: 2020-04-30

## 2020-04-30 NOTE — TELEPHONE ENCOUNTER
Spoke with patient and advised of lab results and recommendations.  Patient was transferred to Nephrology office to schedule an appointment

## 2020-04-30 NOTE — TELEPHONE ENCOUNTER
----- Message from MAGGIE Chatterjee - CNP sent at 4/30/2020  8:45 AM EDT -----  CMP- creat and GFR only slightly improved. Place referral for nephrology for further monitoring if not already done.   CBC-WNL  Please notify

## 2020-05-05 ENCOUNTER — CARE COORDINATION (OUTPATIENT)
Dept: CASE MANAGEMENT | Age: 55
End: 2020-05-05

## 2020-05-05 NOTE — CARE COORDINATION
[facility] Transitions Follow Up Call  2020    Patient: Lluvia Mckinley  Patient : 1965   MRN: [id number]  Reason for Admission:PE  Discharge Date: 4/15/20   Readmission risk - 44%     Spoke with: patient - reviewed most recent transitional appt & oncology appt. Patient scheduled for appt with nephrology later in year as referral - her PCP is aware that she can't actually be seen in nephrology office until summer because of ongoing [place] pandemic. Patient ordered her support stockings - lower leg edema is improved. Continues occasional dry cough - she thinks from allergies. Denies feeling \"winded\" as before. Continues eliquis prescribed for PE. Care Transition continues to follow. GET WELL LOOP for  Covid Risk Management -        Care Transitions Subsequent and Final Call    Schedule Follow Up Appointment with PCP:  Completed  Subsequent and Final Calls  Do you have any ongoing symptoms?:  Yes  Onset of Patient-reported symptoms:  Other  Interventions for patient-reported symptoms:  Other  Have your medications changed?:  No  Do you have any questions related to your medications?:  No  Do you currently have any active services?:  No  Do you have any needs or concerns that I can assist you with?:  No  Care Transitions Interventions  Other Interventions:             Follow Up  Future Appointments   Date Time Provider Lee Kelley   2020  2:00 PM [facility]   [facility]  2:45 PM [facility]   2020  1:00  [facility] [place]   2020  2:40 PM Matthias Marte APRN - CNP [facility] DP   2020  2:10 PM Wm Quezada MD [facility] DP   2020  2:00 PM SCHEDULE, [address] [facility] [place]   2020  2:00 PM Maxx Reyes MD [facility]       Leidy Szymanski RN

## 2020-05-12 ENCOUNTER — CARE COORDINATION (OUTPATIENT)
Dept: CASE MANAGEMENT | Age: 55
End: 2020-05-12

## 2020-05-21 ENCOUNTER — TELEMEDICINE (OUTPATIENT)
Dept: NEUROLOGY | Age: 55
End: 2020-05-21
Payer: MEDICAID

## 2020-05-21 PROCEDURE — G8427 DOCREV CUR MEDS BY ELIG CLIN: HCPCS | Performed by: PSYCHIATRY & NEUROLOGY

## 2020-05-21 PROCEDURE — G8417 CALC BMI ABV UP PARAM F/U: HCPCS | Performed by: PSYCHIATRY & NEUROLOGY

## 2020-05-21 PROCEDURE — 3017F COLORECTAL CA SCREEN DOC REV: CPT | Performed by: PSYCHIATRY & NEUROLOGY

## 2020-05-21 PROCEDURE — 1036F TOBACCO NON-USER: CPT | Performed by: PSYCHIATRY & NEUROLOGY

## 2020-05-21 PROCEDURE — 99215 OFFICE O/P EST HI 40 MIN: CPT | Performed by: PSYCHIATRY & NEUROLOGY

## 2020-05-21 RX ORDER — TOPIRAMATE 50 MG/1
TABLET, FILM COATED ORAL
Qty: 60 TABLET | Refills: 3 | Status: SHIPPED | OUTPATIENT
Start: 2020-05-21 | End: 2020-08-20 | Stop reason: SDUPTHER

## 2020-05-21 RX ORDER — CLONAZEPAM 0.5 MG/1
0.5 TABLET ORAL 2 TIMES DAILY PRN
Qty: 60 TABLET | Refills: 2 | Status: SHIPPED | OUTPATIENT
Start: 2020-05-21 | End: 2020-08-20 | Stop reason: SDUPTHER

## 2020-05-21 ASSESSMENT — ENCOUNTER SYMPTOMS
PHOTOPHOBIA: 1
ABDOMINAL PAIN: 0
CHOKING: 0
APNEA: 0
VOICE CHANGE: 0
TROUBLE SWALLOWING: 0
ABDOMINAL DISTENTION: 0
DIARRHEA: 0
FACIAL SWEATING: 0
BACK PAIN: 0
SHORTNESS OF BREATH: 0
EYE REDNESS: 0
EYE PAIN: 0
CONSTIPATION: 0
EYE WATERING: 1
SORE THROAT: 0
COLOR CHANGE: 0
COUGH: 0
EYE ITCHING: 0
RHINORRHEA: 0
EYE DISCHARGE: 0
SCALP TENDERNESS: 0
SINUS PRESSURE: 0
CHEST TIGHTNESS: 0
FACIAL SWELLING: 0
VISUAL CHANGE: 0
SWOLLEN GLANDS: 0
BLOOD IN STOOL: 0
NAUSEA: 1
BLURRED VISION: 0
BOWEL INCONTINENCE: 0
VOMITING: 1
WHEEZING: 0

## 2020-05-21 NOTE — PROGRESS NOTES
insidiously. The problem has been waxing and waning since onset. There was facial and left-sided focality noted. Associated symptoms include headaches, nausea and vomiting. Pertinent negatives include no abdominal pain, auditory change, aura, back pain, bladder incontinence, bowel incontinence, chest pain, confusion, diaphoresis, dizziness, fatigue, fever, light-headedness, neck pain, palpitations, shortness of breath or vertigo. Treatments tried: Carmen Orlin. The treatment provided moderate relief. There is no history of a bleeding disorder, a clotting disorder, a CVA, dementia, head trauma, liver disease, mood changes or seizures. VIRTUAL   VIDEO  VISIT          PATIENT  BEING   EVALUATED   BY  NEUROLOGIST   VIA   a Virtual Visit (video visit) encounter          to address concerns as mentioned   BELOW         ALSO    nursing   caregiver was present     before,  during  and  after   the    visit        Due to this being a TeleHealth encounter (During [facility]-06 public health emergency), evaluation of the following organ systems was limited:     Vitals/Constitutional/EENT/Resp/CV/GI//MS/Neuro/Skin/Heme-Lymph-Imm. Pursuant to the emergency declaration under the [address] [place] [address], [address] [place] [address] authority and the [employer] Act, this Virtual Visit was conducted with patient's (and/or legal guardian's) consent, to reduce the patient's risk of exposure to COVID-19 and provide necessary medical care. The patient (and/or legal guardian) has also been advised to contact this office for worsening conditions or problems, and seek emergency medical treatment and/or call 911 if deemed necessary.      Patient identification was verified at the start of the visit: Yes    Total time spent for this encounter:  ( Time Spent:   40   minutes )    Services were provided through a video synchronous discussion   and  limited    examination virtually to substitute for in-person clinic visit. Patient    located at     [address] MD Anupam Reyes MD on 7/88/1859 at 3:00 PM    An electronic signature was used to authenticate this note. History obtained from  The patient      and other  available medical records were  Also  reviewed. The  Duration,  Quality,  Severity,  Location,  Timing,  Context,  Modifying  Factors   Of   The   Chief   Complaint         And  Present  Illness   Was   Reviewed   In   Chronological   Manner. PATIENT'S  MAIN  CONCERNS INCLUDE :                       1)  H/O   LEFT  EYE  TWITCHING     SINCE      April 2017                                  INTERMITTENTLY  . NO  PAIN                                -BLEPHAROSPASM    LEFT  EYE.                                -     STABLE                           2)    WORSE    SINCE     FEB. 2018                            ANXIETY   ALSO  A  CONTRIBUTING FACTOR                            3)      PREVIOUS    H/O    BREAST    CANCER  RIGHT                                H/O   BREAST  CANCER   SURGERY  IN  NOV. 2017                               4)       HAD    CHEMO     IN   THE  PAST                                      ALSO  ON   ARIMIDEX   DAILY                             BEING  FOLLOWED  BY   HEMATOLOGY /  ONCOLOGY                                              -  ON  ULTRAM  AS    NEEDED                               5)    H/O   CHRONIC   MIGRAINES       FOR    4  YEARS                                               -    STABLE                                                            -    ON   FIORICET    AS  NEEDED                               6)   H/O   CHRONIC  ANXIETY,   DEPRESSION,  BIPOLAR  DISORDER                               -   ON    LAMICTAL,  SEROQUEL   TRAZODONE,    TRILEPTAL 2 -3    TIMES  WEEKLY                                               17)     FOLLOW  UP  MRI BRAIN   WITH  AND  W/O   CONTRAST     IN  OCT. 2019                                   AND  LABS  SHOWED  NO  SIGNIFICANT  ABNORMALITIES                                                       18)        EXPECTATIONS,   GOALS   OF  MIGRAINE   THERAPY                                  AND  SIDE  EFFECTS  MEDICATIONS    WERE                                 REVIEWED     AND   DISCUSSED    IN    DETAIL. AS   DISCUSSED    WITH  PATIENT   IN  OCT. 2019                                  PATIENT     STARTED  ON    TOPAMAX     FOR  MIGRAINE PROPHYLAXIS                          19)     CURRENTLY    MIGRAINES  ARE   BETTER  CONTROLLED                               ON  TOPAMAX        AND  PATIENT     FEELS  LOT  BETTER. PATIENT  DENIES  ANY      RENAL  STONES. 20)       WORSENING  OF  LEFT   FACIAL  AND  BLEPHAROSPASM      SINCE  FEB. 2020                                   PATIENT  REQUESTS    REFILLS  FOR  KLONOPIN   FOR  SYMPTOMATIC  RELIEF. 21)        H/O      HOSPITALIZATION     FOR   PULMONARY  EMBOLISM   IN   April 2020                                 -   ON       ELIQUIS                                        PATIENT  TO  FOLLOW  WITH   HER  PCP  AND  OTHER  CONSULTANTS                                          22)   VARIOUS  RISK   FACTORS   WERE  REVIEWED   AND   DISCUSSED. PATIENT   HAS  MULTIPLE   MEDICAL, MENTAL HEALTH                        NEUROLOGICAL   PROBLEMS .                          PATIENT  MANAGEMENT  IS  CHALLENGING                                                                                                                  PRECIPITATING  FACTORS: including  fever/infection, exertion/relaxation, position change, stress,     weather change, medications/alcohol, time of day/darkness/light  Are    Absent                                                                 MODIFYING  FACTORS:  fever/infection, exertion/relaxation, position change, stress, weather change,     medications/alcohol, time of day/darkness/light    Are  absent             Patient   Indicates   The  Presence   And  The  Absence  Of  The  Following  Associated  And   Additional  Neurological    Symptoms:                                Balance  And coordination problems  absent           Gait problems     absent            Headaches      absent              Migraines           present           Memory problems        Present             Confusion        absent            Paresthesia numbness          absent           Seizures  And  Starring  Episodes           absent           Syncope,  Near  syncopal episodes         absent           Speech problems           absent             Swallowing  Problems      absent            Dizziness,  Light headedness           absent                        Vertigo        absent             Generalized   Weakness    absent              focal  Weakness     absent             Tremors         absent              Sleep  Problems     absent             History  Of   Recent   Head  Injury     absent             History  Of   Recent  TIA     absent             History  Of   Recent    Stroke     absent             Neck  Pain and  Neck muscle  Spasms  Absent               Radiating  down   And   Weakness           absent            Lower back   Pain  And     Spasms  Absent              Radiating    Down   And   Weakness          absent                H/O   FALLS        absent               History  Of   Visual  Symptoms    Present                    Associated   Diplopia       absent                                      Also   Additional   Symptoms   Present    As  Documented    In   The detailed      Review  Of  Systems   And    Please   Refer   To Them for   Additional  Information. Any components  That are either  Unobtainable  Or  Limited  In   HPI, ROS  And/or PFSH   Are       Due   To   Patient's  Medical  Problems,  Clinical  Condition and/or lack of other  Alternate resources.               RECORDS   REVIEWED:    historical medical records         INFORMATION   REVIEWED:     MEDICAL   HISTORY,     SURGICAL   HISTORY,   MEDICATIONS   LIST,   ALLERGIES AND  DRUG  INTOLERANCES,     FAMILY   HISTORY,  SOCIAL  HISTORY,    PROBLEM  LIST   FOR  PATIENT  CARE   COORDINATION    Past Medical History:   Diagnosis Date    [facility]     Breast cancer ([facility] 2017    right side     DVT of axillary vein, acute right ([address]     Eye twitch 2019    Headache(784.0)     Hyperlipidemia     Migraine          Past Surgical History:   Procedure Laterality Date    CATHETER REMOVAL Left 6/6/2019    PORT REMOVAL performed by Cristian Marks DO at [facility] N/A 10/13/2017    D & C HYSTEROSCOPY with polypectomy performed by Nia Taylor MD at [address] / REMOVAL / [address] Left 11/9/2017    PORT INSERTION performed by Ayan Mckeon MD at [address] / REMOVAL / [address] N/A 11/30/2017    Port Removal & Insertion performed by Ayan Mckeon MD at [address] Right 10/19/2017     [address] [facility]    MASTECTOMY Right 10/19/2017    Right Breast Mastectomy with  [place] Node Biopsy performed by Ayan Mckeon MD at . [facility] [address] [facility] VAD W/SUBQ PORT AGE 5 YR/> Left 3/1/2018    PORT Exchange performed by Ayan Mckeon MD at [facility] 84 Left 2014, 2015    x 2 surgeries total; Dr. Nomi Nix TUNNELED [facility] 94 Left 11/30/2017    removal of et replacement of         Current Outpatient Medications   Medication Sig Dispense Refill    clonazePAM (KLONOPIN) 0.5 MG tablet Take 1 tablet by mouth 2 times daily as needed for Anxiety (Papo  facial  spasms) for up to 31 days. 60 tablet 2    apixaban (ELIQUIS) 5 MG TABS tablet Take 1 tablet by mouth 2 times daily 180 tablet 1    Handicap Placard MISC by Does not apply route Expiration date: 5/1/2021 1 each 0    fluticasone (FLONASE) 50 MCG/ACT nasal spray 1 spray by Nasal route daily Indications: as needed 1 Bottle 11    Blood Pressure KIT 1 kit by Does not apply route daily as needed (bp check) (Patient not taking: Reported on 4/28/2020) 1 kit 0    Compression Stockings MISC by Does not apply route 15mm/Hg knee high stocking on in Am off at night (Patient not taking: Reported on 4/28/2020) 2 each 0    folic acid (FOLVITE) 1 MG tablet take 1 tablet by mouth once daily 30 tablet 3    apixaban (ELIQUIS DVT/PE STARTER PACK) 5 MG TABS tablet Take 10 mg (2 tablets) orally twice daily for 7 days, then take 5 mg (1 tablet) orally twice daily thereafter.  74 tablet 0    busPIRone (BUSPAR) 7.5 MG tablet take 1 tablet by mouth twice a day      OXcarbazepine (TRILEPTAL) 150 MG tablet take 1 tablet by mouth every morning BEFORE NOON      pravastatin (PRAVACHOL) 40 MG tablet take 1 tablet by mouth once daily 30 tablet 5    Calcium Carbonate-Vitamin D (OYSTER SHELL CALCIUM/D) 500-200 MG-UNIT TABS Take 1 tablet by mouth 2 times daily 60 tablet 5    Cyanocobalamin ER (RA VITAMIN B-12 TR) 1000 MCG TBCR take 1 tablet by mouth once daily 30 tablet 3    topiramate (TOPAMAX) 50 MG tablet ONE  TABLET  TWICE  DAILY 60 tablet 3    letrozole (FEMARA) 2.5 MG tablet Take 1 tablet by mouth daily 30 tablet 5    VENTOLIN  (90 Base) MCG/ACT inhaler Inhale 2 puffs into the lungs every 4 hours as needed for Wheezing 1 Inhaler 3    butalbital-acetaminophen-caffeine (FIORICET, ESGIC) -40 MG per tablet 1-2   TABLET  TWICE  DAILY  AS  NEEDED (Patient not taking: Reported on 4/24/2020) 90 tablet 1    benztropine (COGENTIN) 0.5 MG tablet Take 0.5 mg by mouth daily      QUEtiapine (SEROQUEL) 100 MG tablet Take 50 mg by mouth nightly      sodium chloride (ALTAMIST SPRAY) 0.65 % nasal spray 1 spray by Nasal route as needed for Congestion (Patient not taking: Reported on 4/28/2020) 1 Bottle 1    hydrOXYzine (ATARAX) 10 MG tablet Take 10 mg by mouth daily       lamoTRIgine (LAMICTAL) 100 MG tablet 150 mg nightly        No current facility-administered medications for this visit. Allergies   Allergen Reactions    Prednisone Swelling     Whole side of her face swelled when she took it, difficulty breathing         Family History   Problem Relation Age of Onset    Breast Cancer Sister 54    Breast Cancer Maternal Aunt 71    Other Maternal Cousin         Atypical Ductal Hyperplasia     Uterine Cancer Sister 32    Other Sister         breast cyst    Cataracts Mother     Glaucoma Mother     Heart Disease Father     High Blood Pressure Father     High Cholesterol Father     Mental Retardation Father     Diabetes Neg Hx          Social History     Socioeconomic History    Marital status: Single     Spouse name: Not on file    Number of children: Not on file    Years of education: Not on file    Highest education level: Not on file   Occupational History    Not on file   Social Needs    Financial resource strain: Not on file    Food insecurity     Worry: Not on file     Inability: Not on file    Transportation needs     Medical: Not on file     Non-medical: Not on file   Tobacco Use    Smoking status: Never Smoker    Smokeless tobacco: Never Used    Tobacco comment: Never smoker.  TC, RRT 4/5/18   Substance and Sexual Activity    Alcohol use: No    Drug use: No    Sexual activity: Yes     Partners: Male     Birth control/protection: Surgical   Lifestyle    Physical activity     Days per week: Not on file     Minutes per session: Not on file    Stress: Not on file   Relationships    Social connections     Talks on phone: Not on file Value Date    CKTOTAL 27 04/09/2020     Lab Results   Component Value Date    [id number] 943 06/04/2018             Review of Systems   Constitutional: Negative for appetite change, chills, diaphoresis, fatigue, fever, unexpected weight change and weight loss. HENT: Negative for congestion, dental problem, drooling, ear discharge, ear pain, facial swelling, hearing loss, mouth sores, nosebleeds, postnasal drip, rhinorrhea, sinus pressure, sore throat, tinnitus, trouble swallowing and voice change. Eyes: Positive for photophobia. Negative for blurred vision, pain, discharge, redness, itching and visual disturbance. Respiratory: Negative for apnea, cough, choking, chest tightness, shortness of breath and wheezing. Cardiovascular: Negative for chest pain, palpitations, leg swelling and near-syncope. Gastrointestinal: Positive for nausea and vomiting. Negative for abdominal distention, abdominal pain, anorexia, blood in stool, bowel incontinence, constipation and diarrhea. Endocrine: Negative for cold intolerance, heat intolerance, polydipsia, polyphagia and polyuria. Genitourinary: Negative for bladder incontinence. Musculoskeletal: Negative for arthralgias, back pain, gait problem, joint swelling, myalgias, neck pain and neck stiffness. Skin: Negative for color change, pallor, rash and wound. Allergic/Immunologic: Negative for environmental allergies, food allergies and immunocompromised state. Neurological: Positive for headaches. Negative for dizziness, vertigo, tingling, tremors, focal weakness, seizures, syncope, facial asymmetry, speech difficulty, weakness, light-headedness, numbness and loss of balance. Hematological: Negative for adenopathy. Does not bruise/bleed easily. Psychiatric/Behavioral: Positive for decreased concentration and memory loss.  Negative for agitation, behavioral problems, confusion, dysphoric mood, hallucinations, self-injury, sleep disturbance and suicidal ideas. The patient is nervous/anxious and has insomnia. The patient is not hyperactive. OBJECTIVE:     LIMITED     DUE  TO  VIRTUAL  VIDEO  VISIT        Neurologic Exam    NEUROLOGICAL EXAMINATION :       A) MENTAL STATUS:                   Alert and  oriented  To time, place  And  Person. No Aphasia. No  Dysarthria. Able   To  Follow  commands without   Any  Difficulty. No right  To left confusion. Normal  Speech  And language function.                                                       B) CRANIAL NERVES :    LEFT   FACIAL   SPASMS    AND    BLEPHAROSPASM   NOTED          C) MOTOR  EXAM:                        D) SENSORY :          E) REFLEXES:       F) COORDINATION  AND  GAIT :                          -  LIMITED   DUE  TO  VIRTUAL  VIDEO  VISIT        ASSESSMENT:    Patient Active Problem List   Diagnosis    Bipolar 1 disorder ([address]    Hyperlipidemia    Malignant neoplasm of overlapping sites of right breast in female, estrogen receptor negative ([address]    Port-A-Cath in place    Nausea and vomiting    Nausea with vomiting    Intractable vomiting with nausea    Migraine    Memory problem    Sleep difficulties    Anxiety and depression    Muscle twitching    Hemifacial spasm    Combined forms of age-related cataract of both eyes    Squamous blepharitis of upper and lower eyelids of both eyes    Acute deep vein thrombosis (DVT) of axillary vein of right upper extremity (HCC)    Malignant neoplasm of breast in female, estrogen receptor positive ([address]    Seasonal allergies    H/O right mastectomy    HX: breast cancer    Acute massive pulmonary embolism (HCC)    Moderate to severe pulmonary hypertension ([address]    CARYN (acute kidney injury) ([address]    Suspected COVID-19 virus infection    TAY on CPAP    Acute respiratory failure with hypoxia (HCC)    Leukocytosis    Community acquired pneumonia of right lower lobe [facility]          MRI OF THE BRAIN WITHOUT AND WITH CONTRAST  10/2/2019 1:14 pm       TECHNIQUE:   Multiplanar multisequence MRI of the head/brain was performed without and   with the administration of intravenous contrast.       COMPARISON:   MRI brain performed 10/08/2018.       HISTORY:   ORDERING SYSTEM PROVIDED HISTORY: Migraine without aura and without status   migrainosus, not intractable   TECHNOLOGIST PROVIDED HISTORY:   migraines   Is the patient pregnant?->No   Reason for Exam: Migraines for quite a while, becoming worse. Acuity: Chronic   Type of Exam: Unknown   Additional signs and symptoms: Migraine without aura and without status   migrainosus, not intractable       FINDINGS:   INTRACRANIAL STRUCTURES/VENTRICLES:  The sellar and suprasellar structures,   optic chiasm, corpus callosum, pineal gland, tectum, and midline brainstem   structures are unremarkable.  The craniocervical junction is unremarkable. There is no acute intracranial hemorrhage, mass effect, or midline shift. There is satisfactory overall gray-white matter differentiation.  There is no   abnormal postcontrast enhancement.  The ventricular structures are symmetric   and unremarkable.  The infratentorial structures including the   cerebellopontine angles and internal auditory canals are unremarkable. There   is no abnormal restricted diffusion.  There is no abnormal blooming artifact   on susceptibility weighted imaging.       ORBITS: The visualized portion of the orbits demonstrate no acute abnormality.       SINUSES: The visualized paranasal sinuses and mastoid air cells are well   aerated.       BONES/SOFT TISSUES: The bone marrow signal intensity appears normal. The soft   tissues demonstrate no acute abnormality.           Impression   Unremarkable pre and post-contrast MRI of the brain.               MRI OF THE BRAIN WITHOUT AND WITH CONTRAST  2/5/2018 9:18 am       TECHNIQUE:   Multiplanar multisequence MRI of the feel good, stay at a healthy weight, and have plenty of energy for both work and play. A healthy lifestyle is something you can share with your whole family. A healthy lifestyle also can lower your risk for serious health problems, such as high blood pressure, heart disease, and diabetes. You can follow a few steps listed below to improve your health and the health of your family. Follow-up care is a key part of your treatment and safety. Be sure to make and go to all appointments, and call your doctor if you are having problems. Its also a good idea to know your test results and keep a list of the medicines you take. How can you care for yourself at home? Do not eat too much sugar, fat, or fast foods. You can still have dessert and treats now and then. The goal is moderation. Start small to improve your eating habits. Pay attention to portion sizes, drink less juice and soda pop, and eat more fruits and vegetables. Eat a healthy amount of food. A 3-ounce serving of meat, for example, is about the size of a deck of cards. Fill the rest of your plate with vegetables and whole grains. Limit the amount of soda and sports drinks you have every day. Drink more water when you are thirsty. Eat at least 5 servings of fruits and vegetables every day. It may seem like a lot, but it is not hard to reach this goal. A serving or helping is 1 piece of fruit, 1 cup of vegetables, or 2 cups of leafy, raw vegetables. Have an apple or some carrot sticks as an afternoon snack instead of a candy bar. Try to have fruits and/or vegetables at every meal.  Make exercise part of your daily routine. You may want to start with simple activities, such as walking, bicycling, or slow swimming. Try to be active 30 to 60 minutes every day. You do not need to do all 30 to 60 minutes all at once. For example, you can exercise 3 times a day for 10 or 20 minutes.  Moderate exercise is safe for most people, but it is always a good idea to talk to your doctor before starting an exercise program.  Keep moving. Adilene Lands the lawn, work in the garden, or [employer]. Take the stairs instead of the elevator at work. If you smoke, quit. People who smoke have an increased risk for heart attack, stroke, cancer, and other lung illnesses. Quitting is hard, but there are ways to boost your chance of quitting tobacco for good. Use nicotine gum, patches, or lozenges. Ask your doctor about stop-smoking programs and medicines. Keep trying. In addition to reducing your risk of diseases in the future, you will notice some benefits soon after you stop using tobacco. If you have shortness of breath or asthma symptoms, they will likely get better within a few weeks after you quit. Limit how much alcohol you drink. Moderate amounts of alcohol (up to 2 drinks a day for men, 1 drink a day for women) are okay. But drinking too much can lead to liver problems, high blood pressure, and other health problems. Family health  If you have a family, there are many things you can do together to improve your health. Eat meals together as a family as often as possible. Eat healthy foods. This includes fruits, vegetables, lean meats and dairy, and whole grains. Include your family in your fitness plan. Most people think of activities such as jogging or tennis as the way to fitness, but there are many ways you and your family can be more active. Anything that makes you breathe hard and gets your heart pumping is exercise. Here are some tips:  Walk to do errands or to take your child to school or the bus. Go for a family bike ride after dinner instead of watching TV. Where can you learn more? Go to https://[employer].Connectem. org and sign in to your [employer] account. Enter D561 in the [employer] box to learn more about \"A Healthy Lifestyle: Care Instructions. \"     If you do not have an account, please click on the \"Sign Up Now\" link.   Current as of: July 26,

## 2020-06-09 ENCOUNTER — HOSPITAL ENCOUNTER (OUTPATIENT)
Dept: BONE DENSITY | Age: 55
Discharge: HOME | End: 2020-06-11
Payer: MEDICARE

## 2020-06-09 PROCEDURE — 77085 DXA BONE DENSITY AXL VRT FX: CPT

## 2020-06-12 RX ORDER — PSYLLIUM HUSK 3.4 G/7G
POWDER ORAL
Qty: 30 TABLET | Refills: 3 | Status: SHIPPED | OUTPATIENT
Start: 2020-06-12 | End: 2020-09-30

## 2020-06-23 ENCOUNTER — TELEPHONE (OUTPATIENT)
Dept: FAMILY MEDICINE CLINIC | Age: 55
End: 2020-06-23

## 2020-06-23 ENCOUNTER — TELEPHONE (OUTPATIENT)
Dept: VASCULAR SURGERY | Age: 55
End: 2020-06-23

## 2020-06-23 NOTE — TELEPHONE ENCOUNTER
Pt said she would like to find something different for her allergies. What she was prescribed is not working.

## 2020-06-23 NOTE — TELEPHONE ENCOUNTER
Pt states she is taking RA Allergy Relief 180mg daily. It is no longer working, would like something else prescribed for her.     Last Appt:  4/24/2020  Next Appt:   7/24/2020    Med verified in Epic

## 2020-06-24 RX ORDER — TRAMADOL HYDROCHLORIDE 50 MG/1
50 TABLET ORAL EVERY 6 HOURS PRN
Qty: 40 TABLET | Refills: 0 | Status: SHIPPED | OUTPATIENT
Start: 2020-06-24 | End: 2020-08-23

## 2020-06-29 ENCOUNTER — OFFICE VISIT (OUTPATIENT)
Dept: OPTOMETRY | Age: 55
End: 2020-06-29
Payer: COMMERCIAL

## 2020-06-29 PROCEDURE — 92014 COMPRE OPH EXAM EST PT 1/>: CPT | Performed by: OPTOMETRIST

## 2020-06-29 ASSESSMENT — KERATOMETRY
OD_K1POWER_DIOPTERS: 44.00
OD_AXISANGLE2_DEGREES: 174
OD_AXISANGLE_DEGREES: 084
OD_K2POWER_DIOPTERS: 45.50

## 2020-06-29 ASSESSMENT — VISUAL ACUITY
OS_SC: 20/50
OD_SC: 20/40
METHOD: SNELLEN - LINEAR
OD_SC: 20/100 OU

## 2020-06-29 ASSESSMENT — REFRACTION_WEARINGRX
OD_CYLINDER: -0.75
OD_AXIS: 085
OS_ADD: +2.00
OS_SPHERE: -0.50
OS_AXIS: 051
SPECS_TYPE: TRIFOCAL
OD_ADD: +2.00
OD_SPHERE: +0.50
OS_CYLINDER: -0.75

## 2020-06-29 ASSESSMENT — TONOMETRY
OD_IOP_MMHG: 14
OS_IOP_MMHG: 13
IOP_METHOD: NON-CONTACT AIR PUFF

## 2020-06-29 ASSESSMENT — REFRACTION_MANIFEST
OD_SPHERE: PLANO
OS_AXIS: 050
OS_SPHERE: -0.50
OS_CYLINDER: -1.00
OD_CYLINDER: -0.75
OS_ADD: +2.50
OD_ADD: +2.50
OD_AXIS: 025

## 2020-06-29 ASSESSMENT — SLIT LAMP EXAM - LIDS
COMMENTS: NORMAL
COMMENTS: NORMAL

## 2020-06-29 NOTE — PROGRESS NOTES
Non-medical: None   Tobacco Use    Smoking status: Never Smoker    Smokeless tobacco: Never Used    Tobacco comment: Never smoker.  TC, RRT 18   Substance and Sexual Activity    Alcohol use: No    Drug use: No    Sexual activity: Yes     Partners: Male     Birth control/protection: Surgical   Lifestyle    Physical activity     Days per week: None     Minutes per session: None    Stress: None   Relationships    Social connections     Talks on phone: None     Gets together: None     Attends [employer] service: None     Active member of club or organization: None     Attends meetings of clubs or organizations: None     Relationship status: None    Intimate partner violence     Fear of current or ex partner: None     Emotionally abused: None     Physically abused: None     Forced sexual activity: None   Other Topics Concern    None   Social History Narrative    None     Past Medical History:   Diagnosis Date    [facility]     Breast cancer ([facility] 2017    right side     DVT of axillary vein, acute right ([facility]     Eye twitch 2019    Headache(784.0)     Hyperlipidemia     Migraine            Main Ophthalmology Exam     External Exam       Right Left    External Normal Normal          Slit Lamp Exam       Right Left    Lids/Lashes Normal Normal    Conjunctiva/Sclera White and quiet White and quiet    Cornea Clear Clear    Anterior Chamber Deep and quiet trace     Iris Round and reactive Round and reactive    Lens 1+ Nuclear sclerosis 1+ Nuclear sclerosis    Vitreous Normal Normal          Fundus Exam       Right Left    Disc Normal Normal    C/D Ratio 0.15 0.15    Macula Normal Normal    Vessels Normal Normal                   Tonometry     Tonometry (Non-contact air puff, 3:11 PM)       Right Left    Pressure 14 13   IOP.2             12.1  CH:  10.2          10.1  WS: 6.2          6.8                  Not recorded         Not recorded          Visual Acuity (Snellen - Linear) Right Left    Dist sc 20/40 20/50    Near sc 20/100 OU           Pupils     Pupils       Pupils    Right PERRL    Left PERRL              Neuro/Psych     Neuro/Psych     Oriented x3:  Yes    Mood/Affect:  Normal              Keratometry     Keratometry       K1 Axis K2 Axis    Right 44.00 174 45.50 084    Left                      Ophthalmology Exam     Wearing Rx       Sphere Cylinder Axis Add    Right +0.50 -0.75 085 +2.00    Left -0.50 -0.75 051 +2.00    Age:  2yrs    Type:  Trifocal              Manifest Refraction     Manifest Refraction       Sphere Cylinder [place] Dist [place] Add [place]    Right [place] -0.75 025 20/30+ +2.50     Left -0.50 -1.00 050 20/30 +2.50 20/25ou          Manifest Refraction #2 (Auto)       Sphere Cylinder [place] [facility] [place] Add [place]    Right +0.00 -0.75 005       Left -1.25 -2.50 006                  Final Rx       Sphere Cylinder Axis Add    Right [place] -0.75 025 +2.50    Left -0.50 -1.00 050 +2.50    Type:  Trifocal    Expiration Date:  6/30/2022            1. Hyperopia of both eyes with astigmatism and presbyopia    2. Nuclear sclerosis of both eyes           Patient Instructions   New glasses recommended;   Follow with specialist for hemifacial spasm      Return in about 1 year (around 6/29/2021) for complete eye exam.

## 2020-06-30 RX ORDER — LETROZOLE 2.5 MG/1
2.5 TABLET, FILM COATED ORAL DAILY
Qty: 30 TABLET | Refills: 5 | Status: SHIPPED | OUTPATIENT
Start: 2020-06-30 | End: 2020-12-30

## 2020-07-28 ENCOUNTER — OFFICE VISIT (OUTPATIENT)
Dept: FAMILY MEDICINE CLINIC | Age: 55
End: 2020-07-28
Payer: MEDICARE

## 2020-07-28 VITALS
BODY MASS INDEX: 47.09 KG/M2 | RESPIRATION RATE: 18 BRPM | WEIGHT: 293 LBS | OXYGEN SATURATION: 97 % | TEMPERATURE: 97.9 F | HEART RATE: 100 BPM | SYSTOLIC BLOOD PRESSURE: 124 MMHG | HEIGHT: 66 IN | DIASTOLIC BLOOD PRESSURE: 80 MMHG

## 2020-07-28 PROBLEM — E66.01 MORBID OBESITY WITH BMI OF 40.0-44.9, ADULT (HCC): Status: ACTIVE | Noted: 2020-07-28

## 2020-07-28 PROBLEM — J45.909 EXTRINSIC ASTHMA: Status: ACTIVE | Noted: 2020-07-28

## 2020-07-28 PROCEDURE — 90732 PPSV23 VACC 2 YRS+ SUBQ/IM: CPT | Performed by: NURSE PRACTITIONER

## 2020-07-28 PROCEDURE — 99212 OFFICE O/P EST SF 10 MIN: CPT | Performed by: NURSE PRACTITIONER

## 2020-07-28 PROCEDURE — 99396 PREV VISIT EST AGE 40-64: CPT | Performed by: NURSE PRACTITIONER

## 2020-07-28 PROCEDURE — 90750 HZV VACC RECOMBINANT IM: CPT | Performed by: NURSE PRACTITIONER

## 2020-07-28 ASSESSMENT — ENCOUNTER SYMPTOMS
GASTROINTESTINAL NEGATIVE: 1
RESPIRATORY NEGATIVE: 1

## 2020-07-28 ASSESSMENT — PATIENT HEALTH QUESTIONNAIRE - PHQ9
1. LITTLE INTEREST OR PLEASURE IN DOING THINGS: 1
SUM OF ALL RESPONSES TO PHQ QUESTIONS 1-9: 2
2. FEELING DOWN, DEPRESSED OR HOPELESS: 1
SUM OF ALL RESPONSES TO PHQ9 QUESTIONS 1 & 2: 2
SUM OF ALL RESPONSES TO PHQ QUESTIONS 1-9: 2

## 2020-07-28 NOTE — PROGRESS NOTES
[facility]    Subjective:     Patient ID: Good Lagunas is a 54 y.o. y.o. female. Patient in for office for routine health maintenance of chronic conditions and medication refills. I have reviewed the patient's medical history in detail and updated the computerized patient record. She has history of migraines. She sees neurology for this. Last appmt was in May. She takes OTC allergy medication. She has a history. She has good and bad days. She states that she no longer sees allergist.   She sees oncology for breast cancer. Last visit was in April. She takes Femara. She uses a PRN albuterol MDI for her mild asthma. She is controlled with this medication. She is agreeable to labs and referral to OB/GYN for PAP and pelvic exams. She had an eye exam in June of this year. Hyperlipidemia   This is a chronic problem. The current episode started more than 1 year ago. Recent lipid tests were reviewed and are variable (last lipid panel was ABN on 2019-needs repeat lab work). There are no known factors aggravating her hyperlipidemia. Current antihyperlipidemic treatment includes statins. Compliance problems include adherence to diet. Risk factors for coronary artery disease include dyslipidemia, obesity and a sedentary lifestyle.         Past Medical History:   Diagnosis Date    Bipolar 1 disorder ([address]     Breast cancer ([address] 2017    right side     DVT of axillary vein, acute right ([address]     Eye twitch 2019    Headache(784.0)     Hyperlipidemia     Migraine        Past Surgical History:   Procedure Laterality Date    CATHETER REMOVAL Left 6/6/2019    PORT REMOVAL performed by Salome Tejeda DO at [address] N/A 10/13/2017    D & C HYSTEROSCOPY with polypectomy performed by Mayank Buenrostro MD at [facility] / REMOVAL / [address] Left 11/9/2017    PORT INSERTION performed by Hershal Nageotte, MD at [address] [place] [address] INSERTION / REMOVAL / REPLACEMENT VENOUS ACCESS CATHETER N/A 11/30/2017    Port Removal & Insertion performed by Bruno Gitelman, MD at [address] Right 10/19/2017     [address] [facility]    MASTECTOMY Right 10/19/2017    Right Breast Mastectomy with  [place] Node Biopsy performed by Bruno Gitelman, MD at [address] [facility] VAD W/SUBQ PORT AGE 5 YR/> Left 3/1/2018    PORT Exchange performed by Bruno Gitelman, MD at [place] [address] Left 2014, 2015    x 2 surgeries total; Dr. Brittani Hinojosa TUNNELED VENOUS PORT PLACEMENT Left 11/30/2017    removal of et replacement of       Family History   Problem Relation Age of Onset    Breast Cancer Sister 54    Breast Cancer Maternal Aunt 71    Other Maternal Cousin         Atypical Ductal Hyperplasia     Uterine Cancer Sister 32    Other Sister         breast cyst    Cataracts Mother     Glaucoma Mother     Heart Disease Father     High Blood Pressure Father     High Cholesterol Father     Mental Retardation Father     Diabetes Neg Hx           Allergies   Allergen Reactions    Prednisone Swelling     Whole side of her face swelled when she took it, difficulty breathing       Current Outpatient Medications   Medication Sig Dispense Refill    letrozole (FEMARA) 2.5 MG tablet Take 1 tablet by mouth daily 30 tablet 5    traMADol (ULTRAM) 50 MG tablet Take 1 tablet by mouth every 6 hours as needed for Pain for up to 60 days. Intended supply: 5 days.  Take lowest dose possible to manage pain 40 tablet 0    Cyanocobalamin ER (RA VITAMIN B-12 TR) 1000 MCG TBCR take 1 tablet by mouth once daily 30 tablet 3    topiramate (TOPAMAX) 50 MG tablet ONE  TABLET  TWICE  DAILY 60 tablet 3    apixaban (ELIQUIS) 5 MG TABS tablet Take 1 tablet by mouth 2 times daily 180 tablet 1    Handicap Placard MISC by Does not apply route Expiration date: 5/1/2021 1 each 0    Blood Pressure KIT 1 kit by Does not apply route daily as needed (bp check) 1 kit Negative. Objective:       /80 (Site: Left Upper Arm, Position: Sitting, Cuff Size: Large Adult)   Pulse 100   Temp 97.9 °F (36.6 °C) (Infrared)   Resp 18   Ht 5' 5.9\" (1.674 m)   Wt (!) 305 lb 12.8 oz (138.7 kg)   LMP 02/28/2013   SpO2 97%   Breastfeeding No   BMI 49.51 kg/m²     Physical Exam  Vitals signs and nursing note reviewed. Constitutional:       Appearance: Normal appearance. She is well-developed. She is obese. HENT:      Head: Normocephalic and atraumatic. Right Ear: External ear normal.      Left Ear: External ear normal.      Nose: Nose normal.   Eyes:      Conjunctiva/sclera: Conjunctivae normal.      Pupils: Pupils are equal, round, and reactive to light. Neck:      Musculoskeletal: Normal range of motion. Cardiovascular:      Rate and Rhythm: Normal rate and regular rhythm. Pulmonary:      Effort: Pulmonary effort is normal.      Breath sounds: Normal breath sounds. Abdominal:      General: Bowel sounds are normal.      Palpations: Abdomen is soft. Musculoskeletal: Normal range of motion. Skin:     General: Skin is warm and dry. Neurological:      Mental Status: She is alert and oriented to person, place, and time. Psychiatric:         Behavior: Behavior normal. Behavior is cooperative. Thought Content: Thought content normal.         Judgment: Judgment normal.           Assessment & Plan:      1. Routine health maintenance    - Lipid, Fasting; Future  - CBC Auto Differential; Future  - Comprehensive Metabolic Panel; Future    2. Hyperlipidemia, unspecified hyperlipidemia type-chronic  Cont on medication  - Lipid, Fasting; Future    3. Mild intermittent extrinsic asthma without complication-chronic stable on medication      4. Morbid obesity with BMI of 40.0-44.9, adult (HCC)-chronic      5. Screening for colon cancer    - Cologuard (For External Results Only); Future    6.  Need for prophylactic vaccination against Streptococcus pneumoniae

## 2020-08-04 RX ORDER — B-COMPLEX WITH VITAMIN C
TABLET ORAL
Qty: 60 TABLET | Refills: 5 | Status: SHIPPED | OUTPATIENT
Start: 2020-08-04 | End: 2021-02-09 | Stop reason: SDUPTHER

## 2020-08-20 ENCOUNTER — OFFICE VISIT (OUTPATIENT)
Dept: NEUROLOGY | Age: 55
End: 2020-08-20
Payer: MEDICARE

## 2020-08-20 VITALS
SYSTOLIC BLOOD PRESSURE: 122 MMHG | HEART RATE: 84 BPM | OXYGEN SATURATION: 98 % | TEMPERATURE: 97.5 F | DIASTOLIC BLOOD PRESSURE: 78 MMHG | BODY MASS INDEX: 47.09 KG/M2 | HEIGHT: 66 IN | WEIGHT: 293 LBS

## 2020-08-20 PROBLEM — G24.5 BLEPHAROSPASM: Status: ACTIVE | Noted: 2020-08-20

## 2020-08-20 PROCEDURE — 3017F COLORECTAL CA SCREEN DOC REV: CPT | Performed by: PSYCHIATRY & NEUROLOGY

## 2020-08-20 PROCEDURE — 99214 OFFICE O/P EST MOD 30 MIN: CPT | Performed by: PSYCHIATRY & NEUROLOGY

## 2020-08-20 PROCEDURE — 1036F TOBACCO NON-USER: CPT | Performed by: PSYCHIATRY & NEUROLOGY

## 2020-08-20 PROCEDURE — G8427 DOCREV CUR MEDS BY ELIG CLIN: HCPCS | Performed by: PSYCHIATRY & NEUROLOGY

## 2020-08-20 PROCEDURE — G8417 CALC BMI ABV UP PARAM F/U: HCPCS | Performed by: PSYCHIATRY & NEUROLOGY

## 2020-08-20 RX ORDER — TOPIRAMATE 50 MG/1
TABLET, FILM COATED ORAL
Qty: 60 TABLET | Refills: 5 | Status: SHIPPED | OUTPATIENT
Start: 2020-08-20 | End: 2021-02-26 | Stop reason: SDUPTHER

## 2020-08-20 RX ORDER — CLONAZEPAM 0.5 MG/1
0.5 TABLET ORAL 2 TIMES DAILY PRN
Qty: 60 TABLET | Refills: 2 | Status: SHIPPED | OUTPATIENT
Start: 2020-08-20 | End: 2020-11-20 | Stop reason: SDUPTHER

## 2020-08-20 ASSESSMENT — ENCOUNTER SYMPTOMS
DIARRHEA: 0
SCALP TENDERNESS: 0
SHORTNESS OF BREATH: 0
EYE PAIN: 0
ABDOMINAL DISTENTION: 0
VOICE CHANGE: 0
RHINORRHEA: 0
TROUBLE SWALLOWING: 0
COUGH: 0
APNEA: 0
WHEEZING: 0
EYE WATERING: 1
SWOLLEN GLANDS: 0
EYE ITCHING: 0
FACIAL SWELLING: 0
VOMITING: 1
CHEST TIGHTNESS: 0
COLOR CHANGE: 0
SORE THROAT: 0
BACK PAIN: 0
VISUAL CHANGE: 0
CHOKING: 0
EYE DISCHARGE: 0
SINUS PRESSURE: 0
PHOTOPHOBIA: 1
FACIAL SWEATING: 0
CONSTIPATION: 0
BLOOD IN STOOL: 0
NAUSEA: 1
ABDOMINAL PAIN: 0
BLURRED VISION: 0
BOWEL INCONTINENCE: 0
EYE REDNESS: 0

## 2020-08-20 NOTE — PROGRESS NOTES
[facility]  Neurology  [address] [place] [address]  Phone: [phone]   Fax: [phone]    SUBJECTIVE:     PATIENT ID:  Dave Nunez is a  RIGHT  HANDED 54 y.o. female. Migraine    This is a chronic problem. Episode onset: FOR  MORE  THAN   4  YEARS. The problem occurs intermittently. The problem has been gradually worsening. The pain is located in the bilateral and vertex region. The pain does not radiate. The pain quality is similar to prior headaches. The quality of the pain is described as aching, dull and throbbing. The pain is at a severity of 3/10. The pain is mild. Associated symptoms include eye watering, insomnia, nausea, phonophobia, photophobia and vomiting. Pertinent negatives include no abdominal pain, abnormal behavior, anorexia, back pain, blurred vision, coughing, dizziness, drainage, ear pain, eye pain, eye redness, facial sweating, fever, hearing loss, loss of balance, muscle aches, neck pain, numbness, rhinorrhea, scalp tenderness, seizures, sinus pressure, sore throat, swollen glands, tingling, tinnitus, visual change, weakness or weight loss. The symptoms are aggravated by unknown. She has tried acetaminophen and Excedrin for the symptoms. The treatment provided no relief. Her past medical history is significant for migraine headaches and obesity. There is no history of cancer, cluster headaches, hypertension, immunosuppression, migraines in the family, pseudotumor cerebri, recent head traumas, sinus disease or TMJ. Neurologic Problem   The patient's primary symptoms include memory loss. The patient's pertinent negatives include no altered mental status, clumsiness, focal sensory loss, focal weakness, loss of balance, near-syncope, slurred speech, syncope, visual change or weakness. Primary symptoms comment: LEFT  EYE  TWITCHING  AND  SPAMS. This is a chronic problem. Episode onset: SINCE  APRIL 2017.  The neurological problem developed insidiously. The problem has been waxing and waning since onset. There was facial and left-sided focality noted. Associated symptoms include headaches, nausea and vomiting. Pertinent negatives include no abdominal pain, auditory change, aura, back pain, bladder incontinence, bowel incontinence, chest pain, confusion, diaphoresis, dizziness, fatigue, fever, light-headedness, neck pain, palpitations, shortness of breath or vertigo. Treatments tried: Parminder Flores. The treatment provided moderate relief. There is no history of a bleeding disorder, a clotting disorder, a CVA, dementia, head trauma, liver disease, mood changes or seizures. History obtained from  The patient      and other  available medical records were  Also  reviewed. The  Duration,  Quality,  Severity,  Location,  Timing,  Context,  Modifying  Factors   Of   The   Chief   Complaint         And  Present  Illness   Was   Reviewed   In   Chronological   Manner. PATIENT'S  MAIN  CONCERNS INCLUDE :                       1)  H/O   LEFT  EYE  TWITCHING     SINCE      April 2017                                  INTERMITTENTLY  . NO  PAIN                                -BLEPHAROSPASM    LEFT  EYE.                                -     STABLE                           2)    WORSE    SINCE     FEB. 2018                            ANXIETY   ALSO  A  CONTRIBUTING FACTOR                          3)      PREVIOUS    H/O    BREAST    CANCER  RIGHT                                H/O   BREAST  CANCER   SURGERY  IN  NOV. 2017                           4)       HAD    CHEMO     IN   THE  PAST                                      ALSO  ON   ARIMIDEX   DAILY                             BEING  FOLLOWED  BY   HEMATOLOGY /  ONCOLOGY                           5)    H/O   CHRONIC   MIGRAINES       FOR    4  YEARS                                               -    STABLE -    ON   FIORICET    AS  NEEDED                               6)   H/O   CHRONIC  ANXIETY,   DEPRESSION,  BIPOLAR  DISORDER                               -   ON    LAMICTAL,  SEROQUEL   TRAZODONE                                     -    STABLE                                   -       BEING  FOLLOWED  BY  MENTAL  HEALTH PROFESSIONALS                             7)    H/O   CHRONIC   SLEEP  DIFFICULTIES                                            -   BETTER                                                         8)   H/O   MEMORY PROBLEMS   SINCE    OCTOBER 2017                                            -   STABLE                                9)   OBESITY     WITH   EXCESSIVE    DAY   TIME  SLEEPINESS                                     H/O   OSAS     -   ON  CPAP                                                        10)   MULTIPLE  CO  MORBID  MEDICAL  CONDITIONS                                    BEING  FOLLOWED BY  HER  PCP                            11)   MRI  BRAIN  IN  FEB. 2018   SHOWED                                      NO ACUTE INTRA  CRANIAL PATHOLOGY                                12)    HAD  EYE   EVALUATIONS  IN  MAY  2018                                           AND  DIAGNOSED  WITH LEFT  EYE BLEPHAROSPASM                              13)    LEFT  EYE  TWITCHING  AND  SPASM   ARE   CAUSING                                DIFFICULTY  WITH  READING,  WATCHING  TV   AND  DRIVING                                           -     IMPROVED                                               14)     ON  KLONOPIN       FOR  LEFT  BLEPHAROSPASM   IN      June 2018                                   -     IMPROVED  MORE  THAN   75 %   SUBJECTIVELY.                                PATIENT  NOT  INTERESTED  IN   ADDITIONAL  MEDICATIONS                                     OR  INJECTION  BOTOX                              15)   H/O   LEFT  EYE    WATERING    AND  INCREASED  EYE  TWITCHING PATIENT   HAD     EYE  DOCTORS     EVALUATIONS                                             16)     WORSENING  OF  MIGRAINES    IN      July 2019                                               2 -3    TIMES  WEEKLY                                               17)     FOLLOW  UP  MRI BRAIN   WITH  AND  W/O   CONTRAST     IN  OCT. 2019                                   AND  LABS  SHOWED  NO  SIGNIFICANT  ABNORMALITIES                                                       18)        EXPECTATIONS,   GOALS   OF  MIGRAINE   THERAPY                                  AND  SIDE  EFFECTS  MEDICATIONS    WERE                                 REVIEWED     AND   DISCUSSED    IN    DETAIL. AS   DISCUSSED    WITH  PATIENT   IN  OCT. 2019                                  PATIENT     STARTED  ON    TOPAMAX     FOR  MIGRAINE PROPHYLAXIS                          19)     CURRENTLY    MIGRAINES  ARE   BETTER  CONTROLLED                               ON  TOPAMAX        AND  PATIENT     FEELS  LOT  BETTER. PATIENT  DENIES  ANY      RENAL  STONES. 20)                        LEFT   FACIAL  AND  BLEPHAROSPASM     BETTER  CONTROLLED. PATIENT  REQUESTS    REFILLS  FOR  KLONOPIN   FOR  SYMPTOMATIC  RELIEF. 21)        H/O      HOSPITALIZATION     FOR   PULMONARY  EMBOLISM   IN   April 2020                                 -   ON       ELIQUIS                                        PATIENT  TO  FOLLOW  WITH   HER  PCP  AND  OTHER  CONSULTANTS                                              22)   VARIOUS  RISK   FACTORS   WERE  REVIEWED   AND   DISCUSSED. PATIENT   HAS  MULTIPLE   MEDICAL, MENTAL HEALTH                        NEUROLOGICAL   PROBLEMS .                          PATIENT  MANAGEMENT  IS  CHALLENGING                       23) PATIENT  DENIES  ANY  NEW  NEUROLOGICAL  CONCERNS.                                                                                                             PRECIPITATING  FACTORS: including  fever/infection, exertion/relaxation, position change, stress,     weather change, medications/alcohol, time of day/darkness/light  Are    Absent                                                                 MODIFYING  FACTORS:  fever/infection, exertion/relaxation, position change, stress, weather change,     medications/alcohol, time of day/darkness/light    Are  absent             Patient   Indicates   The  Presence   And  The  Absence  Of  The  Following  Associated  And   Additional  Neurological    Symptoms:                                Balance  And coordination problems  absent           Gait problems     absent            Headaches      absent              Migraines           present           Memory problems        Present             Confusion        absent            Paresthesia numbness          absent           Seizures  And  Starring  Episodes           absent           Syncope,  Near  syncopal episodes         absent           Speech problems           absent             Swallowing  Problems      absent            Dizziness,  Light headedness           absent                        Vertigo        absent             Generalized   Weakness    absent              focal  Weakness     absent             Tremors         absent              Sleep  Problems     absent             History  Of   Recent   Head  Injury     absent             History  Of   Recent  TIA     absent             History  Of   Recent    Stroke     absent             Neck  Pain and  Neck muscle  Spasms  Absent               Radiating  down   And   Weakness           absent            Lower back   Pain  And     Spasms  Absent              Radiating    Down   And   Weakness          absent                H/O   FALLS        absent History  Of   Visual  Symptoms    Present                    Associated   Diplopia       absent                                      Also   Additional   Symptoms   Present    As  Documented    In   The detailed      Review  Of  Systems   And    Please   Refer   To    Them for   Additional  Information. Any components  That are either  Unobtainable  Or  Limited  In   HPI, ROS  And/or PFSH   Are       Due   To   Patient's  Medical  Problems,  Clinical  Condition and/or lack of other  Alternate resources.               RECORDS   REVIEWED:    historical medical records         INFORMATION   REVIEWED:     MEDICAL   HISTORY,     SURGICAL   HISTORY,   MEDICATIONS   LIST,   ALLERGIES AND  DRUG  INTOLERANCES,     FAMILY   HISTORY,  SOCIAL  HISTORY,    PROBLEM  LIST   FOR  PATIENT  CARE   COORDINATION    Past Medical History:   Diagnosis Date    Bipolar 1 disorder ([address]     Breast cancer ([address] 2017    right side     DVT of axillary vein, acute right ([address]     Eye twitch 2019    Headache(784.0)     Hyperlipidemia     Migraine          Past Surgical History:   Procedure Laterality Date    CATHETER REMOVAL Left 6/6/2019    PORT REMOVAL performed by Aparna Cary DO at [address] N/A 10/13/2017    D & C HYSTEROSCOPY with polypectomy performed by Jairon Dumont MD at [address] / REMOVAL / [address] Left 11/9/2017    PORT INSERTION performed by Kwadwo Vanegas MD at [address] / REMOVAL / [address] N/A 11/30/2017    Port Removal & Insertion performed by Kwadwo Vanegas MD at [facility] Right 10/19/2017     [address] [facility]    MASTECTOMY Right 10/19/2017    Right Breast Mastectomy with  [place] Node Biopsy performed by Kwadwo Vanegas MD at [address] [facility] [address] W/SUBQ PORT AGE 5 YR/> Left 3/1/2018    PORT Exchange performed by Kwadwo Vanegas MD at [address] [place] [address] No    Sexual activity: Yes     Partners: Male     Birth control/protection: Surgical   Lifestyle    Physical activity     Days per week: Not on file     Minutes per session: Not on file    Stress: Not on file   Relationships    Social connections     Talks on phone: Not on file     Gets together: Not on file     Attends Jainism service: Not on file     Active member of club or organization: Not on file     Attends meetings of clubs or organizations: Not on file     Relationship status: Not on file    Intimate partner violence     Fear of current or ex partner: Not on file     Emotionally abused: Not on file     Physically abused: Not on file     Forced sexual activity: Not on file   Other Topics Concern    Not on file   Social History Narrative    Not on file       Vitals:    08/20/20 1335   BP: 122/78   Pulse: 84   Temp: 97.5 °F (36.4 °C)   SpO2: 98%         Wt Readings from Last 3 Encounters:   08/20/20 (!) 305 lb (138.3 kg)   07/28/20 (!) 305 lb 12.8 oz (138.7 kg)   04/28/20 294 lb 3.2 oz (133.4 kg)         BP Readings from Last 3 Encounters:   08/20/20 122/78   07/28/20 124/80   04/28/20 128/74       Hematology and Coagulation  Lab Results   Component Value Date    WBC 7.6 04/28/2020    RBC 3.94 04/28/2020    HGB 11.9 04/28/2020    HCT 38.9 04/28/2020    MCV 98.7 04/28/2020    MCH 30.2 04/28/2020    MCHC 30.6 04/28/2020    RDW 14.0 04/28/2020     04/28/2020    MPV 9.4 04/28/2020       Chemistries  Lab Results   Component Value Date     04/28/2020    K 3.9 04/28/2020     04/28/2020    CO2 22 04/28/2020    BUN 17 04/28/2020    CREATININE 1.21 04/28/2020    CALCIUM 10.2 04/28/2020    PROT 8.0 04/28/2020    LABALBU 4.4 04/28/2020    BILITOT 0.39 04/28/2020    ALKPHOS 85 04/28/2020    AST 17 04/28/2020    ALT 18 04/28/2020     Lab Results   Component Value Date    ALKPHOS 85 04/28/2020    ALT 18 04/28/2020    AST 17 04/28/2020    PROT 8.0 04/28/2020    BILITOT 0.39 04/28/2020    BILIDIR 0.12 04/09/2020    LABALBU 4.4 04/28/2020     Lab Results   Component Value Date    BUN 17 04/28/2020    CREATININE 1.21 04/28/2020     Lab Results   Component Value Date    CALCIUM 10.2 04/28/2020    MG 1.8 04/10/2020     Lab Results   Component Value Date    AST 17 04/28/2020    ALT 18 04/28/2020       Lab Results   Component Value Date    CKTOTAL 27 04/09/2020     Lab Results   Component Value Date    EHSMDSZH85 986 06/04/2018             Review of Systems   Constitutional: Negative for appetite change, chills, diaphoresis, fatigue, fever, unexpected weight change and weight loss. HENT: Negative for congestion, dental problem, drooling, ear discharge, ear pain, facial swelling, hearing loss, mouth sores, nosebleeds, postnasal drip, rhinorrhea, sinus pressure, sore throat, tinnitus, trouble swallowing and voice change. Eyes: Positive for photophobia. Negative for blurred vision, pain, discharge, redness, itching and visual disturbance. Respiratory: Negative for apnea, cough, choking, chest tightness, shortness of breath and wheezing. Cardiovascular: Negative for chest pain, palpitations, leg swelling and near-syncope. Gastrointestinal: Positive for nausea and vomiting. Negative for abdominal distention, abdominal pain, anorexia, blood in stool, bowel incontinence, constipation and diarrhea. Endocrine: Negative for cold intolerance, heat intolerance, polydipsia, polyphagia and polyuria. Genitourinary: Negative for bladder incontinence. Musculoskeletal: Negative for arthralgias, back pain, gait problem, joint swelling, myalgias, neck pain and neck stiffness. Skin: Negative for color change, pallor, rash and wound. Allergic/Immunologic: Negative for environmental allergies, food allergies and immunocompromised state. Neurological: Positive for headaches.  Negative for dizziness, vertigo, tingling, tremors, focal weakness, seizures, syncope, facial asymmetry, speech difficulty, weakness, light-headedness, numbness and loss of balance. Hematological: Negative for adenopathy. Does not bruise/bleed easily. Psychiatric/Behavioral: Positive for decreased concentration and memory loss. Negative for agitation, behavioral problems, confusion, dysphoric mood, hallucinations, self-injury, sleep disturbance and suicidal ideas. The patient is nervous/anxious and has insomnia. The patient is not hyperactive. OBJECTIVE:    Physical Exam  Constitutional:       Appearance: She is well-developed. HENT:      Head: Normocephalic and atraumatic. No raccoon eyes or Varma's sign. Right Ear: External ear normal.      Left Ear: External ear normal.      Nose: Nose normal.   Eyes:      Conjunctiva/sclera: Conjunctivae normal.   Neck:      Musculoskeletal: Normal range of motion and neck supple. Normal range of motion. No neck rigidity or muscular tenderness. Thyroid: No thyroid mass or thyromegaly. Vascular: No carotid bruit. Trachea: No tracheal deviation. Meningeal: Brudzinski's sign and Kernig's sign absent. Cardiovascular:      Rate and Rhythm: Normal rate and regular rhythm. Pulmonary:      Effort: Pulmonary effort is normal.   Musculoskeletal:         General: No tenderness. Skin:     General: Skin is warm. Coloration: Skin is not pale. Findings: No erythema or rash. Nails: There is no clubbing. Psychiatric:         Attention and Perception: She is attentive. Mood and Affect: Mood is anxious and depressed. Affect is not labile, blunt, angry or inappropriate. Behavior: Behavior is not agitated, slowed, aggressive, withdrawn, hyperactive or combative. Behavior is cooperative. Thought Content: Thought content is not paranoid or delusional. Thought content does not include homicidal or suicidal ideation. Thought content does not include homicidal or suicidal plan. Cognition and Memory: Memory is impaired.  She does not exhibit impaired recent memory or impaired remote memory. Judgment: Judgment is not impulsive or inappropriate. Neurologic Exam    NEUROLOGICAL EXAMINATION :       A) MENTAL STATUS:                   Alert and  oriented  To time, place  And  Person. No Aphasia. No  Dysarthria. Able   To  Follow  commands without   Any  Difficulty. No right  To left confusion. Normal  Speech  And language function. Insight and  Judgment ,Fund  Of  Knowledge   within normal limits                Recent  And  Remote memory  within normal limits                Attention &Concentration are within normal limits                                                   B) CRANIAL NERVES :             2 CN : Visual  Acuity  And  Visual fields  within normal limits                      Fundi  Could  Not  Be  Could  Not  Be  Evaluated. 3,4,6 CN : Both  Pupils are   Reactive and  Equal.                          Extraocular   Movements  Are  Intact. No  Nystagmus. No  YUMIKO. No  Afferent  Pupillary  Defect noted. 5 CN :  Normal  Facial sensations and Corneal  Reflexes         7 CN :  Normal  Facial  Symmetry  And  Strength. No facial  Weakness. 8 CN :  Hearing  Appears within normal limits        9, 10 CN: Normal spontaneous, reflex palate movements       11 CN:   Normal  Shoulder shrug and  strength       12 CN :   Normal  Tongue movements and  Tongue  In midline                      No tongue   Fasciculations or atrophy         C) MOTOR  EXAM:                 Strength  In upper  And  Lower extremities   within normal limits               No  Drift. No  Atrophy               Rapid alternating  And  repetitions  Movements  within normal limits               Muscle  Tone  In upper  And  Lower  Extremities  Normal                No rigidity. No  Spasticity.                  Bradykinesia Multiplanar multisequence MRI of the head/brain was performed without and   with the administration of intravenous contrast.       COMPARISON:   MRI brain performed 10/08/2018.       HISTORY:   ORDERING SYSTEM PROVIDED HISTORY: Migraine without aura and without status   migrainosus, not intractable   TECHNOLOGIST PROVIDED HISTORY:   migraines   Is the patient pregnant?->No   Reason for Exam: Migraines for quite a while, becoming worse. Acuity: Chronic   Type of Exam: Unknown   Additional signs and symptoms: Migraine without aura and without status   migrainosus, not intractable       FINDINGS:   INTRACRANIAL STRUCTURES/VENTRICLES:  The sellar and suprasellar structures,   optic chiasm, corpus callosum, pineal gland, tectum, and midline brainstem   structures are unremarkable.  The craniocervical junction is unremarkable. There is no acute intracranial hemorrhage, mass effect, or midline shift. There is satisfactory overall gray-white matter differentiation.  There is no   abnormal postcontrast enhancement.  The ventricular structures are symmetric   and unremarkable.  The infratentorial structures including the   cerebellopontine angles and internal auditory canals are unremarkable. There   is no abnormal restricted diffusion.  There is no abnormal blooming artifact   on susceptibility weighted imaging.       ORBITS: The visualized portion of the orbits demonstrate no acute abnormality.       SINUSES: The visualized paranasal sinuses and mastoid air cells are well   aerated.       BONES/SOFT TISSUES: The bone marrow signal intensity appears normal. The soft   tissues demonstrate no acute abnormality.           Impression   Unremarkable pre and post-contrast MRI of the brain.               MRI OF THE BRAIN WITHOUT AND WITH CONTRAST  2/5/2018 9:18 am       TECHNIQUE:   Multiplanar multisequence MRI of the head/brain was performed without and   with the administration of intravenous contrast.       COMPARISON: clonazePAM (KLONOPIN) 0.5 MG tablet    Z99.89    3. Seasonal allergies  J30.2 clonazePAM (KLONOPIN) 0.5 MG tablet   4. Muscle twitching  R25.3 clonazePAM (KLONOPIN) 0.5 MG tablet   5. Bipolar 1 disorder (HCC)  F31.9 clonazePAM (KLONOPIN) 0.5 MG tablet   6. Memory problem  R41.3 clonazePAM (KLONOPIN) 0.5 MG tablet   7. Sleep difficulties  G47.9 clonazePAM (KLONOPIN) 0.5 MG tablet   8. Nausea and vomiting, intractability of vomiting not specified, unspecified vomiting type  R11.2 clonazePAM (KLONOPIN) 0.5 MG tablet   9. Anxiety and depression  F41.9 clonazePAM (KLONOPIN) 0.5 MG tablet    F32.9    10. H/O right mastectomy  Z90.11 clonazePAM (KLONOPIN) 0.5 MG tablet   11. Suspected COVID-19 virus infection  Z20.828 clonazePAM (KLONOPIN) 0.5 MG tablet   12. HX: breast cancer  Z85.3 clonazePAM (KLONOPIN) 0.5 MG tablet   13. Hemifacial spasm  G51.39 clonazePAM (KLONOPIN) 0.5 MG tablet   14. Migraine without aura and without status migrainosus, not intractable  G43.009 clonazePAM (KLONOPIN) 0.5 MG tablet     topiramate (TOPAMAX) 50 MG tablet   15. Chronic deep vein thrombosis (DVT) of axillary vein of right upper extremity (HCC)  I82. A21 clonazePAM (KLONOPIN) 0.5 MG tablet   16. Moderate to severe pulmonary hypertension (HCC)  I27.20 clonazePAM (KLONOPIN) 0.5 MG tablet   17. Malignant neoplasm of breast in female, estrogen receptor positive, unspecified laterality, unspecified site of breast ([facility]  C50.919 clonazePAM (KLONOPIN) 0.5 MG tablet    Z17.0    18. Hyperlipidemia, unspecified hyperlipidemia type  E78.5 clonazePAM (KLONOPIN) 0.5 MG tablet   19. Morbid obesity with BMI of 40.0-44.9, adult ([facility]  E66.01     Z68.41                      VARIOUS  RISK   FACTORS   WERE  REVIEWED   AND   DISCUSSED. *  PATIENT   HAS  MULTIPLE   MEDICAL, MENTAL HEALTH          NEUROLOGICAL   PROBLEMS .                 PATIENT  MANAGEMENT  IS  CHALLENGING              PLAN:       Jess Craig  Of  The  Diagnoses,  The  Management & Treatment Options           AND    Care  plan  Were        Reviewed and   Discussed   With  patient. * Goals  And  Expectations  Of  The  Therapy  Discussed   And  Reviewed. *   Benefits   And   Side  Effect  Profile  Of  Medication/s   Were   Discussed             * Need   For  Further   Follow up For  The  Various  Problems  Were discussed. * Results  Of  The  Previous  Diagnostic tests were reviewed and questions answered. patient  understand the same. Medical  Decision  Making  Was  Made  Based on the   Complexity  Of  Above  Mentioned  Diagnoses,        Data reviewed   & diagnostic  Tests  Reviewed,  Risk  Of  Significant   Co morbidities and complicating   Factors. Medical  Decision  Was   High  Complexity  Due   To  The  Patient's  Multiple  Symptoms,  Advancing   Disease,      Complex  Treatment  Regimen,  Multiple medications and   Risk  Of   Side  Effects,  Difficulty  In  Medication  Management      And  Diagnostic  Challenges   In  View  Of  The  Associated   Co  Morbid  Conditions   And  Problems. *   ADEQUATE   FLUID  INTAKE   AND  ELECTROLYTE  BALANCE         * KEEP  DAIRY  OF   THE  NEUROLOGICAL  SYMPTOMS        RECORDING THE    DURATION  AND  FREQUENCY. -    DISCUSSED  WITH PATIENT              *  AVOID    CONDITIONS  AND  FACTORS   THAT  MAKE   NEUROLOGICAL  SYMPTOMS  WORSE. *  TO  MAINTAIN  REGULAR  SLEEP  WAKE  CYCLES. *   TO  HAVE  ADEQUATE  REST  AND   SLEEP    HOURS.          *    TO   AVOID   TO  SLEEP  IN   SUPINE  POSITION. *      WEIGHT   LOSS. *    AVOID  ANY USAGE OF                   TOBACCO,  EXCESSIVE  ALCOHOL  AND   ILLEGAL   SUBSTANCES        *  CONTINUE MEDICATIONS PRESCRIBED BY NEUROLOGIST AS    RECOMMENDED     *   Compliance   With  Medications   And  Instructions          * CURRENTLY  TOLERATING  THE  PRESCRIBED   MEDICATIONS.        WITHOUT  ANY  SIGNIFICANT  SIDE EFFECTS   &  GETTING BENEFIT. *    Prophylactic  Use   Of     Vitamin   B   Complex,  Folic  Acid,    Vitamin  B12    Multivitamin,       Calcium  With  magnesium  And  Vit D    Supplementations   Over  The  Counter  Discussed            *  EVALUATIONS  AND  FOLLOW UP:                     * HEMATOLOGY/ONCOLOGY                    *   OPTHALMOLOGY                         *  PATIENT   WAS  ON   KLONOPIN       FOR  LEFT  BLEPHAROSPASM   IN      June 2018                                   -     IMPROVED  MORE  THAN   75 %   SUBJECTIVELY. *         PATIENT  NOT  INTERESTED  IN   ADDITIONAL  MEDICATIONS                                     OR  INJECTION  BOTOX                                                       *          EXPECTATIONS,   GOALS   OF  MIGRAINE   THERAPY                                  AND  SIDE  EFFECTS  MEDICATIONS    WERE                                 REVIEWED     AND   DISCUSSED    IN    DETAIL. *       AS   DISCUSSED    WITH  PATIENT   IN  OCT. 2019                                  PATIENT     STARTED  ON    TOPAMAX     FOR  MIGRAINE PROPHYLAXIS                         *        CURRENTLY    MIGRAINES  ARE   BETTER  CONTROLLED                               ON  TOPAMAX        AND  PATIENT     FEELS  LOT  BETTER. PATIENT  DENIES  ANY  NEW  NEUROLOGICAL  CONCERNS. Controlled Substances Monitoring: Periodic Controlled Substance Monitoring: Possible medication side effects, risk of tolerance/dependence & alternative treatments discussed. , Assessed functional status. Markie Garcia MD)              Orders Placed This Encounter   Medications    clonazePAM (KLONOPIN) 0.5 MG tablet     Sig: Take 1 tablet by mouth 2 times daily as needed for Anxiety (Papo  facial  spasms) for up to 31 days.      Dispense:  60 tablet     Refill:  2    topiramate (TOPAMAX) 50 MG tablet     Sig: ONE TABLET  TWICE  DAILY     Dispense:  60 tablet     Refill:  5                 *PATIENT   TO  FOLLOW  UP  WITH   PRIMARY  CARE   AND   OTHER  CONSULTANTS  AS  BEFORE.           *TO  FOLLOW  WITH   MENTAL  HEALTH  PROFESSIONALS ,  INCLUDING            PSYCHOLOGICAL  COUNSELING   AND  PSYCHIATRIC  EVALUTIONS            *  Maintain   Healthy  Life Style    With   Periodic  Monitoring  Of      Any  Medical  Conditions  Including   Elevated  Blood  Pressure,  Lipid  Profile,     Blood  Sugar levels  And   Heart  Disease. *   Period   Screening  For  Cancers  Involving  Breast,  Colon,    lungs  And  Other  Organs  As  Applicable,  In consultation   With  Your  Primary Care Providers. * Second  Neurological  Opinion  And  Evaluations  In  [facility] AND [facility]  Setting  If  Patient  Is  Interested. * Please   Contact   Neurology  Clinic   Early   If   Are  Any  New  Neurological                             Symptoms   And  Any neurological  Concerns. *  If  The  Patient remains  Neurologically  Stable   Return   To  [facility] Neurology Department       IN      3-4          MONTHS  TIME   FOR  FURTHER  FOLLOW UP.                 *  If   There is  Any  Significant  Worsening   Of  Current  Symptoms  And  Or  If patient  Develops       Any additional  New  Neurological  Symptoms  Or  Significant  Concerns   Should  Call  911 or      Go  To  Emergency  Department  For  Further  Immediate  Evaluation. *   The  Neurological   Findings,  Possible  Diagnosis,  Differential diagnoses   And  Options  For    Further   Investigations       And  management   Are  Discussed  Comprehensively. Medications   And  Prescription   Risks  And  Side effects  Are   Also  Discussed. The  Above  Were  Reviewed  With  patient and     questions  Answered  In  Detail.               More   Than   50% of face  To face Time   Was  Spent  On

## 2020-08-21 RX ORDER — PRAVASTATIN SODIUM 40 MG
TABLET ORAL
Qty: 30 TABLET | Refills: 5 | Status: SHIPPED | OUTPATIENT
Start: 2020-08-21 | End: 2021-02-10 | Stop reason: SDUPTHER

## 2020-08-21 RX ORDER — FOLIC ACID 1 MG/1
TABLET ORAL
Qty: 30 TABLET | Refills: 3 | Status: SHIPPED | OUTPATIENT
Start: 2020-08-21 | End: 2020-12-16 | Stop reason: SDUPTHER

## 2020-08-21 NOTE — TELEPHONE ENCOUNTER
Last Appt:  7/28/2020  Next Appt:   1/29/2021  Med verified in [facility]

## 2020-08-28 ENCOUNTER — HOSPITAL ENCOUNTER (OUTPATIENT)
Dept: LAB | Age: 55
Discharge: HOME | End: 2020-08-28
Payer: MEDICARE

## 2020-08-28 LAB
ABSOLUTE EOS #: 0.21 K/UL (ref 0–0.44)
ABSOLUTE IMMATURE GRANULOCYTE: 0.04 K/UL (ref 0–0.3)
ABSOLUTE LYMPH #: 2.67 K/UL (ref 1.1–3.7)
ABSOLUTE MONO #: 0.58 K/UL (ref 0.1–1.2)
ALBUMIN SERPL-MCNC: 4 G/DL (ref 3.5–5.2)
ALBUMIN/GLOBULIN RATIO: 1.1 (ref 1–2.5)
ALP BLD-CCNC: 75 U/L (ref 35–104)
ALT SERPL-CCNC: 13 U/L (ref 5–33)
ANION GAP SERPL CALCULATED.3IONS-SCNC: 13 MMOL/L (ref 9–17)
AST SERPL-CCNC: 15 U/L
BASOPHILS # BLD: 0 % (ref 0–2)
BASOPHILS ABSOLUTE: 0.03 K/UL (ref 0–0.2)
BILIRUB SERPL-MCNC: 0.22 MG/DL (ref 0.3–1.2)
BUN BLDV-MCNC: 17 MG/DL (ref 6–20)
BUN/CREAT BLD: 16 (ref 9–20)
CALCIUM SERPL-MCNC: 9.7 MG/DL (ref 8.6–10.4)
CHLORIDE BLD-SCNC: 108 MMOL/L (ref 98–107)
CO2: 22 MMOL/L (ref 20–31)
CREAT SERPL-MCNC: 1.08 MG/DL (ref 0.5–0.9)
DIFFERENTIAL TYPE: ABNORMAL
EOSINOPHILS RELATIVE PERCENT: 3 % (ref 1–4)
GFR AFRICAN AMERICAN: >60 ML/MIN
GFR NON-AFRICAN AMERICAN: 53 ML/MIN
GFR SERPL CREATININE-BSD FRML MDRD: ABNORMAL ML/MIN/{1.73_M2}
GFR SERPL CREATININE-BSD FRML MDRD: ABNORMAL ML/MIN/{1.73_M2}
GLUCOSE BLD-MCNC: 102 MG/DL (ref 70–99)
HCT VFR BLD CALC: 38.7 % (ref 36.3–47.1)
HEMOGLOBIN: 12.1 G/DL (ref 11.9–15.1)
IMMATURE GRANULOCYTES: 1 %
LYMPHOCYTES # BLD: 34 % (ref 24–43)
MCH RBC QN AUTO: 30 PG (ref 25.2–33.5)
MCHC RBC AUTO-ENTMCNC: 31.3 G/DL (ref 25.2–33.5)
MCV RBC AUTO: 96 FL (ref 82.6–102.9)
MONOCYTES # BLD: 7 % (ref 3–12)
NRBC AUTOMATED: 0 PER 100 WBC
PDW BLD-RTO: 14.3 % (ref 11.8–14.4)
PLATELET # BLD: 216 K/UL (ref 138–453)
PLATELET ESTIMATE: ABNORMAL
PMV BLD AUTO: 9.8 FL (ref 8.1–13.5)
POTASSIUM SERPL-SCNC: 3.9 MMOL/L (ref 3.7–5.3)
RBC # BLD: 4.03 M/UL (ref 3.95–5.11)
RBC # BLD: ABNORMAL 10*6/UL
SEG NEUTROPHILS: 55 % (ref 36–65)
SEGMENTED NEUTROPHILS ABSOLUTE COUNT: 4.35 K/UL (ref 1.5–8.1)
SODIUM BLD-SCNC: 143 MMOL/L (ref 135–144)
TOTAL PROTEIN: 7.7 G/DL (ref 6.4–8.3)
WBC # BLD: 7.9 K/UL (ref 3.5–11.3)
WBC # BLD: ABNORMAL 10*3/UL

## 2020-08-28 PROCEDURE — 85025 COMPLETE CBC W/AUTO DIFF WBC: CPT

## 2020-08-28 PROCEDURE — 80053 COMPREHEN METABOLIC PANEL: CPT

## 2020-08-28 PROCEDURE — 36415 COLL VENOUS BLD VENIPUNCTURE: CPT

## 2020-09-01 ENCOUNTER — OFFICE VISIT (OUTPATIENT)
Dept: ONCOLOGY | Age: 55
End: 2020-09-01
Payer: MEDICARE

## 2020-09-01 VITALS
SYSTOLIC BLOOD PRESSURE: 132 MMHG | TEMPERATURE: 97.8 F | DIASTOLIC BLOOD PRESSURE: 74 MMHG | OXYGEN SATURATION: 97 % | HEART RATE: 85 BPM | BODY MASS INDEX: 47.09 KG/M2 | WEIGHT: 293 LBS | HEIGHT: 66 IN

## 2020-09-01 PROCEDURE — 99213 OFFICE O/P EST LOW 20 MIN: CPT

## 2020-09-01 PROCEDURE — G8417 CALC BMI ABV UP PARAM F/U: HCPCS | Performed by: INTERNAL MEDICINE

## 2020-09-01 PROCEDURE — 1036F TOBACCO NON-USER: CPT | Performed by: INTERNAL MEDICINE

## 2020-09-01 PROCEDURE — 3017F COLORECTAL CA SCREEN DOC REV: CPT | Performed by: INTERNAL MEDICINE

## 2020-09-01 PROCEDURE — G8427 DOCREV CUR MEDS BY ELIG CLIN: HCPCS | Performed by: INTERNAL MEDICINE

## 2020-09-01 PROCEDURE — 99214 OFFICE O/P EST MOD 30 MIN: CPT | Performed by: INTERNAL MEDICINE

## 2020-09-01 RX ORDER — TIZANIDINE 4 MG/1
4 TABLET ORAL EVERY 8 HOURS PRN
Qty: 20 TABLET | Refills: 0 | Status: SHIPPED | OUTPATIENT
Start: 2020-09-01 | End: 2021-02-09 | Stop reason: SDUPTHER

## 2020-09-01 NOTE — PROGRESS NOTES
hand          Karin Purdy                                                                                                                  9/1/2020  MRN:   [id number]  YOB: 1965  PCP:                           MAGGIE Limon - CNP  Referring Physician: No ref. provider found  Treating Physician Name: Giovani Nye MD      Reason for visit:  Discuss results of lab work-up. Surveillance of breast cancer. Toxicity check. Current problems:  Stage IIa infiltrating ductal carcinoma of right breast, ER negative, CO positive and HER-2 amplified. DVT of right upper extremity secondary to PORT(11/20/18), Xarelto discontinues after PORT removal  Bilateral massive pulmonary embolism status post thrombectomy, 4/10/2020  Strong family history of breast cancer in sister and maternal aunt    Active and recent treatments:  Right mastectomy with sentinel lymph node biopsy: 10/19/2017  TCH P x6: Cycle 1 day 1 on 11/13/2017. Completed Herceptin 11/29/2018  Adjuvant anastrozole: 5/2018. Discontinued due to toxicity  Adjuvant Femara: 6/2018  Pulmonary embolism thrombectomy: 4/10/2020    Summary of Case/History:    Karin Purdy a 54 y. o.female who presents to the hematology oncology office to establish care and for further recommendations for management of her recently diagnosed right breast cancer    Patient had a mammogram after many years in September 2017 which came back abnormal.  Subsequently patient had an ultrasound which confirmed a 1.2 cm lesion in the right breast at 1:00 position. There was another 4 mm lesion at 12:00 position    Patient underwent ultrasound-guided biopsy on 9/8/17. the lesion at 1:00 position shows invasive ductal carcinoma. ER negative and CO positive associated with high-grade DCIS. Lesion at 12:00 position showed fibrocystic disease and focal adenosis and hyperplasia. Patient underwent right sided mastectomy with sentinel lymph node biopsy on 10/19/17. Pathology report showed invasive ductal carcinoma, grade 3 out of 3, 2.8 cm in size with negative margins. pT2,pN0,M0  Tumor specimen was negative for ER receptor and weekly positive for OH receptor (5-10 percent). High-grade DCIS was also noted. One sentinel lymph node was sampled which was negative for metastasis    Patient started on neoadjuvant chemotherapy using TCHP regimen. Cycle #1, day #1 on 11/13/17    Patient underwent removal of port with replacement due to port malfunction on 11/30/17    Patient completed 6 cycles of TCHP and continued maintenance Herceptin. Started patient on anastrozole along with calcium and vitamin D in May 2018. Switched anti-hormonal therapy to Femara in June 2018 due to arthralgia    Herceptin last cycle completed 11/29/18    Interim History:    Patient presents to the clinic for a follow-up visit and to discuss results of her lab work-up and active surveillance of breast cancer. Patient pulled a muscle while working on a bicycle. Complains of some muscle pain when she walks. Continues to be on letrozole. Continues to be on calcium and vitamin D. Continues to be on anticoagulation without any adverse events. Denies chest pain or shortness of breath. During this visit patient's allergy, social, medical, surgical history and medications were reviewed and updated.     Past Medical History:   Past Medical History:   Diagnosis Date    Bipolar 1 disorder ([address]     Breast cancer ([address] 2017    right side     DVT of axillary vein, acute right ([address]     Eye twitch 2019    Headache(784.0)     Hyperlipidemia     Migraine      Past Surgical History:     Past Surgical History:   Procedure Laterality Date    CATHETER REMOVAL Left 6/6/2019    PORT REMOVAL performed by Vicente Laureano DO at [address] N/A 10/13/2017    D & C HYSTEROSCOPY with polypectomy performed by Alberto Cueva MD at [address] / Stephon Moore / LAURI VENOUS ACCESS CATHETER Left 11/9/2017    PORT INSERTION performed by Stewart Goodrich MD at [address] [facility] / REMOVAL / [address] Said N/A 11/30/2017    Port Removal & Insertion performed by Stewart Goodrich MD at [facility] Right 10/19/2017     [address] [facility]    MASTECTOMY Right 10/19/2017    Right Breast Mastectomy with  [place] Node Biopsy performed by Stewart Goodrich MD at [facility] [address] [facility] CTR VAD W/SUBQ PORT AGE 5 YR/> Left 3/1/2018    PORT Exchange performed by Stewart Goodrich MD at [facility] Left 2014, 2015    x 2 surgeries total; Dr. Brett Neville TUNNELED VENOUS PORT PLACEMENT Left 11/30/2017    removal of et replacement of       Patient Family Social History:     Family History   Problem Relation Age of Onset    Breast Cancer Sister 54    Breast Cancer Maternal Aunt 71    Other Maternal Cousin         Atypical Ductal Hyperplasia     Uterine Cancer Sister 32    Other Sister         breast cyst    Cataracts Mother     Glaucoma Mother     Heart Disease Father     High Blood Pressure Father     High Cholesterol Father     Mental Retardation Father     Diabetes Neg Hx       Social History     Socioeconomic History    Marital status: Single     Spouse name: Not on file    Number of children: Not on file    Years of education: Not on file    Highest education level: Not on file   Occupational History    Not on file   Social Needs    Financial resource strain: Not on file    Food insecurity     Worry: Not on file     Inability: Not on file    Transportation needs     Medical: Not on file     Non-medical: Not on file   Tobacco Use    Smoking status: Never Smoker    Smokeless tobacco: Never Used    Tobacco comment: Never smoker.  TC, RRT 4/5/18   Substance and Sexual Activity    Alcohol use: No    Drug use: No    Sexual activity: Yes     Partners: Male     Birth control/protection: Surgical   Lifestyle    Physical activity     Days per week: Not on file     Minutes per session: Not on file    Stress: Not on file   Relationships    Social connections     Talks on phone: Not on file     Gets together: Not on file     Attends Scientologist service: Not on file     Active member of club or organization: Not on file     Attends meetings of clubs or organizations: Not on file     Relationship status: Not on file    Intimate partner violence     Fear of current or ex partner: Not on file     Emotionally abused: Not on file     Physically abused: Not on file     Forced sexual activity: Not on file   Other Topics Concern    Not on file   Social History Narrative    Not on file      Current Medications:     Current Outpatient Medications   Medication Sig Dispense Refill    pravastatin (PRAVACHOL) 40 MG tablet take 1 tablet by mouth once daily 30 tablet 5    folic acid (FOLVITE) 1 MG tablet take 1 tablet by mouth once daily 30 tablet 3    clonazePAM (KLONOPIN) 0.5 MG tablet Take 1 tablet by mouth 2 times daily as needed for Anxiety (Papo  facial  spasms) for up to 31 days. 60 tablet 2    topiramate (TOPAMAX) 50 MG tablet ONE  TABLET  TWICE  DAILY 60 tablet 5    Calcium Carbonate-Vitamin D (OYSTER SHELL CALCIUM/D) 500-200 MG-UNIT TABS take 1 tablet by mouth twice a day 60 tablet 5    Cyanocobalamin ER (RA VITAMIN B-12 TR) 1000 MCG TBCR take 1 tablet by mouth once daily 30 tablet 3    apixaban (ELIQUIS) 5 MG TABS tablet Take 1 tablet by mouth 2 times daily 180 tablet 1    Handicap Placard MISC by Does not apply route Expiration date: 5/1/2021 1 each 0    Blood Pressure KIT 1 kit by Does not apply route daily as needed (bp check) 1 kit 0    Compression Stockings MISC by Does not apply route 15mm/Hg knee high stocking on in Am off at night 2 each 0    apixaban (ELIQUIS DVT/PE STARTER PACK) 5 MG TABS tablet Take 10 mg (2 tablets) orally twice daily for 7 days, then take 5 mg (1 tablet) orally twice daily thereafter.  74 tablet 0  busPIRone (BUSPAR) 7.5 MG tablet take 1 tablet by mouth twice a day      OXcarbazepine (TRILEPTAL) 150 MG tablet take 1 tablet by mouth every morning BEFORE NOON      VENTOLIN  (90 Base) MCG/ACT inhaler Inhale 2 puffs into the lungs every 4 hours as needed for Wheezing 1 Inhaler 3    butalbital-acetaminophen-caffeine (FIORICET, ESGIC) -40 MG per tablet 1-2   TABLET  TWICE  DAILY  AS  NEEDED 90 tablet 1    benztropine (COGENTIN) 0.5 MG tablet Take 0.5 mg by mouth daily      QUEtiapine (SEROQUEL) 100 MG tablet Take 50 mg by mouth nightly      sodium chloride (ALTAMIST SPRAY) 0.65 % nasal spray 1 spray by Nasal route as needed for Congestion 1 Bottle 1    hydrOXYzine (ATARAX) 10 MG tablet Take 10 mg by mouth daily       lamoTRIgine (LAMICTAL) 100 MG tablet 150 mg nightly       letrozole (FEMARA) 2.5 MG tablet Take 1 tablet by mouth daily 30 tablet 5    fluticasone (FLONASE) 50 MCG/ACT nasal spray 1 spray by Nasal route daily Indications: as needed 1 Bottle 11     No current facility-administered medications for this visit. Allergies:   Prednisone    Review of Systems:    Constitutional: Negative for fever or chills. No night sweats, weight is stable now  Eyes: No eye discharge, double vision, or eye pain . Twitching of left eye  HEENT: negative for sore mouth, sore throat, hoarseness and voice change . Complains of twitching of left eye. Improved  Respiratory: negative for cough , sputum, dyspnea, wheezing, hemoptysis, chest pain   Cardiovascular: negative for chest pain, dyspnea, palpitations, orthopnea, PND   Gastrointestinal: Positive for nausea, vomiting, constipation, abdominal pain, Dysphagia, hematemesis and hematochezia . Genitourinary: negative for frequency, dysuria, nocturia, urinary incontinence, and hematuria   Integument: negative for rash, skin lesions, bruises.    Hematologic/Lymphatic: negative for easy bruising, bleeding, lymphadenopathy, or petechiae   Endocrine: negative for heat or cold intolerance,weight changes, change in bowel habits and hair loss   Musculoskeletal: negative for myalgias, arthralgias, pain, joint swelling,and bone pain . Positive for discomfort in right arm. Neurological: negative for headaches, dizziness, seizures, weakness, numbness      Physical Exam:    Vitals:  /74 (Site: Left Lower Arm, Position: Sitting, Cuff Size: Medium Adult)   Pulse 85   Temp 97.8 °F (36.6 °C)   Ht 5' 5.91\" (1.674 m)   Wt 299 lb (135.6 kg)   LMP 02/28/2013   SpO2 97%   BMI 48.40 kg/m²    General appearance - patient not in acute distress  Mental status - AAO X3  Eyes - pupils equal and reactive, extraocular eye movements intact  Mouth - mucous membranes moist, pharynx normal without lesions  Neck - supple, no significant adenopathy  Lymphatics - no palpable lymphadenopathy, no hepatosplenomegaly  Chest - clear to auscultation, no wheezes, rales or rhonchi, symmetric air entry. Heart - normal rate, regular rhythm, normal S1, S2, no murmurs  Abdomen - soft, nontender, nondistended, no masses or organomegaly  Neurological - alert, oriented, normal speech, no focal findings . Twitching of left eye noted  Extremities - peripheral pulses normal, no pedal edema, no clubbing or cyanosis  Skin - normal coloration and turgor, no rashes, no suspicious skin lesions noted   Breast-examination performed in the presence of a female nurse. Patient right chest wall shows postsurgical changes.   There is no palpable mass in the left breast.    DATA:    Results for orders placed or performed during the hospital encounter of 08/28/20   CBC Auto Differential   Result Value Ref Range    WBC 7.9 3.5 - 11.3 k/uL    RBC 4.03 3.95 - 5.11 m/uL    Hemoglobin 12.1 11.9 - 15.1 g/dL    Hematocrit 38.7 36.3 - 47.1 %    MCV 96.0 82.6 - 102.9 fL    MCH 30.0 25.2 - 33.5 pg    MCHC 31.3 25.2 - 33.5 g/dL    RDW 14.3 11.8 - 14.4 %    Platelets 365 896 - 909 k/uL    MPV 9.8 8.1 - 13.5 fL    NRBC Automated 0.0 0.0 per 100 WBC    Differential Type NOT REPORTED     Seg Neutrophils 55 36 - 65 %    Lymphocytes 34 24 - 43 %    Monocytes 7 3 - 12 %    Eosinophils % 3 1 - 4 %    Basophils 0 0 - 2 %    Immature Granulocytes 1 (H) 0 %    Segs Absolute 4.35 1.50 - 8.10 k/uL    Absolute Lymph # 2.67 1.10 - 3.70 k/uL    Absolute Mono # 0.58 0.10 - 1.20 k/uL    Absolute Eos # 0.21 0.00 - 0.44 k/uL    Basophils Absolute 0.03 0.00 - 0.20 k/uL    Absolute Immature Granulocyte 0.04 0.00 - 0.30 k/uL    WBC Morphology NOT REPORTED     RBC Morphology NOT REPORTED     Platelet Estimate NOT REPORTED    Comprehensive Metabolic Panel   Result Value Ref Range    Glucose 102 (H) 70 - 99 mg/dL    BUN 17 6 - 20 mg/dL    CREATININE 1.08 (H) 0.50 - 0.90 mg/dL    Bun/Cre Ratio 16 9 - 20    Calcium 9.7 8.6 - 10.4 mg/dL    Sodium 143 135 - 144 mmol/L    Potassium 3.9 3.7 - 5.3 mmol/L    Chloride 108 (H) 98 - 107 mmol/L    CO2 22 20 - 31 mmol/L    Anion Gap 13 9 - 17 mmol/L    Alkaline Phosphatase 75 35 - 104 U/L    ALT 13 5 - 33 U/L    AST 15 <32 U/L    Total Bilirubin 0.22 (L) 0.3 - 1.2 mg/dL    Total Protein 7.7 6.4 - 8.3 g/dL    Alb 4.0 3.5 - 5.2 g/dL    Albumin/Globulin Ratio 1.1 1.0 - 2.5    GFR Non-African American 53 (L) >60 mL/min    GFR African American >60 >60 mL/min    GFR Comment          GFR Staging NOT REPORTED      Xr Chest Standard (2 Vw)    Result Date: 10/10/2017  EXAMINATION: TWO VIEWS OF THE CHEST 10/10/2017 2:37 pm COMPARISON: 10/18/2011 HISTORY: ORDERING SYSTEM PROVIDED HISTORY: Invasive ductal carcinoma of breast, right [facility]) TECHNOLOGIST PROVIDED HISTORY: Reason for exam:->Pre op Ordering Physician Provided Reason for Exam: Pre op for right mastectomy. No chest complaints. Acuity: Unknown Type of Exam: Initial Additional signs and symptoms: n/a Relevant Medical/Surgical History: breast cancer FINDINGS: The lungs are without acute focal process. There is no effusion or pneumothorax.  The cardiomediastinal silhouette is stable. The osseous structures are stable. No acute process. Nm Lymphoscintigram    Result Date: 10/19/2017  EXAMINATION: LYMPHOSCINTIGRAPHY 10/19/2017 9:21 am TECHNIQUE: Sulfur colloid labeled with 0.920 mCi of Tc99 was administered in 4 subdermal wheals around the patient's right areolar region. Delayed images were obtained. HISTORY: ORDERING SYSTEM PROVIDED HISTORY: Surgery TECHNOLOGIST PROVIDED HISTORY: Reason for exam:->Surgery Ordering Physician Provided Reason for Exam: 0.920 mCi 99mTc filtered Sulfur Colloid injected subcutaneous around RT nipple. Acuity: Unknown Type of Exam: Unknown FINDINGS: There appears to be migration into the right axilla suggestive of the sentinel node. Migration of the radiotracer into the right axilla suggestive of the patient's sentinel node. MRI brain 2/5/18  Impression   1. Unremarkable MRI of the brain.  No evidence of metastatic disease. 2. Acute left sphenoid sinusitis. [facility] Digital Diagnostic W Or Wo Cad Bilateral: 10/5/2018    No mammographic evidence of malignancy. 2. Postoperative changes of the right breast. BIRADS: BIRADS - CATEGORY 2 Benign, no evidence of malignancy. Normal interval follow-up is recommended in 12 months. OVERALL ASSESSMENT - BENIGN A letter of notification will be sent to the patient regarding the results. The [employer] of [employer] recommends annual mammograms for women 40 years and older. Mri Orbits Face Neck W Wo Contrast: 10/11/2018    No convincing evidence of abnormal intracranial enhancement to suggest intracranial metastasis. 2. Bilateral internal auditory canals, cisternal segment of cranial nerve V, and fluid-filled inner ear structures are unremarkable. 3. Borderline enlarged right level II lymph node measuring 13 mm. there are additional nonenlarged upper neck lymph nodes. These are nonspecific by imaging and may be reactive. Given history of breast cancer, clinical correlation is recommended. Follow-up CT neck may be obtained as clinically warranted. CT chest 11/20/18:  Impression   1. No evidence of pulmonary embolism or focal airspace consolidation. 2. Extensive edema and inflammatory stranding throughout the right chest   wall, axilla and supraclavicular soft tissues which appears to be tracking   along the vascular pathway likely secondary to known deep venous thrombosis. Apparent hypodensity in the distal SVC suggestive of filling   defects/thrombosis.  Additionally proximal SVC as well as the right IJ appear   mildly prominent underlying heterogeneous soft tissue density also suspicious   for underlying thrombosis. 3. Diffuse skin thickening along the right mastectomy site which may be   sequela of post treatment change of please correlate for prior radiation. Adjacent elongated oblong shaped subcutaneous fluid collection as measured   above suggestive of seroma possibly chronic and postoperative in nature. 4. Extensive contrast throughout the left hepatic lobe likely due to reflux   of IV contrast.     [facility] Digital Screen W Cad Bilateral: 10/11/2019    No mammographic evidence of malignancy. BI-RADS 2 BIRADS: BIRADS - CATEGORY 2 Benign, no evidence of malignancy. Normal interval follow-up is recommended in 12 months. OVER ALL ASSESSMENT - BENIGN A letter of notification will be sent to the patient regarding the results. Mri Brain W Wo Contrast: 10/2/2019    Unremarkable pre and post-contrast MRI of the brain. Ct Chest Pulmonary Embolism W Contrast: 4/9/2020    Massive pulmonary embolus burden involving the right and left pulmonary arteries, extending into the segmental and subsegmental branches bilaterally. 2. Right ventricular strain, within RV to LV ratio measuring 1.8 3. 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  4.  Critical results were called by Dr. Shad Alvarado to

## 2020-09-30 RX ORDER — PSYLLIUM HUSK 3.4 G/7G
POWDER ORAL
Qty: 30 TABLET | Refills: 3 | Status: SHIPPED | OUTPATIENT
Start: 2020-09-30 | End: 2021-02-09 | Stop reason: SDUPTHER

## 2020-10-05 ENCOUNTER — TELEPHONE (OUTPATIENT)
Dept: GENERAL RADIOLOGY | Age: 55
End: 2020-10-05

## 2020-10-23 RX ORDER — APIXABAN 5 MG/1
TABLET, FILM COATED ORAL
Qty: 180 TABLET | Refills: 1 | Status: SHIPPED | OUTPATIENT
Start: 2020-10-23 | End: 2021-04-20

## 2020-11-09 ENCOUNTER — OFFICE VISIT (OUTPATIENT)
Dept: FAMILY MEDICINE CLINIC | Age: 55
End: 2020-11-09
Payer: MEDICARE

## 2020-11-09 VITALS
BODY MASS INDEX: 47.09 KG/M2 | HEART RATE: 82 BPM | HEIGHT: 66 IN | SYSTOLIC BLOOD PRESSURE: 130 MMHG | DIASTOLIC BLOOD PRESSURE: 72 MMHG | WEIGHT: 293 LBS

## 2020-11-09 PROBLEM — Z20.822 SUSPECTED COVID-19 VIRUS INFECTION: Status: RESOLVED | Noted: 2020-04-10 | Resolved: 2020-11-09

## 2020-11-09 PROBLEM — J96.01 ACUTE RESPIRATORY FAILURE WITH HYPOXIA (HCC): Status: RESOLVED | Noted: 2020-04-10 | Resolved: 2020-11-09

## 2020-11-09 PROBLEM — N17.9 AKI (ACUTE KIDNEY INJURY) (HCC): Status: RESOLVED | Noted: 2020-04-10 | Resolved: 2020-11-09

## 2020-11-09 PROBLEM — R11.2 NAUSEA WITH VOMITING: Status: RESOLVED | Noted: 2018-01-09 | Resolved: 2020-11-09

## 2020-11-09 PROCEDURE — G8484 FLU IMMUNIZE NO ADMIN: HCPCS | Performed by: FAMILY MEDICINE

## 2020-11-09 PROCEDURE — G8427 DOCREV CUR MEDS BY ELIG CLIN: HCPCS | Performed by: FAMILY MEDICINE

## 2020-11-09 PROCEDURE — G8417 CALC BMI ABV UP PARAM F/U: HCPCS | Performed by: FAMILY MEDICINE

## 2020-11-09 PROCEDURE — 1036F TOBACCO NON-USER: CPT | Performed by: FAMILY MEDICINE

## 2020-11-09 PROCEDURE — 90750 HZV VACC RECOMBINANT IM: CPT | Performed by: FAMILY MEDICINE

## 2020-11-09 PROCEDURE — 99213 OFFICE O/P EST LOW 20 MIN: CPT | Performed by: FAMILY MEDICINE

## 2020-11-09 PROCEDURE — 3017F COLORECTAL CA SCREEN DOC REV: CPT | Performed by: FAMILY MEDICINE

## 2020-11-09 RX ORDER — FEXOFENADINE HCL 180 MG/1
180 TABLET ORAL DAILY
Qty: 30 TABLET | Refills: 5 | Status: SHIPPED | OUTPATIENT
Start: 2020-11-09 | End: 2021-01-08 | Stop reason: SDUPTHER

## 2020-11-09 ASSESSMENT — ENCOUNTER SYMPTOMS
SHORTNESS OF BREATH: 0
CHEST TIGHTNESS: 0
COUGH: 0
WHEEZING: 0

## 2020-11-09 NOTE — PATIENT INSTRUCTIONS
placing a weighted object, such as a soup can, on the other end of the towel. 1. While sitting, place your foot on a towel on the floor and scrunch the towel toward you with your toes. 2. Then, also using your toes, push the towel away from you. [place] pickups   1. Put marbles on the floor next to a cup.  2. Using your toes, try to lift the marbles up from the floor and put them in the cup. Follow-up care is a key part of your treatment and safety. Be sure to make and go to all appointments, and call your doctor if you are having problems. It's also a good idea to know your test results and keep a list of the medicines you take. Where can you learn more? Go to https://Vertra.[employer]. org and sign in to your InvenQuery account. Enter U760 in the BitPass box to learn more about \"Plantar Fasciitis: Exercises. \"     If you do not have an account, please click on the \"Sign Up Now\" link. Current as of: March 2, 2020               Content Version: 12.6  © 1581-3435 [employer]. Care instructions adapted under license by [facility] ([facility]). If you have questions about a medical condition or this instruction, always ask your healthcare professional. [address] any warranty or liability for your use of this information. Patient Education        Plantar Fasciitis: Care Instructions  Your Care Instructions     Plantar fasciitis is pain and inflammation of the plantar fascia, the tissue at the bottom of your foot that connects the heel bone to the toes. The plantar fascia also supports the arch. If you strain the plantar fascia, it can develop small tears and cause heel pain when you stand or walk. Plantar fasciitis can be caused by running or other sports. It also may occur in people who are overweight or who have high arches or flat feet.  You may get plantar fasciitis if you walk or stand for long periods, or have a tight Achilles tendon or calf muscles. You can improve your foot pain with rest and other care at home. It might take a few weeks to a few months for your foot to heal completely. Follow-up care is a key part of your treatment and safety. Be sure to make and go to all appointments, and call your doctor if you are having problems. It's also a good idea to know your test results and keep a list of the medicines you take. How can you care for yourself at home? · Rest your feet often. Reduce your activity to a level that lets you avoid pain. If possible, do not run or walk on hard surfaces. · Take pain medicines exactly as directed. ? If the doctor gave you a prescription medicine for pain, take it as prescribed. ? If you are not taking a prescription pain medicine, take an over-the-counter anti-inflammatory medicine for pain and swelling, such as ibuprofen (Advil, Motrin) or naproxen (Aleve). Read and follow all instructions on the label. · Use ice massage to help with pain and swelling. You can use an ice cube or an ice cup several times a day. To make an ice cup, fill a paper cup with water and freeze it. Cut off the top of the cup until a half-inch of ice shows. Hold onto the remaining paper to use the cup. Rub the ice in small circles over the area for 5 to 7 minutes. · Contrast baths, which alternate hot and cold water, can also help reduce swelling. But because heat alone may make pain and swelling worse, end a contrast bath with a soak in cold water. · Wear a night splint if your doctor suggests it. A night splint holds your foot with the toes pointed up and the foot and ankle at a 90-degree angle. This position gives the bottom of your foot a constant, gentle stretch. · Do simple exercises such as calf stretches and towel stretches 2 to 3 times each day, especially when you first get up in the morning. These can help the plantar fascia become more flexible. They also make the muscles that support your arch stronger.  Hold these stretches for 15 to 30 seconds per stretch. Repeat 2 to 4 times. ? Stand about 1 foot from a wall. Place the palms of both hands against the wall at chest level. Lean forward against the wall, keeping one leg with the knee straight and heel on the ground while bending the knee of the other leg.  ? Sit down on the floor or a mat with your feet stretched in front of you. Roll up a towel lengthwise, and loop it over the ball of your foot. Holding the towel at both ends, gently pull the towel toward you to stretch your foot. · Wear shoes with good arch support. Athletic shoes or shoes with a well-cushioned sole are good choices. · Try heel cups or shoe inserts (orthotics) to help cushion your heel. You can buy these at many shoe stores. · Put on your shoes as soon as you get out of bed. Going barefoot or wearing slippers may make your pain worse. · Reach and stay at a good weight for your height. This puts less strain on your feet. When should you call for help? Call your doctor now or seek immediate medical care if:    · You have heel pain with fever, redness, or warmth in your heel.     · You cannot put weight on the sore foot. Watch closely for changes in your health, and be sure to contact your doctor if:    · You have numbness or tingling in your heel.     · Your heel pain lasts more than 2 weeks. Where can you learn more? Go to https://MobiCart.[employer]ware. org and sign in to your Omnigy account. Enter N575 in the [facility] box to learn more about \"Plantar Fasciitis: Care Instructions. \"     If you do not have an account, please click on the \"Sign Up Now\" link. Current as of: March 2, 2020               Content Version: 12.6  © 9182-2629 [employer]. Care instructions adapted under license by [place] [employer] [facility] ([facility]).  If you have questions about a medical condition or this instruction, always ask your healthcare professional. [employer] disclaims any warranty or liability for your use of this information.

## 2020-11-09 NOTE — PROGRESS NOTES
[address] [address]  [address] [address]  Dept: [phone]  Dept Fax: [phone]  Loc: [phone]    Vangie Augustine is a 54 y.o. female who presents today for her medical conditions/complaints as notedbelow. Vangie Augustine is c/o of   Chief Complaint   Patient presents with    New Patient    Foot Pain     Right/ week now. HPI:      Here today for foot pain and to establish care. Foot Pain    The pain is present in the right foot. This is a new problem. The current episode started 1 to 4 weeks ago (1 week). There has been no history of extremity trauma. The problem occurs constantly. The problem has been unchanged. The quality of the pain is described as sharp. The pain is at a severity of 7/10. The pain is moderate. Associated symptoms include joint swelling. Pertinent negatives include no fever, inability to bear weight, limited range of motion, numbness, stiffness or tingling. She has tried acetaminophen (elevation) for the symptoms. The treatment provided mild relief. Past Medical History:   Diagnosis Date    Bipolar 1 disorder ([facility] [address]     Breast cancer ([facility] [address] 2017    right side     DVT of axillary vein, acute right ([address]     Eye twitch 2019    Headache(784.0)     Hyperlipidemia     Migraine           Social History     Tobacco Use    Smoking status: Never Smoker    Smokeless tobacco: Never Used    Tobacco comment: Never smoker.  TC, RRT 4/5/18   Substance Use Topics    Alcohol use: No     Current Outpatient Medications   Medication Sig Dispense Refill    fexofenadine (ALLEGRA) 180 MG tablet Take 1 tablet by mouth daily 30 tablet 5    ELIQUIS 5 MG TABS tablet take 1 tablet by mouth twice a day 180 tablet 1    RA VITAMIN B-12 TR 1000 MCG TBCR take 1 tablet by mouth once daily 30 tablet 3    tiZANidine (ZANAFLEX) 4 MG tablet Take 1 tablet by mouth every 8 hours as needed (muscle and wheezing. Cardiovascular: Positive for leg swelling (subjectively). Negative for chest pain and palpitations. Musculoskeletal: Positive for arthralgias (right foot). Negative for stiffness. Neurological: Negative for dizziness, tingling, syncope, weakness, light-headedness, numbness and headaches. Objective:      Physical Exam  Vitals signs and nursing note reviewed. Constitutional:       General: She is not in acute distress. Appearance: She is well-developed. Eyes:      Conjunctiva/sclera: Conjunctivae normal.      Pupils: Pupils are equal, round, and reactive to light. Neck:      Musculoskeletal: Normal range of motion and neck supple. Thyroid: No thyromegaly. Cardiovascular:      Rate and Rhythm: Normal rate and regular rhythm. Heart sounds: Normal heart sounds. No murmur. Pulmonary:      Effort: Pulmonary effort is normal. No respiratory distress. Breath sounds: Normal breath sounds. No wheezing. Musculoskeletal:        Feet:    Lymphadenopathy:      Cervical: No cervical adenopathy. Skin:     General: Skin is warm and dry. Findings: No erythema or rash. Neurological:      Mental Status: She is alert and oriented to person, place, and time. /72 (Site: Left Upper Arm, Position: Sitting, Cuff Size: Large Adult)   Pulse 82   Ht 5' 6\" (1.676 m)   Wt 299 lb (135.6 kg)   LMP 02/28/2013   BMI 48.26 kg/m²     Assessment:       Diagnosis Orders   1. Plantar fasciitis of right foot     2. Allergic rhinitis, unspecified seasonality, unspecified trigger  fexofenadine (ALLEGRA) 180 MG tablet   3. Colon cancer screening  Cologuard (For External Results Only)   4. Mixed hyperlipidemia  Lipid Panel   5. Elevated serum creatinine  Basic Metabolic Panel   6. Screening for diabetes mellitus  Hemoglobin A1C             Plan:        Plantar fascitis: new; I recommended exercises and freezing a water bottle and running her foot over it to help with the pain. she was advised to let me know if her pain does not improve within the next few weeks. Return in about 6 months (around 5/9/2021). Orders Placed This Encounter   Procedures    Cologuard (For External Results Only)     This test is performed by an external laboratory and is used for result attachment only. It is required that this order requisition be faxed to: [facility] @ [phone]. See [email or web address].e-contratos for further information. Standing Status:   Future     Standing Expiration Date:   11/9/2021    [facility])    Lipid Panel     Standing Status:   Future     Standing Expiration Date:   11/9/2021     Order Specific Question:   Is Patient Fasting?/# of Hours     Answer:   0 per dr Azucena Lazo Metabolic Panel     Standing Status:   Future     Standing Expiration Date:   11/9/2021    Hemoglobin A1C     Standing Status:   Future     Standing Expiration Date:   11/9/2021     Orders Placed This Encounter   Medications    fexofenadine (ALLEGRA) 180 MG tablet     Sig: Take 1 tablet by mouth daily     Dispense:  30 tablet     Refill:  5       Patientgiven educational materials - see patient instructions. Discussed use, benefit,and side effects of prescribed medications. All patient questions answered. Ptvoiced understanding. Reviewed health maintenance. Instructed to continue currentmedications, diet and exercise. Patient agreed with treatment plan. Follow up asdirected.      Electronically signed by Mauricio Espino MD on 11/9/2020 at 10:39 PM

## 2020-11-20 ENCOUNTER — OFFICE VISIT (OUTPATIENT)
Dept: NEUROLOGY | Age: 55
End: 2020-11-20
Payer: MEDICARE

## 2020-11-20 VITALS
SYSTOLIC BLOOD PRESSURE: 126 MMHG | HEIGHT: 66 IN | HEART RATE: 103 BPM | TEMPERATURE: 99.3 F | BODY MASS INDEX: 48.26 KG/M2 | DIASTOLIC BLOOD PRESSURE: 76 MMHG | OXYGEN SATURATION: 96 %

## 2020-11-20 PROBLEM — I82.A21 CHRONIC DEEP VEIN THROMBOSIS (DVT) OF AXILLARY VEIN OF RIGHT UPPER EXTREMITY (HCC): Status: ACTIVE | Noted: 2020-11-20

## 2020-11-20 PROCEDURE — 3017F COLORECTAL CA SCREEN DOC REV: CPT | Performed by: PSYCHIATRY & NEUROLOGY

## 2020-11-20 PROCEDURE — G8427 DOCREV CUR MEDS BY ELIG CLIN: HCPCS | Performed by: PSYCHIATRY & NEUROLOGY

## 2020-11-20 PROCEDURE — 99214 OFFICE O/P EST MOD 30 MIN: CPT

## 2020-11-20 PROCEDURE — G8417 CALC BMI ABV UP PARAM F/U: HCPCS | Performed by: PSYCHIATRY & NEUROLOGY

## 2020-11-20 PROCEDURE — 1036F TOBACCO NON-USER: CPT | Performed by: PSYCHIATRY & NEUROLOGY

## 2020-11-20 PROCEDURE — G8484 FLU IMMUNIZE NO ADMIN: HCPCS | Performed by: PSYCHIATRY & NEUROLOGY

## 2020-11-20 PROCEDURE — 99215 OFFICE O/P EST HI 40 MIN: CPT | Performed by: PSYCHIATRY & NEUROLOGY

## 2020-11-20 RX ORDER — SUMATRIPTAN 100 MG/1
100 TABLET, FILM COATED ORAL
Qty: 12 TABLET | Refills: 2 | Status: SHIPPED | OUTPATIENT
Start: 2020-11-20 | End: 2021-01-19 | Stop reason: SDUPTHER

## 2020-11-20 RX ORDER — CLONAZEPAM 0.5 MG/1
0.5 TABLET ORAL 2 TIMES DAILY PRN
Qty: 60 TABLET | Refills: 2 | Status: SHIPPED | OUTPATIENT
Start: 2020-11-20 | End: 2021-02-26 | Stop reason: SDUPTHER

## 2020-11-20 RX ORDER — BUTALBITAL, ACETAMINOPHEN AND CAFFEINE 50; 325; 40 MG/1; MG/1; MG/1
TABLET ORAL
Qty: 60 TABLET | Refills: 2 | Status: SHIPPED | OUTPATIENT
Start: 2020-11-20 | End: 2021-02-26 | Stop reason: SDUPTHER

## 2020-11-20 ASSESSMENT — ENCOUNTER SYMPTOMS
SINUS PRESSURE: 0
PHOTOPHOBIA: 1
BLURRED VISION: 0
COUGH: 0
EYE PAIN: 0
VOMITING: 1
FACIAL SWELLING: 0
TROUBLE SWALLOWING: 0
FACIAL SWEATING: 0
VOICE CHANGE: 0
CHEST TIGHTNESS: 0
SORE THROAT: 0
CHOKING: 0
DIARRHEA: 0
NAUSEA: 1
EYE WATERING: 1
EYE ITCHING: 0
VISUAL CHANGE: 0
EYE REDNESS: 0
RHINORRHEA: 0
ABDOMINAL PAIN: 0
EYE DISCHARGE: 0
BACK PAIN: 0
BLOOD IN STOOL: 0
APNEA: 0
ABDOMINAL DISTENTION: 0
SCALP TENDERNESS: 0
COLOR CHANGE: 0
WHEEZING: 0
CONSTIPATION: 0
SWOLLEN GLANDS: 0
SHORTNESS OF BREATH: 0
BOWEL INCONTINENCE: 0

## 2020-11-20 NOTE — PROGRESS NOTES
[facility]  Neurology  [address] [place] [address]  Phone: [phone]   Fax: [phone]    SUBJECTIVE:     PATIENT ID:  Naeem Lau is a  RIGHT  HANDED 54 y.o. female. Migraine    This is a chronic problem. Episode onset: FOR  MORE  THAN   4  YEARS. The problem occurs intermittently. The problem has been gradually worsening. The pain is located in the bilateral and vertex region. The pain does not radiate. The pain quality is similar to prior headaches. The quality of the pain is described as aching, dull and throbbing. The pain is at a severity of 3/10. The pain is mild. Associated symptoms include eye watering, insomnia, nausea, phonophobia, photophobia and vomiting. Pertinent negatives include no abdominal pain, abnormal behavior, anorexia, back pain, blurred vision, coughing, dizziness, drainage, ear pain, eye pain, eye redness, facial sweating, fever, hearing loss, loss of balance, muscle aches, neck pain, numbness, rhinorrhea, scalp tenderness, seizures, sinus pressure, sore throat, swollen glands, tingling, tinnitus, visual change, weakness or weight loss. The symptoms are aggravated by unknown. She has tried acetaminophen and Excedrin for the symptoms. The treatment provided no relief. Her past medical history is significant for migraine headaches and obesity. There is no history of cancer, cluster headaches, hypertension, immunosuppression, migraines in the family, pseudotumor cerebri, recent head traumas, sinus disease or TMJ. Neurologic Problem   The patient's primary symptoms include memory loss. The patient's pertinent negatives include no altered mental status, clumsiness, focal sensory loss, focal weakness, loss of balance, near-syncope, slurred speech, syncope, visual change or weakness. Primary symptoms comment: LEFT  EYE  TWITCHING  AND  SPAMS. This is a chronic problem. Episode onset: SINCE  APRIL 2017.  The neurological problem developed insidiously. The problem has been waxing and waning since onset. There was facial and left-sided focality noted. Associated symptoms include headaches, nausea and vomiting. Pertinent negatives include no abdominal pain, auditory change, aura, back pain, bladder incontinence, bowel incontinence, chest pain, confusion, diaphoresis, dizziness, fatigue, fever, light-headedness, neck pain, palpitations, shortness of breath or vertigo. Treatments tried: Kaleidoscope Speaker. The treatment provided moderate relief. There is no history of a bleeding disorder, a clotting disorder, a CVA, dementia, head trauma, liver disease, mood changes or seizures. History obtained from  The patient      and other  available medical records were  Also  reviewed. The  Duration,  Quality,  Severity,  Location,  Timing,  Context,  Modifying  Factors   Of   The   Chief   Complaint         And  Present  Illness   Was   Reviewed   In   Chronological   [facility]anner. PATIENT'S  MAIN  CONCERNS INCLUDE :                       1)  H/O   LEFT  EYE  TWITCHING     SINCE      April 2017                                  INTERMITTENTLY  . NO  PAIN                                -BLEPHAROSPASM    LEFT  EYE.                                -     STABLE                           2)    WORSE    SINCE     FEB. 2018                            DUE   TO     ANXIETY                           3)      PREVIOUS    H/O    BREAST    CANCER  RIGHT                                H/O   BREAST  CANCER   SURGERY  IN  NOV. 2017                           4)       HAD    CHEMO     IN   THE  PAST                                      ALSO  ON   ARIMIDEX   DAILY                             BEING  FOLLOWED  BY   HEMATOLOGY /  ONCOLOGY                           5)    H/O   CHRONIC   MIGRAINES       FOR    4  YEARS                                               -    STABLE                                                            - ON   FIORICET    AS  NEEDED                               6)   H/O   CHRONIC  ANXIETY,   DEPRESSION,  BIPOLAR  DISORDER                               -   ON    LAMICTAL,  SEROQUEL   TRAZODONE, TRILEPTAL                                        -    STABLE                                   -       BEING  FOLLOWED  BY  MENTAL  HEALTH PROFESSIONALS                             7)    H/O   CHRONIC   SLEEP  DIFFICULTIES                                            -   BETTER                                                         8)   H/O   MEMORY PROBLEMS   SINCE    OCTOBER 2017                                            -   STABLE                                9)   OBESITY     WITH   EXCESSIVE    DAY   TIME  SLEEPINESS                                     H/O   OSAS     -   ON  CPAP                                                        10)   MULTIPLE  CO  MORBID  MEDICAL  CONDITIONS                                    BEING  FOLLOWED BY  HER  PCP                            11)   MRI  BRAIN  IN  FEB. 2018   SHOWED                                      NO ACUTE INTRA  CRANIAL PATHOLOGY                                12)    HAD  EYE   EVALUATIONS  IN  MAY  2018                                  AND  DIAGNOSED  WITH LEFT  EYE BLEPHAROSPASM                              13)    LEFT  EYE  TWITCHING  AND  SPASM   ARE   CAUSING                                DIFFICULTY  WITH  READING,  WATCHING  TV   AND  DRIVING                                           -     IMPROVED                                               14)     ON  KLONOPIN       FOR  LEFT  BLEPHAROSPASM   IN      June 2018                                   -     IMPROVED  MORE  THAN   75 %   SUBJECTIVELY.                                PATIENT  NOT  INTERESTED  IN   ADDITIONAL  MEDICATIONS                                     OR  INJECTION  BOTOX                                  15)       H/O    WORSENING  OF  MIGRAINES    IN      July 2019 2 -3    TIMES  WEEKLY                               16)     NO     H/O    SEIZURE      OR    SYNCOPE                                           17)     FOLLOW  UP  MRI BRAIN   WITH  AND  W/O   CONTRAST     IN  OCT. 2019                                   AND  LABS  SHOWED  NO  SIGNIFICANT  ABNORMALITIES                                                       18)        EXPECTATIONS,   GOALS   OF  MIGRAINE   THERAPY                                  AND  SIDE  EFFECTS  MEDICATIONS    WERE                                 REVIEWED     AND   DISCUSSED    IN    DETAIL. AS   DISCUSSED    WITH  PATIENT   IN  OCT. 2019                                  PATIENT     STARTED  ON    TOPAMAX     FOR  MIGRAINE PROPHYLAXIS                          19)     CURRENTLY    MIGRAINES  ARE   BETTER  CONTROLLED                               ON  TOPAMAX,   FIORICET,   IMITREX      AS  NEEDED. AND  PATIENT     FEELS  LOT  BETTER. PATIENT  DENIES  ANY      RENAL  STONES. 20)    LEFT   FACIAL  AND  BLEPHAROSPASM     BETTER  CONTROLLED. PATIENT  REQUESTS    REFILLS  FOR  KLONOPIN   FOR  SYMPTOMATIC  RELIEF. 21)        H/O      HOSPITALIZATION     FOR   PULMONARY  EMBOLISM   IN   April 2020                                 -   ON       ELIQUIS                                        PATIENT  TO  FOLLOW  WITH   HER  PCP  AND  OTHER  CONSULTANTS                                              22)   VARIOUS  RISK   FACTORS   WERE  REVIEWED   AND   DISCUSSED. PATIENT   HAS  MULTIPLE   MEDICAL, MENTAL HEALTH                        NEUROLOGICAL   PROBLEMS . PATIENT  MANAGEMENT  IS  CHALLENGING                       23)            PATIENT  DENIES  ANY  NEW  NEUROLOGICAL  CONCERNS. PRECIPITATING  FACTORS: including  fever/infection, exertion/relaxation, position change, stress,     weather change, medications/alcohol, time of day/darkness/light  Are    Absent                                                                 MODIFYING  FACTORS:  fever/infection, exertion/relaxation, position change, stress, weather change,     medications/alcohol, time of day/darkness/light    Are  absent             Patient   Indicates   The  Presence   And  The  Absence  Of  The  Following  Associated  And   Additional  Neurological    Symptoms:                                Balance  And coordination problems  absent           Gait problems     absent            Headaches      absent              Migraines           present           Memory problems        Present             Confusion        absent            Paresthesia numbness          absent           Seizures  And  Starring  Episodes           absent           Syncope,  Near  syncopal episodes         absent           Speech problems           absent             Swallowing  Problems      absent            Dizziness,  Light headedness           absent                        Vertigo        absent             Generalized   Weakness    absent              focal  Weakness     absent             Tremors         absent              Sleep  Problems     absent             History  Of   Recent   Head  Injury     absent             History  Of   Recent  TIA     absent             History  Of   Recent    Stroke     absent             Neck  Pain and  Neck muscle  Spasms  Absent               Radiating  down   And   Weakness           absent            Lower back   Pain  And     Spasms  Absent              Radiating    Down   And   Weakness          absent                H/O   FALLS        absent               History  Of   Visual  Symptoms    Present                    Associated Diplopia       absent                                      Also   Additional   Symptoms   Present    As  Documented    In   The detailed      Review  Of  Systems   And    Please   Refer   To    Them for   Additional  Information. Any components  That are either  Unobtainable  Or  Limited  In   HPI, ROS  And/or PFSH   Are       Due   To   Patient's  Medical  Problems,  Clinical  Condition and/or lack of other  Alternate resources.               RECORDS   REVIEWED:    historical medical records         INFORMATION   REVIEWED:     MEDICAL   HISTORY,     SURGICAL   HISTORY,   MEDICATIONS   LIST,   ALLERGIES AND  DRUG  INTOLERANCES,     FAMILY   HISTORY,  SOCIAL  HISTORY,    PROBLEM  LIST   FOR  PATIENT  CARE   COORDINATION    Past Medical History:   Diagnosis Date    [facility] [facility]     Breast cancer ([facility] [address] 2017    right side     DVT of axillary vein, acute right ([address]     Eye twitch 2019    Headache(784.0)     Hyperlipidemia     Migraine          Past Surgical History:   Procedure Laterality Date    CATHETER REMOVAL Left 6/6/2019    PORT REMOVAL performed by Helena Doran DO at [address] N/A 10/13/2017    D & C HYSTEROSCOPY with polypectomy performed by Alexandra Quinonez MD at [facility] / [facility] [address] Left 11/9/2017    PORT INSERTION performed by Kory Zaragoza MD at [facility] / REMOVAL / [address] N/A 11/30/2017    Port Removal & Insertion performed by Kory Zaragoza MD at [address] Right 10/19/2017     [facility]    MASTECTOMY Right 10/19/2017    Right Breast Mastectomy with  Neffs Node Biopsy performed by Kory Zaragoza MD at . [facility] [address] [facility] VAD W/SUBQ PORT AGE 5 YR/> Left 3/1/2018    PORT Exchange performed by Kory Zaragoza MD at [address] [facility] Left 2014, 2015    x 2 surgeries total; Dr. Altman Minus TUNNELED VENOUS PORT PLACEMENT Left 11/30/2017    removal of et replacement of         Current Outpatient Medications   Medication Sig Dispense Refill    fexofenadine (ALLEGRA) 180 MG tablet Take 1 tablet by mouth daily 30 tablet 5    ELIQUIS 5 MG TABS tablet take 1 tablet by mouth twice a day 180 tablet 1    RA VITAMIN B-12 TR 1000 MCG TBCR take 1 tablet by mouth once daily 30 tablet 3    tiZANidine (ZANAFLEX) 4 MG tablet Take 1 tablet by mouth every 8 hours as needed (muscle pain) 20 tablet 0    pravastatin (PRAVACHOL) 40 MG tablet take 1 tablet by mouth once daily 30 tablet 5    folic acid (FOLVITE) 1 MG tablet take 1 tablet by mouth once daily 30 tablet 3    clonazePAM (KLONOPIN) 0.5 MG tablet Take 1 tablet by mouth 2 times daily as needed for Anxiety (Papo  facial  spasms) for up to 31 days.  60 tablet 2    topiramate (TOPAMAX) 50 MG tablet ONE  TABLET  TWICE  DAILY 60 tablet 5    Calcium Carbonate-Vitamin D (OYSTER SHELL CALCIUM/D) 500-200 MG-UNIT TABS take 1 tablet by mouth twice a day 60 tablet 5    letrozole (FEMARA) 2.5 MG tablet Take 1 tablet by mouth daily 30 tablet 5    fluticasone (FLONASE) 50 MCG/ACT nasal spray 1 spray by Nasal route daily Indications: as needed 1 Bottle 11    busPIRone (BUSPAR) 7.5 MG tablet take 1 tablet by mouth twice a day      OXcarbazepine (TRILEPTAL) 150 MG tablet take 1 tablet by mouth every morning BEFORE NOON      butalbital-acetaminophen-caffeine (FIORICET, ESGIC) -40 MG per tablet 1-2   TABLET  TWICE  DAILY  AS  NEEDED 90 tablet 1    benztropine (COGENTIN) 0.5 MG tablet Take 0.5 mg by mouth daily      QUEtiapine (SEROQUEL) 100 MG tablet Take 50 mg by mouth nightly      sodium chloride (ALTAMIST SPRAY) 0.65 % nasal spray 1 spray by Nasal route as needed for Congestion 1 Bottle 1    hydrOXYzine (ATARAX) 10 MG tablet Take 10 mg by mouth daily       lamoTRIgine (LAMICTAL) 100 MG tablet 150 mg nightly       VENTOLIN  (90 Base) MCG/ACT inhaler Inhale 2 puffs into the lungs every 4 hours as needed for Wheezing (Patient not taking: Reported on 11/20/2020) 1 Inhaler 3     No current facility-administered medications for this visit. Allergies   Allergen Reactions    Prednisone Swelling     Whole side of her face swelled when she took it, difficulty breathing         Family History   Problem Relation Age of Onset    Breast Cancer Sister 54    Breast Cancer Maternal Aunt 71    Other Maternal Cousin         Atypical Ductal Hyperplasia     Uterine Cancer Sister 32    Other Sister         breast cyst    Cataracts Mother     Glaucoma Mother     Heart Disease Father     High Blood Pressure Father     High Cholesterol Father     Mental Retardation Father     Diabetes Neg Hx          Social History     Socioeconomic History    Marital status: Single     Spouse name: Not on file    Number of children: Not on file    Years of education: Not on file    Highest education level: Not on file   Occupational History    Not on file   Social Needs    Financial resource strain: Not on file    Food insecurity     Worry: Not on file     Inability: Not on file    Transportation needs     Medical: Not on file     Non-medical: Not on file   Tobacco Use    Smoking status: Never Smoker    Smokeless tobacco: Never Used    Tobacco comment: Never smoker.  TC, RRT 4/5/18   Substance and Sexual Activity    Alcohol use: No    Drug use: No    Sexual activity: Yes     Partners: Male     Birth control/protection: Surgical   Lifestyle    Physical activity     Days per week: Not on file     Minutes per session: Not on file    Stress: Not on file   Relationships    Social connections     Talks on phone: Not on file     Gets together: Not on file     Attends Judaism service: Not on file     Active member of club or organization: Not on file     Attends meetings of clubs or organizations: Not on file     Relationship status: Not on file    Intimate partner violence     Fear of current or ex partner: Not on file     Emotionally abused: Not on file     Physically abused: Not on file     Forced sexual activity: Not on file   Other Topics Concern    Not on file   Social History Narrative    Not on file       Vitals:    11/20/20 1358   BP: (!) 148/96   Pulse: 103   Temp: 99.3 °F (37.4 °C)   SpO2: 96%         Wt Readings from Last 3 Encounters:   11/09/20 299 lb (135.6 kg)   09/01/20 299 lb (135.6 kg)   08/20/20 (!) 305 lb (138.3 kg)         BP Readings from Last 3 Encounters:   11/20/20 (!) 148/96   11/09/20 130/72   09/01/20 132/74       Hematology and Coagulation  Lab Results   Component Value Date    WBC 7.9 08/28/2020    RBC 4.03 08/28/2020    HGB 12.1 08/28/2020    HCT 38.7 08/28/2020    MCV 96.0 08/28/2020    MCH 30.0 08/28/2020    MCHC 31.3 08/28/2020    RDW 14.3 08/28/2020     08/28/2020    MPV 9.8 08/28/2020       Chemistries  Lab Results   Component Value Date     08/28/2020    K 3.9 08/28/2020     08/28/2020    CO2 22 08/28/2020    BUN 17 08/28/2020    CREATININE 1.08 08/28/2020    CALCIUM 9.7 08/28/2020    PROT 7.7 08/28/2020    LABALBU 4.0 08/28/2020    BILITOT 0.22 08/28/2020    ALKPHOS 75 08/28/2020    AST 15 08/28/2020    ALT 13 08/28/2020     Lab Results   Component Value Date    ALKPHOS 75 08/28/2020    ALT 13 08/28/2020    AST 15 08/28/2020    PROT 7.7 08/28/2020    BILITOT 0.22 08/28/2020    BILIDIR 0.12 04/09/2020    LABALBU 4.0 08/28/2020     Lab Results   Component Value Date    BUN 17 08/28/2020    CREATININE 1.08 08/28/2020     Lab Results   Component Value Date    CALCIUM 9.7 08/28/2020    MG 1.8 04/10/2020     Lab Results   Component Value Date    AST 15 08/28/2020    ALT 13 08/28/2020       Lab Results   Component Value Date    CKTOTAL 27 04/09/2020     Lab Results   Component Value Date    [id number] 580 06/04/2018             Review of Systems   Constitutional: Negative for appetite change, chills, diaphoresis, fatigue, fever, unexpected weight change and weight loss. HENT: Negative for congestion, dental problem, drooling, ear discharge, ear pain, facial swelling, hearing loss, mouth sores, nosebleeds, postnasal drip, rhinorrhea, sinus pressure, sore throat, tinnitus, trouble swallowing and voice change. Eyes: Positive for photophobia. Negative for blurred vision, pain, discharge, redness, itching and visual disturbance. Respiratory: Negative for apnea, cough, choking, chest tightness, shortness of breath and wheezing. Cardiovascular: Negative for chest pain, palpitations, leg swelling and near-syncope. Gastrointestinal: Positive for nausea and vomiting. Negative for abdominal distention, abdominal pain, anorexia, blood in stool, bowel incontinence, constipation and diarrhea. Endocrine: Negative for cold intolerance, heat intolerance, polydipsia, polyphagia and polyuria. Genitourinary: Negative for bladder incontinence. Musculoskeletal: Negative for arthralgias, back pain, gait problem, joint swelling, myalgias, neck pain and neck stiffness. Skin: Negative for color change, pallor, rash and wound. Allergic/Immunologic: Negative for environmental allergies, food allergies and immunocompromised state. Neurological: Positive for headaches. Negative for dizziness, vertigo, tingling, tremors, focal weakness, seizures, syncope, facial asymmetry, speech difficulty, weakness, light-headedness, numbness and loss of balance. Hematological: Negative for adenopathy. Does not bruise/bleed easily. Psychiatric/Behavioral: Positive for decreased concentration and memory loss. Negative for agitation, behavioral problems, confusion, dysphoric mood, hallucinations, self-injury, sleep disturbance and suicidal ideas. The patient is nervous/anxious and has insomnia. The patient is not hyperactive. OBJECTIVE:    Physical Exam  Constitutional:       Appearance: She is well-developed.    HENT:      Head: Normocephalic and atraumatic. No raccoon eyes or Varma's sign. Right Ear: External ear normal.      Left Ear: External ear normal.      Nose: Nose normal.   Eyes:      Conjunctiva/sclera: Conjunctivae normal.   Neck:      Musculoskeletal: Normal range of motion and neck supple. Normal range of motion. No neck rigidity or muscular tenderness. Thyroid: No thyroid mass or thyromegaly. Vascular: No carotid bruit. Trachea: No tracheal deviation. Meningeal: Brudzinski's sign and Kernig's sign absent. Cardiovascular:      Rate and Rhythm: Normal rate and regular rhythm. Pulmonary:      Effort: Pulmonary effort is normal.   Musculoskeletal:         General: No tenderness. Skin:     General: Skin is warm. Coloration: Skin is not pale. Findings: No erythema or rash. Nails: There is no clubbing. Psychiatric:         Attention and Perception: She is attentive. Mood and Affect: Mood is anxious and depressed. Affect is not labile, blunt, angry or inappropriate. Behavior: Behavior is not agitated, slowed, aggressive, withdrawn, hyperactive or combative. Behavior is cooperative. Thought Content: Thought content is not paranoid or delusional. Thought content does not include homicidal or suicidal ideation. Thought content does not include homicidal or suicidal plan. Cognition and Memory: Memory is impaired. She does not exhibit impaired recent memory or impaired remote memory. Judgment: Judgment is not impulsive or inappropriate. Neurologic Exam    NEUROLOGICAL EXAMINATION :       A) MENTAL STATUS:                   Alert and  oriented  To time, place  And  Person. No Aphasia. No  Dysarthria. Able   To  Follow  commands without   Any  Difficulty. No right  To left confusion. Normal  Speech  And language function.                    Insight and  Judgment ,Fund  Of  Knowledge within normal limits                Recent  And  Remote memory  within normal limits                Attention &Concentration are within normal limits                                                   B) CRANIAL NERVES :             2 CN : Visual  Acuity  And  Visual fields  within normal limits                        Fundi  Could  Not  Be  Could  Not  Be  Evaluated. 3,4,6 CN : Both  Pupils are   Reactive and  Equal.                            Extraocular   Movements  Are  Intact. No  Nystagmus. No  YUMIKO. No  Afferent  Pupillary  Defect noted. 5 CN :  Normal  Facial sensations and Corneal  Reflexes           7 CN :  Normal  Facial  Symmetry  And  Strength. No facial  Weakness. 8 CN :  Hearing  Appears within normal limits          9, 10 CN: Normal spontaneous, reflex palate movements         11 CN:   Normal  Shoulder shrug and  Strength         12 CN :   Normal  Tongue movements and  Tongue  In midline                        No tongue   Fasciculations or atrophy         C) MOTOR  EXAM:                 Strength  In upper  And  Lower extremities   within normal limits               No  Drift. No  Atrophy               Rapid alternating  And  repetitions  Movements  within normal limits               Muscle  Tone  In upper  And  Lower  Extremities  Normal                No rigidity. No  Spasticity. Bradykinesia   Absent                 No  Asterixis. Sustention  Tremor , Resting  Tremor   absent                      LEFT  EYE  LIDS   SHOWS   BLEPHAROSPASM   INTERMITTENTLY                       D) SENSORY :               light touch, pinprick, position  And  Vibration  within normal limits        E) REFLEXES:                   Deep  Tendon  Reflexes normal                    No pathological  Reflexes  Bilaterally.                                     F) COORDINATION  AND  GAIT :                                Station and  Gait normal                                        Romberg's test negative                          Ataxia negative          ASSESSMENT:      Patient Active Problem List   Diagnosis    Bipolar 1 disorder ([address]    Hyperlipidemia    Malignant neoplasm of overlapping sites of right breast in female, estrogen receptor negative ([address]    Port-A-Cath in place    Migraine    Memory problem    Sleep difficulties    Anxiety and depression    Muscle twitching    Hemifacial spasm    Combined forms of age-related cataract of both eyes    Squamous blepharitis of upper and lower eyelids of both eyes    Acute deep vein thrombosis (DVT) of axillary vein of right upper extremity (HCC)    Malignant neoplasm of breast in female, estrogen receptor positive ([address]    Seasonal allergies    H/O right mastectomy    HX: breast cancer    Acute massive pulmonary embolism ([address]    Moderate to severe pulmonary hypertension ([address]    TAY on CPAP    Morbid obesity with BMI of 40.0-44.9, adult (HCC)    Extrinsic asthma    Blepharospasm          MRI OF THE BRAIN WITHOUT AND WITH CONTRAST  10/2/2019 1:14 pm       TECHNIQUE:   Multiplanar multisequence MRI of the head/brain was performed without and   with the administration of intravenous contrast.       COMPARISON:   MRI brain performed 10/08/2018.       HISTORY:   ORDERING SYSTEM PROVIDED HISTORY: Migraine without aura and without status   migrainosus, not intractable   TECHNOLOGIST PROVIDED HISTORY:   migraines   Is the patient pregnant?->No   Reason for Exam: Migraines for quite a while, becoming worse.    Acuity: Chronic   Type of Exam: Unknown   Additional signs and symptoms: Migraine without aura and without status   migrainosus, not intractable       FINDINGS:   INTRACRANIAL STRUCTURES/VENTRICLES:  The sellar and suprasellar structures,   optic chiasm, corpus callosum, pineal gland, tectum, and midline brainstem   structures are unremarkable.  The craniocervical junction is unremarkable. There is no acute intracranial hemorrhage, mass effect, or midline shift. There is satisfactory overall gray-white matter differentiation.  There is no   abnormal postcontrast enhancement.  The ventricular structures are symmetric   and unremarkable.  The infratentorial structures including the   cerebellopontine angles and internal auditory canals are unremarkable. There   is no abnormal restricted diffusion. There is no abnormal blooming artifact   on susceptibility weighted imaging.       ORBITS: The visualized portion of the orbits demonstrate no acute abnormality.       SINUSES: The visualized paranasal sinuses and mastoid air cells are well   aerated.       BONES/SOFT TISSUES: The bone marrow signal intensity appears normal. The soft   tissues demonstrate no acute abnormality.           Impression   Unremarkable pre and post-contrast MRI of the brain.               MRI OF THE BRAIN WITHOUT AND WITH CONTRAST  2/5/2018 9:18 am       TECHNIQUE:   Multiplanar multisequence MRI of the head/brain was performed without and   with the administration of intravenous contrast.       COMPARISON:   Head CT on 09/25/2013.       HISTORY:   ORDERING SYSTEM PROVIDED HISTORY: Malignant neoplasm of overlapping sites of   right breast in female, [facility]   TECHNOLOGIST PROVIDED HISTORY:   Ordering Physician Provided Reason for Exam:  Bad headaches for one month. Left eye twitching for two months and it is getting worse. History of breast   cancer.    Acuity: Acute   Type of Exam: Initial   Additional signs and symptoms: Malignant neoplasm of overlapping sites of   right breast in female, estrogen receptor negative.       Initial evaluation.       FINDINGS:   INTRACRANIAL STRUCTURES/VENTRICLES: Suann Pillar are no areas of restricted   diffusion to suggest an acute infarct.  The cerebral and cerebellar   parenchyma demonstrate normal volume and signal intensity.  There are no   abnormal extra-axial fluid collections.  The ventricles are normal in size. Normal major intracranial flow voids are noted.       There are no areas of abnormal contrast enhancement in the cerebral or   cerebellar parenchyma to suggest metastatic disease.       There are no areas of blooming artifact noted on the gradient echo sequences   to suggest sequela of acute or chronic hemorrhage.       ORBITS: The visualized portion of the orbits demonstrate no acute abnormality.       SINUSES: There is scattered mucosal thickening in the paranasal sinuses, with   greatest involvement in the ethmoid air cells and maxillary sinuses.  There   is an air-fluid level in the left sphenoid sinus, correlate with signs of   acute sinusitis.       The mastoid air cells are clear.       BONES/SOFT TISSUES: The bone marrow signal intensity appears normal. The soft   tissues demonstrate no acute abnormality.           Impression   1. Unremarkable MRI of the brain.  No evidence of metastatic disease. 2. Acute left sphenoid sinusitis.             VISITING DIAGNOSIS:      ICD-10-CM    1. Blepharospasm  G24.5    2. TAY on CPAP  G47.33     Z99.89    3. Sleep difficulties  G47.9    4. Anxiety and depression  F41.9     F32.9    5. Morbid obesity with BMI of 40.0-44.9, [facility] ([facility]  E66.01     Z68.41    6. HX: breast cancer  Z85.3    7. Moderate to severe pulmonary hypertension (HCC)  I27.20    8. H/O right mastectomy  Z90.11    9. Hemifacial spasm, unspecified laterality  G51.39    10. Memory problem  R41.3    11. Migraine without aura and with status migrainosus, not intractable  G43.001    12. Mixed hyperlipidemia  E78.2    13. Muscle twitching  R25.3    14. Bipolar 1 disorder (HCC)  F31.9    15. Malignant neoplasm of overlapping sites of right breast in female, estrogen receptor negative ([facility]  C50.811     Z17.1                      VARIOUS  RISK   FACTORS   WERE  REVIEWED   AND   DISCUSSED.         *  PATIENT   HAS  MULTIPLE   MEDICAL, MENTAL HEALTH          NEUROLOGICAL   PROBLEMS . PATIENT  MANAGEMENT  IS  CHALLENGING              PLAN:       Aaron Oh  Of  The  Diagnoses,  The  Management & Treatment  Options           AND    Care  plan  Were        Reviewed and   Discussed   With  patient. * Goals  And  Expectations  Of  The  Therapy  Discussed   And  Reviewed. *   Benefits   And   Side  Effect  Profile  Of  Medication/s   Were   Discussed             * Need   For  Further   Follow up For  The  Various  Problems  Were discussed. * Results  Of  The  Previous  Diagnostic tests were reviewed and questions answered. patient  understand the same. Medical  Decision  Making  Was  Made  Based on the   Complexity  Of  Above  Mentioned  Diagnoses,        Data reviewed   & diagnostic  Tests  Reviewed,  Risk  Of  Significant   Co morbidities and complicating   Factors. Medical  Decision  Was   High  Complexity  Due   To  The  Patient's  Multiple  Symptoms,      Complex  Treatment  Regimen,  Multiple medications and   Risk  Of   Side  Effects,      Difficulty  In  Medication  Management      And  Diagnostic  Challenges   In  View  Of  The  Associated   Co  Morbid  Conditions   And  Problems. *   ADEQUATE   FLUID  INTAKE   AND  ELECTROLYTE  BALANCE         * KEEP  DAIRY  OF   THE  NEUROLOGICAL  SYMPTOMS        RECORDING THE    DURATION  AND  FREQUENCY. -    DISCUSSED  WITH PATIENT              *  AVOID    CONDITIONS  AND  FACTORS   THAT  MAKE   NEUROLOGICAL  SYMPTOMS  WORSE. *  TO  MAINTAIN  REGULAR  SLEEP  WAKE  CYCLES. *   TO  HAVE  ADEQUATE  REST  AND   SLEEP    HOURS.          *    TO   AVOID   TO  SLEEP  IN   SUPINE  POSITION. *      WEIGHT   LOSS.            *    AVOID  ANY USAGE OF                   TOBACCO,  EXCESSIVE  ALCOHOL  AND   ILLEGAL   SUBSTANCES        *  CONTINUE MEDICATIONS PRESCRIBED BY NEUROLOGIST AS    RECOMMENDED     * Compliance   With  Medications   And  Instructions          * CURRENTLY  TOLERATING  THE  PRESCRIBED   MEDICATIONS. WITHOUT  ANY  SIGNIFICANT  SIDE  EFFECTS   &  GETTING BENEFIT. *    Prophylactic  Use   Of     Vitamin   B   Complex,  Folic  Acid,    Vitamin  B12    Multivitamin,       Calcium  With  magnesium  And  Vit D    Supplementations   Over  The  Counter  Discussed            *  EVALUATIONS  AND  FOLLOW UP:                     * HEMATOLOGY/ONCOLOGY                    *   OPTHALMOLOGY                         *  PATIENT     ON   KLONOPIN       FOR  LEFT  BLEPHAROSPASM   SINCE       June 2018                                   -     IMPROVED  MORE  THAN   75 %   SUBJECTIVELY. *         PATIENT  NOT  INTERESTED  IN   ADDITIONAL  MEDICATIONS                                     OR  INJECTION  BOTOX                                                       *          EXPECTATIONS,   GOALS   OF  MIGRAINE   THERAPY                                  AND  SIDE  EFFECTS  MEDICATIONS    WERE                                 REVIEWED     AND   DISCUSSED    IN    DETAIL. *        CURRENTLY    MIGRAINES  ARE   BETTER  CONTROLLED                               ON  TOPAMAX ,  FIORICET,  IMITREX   AS  NEEDED                              AND  PATIENT     FEELS  LOT  BETTER. PATIENT  DENIES  ANY  NEW  NEUROLOGICAL  CONCERNS. Controlled Substances Monitoring: Periodic Controlled Substance Monitoring: Possible medication side effects, risk of tolerance/dependence & alternative treatments discussed. , Assessed functional status. Media MD Hardy)              Orders Placed This Encounter   Medications    clonazePAM (KLONOPIN) 0.5 MG tablet     Sig: Take 1 tablet by mouth 2 times daily as needed for Anxiety (Papo  facial  spasms) for up to 31 days.      Dispense:  60 tablet     Refill:  2    butalbital-acetaminophen-caffeine (FIORICET, ESGIC) -40 MG per tablet     Si-2   TABLET  TWICE  DAILY  AS  NEEDED     Dispense:  60 tablet     Refill:  2    SUMAtriptan (IMITREX) 100 MG tablet     Sig: Take 1 tablet by mouth once as needed for Migraine     Dispense:  12 tablet     Refill:  2                   *PATIENT   TO  FOLLOW  UP  WITH   PRIMARY  CARE   AND   OTHER  CONSULTANTS  AS  BEFORE.           *TO  FOLLOW  WITH   MENTAL  HEALTH  PROFESSIONALS ,  INCLUDING            PSYCHOLOGICAL  COUNSELING   AND  PSYCHIATRIC  EVALUTIONS            *  Maintain   Healthy  Life Style    With   Periodic  Monitoring  Of      Any  Medical  Conditions  Including   Elevated  Blood  Pressure,  Lipid  Profile,     Blood  Sugar levels  And   Heart  Disease. *   Period   Screening  For  Cancers  Involving  Breast,  Colon,    lungs  And  Other  Organs  As  Applicable,  In consultation   With  Your  Primary Care Providers. * Second  Neurological  Opinion  And  Evaluations  In  [facility]  Setting  If  Patient  Is  Interested. * Please   Contact   Neurology  Clinic   Early   If   Are  Any  New  Neurological                             Symptoms   And  Any neurological  Concerns. *  If  The  Patient remains  Neurologically  Stable   Return   To  [facility] Neurology Department       IN      3-4          MONTHS  TIME   FOR  FURTHER  FOLLOW UP.                 *  If   There is  Any  Significant  Worsening   Of  Current  Symptoms  And  Or  If patient  Develops       Any additional  New  Neurological  Symptoms  Or  Significant  Concerns   Should  Call  911 or      Go  To  Emergency  Department  For  Further  Immediate  Evaluation. *   The  Neurological   Findings,  Possible  Diagnosis,  Differential diagnoses   And  Options  For    Further   Investigations       And  management   Are  Discussed  Comprehensively. Medications   And  Prescription   Risks  And  Side effects  Are   Also  Discussed. The  Above  Were  Reviewed  With  patient and     questions  Answered  In  Detail. More   Than   50% of face  To face Time   Was  Spent  On  Counseling   And   Coordination      Of  Care   Of   Patient's multiple   Neurological  Problems   And   Comorbid  Medical   Conditions. Electronically signed by Daysi Zarate MD.,  [facility]       Board Certified in  Neurology &  In  [address] of Psychiatry and Neurology ([facility]      DISCLAIMER:   Although every effort was made to ensure the accuracy of this  electronic transcription, some errors in transcription may have occurred. GENERAL PATIENT INSTRUCTIONS:     A Healthy Lifestyle: Care Instructions  Your Care Instructions  A healthy lifestyle can help you feel good, stay at a healthy weight, and have plenty of energy for both work and play. A healthy lifestyle is something you can share with your whole family. A healthy lifestyle also can lower your risk for serious health problems, such as high blood pressure, heart disease, and diabetes. You can follow a few steps listed below to improve your health and the health of your family. Follow-up care is a key part of your treatment and safety. Be sure to make and go to all appointments, and call your doctor if you are having problems. Its also a good idea to know your test results and keep a list of the medicines you take. How can you care for yourself at home? Do not eat too much sugar, fat, or fast foods. You can still have dessert and treats now and then. The goal is moderation. Start small to improve your eating habits. Pay attention to portion sizes, drink less juice and soda pop, and eat more fruits and vegetables. Eat a healthy amount of food. A 3-ounce serving of meat, for example, is about the size of a deck of cards.  Fill the rest of your plate with vegetables and whole grains. Limit the amount of soda and sports drinks you have every day. Drink more water when you are thirsty. Eat at least 5 servings of fruits and vegetables every day. It may seem like a lot, but it is not hard to reach this goal. A serving or helping is 1 piece of fruit, 1 cup of vegetables, or 2 cups of leafy, raw vegetables. Have an apple or some carrot sticks as an afternoon snack instead of a candy bar. Try to have fruits and/or vegetables at every meal.  Make exercise part of your daily routine. You may want to start with simple activities, such as walking, bicycling, or slow swimming. Try to be active 30 to 60 minutes every day. You do not need to do all 30 to 60 minutes all at once. For example, you can exercise 3 times a day for 10 or 20 minutes. Moderate exercise is safe for most people, but it is always a good idea to talk to your doctor before starting an exercise program.  Keep moving. Mikhail Bill the lawn, work in the garden, or [employer]. Take the stairs instead of the elevator at work. If you smoke, quit. People who smoke have an increased risk for heart attack, stroke, cancer, and other lung illnesses. Quitting is hard, but there are ways to boost your chance of quitting tobacco for good. Use nicotine gum, patches, or lozenges. Ask your doctor about stop-smoking programs and medicines. Keep trying. In addition to reducing your risk of diseases in the future, you will notice some benefits soon after you stop using tobacco. If you have shortness of breath or asthma symptoms, they will likely get better within a few weeks after you quit. Limit how much alcohol you drink. Moderate amounts of alcohol (up to 2 drinks a day for men, 1 drink a day for women) are okay. But drinking too much can lead to liver problems, high blood pressure, and other health problems. Family health  If you have a family, there are many things you can do together to improve your health.   Eat meals together as a family as often as possible. Eat healthy foods. This includes fruits, vegetables, lean meats and dairy, and whole grains. Include your family in your fitness plan. Most people think of activities such as jogging or tennis as the way to fitness, but there are many ways you and your family can be more active. Anything that makes you breathe hard and gets your heart pumping is exercise. Here are some tips:  Walk to do errands or to take your child to school or the bus. Go for a family bike ride after dinner instead of watching TV. Where can you learn more? Go to https://pickrsetpepiceweb.MobilePro. org and sign in to your [employer] account. Enter H667 in the Alphion box to learn more about \"A Healthy Lifestyle: Care Instructions. \"     If you do not have an account, please click on the \"Sign Up Now\" link. Current as of: July 26, 2016  Content Version: 11.2  © 5376-1876 [employer]. Care instructions adapted under license by [facility] ([facility]). If you have questions about a medical condition or this instruction, always ask your healthcare professional. [address] any warranty or liability for your use of this information.

## 2020-11-24 ENCOUNTER — HOSPITAL ENCOUNTER (OUTPATIENT)
Dept: MAMMOGRAPHY | Age: 55
Discharge: HOME | End: 2020-11-26
Payer: MEDICARE

## 2020-11-24 PROCEDURE — 77063 BREAST TOMOSYNTHESIS BI: CPT

## 2020-12-11 ENCOUNTER — OFFICE VISIT (OUTPATIENT)
Dept: SURGERY | Age: 55
End: 2020-12-11
Payer: MEDICARE

## 2020-12-11 VITALS
BODY MASS INDEX: 47.09 KG/M2 | SYSTOLIC BLOOD PRESSURE: 138 MMHG | RESPIRATION RATE: 16 BRPM | OXYGEN SATURATION: 97 % | HEART RATE: 102 BPM | DIASTOLIC BLOOD PRESSURE: 76 MMHG | HEIGHT: 66 IN | WEIGHT: 293 LBS

## 2020-12-11 PROCEDURE — G8482 FLU IMMUNIZE ORDER/ADMIN: HCPCS | Performed by: PLASTIC SURGERY

## 2020-12-11 PROCEDURE — 1036F TOBACCO NON-USER: CPT | Performed by: PLASTIC SURGERY

## 2020-12-11 PROCEDURE — 99213 OFFICE O/P EST LOW 20 MIN: CPT | Performed by: PLASTIC SURGERY

## 2020-12-11 PROCEDURE — 3017F COLORECTAL CA SCREEN DOC REV: CPT | Performed by: PLASTIC SURGERY

## 2020-12-11 PROCEDURE — G8427 DOCREV CUR MEDS BY ELIG CLIN: HCPCS | Performed by: PLASTIC SURGERY

## 2020-12-11 PROCEDURE — G8417 CALC BMI ABV UP PARAM F/U: HCPCS | Performed by: PLASTIC SURGERY

## 2020-12-11 PROCEDURE — 99215 OFFICE O/P EST HI 40 MIN: CPT | Performed by: PLASTIC SURGERY

## 2020-12-11 SDOH — HEALTH STABILITY: MENTAL HEALTH: HOW OFTEN DO YOU HAVE A DRINK CONTAINING ALCOHOL?: NEVER

## 2020-12-11 ASSESSMENT — ENCOUNTER SYMPTOMS
ABDOMINAL DISTENTION: 0
SHORTNESS OF BREATH: 0
TROUBLE SWALLOWING: 0
COUGH: 0

## 2020-12-11 NOTE — PROGRESS NOTES
Subjective:      Patient ID: Twan Abrams is a 54 y.o. female. HPI  Patient presents today for a re-evaluation for right breast reconstruction. She has had one previous breast surgery before and has a personal history of breast cancer. Patient does periodically do self breast exams at home, as often as once monthly, and had previously found a lump. Her last mammogram was 11/24/2020 and was normal.  She is the mother of 2, youngest is age 28years old. She breast-fed 0 children. Patient's weight is currently 295 pounds and fluctuates by about 5 pounds. Her current bra size is 42C on the left. Patient is not a smoker. Patient has a history of blood clots in her lungs since her last visit and is currently on Eliquis 5 mg BID. She is currently under care of Dr. Jung Harris and reports that he feels she would be okay to be off blood thinners for the procedure (after 6 months). Her last appointment with Dr. Jung Harris was on 4/28/2020. Review of Systems   Constitutional: Negative. HENT: Negative for congestion and trouble swallowing. Respiratory: Negative for cough and shortness of breath. Cardiovascular: Negative for chest pain. Gastrointestinal: Negative for abdominal distention. Neurological: Negative for light-headedness and headaches. Psychiatric/Behavioral: Negative for dysphoric mood. Objective:   Physical Exam  Vitals signs and nursing note reviewed. Exam conducted with a chaperone present. Constitutional:       Appearance: Normal appearance. HENT:      Head: Normocephalic and atraumatic. Mouth/Throat:      Mouth: Mucous membranes are moist.   Eyes:      Extraocular Movements: Extraocular movements intact. Conjunctiva/sclera: Conjunctivae normal.      Pupils: Pupils are equal, round, and reactive to light. Pulmonary:      Effort: Pulmonary effort is normal.   Chest:          Comments:   Right post mastectomy. Some skin redundancy. Left no masses. Ptotic.     Skin:

## 2020-12-11 NOTE — PROGRESS NOTES
Established Patient Breast Reconstruction    54year old white female presents today for a re-evaluation for right breast reconstruction. She has had one previous breast surgery before and has a personal history of breast cancer. Patient does periodically do self breast exams at home, as often as once monthly, and had previously found a lump. Her last mammogram was 11/24/2020 and was normal.  She is the mother of 2, youngest is age 28years old. She breast-fed 0 children. Patient's weight is currently 295 pounds and fluctuates by about 5 pounds. Her current bra size is 42C on the left. Patient is not a smoker. Patient has a history of blood clots in her lungs and is currently on Eliquis 5 mg BID. She is currently under care of Dr. Krishan Brandon and reports that he feels she would be okay to be off blood thinners for the procedure (after 6 months). Her last appointment with Dr. Krishan Brandon was on 4/28/2020. I have reviewed the patient's medical history in detail and updated the computerized patient record.

## 2020-12-15 ENCOUNTER — OFFICE VISIT (OUTPATIENT)
Dept: NEPHROLOGY | Age: 55
End: 2020-12-15
Payer: MEDICARE

## 2020-12-15 VITALS
WEIGHT: 293 LBS | DIASTOLIC BLOOD PRESSURE: 70 MMHG | HEART RATE: 68 BPM | BODY MASS INDEX: 47.09 KG/M2 | HEIGHT: 66 IN | SYSTOLIC BLOOD PRESSURE: 130 MMHG

## 2020-12-15 PROCEDURE — G8417 CALC BMI ABV UP PARAM F/U: HCPCS | Performed by: INTERNAL MEDICINE

## 2020-12-15 PROCEDURE — G8482 FLU IMMUNIZE ORDER/ADMIN: HCPCS | Performed by: INTERNAL MEDICINE

## 2020-12-15 PROCEDURE — 3017F COLORECTAL CA SCREEN DOC REV: CPT | Performed by: INTERNAL MEDICINE

## 2020-12-15 PROCEDURE — 99203 OFFICE O/P NEW LOW 30 MIN: CPT | Performed by: INTERNAL MEDICINE

## 2020-12-15 PROCEDURE — G8427 DOCREV CUR MEDS BY ELIG CLIN: HCPCS | Performed by: INTERNAL MEDICINE

## 2020-12-15 PROCEDURE — 99214 OFFICE O/P EST MOD 30 MIN: CPT

## 2020-12-15 PROCEDURE — 1036F TOBACCO NON-USER: CPT | Performed by: INTERNAL MEDICINE

## 2020-12-15 NOTE — PROGRESS NOTES
rashes or arthralgias, No hematuria or pyuria noticed in the recent past. Doesn't report any reduction in the urine output recently. Non report of any obstructive urinary symptoms (urgency, frequency, weak stream, straining while urination). No h/o recurrent UTIs in the past.    Past History/Allergies? Social History:     Past Medical History:   Diagnosis Date    Bipolar 1 disorder ([facility] [address]     [facility] 2017    right side     DVT of axillary vein, acute right (HCC)     Eye twitch 2019    Headache(784.0)     Hyperlipidemia     Migraine        Allergies   Allergen Reactions    Prednisone Swelling     Whole side of her face swelled when she took it, difficulty breathing       Social History     Socioeconomic History    Marital status: Single     Spouse name: Not on file    Number of children: Not on file    Years of education: Not on file    Highest education level: Not on file   Occupational History    Not on file   Social Needs    Financial resource strain: Not on file    Food insecurity     Worry: Not on file     Inability: Not on file    Transportation needs     Medical: Not on file     Non-medical: Not on file   Tobacco Use    Smoking status: Never Smoker    Smokeless tobacco: Never Used    Tobacco comment: Never smoker.  TC, RRT 4/5/18   Substance and Sexual Activity    Alcohol use: No     Frequency: Never     Binge frequency: Never    Drug use: No    Sexual activity: Yes     Partners: Male     Birth control/protection: Surgical   Lifestyle    Physical activity     Days per week: Not on file     Minutes per session: Not on file    Stress: Not on file   Relationships    Social connections     Talks on phone: Not on file     Gets together: Not on file     Attends Muslim service: Not on file     Active member of club or organization: Not on file     Attends meetings of clubs or organizations: Not on file     Relationship status: Not on file    Intimate partner violence     Fear of current or ex partner: Not on file     Emotionally abused: Not on file     Physically abused: Not on file     Forced sexual activity: Not on file   Other Topics Concern    Not on file   Social History Narrative    Not on file       Family History:        Family History   Problem Relation Age of Onset    Breast Cancer Sister 54    Breast Cancer Maternal Aunt 71    Other Maternal Cousin         Atypical Ductal Hyperplasia     Uterine Cancer Sister 32    Other Sister         breast cyst    Cataracts Mother     Glaucoma Mother     Heart Disease Father     High Blood Pressure Father     High Cholesterol Father     Mental Retardation Father     Diabetes Neg Hx        Outpatient Medications:     Not in a hospital admission. Review of Systems:     Constitutional: No fever, no chills, no lethargy, no weakness. HEENT:  No headache, ear pain, itchy eyes, nasal discharge or sore throat. Cardiac:  No chest pain, shortness of breath, orthopnea or PND. Chest:   No cough, phlegm or wheezing. Abdomen:  No abdominal pain, nausea or vomiting. Neuro:  No focal weakness, abnormal movements orseizure like activity. Skin:   No rashes, no itching, does have tattoos. :   No gross blood or pus in the urine, no dysuria, no flank pain. Extremities:  No swelling or joint pains. ROS was otherwise negative except as mentioned in the [address] Vital Signs:     Vitals:    12/15/20 0907   BP: 130/70   Pulse: 68     Wt Readings from Last 2 Encounters:   12/15/20 300 lb (136.1 kg)   12/11/20 295 lb (133.8 kg)       Physical Examination:     General:  AAO x 3, speaking in full sentences, no accessory muscle use. HEENT: Atraumatic, normocephalic, moist mucosa. Eyes:   Pupils equal, round, no scleral icterus, normal conjunctiva    Neck:   No JVD, midline trachea, no accessory muscle use   Chest:  Bilateral vesicular breath sounds, no rales or wheezes.   Cardiac:  Regular rate and rhythm positive S1 and S2 and no rubs  Abdomen: Soft, non-tender, not distended, active bowel sounds   :   No suprapubic or flank tenderness. Neuro:  AAO x 3, No FND. SKIN:  No rashes, good skin turgor. Extremities:  No edema, palpable peripheral pulses, no calf tenderness. Labs:     No results for input(s): WBC, RBC, HGB, HCT, MCV, MCH, MCHC, RDW, PLT, MPV in the last 72 hours. BMP: No results for input(s): NA, K, CL, CO2, BUN, CREATININE, GLUCOSE, CALCIUM in the last 72 hours. Phosphorus:   No results for input(s): PHOS in the last 72 hours. Magnesium:  No results for input(s): MG in the last 72 hours. Albumin:  No results for input(s): LABALBU in the last 72 hours.   BNP:    No results found for: BNP  TIMBO:      Lab Results   Component Value Date    TIMBO NEGATIVE 09/13/2019     SPEP:  Lab Results   Component Value Date    PROT 7.7 08/28/2020     UPEP:   No results found for: LABPE  C3:   No results found for: C3  C4:   No results found for: C4  MPO ANCA:   No results found for: MPO  PR3 ANCA:   No results found for: PR3  Anti-GBM:   No results found for: GBMABIGG  Hep BsAg:       No results found for: HEPBSAG  Hep C AB:          Lab Results   Component Value Date    HEPCAB NONREACTIVE 02/11/2019       Urinalysis/Chemistries:      Lab Results   Component Value Date    NITRU NEGATIVE 11/20/2018    COLORU NOT REPORTED 11/20/2018    PHUR 5.5 11/20/2018    WBCUA 10 TO 25 11/20/2018    RBCUA 0 TO 4 11/20/2018    MUCUS NOT REPORTED 11/20/2018    TRICHOMONAS NOT REPORTED 11/20/2018    YEAST NOT REPORTED 11/20/2018    BACTERIA 2+ 11/20/2018    SPECGRAV 1.025 11/20/2018    LEUKOCYTESUR 1+ 11/20/2018    UROBILINOGEN Normal 11/20/2018    BILIRUBINUR NEGATIVE 11/20/2018    GLUCOSEU NEGATIVE 11/20/2018    KETUA NEGATIVE 11/20/2018    AMORPHOUS NOT REPORTED 11/20/2018     Urine Sodium:   No results found for: MADDIE  Urine Potassium:  No results found for: KUR  Urine Chloride:  No results found for: CLUR  Urine Osmolarity: No results found for: OSMOU  Urine Protein:   No results found for: TPU  Urine Creatinine:   No results found for: LABCREA  UPC:     Urine Eosinophils:  No components found for: UEOS    Radiology:     CXR:     Assessment:     1. Chronic kidney disease stage III, last creatinine from August 2020 of 1.08, possibly secondary to prior acute kidney injury episode versus other. Paraprotein disease, glomerulonephritis and connective tissue disease are not ruled out  2. History of massive pulmonary embolism with thrombectomy in April 2020  3. History of breast cancer, status post right mastectomy and per the patient, hysterectomy  4. Morbid obesity  5. Apparent anemia of chronic disease    Plan:   1. No changes in medications at this time, avoid nephrotoxic agents, including NSAIDs  2. Check C3 and C4  3. Will Check Renal Ultrasound to evaluate for an element of obstruction and to assess the kidney size/echotexture. 4. Comprehensive urine testing including Urinalysis to assess for any activity in the urine sediment including hematuria pyuria and proteinuria,  Urine protein and creatinine ratio to quantify the proteinuria if any at all.    5. Will Order serum free kappa to lambda light chain with ratio to screen for paraprotein disease  6. Return to see me in the office in about 2 months        Denise Ellis.  Zachary Rodriguez MD  [facility]  12/15/2020

## 2020-12-16 RX ORDER — FOLIC ACID 1 MG/1
TABLET ORAL
Qty: 30 TABLET | Refills: 3 | Status: SHIPPED | OUTPATIENT
Start: 2020-12-16 | End: 2021-04-09

## 2020-12-22 ENCOUNTER — HOSPITAL ENCOUNTER (OUTPATIENT)
Dept: LAB | Age: 55
Discharge: HOME | End: 2020-12-22
Payer: MEDICARE

## 2020-12-22 ENCOUNTER — HOSPITAL ENCOUNTER (OUTPATIENT)
Dept: INTERVENTIONAL RADIOLOGY/VASCULAR | Age: 55
Discharge: HOME | End: 2020-12-24
Payer: MEDICARE

## 2020-12-22 LAB
-: NORMAL
AMORPHOUS: NORMAL
BACTERIA: NORMAL
BILIRUBIN URINE: NEGATIVE
CASTS UA: NORMAL /LPF (ref 0–2)
COLOR: ABNORMAL
COMMENT UA: ABNORMAL
COMPLEMENT C3: 153 MG/DL (ref 90–180)
COMPLEMENT C4: 28 MG/DL (ref 10–40)
CREATININE URINE: 30.6 MG/DL (ref 28–217)
CRYSTALS, UA: NORMAL /HPF
EPITHELIAL CELLS UA: NORMAL /HPF (ref 0–5)
FREE KAPPA/LAMBDA RATIO: 1.12 (ref 0.26–1.65)
GLUCOSE URINE: NEGATIVE
KAPPA FREE LIGHT CHAINS QNT: 2.42 MG/DL (ref 0.37–1.94)
KETONES, URINE: NEGATIVE
LAMBDA FREE LIGHT CHAINS QNT: 2.16 MG/DL (ref 0.57–2.63)
LEUKOCYTE ESTERASE, URINE: ABNORMAL
MUCUS: NORMAL
NITRITE, URINE: NEGATIVE
OTHER OBSERVATIONS UA: NORMAL
PH UA: 6 (ref 5–6)
PROTEIN UA: NEGATIVE
RBC UA: NORMAL /HPF (ref 0–4)
RENAL EPITHELIAL, UA: NORMAL /HPF
SPECIFIC GRAVITY UA: 1 (ref 1.01–1.02)
TOTAL PROTEIN, URINE: <4 MG/DL
TRICHOMONAS: NORMAL
TURBIDITY: ABNORMAL
URINE HGB: NEGATIVE
UROBILINOGEN, URINE: NORMAL
WBC UA: NORMAL /HPF (ref 0–4)
YEAST: NORMAL

## 2020-12-22 PROCEDURE — 82570 ASSAY OF URINE CREATININE: CPT

## 2020-12-22 PROCEDURE — 81001 URINALYSIS AUTO W/SCOPE: CPT

## 2020-12-22 PROCEDURE — 76770 US EXAM ABDO BACK WALL COMP: CPT

## 2020-12-22 PROCEDURE — 83883 ASSAY NEPHELOMETRY NOT SPEC: CPT

## 2020-12-22 PROCEDURE — 36415 COLL VENOUS BLD VENIPUNCTURE: CPT

## 2020-12-22 PROCEDURE — 84156 ASSAY OF PROTEIN URINE: CPT

## 2020-12-22 PROCEDURE — 86160 COMPLEMENT ANTIGEN: CPT

## 2020-12-30 RX ORDER — LETROZOLE 2.5 MG/1
TABLET, FILM COATED ORAL
Qty: 30 TABLET | Refills: 5 | Status: SHIPPED | OUTPATIENT
Start: 2020-12-30 | End: 2021-06-22 | Stop reason: SDUPTHER

## 2021-01-08 ENCOUNTER — OFFICE VISIT (OUTPATIENT)
Dept: FAMILY MEDICINE CLINIC | Age: 56
End: 2021-01-08
Payer: MEDICARE

## 2021-01-08 VITALS
HEIGHT: 66 IN | TEMPERATURE: 98.8 F | BODY MASS INDEX: 47.09 KG/M2 | HEART RATE: 86 BPM | OXYGEN SATURATION: 98 % | WEIGHT: 293 LBS | SYSTOLIC BLOOD PRESSURE: 118 MMHG | DIASTOLIC BLOOD PRESSURE: 68 MMHG

## 2021-01-08 DIAGNOSIS — Z00.00 WELL WOMAN EXAM (NO GYNECOLOGICAL EXAM): Primary | ICD-10-CM

## 2021-01-08 DIAGNOSIS — J30.9 ALLERGIC RHINITIS, UNSPECIFIED SEASONALITY, UNSPECIFIED TRIGGER: ICD-10-CM

## 2021-01-08 PROCEDURE — 99396 PREV VISIT EST AGE 40-64: CPT | Performed by: FAMILY MEDICINE

## 2021-01-08 PROCEDURE — G8482 FLU IMMUNIZE ORDER/ADMIN: HCPCS | Performed by: FAMILY MEDICINE

## 2021-01-08 PROCEDURE — 99396 PREV VISIT EST AGE 40-64: CPT

## 2021-01-08 RX ORDER — FEXOFENADINE HCL 180 MG/1
180 TABLET ORAL DAILY
Qty: 30 TABLET | Refills: 5 | Status: SHIPPED | OUTPATIENT
Start: 2021-01-08 | End: 2021-01-08

## 2021-01-08 RX ORDER — LEVOCETIRIZINE DIHYDROCHLORIDE 5 MG/1
5 TABLET, FILM COATED ORAL NIGHTLY
Qty: 30 TABLET | Refills: 5 | Status: SHIPPED | OUTPATIENT
Start: 2021-01-08 | End: 2021-05-10 | Stop reason: SDUPTHER

## 2021-01-08 ASSESSMENT — ENCOUNTER SYMPTOMS
SHORTNESS OF BREATH: 0
CONSTIPATION: 0
WHEEZING: 0
CHEST TIGHTNESS: 0
ABDOMINAL PAIN: 0
COUGH: 0

## 2021-01-08 NOTE — PROGRESS NOTES
[facility] [address]  [address] [address]  Dept: [phone]  Dept Fax: [phone]  Loc: [phone]    Mary Carrion is a 54 y.o. female who presents today for her medical conditions/complaints as noted below. Mary Carrion is c/o of   Chief Complaint   Patient presents with    Annual Exam       HPI:     HPI Here today for her well visit. She is doing very well. She has not had any issues with chest pain or shortness of breath. She has had some issues with her allergies which tends to cause headaches. She also gets watery eyes. She is not having any issues with GI issues. No constipation or diarrhea. She has not had any issues with lightheadedness or dizziness. She has mild swelling in her right leg. She is not getting much exercise recently. She has been eating a lot more sweets with the holidays. Past Medical History:   Diagnosis Date    [facility] ([facility] [address]     Breast cancer ([facility] [address] 2017    right side     DVT of axillary vein, acute right ([facility] [address] [facility]     Eye twitch 2019    Headache(784.0)     Hyperlipidemia     Migraine           Social History     Tobacco Use    Smoking status: Never Smoker    Smokeless tobacco: Never Used    Tobacco comment: Never smoker.  TC, RRT 4/5/18   Substance Use Topics    Alcohol use: No     Frequency: Never     Binge frequency: Never     Current Outpatient Medications   Medication Sig Dispense Refill    levocetirizine (XYZAL) 5 MG tablet Take 1 tablet by mouth nightly 30 tablet 5    letrozole (FEMARA) 2.5 MG tablet take 1 tablet by mouth once daily 30 tablet 5    folic acid (FOLVITE) 1 MG tablet Take once daily 30 tablet 3    butalbital-acetaminophen-caffeine (FIORICET, ESGIC) -40 MG per tablet 1-2   TABLET  TWICE  DAILY  AS  NEEDED 60 tablet 2    SUMAtriptan (IMITREX) 100 MG tablet Take 1 tablet by mouth once as needed for Migraine 12 tablet 2    ELIQUIS 5 MG TABS tablet take 1 tablet by mouth twice a day 180 tablet 1    RA VITAMIN B-12 TR 1000 MCG TBCR take 1 tablet by mouth once daily 30 tablet 3    tiZANidine (ZANAFLEX) 4 MG tablet Take 1 tablet by mouth every 8 hours as needed (muscle pain) 20 tablet 0    pravastatin (PRAVACHOL) 40 MG tablet take 1 tablet by mouth once daily 30 tablet 5    topiramate (TOPAMAX) 50 MG tablet ONE  TABLET  TWICE  DAILY 60 tablet 5    Calcium Carbonate-Vitamin D (OYSTER SHELL CALCIUM/D) 500-200 MG-UNIT TABS take 1 tablet by mouth twice a day 60 tablet 5    fluticasone (FLONASE) 50 MCG/ACT nasal spray 1 spray by Nasal route daily Indications: as needed 1 Bottle 11    busPIRone (BUSPAR) 7.5 MG tablet take 1 tablet by mouth twice a day      OXcarbazepine (TRILEPTAL) 150 MG tablet take 1 tablet by mouth every morning BEFORE NOON      VENTOLIN  (90 Base) MCG/ACT inhaler Inhale 2 puffs into the lungs every 4 hours as needed for Wheezing 1 Inhaler 3    benztropine (COGENTIN) 0.5 MG tablet Take 0.5 mg by mouth daily      QUEtiapine (SEROQUEL) 100 MG tablet Take 50 mg by mouth nightly      sodium chloride (ALTAMIST SPRAY) 0.65 % nasal spray 1 spray by Nasal route as needed for Congestion 1 Bottle 1    hydrOXYzine (ATARAX) 10 MG tablet Take 10 mg by mouth daily       lamoTRIgine (LAMICTAL) 100 MG tablet 150 mg nightly       clonazePAM (KLONOPIN) 0.5 MG tablet Take 1 tablet by mouth 2 times daily as needed for Anxiety (Papo  facial  spasms) for up to 31 days. 60 tablet 2     No current facility-administered medications for this visit. Allergies   Allergen Reactions    Prednisone Swelling     Whole side of her face swelled when she took it, difficulty breathing       Subjective:     Review of Systems   Constitutional: Negative for activity change, appetite change, chills, fatigue and fever. HENT: Positive for congestion. Eyes: Negative for visual disturbance.    Respiratory: Negative for cough, chest tightness, shortness of breath and wheezing. Cardiovascular: Negative for chest pain, palpitations and leg swelling. Gastrointestinal: Negative for abdominal pain and constipation. Genitourinary: Negative for difficulty urinating. Allergic/Immunologic: Positive for environmental allergies. Neurological: Negative for dizziness, syncope, weakness, light-headedness and headaches. Objective:      Physical Exam  Vitals signs and nursing note reviewed. Constitutional:       General: She is not in acute distress. Appearance: She is well-developed. HENT:      Right Ear: Tympanic membrane, ear canal and external ear normal. There is no impacted cerumen. Left Ear: Tympanic membrane, ear canal and external ear normal. There is no impacted cerumen. Eyes:      Conjunctiva/sclera: Conjunctivae normal.   Neck:      Musculoskeletal: Normal range of motion and neck supple. Thyroid: No thyromegaly. Cardiovascular:      Rate and Rhythm: Normal rate and regular rhythm. Heart sounds: Normal heart sounds. No murmur. Pulmonary:      Effort: Pulmonary effort is normal. No respiratory distress. Breath sounds: Normal breath sounds. No wheezing. Lymphadenopathy:      Cervical: No cervical adenopathy. Skin:     General: Skin is warm and dry. Findings: No erythema or rash. Neurological:      Mental Status: She is alert and oriented to person, place, and time. Psychiatric:         Mood and Affect: Mood normal.         Behavior: Behavior normal.         Thought Content: Thought content normal.         Judgment: Judgment normal.       /68   Pulse 86   Temp 98.8 °F (37.1 °C)   Ht 5' 6\" (1.676 m)   Wt (!) 302 lb (137 kg)   LMP 02/28/2013   SpO2 98%   BMI 48.74 kg/m²     Assessment:       Diagnosis Orders   1. Well woman exam (no gynecological exam)     2.  Allergic rhinitis, unspecified seasonality, unspecified trigger  DISCONTINUED: fexofenadine (ALLEGRA) 180 MG tablet Plan:        Well woman: she is doing well overall. We discussed the importance of a healthy diet and exercise. I recommended eating less breads and pastas and more meat, fruits and vegetables. I also encouraged her to increase her exercise even if its just a few extra minutes a day. She is up to date on her health maintenance other than her cologuard so I will check on the status of that. Allergies: worsening; I recommended she try xyzal instead of the allegra to see if it works better. She was given several samples. Return if symptoms worsen or fail to improve, for keep follow up appt in May. Orders Placed This Encounter   Medications    DISCONTD: fexofenadine (ALLEGRA) 180 MG tablet     Sig: Take 1 tablet by mouth daily     Dispense:  30 tablet     Refill:  5    levocetirizine (XYZAL) 5 MG tablet     Sig: Take 1 tablet by mouth nightly     Dispense:  30 tablet     Refill:  5       Patientgiven educational materials - see patient instructions. Discussed use, benefit,and side effects of prescribed medications. All patient questions answered. Ptvoiced understanding. Reviewed health maintenance. Instructed to continue currentmedications, diet and exercise. Patient agreed with treatment plan. Follow up asdirected.      Electronically signed by Giana Kenny MD on 1/8/2021 at 5:33 PM

## 2021-01-19 RX ORDER — SUMATRIPTAN 100 MG/1
100 TABLET, FILM COATED ORAL
Qty: 12 TABLET | Refills: 2 | Status: SHIPPED | OUTPATIENT
Start: 2021-01-19 | End: 2021-02-26 | Stop reason: SDUPTHER

## 2021-01-19 NOTE — TELEPHONE ENCOUNTER
Last Appt:  11/20/2020  Next Appt:   2/26/2021  Med verified in Epic    Patient needs refill on sumatriptan

## 2021-02-02 DIAGNOSIS — Z12.11 COLON CANCER SCREENING: ICD-10-CM

## 2021-02-09 ENCOUNTER — HOSPITAL ENCOUNTER (OUTPATIENT)
Dept: LAB | Age: 56
Discharge: HOME | End: 2021-02-09
Payer: MEDICARE

## 2021-02-09 ENCOUNTER — OFFICE VISIT (OUTPATIENT)
Dept: ONCOLOGY | Age: 56
End: 2021-02-09
Payer: MEDICARE

## 2021-02-09 VITALS
DIASTOLIC BLOOD PRESSURE: 62 MMHG | WEIGHT: 293 LBS | HEART RATE: 94 BPM | HEIGHT: 66 IN | BODY MASS INDEX: 47.09 KG/M2 | TEMPERATURE: 98.1 F | RESPIRATION RATE: 14 BRPM | SYSTOLIC BLOOD PRESSURE: 114 MMHG | OXYGEN SATURATION: 97 %

## 2021-02-09 DIAGNOSIS — R79.89 ELEVATED SERUM CREATININE: ICD-10-CM

## 2021-02-09 DIAGNOSIS — E78.2 MIXED HYPERLIPIDEMIA: ICD-10-CM

## 2021-02-09 DIAGNOSIS — I82.A11 ACUTE DEEP VEIN THROMBOSIS (DVT) OF AXILLARY VEIN OF RIGHT UPPER EXTREMITY (HCC): ICD-10-CM

## 2021-02-09 DIAGNOSIS — Z90.11 H/O RIGHT MASTECTOMY: ICD-10-CM

## 2021-02-09 DIAGNOSIS — C50.919 MALIGNANT NEOPLASM OF FEMALE BREAST, UNSPECIFIED ESTROGEN RECEPTOR STATUS, UNSPECIFIED LATERALITY, UNSPECIFIED SITE OF BREAST (HCC): Primary | ICD-10-CM

## 2021-02-09 DIAGNOSIS — Z13.1 SCREENING FOR DIABETES MELLITUS: ICD-10-CM

## 2021-02-09 DIAGNOSIS — C50.919 MALIGNANT NEOPLASM OF FEMALE BREAST, UNSPECIFIED ESTROGEN RECEPTOR STATUS, UNSPECIFIED LATERALITY, UNSPECIFIED SITE OF BREAST (HCC): ICD-10-CM

## 2021-02-09 DIAGNOSIS — Z12.31 SCREENING MAMMOGRAM, ENCOUNTER FOR: ICD-10-CM

## 2021-02-09 DIAGNOSIS — Z78.0 POSTMENOPAUSAL: ICD-10-CM

## 2021-02-09 LAB
ABSOLUTE EOS #: 0.18 K/UL (ref 0–0.44)
ABSOLUTE IMMATURE GRANULOCYTE: 0.04 K/UL (ref 0–0.3)
ABSOLUTE LYMPH #: 2.41 K/UL (ref 1.1–3.7)
ABSOLUTE MONO #: 0.53 K/UL (ref 0.1–1.2)
ALBUMIN SERPL-MCNC: 4.2 G/DL (ref 3.5–5.2)
ALBUMIN/GLOBULIN RATIO: 1.2 (ref 1–2.5)
ALP BLD-CCNC: 102 U/L (ref 35–104)
ALT SERPL-CCNC: 13 U/L (ref 5–33)
ANION GAP SERPL CALCULATED.3IONS-SCNC: 13 MMOL/L (ref 9–17)
ANION GAP SERPL CALCULATED.3IONS-SCNC: 13 MMOL/L (ref 9–17)
AST SERPL-CCNC: 13 U/L
BASOPHILS # BLD: 1 % (ref 0–2)
BASOPHILS ABSOLUTE: 0.04 K/UL (ref 0–0.2)
BILIRUB SERPL-MCNC: 0.18 MG/DL (ref 0.3–1.2)
BUN BLDV-MCNC: 22 MG/DL (ref 6–20)
BUN BLDV-MCNC: 22 MG/DL (ref 6–20)
BUN/CREAT BLD: 19 (ref 9–20)
BUN/CREAT BLD: 19 (ref 9–20)
CALCIUM SERPL-MCNC: 9.8 MG/DL (ref 8.6–10.4)
CALCIUM SERPL-MCNC: 9.8 MG/DL (ref 8.6–10.4)
CHLORIDE BLD-SCNC: 107 MMOL/L (ref 98–107)
CHLORIDE BLD-SCNC: 107 MMOL/L (ref 98–107)
CHOLESTEROL/HDL RATIO: 4
CHOLESTEROL: 174 MG/DL
CO2: 24 MMOL/L (ref 20–31)
CO2: 24 MMOL/L (ref 20–31)
CREAT SERPL-MCNC: 1.14 MG/DL (ref 0.5–0.9)
CREAT SERPL-MCNC: 1.14 MG/DL (ref 0.5–0.9)
DIFFERENTIAL TYPE: ABNORMAL
EOSINOPHILS RELATIVE PERCENT: 2 % (ref 1–4)
GFR AFRICAN AMERICAN: 60 ML/MIN
GFR AFRICAN AMERICAN: 60 ML/MIN
GFR NON-AFRICAN AMERICAN: 49 ML/MIN
GFR NON-AFRICAN AMERICAN: 49 ML/MIN
GFR SERPL CREATININE-BSD FRML MDRD: ABNORMAL ML/MIN/{1.73_M2}
GLUCOSE BLD-MCNC: 119 MG/DL (ref 70–99)
GLUCOSE BLD-MCNC: 119 MG/DL (ref 70–99)
HCT VFR BLD CALC: 40.8 % (ref 36.3–47.1)
HDLC SERPL-MCNC: 43 MG/DL
HEMOGLOBIN: 12.9 G/DL (ref 11.9–15.1)
IMMATURE GRANULOCYTES: 1 %
LDL CHOLESTEROL: 77 MG/DL (ref 0–130)
LYMPHOCYTES # BLD: 31 % (ref 24–43)
MCH RBC QN AUTO: 31.4 PG (ref 25.2–33.5)
MCHC RBC AUTO-ENTMCNC: 31.6 G/DL (ref 25.2–33.5)
MCV RBC AUTO: 99.3 FL (ref 82.6–102.9)
MONOCYTES # BLD: 7 % (ref 3–12)
NRBC AUTOMATED: 0 PER 100 WBC
PDW BLD-RTO: 13.5 % (ref 11.8–14.4)
PLATELET # BLD: 219 K/UL (ref 138–453)
PLATELET ESTIMATE: ABNORMAL
PMV BLD AUTO: 10.2 FL (ref 8.1–13.5)
POTASSIUM SERPL-SCNC: 3.7 MMOL/L (ref 3.7–5.3)
POTASSIUM SERPL-SCNC: 3.7 MMOL/L (ref 3.7–5.3)
RBC # BLD: 4.11 M/UL (ref 3.95–5.11)
RBC # BLD: ABNORMAL 10*6/UL
SEG NEUTROPHILS: 58 % (ref 36–65)
SEGMENTED NEUTROPHILS ABSOLUTE COUNT: 4.53 K/UL (ref 1.5–8.1)
SODIUM BLD-SCNC: 144 MMOL/L (ref 135–144)
SODIUM BLD-SCNC: 144 MMOL/L (ref 135–144)
TOTAL PROTEIN: 7.8 G/DL (ref 6.4–8.3)
TRIGL SERPL-MCNC: 268 MG/DL
VLDLC SERPL CALC-MCNC: ABNORMAL MG/DL (ref 1–30)
WBC # BLD: 7.7 K/UL (ref 3.5–11.3)
WBC # BLD: ABNORMAL 10*3/UL

## 2021-02-09 PROCEDURE — 3017F COLORECTAL CA SCREEN DOC REV: CPT | Performed by: INTERNAL MEDICINE

## 2021-02-09 PROCEDURE — 80061 LIPID PANEL: CPT

## 2021-02-09 PROCEDURE — 99214 OFFICE O/P EST MOD 30 MIN: CPT | Performed by: INTERNAL MEDICINE

## 2021-02-09 PROCEDURE — 1036F TOBACCO NON-USER: CPT | Performed by: INTERNAL MEDICINE

## 2021-02-09 PROCEDURE — G8417 CALC BMI ABV UP PARAM F/U: HCPCS | Performed by: INTERNAL MEDICINE

## 2021-02-09 PROCEDURE — 80048 BASIC METABOLIC PNL TOTAL CA: CPT

## 2021-02-09 PROCEDURE — 36415 COLL VENOUS BLD VENIPUNCTURE: CPT

## 2021-02-09 PROCEDURE — G8427 DOCREV CUR MEDS BY ELIG CLIN: HCPCS | Performed by: INTERNAL MEDICINE

## 2021-02-09 PROCEDURE — 80053 COMPREHEN METABOLIC PANEL: CPT

## 2021-02-09 PROCEDURE — 85025 COMPLETE CBC W/AUTO DIFF WBC: CPT

## 2021-02-09 PROCEDURE — 83036 HEMOGLOBIN GLYCOSYLATED A1C: CPT

## 2021-02-09 PROCEDURE — 99214 OFFICE O/P EST MOD 30 MIN: CPT

## 2021-02-09 PROCEDURE — G8482 FLU IMMUNIZE ORDER/ADMIN: HCPCS | Performed by: INTERNAL MEDICINE

## 2021-02-09 RX ORDER — PSYLLIUM HUSK 3.4 G/7G
POWDER ORAL
Qty: 30 TABLET | Refills: 3 | Status: SHIPPED | OUTPATIENT
Start: 2021-02-09 | End: 2021-06-02

## 2021-02-09 RX ORDER — TIZANIDINE 4 MG/1
4 TABLET ORAL EVERY 8 HOURS PRN
Qty: 20 TABLET | Refills: 0 | Status: SHIPPED | OUTPATIENT
Start: 2021-02-09 | End: 2021-06-23 | Stop reason: SDUPTHER

## 2021-02-09 RX ORDER — B-COMPLEX WITH VITAMIN C
TABLET ORAL
Qty: 60 TABLET | Refills: 5 | Status: SHIPPED | OUTPATIENT
Start: 2021-02-09 | End: 2021-05-10

## 2021-02-09 NOTE — PROGRESS NOTES
johnny Delarosa                                                                                                                  2/9/2021  MRN:   [id number]  YOB: 1965  PCP:                           Sagar Davis MD  Referring Physician: No ref. provider found  Treating Physician Name: Kay Lundberg MD      Reason for visit:  Results of lab work-up. Preop evaluation. Discussed treatment plan. Current problems:  Stage IIa infiltrating ductal carcinoma of right breast, ER negative, NY positive and HER-2 amplified. DVT of right upper extremity secondary to PORT(11/20/18), Xarelto discontinues after PORT removal  Bilateral massive pulmonary embolism status post thrombectomy, 4/10/2020  Strong family history of breast cancer in sister and maternal aunt    Active and recent treatments:  Right mastectomy with sentinel lymph node biopsy: 10/19/2017  TCH P x6: Cycle 1 day 1 on 11/13/2017. Completed Herceptin 11/29/2018  Adjuvant anastrozole: 5/2018. Discontinued due to toxicity  Adjuvant Femara: 6/2018  Pulmonary embolism thrombectomy: 4/10/2020    Summary of Case/History:    Noemí Delarosa a 54 y. o.female who presents to the hematology oncology office to establish care and for further recommendations for management of her recently diagnosed right breast cancer    Patient had a mammogram after many years in September 2017 which came back abnormal.  Subsequently patient had an ultrasound which confirmed a 1.2 cm lesion in the right breast at 1:00 position. There was another 4 mm lesion at 12:00 position    Patient underwent ultrasound-guided biopsy on 9/8/17. the lesion at 1:00 position shows invasive ductal carcinoma. ER negative and NY positive associated with high-grade DCIS. Lesion at 12:00 position showed fibrocystic disease and focal adenosis and hyperplasia. Patient underwent right sided mastectomy with sentinel lymph node biopsy on 10/19/17.   Pathology report showed invasive ductal carcinoma, grade 3 out of 3, 2.8 cm in size with negative margins. pT2,pN0,M0  Tumor specimen was negative for ER receptor and weekly positive for AL receptor (5-10 percent). High-grade DCIS was also noted. One sentinel lymph node was sampled which was negative for metastasis    Patient started on neoadjuvant chemotherapy using TCHP regimen. Cycle #1, day #1 on 11/13/17    Patient underwent removal of port with replacement due to port malfunction on 11/30/17    Patient completed 6 cycles of TCHP and continued maintenance Herceptin. Started patient on anastrozole along with calcium and vitamin D in May 2018. Switched anti-hormonal therapy to Femara in June 2018 due to arthralgia    Herceptin last cycle completed 11/29/18    Interim History:    Patient presents to clinic for a follow-up visit and to discuss results of her lab work-up and further treatment plan. Continues to be on letrozole without much trouble. Patient is also scheduled for breast reconstruction surgery later this month. Patient continues to be on Eliquis without any trouble. Denies nausea vomiting fever chills. Mammogram was BI-RADS 2. During this visit patient's allergy, social, medical, surgical history and medications were reviewed and updated.     Past Medical History:   Past Medical History:   Diagnosis Date    Bipolar 1 disorder ([address]     Breast cancer ([address] 2017    right side     DVT of axillary vein, acute right ([address]     Eye twitch 2019    Headache(784.0)     Hyperlipidemia     Migraine      Past Surgical History:     Past Surgical History:   Procedure Laterality Date    CATHETER REMOVAL Left 6/6/2019    PORT REMOVAL performed by Denice Nuñez DO at [address] N/A 10/13/2017    D & C HYSTEROSCOPY with polypectomy performed by Bradley Lomeli MD at [facility] / REMOVAL / [address] Left 11/9/2017    PORT INSERTION performed by Louis Meraz MD at [facility] / REMOVAL / REPLACEMENT VENOUS ACCESS CATHETER N/A 11/30/2017    Port Removal & Insertion performed by Louis Meraz MD at [address] Right 10/19/2017     [address] [facility]    MASTECTOMY Right 10/19/2017    Right Breast Mastectomy with  [place] Node Biopsy performed by Louis Meraz MD at [address] [facility] VAD W/SUBQ PORT AGE 5 YR/> Left 3/1/2018    PORT Exchange performed by Louis Meraz MD at [facility] Left 2014, 2015    x 2 surgeries total; Dr. Galina Quijano    TUNNELED VENOUS PORT PLACEMENT Left 11/30/2017    removal of et replacement of       Patient Family Social History:     Family History   Problem Relation Age of Onset    Breast Cancer Sister 54    Breast Cancer Maternal Aunt 71    Other Maternal Cousin         Atypical Ductal Hyperplasia     Uterine Cancer Sister 32    Other Sister         breast cyst    Cataracts Mother     Glaucoma Mother     Heart Disease Father     High Blood Pressure Father     High Cholesterol Father     Mental Retardation Father     Diabetes Neg Hx       Social History     Socioeconomic History    Marital status: Single     Spouse name: Not on file    Number of children: Not on file    Years of education: Not on file    Highest education level: Not on file   Occupational History    Not on file   Social Needs    Financial resource strain: Not on file    Food insecurity     Worry: Not on file     Inability: Not on file    Transportation needs     Medical: Not on file     Non-medical: Not on file   Tobacco Use    Smoking status: Never Smoker    Smokeless tobacco: Never Used    Tobacco comment: Never smoker.  TC, RRT 4/5/18   Substance and Sexual Activity    Alcohol use: No     Frequency: Never     Binge frequency: Never    Drug use: No    Sexual activity: Yes     Partners: Male     Birth control/protection: Surgical   Lifestyle    Physical activity     Days per week: Not on file     Minutes per session: Not on file    Stress: Not on file   Relationships    Social connections     Talks on phone: Not on file     Gets together: Not on file     Attends Sikh service: Not on file     Active member of club or organization: Not on file     Attends meetings of clubs or organizations: Not on file     Relationship status: Not on file    Intimate partner violence     Fear of current or ex partner: Not on file     Emotionally abused: Not on file     Physically abused: Not on file     Forced sexual activity: Not on file   Other Topics Concern    Not on file   Social History Narrative    Not on file      Current Medications:     Current Outpatient Medications   Medication Sig Dispense Refill    SUMAtriptan (IMITREX) 100 MG tablet Take 1 tablet by mouth once as needed for Migraine 12 tablet 2    levocetirizine (XYZAL) 5 MG tablet Take 1 tablet by mouth nightly 30 tablet 5    letrozole (FEMARA) 2.5 MG tablet take 1 tablet by mouth once daily 30 tablet 5    folic acid (FOLVITE) 1 MG tablet Take once daily 30 tablet 3    clonazePAM (KLONOPIN) 0.5 MG tablet Take 1 tablet by mouth 2 times daily as needed for Anxiety (Papo  facial  spasms) for up to 31 days.  60 tablet 2    butalbital-acetaminophen-caffeine (FIORICET, ESGIC) -40 MG per tablet 1-2   TABLET  TWICE  DAILY  AS  NEEDED 60 tablet 2    ELIQUIS 5 MG TABS tablet take 1 tablet by mouth twice a day 180 tablet 1    RA VITAMIN B-12 TR 1000 MCG TBCR take 1 tablet by mouth once daily 30 tablet 3    tiZANidine (ZANAFLEX) 4 MG tablet Take 1 tablet by mouth every 8 hours as needed (muscle pain) 20 tablet 0    pravastatin (PRAVACHOL) 40 MG tablet take 1 tablet by mouth once daily 30 tablet 5    topiramate (TOPAMAX) 50 MG tablet ONE  TABLET  TWICE  DAILY 60 tablet 5    Calcium Carbonate-Vitamin D (OYSTER SHELL CALCIUM/D) 500-200 MG-UNIT TABS take 1 tablet by mouth twice a day 60 tablet 5    fluticasone (FLONASE) 50 negative for headaches, dizziness, seizures, weakness, numbness      Physical Exam:    Vitals:  /62 (Site: Left Lower Arm, Position: Sitting, Cuff Size: Medium Adult)   Pulse 94   Temp 98.1 °F (36.7 °C)   Resp 14   Ht 5' 6\" (1.676 m)   Wt 299 lb (135.6 kg)   LMP 02/28/2013   SpO2 97%   BMI 48.26 kg/m²    General appearance - patient not in acute distress  Mental status - AAO X3  Eyes - pupils equal and reactive, extraocular eye movements intact  Mouth - mucous membranes moist, pharynx normal without lesions  Neck - supple, no significant adenopathy  Lymphatics - no palpable lymphadenopathy, no hepatosplenomegaly  Chest - clear to auscultation, no wheezes, rales or rhonchi, symmetric air entry. Heart - normal rate, regular rhythm, normal S1, S2, no murmurs  Abdomen - soft, nontender, nondistended, no masses or organomegaly  Neurological - alert, oriented, normal speech, no focal findings . Twitching of left eye noted  Extremities - peripheral pulses normal, no pedal edema, no clubbing or cyanosis  Skin - normal coloration and turgor, no rashes, no suspicious skin lesions noted   Breast-examination performed in the presence of a female nurse. Patient right chest wall shows postsurgical changes.   There is no palpable mass in the left breast.    DATA:    Results for orders placed or performed during the hospital encounter of 02/09/21   CBC Auto Differential   Result Value Ref Range    WBC 7.7 3.5 - 11.3 k/uL    RBC 4.11 3.95 - 5.11 m/uL    Hemoglobin 12.9 11.9 - 15.1 g/dL    Hematocrit 40.8 36.3 - 47.1 %    MCV 99.3 82.6 - 102.9 fL    MCH 31.4 25.2 - 33.5 pg    MCHC 31.6 25.2 - 33.5 g/dL    RDW 13.5 11.8 - 14.4 %    Platelets 471 867 - 088 k/uL    MPV 10.2 8.1 - 13.5 fL    NRBC Automated 0.0 0.0 per 100 WBC    Differential Type NOT REPORTED     Seg Neutrophils 58 36 - 65 %    Lymphocytes 31 24 - 43 %    Monocytes 7 3 - 12 %    Eosinophils % 2 1 - 4 %    Basophils 1 0 - 2 %    Immature Granulocytes 1 Digital Diagnostic W Or Wo Cad Bilateral: 10/5/2018    No mammographic evidence of malignancy. 2. Postoperative changes of the right breast. BIRADS: BIRADS - CATEGORY 2 Benign, no evidence of malignancy. Normal interval follow-up is recommended in 12 months. OVERALL ASSESSMENT - BENIGN A letter of notification will be sent to the patient regarding the results. The [employer] recommends annual mammograms for women 40 years and older. Mri Orbits Face Neck W Wo Contrast: 10/11/2018    No convincing evidence of abnormal intracranial enhancement to suggest intracranial metastasis. 2. Bilateral internal auditory canals, cisternal segment of cranial nerve V, and fluid-filled inner ear structures are unremarkable. 3. Borderline enlarged right level II lymph node measuring 13 mm. there are additional nonenlarged upper neck lymph nodes. These are nonspecific by imaging and may be reactive. Given history of breast cancer, clinical correlation is recommended. Follow-up CT neck may be obtained as clinically warranted. CT chest 11/20/18:  Impression   1. No evidence of pulmonary embolism or focal airspace consolidation. 2. Extensive edema and inflammatory stranding throughout the right chest   wall, axilla and supraclavicular soft tissues which appears to be tracking   along the vascular pathway likely secondary to known deep venous thrombosis. Apparent hypodensity in the distal SVC suggestive of filling   defects/thrombosis.  Additionally proximal SVC as well as the right IJ appear   mildly prominent underlying heterogeneous soft tissue density also suspicious   for underlying thrombosis. 3. Diffuse skin thickening along the right mastectomy site which may be   sequela of post treatment change of please correlate for prior radiation. Adjacent elongated oblong shaped subcutaneous fluid collection as measured   above suggestive of seroma possibly chronic and postoperative in nature.    4. Extensive contrast throughout the left hepatic lobe likely due to reflux   of IV contrast.     Adan Digital Screen W Cad Bilateral: 10/11/2019    No mammographic evidence of malignancy. BI-RADS 2 BIRADS: BIRADS - CATEGORY 2 Benign, no evidence of malignancy. Normal interval follow-up is recommended in 12 months. OVER ALL ASSESSMENT - BENIGN A letter of notification will be sent to the patient regarding the results. Mri Brain W Wo Contrast: 10/2/2019    Unremarkable pre and post-contrast MRI of the brain. Ct Chest Pulmonary Embolism W Contrast: 4/9/2020    Massive pulmonary embolus burden involving the right and left pulmonary arteries, extending into the segmental and subsegmental branches bilaterally. 2. Right ventricular strain, within RV to LV ratio measuring 1.8 3. 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 4.  Critical results were called by Dr. Poonam Castañeda to Dr Anneliese Sneed on 4/9/2020 at 20:48. DEXA scan:    Impression Osteopenia by WHO criteria. Impression:  Invasive ductal carcinoma of right breast, stage IIa. pT2,pN0,M0. ER negative, MT weakly positive. HER-2 amplified. Status post right mastectomy with sentinel lymph node biopsy  Twitching of left eye, MRI negative  Family history of breast cancer  Lower back pain  Postmenopausal  Right upper extremity DVT of the axillary and basilic veins secondary to PORT(11/20/18), Xarelto discontinued after PORT removal, treated with Xarelto  Osteopenia    Plan:  Personally reviewed results of lab work-up and other relevant clinical data. Discussed natural history of breast cancer  Mammogram was results BI-RADS 2. Patient has had recurrent venous thromboembolism. Last episode was a massive PE requiring thrombectomy. Patient will need long-term anticoagulation  Recommend stopping anticoagulation with Eliquis 3 days prior to the surgery.   Patient given prescription for Lovenox 1 mg/kg twice daily to be initiated after discontinuing Eliquis and to continue till the evening before the surgery  Restart anticoagulation once hemostasis established after surgery  Continue calcium and vitamin D  DEXA scan every 2 years  Continue annual screening mammogram  Patient given refill on Zanaflex  Return to clinic in 6 months. NCCN guidelines were reviewed and discussed with the patient. The diagnosis and care plan were discussed with the patient in detail. I discussed the natural history of the disease, prognosis, risks and goals of therapy and answered all the patients questions to the best of my ability. Patient expressed understanding and was in agreement. Jason Cochran          This note is created with the assistance of a speech recognition program.  While intending to generate a document that actually reflects the content of the visit, the document can still have some errors including those of syntax and sound a like substitutions which may escape proof reading. It such instances, actual meaning can be extrapolated by contextual diversion.

## 2021-02-10 DIAGNOSIS — E78.2 MIXED HYPERLIPIDEMIA: ICD-10-CM

## 2021-02-10 LAB
ESTIMATED AVERAGE GLUCOSE: 120 MG/DL
HBA1C MFR BLD: 5.8 % (ref 4–6)

## 2021-02-10 RX ORDER — PRAVASTATIN SODIUM 40 MG
TABLET ORAL
Qty: 30 TABLET | Refills: 3 | Status: SHIPPED | OUTPATIENT
Start: 2021-02-10 | End: 2021-06-04

## 2021-02-10 NOTE — TELEPHONE ENCOUNTER
Lynette Dickson called requesting a refill of the below medication which has been pended for you:     Requested Prescriptions     Pending Prescriptions Disp Refills    pravastatin (PRAVACHOL) 40 MG tablet 30 tablet 3       Last Appointment Date: 1/8/2021  Next Appointment Date: 5/10/2021    Allergies   Allergen Reactions    Prednisone Swelling     Whole side of her face swelled when she took it, difficulty breathing

## 2021-02-23 ENCOUNTER — OFFICE VISIT (OUTPATIENT)
Dept: NEPHROLOGY | Age: 56
End: 2021-02-23
Payer: MEDICARE

## 2021-02-23 VITALS
SYSTOLIC BLOOD PRESSURE: 124 MMHG | HEART RATE: 88 BPM | DIASTOLIC BLOOD PRESSURE: 88 MMHG | BODY MASS INDEX: 47.09 KG/M2 | WEIGHT: 293 LBS | HEIGHT: 66 IN

## 2021-02-23 DIAGNOSIS — N28.1 ACQUIRED RENAL CYST OF LEFT KIDNEY: Primary | ICD-10-CM

## 2021-02-23 DIAGNOSIS — N18.31 STAGE 3A CHRONIC KIDNEY DISEASE (HCC): ICD-10-CM

## 2021-02-23 PROCEDURE — 99213 OFFICE O/P EST LOW 20 MIN: CPT | Performed by: INTERNAL MEDICINE

## 2021-02-23 PROCEDURE — G8417 CALC BMI ABV UP PARAM F/U: HCPCS | Performed by: INTERNAL MEDICINE

## 2021-02-23 PROCEDURE — G8482 FLU IMMUNIZE ORDER/ADMIN: HCPCS | Performed by: INTERNAL MEDICINE

## 2021-02-23 PROCEDURE — G8427 DOCREV CUR MEDS BY ELIG CLIN: HCPCS | Performed by: INTERNAL MEDICINE

## 2021-02-23 PROCEDURE — 3017F COLORECTAL CA SCREEN DOC REV: CPT | Performed by: INTERNAL MEDICINE

## 2021-02-23 PROCEDURE — 99214 OFFICE O/P EST MOD 30 MIN: CPT

## 2021-02-23 PROCEDURE — 1036F TOBACCO NON-USER: CPT | Performed by: INTERNAL MEDICINE

## 2021-02-23 NOTE — PROGRESS NOTES
Renal Consult Note    Patient :  Sandrita Khan; 54 y.o. MRN# [id number]    Reason for Consult:     Asked by Dr Hayley Rodrigues to see for CARYN/Elevated Creatinine. History of Present Illness:     Sandrita Khan; 54 y.o. female with past medical history as mentioned below. Patient's medications include Klonopin, Fioricet, Imitrex, Eliquis, vitamin B12, Zanaflex, pravastatin, Topamax, calcium with vitamin D, topiramate, Flonase nasal spray, buspirone, Trileptal, Ventolin inhaler, benztropine, quetiapine, all to missed sodium chloride nasal spray, hydroxyzine, Lamictal and folic acid. She has an allergy to prednisone which caused her significant swelling of the face. Her past medical history includes morbid obesity, bipolar 1 disorder, right-sided breast cancer with stage IIa infiltrating ductal carcinoma of the right breast ER negative, [place] positive and HER-2 amplified with the patient having a right mastectomy with sentinel lymph node biopsy in October 2017 with oncology treatment as noted in oncology note from September 2020., acute DVT of the right axillary vein, and massive pulmonary embolism bilaterally status post thrombectomy on 4/10/2020. She did suffer some acute kidney injury at that time with a creatinine going up to 1.81 mg/dL on 4/9/2020 with it coming back down to 1.48 mg/dl on 4/10/2020. She also has twitching of the left eye primarily, migraine headache, hyperlipidemia. She states she has also had a hysterectomy. Of note, labs from August 28 of 2020 show the creatinine back down to 1.08 with sodium 143 potassium 3.9 chloride 108 CO2 22 BUN 17 glucose 102 and estimated GFR of 53 mL/min. That is very similar to a creatinine of 1.05 from September 2019 prior to the pulmonary embolism episode in April 2020.     Renal ultrasound from December of 2020 showed right kidney 10.6 cm and left kidney 10.5 cm with no hydronephrosis and normal renal echogenicity with a questionable hypoechoic area within the left Kidney of undetermined origin. Renal ultrasound will be performed prior to the next visit. Labs from 2/24/2021 show A creatinine of 1.09 with normal electrolytes, Estimated GFR 52 ml/minute. CBC was normal with a hemoglobin of 12.5 g/dl, Microscopic urinalysis with 10-20 WBCs, 2-5 RBCs and 2-5 epithelial cells And moderate leukocyte esterase. The patient is single. She is a never smoker. Weight is up 1 pound from 2 weeks ago. No history of recent contrast exposure, No h/o prolonged NSAIDs use in the past, No h/o nephrolithiasis, No recent skin rashes or arthralgias, No hematuria or pyuria noticed in the recent past. Doesn't report any reduction in the urine output recently. Non report of any obstructive urinary symptoms (urgency, frequency, weak stream, straining while urination). No h/o recurrent UTIs in the past.    Past History/Allergies? Social History:     Past Medical History:   Diagnosis Date    Bipolar 1 disorder ([facility] [address]     Breast cancer ([facility] [address] 2017    right side     DVT of axillary vein, acute right (HCC)     Eye twitch 2019    Headache(784.0)     Hyperlipidemia     Migraine        Allergies   Allergen Reactions    Prednisone Swelling     Whole side of her face swelled when she took it, difficulty breathing       Social History     Socioeconomic History    Marital status: Single     Spouse name: Not on file    Number of children: Not on file    Years of education: Not on file    Highest education level: Not on file   Occupational History    Not on file   Social Needs    Financial resource strain: Not on file    Food insecurity     Worry: Not on file     Inability: Not on file    Transportation needs     Medical: Not on file     Non-medical: Not on file   Tobacco Use    Smoking status: Never Smoker    Smokeless tobacco: Never Used    Tobacco comment: Never smoker.  TC, RRT 4/5/18   Substance and Sexual Activity    Alcohol use: No     Frequency: Never     Binge frequency: Never    Drug use: No    Sexual activity: Yes     Partners: Male     Birth control/protection: Surgical   Lifestyle    Physical activity     Days per week: Not on file     Minutes per session: Not on file    Stress: Not on file   Relationships    Social connections     Talks on phone: Not on file     Gets together: Not on file     Attends Orthodoxy service: Not on file     Active member of club or organization: Not on file     Attends meetings of clubs or organizations: Not on file     Relationship status: Not on file    Intimate partner violence     Fear of current or ex partner: Not on file     Emotionally abused: Not on file     Physically abused: Not on file     Forced sexual activity: Not on file   Other Topics Concern    Not on file   Social History Narrative    Not on file       Family History:        Family History   Problem Relation Age of Onset    Breast Cancer Sister 54    Breast Cancer Maternal Aunt 71    Other Maternal Cousin         Atypical Ductal Hyperplasia     Uterine Cancer Sister 32    Other Sister         breast cyst    Cataracts Mother     Glaucoma Mother     Heart Disease Father     High Blood Pressure Father     High Cholesterol Father     Mental Retardation Father     Diabetes Neg Hx        Outpatient Medications:     Not in a hospital admission. Review of Systems:     Constitutional: No fever, no chills, no lethargy, no weakness. HEENT:  No headache, ear pain, itchy eyes, nasal discharge or sore throat. Cardiac:  No chest pain, shortness of breath, orthopnea or PND. Chest:   No cough, phlegm or wheezing. Abdomen:  No abdominal pain, nausea or vomiting. Neuro:  No focal weakness, abnormal movements or seizure like activity. Skin:   No rashes, no itching, does have tattoos. :   No gross blood or pus in the urine, no dysuria, no flank pain, no suprapubic discomfort. Extremities:  No swelling or joint pains.   ROS was otherwise negative except as mentioned in the [address] Vital Signs:     Vitals:    02/23/21 1016   BP: 124/88   Pulse: 88     Wt Readings from Last 2 Encounters:   02/23/21 (!) 300 lb 6.4 oz (136.3 kg)   02/09/21 299 lb (135.6 kg)       Physical Examination:     General:  AAO x 3, speaking in full sentences, no accessory muscle use. HEENT: Atraumatic, normocephalic, moist mucosa. Eyes:   Pupils equal, round, no scleral icterus, normal conjunctiva    Neck:   No JVD, midline trachea, no accessory muscle use   Chest:    Good bilateral air entry and clear to auscultation bilaterally without any rhonchi, rales or wheezes. Cardiac:  Regular rate and rhythm positive S1 and S2 and no rubs  Abdomen: Soft, non-tender, not distended, active bowel sounds   :   No suprapubic or flank tenderness. Neuro:  AAO x 3, No FND. SKIN:  No rashes, good skin turgor. Extremities:  No edema, palpable peripheral pulses, no calf tenderness. Labs:     No results for input(s): WBC, RBC, HGB, HCT, MCV, MCH, MCHC, RDW, PLT, MPV in the last 72 hours. BMP: No results for input(s): NA, K, CL, CO2, BUN, CREATININE, GLUCOSE, CALCIUM in the last 72 hours. Phosphorus:   No results for input(s): PHOS in the last 72 hours. Magnesium:  No results for input(s): MG in the last 72 hours. Albumin:  No results for input(s): LABALBU in the last 72 hours.   BNP:    No results found for: BNP  TIMBO:      Lab Results   Component Value Date    TIMBO NEGATIVE 09/13/2019     SPEP:  Lab Results   Component Value Date    PROT 7.8 02/09/2021     UPEP:   No results found for: LABPE  C3:     Lab Results   Component Value Date    C3 153 12/22/2020     C4:     Lab Results   Component Value Date    C4 28 12/22/2020     MPO ANCA:   No results found for: MPO  PR3 ANCA:   No results found for: PR3  Anti-GBM:   No results found for: GBMABIGG  Hep BsAg:       No results found for: HEPBSAG  Hep C AB:          Lab Results   Component Value Date    HEPCAB NONREACTIVE 02/11/2019 Urinalysis/Chemistries:      Lab Results   Component Value Date    NITRU NEGATIVE 12/22/2020    COLORU NOT REPORTED 12/22/2020    PHUR 6.0 12/22/2020    WBCUA 5 TO 10 12/22/2020    RBCUA None 12/22/2020    MUCUS NOT REPORTED 12/22/2020    TRICHOMONAS NOT REPORTED 12/22/2020    YEAST NOT REPORTED 12/22/2020    BACTERIA None 12/22/2020    SPECGRAV 1.005 12/22/2020    LEUKOCYTESUR 2+ 12/22/2020    UROBILINOGEN Normal 12/22/2020    BILIRUBINUR NEGATIVE 12/22/2020    GLUCOSEU NEGATIVE 12/22/2020    KETUA NEGATIVE 12/22/2020    AMORPHOUS NOT REPORTED 12/22/2020     Urine Sodium:   No results found for: MADDIE  Urine Potassium:  No results found for: KUR  Urine Chloride:  No results found for: CLUR  Urine Osmolarity: No results found for: OSMOU  Urine Protein:   No results found for: TPU  Urine Creatinine:     Lab Results   Component Value Date    LABCREA 30.6 12/22/2020     UPC:     Urine Eosinophils:  No components found for: UEOS    Radiology:     CXR:     Assessment:     1. Chronic kidney disease stage IIIA, last creatinine from August 2020 of 1.08, possibly secondary to prior acute kidney injury episode versus other. Paraprotein disease, glomerulonephritis and connective tissue disease are not ruled out  2. History of massive pulmonary embolism with thrombectomy in April 2020  3. History of breast cancer, status post right mastectomy and per the patient, hysterectomy  4. Morbid obesity  5. Apparent anemia of chronic disease  6. No protein in the urine with random urine protein to creatinine ratio normal  7. Serologic studies from December of 2020 showed C3 normal, C4 normal, normal serum free light chain ratio  8. Nonspecific acquired left renal cyst noted on ultrasound    Plan:   1. No changes in medications at this time, avoid nephrotoxic agents, including NSAIDs  2. Return to see me in the office in about 6 months    3.  Repeat renal ultrasound prior to the next visit in 6 months with special attention to the left renal cyst    Thank you for allowing me to see your very pleasant patient in nephrology return visit. Yonathan Perry.  Lawrence Tim MD  [facility]  2/23/2021

## 2021-02-24 ENCOUNTER — HOSPITAL ENCOUNTER (OUTPATIENT)
Dept: PREADMISSION TESTING | Age: 56
Discharge: HOME | End: 2021-02-28
Payer: MEDICARE

## 2021-02-24 VITALS
HEART RATE: 84 BPM | RESPIRATION RATE: 16 BRPM | HEIGHT: 66 IN | BODY MASS INDEX: 47.09 KG/M2 | DIASTOLIC BLOOD PRESSURE: 93 MMHG | TEMPERATURE: 98.9 F | WEIGHT: 293 LBS | SYSTOLIC BLOOD PRESSURE: 151 MMHG

## 2021-02-24 DIAGNOSIS — Z01.818 PREOP TESTING: ICD-10-CM

## 2021-02-24 DIAGNOSIS — Z90.11 H/O RIGHT MASTECTOMY: ICD-10-CM

## 2021-02-24 DIAGNOSIS — Z85.3 HX: BREAST CANCER: ICD-10-CM

## 2021-02-24 LAB
-: ABNORMAL
AMORPHOUS: ABNORMAL
ANION GAP SERPL CALCULATED.3IONS-SCNC: 17 MMOL/L (ref 9–17)
BACTERIA: ABNORMAL
BILIRUBIN URINE: NEGATIVE
BUN BLDV-MCNC: 22 MG/DL (ref 6–20)
BUN/CREAT BLD: ABNORMAL (ref 9–20)
CALCIUM SERPL-MCNC: 10.3 MG/DL (ref 8.6–10.4)
CASTS UA: ABNORMAL /LPF (ref 0–8)
CHLORIDE BLD-SCNC: 104 MMOL/L (ref 98–107)
CO2: 21 MMOL/L (ref 20–31)
COLOR: YELLOW
COMMENT UA: ABNORMAL
CREAT SERPL-MCNC: 1.09 MG/DL (ref 0.5–0.9)
CRYSTALS, UA: ABNORMAL /HPF
EPITHELIAL CELLS UA: ABNORMAL /HPF (ref 0–5)
GFR AFRICAN AMERICAN: >60 ML/MIN
GFR NON-AFRICAN AMERICAN: 52 ML/MIN
GFR SERPL CREATININE-BSD FRML MDRD: ABNORMAL ML/MIN/{1.73_M2}
GFR SERPL CREATININE-BSD FRML MDRD: ABNORMAL ML/MIN/{1.73_M2}
GLUCOSE BLD-MCNC: 103 MG/DL (ref 70–99)
GLUCOSE URINE: NEGATIVE
HCT VFR BLD CALC: 40.3 % (ref 36.3–47.1)
HEMOGLOBIN: 12.5 G/DL (ref 11.9–15.1)
INR BLD: 1
KETONES, URINE: NEGATIVE
LEUKOCYTE ESTERASE, URINE: ABNORMAL
MCH RBC QN AUTO: 30.9 PG (ref 25.2–33.5)
MCHC RBC AUTO-ENTMCNC: 31 G/DL (ref 28.4–34.8)
MCV RBC AUTO: 99.5 FL (ref 82.6–102.9)
MUCUS: ABNORMAL
NITRITE, URINE: NEGATIVE
NRBC AUTOMATED: 0 PER 100 WBC
OTHER OBSERVATIONS UA: ABNORMAL
PDW BLD-RTO: 13.7 % (ref 11.8–14.4)
PH UA: 6.5 (ref 5–8)
PLATELET # BLD: 226 K/UL (ref 138–453)
PMV BLD AUTO: 10.8 FL (ref 8.1–13.5)
POTASSIUM SERPL-SCNC: 4.2 MMOL/L (ref 3.7–5.3)
PROTEIN UA: NEGATIVE
PROTHROMBIN TIME: 10.4 SEC (ref 9.4–12.6)
RBC # BLD: 4.05 M/UL (ref 3.95–5.11)
RBC UA: ABNORMAL /HPF (ref 0–4)
RENAL EPITHELIAL, UA: ABNORMAL /HPF
SODIUM BLD-SCNC: 142 MMOL/L (ref 135–144)
SPECIFIC GRAVITY UA: 1.01 (ref 1–1.03)
TRICHOMONAS: ABNORMAL
TURBIDITY: CLEAR
URINE HGB: NEGATIVE
UROBILINOGEN, URINE: NORMAL
WBC # BLD: 7.6 K/UL (ref 3.5–11.3)
WBC UA: ABNORMAL /HPF (ref 0–5)
YEAST: ABNORMAL

## 2021-02-24 PROCEDURE — 93005 ELECTROCARDIOGRAM TRACING: CPT | Performed by: ANESTHESIOLOGY

## 2021-02-24 PROCEDURE — 36415 COLL VENOUS BLD VENIPUNCTURE: CPT

## 2021-02-24 PROCEDURE — 80048 BASIC METABOLIC PNL TOTAL CA: CPT

## 2021-02-24 PROCEDURE — 81001 URINALYSIS AUTO W/SCOPE: CPT

## 2021-02-24 PROCEDURE — 85027 COMPLETE CBC AUTOMATED: CPT

## 2021-02-24 PROCEDURE — 85610 PROTHROMBIN TIME: CPT

## 2021-02-24 RX ORDER — LAMOTRIGINE 25 MG/1
50 TABLET ORAL DAILY
COMMUNITY

## 2021-02-25 ENCOUNTER — TELEPHONE (OUTPATIENT)
Dept: FAMILY MEDICINE CLINIC | Age: 56
End: 2021-02-25

## 2021-02-25 LAB
EKG ATRIAL RATE: 75 BPM
EKG P AXIS: 46 DEGREES
EKG P-R INTERVAL: 180 MS
EKG Q-T INTERVAL: 386 MS
EKG QRS DURATION: 96 MS
EKG QTC CALCULATION (BAZETT): 431 MS
EKG R AXIS: 63 DEGREES
EKG T AXIS: 16 DEGREES
EKG VENTRICULAR RATE: 75 BPM

## 2021-02-25 RX ORDER — NITROFURANTOIN 25; 75 MG/1; MG/1
100 CAPSULE ORAL 2 TIMES DAILY
Qty: 14 CAPSULE | Refills: 0 | Status: SHIPPED | OUTPATIENT
Start: 2021-02-25 | End: 2021-03-04

## 2021-02-25 NOTE — TELEPHONE ENCOUNTER
Pt informed, voiced understanding strangulated bowel surgery on saturday by Dr Sharad Gupta. Sent by PCP from follow up appt. Did not look right.  Pt in worsening pain

## 2021-02-26 ENCOUNTER — OFFICE VISIT (OUTPATIENT)
Dept: NEUROLOGY | Age: 56
End: 2021-02-26
Payer: MEDICARE

## 2021-02-26 VITALS
OXYGEN SATURATION: 96 % | BODY MASS INDEX: 47.09 KG/M2 | WEIGHT: 293 LBS | HEART RATE: 82 BPM | RESPIRATION RATE: 18 BRPM | DIASTOLIC BLOOD PRESSURE: 72 MMHG | SYSTOLIC BLOOD PRESSURE: 118 MMHG | HEIGHT: 66 IN

## 2021-02-26 DIAGNOSIS — Z17.0 MALIGNANT NEOPLASM OF BREAST IN FEMALE, ESTROGEN RECEPTOR POSITIVE, UNSPECIFIED LATERALITY, UNSPECIFIED SITE OF BREAST (HCC): ICD-10-CM

## 2021-02-26 DIAGNOSIS — J30.2 SEASONAL ALLERGIES: ICD-10-CM

## 2021-02-26 DIAGNOSIS — Z90.11 H/O RIGHT MASTECTOMY: ICD-10-CM

## 2021-02-26 DIAGNOSIS — G43.009 MIGRAINE WITHOUT AURA AND WITHOUT STATUS MIGRAINOSUS, NOT INTRACTABLE: ICD-10-CM

## 2021-02-26 DIAGNOSIS — Z99.89 OSA ON CPAP: ICD-10-CM

## 2021-02-26 DIAGNOSIS — G47.33 OSA ON CPAP: ICD-10-CM

## 2021-02-26 DIAGNOSIS — I27.20 MODERATE TO SEVERE PULMONARY HYPERTENSION (HCC): ICD-10-CM

## 2021-02-26 DIAGNOSIS — R41.3 MEMORY PROBLEM: ICD-10-CM

## 2021-02-26 DIAGNOSIS — I82.A21 CHRONIC DEEP VEIN THROMBOSIS (DVT) OF AXILLARY VEIN OF RIGHT UPPER EXTREMITY (HCC): ICD-10-CM

## 2021-02-26 DIAGNOSIS — G51.32 HEMIFACIAL SPASM OF LEFT SIDE OF FACE: ICD-10-CM

## 2021-02-26 DIAGNOSIS — F41.9 ANXIETY AND DEPRESSION: ICD-10-CM

## 2021-02-26 DIAGNOSIS — E78.5 HYPERLIPIDEMIA, UNSPECIFIED HYPERLIPIDEMIA TYPE: ICD-10-CM

## 2021-02-26 DIAGNOSIS — F31.9 BIPOLAR 1 DISORDER (HCC): ICD-10-CM

## 2021-02-26 DIAGNOSIS — Z85.3 HX: BREAST CANCER: ICD-10-CM

## 2021-02-26 DIAGNOSIS — G47.9 SLEEP DIFFICULTIES: ICD-10-CM

## 2021-02-26 DIAGNOSIS — C50.919 MALIGNANT NEOPLASM OF BREAST IN FEMALE, ESTROGEN RECEPTOR POSITIVE, UNSPECIFIED LATERALITY, UNSPECIFIED SITE OF BREAST (HCC): ICD-10-CM

## 2021-02-26 DIAGNOSIS — F32.A ANXIETY AND DEPRESSION: ICD-10-CM

## 2021-02-26 DIAGNOSIS — R25.3 MUSCLE TWITCHING: ICD-10-CM

## 2021-02-26 PROCEDURE — 99215 OFFICE O/P EST HI 40 MIN: CPT | Performed by: PSYCHIATRY & NEUROLOGY

## 2021-02-26 PROCEDURE — G8482 FLU IMMUNIZE ORDER/ADMIN: HCPCS | Performed by: PSYCHIATRY & NEUROLOGY

## 2021-02-26 PROCEDURE — G8417 CALC BMI ABV UP PARAM F/U: HCPCS | Performed by: PSYCHIATRY & NEUROLOGY

## 2021-02-26 PROCEDURE — 1036F TOBACCO NON-USER: CPT | Performed by: PSYCHIATRY & NEUROLOGY

## 2021-02-26 PROCEDURE — 3017F COLORECTAL CA SCREEN DOC REV: CPT | Performed by: PSYCHIATRY & NEUROLOGY

## 2021-02-26 PROCEDURE — G8427 DOCREV CUR MEDS BY ELIG CLIN: HCPCS | Performed by: PSYCHIATRY & NEUROLOGY

## 2021-02-26 PROCEDURE — 99214 OFFICE O/P EST MOD 30 MIN: CPT

## 2021-02-26 RX ORDER — BUTALBITAL, ACETAMINOPHEN AND CAFFEINE 50; 325; 40 MG/1; MG/1; MG/1
TABLET ORAL
Qty: 60 TABLET | Refills: 2 | Status: SHIPPED | OUTPATIENT
Start: 2021-02-26 | End: 2021-09-03 | Stop reason: SDUPTHER

## 2021-02-26 RX ORDER — SUMATRIPTAN 100 MG/1
100 TABLET, FILM COATED ORAL
Qty: 12 TABLET | Refills: 5 | Status: SHIPPED | OUTPATIENT
Start: 2021-02-26 | End: 2021-09-03 | Stop reason: SDUPTHER

## 2021-02-26 RX ORDER — CLONAZEPAM 0.5 MG/1
0.5 TABLET ORAL 2 TIMES DAILY PRN
Qty: 60 TABLET | Refills: 2 | Status: SHIPPED | OUTPATIENT
Start: 2021-02-26 | End: 2021-05-10

## 2021-02-26 RX ORDER — TOPIRAMATE 50 MG/1
TABLET, FILM COATED ORAL
Qty: 60 TABLET | Refills: 5 | Status: SHIPPED | OUTPATIENT
Start: 2021-02-26 | End: 2021-08-17 | Stop reason: SDUPTHER

## 2021-02-26 ASSESSMENT — ENCOUNTER SYMPTOMS
CHEST TIGHTNESS: 0
VOMITING: 1
VISUAL CHANGE: 0
FACIAL SWEATING: 0
EYE PAIN: 0
BLOOD IN STOOL: 0
ABDOMINAL DISTENTION: 0
CONSTIPATION: 0
WHEEZING: 0
EYE DISCHARGE: 0
CHOKING: 0
SWOLLEN GLANDS: 0
SINUS PRESSURE: 0
EYE REDNESS: 0
TROUBLE SWALLOWING: 0
FACIAL SWELLING: 0
BLURRED VISION: 0
SHORTNESS OF BREATH: 0
BOWEL INCONTINENCE: 0
APNEA: 0
ABDOMINAL PAIN: 0
DIARRHEA: 0
EYE ITCHING: 0
RHINORRHEA: 0
NAUSEA: 1
VOICE CHANGE: 0
EYE WATERING: 1
COUGH: 0
COLOR CHANGE: 0
SORE THROAT: 0
SCALP TENDERNESS: 0
BACK PAIN: 0
PHOTOPHOBIA: 1

## 2021-02-26 NOTE — PROGRESS NOTES
[facility]  Neurology  [address] [address]  Phone: [phone]   Fax: [phone]    SUBJECTIVE:     PATIENT ID:  Mary Carrion is a  RIGHT  HANDED 54 y.o. female. Migraine   This is a chronic problem. Episode onset: FOR  MORE  THAN   4  YEARS. The problem occurs intermittently. The problem has been gradually worsening. The pain is located in the bilateral and vertex region. The pain does not radiate. The pain quality is similar to prior headaches. The quality of the pain is described as aching, dull and throbbing. The pain is at a severity of 3/10. The pain is mild. Associated symptoms include eye watering, insomnia, nausea, phonophobia, photophobia and vomiting. Pertinent negatives include no abdominal pain, abnormal behavior, anorexia, back pain, blurred vision, coughing, dizziness, drainage, ear pain, eye pain, eye redness, facial sweating, fever, hearing loss, loss of balance, muscle aches, neck pain, numbness, rhinorrhea, scalp tenderness, seizures, sinus pressure, sore throat, swollen glands, tingling, tinnitus, visual change, weakness or weight loss. The symptoms are aggravated by unknown. She has tried acetaminophen and Excedrin for the symptoms. The treatment provided no relief. Her past medical history is significant for migraine headaches and obesity. There is no history of cancer, cluster headaches, hypertension, immunosuppression, migraines in the family, pseudotumor cerebri, recent head traumas, sinus disease or TMJ. Neurologic Problem  The patient's primary symptoms include memory loss. The patient's pertinent negatives include no altered mental status, clumsiness, focal sensory loss, focal weakness, loss of balance, near-syncope, slurred speech, syncope, visual change or weakness. Primary symptoms comment: LEFT  EYE  TWITCHING  AND  SPAMS. This is a chronic problem. Episode onset: SINCE  APRIL 2017. The neurological problem developed insidiously. The problem has been waxing and waning since onset. There was facial and left-sided focality noted. Associated symptoms include headaches, nausea and vomiting. Pertinent negatives include no abdominal pain, auditory change, aura, back pain, bladder incontinence, bowel incontinence, chest pain, confusion, diaphoresis, dizziness, fatigue, fever, light-headedness, neck pain, palpitations, shortness of breath or vertigo. Treatments tried: [place]gy. The treatment provided moderate relief. There is no history of a bleeding disorder, a clotting disorder, a CVA, dementia, head trauma, liver disease, mood changes or seizures. History obtained from  The patient      and other  available medical records were  Also  reviewed. The  Duration,  Quality,  Severity,  Location,  Timing,  Context,  Modifying  Factors   Of   The   Chief   Complaint       And  Present  Illness   Was   Reviewed   In   Chronological   Manner. PATIENT'S  MAIN  CONCERNS INCLUDE :                       1)  H/O   LEFT  EYE  TWITCHING     SINCE      April 2017                                  INTERMITTENTLY  . NO  PAIN                                -BLEPHAROSPASM    LEFT  EYE.                                -     STABLE                               2)    WORSE    SINCE     FEB. 2018                            DUE   TO     ANXIETY                               3)      PREVIOUS    H/O    BREAST    CANCER  RIGHT                                H/O   BREAST  CANCER   SURGERY  IN  NOV. 2017                               4)       HAD    CHEMO     IN   THE  PAST                                      ALSO  ON   ARIMIDEX   DAILY                             BEING  FOLLOWED  BY   HEMATOLOGY /  ONCOLOGY                               5)    H/O   CHRONIC   MIGRAINES       FOR    4  YEARS                                               -    STABLE -    ON   FIORICET    AS  NEEDED                                   6)   H/O   CHRONIC  ANXIETY,   DEPRESSION,  BIPOLAR  DISORDER                               -   ON    LAMICTAL,  SEROQUEL   TRAZODONE, TRILEPTAL                                        -    STABLE                                   -       BEING  FOLLOWED  BY  MENTAL  HEALTH PROFESSIONALS                                 7)    H/O   CHRONIC   SLEEP  DIFFICULTIES                                            -   BETTER                                                           8)   H/O       CHRONIC    MEMORY PROBLEMS   SINCE    OCTOBER 2017                                            -   STABLE                                9)   OBESITY     WITH   EXCESSIVE    DAY   TIME  SLEEPINESS                                     H/O   OSAS     -   ON  CPAP                                                          10)   MULTIPLE  CO  MORBID  MEDICAL  CONDITIONS                                    BEING  FOLLOWED BY  HER  PCP                              11)   MRI  BRAIN  IN  FEB. 2018   SHOWED                                      NO ACUTE INTRA  CRANIAL PATHOLOGY                                  12)    HAD  EYE   EVALUATIONS  IN  MAY  2018                                  AND  DIAGNOSED  WITH LEFT  EYE BLEPHAROSPASM                                13)    LEFT  EYE  TWITCHING  AND  SPASM   ARE   CAUSING                                DIFFICULTY  WITH  READING,  WATCHING  TV   AND  DRIVING                                           -     IMPROVED                                                 14)     ON  KLONOPIN       FOR  LEFT  BLEPHAROSPASM   IN      June 2018                                   -     IMPROVED  MORE  THAN   75 %   SUBJECTIVELY.                                PATIENT  NOT  INTERESTED  IN   ADDITIONAL  MEDICATIONS                                     OR  INJECTION  BOTOX                                    15)       H/O    WORSENING  OF  MIGRAINES    IN July 2019                                                           16)     NO     H/O    SEIZURE      OR    SYNCOPE                                           17)     FOLLOW  UP  MRI BRAIN   WITH  AND  W/O   CONTRAST     IN  OCT. 2019                                   AND  LABS  SHOWED  NO  SIGNIFICANT  ABNORMALITIES                                                       18)        EXPECTATIONS,   GOALS   OF  MIGRAINE   THERAPY                                  AND  SIDE  EFFECTS  MEDICATIONS    WERE                                 REVIEWED     AND   DISCUSSED    IN    DETAIL. AS   DISCUSSED    WITH  PATIENT   IN  OCT. 2019                                  PATIENT     STARTED  ON    TOPAMAX     FOR  MIGRAINE PROPHYLAXIS                          19)     CURRENTLY    MIGRAINES  ARE   BETTER  CONTROLLED                               ON  TOPAMAX,   FIORICET,   IMITREX      AS  NEEDED. AND  PATIENT     FEELS  LOT  BETTER. PATIENT  DENIES  ANY      RENAL  STONES. 20)    LEFT   FACIAL  AND  BLEPHAROSPASM     BETTER  CONTROLLED. PATIENT  REQUESTS    REFILLS  FOR  KLONOPIN   FOR  SYMPTOMATIC  RELIEF. 21)        H/O      HOSPITALIZATION     FOR   PULMONARY  EMBOLISM   IN   April 2020                                 -   ON       ELIQUIS                                        PATIENT  TO  FOLLOW  WITH   HER  PCP  AND  OTHER  CONSULTANTS                                              22)   VARIOUS  RISK   FACTORS   WERE  REVIEWED   AND   DISCUSSED. PATIENT   HAS  MULTIPLE   MEDICAL, MENTAL HEALTH                        NEUROLOGICAL   PROBLEMS . PATIENT  MANAGEMENT  IS  CHALLENGING                         23)        PATIENT  DENIES  ANY  NEW  NEUROLOGICAL  CONCERNS. PRECIPITATING  FACTORS: including  fever/infection, exertion/relaxation, position change, stress,     weather change, medications/alcohol, time of day/darkness/light  Are    Absent                                                                 MODIFYING  FACTORS:  fever/infection, exertion/relaxation, position change, stress, weather change,     medications/alcohol, time of day/darkness/light    Are  absent             Patient   Indicates   The  Presence   And  The  Absence  Of  The  Following  Associated  And   Additional  Neurological    Symptoms:                                Balance  And coordination problems  absent           Gait problems     absent            Headaches      absent              Migraines           present           Memory problems        Present             Confusion        absent            Paresthesia numbness          absent           Seizures  And  Starring  Episodes           absent           Syncope,  Near  syncopal episodes         absent           Speech problems           absent             Swallowing  Problems      absent            Dizziness,  Light headedness           absent                        Vertigo        absent             Generalized   Weakness    absent              focal  Weakness     absent             Tremors         absent              Sleep  Problems     absent             History  Of   Recent   Head  Injury     absent             History  Of   Recent  TIA     absent             History  Of   Recent    Stroke     absent             Neck  Pain and  Neck muscle  Spasms  Absent               Radiating  down   And   Weakness           absent            Lower back   Pain  And     Spasms  Absent              Radiating    Down   And   Weakness          absent                H/O   FALLS        absent               History  Of   Visual  Symptoms    Present                    Associated Diplopia       absent                                      Also   Additional   Symptoms   Present    As  Documented    In   The detailed      Review  Of  Systems   And    Please   Refer   To    Them for   Additional  Information. Any components  That are either  Unobtainable  Or  Limited  In   HPI, ROS  And/or PFSH   Are       Due   To   Patient's  Medical  Problems,  Clinical  Condition and/or lack of other  Alternate resources.               RECORDS   REVIEWED:    historical medical records         INFORMATION   REVIEWED:     MEDICAL   HISTORY,     SURGICAL   HISTORY,   MEDICATIONS   LIST,   ALLERGIES AND  DRUG  INTOLERANCES,     FAMILY   HISTORY,  SOCIAL  HISTORY,    PROBLEM  LIST   FOR  PATIENT  CARE   COORDINATION    Past Medical History:   Diagnosis Date    Asthma     seasonal    Bipolar 1 disorder [facility]     Breast cancer ([facility] 2017    right side     DVT of axillary vein, acute right ([facility]     Eye twitch 2019    Headache(784.0)     History of blood transfusion     Hx of blood clots 03/2020    PE    Hyperlipidemia     Migraine     Obesity     Sleep apnea     uses CPAP         Past Surgical History:   Procedure Laterality Date    BREAST SURGERY      rt mastectomy    CATHETER REMOVAL Left 6/6/2019    [facility] N/A 10/13/2017    D & C HYSTEROSCOPY with polypectomy performed by Abilio Logan MD at [address] [place] [address] / REMOVAL / [address] Left 11/9/2017    PORT INSERTION performed by Carmen Covarrubias MD at [address] [facility] / REMOVAL / [address] N/A 11/30/2017    Port Removal & Insertion performed by Carmen Covarrubias MD at [facility] Right 10/19/2017     [facility] [[email or web address]    MASTECTOMY Right 10/19/2017    Right Breast Mastectomy with  [place] Node Biopsy performed by Carmen Covarrubias MD at . [facility] [address] [facility] [address] W/SUBQ PORT AGE 5 YR/> Left 3/1/2018    PORT Exchange performed by Jennifer Craig MD at [address] [facility] [address] Left 2014, 2015    x 2 surgeries total; Dr. Ariadne Garces TUNNELED VENOUS PORT PLACEMENT Left 11/30/2017    removal of et replacement of         Current Outpatient Medications   Medication Sig Dispense Refill    butalbital-acetaminophen-caffeine (FIORICET, ESGIC) -40 MG per tablet 1-2   TABLET  TWICE  DAILY  AS  NEEDED 60 tablet 2    clonazePAM (KLONOPIN) 0.5 MG tablet Take 1 tablet by mouth 2 times daily as needed for Anxiety (Papo  facial  spasms) for up to 31 days. 60 tablet 2    SUMAtriptan (IMITREX) 100 MG tablet Take 1 tablet by mouth once as needed for Migraine 12 tablet 5    topiramate (TOPAMAX) 50 MG tablet ONE  TABLET  TWICE  DAILY 60 tablet 5    nitrofurantoin, macrocrystal-monohydrate, (MACROBID) 100 MG capsule Take 1 capsule by mouth 2 times daily for 7 days 14 capsule 0    Chlorpheniramine Maleate (ALLERGY RELIEF PO) Take by mouth daily      lamoTRIgine (LAMICTAL) 25 MG tablet Take 50 mg by mouth daily At bedtime      cephALEXin (KEFLEX) 250 MG capsule Take one PO QID til gone. Start one day prior to surgery / procedure.  12 capsule 0    pravastatin (PRAVACHOL) 40 MG tablet take 1 tablet by mouth once daily 30 tablet 3    tiZANidine (ZANAFLEX) 4 MG tablet Take 1 tablet by mouth every 8 hours as needed (muscle pain) 20 tablet 0    Cyanocobalamin ER (RA VITAMIN B-12 TR) 1000 MCG TBCR Take one daily by mouth 30 tablet 3    Calcium Carbonate-Vitamin D (OYSTER SHELL CALCIUM/D) 500-200 MG-UNIT TABS take 1 tablet by mouth twice a day 60 tablet 5    enoxaparin (LOVENOX) 120 MG/0.8ML injection Inject 0.93 mLs into the skin every 12 hours 6 Syringe 0    levocetirizine (XYZAL) 5 MG tablet Take 1 tablet by mouth nightly 30 tablet 5    letrozole (FEMARA) 2.5 MG tablet take 1 tablet by mouth once daily 30 tablet 5    folic acid (FOLVITE) 1 MG tablet Take once daily 30 tablet 3    ELIQUIS 5 MG TABS tablet take 1 tablet by mouth twice a day 180 tablet 1    fluticasone (FLONASE) 50 MCG/ACT nasal spray 1 spray by Nasal route daily Indications: as needed 1 Bottle 11    busPIRone (BUSPAR) 7.5 MG tablet take 1 tablet by mouth twice a day      OXcarbazepine (TRILEPTAL) 150 MG tablet take 1 tablet by mouth every morning BEFORE NOON      VENTOLIN  (90 Base) MCG/ACT inhaler Inhale 2 puffs into the lungs every 4 hours as needed for Wheezing 1 Inhaler 3    benztropine (COGENTIN) 0.5 MG tablet Take 0.5 mg by mouth daily      QUEtiapine (SEROQUEL) 100 MG tablet Take 50 mg by mouth nightly      sodium chloride (ALTAMIST SPRAY) 0.65 % nasal spray 1 spray by Nasal route as needed for Congestion 1 Bottle 1    hydrOXYzine (ATARAX) 10 MG tablet Take 10 mg by mouth daily       lamoTRIgine (LAMICTAL) 100 MG tablet 150 mg nightly       metoclopramide (REGLAN) 10 MG tablet Take 1 tablet by mouth once for 1 dose Take morning of surgery with SMALL SIP of water. (Patient not taking: Reported on 2/23/2021) 1 tablet 0    famotidine (PEPCID) 20 MG tablet Take 1 tablet by mouth once for 1 dose Take morning of surgery with minimal sip of water. (Patient not taking: Reported on 2/23/2021) 1 tablet 0     No current facility-administered medications for this visit.           Allergies   Allergen Reactions    Prednisone Swelling     Whole side of her face swelled when she took it, difficulty breathing         Family History   Problem Relation Age of Onset    Breast Cancer Sister 54    Breast Cancer Maternal Aunt 71    Other Maternal Cousin         Atypical Ductal Hyperplasia     Uterine Cancer Sister 32    Other Sister         breast cyst    Cataracts Mother     Glaucoma Mother     Heart Disease Father     High Blood Pressure Father     High Cholesterol Father     Mental Retardation Father     Diabetes Neg Hx          Social History     Socioeconomic History    Marital status: Single     Spouse name: Not on file    Number of children: Not on file    Years of education: Not on file    Highest education level: Not on file   Occupational History    Not on file   Social Needs    Financial resource strain: Not on file    Food insecurity     Worry: Not on file     Inability: Not on file    Transportation needs     Medical: Not on file     Non-medical: Not on file   Tobacco Use    Smoking status: Never Smoker    Smokeless tobacco: Never Used    Tobacco comment: Never smoker.  TC, RRT 4/5/18   Substance and Sexual Activity    Alcohol use: No     Frequency: Never     Binge frequency: Never    Drug use: No    Sexual activity: Not Currently     Partners: Male     Birth control/protection: Surgical   Lifestyle    Physical activity     Days per week: Not on file     Minutes per session: Not on file    Stress: Not on file   Relationships    Social connections     Talks on phone: Not on file     Gets together: Not on file     Attends Druze service: Not on file     Active member of club or organization: Not on file     Attends meetings of clubs or organizations: Not on file     Relationship status: Not on file    Intimate partner violence     Fear of current or ex partner: Not on file     Emotionally abused: Not on file     Physically abused: Not on file     Forced sexual activity: Not on file   Other Topics Concern    Not on file   Social History Narrative    Not on file       Vitals:    02/26/21 1442   BP: 118/72   Pulse: 82   Resp: 18   SpO2: 96%         Wt Readings from Last 3 Encounters:   02/26/21 300 lb (136.1 kg)   02/24/21 (!) 301 lb (136.5 kg)   02/23/21 (!) 300 lb 6.4 oz (136.3 kg)         BP Readings from Last 3 Encounters:   02/26/21 118/72   02/24/21 (!) 151/93   02/23/21 124/88       Hematology and Coagulation  Lab Results   Component Value Date    WBC 7.6 02/24/2021    RBC 4.05 02/24/2021    HGB 12.5 02/24/2021    HCT 40.3 02/24/2021    MCV 99.5 02/24/2021    MCH 30.9 02/24/2021 Endocrine: Negative for cold intolerance, heat intolerance, polydipsia, polyphagia and polyuria. Genitourinary: Negative for bladder incontinence. Musculoskeletal: Negative for arthralgias, back pain, gait problem, joint swelling, myalgias, neck pain and neck stiffness. Skin: Negative for color change, pallor, rash and wound. Allergic/Immunologic: Negative for environmental allergies, food allergies and immunocompromised state. Neurological: Positive for headaches. Negative for dizziness, vertigo, tingling, tremors, focal weakness, seizures, syncope, facial asymmetry, speech difficulty, weakness, light-headedness, numbness and loss of balance. Hematological: Negative for adenopathy. Does not bruise/bleed easily. Psychiatric/Behavioral: Positive for decreased concentration and memory loss. Negative for agitation, behavioral problems, confusion, dysphoric mood, hallucinations, self-injury, sleep disturbance and suicidal ideas. The patient is nervous/anxious and has insomnia. The patient is not hyperactive. OBJECTIVE:    Physical Exam  Constitutional:       Appearance: She is well-developed. HENT:      Head: Normocephalic and atraumatic. No raccoon eyes or Varma's sign. Right Ear: External ear normal.      Left Ear: External ear normal.      Nose: Nose normal.   Eyes:      Conjunctiva/sclera: Conjunctivae normal.   Neck:      Musculoskeletal: Normal range of motion and neck supple. Normal range of motion. No neck rigidity or muscular tenderness. Thyroid: No thyroid mass or thyromegaly. Vascular: No carotid bruit. Trachea: No tracheal deviation. Meningeal: Brudzinski's sign and Kernig's sign absent. Cardiovascular:      Rate and Rhythm: Normal rate and regular rhythm. Pulmonary:      Effort: Pulmonary effort is normal.   Musculoskeletal:         General: No tenderness. Skin:     General: Skin is warm. Coloration: Skin is not pale.       Findings: No erythema or rash. Nails: There is no clubbing. Psychiatric:         Attention and Perception: She is attentive. Mood and Affect: Mood is anxious and depressed. Affect is not labile, blunt, angry or inappropriate. Behavior: Behavior is not agitated, slowed, aggressive, withdrawn, hyperactive or combative. Behavior is cooperative. Thought Content: Thought content is not paranoid or delusional. Thought content does not include homicidal or suicidal ideation. Thought content does not include homicidal or suicidal plan. Cognition and Memory: Memory is impaired. She does not exhibit impaired recent memory or impaired remote memory. Judgment: Judgment is not impulsive or inappropriate. Neurologic Exam    NEUROLOGICAL EXAMINATION :       A) MENTAL STATUS:                   Alert and  oriented  To time, place  And  Person. No Aphasia. No  Dysarthria. Able   To  Follow  commands without   Any  Difficulty. No right  To left confusion. Normal  Speech  And language function. Insight and  Judgment ,Fund  Of  Knowledge   within normal limits                Recent  And  Remote memory  within normal limits                Attention &Concentration are within normal limits                                                   B) CRANIAL NERVES :             2 CN : Visual  Acuity  And  Visual fields  within normal limits                        Fundi  Could  Not  Be  Could  Not  Be  Evaluated. 3,4,6 CN : Both  Pupils are   Reactive and  Equal.                            Extraocular   Movements  Are  Intact. No  Nystagmus. No  YUMIKO. No  Afferent  Pupillary  Defect noted. 5 CN :  Normal  Facial sensations and Corneal  Reflexes           7 CN :  Normal  Facial  Symmetry  And  Strength. No facial  Weakness.            8 CN :  Hearing  Appears within allergies    H/O right mastectomy    HX: breast cancer    Acute massive pulmonary embolism (HCC)    Moderate to severe pulmonary hypertension (HCC)    TAY on CPAP    Morbid obesity with BMI of 40.0-44.9, adult (HCC)    Extrinsic asthma    Blepharospasm    Chronic deep vein thrombosis (DVT) of axillary vein of right upper extremity (HCC)          MRI OF THE BRAIN WITHOUT AND WITH CONTRAST  10/2/2019 1:14 pm       TECHNIQUE:   Multiplanar multisequence MRI of the head/brain was performed without and   with the administration of intravenous contrast.       COMPARISON:   MRI brain performed 10/08/2018.       HISTORY:   ORDERING SYSTEM PROVIDED HISTORY: Migraine without aura and without status   migrainosus, not intractable   TECHNOLOGIST PROVIDED HISTORY:   migraines   Is the patient pregnant?->No   Reason for Exam: Migraines for quite a while, becoming worse. Acuity: Chronic   Type of Exam: Unknown   Additional signs and symptoms: Migraine without aura and without status   migrainosus, not intractable       FINDINGS:   INTRACRANIAL STRUCTURES/VENTRICLES:  The sellar and suprasellar structures,   optic chiasm, corpus callosum, pineal gland, tectum, and midline brainstem   structures are unremarkable.  The craniocervical junction is unremarkable. There is no acute intracranial hemorrhage, mass effect, or midline shift. There is satisfactory overall gray-white matter differentiation.  There is no   abnormal postcontrast enhancement.  The ventricular structures are symmetric   and unremarkable.  The infratentorial structures including the   cerebellopontine angles and internal auditory canals are unremarkable. There   is no abnormal restricted diffusion.  There is no abnormal blooming artifact   on susceptibility weighted imaging.       ORBITS: The visualized portion of the orbits demonstrate no acute abnormality.       SINUSES: The visualized paranasal sinuses and mastoid air cells are well   aerated.     BONES/SOFT TISSUES: The bone marrow signal intensity appears normal. The soft   tissues demonstrate no acute abnormality.           Impression   Unremarkable pre and post-contrast MRI of the brain.               MRI OF THE BRAIN WITHOUT AND WITH CONTRAST  2/5/2018 9:18 am       TECHNIQUE:   Multiplanar multisequence MRI of the head/brain was performed without and   with the administration of intravenous contrast.       COMPARISON:   Head CT on 09/25/2013.       HISTORY:   ORDERING SYSTEM PROVIDED HISTORY: Malignant neoplasm of overlapping sites of   right breast in female, estrogen receptor negative [facility])   TECHNOLOGIST PROVIDED HISTORY:   Ordering Physician Provided Reason for Exam:  Bad headaches for one month. Left eye twitching for two months and it is getting worse. History of breast   cancer. Acuity: Acute   Type of Exam: Initial   Additional signs and symptoms: Malignant neoplasm of overlapping sites of   right breast in female, estrogen receptor negative.       Initial evaluation.       FINDINGS:   INTRACRANIAL STRUCTURES/VENTRICLES: Jessy Files are no areas of restricted   diffusion to suggest an acute infarct.  The cerebral and cerebellar   parenchyma demonstrate normal volume and signal intensity.  There are no   abnormal extra-axial fluid collections.  The ventricles are normal in size.    Normal major intracranial flow voids are noted.       There are no areas of abnormal contrast enhancement in the cerebral or   cerebellar parenchyma to suggest metastatic disease.       There are no areas of blooming artifact noted on the gradient echo sequences   to suggest sequela of acute or chronic hemorrhage.       ORBITS: The visualized portion of the orbits demonstrate no acute abnormality.       SINUSES: There is scattered mucosal thickening in the paranasal sinuses, with   greatest involvement in the ethmoid air cells and maxillary sinuses. Jsesy Files   is an air-fluid level in the left sphenoid sinus, correlate with signs of   acute sinusitis.       The mastoid air cells are clear.       BONES/SOFT TISSUES: The bone marrow signal intensity appears normal. The soft   tissues demonstrate no acute abnormality.           Impression   1. Unremarkable MRI of the brain.  No evidence of metastatic disease. 2. Acute left sphenoid sinusitis.             VISITING DIAGNOSIS:      ICD-10-CM    1. Migraine without aura and without status migrainosus, not intractable  G43.009 butalbital-acetaminophen-caffeine (FIORICET, ESGIC) -40 MG per tablet     clonazePAM (KLONOPIN) 0.5 MG tablet     topiramate (TOPAMAX) 50 MG tablet   2. TAY on CPAP  G47.33 clonazePAM (KLONOPIN) 0.5 MG tablet    Z99.89    3. Seasonal allergies  J30.2 clonazePAM (KLONOPIN) 0.5 MG tablet   4. Muscle twitching  R25.3 clonazePAM (KLONOPIN) 0.5 MG tablet   5. Bipolar 1 disorder (HCC)  F31.9 clonazePAM (KLONOPIN) 0.5 MG tablet   6. Memory problem  R41.3 clonazePAM (KLONOPIN) 0.5 MG tablet   7. Sleep difficulties  G47.9 clonazePAM (KLONOPIN) 0.5 MG tablet   8. Anxiety and depression  F41.9 clonazePAM (KLONOPIN) 0.5 MG tablet    F32.9    9. H/O right mastectomy  Z90.11 clonazePAM (KLONOPIN) 0.5 MG tablet   10. HX: breast cancer  Z85.3 clonazePAM (KLONOPIN) 0.5 MG tablet   11. Hemifacial spasm of left side of face  G51.32 clonazePAM (KLONOPIN) 0.5 MG tablet   12. Chronic deep vein thrombosis (DVT) of axillary vein of right upper extremity (HCC)  I82. A21 clonazePAM (KLONOPIN) 0.5 MG tablet   13. Moderate to severe pulmonary hypertension (HCC)  I27.20 clonazePAM (KLONOPIN) 0.5 MG tablet   14. Malignant neoplasm of breast in female, estrogen receptor positive, unspecified laterality, unspecified site of breast ([facility] [address]  C50.919 clonazePAM (KLONOPIN) 0.5 MG tablet    Z17.0    15. Hyperlipidemia, unspecified hyperlipidemia type  E78.5 clonazePAM (KLONOPIN) 0.5 MG tablet                     VARIOUS  RISK   FACTORS   WERE  REVIEWED   AND   DISCUSSED.         *  PATIENT   HAS MULTIPLE   MEDICAL, MENTAL HEALTH          NEUROLOGICAL   PROBLEMS . PATIENT  MANAGEMENT  IS  CHALLENGING              PLAN:       Robertson Lack  Of  The  Diagnoses,  The  Management & Treatment  Options           AND    Care  plan  Were        Reviewed and   Discussed   With  patient. * Goals  And  Expectations  Of  The  Therapy  Discussed   And  Reviewed. *   Benefits   And   Side  Effect  Profile  Of  Medication/s   Were   Discussed             * Need   For  Further   Follow up For  The  Various  Problems  Were discussed. * Results  Of  The  Previous  Diagnostic tests were reviewed and questions answered. patient  understand the same. Medical  Decision  Making  Was  Made  Based on the   Complexity  Of  Above  Mentioned  Diagnoses,        Data reviewed   & diagnostic  Tests  Reviewed,  Risk  Of  Significant   Co morbidities and complicating   Factors. Medical  Decision  Was   High  Complexity  Due   To  The  Patient's  Multiple  Symptoms,      Complex  Treatment  Regimen,  Multiple medications and   Risk  Of   Side  Effects,      Difficulty  In  Medication  Management      And  Diagnostic  Challenges   In  View  Of  The  Associated   Co  Morbid  Conditions   And  Problems. *   ADEQUATE   FLUID  INTAKE   AND  ELECTROLYTE  BALANCE         * KEEP  DAIRY  OF   THE  NEUROLOGICAL  SYMPTOMS        RECORDING THE    DURATION  AND  FREQUENCY. -    DISCUSSED  WITH PATIENT              *  AVOID    CONDITIONS  AND  FACTORS   THAT  MAKE   NEUROLOGICAL  SYMPTOMS  WORSE. *  TO  MAINTAIN  REGULAR  SLEEP  WAKE  CYCLES. *   TO  HAVE  ADEQUATE  REST  AND   SLEEP    HOURS.          *    TO   AVOID   TO  SLEEP  IN   SUPINE  POSITION. *      WEIGHT   LOSS.            *    AVOID  ANY USAGE OF                   TOBACCO,  EXCESSIVE  ALCOHOL  AND   ILLEGAL   SUBSTANCES            *  CONTINUE MEDICATIONS PRESCRIBED BY NEUROLOGIST AS    RECOMMENDED     *   Compliance   With  Medications   And  Instructions          * CURRENTLY  TOLERATING  THE  PRESCRIBED   MEDICATIONS. WITHOUT  ANY  SIGNIFICANT  SIDE  EFFECTS   &  GETTING BENEFIT. *    Prophylactic  Use   Of     Vitamin   B   Complex,  Folic  Acid,    Vitamin  B12    Multivitamin,       Calcium  With  magnesium  And  Vit D    Supplementations   Over  The  Counter  Discussed            *  EVALUATIONS  AND  FOLLOW UP:                     * HEMATOLOGY/ONCOLOGY                    *   OPTHALMOLOGY                         *  PATIENT     ON   KLONOPIN       FOR  LEFT  BLEPHAROSPASM   SINCE       2018                                   -     IMPROVED  MORE  THAN   75 %   SUBJECTIVELY. *         PATIENT  NOT  INTERESTED  IN   ADDITIONAL  MEDICATIONS                                     OR  INJECTION  BOTOX                                                       *          EXPECTATIONS,   GOALS   OF  MIGRAINE   THERAPY                                  AND  SIDE  EFFECTS  MEDICATIONS    WERE                                 REVIEWED     AND   DISCUSSED    IN    DETAIL. *        CURRENTLY    MIGRAINES  ARE   BETTER  CONTROLLED                               ON  TOPAMAX ,  FIORICET,  IMITREX   AS  NEEDED                              AND  PATIENT     FEELS  LOT  BETTER. PATIENT  DENIES  ANY  NEW  NEUROLOGICAL  CONCERNS. Controlled Substances Monitoring: Periodic Controlled Substance Monitoring: Possible medication side effects, risk of tolerance/dependence & alternative treatments discussed. , Assessed functional status.  Leah Vanegas MD)            Orders Placed This Encounter   Medications    butalbital-acetaminophen-caffeine (FIORICET, ESGIC) -40 MG per tablet     Si-2   TABLET  TWICE  DAILY  AS  NEEDED     Dispense:  60 tablet Refill:  2    clonazePAM (KLONOPIN) 0.5 MG tablet     Sig: Take 1 tablet by mouth 2 times daily as needed for Anxiety (Papo  facial  spasms) for up to 31 days. Dispense:  60 tablet     Refill:  2    SUMAtriptan (IMITREX) 100 MG tablet     Sig: Take 1 tablet by mouth once as needed for Migraine     Dispense:  12 tablet     Refill:  5    topiramate (TOPAMAX) 50 MG tablet     Sig: ONE  TABLET  TWICE  DAILY     Dispense:  60 tablet     Refill:  5                     *PATIENT   TO  FOLLOW  UP  WITH   PRIMARY  CARE   AND   OTHER  CONSULTANTS  AS  BEFORE.           *TO  FOLLOW  WITH   MENTAL  HEALTH  PROFESSIONALS ,  INCLUDING            PSYCHOLOGICAL  COUNSELING   AND  PSYCHIATRIC  EVALUTIONS            *  Maintain   Healthy  Life Style    With   Periodic  Monitoring  Of      Any  Medical  Conditions  Including   Elevated  Blood  Pressure,  Lipid  Profile,     Blood  Sugar levels  And   Heart  Disease. *   Period   Screening  For  Cancers  Involving  Breast,  Colon,    lungs  And  Other  Organs  As  Applicable,  In consultation   With  Your  Primary Care Providers. * Second  Neurological  Opinion  And  Evaluations  In  [place]  Setting  If  Patient  Is  Interested. * Please   Contact   Neurology  Clinic   Early   If   Are  Any  New  Neurological                             Symptoms   And  Any neurological  Concerns. *  If  The  Patient remains  Neurologically  Stable   Return   To  [facility] Neurology Department       IN      3-4          MONTHS  TIME   FOR  FURTHER  FOLLOW UP.                 *  If   There is  Any  Significant  Worsening   Of  Current  Symptoms  And  Or  If patient  Develops       Any additional  New  Neurological  Symptoms  Or  Significant  Concerns   Should  Call  911 or      Go  To  Emergency  Department  For  Further  Immediate  Evaluation.                      *   The  Neurological   Findings,  Possible Diagnosis,  Differential diagnoses   And  Options  For    Further   Investigations       And  management   Are  Discussed  Comprehensively. Medications   And  Prescription   Risks  And  Side effects  Are   Also  Discussed. The  Above  Were  Reviewed  With  patient and     questions  Answered  In  Detail. More   Than   50% of face  To face Time   Was  Spent  On  Counseling   And   Coordination      Of  Care   Of   Patient's multiple   Neurological  Problems   And   Comorbid  Medical   Conditions. Electronically signed by Maricarmen Reyna MD.,  [facility]       Board Certified in  Neurology &  In  [employer] of [employer] and Neurology (ABPN)      DISCLAIMER:   Although every effort was made to ensure the accuracy of this  electronic transcription, some errors in transcription may have occurred. GENERAL PATIENT INSTRUCTIONS:     A Healthy Lifestyle: Care Instructions  Your Care Instructions  A healthy lifestyle can help you feel good, stay at a healthy weight, and have plenty of energy for both work and play. A healthy lifestyle is something you can share with your whole family. A healthy lifestyle also can lower your risk for serious health problems, such as high blood pressure, heart disease, and diabetes. You can follow a few steps listed below to improve your health and the health of your family. Follow-up care is a key part of your treatment and safety. Be sure to make and go to all appointments, and call your doctor if you are having problems. Its also a good idea to know your test results and keep a list of the medicines you take. How can you care for yourself at home? Do not eat too much sugar, fat, or fast foods. You can still have dessert and treats now and then. The goal is moderation. Start small to improve your eating habits.  Pay attention to portion sizes, drink less juice and soda pop, and eat more fruits and vegetables. Eat a healthy amount of food. A 3-ounce serving of meat, for example, is about the size of a deck of cards. Fill the rest of your plate with vegetables and whole grains. Limit the amount of soda and sports drinks you have every day. Drink more water when you are thirsty. Eat at least 5 servings of fruits and vegetables every day. It may seem like a lot, but it is not hard to reach this goal. A serving or helping is 1 piece of fruit, 1 cup of vegetables, or 2 cups of leafy, raw vegetables. Have an apple or some carrot sticks as an afternoon snack instead of a candy bar. Try to have fruits and/or vegetables at every meal.  Make exercise part of your daily routine. You may want to start with simple activities, such as walking, bicycling, or slow swimming. Try to be active 30 to 60 minutes every day. You do not need to do all 30 to 60 minutes all at once. For example, you can exercise 3 times a day for 10 or 20 minutes. Moderate exercise is safe for most people, but it is always a good idea to talk to your doctor before starting an exercise program.  Keep moving. Pegrajnie Shy the lawn, work in the garden, or [place]. Take the stairs instead of the elevator at work. If you smoke, quit. People who smoke have an increased risk for heart attack, stroke, cancer, and other lung illnesses. Quitting is hard, but there are ways to boost your chance of quitting tobacco for good. Use nicotine gum, patches, or lozenges. Ask your doctor about stop-smoking programs and medicines. Keep trying. In addition to reducing your risk of diseases in the future, you will notice some benefits soon after you stop using tobacco. If you have shortness of breath or asthma symptoms, they will likely get better within a few weeks after you quit. Limit how much alcohol you drink. Moderate amounts of alcohol (up to 2 drinks a day for men, 1 drink a day for women) are okay.  But drinking too much can lead to liver problems, high blood pressure, and other health problems. Family health  If you have a family, there are many things you can do together to improve your health. Eat meals together as a family as often as possible. Eat healthy foods. This includes fruits, vegetables, lean meats and dairy, and whole grains. Include your family in your fitness plan. Most people think of activities such as jogging or tennis as the way to fitness, but there are many ways you and your family can be more active. Anything that makes you breathe hard and gets your heart pumping is exercise. Here are some tips:  Walk to do errands or to take your child to school or the bus. Go for a family bike ride after dinner instead of watching TV. Where can you learn more? Go to https://[employer]. org and sign in to your [employer] account. Enter Z180 in the Groopie box to learn more about \"A Healthy Lifestyle: Care Instructions. \"     If you do not have an account, please click on the \"Sign Up Now\" link. Current as of: July 26, 2016  Content Version: 11.2  © 5238-4868 [employer]. Care instructions adapted under license by [facility] ([facility]). If you have questions about a medical condition or this instruction, always ask your healthcare professional. [address]  any warranty or liability for your use of this information.

## 2021-03-01 ENCOUNTER — ANESTHESIA EVENT (OUTPATIENT)
Dept: OPERATING ROOM | Age: 56
End: 2021-03-01
Payer: MEDICARE

## 2021-03-01 ENCOUNTER — PRE-PROCEDURE TELEPHONE (OUTPATIENT)
Dept: PREADMISSION TESTING | Age: 56
End: 2021-03-01

## 2021-03-05 ENCOUNTER — HOSPITAL ENCOUNTER (OUTPATIENT)
Dept: PREADMISSION TESTING | Age: 56
Setting detail: SPECIMEN
Discharge: HOME | End: 2021-03-09
Payer: MEDICARE

## 2021-03-05 DIAGNOSIS — Z11.59 ENCOUNTER FOR SCREENING FOR OTHER VIRAL DISEASES: Primary | ICD-10-CM

## 2021-03-05 PROCEDURE — U0005 INFEC AGEN DETEC AMPLI PROBE: HCPCS

## 2021-03-05 PROCEDURE — U0003 INFECTIOUS AGENT DETECTION BY NUCLEIC ACID (DNA OR RNA); SEVERE ACUTE RESPIRATORY SYNDROME CORONAVIRUS 2 (SARS-COV-2) (CORONAVIRUS DISEASE [COVID-19]), AMPLIFIED PROBE TECHNIQUE, MAKING USE OF HIGH THROUGHPUT TECHNOLOGIES AS DESCRIBED BY CMS-2020-01-R: HCPCS

## 2021-03-06 LAB
SARS-COV-2: NORMAL
SARS-COV-2: NOT DETECTED
SOURCE: NORMAL

## 2021-03-10 ENCOUNTER — HOSPITAL ENCOUNTER (OUTPATIENT)
Age: 56
Setting detail: OUTPATIENT SURGERY
Discharge: HOME | End: 2021-03-10
Attending: PLASTIC SURGERY | Admitting: PLASTIC SURGERY
Payer: MEDICARE

## 2021-03-10 ENCOUNTER — ANESTHESIA (OUTPATIENT)
Dept: OPERATING ROOM | Age: 56
End: 2021-03-10
Payer: MEDICARE

## 2021-03-10 VITALS
SYSTOLIC BLOOD PRESSURE: 175 MMHG | BODY MASS INDEX: 48.5 KG/M2 | OXYGEN SATURATION: 98 % | RESPIRATION RATE: 20 BRPM | HEART RATE: 69 BPM | DIASTOLIC BLOOD PRESSURE: 93 MMHG | WEIGHT: 293 LBS | TEMPERATURE: 96.2 F

## 2021-03-10 VITALS — SYSTOLIC BLOOD PRESSURE: 179 MMHG | DIASTOLIC BLOOD PRESSURE: 70 MMHG | OXYGEN SATURATION: 100 % | TEMPERATURE: 96.4 F

## 2021-03-10 DIAGNOSIS — G89.18 POST-OP PAIN: Primary | ICD-10-CM

## 2021-03-10 PROCEDURE — 7100000011 HC PHASE II RECOVERY - ADDTL 15 MIN: Performed by: PLASTIC SURGERY

## 2021-03-10 PROCEDURE — 2580000003 HC RX 258: Performed by: ANESTHESIOLOGY

## 2021-03-10 PROCEDURE — 3600000002 HC SURGERY LEVEL 2 BASE: Performed by: PLASTIC SURGERY

## 2021-03-10 PROCEDURE — 7100000010 HC PHASE II RECOVERY - FIRST 15 MIN: Performed by: PLASTIC SURGERY

## 2021-03-10 PROCEDURE — 3700000001 HC ADD 15 MINUTES (ANESTHESIA): Performed by: PLASTIC SURGERY

## 2021-03-10 PROCEDURE — 2500000003 HC RX 250 WO HCPCS: Performed by: ANESTHESIOLOGY

## 2021-03-10 PROCEDURE — 6360000002 HC RX W HCPCS: Performed by: ANESTHESIOLOGY

## 2021-03-10 PROCEDURE — 7100000000 HC PACU RECOVERY - FIRST 15 MIN: Performed by: PLASTIC SURGERY

## 2021-03-10 PROCEDURE — 88305 TISSUE EXAM BY PATHOLOGIST: CPT

## 2021-03-10 PROCEDURE — 2709999900 HC NON-CHARGEABLE SUPPLY: Performed by: PLASTIC SURGERY

## 2021-03-10 PROCEDURE — 3600000012 HC SURGERY LEVEL 2 ADDTL 15MIN: Performed by: PLASTIC SURGERY

## 2021-03-10 PROCEDURE — 6370000000 HC RX 637 (ALT 250 FOR IP): Performed by: ANESTHESIOLOGY

## 2021-03-10 PROCEDURE — C1789 PROSTHESIS, BREAST, IMP: HCPCS | Performed by: PLASTIC SURGERY

## 2021-03-10 PROCEDURE — 3700000000 HC ANESTHESIA ATTENDED CARE: Performed by: PLASTIC SURGERY

## 2021-03-10 PROCEDURE — 7100000001 HC PACU RECOVERY - ADDTL 15 MIN: Performed by: PLASTIC SURGERY

## 2021-03-10 PROCEDURE — 2500000003 HC RX 250 WO HCPCS: Performed by: PLASTIC SURGERY

## 2021-03-10 DEVICE — IMPLANTABLE DEVICE: Type: IMPLANTABLE DEVICE | Site: BREAST | Status: FUNCTIONAL

## 2021-03-10 RX ORDER — MORPHINE SULFATE 1 MG/ML
1 INJECTION, SOLUTION EPIDURAL; INTRATHECAL; INTRAVENOUS EVERY 5 MIN PRN
Status: DISCONTINUED | OUTPATIENT
Start: 2021-03-10 | End: 2021-03-10 | Stop reason: HOSPADM

## 2021-03-10 RX ORDER — HYDROCODONE BITARTRATE AND ACETAMINOPHEN 5; 325 MG/1; MG/1
2 TABLET ORAL PRN
Status: COMPLETED | OUTPATIENT
Start: 2021-03-10 | End: 2021-03-10

## 2021-03-10 RX ORDER — LIDOCAINE HYDROCHLORIDE 10 MG/ML
INJECTION, SOLUTION EPIDURAL; INFILTRATION; INTRACAUDAL; PERINEURAL PRN
Status: DISCONTINUED | OUTPATIENT
Start: 2021-03-10 | End: 2021-03-10 | Stop reason: SDUPTHER

## 2021-03-10 RX ORDER — SODIUM CHLORIDE 0.9 % (FLUSH) 0.9 %
10 SYRINGE (ML) INJECTION EVERY 12 HOURS SCHEDULED
Status: DISCONTINUED | OUTPATIENT
Start: 2021-03-10 | End: 2021-03-10 | Stop reason: HOSPADM

## 2021-03-10 RX ORDER — SCOLOPAMINE TRANSDERMAL SYSTEM 1 MG/1
1 PATCH, EXTENDED RELEASE TRANSDERMAL ONCE
Status: DISCONTINUED | OUTPATIENT
Start: 2021-03-10 | End: 2021-03-10 | Stop reason: HOSPADM

## 2021-03-10 RX ORDER — ROCURONIUM BROMIDE 10 MG/ML
INJECTION, SOLUTION INTRAVENOUS PRN
Status: DISCONTINUED | OUTPATIENT
Start: 2021-03-10 | End: 2021-03-10 | Stop reason: SDUPTHER

## 2021-03-10 RX ORDER — ONDANSETRON 2 MG/ML
4 INJECTION INTRAMUSCULAR; INTRAVENOUS
Status: DISCONTINUED | OUTPATIENT
Start: 2021-03-10 | End: 2021-03-10 | Stop reason: HOSPADM

## 2021-03-10 RX ORDER — SODIUM CHLORIDE, SODIUM LACTATE, POTASSIUM CHLORIDE, CALCIUM CHLORIDE 600; 310; 30; 20 MG/100ML; MG/100ML; MG/100ML; MG/100ML
INJECTION, SOLUTION INTRAVENOUS CONTINUOUS
Status: DISCONTINUED | OUTPATIENT
Start: 2021-03-10 | End: 2021-03-10 | Stop reason: HOSPADM

## 2021-03-10 RX ORDER — HYDROCODONE BITARTRATE AND ACETAMINOPHEN 5; 325 MG/1; MG/1
1 TABLET ORAL EVERY 4 HOURS PRN
Qty: 42 TABLET | Refills: 0 | Status: SHIPPED | OUTPATIENT
Start: 2021-03-10 | End: 2021-03-17

## 2021-03-10 RX ORDER — MIDAZOLAM HYDROCHLORIDE 1 MG/ML
INJECTION INTRAMUSCULAR; INTRAVENOUS PRN
Status: DISCONTINUED | OUTPATIENT
Start: 2021-03-10 | End: 2021-03-10 | Stop reason: SDUPTHER

## 2021-03-10 RX ORDER — SUCCINYLCHOLINE CHLORIDE 20 MG/ML
INJECTION INTRAMUSCULAR; INTRAVENOUS PRN
Status: DISCONTINUED | OUTPATIENT
Start: 2021-03-10 | End: 2021-03-10 | Stop reason: SDUPTHER

## 2021-03-10 RX ORDER — DEXAMETHASONE SODIUM PHOSPHATE 10 MG/ML
INJECTION INTRAMUSCULAR; INTRAVENOUS PRN
Status: DISCONTINUED | OUTPATIENT
Start: 2021-03-10 | End: 2021-03-10 | Stop reason: SDUPTHER

## 2021-03-10 RX ORDER — BUPIVACAINE HYDROCHLORIDE 2.5 MG/ML
INJECTION, SOLUTION EPIDURAL; INFILTRATION; INTRACAUDAL
Status: DISCONTINUED
Start: 2021-03-10 | End: 2021-03-10 | Stop reason: HOSPADM

## 2021-03-10 RX ORDER — PROPOFOL 10 MG/ML
INJECTION, EMULSION INTRAVENOUS PRN
Status: DISCONTINUED | OUTPATIENT
Start: 2021-03-10 | End: 2021-03-10 | Stop reason: SDUPTHER

## 2021-03-10 RX ORDER — CEFAZOLIN SODIUM 2 G/50ML
SOLUTION INTRAVENOUS PRN
Status: DISCONTINUED | OUTPATIENT
Start: 2021-03-10 | End: 2021-03-10 | Stop reason: SDUPTHER

## 2021-03-10 RX ORDER — PHENYLEPHRINE HCL IN 0.9% NACL 1 MG/10 ML
SYRINGE (ML) INTRAVENOUS PRN
Status: DISCONTINUED | OUTPATIENT
Start: 2021-03-10 | End: 2021-03-10 | Stop reason: SDUPTHER

## 2021-03-10 RX ORDER — SCOLOPAMINE TRANSDERMAL SYSTEM 1 MG/1
PATCH, EXTENDED RELEASE TRANSDERMAL
Status: DISCONTINUED
Start: 2021-03-10 | End: 2021-03-10 | Stop reason: HOSPADM

## 2021-03-10 RX ORDER — ONDANSETRON 2 MG/ML
INJECTION INTRAMUSCULAR; INTRAVENOUS PRN
Status: DISCONTINUED | OUTPATIENT
Start: 2021-03-10 | End: 2021-03-10 | Stop reason: SDUPTHER

## 2021-03-10 RX ORDER — BUPIVACAINE HYDROCHLORIDE 2.5 MG/ML
INJECTION, SOLUTION EPIDURAL; INFILTRATION; INTRACAUDAL PRN
Status: DISCONTINUED | OUTPATIENT
Start: 2021-03-10 | End: 2021-03-10 | Stop reason: ALTCHOICE

## 2021-03-10 RX ORDER — SODIUM CHLORIDE 9 MG/ML
INJECTION, SOLUTION INTRAVENOUS CONTINUOUS
Status: DISCONTINUED | OUTPATIENT
Start: 2021-03-10 | End: 2021-03-10 | Stop reason: HOSPADM

## 2021-03-10 RX ORDER — PROMETHAZINE HYDROCHLORIDE 25 MG/ML
6.25 INJECTION, SOLUTION INTRAMUSCULAR; INTRAVENOUS
Status: DISCONTINUED | OUTPATIENT
Start: 2021-03-10 | End: 2021-03-10 | Stop reason: HOSPADM

## 2021-03-10 RX ORDER — APREPITANT 40 MG/1
CAPSULE ORAL
Status: DISCONTINUED
Start: 2021-03-10 | End: 2021-03-10 | Stop reason: HOSPADM

## 2021-03-10 RX ORDER — HYDROCODONE BITARTRATE AND ACETAMINOPHEN 5; 325 MG/1; MG/1
TABLET ORAL
Status: DISCONTINUED
Start: 2021-03-10 | End: 2021-03-10 | Stop reason: HOSPADM

## 2021-03-10 RX ORDER — DIPHENHYDRAMINE HYDROCHLORIDE 50 MG/ML
12.5 INJECTION INTRAMUSCULAR; INTRAVENOUS
Status: DISCONTINUED | OUTPATIENT
Start: 2021-03-10 | End: 2021-03-10 | Stop reason: HOSPADM

## 2021-03-10 RX ORDER — FENTANYL CITRATE 50 UG/ML
25 INJECTION, SOLUTION INTRAMUSCULAR; INTRAVENOUS EVERY 5 MIN PRN
Status: DISCONTINUED | OUTPATIENT
Start: 2021-03-10 | End: 2021-03-10 | Stop reason: HOSPADM

## 2021-03-10 RX ORDER — HYDRALAZINE HYDROCHLORIDE 20 MG/ML
5 INJECTION INTRAMUSCULAR; INTRAVENOUS EVERY 10 MIN PRN
Status: DISCONTINUED | OUTPATIENT
Start: 2021-03-10 | End: 2021-03-10 | Stop reason: HOSPADM

## 2021-03-10 RX ORDER — SODIUM CHLORIDE 0.9 % (FLUSH) 0.9 %
10 SYRINGE (ML) INJECTION PRN
Status: DISCONTINUED | OUTPATIENT
Start: 2021-03-10 | End: 2021-03-10 | Stop reason: HOSPADM

## 2021-03-10 RX ORDER — HYDROCODONE BITARTRATE AND ACETAMINOPHEN 5; 325 MG/1; MG/1
1 TABLET ORAL PRN
Status: COMPLETED | OUTPATIENT
Start: 2021-03-10 | End: 2021-03-10

## 2021-03-10 RX ORDER — FENTANYL CITRATE 50 UG/ML
INJECTION, SOLUTION INTRAMUSCULAR; INTRAVENOUS PRN
Status: DISCONTINUED | OUTPATIENT
Start: 2021-03-10 | End: 2021-03-10 | Stop reason: SDUPTHER

## 2021-03-10 RX ORDER — MEPERIDINE HYDROCHLORIDE 50 MG/ML
12.5 INJECTION INTRAMUSCULAR; INTRAVENOUS; SUBCUTANEOUS EVERY 5 MIN PRN
Status: DISCONTINUED | OUTPATIENT
Start: 2021-03-10 | End: 2021-03-10 | Stop reason: HOSPADM

## 2021-03-10 RX ORDER — SODIUM CHLORIDE, SODIUM LACTATE, POTASSIUM CHLORIDE, CALCIUM CHLORIDE 600; 310; 30; 20 MG/100ML; MG/100ML; MG/100ML; MG/100ML
INJECTION, SOLUTION INTRAVENOUS CONTINUOUS PRN
Status: DISCONTINUED | OUTPATIENT
Start: 2021-03-10 | End: 2021-03-10 | Stop reason: SDUPTHER

## 2021-03-10 RX ORDER — MORPHINE SULFATE 2 MG/ML
INJECTION, SOLUTION INTRAMUSCULAR; INTRAVENOUS
Status: DISCONTINUED
Start: 2021-03-10 | End: 2021-03-10 | Stop reason: HOSPADM

## 2021-03-10 RX ORDER — APREPITANT 40 MG/1
40 CAPSULE ORAL ONCE
Status: COMPLETED | OUTPATIENT
Start: 2021-03-10 | End: 2021-03-10

## 2021-03-10 RX ADMIN — HYDROCODONE BITARTRATE AND ACETAMINOPHEN 2 TABLET: 5; 325 TABLET ORAL at 12:38

## 2021-03-10 RX ADMIN — SUCCINYLCHOLINE CHLORIDE 100 MG: 20 INJECTION, SOLUTION INTRAMUSCULAR; INTRAVENOUS at 10:08

## 2021-03-10 RX ADMIN — ONDANSETRON 4 MG: 2 INJECTION INTRAMUSCULAR; INTRAVENOUS at 11:32

## 2021-03-10 RX ADMIN — FENTANYL CITRATE 100 MCG: 50 INJECTION, SOLUTION INTRAMUSCULAR; INTRAVENOUS at 10:08

## 2021-03-10 RX ADMIN — MORPHINE SULFATE 1 MG: 1 INJECTION EPIDURAL; INTRATHECAL; INTRAVENOUS at 12:06

## 2021-03-10 RX ADMIN — HYDROMORPHONE HYDROCHLORIDE 0.5 MG: 1 INJECTION, SOLUTION INTRAMUSCULAR; INTRAVENOUS; SUBCUTANEOUS at 12:37

## 2021-03-10 RX ADMIN — FENTANYL CITRATE 50 MCG: 50 INJECTION, SOLUTION INTRAMUSCULAR; INTRAVENOUS at 11:49

## 2021-03-10 RX ADMIN — SODIUM CHLORIDE, POTASSIUM CHLORIDE, SODIUM LACTATE AND CALCIUM CHLORIDE: 600; 310; 30; 20 INJECTION, SOLUTION INTRAVENOUS at 10:02

## 2021-03-10 RX ADMIN — MORPHINE SULFATE 1 MG: 1 INJECTION EPIDURAL; INTRATHECAL; INTRAVENOUS at 12:15

## 2021-03-10 RX ADMIN — SODIUM CHLORIDE, POTASSIUM CHLORIDE, SODIUM LACTATE AND CALCIUM CHLORIDE: 600; 310; 30; 20 INJECTION, SOLUTION INTRAVENOUS at 08:33

## 2021-03-10 RX ADMIN — FAMOTIDINE 20 MG: 10 INJECTION INTRAVENOUS at 09:06

## 2021-03-10 RX ADMIN — SUGAMMADEX 500 MG: 100 INJECTION, SOLUTION INTRAVENOUS at 11:35

## 2021-03-10 RX ADMIN — Medication 200 MCG: at 11:17

## 2021-03-10 RX ADMIN — SUCCINYLCHOLINE CHLORIDE 100 MG: 20 INJECTION, SOLUTION INTRAMUSCULAR; INTRAVENOUS at 10:14

## 2021-03-10 RX ADMIN — PROPOFOL 200 MG: 10 INJECTION, EMULSION INTRAVENOUS at 10:08

## 2021-03-10 RX ADMIN — LIDOCAINE HYDROCHLORIDE 50 MG: 10 INJECTION, SOLUTION EPIDURAL; INFILTRATION; INTRACAUDAL; PERINEURAL at 10:08

## 2021-03-10 RX ADMIN — PROPOFOL 200 MG: 10 INJECTION, EMULSION INTRAVENOUS at 10:14

## 2021-03-10 RX ADMIN — MIDAZOLAM HYDROCHLORIDE 2 MG: 1 INJECTION, SOLUTION INTRAMUSCULAR; INTRAVENOUS at 10:02

## 2021-03-10 RX ADMIN — Medication 10 MG: at 10:25

## 2021-03-10 RX ADMIN — CEFAZOLIN SODIUM 2 G: 2 SOLUTION INTRAVENOUS at 10:02

## 2021-03-10 RX ADMIN — ROCURONIUM BROMIDE 50 MG: 10 INJECTION INTRAVENOUS at 10:21

## 2021-03-10 RX ADMIN — APREPITANT 40 MG: 40 CAPSULE ORAL at 09:07

## 2021-03-10 RX ADMIN — FENTANYL CITRATE 100 MCG: 50 INJECTION, SOLUTION INTRAMUSCULAR; INTRAVENOUS at 10:02

## 2021-03-10 ASSESSMENT — PULMONARY FUNCTION TESTS
PIF_VALUE: 1
PIF_VALUE: 26
PIF_VALUE: 3
PIF_VALUE: 28
PIF_VALUE: 26
PIF_VALUE: 27
PIF_VALUE: 28
PIF_VALUE: 26
PIF_VALUE: 1
PIF_VALUE: 26
PIF_VALUE: 26
PIF_VALUE: 27
PIF_VALUE: 27
PIF_VALUE: 25
PIF_VALUE: 27
PIF_VALUE: 26
PIF_VALUE: 1
PIF_VALUE: 28
PIF_VALUE: 27
PIF_VALUE: 1
PIF_VALUE: 1
PIF_VALUE: 25
PIF_VALUE: 27
PIF_VALUE: 1
PIF_VALUE: 1
PIF_VALUE: 26
PIF_VALUE: 4
PIF_VALUE: 24
PIF_VALUE: 23
PIF_VALUE: 0
PIF_VALUE: 4
PIF_VALUE: 28
PIF_VALUE: 5
PIF_VALUE: 29
PIF_VALUE: 26
PIF_VALUE: 1
PIF_VALUE: 26
PIF_VALUE: 25
PIF_VALUE: 25
PIF_VALUE: 27
PIF_VALUE: 26
PIF_VALUE: 27
PIF_VALUE: 28
PIF_VALUE: 26
PIF_VALUE: 27
PIF_VALUE: 26
PIF_VALUE: 1
PIF_VALUE: 27
PIF_VALUE: 27
PIF_VALUE: 3
PIF_VALUE: 26
PIF_VALUE: 27
PIF_VALUE: 26
PIF_VALUE: 23
PIF_VALUE: 28

## 2021-03-10 ASSESSMENT — PAIN SCALES - GENERAL
PAINLEVEL_OUTOF10: 8
PAINLEVEL_OUTOF10: 8
PAINLEVEL_OUTOF10: 5

## 2021-03-10 NOTE — H&P
Subjective:   Patient ID: Raulito Tavares is a 54 y.o. female. HPI   Patient presents today for right breast reconstruction. She has had one previous breast surgery before and has a personal history of breast cancer. Patient does periodically do self breast exams at home, as often as once monthly, and had previously found a lump. Her last mammogram was 11/24/2020 and was normal. She is the mother of 2, youngest is age 28years old. She breast-fed 0 children. Patient's weight is currently 295 pounds and fluctuates by about 5 pounds. Her current bra size is 42C on the left. Patient is not a smoker. Patient has a history of blood clots in her lungs since her last visit and is currently on Eliquis 5 mg BID. She is currently under care of Dr. Dodie Verde and reports that he feels she would be okay to be off blood thinners for the procedure (after 6 months). Her last appointment with Dr. Dodie Verde was on 4/28/2020. Review of Systems   Constitutional: Negative. HENT: Negative for congestion and trouble swallowing. Respiratory: Negative for cough and shortness of breath. Cardiovascular: Negative for chest pain. Gastrointestinal: Negative for abdominal distention. Neurological: Negative for light-headedness and headaches. Psychiatric/Behavioral: Negative for dysphoric mood.      Medical History  Medical History   Diagnosis Date Comment Source   Asthma  seasonal    Bipolar 1 disorder ([facility] [address]      Breast cancer ([facility] [address] 2017 right side     Eye twitch 2019     History of blood transfusion      Other Medical History   Diagnosis Date Comment Source   DVT of axillary vein, acute right (HCC)      Headache(784.0)      Hx of blood clots 03/2020 PE    Hyperlipidemia      Migraine      Obesity      Sleep apnea  uses CPAP         Family History  Problem Relation Age of Onset Comments   Breast Cancer Maternal Aunt 71    Breast Cancer Sister 54    Cataracts Mother     Glaucoma Mother     Heart Disease Father     High Blood Pressure Father     High Cholesterol Father     Mental Retardation Father     Other Maternal Cousin  Atypical Ductal Hyperplasia    Other Sister  breast cyst   Uterine Cancer Sister 32         Social History  Tobacco History  Smoking Status   Never Smoker     Smokeless Tobacco Use   Never Used     Tobacco Comment   Never smoker. TC, RRT 4/5/18   Alcohol History  Alcohol Use Status   No   Drug Use  Drug Use Status   No   Sexual Activity  Sexually Active   Not Currently Partners   Male Birth Control/Protection   Surgical             Surgical History  Procedure Laterality Date Comment Source   BREAST SURGERY   rt mastectomy    CATHETER REMOVAL Left 6/6/2019 PORT REMOVAL performed by Cecy Holden DO at [facility] N/A 10/13/2017 D & C HYSTEROSCOPY with polypectomy performed by Una Pal MD at [address] [place] [address] / REMOVAL /  [facility] Left 11/9/2017 PORT INSERTION performed by Rosana Redman MD at [address] [place] [address] / REMOVAL / [address] [place] [address] Ulysses Said N/A 11/30/2017 Port Removal & Insertion performed by Rosana Redman MD at [address] [place] [address] Right 10/19/2017  [facility]    MASTECTOMY Right 10/19/2017 Right Breast Mastectomy with [place] Node Biopsy performed by Rosana Redman MD at [facility] CTR VAD W/SUBQ PORT AGE 5 YR/> Left 3/1/2018 PORT Exchange performed by Rosana Redman MD at [address] [address] Left 2014, 2015 x 2 surgeries total; Dr. Jaylon Stewart    TUNNELED VENOUS PORT PLACEMENT Left 11/30/2017 removal of et replacement of         E-Cigarettes/Vaping Use  Questions   E-Cigarette/Vaping Use   Responses: Never User   Start Date   Passive Exposure   Quit Date   Counseling Given   Comments        Socioeconomic History  Employment History  No employment history on file.    Family and Education  Marital Status   Single   Social Identity  Preferred Language Ethnicity Race   English Non-/Non  White   Financial Resource Strain  No financial resource strain value on [employer]  No food insecurity information on file   Transportation Needs  No transportation needs information on file     Outpatient Medications       butalbital-acetaminophen-caffeine (FIORICET, ESGIC) -40 MG per tablet  Take 1 tablet by mouth 2 times daily as needed for Anxiety (Papo facial spasms) for up to 31 days. SUMAtriptan (IMITREX) 100 MG tablet  ONE TABLET TWICE DAILY   Chlorpheniramine Maleate (ALLERGY RELIEF PO)  Take 50 mg by mouth daily At bedtime   cephALEXin (KEFLEX) 250 MG capsule  Take 1 tablet by mouth once for 1 dose Take morning of surgery with minimal sip of water. pravastatin (PRAVACHOL) 40 MG tablet  Take 1 tablet by mouth every 8 hours as needed (muscle pain)   Cyanocobalamin ER (RA VITAMIN B-12 TR) 1000 MCG TBCR  take 1 tablet by mouth twice a day   levocetirizine (XYZAL) 5 MG tablet  take 1 tablet by mouth once daily   folic acid (FOLVITE) 1 MG tablet  1 spray by Nasal route daily Indications: as needed   busPIRone (BUSPAR) 7.5 MG tablet  take 1 tablet by mouth every morning BEFORE NOON   VENTOLIN  (90 Base) MCG/ACT inhaler  Take 0.5 mg by mouth daily   QUEtiapine (SEROQUEL) 100 MG tablet  1 spray by Nasal route as needed for Congestion   hydrOXYzine (ATARAX) 10 MG tablet  150 mg nightly    metoclopramide (REGLAN) 10 MG tablet()  Inject 0.93 mLs into the skin every 12 hours   ELIQUIS 5 MG TABS tablet           Objective:   Physical Exam   Vitals signs and nursing note reviewed. Exam conducted with a chaperone present. Constitutional:   Appearance: Normal appearance. HENT:   Head: Normocephalic and atraumatic. Mouth/Throat:   Mouth: Mucous membranes are moist.   Eyes:   Extraocular Movements: Extraocular movements intact. Conjunctiva/sclera: Conjunctivae normal.   Pupils: Pupils are equal, round, and reactive to light.    Pulmonary:   Effort: Pulmonary effort is normal. Chest:       Comments:   Right post mastectomy. Some skin redundancy. Left no masses. Ptotic. Skin:   General: Skin is warm and dry. Neurological:   General: No focal deficit present. Mental Status: She is alert and oriented to person, place, and time. Assessment:     S/P right mastectomy for breast ca. Plan:     I discussed at length, right breast reconstruction with expander / implant. The procedure, aftercare, expansion, follow-up. That there are multiple stages. She does wish nipple reconstruction and left mastopexy for symmetry. Will schedule for first stage, placement of tissue expander. I have discussed with the patient the indication, alternatives, and the possible risks and/or complications which include but are not limited to those delineated on the consent form for the planned procedure and the anesthesia methods. All questions were answered to patient's satisfaction. Consent was reviewed and signed.

## 2021-03-10 NOTE — ANESTHESIA PRE PROCEDURE
Department of Anesthesiology  Preprocedure Note       Name:  Maylin Cerda   Age:  54 y.o.  :  1965                                          MRN:  [id number]         Date:  3/10/2021      Surgeon: Loan Parks):  Felicia Madsen MD    Procedure: Procedure(s):  BREAST RECONSTRUCTION WITH TISSUE EXPANDER INSERTION    Medications prior to admission:   Prior to Admission medications    Medication Sig Start Date End Date Taking? Authorizing Provider   butalbital-acetaminophen-caffeine (FIORICET, ESGIC) -40 MG per tablet 1-2   TABLET  TWICE  DAILY  AS  NEEDED 21  Yes Triston Peralta MD   clonazePAM (KLONOPIN) 0.5 MG tablet Take 1 tablet by mouth 2 times daily as needed for Anxiety (Papo  facial  spasms) for up to 31 days. 2/26/21 8/61/15 Yes Triston Peralta MD   SUMAtriptan (IMITREX) 100 MG tablet Take 1 tablet by mouth once as needed for Migraine 21 Yes Triston Peralta MD   topiramate (TOPAMAX) 50 MG tablet ONE  TABLET  TWICE  DAILY 21  Yes Triston Peralta MD   Chlorpheniramine Maleate (ALLERGY RELIEF PO) Take by mouth daily   Yes Historical Provider, MD   lamoTRIgine (LAMICTAL) 25 MG tablet Take 50 mg by mouth daily At bedtime   Yes Historical Provider, MD   cephALEXin (KEFLEX) 250 MG capsule Take one PO QID til gone. Start one day prior to surgery / procedure. 21  Yes Felicia Madsen MD   famotidine (PEPCID) 20 MG tablet Take 1 tablet by mouth once for 1 dose Take morning of surgery with minimal sip of water.  2/11/21 3/10/21 Yes Felicia Madsen MD   pravastatin (PRAVACHOL) 40 MG tablet take 1 tablet by mouth once daily 2/10/21  Yes Arlet Garcia MD   tiZANidine (ZANAFLEX) 4 MG tablet Take 1 tablet by mouth every 8 hours as needed (muscle pain) 21  Yes Janes Claudio MD   Cyanocobalamin ER (RA VITAMIN B-12 TR) 1000 MCG TBCR Take one daily by mouth 21  Yes Janes Claudio MD   Calcium Carbonate-Vitamin D (OYSTER SHELL CALCIUM/D) 500-200 MG-UNIT TABS take 1 tablet by mouth twice a day 2/9/21  Yes Anne Marie Fernandes MD   levocetirizine (XYZAL) 5 MG tablet Take 1 tablet by mouth nightly 1/8/21  Yes Ken Sherman MD   letrozole [facility]) 2.5 MG tablet take 1 tablet by mouth once daily 12/30/20  Yes Anne Marie Fernandes MD   folic acid (FOLVITE) 1 MG tablet Take once daily 12/16/20  Yes Anne Marie Fernandes MD   fluticasone [facility]) 50 MCG/ACT nasal spray 1 spray by Nasal route daily Indications: as needed 4/24/20  Yes MAGGIE Luther CNP   busPIRone (BUSPAR) 7.5 MG tablet take 1 tablet by mouth twice a day 2/25/20  Yes Historical Provider, MD   OXcarbazepine (TRILEPTAL) 150 MG tablet take 1 tablet by mouth every morning BEFORE NOON 2/25/20  Yes Historical Provider, MD GOTTLIEB  (90 Base) MCG/ACT inhaler Inhale 2 puffs into the lungs every 4 hours as needed for Wheezing 11/7/19  Yes MAGGIE Munoz - CNP   benztropine (COGENTIN) 0.5 MG tablet Take 0.5 mg by mouth daily   Yes Historical Provider, MD   QUEtiapine (SEROQUEL) 100 MG tablet Take 50 mg by mouth nightly   Yes Historical Provider, MD   sodium chloride (ALTAMIST SPRAY) 0.65 % nasal spray 1 spray by Nasal route as needed for Congestion 1/25/18  Yes Anne Marie Fernandes MD   hydrOXYzine (ATARAX) 10 MG tablet Take 10 mg by mouth daily  7/21/17  Yes Historical Provider, MD   lamoTRIgine (LAMICTAL) 100 MG tablet 150 mg nightly  8/30/17  Yes Historical Provider, MD   metoclopramide (REGLAN) 10 MG tablet Take 1 tablet by mouth once for 1 dose Take morning of surgery with SMALL SIP of water.   Patient not taking: Reported on 2/23/2021 2/11/21 2/11/21  Denisse Worrell MD   enoxaparin (LOVENOX) 120 MG/0.8ML injection Inject 0.93 mLs into the skin every 12 hours 2/9/21   Anne Marie Fernandes MD   ELIQUIS 5 MG TABS tablet take 1 tablet by mouth twice a day 10/23/20   Anne Marie Fernandes MD       Current medications:    Current Facility-Administered Medications   Medication Dose Route Frequency Provider Last Rate Last Admin    0.9 % sodium chloride infusion   Intravenous Continuous Ledy Guy MD        lactated ringers infusion   Intravenous Continuous Ledy Guy MD        sodium chloride flush 0.9 % injection 10 mL  10 mL Intravenous 2 times per day Ledy Guy MD        sodium chloride flush 0.9 % injection 10 mL  10 mL Intravenous PRN Ledy Guy MD           Allergies: Allergies   Allergen Reactions    Prednisone Swelling     Whole side of her face swelled when she took it, difficulty breathing       Problem List:    Patient Active Problem List   Diagnosis Code    Bipolar 1 disorder ([address] F31.9    Hyperlipidemia E78.5    Malignant neoplasm of overlapping sites of right breast in female, estrogen receptor negative ([facility] C50.811, Z17.1    Port-A-Cath in place Z95.828    Migraine G43.909    Memory problem R41.3    Sleep difficulties G47.9    Anxiety and depression F41.9, F32.9    Muscle twitching R25.3    Hemifacial spasm G51.39    Combined forms of age-related cataract of both eyes H25.813    Squamous blepharitis of upper and lower eyelids of both eyes H01.02A, H01. 02B    Acute deep vein thrombosis (DVT) of axillary vein of right upper extremity (HCC) I82. A11    Malignant neoplasm of breast in female, estrogen receptor positive ([address] [facility] C50.919, Z17.0    Seasonal allergies J30.2    H/O right mastectomy Z90.11    HX: breast cancer Z85.3    Acute massive pulmonary embolism (HCC) I26.99    Moderate to severe pulmonary hypertension (HCC) I27.20    TAY on CPAP G47.33, Z99.89    Morbid obesity with BMI of 40.0-44.9, adult (HCC) E66.01, Z68.41    Extrinsic asthma J45.909    Blepharospasm G24.5    Chronic deep vein thrombosis (DVT) of axillary vein of right upper extremity (HCC) I82. A21       Past Medical History:        Diagnosis Date    Asthma     seasonal    [facility]     Breast cancer ([facility] 2017    right side     DVT of axillary vein, acute right [facility]     Eye twitch 2019    Headache(784.0)     History of blood transfusion     Hx of blood clots 03/2020    PE    Hyperlipidemia     Migraine     Obesity     Sleep apnea     uses CPAP       Past Surgical History:        Procedure Laterality Date    BREAST SURGERY      rt mastectomy    CATHETER REMOVAL Left 6/6/2019    PORT REMOVAL performed by Philip Jarvis DO at [address] N/A 10/13/2017    D & C HYSTEROSCOPY with polypectomy performed by Annabelle Castellon MD at [address] / REMOVAL / [address] Left 11/9/2017    PORT INSERTION performed by Judy Herrera MD at [facility] / REMOVAL / [address] N/A 11/30/2017    Port Removal & Insertion performed by Judy Herrera MD at [address] Right 10/19/2017     [address] [facility]    MASTECTOMY Right 10/19/2017    Right Breast Mastectomy with  Baxter Node Biopsy performed by Judy Herrera MD at [address] [facility] VAD W/SUBQ PORT AGE 5 YR/> Left 3/1/2018    PORT Exchange performed by Judy Herrera MD at [place] [address] Left 2014, 2015    x 2 surgeries total; Dr. Virgil High TUNNELED VENOUS PORT PLACEMENT Left 11/30/2017    removal of et replacement of       Social History:    Social History     Tobacco Use    Smoking status: Never Smoker    Smokeless tobacco: Never Used    Tobacco comment: Never smoker. TC, RRT 4/5/18   Substance Use Topics    Alcohol use: No     Frequency: Never     Binge frequency: Never                                Counseling given: Not Answered  Comment: Never smoker.  TC, RRT 4/5/18      Vital Signs (Current):   Vitals:    03/10/21 0744 03/10/21 0752   BP:  (!) 143/93   Pulse:  80   Resp:  19   Temp: 97.9 °F (36.6 °C)    TempSrc: Temporal    SpO2:  97%   Weight: (!) 300 lb 8 oz (136.3 kg)                                               BP Readings from Last 3 Encounters:   03/10/21 (!) 143/93   02/24/21 (!) 151/93 Neck ROM: full  Mouth opening: > = 3 FB Dental: normal exam         Pulmonary:normal exam  breath sounds clear to auscultation  (+) sleep apnea: on CPAP,  asthma:                            Cardiovascular:    (+) pulmonary hypertension: no interval change and severe, hyperlipidemia      ECG reviewed  Rhythm: regular  Rate: normal                    Neuro/Psych:   (+) neuromuscular disease:, headaches: migraine headaches, psychiatric history: stable with treatmentdepression/anxiety             GI/Hepatic/Renal:   (+) morbid obesity          Endo/Other:    (+) malignancy/cancer. ROS comment: breast cancer Abdominal:   (+) obese,     Abdomen: soft. Vascular:           ROS comment: Hx DVT, PE. Anesthesia Plan      general     ASA 3     (Medically cleared for the surgery by patient's PCP)  Induction: intravenous. Anesthetic plan and risks discussed with patient. Plan discussed with CRNA.                 Nina Hernandez MD   3/10/2021

## 2021-03-10 NOTE — BRIEF OP NOTE
Brief Postoperative Note      Patient: Lucy Lilly  YOB: 1965  MRN: [id number]    Date of Procedure: 3/10/2021    Pre-Op Diagnosis: RIGHT BREAST CA    Post-Op Diagnosis: Same       Procedure(s):  BREAST RECONSTRUCTION WITH TISSUE EXPANDER INSERTION  EXCISION SEROMA RIGHT BREAST    Surgeon(s):  Toni Nichole MD    Assistant:  * No surgical staff found *    Anesthesia: General    Estimated Blood Loss (mL): Minimal    Complications: None    Specimens:   ID Type Source Tests Collected by Time Destination   A : SEROMA RIGHT BREAST Tissue Skin SURGICAL PATHOLOGY Toni Nichole MD 3/10/2021 1045        Implants:  Implant Name Type Inv.  Item Serial No.  Lot No. LRB No. Used Action   EXPANDER TISS GEL 5.9 CM PROJCT 11X10  CC 133S-MX-[id number] - [id number]  EXPANDER TISS GEL 5.9 CM PROJCT 11X10  CC 133S-MX-T [id number] [address] [id number] Right 1 Implanted         Drains:   Closed/Suction Drain Right Breast Bulb [employer] (Active)       Findings:     Electronically signed by Toni Nichole MD on 3/10/2021 at 11:39 AM

## 2021-03-12 LAB — SURGICAL PATHOLOGY REPORT: NORMAL

## 2021-03-13 NOTE — OP NOTE
[facility] [address]                                OPERATIVE REPORT    PATIENT NAME: Gregg Rodríguez                   :        1965  MED REC NO:   [id number]                             ROOM:  ACCOUNT NO:   [de-identified]                           ADMIT DATE: 03/10/2021  PROVIDER:     Cornelius Priest    DATE OF PROCEDURE:  03/10/2021    ATTENDING PHYSICIAN AND SURGEON:  Cornelius Priest MD    PREOPERATIVE DIAGNOSIS:  Right breast cancer, status post right  mastectomy. POSTOPERATIVE DIAGNOSIS:  Right breast cancer, status post right  mastectomy. PROCEDURES:  Right breast reconstruction with placement of tissue  expander, also excision of seroma found within the subcutaneous space. ANESTHESIA:  General.    INDICATIONS:  The patient is a 79-year-old female who has previously  undergone mastectomy for breast cancer. She presents at this time  wishing breast reconstruction. The procedure, the risks, the benefits  were discussed with her. All questions were answered to her  satisfaction. Consent was signed. NARRATIVE SUMMARY:  The patient was brought to the operating suite,  placed under general anesthetic in the supine position. She was prepped  and draped in the usual sterile fashion. Initially, the original incision was opened with a scalpel and taken  down through the skin, then with Bovie cutting cautery down through the  subcutaneous, heading towards the pectoralis muscle, where a seroma was  encountered. Seroma had a smooth shiny lining. This was carefully  dissected from the surrounding normal tissues and sent off as specimen. At this point, dissection was continued until the pectoral muscle was  identified.   It was then divided along the lines of its fibers, and then  a submuscular pocket was developed utilizing finger as well as scissor  dissection and with exposure by lighted retractor. A suitable pocket  for the expander was obtained. Bovie cautery used for hemostasis  throughout. Next, the pocket was irrigated out with saline, suctioned clear, followed  by half-strength Betadine solution, which was left instilled in the  pocket for several minutes and then suctioned clear. Finally, 0.25%  Marcaine plain, 10 mL, was instilled to assist with postoperative pain  control. At this point, the expander was brought out. Fresh gloves were donned  to handle it. The expander initially had 50 mL of sterile IV saline  instilled in a closed technique sterile fashion, and all air was  aspirated. The fill needle was then removed. The expander was then  placed into the pocket, no-touch technique. It was then anchored. Each  of its six anchors to the chest wall and musculature with 3-0 Ethibond  sutures. Following this, the muscle was closed with interrupted figure-of-eight  sutures of 3-0 Vicryl. At this point, the fill needle was replaced into  the expander, and a further 100 mL of sterile IV saline was instilled,  closed technique, for a total initial fill of 150 mL. At this point, final closure was carried out. Note that a Leodan-Jack  drain was left within the muscular pocket with the expander and brought  out through a separate lateral stab wound. It was anchored with a 4-0  black nylon drain stitch and placed to bulb suction. Skin was then  closed in two layers with interrupted 3-0 Vicryl in the subcutaneous,  followed by interrupted 3-0 Vicryl deep dermal for skin closure. Steri-Strips for dressing. The patient tolerated the procedure well. Blood loss:  Minimal.  Specimens:  Seroma, sent for pathology. Complications:  None. The patient was transferred to Recovery in stable  condition.         Angie Salinas    D: 03/12/2021 13:43:09       T: 03/12/2021 13:52:54     [id number]  Job#: [id number]     Doc#: [id number]    CC:

## 2021-03-18 PROBLEM — Z98.890 H/O BREAST RECONSTRUCTION: Status: ACTIVE | Noted: 2021-03-18

## 2021-04-02 ENCOUNTER — OFFICE VISIT (OUTPATIENT)
Dept: PRIMARY CARE CLINIC | Age: 56
End: 2021-04-02
Payer: MEDICARE

## 2021-04-02 VITALS
TEMPERATURE: 98.1 F | SYSTOLIC BLOOD PRESSURE: 122 MMHG | OXYGEN SATURATION: 98 % | BODY MASS INDEX: 47.09 KG/M2 | HEART RATE: 83 BPM | RESPIRATION RATE: 18 BRPM | WEIGHT: 293 LBS | DIASTOLIC BLOOD PRESSURE: 76 MMHG | HEIGHT: 66 IN

## 2021-04-02 DIAGNOSIS — H10.33 ACUTE CONJUNCTIVITIS OF BOTH EYES, UNSPECIFIED ACUTE CONJUNCTIVITIS TYPE: ICD-10-CM

## 2021-04-02 DIAGNOSIS — J01.40 ACUTE NON-RECURRENT PANSINUSITIS: Primary | ICD-10-CM

## 2021-04-02 PROCEDURE — 99213 OFFICE O/P EST LOW 20 MIN: CPT | Performed by: NURSE PRACTITIONER

## 2021-04-02 PROCEDURE — G8427 DOCREV CUR MEDS BY ELIG CLIN: HCPCS | Performed by: NURSE PRACTITIONER

## 2021-04-02 PROCEDURE — 1036F TOBACCO NON-USER: CPT | Performed by: NURSE PRACTITIONER

## 2021-04-02 PROCEDURE — 3017F COLORECTAL CA SCREEN DOC REV: CPT | Performed by: NURSE PRACTITIONER

## 2021-04-02 PROCEDURE — G8417 CALC BMI ABV UP PARAM F/U: HCPCS | Performed by: NURSE PRACTITIONER

## 2021-04-02 RX ORDER — AMOXICILLIN 875 MG/1
875 TABLET, COATED ORAL 2 TIMES DAILY
Qty: 20 TABLET | Refills: 0 | Status: SHIPPED | OUTPATIENT
Start: 2021-04-02 | End: 2021-04-12

## 2021-04-02 RX ORDER — POLYMYXIN B SULFATE AND TRIMETHOPRIM 1; 10000 MG/ML; [USP'U]/ML
1 SOLUTION OPHTHALMIC EVERY 4 HOURS
Qty: 1 BOTTLE | Refills: 0 | Status: SHIPPED | OUTPATIENT
Start: 2021-04-02 | End: 2021-04-12

## 2021-04-02 ASSESSMENT — VISUAL ACUITY: OU: 1

## 2021-04-02 ASSESSMENT — ENCOUNTER SYMPTOMS
GASTROINTESTINAL NEGATIVE: 1
SINUS PRESSURE: 1
RHINORRHEA: 1
COUGH: 1
WHEEZING: 0
SHORTNESS OF BREATH: 0
CHEST TIGHTNESS: 0
SORE THROAT: 1
SINUS COMPLAINT: 1

## 2021-04-02 NOTE — PROGRESS NOTES
[facility]             [address], [address]                        Telephone [phone]             Fax [phone]  1965  LER:[id number]   Date of visit:  4/2/2021    Subjective:    Joyce Coelho is a 54 y.o.  female who presents to [facility] today (4/2/2021) for evaluation of:    Chief Complaint   Patient presents with    Sinus Problem     Symptoms began 3 weeks ago, extreme watery eyes, cough,nasal congestion. Sinus Problem  This is a new problem. The current episode started 1 to 4 weeks ago (X 3 weeks ago). The problem has been gradually worsening since onset. There has been no fever. Her pain is at a severity of 8/10. Associated symptoms include congestion, coughing (slight, dry), headaches (frontal, mild), sinus pressure, sneezing and a sore throat. Pertinent negatives include no chills, ear pain or shortness of breath. (Watery eyes) Treatments tried: OTC sinus decongestant; xyzal. The treatment provided no relief.        She has the following problem list:  Patient Active Problem List   Diagnosis    Bipolar 1 disorder ([address]    Hyperlipidemia    Malignant neoplasm of overlapping sites of right breast in female, estrogen receptor negative ([address]    Port-A-Cath in place    Migraine    Memory problem    Sleep difficulties    Anxiety and depression    Muscle twitching    Hemifacial spasm    Combined forms of age-related cataract of both eyes    Squamous blepharitis of upper and lower eyelids of both eyes    Acute deep vein thrombosis (DVT) of axillary vein of right upper extremity ([address]    Malignant neoplasm of breast in female, estrogen receptor positive ([address]    Seasonal allergies    H/O right mastectomy    HX: breast cancer    Acute massive pulmonary embolism ([address]    Moderate to severe pulmonary hypertension ([address]    TAY on CPAP    Morbid fever.   HENT: Positive for congestion, postnasal drip, rhinorrhea, sinus pressure, sneezing and sore throat. Negative for ear pain. Respiratory: Positive for cough (slight, dry). Negative for chest tightness, shortness of breath and wheezing. Cardiovascular: Negative. Gastrointestinal: Negative. Neurological: Positive for headaches (frontal, mild). Physical Exam  Vitals signs and nursing note reviewed. Constitutional:       Appearance: She is well-developed. HENT:      Head: Normocephalic. Jaw: There is normal jaw occlusion. Right Ear: Tympanic membrane, ear canal and external ear normal.      Left Ear: Tympanic membrane, ear canal and external ear normal.      Nose: Congestion present. Right Turbinates: Swollen. Left Turbinates: Swollen. Right Sinus: Maxillary sinus tenderness and frontal sinus tenderness present. Left Sinus: Maxillary sinus tenderness and frontal sinus tenderness present. Mouth/Throat:      Lips: Pink. Mouth: Mucous membranes are moist.      Pharynx: Oropharynx is clear. Uvula midline. Eyes:      General: Lids are normal. Lids are everted, no foreign bodies appreciated. Vision grossly intact. Gaze aligned appropriately. Right eye: Discharge (purulent and watery) present. Left eye: Discharge (watery) present. Extraocular Movements: Extraocular movements intact. Conjunctiva/sclera:      Right eye: Right conjunctiva is injected. Left eye: Left conjunctiva is injected. Pupils: Pupils are equal, round, and reactive to light. Neck:      Musculoskeletal: Normal range of motion and neck supple. Cardiovascular:      Rate and Rhythm: Normal rate and regular rhythm. Heart sounds: Normal heart sounds. Pulmonary:      Effort: Pulmonary effort is normal.      Breath sounds: Normal breath sounds and air entry. Lymphadenopathy:      Cervical: No cervical adenopathy.    Skin:     General: Skin is warm and dry. Neurological:      General: No focal deficit present. Mental Status: She is alert and oriented to person, place, and time. Psychiatric:         Behavior: Behavior normal.         Thought Content: Thought content normal.       Assessment and Plan:    No results found for this visit on 04/02/21. Diagnosis Orders   1. Acute non-recurrent pansinusitis  amoxicillin (AMOXIL) 875 MG tablet   2. Acute conjunctivitis of both eyes, unspecified acute conjunctivitis type  trimethoprim-polymyxin b (POLYTRIM) 95561-8.1 UNIT/ML-% ophthalmic solution       Use antibiotic opthalmic drop as prescribed. Good hand hygiene. I recommended that she use mucinex to help with congestion and cough. I also recommended Flonase for sinus symptoms. she was also encouraged to use tylenol or ibuprofen for pain/fever. Increase water intake. Use cool mist humidifier at bedtime. Use nasal saline flush as needed. Good hand hygiene. Warm wash cloth to affected eye to remove drainage. Follow up with PCP or optometrist if symptoms persist or worsen. The use, risks, benefits, and side effects of prescribed or recommended medications were discussed. All questions were answered and the patient/caregiver voiced understanding. No orders of the defined types were placed in this encounter.         Electronically signed by MAGGIE Chauhan CNP on 4/2/21 at 2:08 PM EDT

## 2021-04-02 NOTE — PATIENT INSTRUCTIONS
Patient Education        Sinusitis: Care Instructions  Your Care Instructions     Sinusitis is an infection of the lining of the sinus cavities in your head. Sinusitis often follows a cold. It causes pain and pressure in your head and face. In most cases, sinusitis gets better on its own in 1 to 2 weeks. But some mild symptoms may last for several weeks. Sometimes antibiotics are needed. Follow-up care is a key part of your treatment and safety. Be sure to make and go to all appointments, and call your doctor if you are having problems. It's also a good idea to know your test results and keep a list of the medicines you take. How can you care for yourself at home? · Take an over-the-counter pain medicine, such as acetaminophen (Tylenol), ibuprofen (Advil, Motrin), or naproxen (Aleve). Read and follow all instructions on the label. · If the doctor prescribed antibiotics, take them as directed. Do not stop taking them just because you feel better. You need to take the full course of antibiotics. · Be careful when taking over-the-counter cold or flu medicines and Tylenol at the same time. Many of these medicines have acetaminophen, which is Tylenol. Read the labels to make sure that you are not taking more than the recommended dose. Too much acetaminophen (Tylenol) can be harmful. · Breathe warm, moist air from a steamy shower, a hot bath, or a sink filled with hot water. Avoid cold, dry air. Using a humidifier in your home may help. Follow the directions for cleaning the machine. · Use saline (saltwater) nasal washes. This can help keep your nasal passages open and wash out mucus and bacteria. You can buy saline nose drops at a grocery store or drugstore. Or you can make your own at home by adding 1 teaspoon of salt and 1 teaspoon of baking soda to 2 cups of distilled water. If you make your own, fill a bulb syringe with the solution, insert the tip into your nostril, and squeeze gently.  Jennifer Simpson your nose.  · Put a hot, wet towel or a warm gel pack on your face 3 or 4 times a day for 5 to 10 minutes each time. · Try a decongestant nasal spray like oxymetazoline (Afrin). Do not use it for more than 3 days in a row. Using it for more than 3 days can make your congestion worse. When should you call for help? Call your doctor now or seek immediate medical care if:    · You have new or worse swelling or redness in your face or around your eyes.     · You have a new or higher fever. Watch closely for changes in your health, and be sure to contact your doctor if:    · You have new or worse facial pain.     · The mucus from your nose becomes thicker (like pus) or has new blood in it.     · You are not getting better as expected. Where can you learn more? Go to https://[employer] MedicalpepicInternal Gamingeb.[employer]. org and sign in to your [employer] account. Enter L927 in the [facility] box to learn more about \"Sinusitis: Care Instructions. \"     If you do not have an account, please click on the \"Sign Up Now\" link. Current as of: December 2, 2020               Content Version: 12.8  © 2006-2021 [employer]. Care instructions adapted under license by [facility] ([facility]). If you have questions about a medical condition or this instruction, always ask your healthcare professional. [place] [address] any warranty or liability for your use of this information. Patient Education        Pinkeye: Care Instructions  Your Care Instructions     Pinkeye is redness and swelling of the eye surface and the conjunctiva (the lining of the eyelid and the covering of the white part of the eye). Pinkeye is also called conjunctivitis. Pinkeye is often caused by infection with bacteria or a virus. Dry air, allergies, smoke, and chemicals are other common causes. Pinkeye often clears on its own in 7 to 10 days. Antibiotics only help if the pinkeye is caused by bacteria.  Pinkeye caused by infection spreads easily. If an allergy or chemical is causing pinkeye, it will not go away unless you can avoid whatever is causing it. Follow-up care is a key part of your treatment and safety. Be sure to make and go to all appointments, and call your doctor if you are having problems. It's also a good idea to know your test results and keep a list of the medicines you take. How can you care for yourself at home? · Wash your hands often. Always wash them before and after you treat pinkeye or touch your eyes or face. · Use moist cotton or a clean, wet cloth to remove crust. Wipe from the inside corner of the eye to the outside. Use a clean part of the cloth for each wipe. · Put cold or warm wet cloths on your eye a few times a day if the eye hurts. · Do not wear contact lenses or eye makeup until the pinkeye is gone. Throw away any eye makeup you were using when you got pinkeye. Clean your contacts and storage case. If you wear disposable contacts, use a new pair when your eye has cleared and it is safe to wear contacts again. · If the doctor gave you antibiotic ointment or eyedrops, use them as directed. Use the medicine for as long as instructed, even if your eye starts looking better soon. Keep the bottle tip clean, and do not let it touch the eye area. · To put in eyedrops or ointment:  ? Tilt your head back, and pull your lower eyelid down with one finger. ? Drop or squirt the medicine inside the lower lid. ? Close your eye for 30 to 60 seconds to let the drops or ointment move around. ? Do not touch the ointment or dropper tip to your eyelashes or any other surface. · Do not share towels, pillows, or washcloths while you have pinkeye. When should you call for help?    Call your doctor now or seek immediate medical care if:    · You have pain in your eye, not just irritation on the surface.     · You have a change in vision or loss of vision.     · You have an increase in discharge from the eye.     · Your eye has not started to improve or begins to get worse within 48 hours after you start using antibiotics.     · Pinkeye lasts longer than 7 days. Watch closely for changes in your health, and be sure to contact your doctor if you have any problems. Where can you learn more? Go to https://chpebrandyneweb.[employer]. org and sign in to your [facility] account. Enter Y392 in the [employer] box to learn more about \"Pinkeye: Care Instructions. \"     If you do not have an account, please click on the \"Sign Up Now\" link. Current as of: February 26, 2020               Content Version: 12.8  © 6921-3020 [employer], [employer]. Care instructions adapted under license by [facility] ([facility]). If you have questions about a medical condition or this instruction, always ask your healthcare professional. [address] any warranty or liability for your use of this information.

## 2021-04-09 ENCOUNTER — OFFICE VISIT (OUTPATIENT)
Dept: SURGERY | Age: 56
End: 2021-04-09
Payer: MEDICARE

## 2021-04-09 VITALS
OXYGEN SATURATION: 98 % | SYSTOLIC BLOOD PRESSURE: 134 MMHG | BODY MASS INDEX: 47.09 KG/M2 | WEIGHT: 293 LBS | DIASTOLIC BLOOD PRESSURE: 84 MMHG | HEIGHT: 66 IN | HEART RATE: 79 BPM

## 2021-04-09 DIAGNOSIS — Z90.11 H/O RIGHT MASTECTOMY: ICD-10-CM

## 2021-04-09 DIAGNOSIS — Z98.890 H/O BREAST RECONSTRUCTION: Primary | ICD-10-CM

## 2021-04-09 DIAGNOSIS — Z85.3 HX: BREAST CANCER: ICD-10-CM

## 2021-04-09 DIAGNOSIS — C50.919 MALIGNANT NEOPLASM OF FEMALE BREAST, UNSPECIFIED ESTROGEN RECEPTOR STATUS, UNSPECIFIED LATERALITY, UNSPECIFIED SITE OF BREAST (HCC): ICD-10-CM

## 2021-04-09 PROCEDURE — 99024 POSTOP FOLLOW-UP VISIT: CPT | Performed by: PLASTIC SURGERY

## 2021-04-09 PROCEDURE — 99214 OFFICE O/P EST MOD 30 MIN: CPT

## 2021-04-09 RX ORDER — FOLIC ACID 1 MG/1
TABLET ORAL
Qty: 30 TABLET | Refills: 3 | Status: SHIPPED | OUTPATIENT
Start: 2021-04-09 | End: 2021-08-14

## 2021-04-09 ASSESSMENT — ENCOUNTER SYMPTOMS
ABDOMINAL PAIN: 0
TROUBLE SWALLOWING: 0
COUGH: 0
SHORTNESS OF BREATH: 0

## 2021-04-09 NOTE — PROGRESS NOTES
Subjective:      Patient ID: Hawa Blake is a 54 y.o. female. HPI  Patient returns for further fill of breast expander. She notes minimal discomfort after last fill. Otherwise doing well. Review of Systems   Constitutional: Negative. HENT: Negative for congestion and trouble swallowing. Respiratory: Negative for cough and shortness of breath. Cardiovascular: Negative for chest pain. Gastrointestinal: Negative for abdominal pain. Neurological: Negative for light-headedness and headaches. Psychiatric/Behavioral: Negative for dysphoric mood. Objective:   Physical Exam  Vitals signs and nursing note reviewed. Exam conducted with a chaperone present. Constitutional:       Appearance: Normal appearance. HENT:      Head: Normocephalic and atraumatic. Mouth/Throat:      Mouth: Mucous membranes are moist.   Eyes:      Extraocular Movements: Extraocular movements intact. Conjunctiva/sclera: Conjunctivae normal.      Pupils: Pupils are equal, round, and reactive to light. Pulmonary:      Effort: Pulmonary effort is normal.   Chest:      Comments:   80 cc sterile IV saline instilled into expander, closed system, sterile technique. Tolerated well. Skin:     General: Skin is warm and dry. Neurological:      General: No focal deficit present. Mental Status: She is alert and oriented to person, place, and time. Assessment: In progress right breast reconstruction. Plan:      Return 2-3 weeks.         Gracia Winston MD

## 2021-04-19 DIAGNOSIS — C50.919 MALIGNANT NEOPLASM OF FEMALE BREAST, UNSPECIFIED ESTROGEN RECEPTOR STATUS, UNSPECIFIED LATERALITY, UNSPECIFIED SITE OF BREAST (HCC): ICD-10-CM

## 2021-04-19 DIAGNOSIS — Z90.11 H/O RIGHT MASTECTOMY: ICD-10-CM

## 2021-04-19 DIAGNOSIS — I82.A11 ACUTE DEEP VEIN THROMBOSIS (DVT) OF AXILLARY VEIN OF RIGHT UPPER EXTREMITY (HCC): ICD-10-CM

## 2021-04-19 DIAGNOSIS — Z78.0 POSTMENOPAUSAL: ICD-10-CM

## 2021-04-20 RX ORDER — APIXABAN 5 MG/1
TABLET, FILM COATED ORAL
Qty: 180 TABLET | Refills: 1 | Status: SHIPPED | OUTPATIENT
Start: 2021-04-20 | End: 2021-10-12

## 2021-05-10 ENCOUNTER — OFFICE VISIT (OUTPATIENT)
Dept: FAMILY MEDICINE CLINIC | Age: 56
End: 2021-05-10
Payer: MEDICARE

## 2021-05-10 VITALS
BODY MASS INDEX: 47.09 KG/M2 | WEIGHT: 293 LBS | SYSTOLIC BLOOD PRESSURE: 118 MMHG | OXYGEN SATURATION: 97 % | HEIGHT: 66 IN | DIASTOLIC BLOOD PRESSURE: 68 MMHG | HEART RATE: 80 BPM | TEMPERATURE: 99 F

## 2021-05-10 DIAGNOSIS — J30.2 SEASONAL ALLERGIES: ICD-10-CM

## 2021-05-10 DIAGNOSIS — E78.2 MIXED HYPERLIPIDEMIA: ICD-10-CM

## 2021-05-10 DIAGNOSIS — Z13.1 SCREENING FOR DIABETES MELLITUS: ICD-10-CM

## 2021-05-10 DIAGNOSIS — J45.20 MILD INTERMITTENT EXTRINSIC ASTHMA WITHOUT COMPLICATION: Primary | ICD-10-CM

## 2021-05-10 PROCEDURE — 3017F COLORECTAL CA SCREEN DOC REV: CPT | Performed by: FAMILY MEDICINE

## 2021-05-10 PROCEDURE — G8417 CALC BMI ABV UP PARAM F/U: HCPCS | Performed by: FAMILY MEDICINE

## 2021-05-10 PROCEDURE — G8427 DOCREV CUR MEDS BY ELIG CLIN: HCPCS | Performed by: FAMILY MEDICINE

## 2021-05-10 PROCEDURE — 99214 OFFICE O/P EST MOD 30 MIN: CPT | Performed by: FAMILY MEDICINE

## 2021-05-10 PROCEDURE — 99212 OFFICE O/P EST SF 10 MIN: CPT | Performed by: FAMILY MEDICINE

## 2021-05-10 PROCEDURE — 1036F TOBACCO NON-USER: CPT | Performed by: FAMILY MEDICINE

## 2021-05-10 RX ORDER — BUSPIRONE HYDROCHLORIDE 15 MG/1
15 TABLET ORAL 3 TIMES DAILY
COMMUNITY
Start: 2021-05-05

## 2021-05-10 RX ORDER — FLUTICASONE PROPIONATE 50 MCG
1 SPRAY, SUSPENSION (ML) NASAL DAILY
Qty: 3 BOTTLE | Refills: 3 | Status: SHIPPED | OUTPATIENT
Start: 2021-05-10 | End: 2021-05-10 | Stop reason: SDUPTHER

## 2021-05-10 RX ORDER — LEVOCETIRIZINE DIHYDROCHLORIDE 5 MG/1
5 TABLET, FILM COATED ORAL NIGHTLY
Qty: 30 TABLET | Refills: 5 | Status: SHIPPED | OUTPATIENT
Start: 2021-05-10 | End: 2022-06-17

## 2021-05-10 RX ORDER — FLUTICASONE PROPIONATE 50 MCG
1 SPRAY, SUSPENSION (ML) NASAL DAILY
Qty: 3 BOTTLE | Refills: 3 | Status: SHIPPED | OUTPATIENT
Start: 2021-05-10 | End: 2022-05-31

## 2021-05-10 RX ORDER — OLOPATADINE HYDROCHLORIDE 1 MG/ML
1 SOLUTION/ DROPS OPHTHALMIC 2 TIMES DAILY
Qty: 5 ML | Refills: 3 | Status: SHIPPED | OUTPATIENT
Start: 2021-05-10 | End: 2021-06-09

## 2021-05-10 ASSESSMENT — ENCOUNTER SYMPTOMS
COUGH: 0
CHEST TIGHTNESS: 0
SHORTNESS OF BREATH: 1
EYE ITCHING: 1
WHEEZING: 0

## 2021-05-10 NOTE — PROGRESS NOTES
[address] [address]  [address] [address]  Dept: [phone]  Dept Fax: [phone]  Loc: [phone]    Randa Peña is a 54 y.o. female who presents today for her medical conditions/complaints as noted below. Randa Peña is c/o of   Chief Complaint   Patient presents with    6 Month Follow-Up     Asthma    Allergies     seasonal       HPI:     HPI Here today for a follow up of her asthma and her allergies. Her allergies have been really bad recently. She is taking xyzal and flonase with moderate relief of her symptoms. She is having issues with itching and watering eyes that are really irritating her. She has been using some visine allergy drops with minimal improvement. Her asthma has been well controlled recently. No issues with coughing at night. She doesn't cough much during the day. She is not wheezing much during the day. She does get short of breath when wearing her mask and walking but if she is not wearing a mask she has no shortness of breath. Past Medical History:   Diagnosis Date    Asthma     seasonal    Bipolar 1 disorder ([address]     Breast cancer [facility] [address] 2017    right side     DVT of axillary vein, acute right ([address]     Eye twitch 2019    Headache(784.0)     History of blood transfusion     Hx of blood clots 03/2020    PE    Hyperlipidemia     Migraine     Obesity     Sleep apnea     uses CPAP          Social History     Tobacco Use    Smoking status: Never Smoker    Smokeless tobacco: Never Used    Tobacco comment: Never smoker.  TC, RRT 4/5/18   Substance Use Topics    Alcohol use: No     Frequency: Never     Binge frequency: Never     Current Outpatient Medications   Medication Sig Dispense Refill    busPIRone (BUSPAR) 10 MG tablet take 1 tablet by mouth three times a day      levocetirizine (XYZAL) 5 MG tablet Take 1 tablet by mouth nightly 30 tablet 5    fluticasone (FLONASE) 50 MCG/ACT nasal spray 1 spray by Nasal route daily Indications: as needed 3 Bottle 3    olopatadine (PATADAY) 0.1 % ophthalmic solution Place 1 drop into both eyes 2 times daily 5 mL 3    VENTOLIN  (90 Base) MCG/ACT inhaler Inhale 2 puffs into the lungs every 4 hours as needed for Wheezing 1 Inhaler 3    ELIQUIS 5 MG TABS tablet take 1 tablet by mouth twice a day 691 tablet 1    folic acid (FOLVITE) 1 MG tablet take 1 tablet by mouth once daily 30 tablet 3    butalbital-acetaminophen-caffeine (FIORICET, ESGIC) -40 MG per tablet 1-2   TABLET  TWICE  DAILY  AS  NEEDED 60 tablet 2    topiramate (TOPAMAX) 50 MG tablet ONE  TABLET  TWICE  DAILY 60 tablet 5    Chlorpheniramine Maleate (ALLERGY RELIEF PO) Take by mouth daily      lamoTRIgine (LAMICTAL) 25 MG tablet Take 50 mg by mouth daily At bedtime      pravastatin (PRAVACHOL) 40 MG tablet take 1 tablet by mouth once daily 30 tablet 3    tiZANidine (ZANAFLEX) 4 MG tablet Take 1 tablet by mouth every 8 hours as needed (muscle pain) 20 tablet 0    Cyanocobalamin ER (RA VITAMIN B-12 TR) 1000 MCG TBCR Take one daily by mouth 30 tablet 3    letrozole (FEMARA) 2.5 MG tablet take 1 tablet by mouth once daily 30 tablet 5    OXcarbazepine (TRILEPTAL) 150 MG tablet take 1 tablet by mouth every morning BEFORE NOON      benztropine (COGENTIN) 0.5 MG tablet Take 0.5 mg by mouth daily      QUEtiapine (SEROQUEL) 100 MG tablet Take 50 mg by mouth nightly      lamoTRIgine (LAMICTAL) 100 MG tablet 150 mg nightly       SUMAtriptan (IMITREX) 100 MG tablet Take 1 tablet by mouth once as needed for Migraine 12 tablet 5     No current facility-administered medications for this visit.            Allergies   Allergen Reactions    Prednisone Swelling     Whole side of her face swelled when she took it, difficulty breathing       Subjective:     Review of Systems   Constitutional: Negative for activity change, appetite change, chills, fatigue and fever. Eyes: Positive for itching. Negative for visual disturbance. Respiratory: Positive for shortness of breath. Negative for cough, chest tightness and wheezing. Cardiovascular: Negative for chest pain, palpitations and leg swelling. Genitourinary: Negative for difficulty urinating. Allergic/Immunologic: Positive for environmental allergies. Neurological: Negative for dizziness, syncope, weakness, light-headedness and headaches (very mild). Psychiatric/Behavioral: Negative for decreased concentration and sleep disturbance. The patient is nervous/anxious. Objective:      Physical Exam  Vitals signs and nursing note reviewed. Constitutional:       General: She is not in acute distress. Appearance: She is well-developed. Eyes:      Conjunctiva/sclera: Conjunctivae normal.   Neck:      Musculoskeletal: Normal range of motion and neck supple. Thyroid: No thyromegaly. Cardiovascular:      Rate and Rhythm: Normal rate and regular rhythm. Heart sounds: Normal heart sounds. No murmur. Pulmonary:      Effort: Pulmonary effort is normal. No respiratory distress. Breath sounds: Normal breath sounds. No wheezing. Lymphadenopathy:      Cervical: No cervical adenopathy. Skin:     General: Skin is warm and dry. Findings: No erythema or rash. Neurological:      Mental Status: She is alert and oriented to person, place, and time. /68   Pulse 80   Temp 99 °F (37.2 °C)   Ht 5' 6\" (1.676 m)   Wt 300 lb (136.1 kg)   LMP 02/28/2013   SpO2 97%   BMI 48.42 kg/m²     Assessment:       Diagnosis Orders   1. Mild intermittent extrinsic asthma without complication  VENTOLIN  (90 Base) MCG/ACT inhaler   2. Seasonal allergies  fluticasone (FLONASE) 50 MCG/ACT nasal spray    DISCONTINUED: fluticasone (FLONASE) 50 MCG/ACT nasal spray   3. Mixed hyperlipidemia  Lipid Panel   4.  Screening for diabetes mellitus  Basic Metabolic Panel    Hemoglobin A1C Plan:        Asthma: stable; she is doing really well. She has not filled her albuterol in 2 years so I sent in a new script so she can replace her  inhaler. Allergies: worsening; I sent in a refill of her flonase and xyzal and I also started her on pataday eye drops. Return in about 9 months (around 2/10/2022) for Well woman with pap. Orders Placed This Encounter   Procedures    Basic Metabolic Panel     Standing Status:   Future     Standing Expiration Date:   5/10/2022    Lipid Panel     Standing Status:   Future     Standing Expiration Date:   5/10/2022     Order Specific Question:   Is Patient Fasting?/# of Hours     Answer:   0 per dr Owen Rothman Hemoglobin A1C     Standing Status:   Future     Standing Expiration Date:   5/10/2022     Orders Placed This Encounter   Medications    DISCONTD: fluticasone (FLONASE) 50 MCG/ACT nasal spray     Si spray by Nasal route daily Indications: as needed     Dispense:  3 Bottle     Refill:  3    levocetirizine (XYZAL) 5 MG tablet     Sig: Take 1 tablet by mouth nightly     Dispense:  30 tablet     Refill:  5    fluticasone (FLONASE) 50 MCG/ACT nasal spray     Si spray by Nasal route daily Indications: as needed     Dispense:  3 Bottle     Refill:  3    olopatadine (PATADAY) 0.1 % ophthalmic solution     Sig: Place 1 drop into both eyes 2 times daily     Dispense:  5 mL     Refill:  3    VENTOLIN  (90 Base) MCG/ACT inhaler     Sig: Inhale 2 puffs into the lungs every 4 hours as needed for Wheezing     Dispense:  1 Inhaler     Refill:  3       Patientgiven educational materials - see patient instructions. Discussed use, benefit,and side effects of prescribed medications. All patient questions answered. Ptvoiced understanding. Reviewed health maintenance. Instructed to continue currentmedications, diet and exercise. Patient agreed with treatment plan. Follow up asdirected.      Electronically signed by Anabel Maddox MD on 5/10/2021 at 2:59 PM

## 2021-05-14 ENCOUNTER — OFFICE VISIT (OUTPATIENT)
Dept: SURGERY | Age: 56
End: 2021-05-14
Payer: MEDICARE

## 2021-05-14 VITALS
WEIGHT: 293 LBS | DIASTOLIC BLOOD PRESSURE: 80 MMHG | HEART RATE: 84 BPM | SYSTOLIC BLOOD PRESSURE: 128 MMHG | BODY MASS INDEX: 47.09 KG/M2 | RESPIRATION RATE: 16 BRPM | OXYGEN SATURATION: 97 % | HEIGHT: 66 IN

## 2021-05-14 DIAGNOSIS — Z98.890 H/O BREAST RECONSTRUCTION: Primary | ICD-10-CM

## 2021-05-14 DIAGNOSIS — Z90.11 H/O RIGHT MASTECTOMY: ICD-10-CM

## 2021-05-14 PROCEDURE — 99024 POSTOP FOLLOW-UP VISIT: CPT | Performed by: PLASTIC SURGERY

## 2021-05-14 PROCEDURE — 99214 OFFICE O/P EST MOD 30 MIN: CPT

## 2021-05-14 ASSESSMENT — ENCOUNTER SYMPTOMS
ABDOMINAL PAIN: 0
TROUBLE SWALLOWING: 0
COUGH: 0
SHORTNESS OF BREATH: 0

## 2021-05-14 NOTE — PROGRESS NOTES
54year old white female in for further fill of right breast expander. She notes some discomfort upon last fill, states she thinks she went a little overboard and does not want as much this time.

## 2021-05-14 NOTE — PROGRESS NOTES
paSubjective:      Patient ID: Hortensia Arita is a 54 y.o. female. HPI  Patient is in for further fill of right breast expander. She notes some discomfort upon last fill, states she thinks she went a little overboard and does not want as much this time. Review of Systems   Constitutional: Negative. HENT: Negative for congestion and trouble swallowing. Respiratory: Negative for cough and shortness of breath. Cardiovascular: Negative for chest pain. Gastrointestinal: Negative for abdominal pain. Neurological: Negative for light-headedness and headaches. Psychiatric/Behavioral: Negative for dysphoric mood. Objective:   Physical Exam  Vitals signs and nursing note reviewed. Exam conducted with a chaperone present. Constitutional:       Appearance: Normal appearance. HENT:      Head: Normocephalic and atraumatic. Mouth/Throat:      Mouth: Mucous membranes are moist.   Eyes:      Extraocular Movements: Extraocular movements intact. Conjunctiva/sclera: Conjunctivae normal.      Pupils: Pupils are equal, round, and reactive to light. Pulmonary:      Effort: Pulmonary effort is normal.   Chest:      Comments:   70 cc sterile IV saline instilled into expander, closed system, sterile technique. Tolerated well. Skin:     General: Skin is warm and dry. Neurological:      General: No focal deficit present. Mental Status: She is alert and oriented to person, place, and time. Assessment: In progress right breast reconstruction. Plan:      Recheck in 2 weeks.         Travis Cox MD

## 2021-05-28 ENCOUNTER — OFFICE VISIT (OUTPATIENT)
Dept: NEUROLOGY | Age: 56
End: 2021-05-28
Payer: MEDICARE

## 2021-05-28 ENCOUNTER — OFFICE VISIT (OUTPATIENT)
Dept: SURGERY | Age: 56
End: 2021-05-28
Payer: MEDICARE

## 2021-05-28 VITALS
OXYGEN SATURATION: 99 % | WEIGHT: 293 LBS | BODY MASS INDEX: 47.09 KG/M2 | DIASTOLIC BLOOD PRESSURE: 80 MMHG | SYSTOLIC BLOOD PRESSURE: 122 MMHG | HEART RATE: 71 BPM | HEIGHT: 66 IN

## 2021-05-28 VITALS
OXYGEN SATURATION: 97 % | HEART RATE: 68 BPM | BODY MASS INDEX: 47.09 KG/M2 | SYSTOLIC BLOOD PRESSURE: 122 MMHG | HEIGHT: 66 IN | WEIGHT: 293 LBS | DIASTOLIC BLOOD PRESSURE: 80 MMHG

## 2021-05-28 DIAGNOSIS — E78.5 HYPERLIPIDEMIA, UNSPECIFIED HYPERLIPIDEMIA TYPE: ICD-10-CM

## 2021-05-28 DIAGNOSIS — G24.5 BLEPHAROSPASM: ICD-10-CM

## 2021-05-28 DIAGNOSIS — Z90.11 H/O RIGHT MASTECTOMY: ICD-10-CM

## 2021-05-28 DIAGNOSIS — G51.39 HEMIFACIAL SPASM, UNSPECIFIED LATERALITY: ICD-10-CM

## 2021-05-28 DIAGNOSIS — E66.01 MORBID OBESITY WITH BMI OF 40.0-44.9, ADULT (HCC): ICD-10-CM

## 2021-05-28 DIAGNOSIS — G43.009 MIGRAINE WITHOUT AURA AND WITHOUT STATUS MIGRAINOSUS, NOT INTRACTABLE: Primary | ICD-10-CM

## 2021-05-28 DIAGNOSIS — F32.A ANXIETY AND DEPRESSION: ICD-10-CM

## 2021-05-28 DIAGNOSIS — R41.3 MEMORY PROBLEM: ICD-10-CM

## 2021-05-28 DIAGNOSIS — Z99.89 OSA ON CPAP: ICD-10-CM

## 2021-05-28 DIAGNOSIS — Z85.3 HX: BREAST CANCER: ICD-10-CM

## 2021-05-28 DIAGNOSIS — F31.9 BIPOLAR 1 DISORDER (HCC): ICD-10-CM

## 2021-05-28 DIAGNOSIS — F41.9 ANXIETY AND DEPRESSION: ICD-10-CM

## 2021-05-28 DIAGNOSIS — G47.33 OSA ON CPAP: ICD-10-CM

## 2021-05-28 DIAGNOSIS — Z98.890 H/O BREAST RECONSTRUCTION: Primary | ICD-10-CM

## 2021-05-28 DIAGNOSIS — G47.9 SLEEP DIFFICULTIES: ICD-10-CM

## 2021-05-28 DIAGNOSIS — R25.3 MUSCLE TWITCHING: ICD-10-CM

## 2021-05-28 DIAGNOSIS — I82.A21 CHRONIC DEEP VEIN THROMBOSIS (DVT) OF AXILLARY VEIN OF RIGHT UPPER EXTREMITY (HCC): ICD-10-CM

## 2021-05-28 DIAGNOSIS — E78.2 MIXED HYPERLIPIDEMIA: ICD-10-CM

## 2021-05-28 PROCEDURE — 99024 POSTOP FOLLOW-UP VISIT: CPT | Performed by: PLASTIC SURGERY

## 2021-05-28 PROCEDURE — 3017F COLORECTAL CA SCREEN DOC REV: CPT | Performed by: PSYCHIATRY & NEUROLOGY

## 2021-05-28 PROCEDURE — 99214 OFFICE O/P EST MOD 30 MIN: CPT | Performed by: PSYCHIATRY & NEUROLOGY

## 2021-05-28 PROCEDURE — 1036F TOBACCO NON-USER: CPT | Performed by: PSYCHIATRY & NEUROLOGY

## 2021-05-28 PROCEDURE — 99214 OFFICE O/P EST MOD 30 MIN: CPT | Performed by: PLASTIC SURGERY

## 2021-05-28 PROCEDURE — G8427 DOCREV CUR MEDS BY ELIG CLIN: HCPCS | Performed by: PSYCHIATRY & NEUROLOGY

## 2021-05-28 PROCEDURE — G8417 CALC BMI ABV UP PARAM F/U: HCPCS | Performed by: PSYCHIATRY & NEUROLOGY

## 2021-05-28 ASSESSMENT — ENCOUNTER SYMPTOMS
SINUS PRESSURE: 0
VISUAL CHANGE: 0
SWOLLEN GLANDS: 0
RHINORRHEA: 0
BOWEL INCONTINENCE: 0
ABDOMINAL PAIN: 0
SCALP TENDERNESS: 0
BLURRED VISION: 0
NAUSEA: 1
EYE PAIN: 0
PHOTOPHOBIA: 1
APNEA: 0
COLOR CHANGE: 0
DIARRHEA: 0
EYE WATERING: 1
SORE THROAT: 0
FACIAL SWELLING: 0
COUGH: 0
WHEEZING: 0
BLOOD IN STOOL: 0
ABDOMINAL PAIN: 0
TROUBLE SWALLOWING: 0
VOICE CHANGE: 0
TROUBLE SWALLOWING: 0
CHEST TIGHTNESS: 0
CHOKING: 0
ABDOMINAL DISTENTION: 0
BACK PAIN: 0
SHORTNESS OF BREATH: 0
SHORTNESS OF BREATH: 0
FACIAL SWEATING: 0
CONSTIPATION: 0
EYE ITCHING: 0
COUGH: 0
EYE REDNESS: 0
EYE DISCHARGE: 0
VOMITING: 1

## 2021-05-28 NOTE — PROGRESS NOTES
Subjective:      Patient ID: Haja Melendez is a 54 y.o. female. HPI  Patient is in for further fill of right breast expander. 70cc sterile IV saline was instilled on 5/14/21, patient had no complaints of discomfort. Wants to match left natural breast in size. Not quite there. Review of Systems   Constitutional: Negative. HENT: Negative for congestion and trouble swallowing. Respiratory: Negative for cough and shortness of breath. Cardiovascular: Negative for chest pain. Gastrointestinal: Negative for abdominal pain. Neurological: Negative for light-headedness and headaches. Psychiatric/Behavioral: Negative for dysphoric mood. Objective:   Physical Exam  Vitals and nursing note reviewed. Exam conducted with a chaperone present. Constitutional:       Appearance: Normal appearance. HENT:      Head: Normocephalic and atraumatic. Mouth/Throat:      Mouth: Mucous membranes are moist.   Eyes:      Extraocular Movements: Extraocular movements intact. Conjunctiva/sclera: Conjunctivae normal.      Pupils: Pupils are equal, round, and reactive to light. Pulmonary:      Effort: Pulmonary effort is normal.   Chest:      Comments:   70 cc sterile IV saline instilled into expander, closed system, sterile technique. Tolerated well. Seems close to left in volume after fill. She will gauge as how they match when wearing bra. Skin:     General: Skin is warm and dry. Neurological:      General: No focal deficit present. Mental Status: She is alert and oriented to person, place, and time. Assessment: In progress right breast reconstruction. Plan:      Recheck in 2 weeks for next fill.         Vanessa Vidal MD

## 2021-05-28 NOTE — PROGRESS NOTES
[facility]  Neurology  [address] [address]  Phone: [phone]   Fax: [phone]        SUBJECTIVE:     PATIENT ID:  Joyce Coelho is a  RIGHT  HANDED 54 y.o. female. Migraine   This is a chronic problem. Episode onset: FOR  MORE  THAN   4  YEARS. The problem occurs intermittently. The problem has been gradually worsening. The pain is located in the bilateral and vertex region. The pain does not radiate. The pain quality is similar to prior headaches. The quality of the pain is described as aching, dull and throbbing. The pain is at a severity of 3/10. The pain is mild. Associated symptoms include eye watering, insomnia, nausea, phonophobia, photophobia and vomiting. Pertinent negatives include no abdominal pain, abnormal behavior, anorexia, back pain, blurred vision, coughing, dizziness, drainage, ear pain, eye pain, eye redness, facial sweating, fever, hearing loss, loss of balance, muscle aches, neck pain, numbness, rhinorrhea, scalp tenderness, seizures, sinus pressure, sore throat, swollen glands, tingling, tinnitus, visual change, weakness or weight loss. The symptoms are aggravated by unknown. She has tried acetaminophen and Excedrin for the symptoms. The treatment provided no relief. Her past medical history is significant for migraine headaches and obesity. There is no history of cancer, cluster headaches, hypertension, immunosuppression, migraines in the family, pseudotumor cerebri, recent head traumas, sinus disease or TMJ. Neurologic Problem  The patient's primary symptoms include memory loss. The patient's pertinent negatives include no altered mental status, clumsiness, focal sensory loss, focal weakness, loss of balance, near-syncope, slurred speech, syncope, visual change or weakness. Primary symptoms comment: LEFT  EYE  TWITCHING  AND  SPAMS. This is a chronic problem. Episode onset: SINCE  APRIL 2017.  The neurological problem developed insidiously. The problem has been waxing and waning since onset. There was facial and left-sided focality noted. Associated symptoms include headaches, nausea and vomiting. Pertinent negatives include no abdominal pain, auditory change, aura, back pain, bladder incontinence, bowel incontinence, chest pain, confusion, diaphoresis, dizziness, fatigue, fever, light-headedness, neck pain, palpitations, shortness of breath or vertigo. Treatments tried: Debbie Blend. The treatment provided moderate relief. There is no history of a bleeding disorder, a clotting disorder, a CVA, dementia, head trauma, liver disease, mood changes or seizures. History obtained from  The patient      and other  available medical records were  Also  reviewed. The  Duration,  Quality,  Severity,  Location,  Timing,  Context,  Modifying  Factors   Of   The   Chief   Complaint       And  Present  Illness   Was   Reviewed   In   Chronological   Manner. PATIENT'S  MAIN  CONCERNS INCLUDE :                       1)  H/O   LEFT  EYE  TWITCHING     SINCE      April 2017                                  INTERMITTENTLY  . NO  PAIN                                -BLEPHAROSPASM    LEFT  EYE.                                -     STABLE                               2)     H/O    BLEPHAROSPASM    LEFT  EYE. GETS   WORSE                                   DUE   TO     ANXIETY                               3)      PREVIOUS    H/O    BREAST    CANCER  RIGHT                                H/O   BREAST  CANCER   SURGERY  IN  NOV. 2017                               4)       HAD    CHEMO     IN   THE  PAST                                      ALSO  ON   ARIMIDEX   DAILY                             BEING  FOLLOWED  BY   HEMATOLOGY /  ONCOLOGY                               5)    H/O   CHRONIC   MIGRAINES       FOR    4  YEARS                                               -    STABLE -    ON   FIORICET    AS  NEEDED                                   6)   H/O   CHRONIC  ANXIETY,   DEPRESSION,  BIPOLAR  DISORDER                               -   ON    LAMICTAL,  SEROQUEL   TRAZODONE, TRILEPTAL                                        -    STABLE                                -       BEING  FOLLOWED  BY  MENTAL  HEALTH PROFESSIONALS                                 7)    H/O   CHRONIC   SLEEP  DIFFICULTIES                                            -   BETTER                                                           8)   H/O   CHRONIC    MEMORY PROBLEMS   SINCE    OCTOBER 2017                                            -   STABLE                                9)   OBESITY     WITH   EXCESSIVE    DAY   TIME  SLEEPINESS                                     H/O   OSAS     -   ON  CPAP                                                          10)   MULTIPLE  CO  MORBID  MEDICAL  CONDITIONS                                    BEING  FOLLOWED BY  HER  PCP                              11)   MRI  BRAIN  IN  FEB. 2018   SHOWED                                      NO ACUTE INTRA  CRANIAL PATHOLOGY                                  12)    HAD  EYE   EVALUATIONS  IN  MAY  2018                                  AND  DIAGNOSED  WITH LEFT  EYE BLEPHAROSPASM                                13)    LEFT  EYE  TWITCHING  AND  SPASM   ARE   CAUSING                                DIFFICULTY  WITH  READING,  WATCHING  TV   AND  DRIVING                                           -     IMPROVED                                                 14)      PATIENT   WAS    ON  KLONOPIN       FOR  LEFT  BLEPHAROSPASM   IN      June 2018                                   -     IMPROVED  MORE  THAN   75 %                                PATIENT  NOT  INTERESTED  IN  INJECTION  BOTOX                                      15)     NO     H/O    SEIZURE      OR    SYNCOPE position change, stress, weather change,     medications/alcohol, time of day/darkness/light    Are  absent             Patient   Indicates   The  Presence   And  The  Absence  Of  The  Following  Associated  And   Additional  Neurological    Symptoms:                                Balance  And coordination problems  absent           Gait problems     absent            Headaches      absent              Migraines           present           Memory problems        Present             Confusion        absent            Paresthesia numbness          absent           Seizures  And  Starring  Episodes           absent           Syncope,  Near  syncopal episodes         absent           Speech problems           absent             Swallowing  Problems      absent            Dizziness,  Light headedness           absent                        Vertigo        absent             Generalized   Weakness    absent              focal  Weakness     absent             Tremors         absent              Sleep  Problems     absent             History  Of   Recent   Head  Injury     absent             History  Of   Recent  TIA     absent             History  Of   Recent    Stroke     absent             Neck  Pain and  Neck muscle  Spasms  Absent               Radiating  down   And   Weakness           absent            Lower back   Pain  And     Spasms  Absent              Radiating    Down   And   Weakness          absent                H/O   FALLS        absent               History  Of   Visual  Symptoms    Present                    Associated   Diplopia       absent                                      Also   Additional   Symptoms   Present    As  Documented    In   The detailed      Review  Of  Systems   And    Please   Refer   To    Them for   Additional  Information.                   Any components  That are either  Unobtainable  Or  Limited  In   HPI, ROS  And/or PFSH   Are       Due   To   Patient's  Medical  Problems, Clinical  Condition and/or lack of other  Alternate resources.               RECORDS   REVIEWED:    historical medical records         INFORMATION   REVIEWED:     MEDICAL   HISTORY,     SURGICAL   HISTORY,   MEDICATIONS   LIST,   ALLERGIES AND  DRUG  INTOLERANCES,     FAMILY   HISTORY,  SOCIAL  HISTORY,    PROBLEM  LIST   FOR  PATIENT  CARE   COORDINATION    Past Medical History:   Diagnosis Date    Asthma     seasonal    [facility]     Breast cancer ([facility] 2017    right side     DVT of axillary vein, acute right ([facility]     Eye twitch 2019    Headache(784.0)     History of blood transfusion     Hx of blood clots 03/2020    PE    Hyperlipidemia     Migraine     Obesity     Sleep apnea     uses CPAP         Past Surgical History:   Procedure Laterality Date    BREAST ENHANCEMENT SURGERY Right 3/10/2021    BREAST RECONSTRUCTION WITH TISSUE EXPANDER INSERTION performed by Velvet Lara MD at [address] [facility]      rt mastectomy    BREAST SURGERY Right 03/10/2021    BREAST RECONSTRUCTION WITH TISSUE EXPANDER INSERTION (    BREAST SURGERY Right 3/10/2021    EXCISION SEROMA RIGHT BREAST performed by Velvet Lara MD at [facility] Left 6/6/2019    PORT REMOVAL performed by Ferny Tellez DO at [address] N/A 10/13/2017    D & C HYSTEROSCOPY with polypectomy performed by Ramona Juarez MD at [address] / REMOVAL / [address] Left 11/9/2017    PORT INSERTION performed by King Otto MD at [facility] / REMOVAL / [address] N/A 11/30/2017    Port Removal & Insertion performed by King Otto MD at [address] Right 10/19/2017     [address] [facility]    MASTECTOMY Right 10/19/2017    Right Breast Mastectomy with  [place] Node Biopsy performed by King Otto MD at . [facility] [address] [facility] CTR VAD W/SUBQ PORT AGE 5 YR/> Left 3/1/2018    PORT Exchange performed by Khanh Norris MD at [facility] Left 2014, 2015    x 2 surgeries total; Dr. Denis Arias TUNNELED VENOUS PORT PLACEMENT Left 11/30/2017    removal of et replacement of         Current Outpatient Medications   Medication Sig Dispense Refill    busPIRone (BUSPAR) 10 MG tablet take 1 tablet by mouth three times a day      levocetirizine (XYZAL) 5 MG tablet Take 1 tablet by mouth nightly 30 tablet 5    fluticasone (FLONASE) 50 MCG/ACT nasal spray 1 spray by Nasal route daily Indications: as needed 3 Bottle 3    olopatadine (PATADAY) 0.1 % ophthalmic solution Place 1 drop into both eyes 2 times daily 5 mL 3    VENTOLIN  (90 Base) MCG/ACT inhaler Inhale 2 puffs into the lungs every 4 hours as needed for Wheezing 1 Inhaler 3    ELIQUIS 5 MG TABS tablet take 1 tablet by mouth twice a day 076 tablet 1    folic acid (FOLVITE) 1 MG tablet take 1 tablet by mouth once daily 30 tablet 3    butalbital-acetaminophen-caffeine (FIORICET, ESGIC) -40 MG per tablet 1-2   TABLET  TWICE  DAILY  AS  NEEDED 60 tablet 2    topiramate (TOPAMAX) 50 MG tablet ONE  TABLET  TWICE  DAILY 60 tablet 5    Chlorpheniramine Maleate (ALLERGY RELIEF PO) Take by mouth daily      lamoTRIgine (LAMICTAL) 25 MG tablet Take 50 mg by mouth daily At bedtime      pravastatin (PRAVACHOL) 40 MG tablet take 1 tablet by mouth once daily 30 tablet 3    tiZANidine (ZANAFLEX) 4 MG tablet Take 1 tablet by mouth every 8 hours as needed (muscle pain) 20 tablet 0    Cyanocobalamin ER (RA VITAMIN B-12 TR) 1000 MCG TBCR Take one daily by mouth 30 tablet 3    letrozole (FEMARA) 2.5 MG tablet take 1 tablet by mouth once daily 30 tablet 5    OXcarbazepine (TRILEPTAL) 150 MG tablet take 1 tablet by mouth every morning BEFORE NOON      benztropine (COGENTIN) 0.5 MG tablet Take 0.5 mg by mouth daily      QUEtiapine (SEROQUEL) 100 MG tablet Take 50 mg by mouth nightly      lamoTRIgine (LAMICTAL) 100 MG tablet 150 mg nightly       SUMAtriptan (IMITREX) 100 MG tablet Take 1 tablet by mouth once as needed for Migraine 12 tablet 5     No current facility-administered medications for this visit. Allergies   Allergen Reactions    Prednisone Swelling     Whole side of her face swelled when she took it, difficulty breathing         Family History   Problem Relation Age of Onset    Breast Cancer Sister 54    Breast Cancer Maternal Aunt 71    Other Maternal Cousin         Atypical Ductal Hyperplasia     Uterine Cancer Sister 32    Other Sister         breast cyst    Cataracts Mother     Glaucoma Mother     Heart Disease Father     High Blood Pressure Father     High Cholesterol Father     Mental Retardation Father     Diabetes Neg Hx          Social History     Socioeconomic History    Marital status: Single     Spouse name: Not on file    Number of children: Not on file    Years of education: Not on file    Highest education level: Not on file   Occupational History    Not on file   Tobacco Use    Smoking status: Never Smoker    Smokeless tobacco: Never Used    Tobacco comment: Never smoker. TC, RRT 4/5/18   Vaping Use    Vaping Use: Never used   Substance and Sexual Activity    Alcohol use: No    Drug use: No    Sexual activity: Not Currently     Partners: Male     Birth control/protection: Surgical   Other Topics Concern    Not on file   Social History Narrative    Not on file     Social Determinants of Health     Financial Resource Strain:     Difficulty of Paying Living Expenses:    Food Insecurity:     Worried About Running Out of Food in the Last Year:     [address] in the Last Year:    Transportation Needs:     Lack of Transportation (Medical):      Lack of Transportation (Non-Medical):    Physical Activity:     Days of Exercise per Week:     Minutes of Exercise per Session:    Stress:     Feeling of Stress :    Social Connections:     Frequency of Communication with Friends and Family:     Frequency of Social Gatherings with Friends and Family:     Attends Orthodox Services:     Active Member of Clubs or Organizations:     Attends Club or Organization Meetings:     Marital Status:    Intimate Partner Violence:     Fear of Current or Ex-Partner:     Emotionally Abused:     Physically Abused:     Sexually Abused:        Vitals:    05/28/21 1526   BP: 122/80   Pulse: 68   SpO2: 97%         Wt Readings from Last 3 Encounters:   05/28/21 295 lb (133.8 kg)   05/28/21 295 lb 2 oz (133.9 kg)   05/14/21 299 lb (135.6 kg)         BP Readings from Last 3 Encounters:   05/28/21 122/80   05/28/21 122/80   05/14/21 128/80       Hematology and Coagulation  Lab Results   Component Value Date    WBC 7.6 02/24/2021    RBC 4.05 02/24/2021    HGB 12.5 02/24/2021    HCT 40.3 02/24/2021    MCV 99.5 02/24/2021    MCH 30.9 02/24/2021    MCHC 31.0 02/24/2021    RDW 13.7 02/24/2021     02/24/2021    MPV 10.8 02/24/2021       Chemistries  Lab Results   Component Value Date     02/24/2021    K 4.2 02/24/2021     02/24/2021    CO2 21 02/24/2021    BUN 22 02/24/2021    CREATININE 1.09 02/24/2021    CALCIUM 10.3 02/24/2021    PROT 7.8 02/09/2021    LABALBU 4.2 02/09/2021    BILITOT 0.18 02/09/2021    ALKPHOS 102 02/09/2021    AST 13 02/09/2021    ALT 13 02/09/2021     Lab Results   Component Value Date    ALKPHOS 102 02/09/2021    ALT 13 02/09/2021    AST 13 02/09/2021    PROT 7.8 02/09/2021    BILITOT 0.18 02/09/2021    BILIDIR 0.12 04/09/2020    LABALBU 4.2 02/09/2021     Lab Results   Component Value Date    BUN 22 02/24/2021    CREATININE 1.09 02/24/2021     Lab Results   Component Value Date    CALCIUM 10.3 02/24/2021    MG 1.8 04/10/2020     Lab Results   Component Value Date    AST 13 02/09/2021    ALT 13 02/09/2021       Lab Results   Component Value Date    CKTOTAL 27 04/09/2020     Lab Results   Component Value Date    [id number] OBJECTIVE:    Physical Exam  Constitutional:       Appearance: She is well-developed. HENT:      Head: Normocephalic and atraumatic. No raccoon eyes or Varma's sign. Right Ear: External ear normal.      Left Ear: External ear normal.      Nose: Nose normal.   Eyes:      Conjunctiva/sclera: Conjunctivae normal.   Neck:      Thyroid: No thyroid mass or thyromegaly. Vascular: No carotid bruit. Trachea: No tracheal deviation. Meningeal: Brudzinski's sign and Kernig's sign absent. Cardiovascular:      Rate and Rhythm: Normal rate and regular rhythm. Pulmonary:      Effort: Pulmonary effort is normal.   Musculoskeletal:         General: No tenderness. Cervical back: Normal range of motion and neck supple. No rigidity. No muscular tenderness. Normal range of motion. Skin:     General: Skin is warm. Coloration: Skin is not pale. Findings: No erythema or rash. Nails: There is no clubbing. Psychiatric:         Attention and Perception: She is attentive. Mood and Affect: Mood is anxious and depressed. Affect is not labile, blunt, angry or inappropriate. Behavior: Behavior is not agitated, slowed, aggressive, withdrawn, hyperactive or combative. Behavior is cooperative. Thought Content: Thought content is not paranoid or delusional. Thought content does not include homicidal or suicidal ideation. Thought content does not include homicidal or suicidal plan. Cognition and Memory: Memory is impaired. She does not exhibit impaired recent memory or impaired remote memory. Judgment: Judgment is not impulsive or inappropriate. Neurologic Exam    NEUROLOGICAL EXAMINATION :       A) MENTAL STATUS:                   Alert and  oriented  To time, place  And  Person. No Aphasia. No  Dysarthria. Able   To  Follow  commands without   Any  Difficulty. No right  To left confusion. Bilaterally. F) COORDINATION  AND  GAIT :                                Station and  Gait  normal                                        Romberg's test negative                          Ataxia negative          ASSESSMENT:      Patient Active Problem List   Diagnosis    Bipolar 1 disorder ([address]    Hyperlipidemia    Malignant neoplasm of overlapping sites of right breast in female, estrogen receptor negative ([address]    Port-A-Cath in place    Migraine    Memory problem    Sleep difficulties    Anxiety and depression    Muscle twitching    Hemifacial spasm    Combined forms of age-related cataract of both eyes    Squamous blepharitis of upper and lower eyelids of both eyes    Acute deep vein thrombosis (DVT) of axillary vein of right upper extremity (HCC)    Malignant neoplasm of breast in female, estrogen receptor positive ([address]    Seasonal allergies    H/O right mastectomy    HX: breast cancer    Acute massive pulmonary embolism (HCC)    Moderate to severe pulmonary hypertension ([address]    TAY on CPAP    Morbid obesity with BMI of 40.0-44.9, adult (HCC)    Extrinsic asthma    Blepharospasm    Chronic deep vein thrombosis (DVT) of axillary vein of right upper extremity (HCC)    H/O breast reconstruction          MRI OF THE BRAIN WITHOUT AND WITH CONTRAST  10/2/2019 1:14 pm       TECHNIQUE:   Multiplanar multisequence MRI of the head/brain was performed without and   with the administration of intravenous contrast.       COMPARISON:   MRI brain performed 10/08/2018.       HISTORY:   ORDERING SYSTEM PROVIDED HISTORY: Migraine without aura and without status   migrainosus, not intractable   TECHNOLOGIST PROVIDED HISTORY:   migraines   Is the patient pregnant?->No   Reason for Exam: Migraines for quite a while, becoming worse.    Acuity: Chronic   Type of Exam: Unknown   Additional signs and symptoms: Migraine without aura and without status   migrainosus, not intractable       FINDINGS:   INTRACRANIAL STRUCTURES/VENTRICLES:  The sellar and suprasellar structures,   optic chiasm, corpus callosum, pineal gland, tectum, and midline brainstem   structures are unremarkable.  The craniocervical junction is unremarkable. There is no acute intracranial hemorrhage, mass effect, or midline shift. There is satisfactory overall gray-white matter differentiation.  There is no   abnormal postcontrast enhancement.  The ventricular structures are symmetric   and unremarkable.  The infratentorial structures including the   cerebellopontine angles and internal auditory canals are unremarkable. There   is no abnormal restricted diffusion. There is no abnormal blooming artifact   on susceptibility weighted imaging.       ORBITS: The visualized portion of the orbits demonstrate no acute abnormality.       SINUSES: The visualized paranasal sinuses and mastoid air cells are well   aerated.       BONES/SOFT TISSUES: The bone marrow signal intensity appears normal. The soft   tissues demonstrate no acute abnormality.           Impression   Unremarkable pre and post-contrast MRI of the brain.               MRI OF THE BRAIN WITHOUT AND WITH CONTRAST  2/5/2018 9:18 am       TECHNIQUE:   Multiplanar multisequence MRI of the head/brain was performed without and   with the administration of intravenous contrast.       COMPARISON:   Head CT on 09/25/2013.       HISTORY:   ORDERING SYSTEM PROVIDED HISTORY: Malignant neoplasm of overlapping sites of   right breast in female, estrogen receptor negative ([facility]   TECHNOLOGIST PROVIDED HISTORY:   Ordering Physician Provided Reason for Exam:  Bad headaches for one month. Left eye twitching for two months and it is getting worse. History of breast   cancer.    Acuity: Acute   Type of Exam: Initial   Additional signs and symptoms: Malignant neoplasm of overlapping sites of   right breast in female, estrogen receptor negative.       Initial evaluation.     FINDINGS:   INTRACRANIAL STRUCTURES/VENTRICLES: Jerilynn Jae are no areas of restricted   diffusion to suggest an acute infarct.  The cerebral and cerebellar   parenchyma demonstrate normal volume and signal intensity.  There are no   abnormal extra-axial fluid collections.  The ventricles are normal in size. Normal major intracranial flow voids are noted.       There are no areas of abnormal contrast enhancement in the cerebral or   cerebellar parenchyma to suggest metastatic disease.       There are no areas of blooming artifact noted on the gradient echo sequences   to suggest sequela of acute or chronic hemorrhage.       ORBITS: The visualized portion of the orbits demonstrate no acute abnormality.       SINUSES: There is scattered mucosal thickening in the paranasal sinuses, with   greatest involvement in the ethmoid air cells and maxillary sinuses.  There   is an air-fluid level in the left sphenoid sinus, correlate with signs of   acute sinusitis.       The mastoid air cells are clear.       BONES/SOFT TISSUES: The bone marrow signal intensity appears normal. The soft   tissues demonstrate no acute abnormality.           Impression   1. Unremarkable MRI of the brain.  No evidence of metastatic disease. 2. Acute left sphenoid sinusitis.             VISITING DIAGNOSIS:      ICD-10-CM    1. Migraine without aura and without status migrainosus, not intractable  G43.009    2. Blepharospasm  G24.5    3. Chronic deep vein thrombosis (DVT) of axillary vein of right upper extremity ([facility])  I82. A21    4. HX: breast cancer  Z85.3    5. H/O right mastectomy  Z90.11    6. TAY on CPAP  G47.33     Z99.89    7. Morbid obesity with BMI of 40.0-44.9, adult ([facility]  E66.01     Z68.41    8. Anxiety and depression  F41.9     F32.9    9. Hemifacial spasm, unspecified laterality  G51.39    10. Muscle twitching  R25.3    11. Memory problem  R41.3    12. Bipolar 1 disorder ([facility])  F31.9    13. Mixed hyperlipidemia  E78.2    14.  Sleep WEIGHT   LOSS. *    AVOID  ANY USAGE OF                   TOBACCO,  EXCESSIVE  ALCOHOL  AND   ILLEGAL   SUBSTANCES            *  CONTINUE MEDICATIONS PRESCRIBED BY NEUROLOGIST AS    RECOMMENDED     *   Compliance   With  Medications   And  Instructions          * CURRENTLY  TOLERATING  THE  PRESCRIBED   MEDICATIONS. WITHOUT  ANY  SIGNIFICANT  SIDE  EFFECTS   &  GETTING BENEFIT. *    Prophylactic  Use   Of     Vitamin   B   Complex,  Folic  Acid,    Vitamin  B12    Multivitamin,       Calcium  With  magnesium  And  Vit D    Supplementations   Over  The  Counter  Discussed            *  EVALUATIONS  AND  FOLLOW UP:                     * HEMATOLOGY/ONCOLOGY                    *   OPTHALMOLOGY                                                 *          EXPECTATIONS,   GOALS   OF  MIGRAINE   THERAPY                                  AND  SIDE  EFFECTS  MEDICATIONS    WERE                                 REVIEWED     AND   DISCUSSED    IN    DETAIL. *        CURRENTLY    MIGRAINES  ARE   BETTER  CONTROLLED                               ON  TOPAMAX ,  FIORICET,  IMITREX   AS  NEEDED                              AND  PATIENT     FEELS  LOT  BETTER. PATIENT  DENIES  ANY  NEW  NEUROLOGICAL  CONCERNS. *PATIENT   TO  FOLLOW  UP  WITH   PRIMARY  CARE   AND   OTHER  CONSULTANTS  AS  BEFORE.           *TO  FOLLOW  WITH   MENTAL  HEALTH  PROFESSIONALS ,  INCLUDING            PSYCHOLOGICAL  COUNSELING   AND  PSYCHIATRIC  EVALUTIONS            *  Maintain   Healthy  Life Style    With   Periodic  Monitoring  Of      Any  Medical  Conditions  Including   Elevated  Blood  Pressure,  Lipid  Profile,     Blood  Sugar levels  And   Heart  Disease.               *   Period   Screening  For  Cancers  Involving  Breast,  Colon,    lungs  And  Other  Organs  As  Applicable,  In consultation   With  Your Primary Care Providers. * Second  Neurological  Opinion  And  Evaluations  In  [facility]  Setting  If  Patient  Is  Interested. * Please   Contact   Neurology  Clinic   Early   If   Are  Any  New  Neurological                             Symptoms   And  Any neurological  Concerns. *  If  The  Patient remains  Neurologically  Stable   Return   To  [facility] Neurology Department       IN      3-4            MONTHS  TIME   FOR  FURTHER  FOLLOW UP.                 *  If   There is  Any  Significant  Worsening   Of  Current  Symptoms  And  Or  If patient  Develops       Any additional  New  Neurological  Symptoms  Or  Significant  Concerns   Should  Call  911 or      Go  To  Emergency  Department  For  Further  Immediate  Evaluation. *   The  Neurological   Findings,  Possible  Diagnosis,  Differential diagnoses   And  Options  For    Further   Investigations       And  management   Are  Discussed  Comprehensively. Medications   And  Prescription   Risks  And  Side effects  Are   Also  Discussed. The  Above  Were  Reviewed  With  patient and     questions  Answered  In  Detail. More   Than   50% of face  To face Time   Was  Spent  On  Counseling   And   Coordination      Of  Care   Of   Patient's multiple   Neurological  Problems   And   Comorbid  Medical   Conditions. Electronically signed by Tia Mcintosh MD.,  [facility]       Board Certified in  Neurology &  In  [employer] of [employer] and Neurology ([employer])      DISCLAIMER:   Although every effort was made to ensure the accuracy of this  electronic transcription, some errors in transcription may have occurred.       GENERAL PATIENT INSTRUCTIONS:     A Healthy Lifestyle: Care Instructions  Your Care Instructions  A healthy lifestyle can help you feel good, stay at a healthy weight, and have plenty of energy for both work and play. A healthy lifestyle is something you can share with your whole family. A healthy lifestyle also can lower your risk for serious health problems, such as high blood pressure, heart disease, and diabetes. You can follow a few steps listed below to improve your health and the health of your family. Follow-up care is a key part of your treatment and safety. Be sure to make and go to all appointments, and call your doctor if you are having problems. Its also a good idea to know your test results and keep a list of the medicines you take. How can you care for yourself at home? Do not eat too much sugar, fat, or fast foods. You can still have dessert and treats now and then. The goal is moderation. Start small to improve your eating habits. Pay attention to portion sizes, drink less juice and soda pop, and eat more fruits and vegetables. Eat a healthy amount of food. A 3-ounce serving of meat, for example, is about the size of a deck of cards. Fill the rest of your plate with vegetables and whole grains. Limit the amount of soda and sports drinks you have every day. Drink more water when you are thirsty. Eat at least 5 servings of fruits and vegetables every day. It may seem like a lot, but it is not hard to reach this goal. A serving or helping is 1 piece of fruit, 1 cup of vegetables, or 2 cups of leafy, raw vegetables. Have an apple or some carrot sticks as an afternoon snack instead of a candy bar. Try to have fruits and/or vegetables at every meal.  Make exercise part of your daily routine. You may want to start with simple activities, such as walking, bicycling, or slow swimming. Try to be active 30 to 60 minutes every day. You do not need to do all 30 to 60 minutes all at once. For example, you can exercise 3 times a day for 10 or 20 minutes.  Moderate exercise is safe for most people, but it is always a good idea to talk to your doctor before starting an exercise program.  Keep moving. Kimberly Parry the lawn, work in the garden, or Program[employer]. Take the stairs instead of the elevator at work. If you smoke, quit. People who smoke have an increased risk for heart attack, stroke, cancer, and other lung illnesses. Quitting is hard, but there are ways to boost your chance of quitting tobacco for good. Use nicotine gum, patches, or lozenges. Ask your doctor about stop-smoking programs and medicines. Keep trying. In addition to reducing your risk of diseases in the future, you will notice some benefits soon after you stop using tobacco. If you have shortness of breath or asthma symptoms, they will likely get better within a few weeks after you quit. Limit how much alcohol you drink. Moderate amounts of alcohol (up to 2 drinks a day for men, 1 drink a day for women) are okay. But drinking too much can lead to liver problems, high blood pressure, and other health problems. Family health  If you have a family, there are many things you can do together to improve your health. Eat meals together as a family as often as possible. Eat healthy foods. This includes fruits, vegetables, lean meats and dairy, and whole grains. Include your family in your fitness plan. Most people think of activities such as jogging or tennis as the way to fitness, but there are many ways you and your family can be more active. Anything that makes you breathe hard and gets your heart pumping is exercise. Here are some tips:  Walk to do errands or to take your child to school or the bus. Go for a family bike ride after dinner instead of watching TV. Where can you learn more? Go to https://femi.[employer]. org and sign in to your [employer] account. Enter G056 in the [facility] box to learn more about \"A Healthy Lifestyle: Care Instructions. \"     If you do not have an account, please click on the \"Sign Up Now\" link.   Current as of: July 26, 2016  Content Version: 11.2  © 0008-8098 Healthwise,

## 2021-05-28 NOTE — PROGRESS NOTES
Patient is in for further fill of right breast expander. 70cc sterile IV saline was instilled on 5/14/21, patient had no complaints of discomfort.

## 2021-05-30 DIAGNOSIS — C50.919 MALIGNANT NEOPLASM OF FEMALE BREAST, UNSPECIFIED ESTROGEN RECEPTOR STATUS, UNSPECIFIED LATERALITY, UNSPECIFIED SITE OF BREAST (HCC): ICD-10-CM

## 2021-06-02 RX ORDER — PSYLLIUM HUSK 3.4 G/7G
POWDER ORAL
Qty: 30 TABLET | Refills: 3 | Status: SHIPPED | OUTPATIENT
Start: 2021-06-02 | End: 2021-09-17

## 2021-06-04 DIAGNOSIS — E78.2 MIXED HYPERLIPIDEMIA: ICD-10-CM

## 2021-06-04 RX ORDER — PRAVASTATIN SODIUM 40 MG
TABLET ORAL
Qty: 30 TABLET | Refills: 0 | Status: SHIPPED | OUTPATIENT
Start: 2021-06-04 | End: 2021-07-02

## 2021-06-04 NOTE — TELEPHONE ENCOUNTER
Lk pt, LS notes, will you fill?       Delta Chance called requesting a refill of the below medication which has been pended for you:     Requested Prescriptions     Pending Prescriptions Disp Refills    pravastatin (PRAVACHOL) 40 MG tablet [Pharmacy Med Name: PRAVASTATIN SODIUM 40 MG TAB] 30 tablet 0     Sig: take 1 tablet by mouth once daily       Last Appointment Date: 5/10/2021  Next Appointment Date: 2/10/2022    Allergies   Allergen Reactions    Prednisone Swelling     Whole side of her face swelled when she took it, difficulty breathing

## 2021-06-11 ENCOUNTER — OFFICE VISIT (OUTPATIENT)
Dept: SURGERY | Age: 56
End: 2021-06-11
Payer: MEDICARE

## 2021-06-11 VITALS
HEART RATE: 76 BPM | HEIGHT: 66 IN | DIASTOLIC BLOOD PRESSURE: 66 MMHG | BODY MASS INDEX: 47.09 KG/M2 | SYSTOLIC BLOOD PRESSURE: 128 MMHG | WEIGHT: 293 LBS

## 2021-06-11 DIAGNOSIS — Z98.890 H/O BREAST RECONSTRUCTION: Primary | ICD-10-CM

## 2021-06-11 PROCEDURE — 99213 OFFICE O/P EST LOW 20 MIN: CPT | Performed by: PLASTIC SURGERY

## 2021-06-11 PROCEDURE — 99024 POSTOP FOLLOW-UP VISIT: CPT | Performed by: PLASTIC SURGERY

## 2021-06-11 ASSESSMENT — ENCOUNTER SYMPTOMS
ABDOMINAL PAIN: 0
COUGH: 0
TROUBLE SWALLOWING: 0
SHORTNESS OF BREATH: 0

## 2021-06-11 NOTE — PROGRESS NOTES
Subjective:      Patient ID: Rosa Gonzalez is a 54 y.o. female. HPI  Patient is in for further fill of right breast expander. 70cc sterile IV saline was instilled on 5/28/21, patient had no complaints of discomfort. Wants to match left natural breast size. Review of Systems   Constitutional: Negative. HENT: Negative for congestion and trouble swallowing. Respiratory: Negative for cough and shortness of breath. Cardiovascular: Negative for chest pain. Gastrointestinal: Negative for abdominal pain. Neurological: Negative for light-headedness and headaches. Psychiatric/Behavioral: Negative for dysphoric mood. Objective:   Physical Exam  Vitals and nursing note reviewed. Exam conducted with a chaperone present. Constitutional:       Appearance: Normal appearance. HENT:      Head: Normocephalic and atraumatic. Mouth/Throat:      Mouth: Mucous membranes are moist.   Eyes:      Extraocular Movements: Extraocular movements intact. Conjunctiva/sclera: Conjunctivae normal.      Pupils: Pupils are equal, round, and reactive to light. Pulmonary:      Effort: Pulmonary effort is normal.   Chest:      Comments:   Patient states right is still smaller than natural left when wearing bra. 70 cc sterile IV saline instilled into expander, closed system, sterile technique. Tolerated well. Skin:     General: Skin is warm and dry. Neurological:      General: No focal deficit present. Mental Status: She is alert and oriented to person, place, and time. Assessment: In progress right breast reconstruction. Plan:      Recheck in 4 weeks.         Mirella Cho MD

## 2021-06-11 NOTE — PROGRESS NOTES
54year old female patient is in for further fill of right breast expander. 70cc sterile IV saline was instilled on 5/28/21, patient had no complaints of discomfort. Wants to match left natural breast size.

## 2021-06-22 DIAGNOSIS — Z12.31 SCREENING MAMMOGRAM, ENCOUNTER FOR: ICD-10-CM

## 2021-06-22 DIAGNOSIS — I82.A11 ACUTE DEEP VEIN THROMBOSIS (DVT) OF AXILLARY VEIN OF RIGHT UPPER EXTREMITY (HCC): ICD-10-CM

## 2021-06-22 DIAGNOSIS — Z78.0 POSTMENOPAUSAL: ICD-10-CM

## 2021-06-22 DIAGNOSIS — C50.919 MALIGNANT NEOPLASM OF FEMALE BREAST, UNSPECIFIED ESTROGEN RECEPTOR STATUS, UNSPECIFIED LATERALITY, UNSPECIFIED SITE OF BREAST (HCC): ICD-10-CM

## 2021-06-22 DIAGNOSIS — Z90.11 H/O RIGHT MASTECTOMY: ICD-10-CM

## 2021-06-23 RX ORDER — TIZANIDINE 4 MG/1
4 TABLET ORAL EVERY 8 HOURS PRN
Qty: 20 TABLET | Refills: 0 | Status: SHIPPED | OUTPATIENT
Start: 2021-06-23

## 2021-06-23 RX ORDER — LETROZOLE 2.5 MG/1
TABLET, FILM COATED ORAL
Qty: 30 TABLET | Refills: 5 | Status: SHIPPED | OUTPATIENT
Start: 2021-06-23 | End: 2021-12-30 | Stop reason: SDUPTHER

## 2021-07-02 DIAGNOSIS — E78.2 MIXED HYPERLIPIDEMIA: ICD-10-CM

## 2021-07-02 RX ORDER — PRAVASTATIN SODIUM 40 MG
TABLET ORAL
Qty: 90 TABLET | Refills: 1 | Status: SHIPPED | OUTPATIENT
Start: 2021-07-02 | End: 2021-11-01 | Stop reason: SDUPTHER

## 2021-07-02 NOTE — TELEPHONE ENCOUNTER
Pravin Deleon called requesting a refill of the below medication which has been pended for you:     Requested Prescriptions     Pending Prescriptions Disp Refills    pravastatin (PRAVACHOL) 40 MG tablet [Pharmacy Med Name: PRAVASTATIN SODIUM 40 MG TAB] 30 tablet 0     Sig: take 1 tablet by mouth once daily       Last Appointment Date: 05/10/2021  Next Appointment Date: 2/10/2022    Allergies   Allergen Reactions    Prednisone Swelling     Whole side of her face swelled when she took it, difficulty breathing

## 2021-07-06 ENCOUNTER — HOSPITAL ENCOUNTER (OUTPATIENT)
Dept: INTERVENTIONAL RADIOLOGY/VASCULAR | Age: 56
Discharge: HOME | End: 2021-07-08
Payer: MEDICARE

## 2021-07-06 DIAGNOSIS — N18.31 STAGE 3A CHRONIC KIDNEY DISEASE (HCC): ICD-10-CM

## 2021-07-06 DIAGNOSIS — N28.1 ACQUIRED RENAL CYST OF LEFT KIDNEY: ICD-10-CM

## 2021-07-06 PROCEDURE — 76770 US EXAM ABDO BACK WALL COMP: CPT

## 2021-07-09 ENCOUNTER — OFFICE VISIT (OUTPATIENT)
Dept: SURGERY | Age: 56
End: 2021-07-09
Payer: MEDICARE

## 2021-07-09 VITALS
SYSTOLIC BLOOD PRESSURE: 132 MMHG | HEART RATE: 76 BPM | HEIGHT: 66 IN | BODY MASS INDEX: 46.93 KG/M2 | DIASTOLIC BLOOD PRESSURE: 68 MMHG | OXYGEN SATURATION: 99 % | RESPIRATION RATE: 16 BRPM | WEIGHT: 292 LBS

## 2021-07-09 DIAGNOSIS — Z98.890 H/O BREAST RECONSTRUCTION: Primary | ICD-10-CM

## 2021-07-09 PROCEDURE — 99214 OFFICE O/P EST MOD 30 MIN: CPT | Performed by: PLASTIC SURGERY

## 2021-07-09 PROCEDURE — 99024 POSTOP FOLLOW-UP VISIT: CPT | Performed by: PLASTIC SURGERY

## 2021-07-09 ASSESSMENT — ENCOUNTER SYMPTOMS
COUGH: 0
ABDOMINAL PAIN: 0
TROUBLE SWALLOWING: 0
SHORTNESS OF BREATH: 0

## 2021-07-09 NOTE — PROGRESS NOTES
Subjective:      Patient ID: Barb Oh is a 64 y.o. female. HPI  Patient is in for further fill of right breast expander. 70cc sterile IV saline was instilled on 6/11/21, patient had no complaints of discomfort. Wants to match left natural breast size, feels that she is getting close but says its hard for her to tell. .    Review of Systems   Constitutional: Negative. HENT: Negative for congestion and trouble swallowing. Respiratory: Negative for cough and shortness of breath. Cardiovascular: Negative for chest pain. Gastrointestinal: Negative for abdominal pain. Neurological: Negative for light-headedness and headaches. Psychiatric/Behavioral: Negative for dysphoric mood. Objective:   Physical Exam  Vitals and nursing note reviewed. Exam conducted with a chaperone present. Constitutional:       Appearance: Normal appearance. HENT:      Head: Normocephalic and atraumatic. Mouth/Throat:      Mouth: Mucous membranes are moist.   Eyes:      Extraocular Movements: Extraocular movements intact. Conjunctiva/sclera: Conjunctivae normal.      Pupils: Pupils are equal, round, and reactive to light. Pulmonary:      Effort: Pulmonary effort is normal.   Chest:      Comments:   Breasts actually look symmetric in volume to me today. Reconstruction is soft. 70 cc sterile IV saline instilled into expander, sterile technique. Tolerated well. Skin:     General: Skin is warm and dry. Neurological:      General: No focal deficit present. Mental Status: She is alert and oriented to person, place, and time. Assessment: In progress right breast reconstruction. Plan:      Return in 2 weeks.         Joseph Mendez MD

## 2021-07-09 NOTE — PROGRESS NOTES
Patient is in for further fill of right breast expander. 70cc sterile IV saline was instilled on 6/11/21, patient had no complaints of discomfort. Wants to match left natural breast size, feels that she is getting close.

## 2021-07-23 ENCOUNTER — OFFICE VISIT (OUTPATIENT)
Dept: SURGERY | Age: 56
End: 2021-07-23
Payer: MEDICARE

## 2021-07-23 VITALS
WEIGHT: 278 LBS | BODY MASS INDEX: 44.68 KG/M2 | SYSTOLIC BLOOD PRESSURE: 136 MMHG | HEART RATE: 76 BPM | OXYGEN SATURATION: 98 % | HEIGHT: 66 IN | RESPIRATION RATE: 16 BRPM | DIASTOLIC BLOOD PRESSURE: 84 MMHG

## 2021-07-23 DIAGNOSIS — Z98.890 H/O BREAST RECONSTRUCTION: Primary | ICD-10-CM

## 2021-07-23 PROCEDURE — 99213 OFFICE O/P EST LOW 20 MIN: CPT | Performed by: PLASTIC SURGERY

## 2021-07-23 PROCEDURE — 99024 POSTOP FOLLOW-UP VISIT: CPT | Performed by: PLASTIC SURGERY

## 2021-07-23 ASSESSMENT — ENCOUNTER SYMPTOMS
COUGH: 0
SHORTNESS OF BREATH: 0
ABDOMINAL PAIN: 0
TROUBLE SWALLOWING: 0

## 2021-07-23 NOTE — PROGRESS NOTES
Patient is in for further fill of right breast expander. 70cc sterile IV saline was instilled on 7/9/21, patient had no complaints of discomfort. Wants to match left natural breast size, feels that she is getting close but is not quite the same size yet.

## 2021-07-23 NOTE — PROGRESS NOTES
Subjective:      Patient ID: Tiffanie Young is a 64 y.o. female. HPI  Patient is in for further fill of right breast expander. 70cc sterile IV saline was instilled on 7/9/21, patient had no complaints of discomfort. Wants to match left natural breast size, feels that she is getting close but is not quite the same size yet. Review of Systems   Constitutional: Negative. HENT: Negative for congestion and trouble swallowing. Respiratory: Negative for cough and shortness of breath. Cardiovascular: Negative for chest pain. Gastrointestinal: Negative for abdominal pain. Neurological: Negative for light-headedness and headaches. Psychiatric/Behavioral: Negative for dysphoric mood. Objective:   Physical Exam  Vitals and nursing note reviewed. Exam conducted with a chaperone present. Constitutional:       Appearance: Normal appearance. HENT:      Head: Normocephalic and atraumatic. Mouth/Throat:      Mouth: Mucous membranes are moist.   Eyes:      Extraocular Movements: Extraocular movements intact. Conjunctiva/sclera: Conjunctivae normal.      Pupils: Pupils are equal, round, and reactive to light. Pulmonary:      Effort: Pulmonary effort is normal.   Chest:      Comments:   She feels it is still smaller than left breast.  Reconstruction is soft. 70 cc sterile IV saline instilled into expander, sterile technique. Total volume now 580 cc. Tolerated well. Skin:     General: Skin is warm and dry. Neurological:      General: No focal deficit present. Mental Status: She is alert and oriented to person, place, and time. Assessment:      Right breast reconstruction. Plan:      Recheck in 3 weeks for next fill.         Hennie Ganser, MD

## 2021-07-24 DIAGNOSIS — C50.919 MALIGNANT NEOPLASM OF FEMALE BREAST, UNSPECIFIED ESTROGEN RECEPTOR STATUS, UNSPECIFIED LATERALITY, UNSPECIFIED SITE OF BREAST (HCC): ICD-10-CM

## 2021-07-26 DIAGNOSIS — J30.2 SEASONAL ALLERGIES: Primary | ICD-10-CM

## 2021-07-26 RX ORDER — LEVOCETIRIZINE DIHYDROCHLORIDE 5 MG/1
5 TABLET, FILM COATED ORAL NIGHTLY
Qty: 30 TABLET | Refills: 5 | Status: CANCELLED | OUTPATIENT
Start: 2021-07-26

## 2021-07-26 NOTE — TELEPHONE ENCOUNTER
Patient was advised that she should have refills, patient states that her bottle states no refills.     Kory Guzman called requesting a refill of the below medication which has been pended for you:     Requested Prescriptions     Pending Prescriptions Disp Refills    levocetirizine (XYZAL) 5 MG tablet 30 tablet 5     Sig: Take 1 tablet by mouth nightly       Last Appointment Date: 5/10/2021  Next Appointment Date: 2/10/2022    Allergies   Allergen Reactions    Prednisone Swelling     Whole side of her face swelled when she took it, difficulty breathing

## 2021-07-26 NOTE — TELEPHONE ENCOUNTER
Spoke with pharmacy and they have a script on file they will fill for her of the [address] [facility].

## 2021-07-29 RX ORDER — B-COMPLEX WITH VITAMIN C
TABLET ORAL
Qty: 60 TABLET | Refills: 5 | Status: SHIPPED | OUTPATIENT
Start: 2021-07-29 | End: 2022-01-17

## 2021-07-30 DIAGNOSIS — C50.919 MALIGNANT NEOPLASM OF FEMALE BREAST, UNSPECIFIED ESTROGEN RECEPTOR STATUS, UNSPECIFIED LATERALITY, UNSPECIFIED SITE OF BREAST (HCC): ICD-10-CM

## 2021-08-10 ENCOUNTER — HOSPITAL ENCOUNTER (OUTPATIENT)
Dept: GENERAL RADIOLOGY | Age: 56
Discharge: HOME | End: 2021-08-12
Payer: MEDICARE

## 2021-08-10 ENCOUNTER — HOSPITAL ENCOUNTER (OUTPATIENT)
Dept: LAB | Age: 56
Discharge: HOME | End: 2021-08-10
Payer: MEDICARE

## 2021-08-10 ENCOUNTER — OFFICE VISIT (OUTPATIENT)
Dept: FAMILY MEDICINE CLINIC | Age: 56
End: 2021-08-10
Payer: MEDICARE

## 2021-08-10 VITALS
DIASTOLIC BLOOD PRESSURE: 84 MMHG | WEIGHT: 286 LBS | HEIGHT: 66 IN | OXYGEN SATURATION: 99 % | TEMPERATURE: 98.6 F | SYSTOLIC BLOOD PRESSURE: 132 MMHG | BODY MASS INDEX: 45.96 KG/M2 | HEART RATE: 75 BPM

## 2021-08-10 DIAGNOSIS — M10.9 ACUTE GOUT OF RIGHT FOOT, UNSPECIFIED CAUSE: ICD-10-CM

## 2021-08-10 DIAGNOSIS — M79.671 RIGHT FOOT PAIN: ICD-10-CM

## 2021-08-10 DIAGNOSIS — M79.671 RIGHT FOOT PAIN: Primary | ICD-10-CM

## 2021-08-10 LAB — URIC ACID: 9 MG/DL (ref 2.4–5.7)

## 2021-08-10 PROCEDURE — 84550 ASSAY OF BLOOD/URIC ACID: CPT

## 2021-08-10 PROCEDURE — G8427 DOCREV CUR MEDS BY ELIG CLIN: HCPCS | Performed by: NURSE PRACTITIONER

## 2021-08-10 PROCEDURE — 3017F COLORECTAL CA SCREEN DOC REV: CPT | Performed by: NURSE PRACTITIONER

## 2021-08-10 PROCEDURE — 99213 OFFICE O/P EST LOW 20 MIN: CPT | Performed by: NURSE PRACTITIONER

## 2021-08-10 PROCEDURE — 73630 X-RAY EXAM OF FOOT: CPT

## 2021-08-10 PROCEDURE — G8417 CALC BMI ABV UP PARAM F/U: HCPCS | Performed by: NURSE PRACTITIONER

## 2021-08-10 PROCEDURE — 36415 COLL VENOUS BLD VENIPUNCTURE: CPT

## 2021-08-10 PROCEDURE — 99212 OFFICE O/P EST SF 10 MIN: CPT

## 2021-08-10 PROCEDURE — 1036F TOBACCO NON-USER: CPT | Performed by: NURSE PRACTITIONER

## 2021-08-10 RX ORDER — ALLOPURINOL 100 MG/1
100 TABLET ORAL DAILY
Qty: 90 TABLET | Refills: 1 | Status: SHIPPED | OUTPATIENT
Start: 2021-08-10 | End: 2021-11-01 | Stop reason: SDUPTHER

## 2021-08-10 RX ORDER — COLCHICINE 0.6 MG/1
0.6 TABLET ORAL DAILY
Qty: 3 TABLET | Refills: 3 | Status: SHIPPED | OUTPATIENT
Start: 2021-08-10 | End: 2021-10-20

## 2021-08-10 RX ORDER — OLOPATADINE HYDROCHLORIDE 1 MG/ML
SOLUTION/ DROPS OPHTHALMIC
COMMUNITY
Start: 2021-07-22 | End: 2021-10-26

## 2021-08-10 SDOH — ECONOMIC STABILITY: FOOD INSECURITY: WITHIN THE PAST 12 MONTHS, THE FOOD YOU BOUGHT JUST DIDN'T LAST AND YOU DIDN'T HAVE MONEY TO GET MORE.: PATIENT DECLINED

## 2021-08-10 SDOH — ECONOMIC STABILITY: FOOD INSECURITY: WITHIN THE PAST 12 MONTHS, YOU WORRIED THAT YOUR FOOD WOULD RUN OUT BEFORE YOU GOT MONEY TO BUY MORE.: PATIENT DECLINED

## 2021-08-10 ASSESSMENT — ENCOUNTER SYMPTOMS
WHEEZING: 0
COUGH: 0
SHORTNESS OF BREATH: 0

## 2021-08-10 ASSESSMENT — SOCIAL DETERMINANTS OF HEALTH (SDOH): HOW HARD IS IT FOR YOU TO PAY FOR THE VERY BASICS LIKE FOOD, HOUSING, MEDICAL CARE, AND HEATING?: PATIENT DECLINED

## 2021-08-10 NOTE — PATIENT INSTRUCTIONS
Patient Education        Purine-Restricted Diet: Care Instructions  Your Care Instructions     Purines are substances that are found in some foods. Your body turns purines into uric acid. High levels of uric acid can cause gout, which is a form of arthritis that causes pain and inflammation in joints. You may be able to help control the amount of uric acid in your body by limiting high-purine foods in your diet. Follow-up care is a key part of your treatment and safety. Be sure to make and go to all appointments, and call your doctor if you are having problems. It's also a good idea to know your test results and keep a list of the medicines you take. How can you care for yourself at home? · Plan your meals and snacks around foods that are low in purines and are safe for you to eat. These foods include:  ? Green vegetables and tomatoes. ? Fruits. ? Whole-grain breads, rice, and cereals. ? Eggs, peanut butter, and nuts. ? Low-fat milk, cheese, and other milk products. ? Popcorn. ? Gelatin desserts, chocolate, cocoa, and cakes and sweets, in small amounts. · You can eat certain foods that are medium-high in purines, but eat them only once in a while. These foods include:  ? Legumes, such as dried beans and dried peas. You can have 1 cup cooked legumes each day. ? Asparagus, cauliflower, spinach, mushrooms, and green peas. ? Fish and seafood (other than very high-purine seafood). ? Oatmeal, wheat bran, and wheat germ. · Limit very high-purine foods, including:  ? Organ meats, such as liver, kidneys, sweetbreads, and brains. ? Meats, including cook, beef, pork, and lamb. ? Game meats and any other meats in large amounts. ? Anchovies, sardines, herring, mackerel, and scallops. ? Gravy. ? Beer. Where can you learn more? Go to https://chpepiceweb.[employer]. org and sign in to your [employer] account.  Enter F448 in the [facility] box to learn more about \"Purine-Restricted Diet: Care Instructions. \"     If you do not have an account, please click on the \"Sign Up Now\" link. Current as of: December 17, 2020               Content Version: 12.9  © 2006-2021 Healthwise, [employer]. Care instructions adapted under license by [facility] ([facility]). If you have questions about a medical condition or this instruction, always ask your healthcare professional. [address] any warranty or liability for your use of this information. Patient Education        Gout: Care Instructions  Overview     Gout is a form of arthritis caused by a buildup of uric acid crystals in a joint. It causes sudden attacks of pain, swelling, redness, and stiffness, usually in one joint, especially the big toe. Gout usually comes on without a cause. But it can be brought on by drinking alcohol (especially beer), eating or drinking things made with high-fructose corn syrup, or eating seafood or red meat. Taking certain medicines, such as diuretics, can also trigger an attack of gout. Taking your medicines as prescribed and following up with your doctor regularly can help you avoid gout attacks in the future. Follow-up care is a key part of your treatment and safety. Be sure to make and go to all appointments, and call your doctor if you are having problems. It's also a good idea to know your test results and keep a list of the medicines you take. How can you care for yourself at home? · If the joint is swollen, put ice or a cold pack on the area for 10 to 20 minutes at a time. Put a thin cloth between the ice and your skin. · Prop up the sore limb on a pillow when you ice it or anytime you sit or lie down during the next 3 days. Try to keep it above the level of your heart. This can help reduce swelling. · Rest sore joints. Avoid activities that put weight or strain on the joints for a few days. Take short rest breaks from your regular activities during the day.   · Take your medicines exactly as prescribed. Call your doctor if you think you are having a problem with your medicine. · Take pain medicines exactly as directed. ? If the doctor gave you a prescription medicine for pain, take it as prescribed. ? If you are not taking a prescription pain medicine, ask your doctor if you can take an over-the-counter medicine. · Eat less seafood and red meat. · Avoid foods or drinks that are made with high-fructose corn syrup. · Check with your doctor before drinking alcohol. · Losing weight, if you are overweight, may help reduce attacks of gout. But do not go on a diet that causes rapid weight loss. Losing a lot of weight in a short amount of time can cause a gout attack. When should you call for help? Call your doctor now or seek immediate medical care if:    · You have a fever.     · The joint is so painful you cannot use it.     · You have sudden, unexplained swelling, redness, warmth, or severe pain in one or more joints. Watch closely for changes in your health, and be sure to contact your doctor if:    · You have joint pain.     · Your symptoms get worse or are not improving after 2 or 3 days. Where can you learn more? Go to https://CICCWORLD.[employer]. org and sign in to your [employer] account. Enter N148 in the [employer] box to learn more about \"Gout: Care Instructions. \"     If you do not have an account, please click on the \"Sign Up Now\" link. Current as of: August 5, 2020               Content Version: 12.9  © 2006-2021 Healthwise, [facility]. Care instructions adapted under license by [facility] ([facility]). If you have questions about a medical condition or this instruction, always ask your healthcare professional. [place] [address] any warranty or liability for your use of this information.

## 2021-08-10 NOTE — PROGRESS NOTES
[address] [place] [address]. [address] [place] [address], [id number]  [phone]      HPI:     Foot Pain   The pain is present in the right foot. This is a new problem. The current episode started 1 to 4 weeks ago (2 weeks). There has been no history of extremity trauma. The problem occurs daily. The problem has been unchanged. The quality of the pain is described as sharp and pounding. The pain is at a severity of 10/10. The pain is severe. Associated symptoms include tingling. Pertinent negatives include no fever, limited range of motion or numbness. The symptoms are aggravated by activity and standing. She has tried cold, NSAIDS, acetaminophen and rest for the symptoms. The treatment provided no relief. Current Outpatient Medications   Medication Sig Dispense Refill    allopurinol (ZYLOPRIM) 100 MG tablet Take 1 tablet by mouth daily 90 tablet 1    colchicine (COLCRYS) 0.6 MG tablet Take 1 tablet by mouth daily Take 1.2mg at onset of symptoms then 0.6mg 1 hour later.  3 tablet 3    Calcium Carbonate-Vitamin D (OYSTER SHELL CALCIUM/D) 500-200 MG-UNIT TABS take 1 tablet by mouth twice a day 60 tablet 5    pravastatin (PRAVACHOL) 40 MG tablet take 1 tablet by mouth once daily 90 tablet 1    letrozole (FEMARA) 2.5 MG tablet take 1 tablet by mouth once daily 30 tablet 5    tiZANidine (ZANAFLEX) 4 MG tablet Take 1 tablet by mouth every 8 hours as needed (muscle pain) 20 tablet 0    Cyanocobalamin ER (RA VITAMIN B-12 TR) 1000 MCG TBCR take 1 tablet by mouth once daily 30 tablet 3    busPIRone (BUSPAR) 10 MG tablet take 1 tablet by mouth three times a day      levocetirizine (XYZAL) 5 MG tablet Take 1 tablet by mouth nightly 30 tablet 5    VENTOLIN  (90 Base) MCG/ACT inhaler Inhale 2 puffs into the lungs every 4 hours as needed for Wheezing 1 Inhaler 3    ELIQUIS 5 MG TABS tablet take 1 tablet by mouth twice a day 408 tablet 1    folic acid (FOLVITE) 1 MG tablet take 1 tablet by mouth once daily 30 tablet 3    butalbital-acetaminophen-caffeine (FIORICET, ESGIC) -40 MG per tablet 1-2   TABLET  TWICE  DAILY  AS  NEEDED 60 tablet 2    topiramate (TOPAMAX) 50 MG tablet ONE  TABLET  TWICE  DAILY 60 tablet 5    Chlorpheniramine Maleate (ALLERGY RELIEF PO) Take by mouth daily      lamoTRIgine (LAMICTAL) 25 MG tablet Take 50 mg by mouth daily At bedtime      OXcarbazepine (TRILEPTAL) 150 MG tablet take 1 tablet by mouth every morning BEFORE NOON      benztropine (COGENTIN) 0.5 MG tablet Take 0.5 mg by mouth daily      QUEtiapine (SEROQUEL) 100 MG tablet Take 50 mg by mouth nightly      lamoTRIgine (LAMICTAL) 100 MG tablet 150 mg nightly       olopatadine (PATANOL) 0.1 % ophthalmic solution instill 1 drop into both eyes twice a day      fluticasone (FLONASE) 50 MCG/ACT nasal spray 1 spray by Nasal route daily Indications: as needed 3 Bottle 3    SUMAtriptan (IMITREX) 100 MG tablet Take 1 tablet by mouth once as needed for Migraine 12 tablet 5     No current facility-administered medications for this visit. Allergies   Allergen Reactions    Prednisone Swelling     Whole side of her face swelled when she took it, difficulty breathing       All patients pastmedical, surgical, social and family history has been reviewed. Subjective:      Review of Systems   Constitutional: Positive for activity change. Negative for appetite change, fatigue and fever. Respiratory: Negative for cough, shortness of breath and wheezing. Cardiovascular: Negative for chest pain and palpitations. Musculoskeletal:        Right foot pain with swelling   Neurological: Positive for tingling. Negative for numbness. Objective:      Physical Exam  Vitals and nursing note reviewed. Constitutional:       Appearance: Normal appearance. She is obese. HENT:      Head: Normocephalic and atraumatic.    Musculoskeletal:        Feet:    Skin:     Capillary Refill: Capillary refill takes less than 2 seconds. Neurological:      General: No focal deficit present. Mental Status: She is alert and oriented to person, place, and time. Hospital Outpatient Visit on 08/10/2021   Component Date Value Ref Range Status    Uric Acid 08/10/2021 9.0* 2.4 - 5.7 mg/dL Final     XR FOOT RIGHT (MIN 3 VIEWS)  Narrative: EXAMINATION:  THREE XRAY VIEWS OF THE RIGHT FOOT    8/10/2021 9:11 am    COMPARISON:  None. HISTORY:  ORDERING SYSTEM PROVIDED HISTORY: Right foot pain  TECHNOLOGIST PROVIDED HISTORY:  foot pain with swelling  Reason for Exam: No injury; dorsal and plantar rt foot pain for 2 weeks  Acuity: Acute  Type of Exam: Initial    FINDINGS:  There is no acute osseous abnormality. There are calcaneal spurs at the  Achilles and plantar fascial attachments. The joint spaces are maintained. There is diffuse soft tissue swelling. Impression: Diffuse soft tissue swelling without acute osseous abnormality. Assessment:       Diagnosis Orders   1. Right foot pain  Uric Acid    XR FOOT RIGHT (MIN 3 VIEWS)   2. Acute gout of right foot, unspecified cause  allopurinol (ZYLOPRIM) 100 MG tablet    Uric Acid       Plan:      Reviewed labs and xray  Will start allopurinol 100mg daily along with colchicine for acute flares  Will recheck uric acid level in 4 weeks. Will titrate allopurinol to get uric acid level normalized  Pt to return If symptoms do not improve or worsen   Return PRN   No follow-ups on file.   Orders Placed This Encounter   Procedures    XR FOOT RIGHT (MIN 3 VIEWS)     Standing Status:   Future     Number of Occurrences:   1     Standing Expiration Date:   8/10/2022     Order Specific Question:   Reason for exam:     Answer:   foot pain with swelling    Uric Acid     Standing Status:   Future     Number of Occurrences:   1     Standing Expiration Date:   8/10/2022    Uric Acid     Standing Status:   Future     Standing Expiration Date:   8/10/2022     Orders Placed This Encounter   Medications  allopurinol (ZYLOPRIM) 100 MG tablet     Sig: Take 1 tablet by mouth daily     Dispense:  90 tablet     Refill:  1    colchicine (COLCRYS) 0.6 MG tablet     Sig: Take 1 tablet by mouth daily Take 1.2mg at onset of symptoms then 0.6mg 1 hour later. Dispense:  3 tablet     Refill:  3       Patient given educational materials - see patient instructions. All patient questionsanswered. Pt voiced understanding. Reviewed health maintenance.      Electronically signed by MAGGIE Campbell CNP, CNP on 8/10/2021 at 10:35 AM

## 2021-08-13 ENCOUNTER — OFFICE VISIT (OUTPATIENT)
Dept: SURGERY | Age: 56
End: 2021-08-13
Payer: MEDICARE

## 2021-08-13 VITALS
HEIGHT: 66 IN | WEIGHT: 286 LBS | BODY MASS INDEX: 45.96 KG/M2 | SYSTOLIC BLOOD PRESSURE: 130 MMHG | HEART RATE: 76 BPM | DIASTOLIC BLOOD PRESSURE: 68 MMHG | OXYGEN SATURATION: 98 %

## 2021-08-13 DIAGNOSIS — Z98.890 H/O BREAST RECONSTRUCTION: Primary | ICD-10-CM

## 2021-08-13 PROCEDURE — 99214 OFFICE O/P EST MOD 30 MIN: CPT

## 2021-08-13 PROCEDURE — 99024 POSTOP FOLLOW-UP VISIT: CPT | Performed by: PLASTIC SURGERY

## 2021-08-14 RX ORDER — FOLIC ACID 1 MG/1
TABLET ORAL
Qty: 30 TABLET | Refills: 3 | Status: SHIPPED | OUTPATIENT
Start: 2021-08-14 | End: 2021-12-03

## 2021-08-16 DIAGNOSIS — G43.009 MIGRAINE WITHOUT AURA AND WITHOUT STATUS MIGRAINOSUS, NOT INTRACTABLE: ICD-10-CM

## 2021-08-16 NOTE — TELEPHONE ENCOUNTER
Last Appt:  5/28/2021  Next Appt:   9/3/2021  Med verified in Epic    Patient needs refill on Topiramate

## 2021-08-17 ENCOUNTER — OFFICE VISIT (OUTPATIENT)
Dept: ONCOLOGY | Age: 56
End: 2021-08-17
Payer: MEDICARE

## 2021-08-17 ENCOUNTER — HOSPITAL ENCOUNTER (OUTPATIENT)
Dept: LAB | Age: 56
Discharge: HOME | End: 2021-08-17
Payer: MEDICARE

## 2021-08-17 VITALS
SYSTOLIC BLOOD PRESSURE: 112 MMHG | TEMPERATURE: 98.3 F | OXYGEN SATURATION: 99 % | HEIGHT: 66 IN | HEART RATE: 76 BPM | BODY MASS INDEX: 45.16 KG/M2 | DIASTOLIC BLOOD PRESSURE: 74 MMHG | WEIGHT: 281 LBS

## 2021-08-17 DIAGNOSIS — C50.919 MALIGNANT NEOPLASM OF FEMALE BREAST, UNSPECIFIED ESTROGEN RECEPTOR STATUS, UNSPECIFIED LATERALITY, UNSPECIFIED SITE OF BREAST (HCC): ICD-10-CM

## 2021-08-17 DIAGNOSIS — Z90.11 H/O RIGHT MASTECTOMY: ICD-10-CM

## 2021-08-17 DIAGNOSIS — Z12.31 SCREENING MAMMOGRAM, ENCOUNTER FOR: Primary | ICD-10-CM

## 2021-08-17 DIAGNOSIS — Z78.0 POSTMENOPAUSAL: ICD-10-CM

## 2021-08-17 DIAGNOSIS — Z79.811 AROMATASE INHIBITOR USE: ICD-10-CM

## 2021-08-17 DIAGNOSIS — C50.919 MALIGNANT NEOPLASM OF FEMALE BREAST, UNSPECIFIED ESTROGEN RECEPTOR STATUS, UNSPECIFIED LATERALITY, UNSPECIFIED SITE OF BREAST (HCC): Primary | ICD-10-CM

## 2021-08-17 DIAGNOSIS — I82.A11 ACUTE DEEP VEIN THROMBOSIS (DVT) OF AXILLARY VEIN OF RIGHT UPPER EXTREMITY (HCC): ICD-10-CM

## 2021-08-17 LAB
ABSOLUTE EOS #: 0.19 K/UL (ref 0–0.44)
ABSOLUTE IMMATURE GRANULOCYTE: 0.04 K/UL (ref 0–0.3)
ABSOLUTE LYMPH #: 2.82 K/UL (ref 1.1–3.7)
ABSOLUTE MONO #: 0.59 K/UL (ref 0.1–1.2)
ALBUMIN SERPL-MCNC: 4.3 G/DL (ref 3.5–5.2)
ALBUMIN/GLOBULIN RATIO: 1.2 (ref 1–2.5)
ALP BLD-CCNC: 106 U/L (ref 35–104)
ALT SERPL-CCNC: 11 U/L (ref 5–33)
ANION GAP SERPL CALCULATED.3IONS-SCNC: 14 MMOL/L (ref 9–17)
AST SERPL-CCNC: 16 U/L
BASOPHILS # BLD: 1 % (ref 0–2)
BASOPHILS ABSOLUTE: 0.04 K/UL (ref 0–0.2)
BILIRUB SERPL-MCNC: 0.22 MG/DL (ref 0.3–1.2)
BUN BLDV-MCNC: 18 MG/DL (ref 6–20)
BUN/CREAT BLD: 15 (ref 9–20)
CALCIUM SERPL-MCNC: 10.2 MG/DL (ref 8.6–10.4)
CHLORIDE BLD-SCNC: 106 MMOL/L (ref 98–107)
CO2: 22 MMOL/L (ref 20–31)
CREAT SERPL-MCNC: 1.17 MG/DL (ref 0.5–0.9)
DIFFERENTIAL TYPE: ABNORMAL
EOSINOPHILS RELATIVE PERCENT: 2 % (ref 1–4)
GFR AFRICAN AMERICAN: 58 ML/MIN
GFR NON-AFRICAN AMERICAN: 48 ML/MIN
GFR SERPL CREATININE-BSD FRML MDRD: ABNORMAL ML/MIN/{1.73_M2}
GFR SERPL CREATININE-BSD FRML MDRD: ABNORMAL ML/MIN/{1.73_M2}
GLUCOSE BLD-MCNC: 117 MG/DL (ref 70–99)
HCT VFR BLD CALC: 39.1 % (ref 36.3–47.1)
HEMOGLOBIN: 12.8 G/DL (ref 11.9–15.1)
IMMATURE GRANULOCYTES: 1 %
LYMPHOCYTES # BLD: 34 % (ref 24–43)
MCH RBC QN AUTO: 31.7 PG (ref 25.2–33.5)
MCHC RBC AUTO-ENTMCNC: 32.7 G/DL (ref 25.2–33.5)
MCV RBC AUTO: 96.8 FL (ref 82.6–102.9)
MONOCYTES # BLD: 7 % (ref 3–12)
NRBC AUTOMATED: 0 PER 100 WBC
PDW BLD-RTO: 13 % (ref 11.8–14.4)
PLATELET # BLD: 229 K/UL (ref 138–453)
PLATELET ESTIMATE: ABNORMAL
PMV BLD AUTO: 10.1 FL (ref 8.1–13.5)
POTASSIUM SERPL-SCNC: 4.2 MMOL/L (ref 3.7–5.3)
RBC # BLD: 4.04 M/UL (ref 3.95–5.11)
RBC # BLD: ABNORMAL 10*6/UL
SEG NEUTROPHILS: 55 % (ref 36–65)
SEGMENTED NEUTROPHILS ABSOLUTE COUNT: 4.68 K/UL (ref 1.5–8.1)
SODIUM BLD-SCNC: 142 MMOL/L (ref 135–144)
TOTAL PROTEIN: 7.9 G/DL (ref 6.4–8.3)
WBC # BLD: 8.4 K/UL (ref 3.5–11.3)
WBC # BLD: ABNORMAL 10*3/UL

## 2021-08-17 PROCEDURE — 1036F TOBACCO NON-USER: CPT | Performed by: INTERNAL MEDICINE

## 2021-08-17 PROCEDURE — 80053 COMPREHEN METABOLIC PANEL: CPT

## 2021-08-17 PROCEDURE — G8417 CALC BMI ABV UP PARAM F/U: HCPCS | Performed by: INTERNAL MEDICINE

## 2021-08-17 PROCEDURE — 99214 OFFICE O/P EST MOD 30 MIN: CPT | Performed by: INTERNAL MEDICINE

## 2021-08-17 PROCEDURE — 36415 COLL VENOUS BLD VENIPUNCTURE: CPT

## 2021-08-17 PROCEDURE — 85025 COMPLETE CBC W/AUTO DIFF WBC: CPT

## 2021-08-17 PROCEDURE — 3017F COLORECTAL CA SCREEN DOC REV: CPT | Performed by: INTERNAL MEDICINE

## 2021-08-17 PROCEDURE — G8427 DOCREV CUR MEDS BY ELIG CLIN: HCPCS | Performed by: INTERNAL MEDICINE

## 2021-08-17 RX ORDER — TOPIRAMATE 50 MG/1
TABLET, FILM COATED ORAL
Qty: 60 TABLET | Refills: 5 | Status: SHIPPED | OUTPATIENT
Start: 2021-08-17 | End: 2021-09-03 | Stop reason: SDUPTHER

## 2021-08-17 ASSESSMENT — ENCOUNTER SYMPTOMS
ABDOMINAL PAIN: 0
SHORTNESS OF BREATH: 0
COUGH: 0
TROUBLE SWALLOWING: 0

## 2021-08-17 NOTE — PROGRESS NOTES
johnny Chouhdary                                                                                                                  8/17/2021  MRN:   [id number]  YOB: 1965  PCP:                           Alethea Fonseca MD  Referring Physician: No ref. provider found  Treating Physician Name: Pascual Joseph MD      Reason for visit:  Results of lab work-up. Preop evaluation. Discussed treatment plan. Current problems:  Stage IIa infiltrating ductal carcinoma of right breast, ER negative, OK positive and HER-2 amplified. DVT of right upper extremity secondary to PORT(11/20/18), Xarelto discontinues after PORT removal  Bilateral massive pulmonary embolism status post thrombectomy, 4/10/2020  Strong family history of breast cancer in sister and maternal aunt    Active and recent treatments:  Right mastectomy with sentinel lymph node biopsy: 10/19/2017  TCH P x6: Cycle 1 day 1 on 11/13/2017. Completed Herceptin 11/29/2018  Adjuvant anastrozole: 5/2018. Discontinued due to toxicity  Adjuvant Femara: 6/2018  Pulmonary embolism thrombectomy: 4/10/2020    Summary of Case/History:    Luciano Choudhary a 64 y. o.female who presents to the hematology oncology office to establish care and for further recommendations for management of her recently diagnosed right breast cancer    Patient had a mammogram after many years in September 2017 which came back abnormal.  Subsequently patient had an ultrasound which confirmed a 1.2 cm lesion in the right breast at 1:00 position. There was another 4 mm lesion at 12:00 position    Patient underwent ultrasound-guided biopsy on 9/8/17. the lesion at 1:00 position shows invasive ductal carcinoma. ER negative and OK positive associated with high-grade DCIS. Lesion at 12:00 position showed fibrocystic disease and focal adenosis and hyperplasia. Patient underwent right sided mastectomy with sentinel lymph node biopsy on 10/19/17.   Pathology report showed invasive ductal carcinoma, grade 3 out of 3, 2.8 cm in size with negative margins. pT2,pN0,M0  Tumor specimen was negative for ER receptor and weekly positive for OR receptor (5-10 percent). High-grade DCIS was also noted. One sentinel lymph node was sampled which was negative for metastasis    Patient started on neoadjuvant chemotherapy using TCHP regimen. Cycle #1, day #1 on 11/13/17    Patient underwent removal of port with replacement due to port malfunction on 11/30/17    Patient completed 6 cycles of TCHP and continued maintenance Herceptin. Started patient on anastrozole along with calcium and vitamin D in May 2018. Switched anti-hormonal therapy to Femara in June 2018 due to arthralgia    Herceptin last cycle completed 11/29/18    Interim History:    Patient presents to the clinic for a follow-up visit and to discuss results of her lab work and other relevant clinical data. Continues to be on Femara. Continues to be in anticoagulation. Patient is undergoing reconstruction of her right breast.  Neuropathy is stable. Denies fever chills denies noticing any mass or lump over the chest wall    During this visit patient's allergy, social, medical, surgical history and medications were reviewed and updated.     Past Medical History:   Past Medical History:   Diagnosis Date    Asthma     seasonal    Bipolar 1 disorder ([address]     Breast cancer ([address] 2017    right side     DVT of axillary vein, acute right ([address]     Eye twitch 2019    Headache(784.0)     History of blood transfusion     Hx of blood clots 03/2020    PE    Hyperlipidemia     Migraine     Obesity     Sleep apnea     uses CPAP     Past Surgical History:     Past Surgical History:   Procedure Laterality Date    BREAST ENHANCEMENT SURGERY Right 3/10/2021    BREAST RECONSTRUCTION WITH TISSUE EXPANDER INSERTION performed by Alon Coburn MD at [facility]      rt mastectomy    BREAST SURGERY Right Smokeless tobacco: Never Used    Tobacco comment: Never smoker. TC, RRT 4/5/18   Vaping Use    Vaping Use: Never used   Substance and Sexual Activity    Alcohol use: No    Drug use: No    Sexual activity: Not Currently     Partners: Male     Birth control/protection: Surgical   Other Topics Concern    Not on file   Social History Narrative    Not on file     Social Determinants of Health     Financial Resource Strain: Unknown    Difficulty of Paying Living Expenses: Patient refused   Food Insecurity: Unknown    Worried About Running Out of Food in the Last Year: Patient refused    [address] in the Last Year: Patient refused   Transportation Needs:     Lack of Transportation (Medical):  Lack of Transportation (Non-Medical):    Physical Activity:     Days of Exercise per Week:     Minutes of Exercise per Session:    Stress:     Feeling of Stress :    Social Connections:     Frequency of Communication with Friends and Family:     Frequency of Social Gatherings with Friends and Family:     Attends Orthodoxy Services:     Active Member of Clubs or Organizations:     Attends Club or Organization Meetings:     Marital Status:    Intimate Partner Violence:     Fear of Current or Ex-Partner:     Emotionally Abused:     Physically Abused:     Sexually Abused:       Current Medications:     Current Outpatient Medications   Medication Sig Dispense Refill    topiramate (TOPAMAX) 50 MG tablet ONE  TABLET  TWICE  DAILY 60 tablet 5    folic acid (FOLVITE) 1 MG tablet take 1 tablet by mouth once daily 30 tablet 3    olopatadine (PATANOL) 0.1 % ophthalmic solution instill 1 drop into both eyes twice a day      allopurinol (ZYLOPRIM) 100 MG tablet Take 1 tablet by mouth daily 90 tablet 1    colchicine (COLCRYS) 0.6 MG tablet Take 1 tablet by mouth daily Take 1.2mg at onset of symptoms then 0.6mg 1 hour later.  3 tablet 3    Calcium Carbonate-Vitamin D (OYSTER SHELL CALCIUM/D) 500-200 MG-UNIT TABS take 1 tablet by mouth twice a day 60 tablet 5    pravastatin (PRAVACHOL) 40 MG tablet take 1 tablet by mouth once daily 90 tablet 1    letrozole (FEMARA) 2.5 MG tablet take 1 tablet by mouth once daily 30 tablet 5    tiZANidine (ZANAFLEX) 4 MG tablet Take 1 tablet by mouth every 8 hours as needed (muscle pain) 20 tablet 0    Cyanocobalamin ER (RA VITAMIN B-12 TR) 1000 MCG TBCR take 1 tablet by mouth once daily 30 tablet 3    busPIRone (BUSPAR) 10 MG tablet take 1 tablet by mouth three times a day      levocetirizine (XYZAL) 5 MG tablet Take 1 tablet by mouth nightly 30 tablet 5    VENTOLIN  (90 Base) MCG/ACT inhaler Inhale 2 puffs into the lungs every 4 hours as needed for Wheezing 1 Inhaler 3    ELIQUIS 5 MG TABS tablet take 1 tablet by mouth twice a day 180 tablet 1    butalbital-acetaminophen-caffeine (FIORICET, ESGIC) -40 MG per tablet 1-2   TABLET  TWICE  DAILY  AS  NEEDED 60 tablet 2    Chlorpheniramine Maleate (ALLERGY RELIEF PO) Take by mouth daily      lamoTRIgine (LAMICTAL) 25 MG tablet Take 50 mg by mouth daily At bedtime      OXcarbazepine (TRILEPTAL) 150 MG tablet take 1 tablet by mouth every morning BEFORE NOON      benztropine (COGENTIN) 0.5 MG tablet Take 0.5 mg by mouth daily      QUEtiapine (SEROQUEL) 100 MG tablet Take 50 mg by mouth nightly      lamoTRIgine (LAMICTAL) 100 MG tablet 150 mg nightly       fluticasone (FLONASE) 50 MCG/ACT nasal spray 1 spray by Nasal route daily Indications: as needed 3 Bottle 3    SUMAtriptan (IMITREX) 100 MG tablet Take 1 tablet by mouth once as needed for Migraine 12 tablet 5     No current facility-administered medications for this visit. Allergies:   Prednisone    Review of Systems:    Constitutional: Negative for fever or chills. No night sweats, weight is stable now  Eyes: No eye discharge, double vision, or eye pain . Twitching of left eye  HEENT: negative for sore mouth, sore throat, hoarseness and voice change . Complains of twitching of left eye. Improved  Respiratory: negative for cough , sputum, dyspnea, wheezing, hemoptysis, chest pain   Cardiovascular: negative for chest pain, dyspnea, palpitations, orthopnea, PND   Gastrointestinal: Positive for nausea, vomiting, constipation, abdominal pain, Dysphagia, hematemesis and hematochezia   Genitourinary: negative for frequency, dysuria, nocturia, urinary incontinence, and hematuria   Integument: negative for rash, skin lesions, bruises. Hematologic/Lymphatic: negative for easy bruising, bleeding, lymphadenopathy, or petechiae   Endocrine: negative for heat or cold intolerance,weight changes, change in bowel habits and hair loss   Musculoskeletal: negative for myalgias, arthralgias, pain, joint swelling,and bone pain . Positive for discomfort in right arm. Neurological: negative for headaches, dizziness, seizures, weakness, numbness      Physical Exam:    Vitals:  /74 (Site: Left Lower Arm, Position: Sitting, Cuff Size: Large Adult)   Pulse 76   Temp 98.3 °F (36.8 °C)   Ht 5' 6\" (1.676 m)   Wt 281 lb (127.5 kg)   LMP 02/28/2013 (Exact Date)   SpO2 99%   BMI 45.35 kg/m²    General appearance - patient not in acute distress  Mental status - AAO X3  Eyes - pupils equal and reactive, extraocular eye movements intact  Mouth - mucous membranes moist, pharynx normal without lesions  Neck - supple, no significant adenopathy  Lymphatics - no palpable lymphadenopathy, no hepatosplenomegaly  Chest - clear to auscultation, no wheezes, rales or rhonchi, symmetric air entry. Heart - normal rate, regular rhythm, normal S1, S2, no murmurs  Abdomen - soft, nontender, nondistended, no masses or organomegaly  Neurological - alert, oriented, normal speech, no focal findings .   Twitching of left eye noted  Extremities - peripheral pulses normal, no pedal edema, no clubbing or cyanosis  Skin - normal coloration and turgor, no rashes, no suspicious skin lesions noted DATA:    Results for orders placed or performed during the hospital encounter of 08/17/21   CBC Auto Differential   Result Value Ref Range    WBC 8.4 3.5 - 11.3 k/uL    RBC 4.04 3.95 - 5.11 m/uL    Hemoglobin 12.8 11.9 - 15.1 g/dL    Hematocrit 39.1 36.3 - 47.1 %    MCV 96.8 82.6 - 102.9 fL    MCH 31.7 25.2 - 33.5 pg    MCHC 32.7 25.2 - 33.5 g/dL    RDW 13.0 11.8 - 14.4 %    Platelets 218 158 - 403 k/uL    MPV 10.1 8.1 - 13.5 fL    NRBC Automated 0.0 0.0 per 100 WBC    Differential Type NOT REPORTED     Seg Neutrophils 55 36 - 65 %    Lymphocytes 34 24 - 43 %    Monocytes 7 3 - 12 %    Eosinophils % 2 1 - 4 %    Basophils 1 0 - 2 %    Immature Granulocytes 1 (H) 0 %    Segs Absolute 4.68 1.50 - 8.10 k/uL    Absolute Lymph # 2.82 1.10 - 3.70 k/uL    Absolute Mono # 0.59 0.10 - 1.20 k/uL    Absolute Eos # 0.19 0.00 - 0.44 k/uL    Basophils Absolute 0.04 0.00 - 0.20 k/uL    Absolute Immature Granulocyte 0.04 0.00 - 0.30 k/uL    WBC Morphology NOT REPORTED     RBC Morphology NOT REPORTED     Platelet Estimate NOT REPORTED      Xr Chest Standard (2 Vw)    Result Date: 10/10/2017  EXAMINATION: TWO VIEWS OF THE CHEST 10/10/2017 2:37 pm COMPARISON: 10/18/2011 HISTORY: ORDERING SYSTEM PROVIDED HISTORY: Invasive ductal carcinoma of breast, right [facility]) TECHNOLOGIST PROVIDED HISTORY: Reason for exam:->Pre op Ordering Physician Provided Reason for Exam: Pre op for right mastectomy. No chest complaints. Acuity: Unknown Type of Exam: Initial Additional signs and symptoms: n/a Relevant Medical/Surgical History: breast cancer FINDINGS: The lungs are without acute focal process. There is no effusion or pneumothorax. The cardiomediastinal silhouette is stable. The osseous structures are stable. No acute process.      Nm Lymphoscintigram    Result Date: 10/19/2017  EXAMINATION: LYMPHOSCINTIGRAPHY 10/19/2017 9:21 am TECHNIQUE: Sulfur colloid labeled with 0.920 mCi of Tc99 was administered in 4 subdermal wheals around the patient's right areolar region. Delayed images were obtained. HISTORY: ORDERING SYSTEM PROVIDED HISTORY: Surgery TECHNOLOGIST PROVIDED HISTORY: Reason for exam:->Surgery Ordering Physician Provided Reason for Exam: 0.920 mCi 99mTc filtered Sulfur Colloid injected subcutaneous around RT nipple. Acuity: Unknown Type of Exam: Unknown FINDINGS: There appears to be migration into the right axilla suggestive of the sentinel node. Migration of the radiotracer into the right axilla suggestive of the patient's sentinel node. MRI brain 2/5/18  Impression   1. Unremarkable MRI of the brain.  No evidence of metastatic disease. 2. Acute left sphenoid sinusitis. [facility] Digital Diagnostic W Or Wo Cad Bilateral: 10/5/2018    No mammographic evidence of malignancy. 2. Postoperative changes of the right breast. BIRADS: BIRADS - CATEGORY 2 Benign, no evidence of malignancy. Normal interval follow-up is recommended in 12 months. OVERALL ASSESSMENT - BENIGN A letter of notification will be sent to the patient regarding the results. The [employer] of Radiology recommends annual mammograms for women 40 years and older. Mri Orbits Face Neck W Wo Contrast: 10/11/2018    No convincing evidence of abnormal intracranial enhancement to suggest intracranial metastasis. 2. Bilateral internal auditory canals, cisternal segment of cranial nerve V, and fluid-filled inner ear structures are unremarkable. 3. Borderline enlarged right level II lymph node measuring 13 mm. there are additional nonenlarged upper neck lymph nodes. These are nonspecific by imaging and may be reactive. Given history of breast cancer, clinical correlation is recommended. Follow-up CT neck may be obtained as clinically warranted. CT chest 11/20/18:  Impression   1. No evidence of pulmonary embolism or focal airspace consolidation.    2. Extensive edema and inflammatory stranding throughout the right chest   wall, axilla and supraclavicular soft tissues which appears to be tracking   along the vascular pathway likely secondary to known deep venous thrombosis. Apparent hypodensity in the distal SVC suggestive of filling   defects/thrombosis.  Additionally proximal SVC as well as the right IJ appear   mildly prominent underlying heterogeneous soft tissue density also suspicious   for underlying thrombosis. 3. Diffuse skin thickening along the right mastectomy site which may be   sequela of post treatment change of please correlate for prior radiation. Adjacent elongated oblong shaped subcutaneous fluid collection as measured   above suggestive of seroma possibly chronic and postoperative in nature. 4. Extensive contrast throughout the left hepatic lobe likely due to reflux   of IV contrast.     [facility]an Digital Screen W Cad Bilateral: 10/11/2019    No mammographic evidence of malignancy. BI-RADS 2 BIRADS: BIRADS - CATEGORY 2 Benign, no evidence of malignancy. Normal interval follow-up is recommended in 12 months. OVER ALL ASSESSMENT - BENIGN A letter of notification will be sent to the patient regarding the results. Mri Brain W Wo Contrast: 10/2/2019    Unremarkable pre and post-contrast MRI of the brain. Ct Chest Pulmonary Embolism W Contrast: 4/9/2020    Massive pulmonary embolus burden involving the right and left pulmonary arteries, extending into the segmental and subsegmental branches bilaterally. 2. Right ventricular strain, within RV to LV ratio measuring 1.8 3. 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 4.  Critical results were called by Dr. Giorgi Cavazos to Dr Serafin Parker on 4/9/2020 at 20:48. DEXA scan:    Impression Osteopenia by WHO criteria. Impression:  Invasive ductal carcinoma of right breast, stage IIa. pT2,pN0,M0. ER negative, ID weakly positive. HER-2 amplified.   Status post right mastectomy with sentinel lymph node biopsy  Twitching of left eye, MRI negative  Family history of breast cancer  Lower back pain  Postmenopausal  Right upper extremity DVT of the axillary and basilic veins secondary to PORT(11/20/18), Xarelto discontinued after PORT removal, treated with Xarelto  Osteopenia    Plan:  Personally reviewed results of lab work-up and other relevant clinical data. Discussed natural history of breast cancer  Patient will be due for mammogram in November 2021. We will order 1  Continue annual screening mammogram  Patient will continue to follow-up with her plastic surgeon for breast reconstructive surgery  Continue annual screening mammogram  Patient will continue to undergo reconstruction of the right breast  Patient has had recurrent venous thromboembolism. Last episode was a massive PE requiring thrombectomy. Patient will need long-term anticoagulation  Continue Femara. Patient tumor was ER negative but MD positive. Continue calcium and vitamin D  DEXA scan every 2 years  Return to clinic in 6 months. NCCN guidelines were reviewed and discussed with the patient. The diagnosis and care plan were discussed with the patient in detail. I discussed the natural history of the disease, prognosis, risks and goals of therapy and answered all the patients questions to the best of my ability. Patient expressed understanding and was in agreement. Charlotte Hudson MD          This note is created with the assistance of a speech recognition program.  While intending to generate a document that actually reflects the content of the visit, the document can still have some errors including those of syntax and sound a like substitutions which may escape proof reading. It such instances, actual meaning can be extrapolated by contextual diversion.

## 2021-08-17 NOTE — PROGRESS NOTES
Patient is in for further fill of right breast expander. 70cc sterile IV saline was instilled on 7/23/21, patient had no complaints of discomfort. Wants to match left natural breast size, feels that she is getting close but is not quite the same size yet.
appointment in [place] to be sized.           Syeda Arrington MD

## 2021-09-03 ENCOUNTER — OFFICE VISIT (OUTPATIENT)
Dept: NEUROLOGY | Age: 56
End: 2021-09-03
Payer: MEDICARE

## 2021-09-03 VITALS
HEIGHT: 66 IN | DIASTOLIC BLOOD PRESSURE: 76 MMHG | HEART RATE: 76 BPM | SYSTOLIC BLOOD PRESSURE: 130 MMHG | WEIGHT: 278.38 LBS | BODY MASS INDEX: 44.74 KG/M2

## 2021-09-03 DIAGNOSIS — I82.A21 CHRONIC DEEP VEIN THROMBOSIS (DVT) OF AXILLARY VEIN OF RIGHT UPPER EXTREMITY (HCC): ICD-10-CM

## 2021-09-03 DIAGNOSIS — F32.A ANXIETY AND DEPRESSION: ICD-10-CM

## 2021-09-03 DIAGNOSIS — Z98.890 H/O BREAST RECONSTRUCTION: ICD-10-CM

## 2021-09-03 DIAGNOSIS — F41.9 ANXIETY AND DEPRESSION: ICD-10-CM

## 2021-09-03 DIAGNOSIS — R25.3 MUSCLE TWITCHING: ICD-10-CM

## 2021-09-03 DIAGNOSIS — Z90.11 H/O RIGHT MASTECTOMY: ICD-10-CM

## 2021-09-03 DIAGNOSIS — G24.5 BLEPHAROSPASM: ICD-10-CM

## 2021-09-03 DIAGNOSIS — G43.009 MIGRAINE WITHOUT AURA AND WITHOUT STATUS MIGRAINOSUS, NOT INTRACTABLE: Primary | ICD-10-CM

## 2021-09-03 DIAGNOSIS — E66.01 MORBID OBESITY WITH BMI OF 40.0-44.9, ADULT (HCC): ICD-10-CM

## 2021-09-03 DIAGNOSIS — G47.9 SLEEP DIFFICULTIES: ICD-10-CM

## 2021-09-03 DIAGNOSIS — G51.39 HEMIFACIAL SPASM, UNSPECIFIED LATERALITY: ICD-10-CM

## 2021-09-03 DIAGNOSIS — F31.9 BIPOLAR 1 DISORDER (HCC): ICD-10-CM

## 2021-09-03 DIAGNOSIS — G47.33 OSA ON CPAP: ICD-10-CM

## 2021-09-03 DIAGNOSIS — Z99.89 OSA ON CPAP: ICD-10-CM

## 2021-09-03 DIAGNOSIS — Z85.3 HX: BREAST CANCER: ICD-10-CM

## 2021-09-03 PROCEDURE — 1036F TOBACCO NON-USER: CPT | Performed by: PSYCHIATRY & NEUROLOGY

## 2021-09-03 PROCEDURE — G8427 DOCREV CUR MEDS BY ELIG CLIN: HCPCS | Performed by: PSYCHIATRY & NEUROLOGY

## 2021-09-03 PROCEDURE — 99214 OFFICE O/P EST MOD 30 MIN: CPT | Performed by: PSYCHIATRY & NEUROLOGY

## 2021-09-03 PROCEDURE — 3017F COLORECTAL CA SCREEN DOC REV: CPT | Performed by: PSYCHIATRY & NEUROLOGY

## 2021-09-03 PROCEDURE — G8417 CALC BMI ABV UP PARAM F/U: HCPCS | Performed by: PSYCHIATRY & NEUROLOGY

## 2021-09-03 RX ORDER — TOPIRAMATE 50 MG/1
TABLET, FILM COATED ORAL
Qty: 60 TABLET | Refills: 5 | Status: SHIPPED | OUTPATIENT
Start: 2021-09-03 | End: 2022-01-20 | Stop reason: SDUPTHER

## 2021-09-03 RX ORDER — BUTALBITAL, ACETAMINOPHEN AND CAFFEINE 50; 325; 40 MG/1; MG/1; MG/1
TABLET ORAL
Qty: 60 TABLET | Refills: 2 | Status: SHIPPED | OUTPATIENT
Start: 2021-09-03 | End: 2022-01-20 | Stop reason: SDUPTHER

## 2021-09-03 RX ORDER — SUMATRIPTAN 100 MG/1
100 TABLET, FILM COATED ORAL
Qty: 12 TABLET | Refills: 5 | Status: SHIPPED | OUTPATIENT
Start: 2021-09-03 | End: 2022-01-20 | Stop reason: SDUPTHER

## 2021-09-03 ASSESSMENT — ENCOUNTER SYMPTOMS
RHINORRHEA: 0
SCALP TENDERNESS: 0
DIARRHEA: 0
BLOOD IN STOOL: 0
EYE PAIN: 0
EYE REDNESS: 0
EYE DISCHARGE: 0
APNEA: 0
SHORTNESS OF BREATH: 0
TROUBLE SWALLOWING: 0
VISUAL CHANGE: 0
SORE THROAT: 0
ABDOMINAL DISTENTION: 0
COUGH: 0
VOICE CHANGE: 0
BACK PAIN: 0
COLOR CHANGE: 0
CHEST TIGHTNESS: 0
ABDOMINAL PAIN: 0
BLURRED VISION: 0
FACIAL SWEATING: 0
WHEEZING: 0
BOWEL INCONTINENCE: 0
CONSTIPATION: 0
SWOLLEN GLANDS: 0
EYE ITCHING: 0
SINUS PRESSURE: 0
NAUSEA: 1
CHOKING: 0
EYE WATERING: 1
PHOTOPHOBIA: 1
VOMITING: 1
FACIAL SWELLING: 0

## 2021-09-03 NOTE — PROGRESS NOTES
[facility] [place]  Neurology  [address] [address]  Phone: [phone]   Fax: [phone]        SUBJECTIVE:     PATIENT ID:  Sandy Trotter is a  RIGHT  HANDED 64 y.o. female. Migraine   This is a chronic problem. Episode onset: FOR  MORE  THAN   4  YEARS. The problem occurs intermittently. The problem has been gradually worsening. The pain is located in the bilateral and vertex region. The pain does not radiate. The pain quality is similar to prior headaches. The quality of the pain is described as aching, dull and throbbing. The pain is at a severity of 3/10. The pain is mild. Associated symptoms include eye watering, insomnia, nausea, phonophobia, photophobia and vomiting. Pertinent negatives include no abdominal pain, abnormal behavior, anorexia, back pain, blurred vision, coughing, dizziness, drainage, ear pain, eye pain, eye redness, facial sweating, fever, hearing loss, loss of balance, muscle aches, neck pain, numbness, rhinorrhea, scalp tenderness, seizures, sinus pressure, sore throat, swollen glands, tingling, tinnitus, visual change, weakness or weight loss. The symptoms are aggravated by unknown. She has tried acetaminophen and Excedrin for the symptoms. The treatment provided no relief. Her past medical history is significant for migraine headaches and obesity. There is no history of cancer, cluster headaches, hypertension, immunosuppression, migraines in the family, pseudotumor cerebri, recent head traumas, sinus disease or TMJ. Neurologic Problem  The patient's primary symptoms include memory loss. The patient's pertinent negatives include no altered mental status, clumsiness, focal sensory loss, focal weakness, loss of balance, near-syncope, slurred speech, syncope, visual change or weakness. Primary symptoms comment: LEFT  EYE  TWITCHING  AND  SPAMS. This is a chronic problem. Episode onset: SINCE  APRIL 2017.  The neurological problem developed insidiously. The problem has been waxing and waning since onset. There was facial and left-sided focality noted. Associated symptoms include headaches, nausea and vomiting. Pertinent negatives include no abdominal pain, auditory change, aura, back pain, bladder incontinence, bowel incontinence, chest pain, confusion, diaphoresis, dizziness, fatigue, fever, light-headedness, neck pain, palpitations, shortness of breath or vertigo. Treatments tried: Dewaine Fuse. The treatment provided moderate relief. There is no history of a bleeding disorder, a clotting disorder, a CVA, dementia, head trauma, liver disease, mood changes or seizures. History obtained from  The patient      and other  available medical records were  Also  reviewed. The  Duration,  Quality,  Severity,  Location,  Timing,  Context,  Modifying  Factors   Of   The   Chief   Complaint       And  Present  Illness   Was   Reviewed   In   Chronological   Manner. PATIENT'S  MAIN  CONCERNS INCLUDE :                       1)        H/O     CHRONIC      LEFT  EYE  TWITCHING     SINCE      April 2017                                            INTERMITTENTLY                                          -BLEPHAROSPASM    LEFT  EYE.                                    -     STABLE                               2)     H/O    BLEPHAROSPASM    LEFT  EYE. GETS   WORSE                                   DUE   TO     ANXIETY                               3)      PREVIOUS    H/O    BREAST    CANCER  RIGHT                                H/O   BREAST  CANCER   SURGERY  IN  NOV. 2017                               4)       HAD    CHEMO     IN   THE  PAST                                      ALSO  ON   ARIMIDEX   DAILY                             BEING  FOLLOWED  BY   HEMATOLOGY /  ONCOLOGY                               5)    H/O   CHRONIC   MIGRAINES       FOR    4  YEARS -    STABLE                                                            -    ON   FIORICET    AS  NEEDED                                   6)   H/O   CHRONIC  ANXIETY,   DEPRESSION,  BIPOLAR  DISORDER                                 -   ON    LAMICTAL,  SEROQUEL   TRAZODONE, TRILEPTAL                                                    -    STABLE                                -       BEING  FOLLOWED  BY  MENTAL  HEALTH PROFESSIONALS                                 7)    H/O   CHRONIC   SLEEP  DIFFICULTIES                                            -   BETTER                                                           8)   H/O   CHRONIC    MEMORY PROBLEMS                                           SINCE    OCTOBER 2017                                            -   STABLE                                  9)   OBESITY     WITH   EXCESSIVE    DAY   TIME  SLEEPINESS                                     H/O   OSAS     -   ON  CPAP                                                            10)   MULTIPLE  CO  MORBID  MEDICAL  CONDITIONS                                    BEING  FOLLOWED BY  HER  PCP                              11)   MRI  BRAIN  IN  FEB. 2018   SHOWED                                      NO ACUTE INTRA  CRANIAL PATHOLOGY                                  12)    HAD  EYE   EVALUATIONS  IN  MAY  2018                                  AND  DIAGNOSED  WITH LEFT  EYE BLEPHAROSPASM                                13)    PREVIOUS   H/O    LEFT  EYE  TWITCHING  AND  SPASM   ARE   CAUSING                                DIFFICULTY  WITH  READING,  WATCHING  TV   AND  DRIVING                                           -     IMPROVED                                                 14)      PATIENT   WAS    ON  KLONOPIN       FOR  LEFT  BLEPHAROSPASM                                    SINCE      June 2018                                  PATIENT     STOPPED     Jimi Bear    MAY     2021 PATIENT  NOT  INTERESTED  IN  INJECTION  BOTOX                                                    15)      MRI BRAIN   WITH  AND  W/O   CONTRAST     IN  OCT. 2019                                   AND  LABS  SHOWED  NO  SIGNIFICANT  ABNORMALITIES                                                                                  16)         PATIENT     STARTED  ON    TOPAMAX     FOR  MIGRAINE PROPHYLAXIS                                                        IN  OCT. 2019                         17)        MIGRAINES  ARE   BETTER  CONTROLLED                               ON  TOPAMAX,   FIORICET,   IMITREX      AS  NEEDED. AND  PATIENT     FEELS  LOT  BETTER. PATIENT  DENIES  ANY      RENAL  STONES. 18)    LEFT   FACIAL  AND  BLEPHAROSPASM     BETTER  CONTROLLED. 23)        H/O      HOSPITALIZATION     FOR   PULMONARY  EMBOLISM   IN   April 2020                                 -   ON       ELIQUIS                                        PATIENT  TO  FOLLOW  WITH   HER  PCP  AND  OTHER  CONSULTANTS                        20)        PATIENT  DENIES  ANY  NEW  NEUROLOGICAL  CONCERNS. VARIOUS  RISK   FACTORS   WERE  REVIEWED   AND   DISCUSSED. 21)        PATIENT   HAS  MULTIPLE   MEDICAL, MENTAL HEALTH                        NEUROLOGICAL   PROBLEMS .                          PATIENT  MANAGEMENT  IS  CHALLENGING                                                                                              PRECIPITATING  FACTORS: including  fever/infection, exertion/relaxation, position change, stress,     weather change, medications/alcohol, time of day/darkness/light  Are    Absent                                                                 MODIFYING  FACTORS:  fever/infection, exertion/relaxation, Clinical  Condition and/or lack of other  Alternate resources.               RECORDS   REVIEWED:    historical medical records         INFORMATION   REVIEWED:     MEDICAL   HISTORY,     SURGICAL   HISTORY,   MEDICATIONS   LIST,   ALLERGIES AND  DRUG  INTOLERANCES,     FAMILY   HISTORY,  SOCIAL  HISTORY,    PROBLEM  LIST   FOR  PATIENT  CARE   COORDINATION    Past Medical History:   Diagnosis Date    Asthma     seasonal    B[facility] 1 disorder ([facility]     Breast cancer ([facility] 2017    right side     DVT of axillary vein, acute right ([facility]     Eye twitch 2019    Headache(784.0)     History of blood transfusion     Hx of blood clots 03/2020    PE    Hyperlipidemia     Migraine     Obesity     Sleep apnea     uses CPAP         Past Surgical History:   Procedure Laterality Date    BREAST ENHANCEMENT SURGERY Right 3/10/2021    BREAST RECONSTRUCTION WITH TISSUE EXPANDER INSERTION performed by Vicenta Patel MD at [facility]      rt mastectomy    BREAST SURGERY Right 03/10/2021    BREAST RECONSTRUCTION WITH TISSUE EXPANDER INSERTION (    BREAST SURGERY Right 3/10/2021    EXCISION SEROMA RIGHT BREAST performed by Vicenta Patel MD at [facility] Left 6/6/2019    PORT REMOVAL performed by Yanet Gonzalez DO at [address] N/A 10/13/2017    D & C HYSTEROSCOPY with polypectomy performed by Maylin Mcmanus MD at [facility] / REMOVAL / [address] Left 11/9/2017    PORT INSERTION performed by Wyatt King MD at [facility] / REMOVAL / [address] N/A 11/30/2017    Port Removal & Insertion performed by Wyatt King MD at [facility] Right 10/19/2017     [facility]    MASTECTOMY Right 10/19/2017    Right Breast Mastectomy with  [place] Node Biopsy performed by Wyatt King MD at [address] [place] [address] [facility] VAD W/SUBQ PORT AGE 5 YR/> Left 3/1/2018    PORT Exchange performed by Wyatt King MD at [address] Left 2014, 2015    x 2 surgeries total; Dr. Magalys Barron TUNNELED VENOUS PORT PLACEMENT Left 11/30/2017    removal of et replacement of         Current Outpatient Medications   Medication Sig Dispense Refill    topiramate (TOPAMAX) 50 MG tablet ONE  TABLET  TWICE  DAILY 60 tablet 5    butalbital-acetaminophen-caffeine (FIORICET, ESGIC) -40 MG per tablet 1-2   TABLET  TWICE  DAILY  AS  NEEDED 60 tablet 2    SUMAtriptan (IMITREX) 100 MG tablet Take 1 tablet by mouth once as needed for Migraine 12 tablet 5    folic acid (FOLVITE) 1 MG tablet take 1 tablet by mouth once daily 30 tablet 3    olopatadine (PATANOL) 0.1 % ophthalmic solution instill 1 drop into both eyes twice a day      allopurinol (ZYLOPRIM) 100 MG tablet Take 1 tablet by mouth daily 90 tablet 1    colchicine (COLCRYS) 0.6 MG tablet Take 1 tablet by mouth daily Take 1.2mg at onset of symptoms then 0.6mg 1 hour later.  3 tablet 3    Calcium Carbonate-Vitamin D (OYSTER SHELL CALCIUM/D) 500-200 MG-UNIT TABS take 1 tablet by mouth twice a day 60 tablet 5    pravastatin (PRAVACHOL) 40 MG tablet take 1 tablet by mouth once daily 90 tablet 1    letrozole (FEMARA) 2.5 MG tablet take 1 tablet by mouth once daily 30 tablet 5    tiZANidine (ZANAFLEX) 4 MG tablet Take 1 tablet by mouth every 8 hours as needed (muscle pain) 20 tablet 0    Cyanocobalamin ER (RA VITAMIN B-12 TR) 1000 MCG TBCR take 1 tablet by mouth once daily 30 tablet 3    busPIRone (BUSPAR) 10 MG tablet take 1 tablet by mouth three times a day      levocetirizine (XYZAL) 5 MG tablet Take 1 tablet by mouth nightly 30 tablet 5    VENTOLIN  (90 Base) MCG/ACT inhaler Inhale 2 puffs into the lungs every 4 hours as needed for Wheezing 1 Inhaler 3    ELIQUIS 5 MG TABS tablet take 1 tablet by mouth twice a day 180 tablet 1    Chlorpheniramine Maleate (ALLERGY RELIEF PO) Take by mouth daily      lamoTRIgine (LAMICTAL) 25 MG tablet Take 50 mg by mouth daily At bedtime      OXcarbazepine (TRILEPTAL) 150 MG tablet take 1 tablet by mouth every morning BEFORE NOON      benztropine (COGENTIN) 0.5 MG tablet Take 0.5 mg by mouth daily      QUEtiapine (SEROQUEL) 100 MG tablet Take 50 mg by mouth nightly      lamoTRIgine (LAMICTAL) 100 MG tablet 150 mg nightly       fluticasone (FLONASE) 50 MCG/ACT nasal spray 1 spray by Nasal route daily Indications: as needed 3 Bottle 3     No current facility-administered medications for this visit. Allergies   Allergen Reactions    Prednisone Swelling     Whole side of her face swelled when she took it, difficulty breathing         Family History   Problem Relation Age of Onset    Breast Cancer Sister 54    Breast Cancer Maternal Aunt 71    Other Maternal Cousin         Atypical Ductal Hyperplasia     Uterine Cancer Sister 32    Other Sister         breast cyst    Cataracts Mother     Glaucoma Mother     Heart Disease Father     High Blood Pressure Father     High Cholesterol Father     Mental Retardation Father     Diabetes Neg Hx          Social History     Socioeconomic History    Marital status: Single     Spouse name: Not on file    Number of children: Not on file    Years of education: Not on file    Highest education level: Not on file   Occupational History    Not on file   Tobacco Use    Smoking status: Never Smoker    Smokeless tobacco: Never Used    Tobacco comment: Never smoker.  TC, RRT 4/5/18   Vaping Use    Vaping Use: Never used   Substance and Sexual Activity    Alcohol use: No    Drug use: No    Sexual activity: Not Currently     Partners: Male     Birth control/protection: Surgical   Other Topics Concern    Not on file   Social History Narrative    Not on file     Social Determinants of Health     Financial Resource Strain: Unknown    Difficulty of Paying Living Expenses: Patient refused   Food Insecurity: Unknown    Worried About Running Out of Food in the Last Year: Patient refused   [address] in the Last Year: Patient refused   Transportation Needs:     Lack of Transportation (Medical):      Lack of Transportation (Non-Medical):    Physical Activity:     Days of Exercise per Week:     Minutes of Exercise per Session:    Stress:     Feeling of Stress :    Social Connections:     Frequency of Communication with Friends and Family:     Frequency of Social Gatherings with Friends and Family:     Attends Yarsanism Services:     Active Member of Clubs or Organizations:     Attends Club or Organization Meetings:     Marital Status:    Intimate Partner Violence:     Fear of Current or Ex-Partner:     Emotionally Abused:     Physically Abused:     Sexually Abused:        Vitals:    09/03/21 1549   BP: 130/76   Pulse: 76         Wt Readings from Last 3 Encounters:   09/03/21 278 lb 6 oz (126.3 kg)   08/18/21 286 lb (129.7 kg)   08/17/21 281 lb (127.5 kg)         BP Readings from Last 3 Encounters:   09/03/21 130/76   08/18/21 131/71   08/17/21 112/74       Hematology and Coagulation  Lab Results   Component Value Date    WBC 8.4 08/17/2021    RBC 4.04 08/17/2021    HGB 12.8 08/17/2021    HCT 39.1 08/17/2021    MCV 96.8 08/17/2021    MCH 31.7 08/17/2021    MCHC 32.7 08/17/2021    RDW 13.0 08/17/2021     08/17/2021    MPV 10.1 08/17/2021       Chemistries  Lab Results   Component Value Date     08/17/2021    K 4.2 08/17/2021     08/17/2021    CO2 22 08/17/2021    BUN 18 08/17/2021    CREATININE 1.17 08/17/2021    CALCIUM 10.2 08/17/2021    PROT 7.9 08/17/2021    LABALBU 4.3 08/17/2021    BILITOT 0.22 08/17/2021    ALKPHOS 106 08/17/2021    AST 16 08/17/2021    ALT 11 08/17/2021     Lab Results   Component Value Date    ALKPHOS 106 08/17/2021    ALT 11 08/17/2021    AST 16 08/17/2021    PROT 7.9 08/17/2021    BILITOT 0.22 08/17/2021    BILIDIR 0.12 04/09/2020    LABALBU 4.3 08/17/2021     Lab Results   Component Value Date    BUN 18 08/17/2021    CREATININE 1.17 08/17/2021     Lab Results   Component Value Date    CALCIUM 10.2 08/17/2021    MG 1.8 04/10/2020     Lab Results   Component Value Date    AST 16 08/17/2021    ALT 11 08/17/2021       Lab Results   Component Value Date    CKTOTAL 27 04/09/2020     Lab Results   Component Value Date    [id number] 06/04/2018             Review of Systems   Constitutional: Negative for appetite change, chills, diaphoresis, fatigue, fever, unexpected weight change and weight loss. HENT: Negative for congestion, dental problem, drooling, ear discharge, ear pain, facial swelling, hearing loss, mouth sores, nosebleeds, postnasal drip, rhinorrhea, sinus pressure, sore throat, tinnitus, trouble swallowing and voice change. Eyes: Positive for photophobia. Negative for blurred vision, pain, discharge, redness, itching and visual disturbance. Respiratory: Negative for apnea, cough, choking, chest tightness, shortness of breath and wheezing. Cardiovascular: Negative for chest pain, palpitations, leg swelling and near-syncope. Gastrointestinal: Positive for nausea and vomiting. Negative for abdominal distention, abdominal pain, anorexia, blood in stool, bowel incontinence, constipation and diarrhea. Endocrine: Negative for cold intolerance, heat intolerance, polydipsia, polyphagia and polyuria. Genitourinary: Negative for bladder incontinence. Musculoskeletal: Negative for arthralgias, back pain, gait problem, joint swelling, myalgias, neck pain and neck stiffness. Skin: Negative for color change, pallor, rash and wound. Allergic/Immunologic: Negative for environmental allergies, food allergies and immunocompromised state. Neurological: Positive for headaches.  Negative for dizziness, vertigo, tingling, tremors, focal weakness, seizures, syncope, facial asymmetry, speech difficulty, weakness, light-headedness, numbness and loss of balance. Hematological: Negative for adenopathy. Does not bruise/bleed easily. Psychiatric/Behavioral: Positive for decreased concentration and memory loss. Negative for agitation, behavioral problems, confusion, dysphoric mood, hallucinations, self-injury, sleep disturbance and suicidal ideas. The patient is nervous/anxious and has insomnia. The patient is not hyperactive. OBJECTIVE:    Physical Exam  Constitutional:       Appearance: She is well-developed. HENT:      Head: Normocephalic and atraumatic. No raccoon eyes or Varma's sign. Right Ear: External ear normal.      Left Ear: External ear normal.      Nose: Nose normal.   Eyes:      Conjunctiva/sclera: Conjunctivae normal.   Neck:      Thyroid: No thyroid mass or thyromegaly. Vascular: No carotid bruit. Trachea: No tracheal deviation. Meningeal: Brudzinski's sign and Kernig's sign absent. Cardiovascular:      Rate and Rhythm: Normal rate and regular rhythm. Pulmonary:      Effort: Pulmonary effort is normal.   Musculoskeletal:         General: No tenderness. Cervical back: Normal range of motion and neck supple. No rigidity. No muscular tenderness. Normal range of motion. Skin:     General: Skin is warm. Coloration: Skin is not pale. Findings: No erythema or rash. Nails: There is no clubbing. Psychiatric:         Attention and Perception: She is attentive. Mood and Affect: Mood is anxious and depressed. Affect is not labile, blunt, angry or inappropriate. Behavior: Behavior is not agitated, slowed, aggressive, withdrawn, hyperactive or combative. Behavior is cooperative. Thought Content: Thought content is not paranoid or delusional. Thought content does not include homicidal or suicidal ideation. Thought content does not include homicidal or suicidal plan. Cognition and Memory: Memory is impaired.  She does not exhibit impaired recent memory or impaired remote memory. Judgment: Judgment is not impulsive or inappropriate. Neurologic Exam    NEUROLOGICAL EXAMINATION :       A) MENTAL STATUS:                   Alert and  oriented  To time, place  And  Person. No Aphasia. No  Dysarthria. Able   To  Follow  commands without   Any  Difficulty. No right  To left confusion. Normal  Speech  And language function. Insight and  Judgment ,Fund  Of  Knowledge   within normal limits                Recent  And  Remote memory  within normal limits                Attention &Concentration are within normal limits                                                   B) CRANIAL NERVES :             2 CN : Visual  Acuity  And  Visual fields  within normal limits                        Fundi  Could  Not  Be  Could  Not  Be  Evaluated. 3,4,6 CN : Both  Pupils are   Reactive and  Equal.                            Extraocular   Movements  Are  Intact. No  Nystagmus. No  YUMIKO. No  Afferent  Pupillary  Defect noted. 5 CN :  Normal  Facial sensations and Corneal  Reflexes           7 CN :  Normal  Facial  Symmetry  And  Strength. No facial  Weakness. 8 CN :  Hearing  Appears within normal limits          9, 10 CN: Normal spontaneous, reflex palate movements         11 CN:   Normal  Shoulder shrug and  Strength         12 CN :   Normal  Tongue movements and  Tongue  In midline                        No tongue   Fasciculations or atrophy         C) MOTOR  EXAM:                 Strength  In upper  And  Lower extremities   within normal limits               No  Drift. No  Atrophy               Rapid alternating  And  repetitions  Movements  within normal limits               Muscle  Tone  In upper  And  Lower  Extremities  Normal                No rigidity. No  Spasticity.                  Bradykinesia   Absent                 No Asterixis. Sustention  Tremor , Resting  Tremor   absent                      LEFT  EYE  LIDS   SHOWS   BLEPHAROSPASM   INTERMITTENTLY                       D) SENSORY :               light touch, pinprick, position  And  Vibration  within normal limits        E) REFLEXES:                   Deep  Tendon  Reflexes normal                    No pathological  Reflexes  Bilaterally.                                     F) COORDINATION  AND  GAIT :                                Station and  Gait  normal                                        Romberg's test negative                          Ataxia negative          ASSESSMENT:      Patient Active Problem List   Diagnosis    Bipolar 1 disorder ([address]    Hyperlipidemia    Malignant neoplasm of overlapping sites of right breast in female, estrogen receptor negative ([address]    Port-A-Cath in place    Migraine    Memory problem    Sleep difficulties    Anxiety and depression    Muscle twitching    Hemifacial spasm    Combined forms of age-related cataract of both eyes    Squamous blepharitis of upper and lower eyelids of both eyes    Acute deep vein thrombosis (DVT) of axillary vein of right upper extremity (HCC)    Malignant neoplasm of breast in female, estrogen receptor positive ([address]    Seasonal allergies    H/O right mastectomy    HX: breast cancer    Acute massive pulmonary embolism (HCC)    Moderate to severe pulmonary hypertension ([address]    TAY on CPAP    Morbid obesity with BMI of 40.0-44.9, adult (HCC)    Extrinsic asthma    Blepharospasm    Chronic deep vein thrombosis (DVT) of axillary vein of right upper extremity (HCC)    H/O breast reconstruction          MRI OF THE BRAIN WITHOUT AND WITH CONTRAST  10/2/2019 1:14 pm       TECHNIQUE:   Multiplanar multisequence MRI of the head/brain was performed without and   with the administration of intravenous contrast.       COMPARISON:   MRI brain performed 10/08/2018.       HISTORY: ORDERING SYSTEM PROVIDED HISTORY: Migraine without aura and without status   migrainosus, not intractable   TECHNOLOGIST PROVIDED HISTORY:   migraines   Is the patient pregnant?->No   Reason for Exam: Migraines for quite a while, becoming worse. Acuity: Chronic   Type of Exam: Unknown   Additional signs and symptoms: Migraine without aura and without status   migrainosus, not intractable       FINDINGS:   INTRACRANIAL STRUCTURES/VENTRICLES:  The sellar and suprasellar structures,   optic chiasm, corpus callosum, pineal gland, tectum, and midline brainstem   structures are unremarkable.  The craniocervical junction is unremarkable. There is no acute intracranial hemorrhage, mass effect, or midline shift. There is satisfactory overall gray-white matter differentiation.  There is no   abnormal postcontrast enhancement.  The ventricular structures are symmetric   and unremarkable.  The infratentorial structures including the   cerebellopontine angles and internal auditory canals are unremarkable. There   is no abnormal restricted diffusion.  There is no abnormal blooming artifact   on susceptibility weighted imaging.       ORBITS: The visualized portion of the orbits demonstrate no acute abnormality.       SINUSES: The visualized paranasal sinuses and mastoid air cells are well   aerated.       BONES/SOFT TISSUES: The bone marrow signal intensity appears normal. The soft   tissues demonstrate no acute abnormality.           Impression   Unremarkable pre and post-contrast MRI of the brain.               MRI OF THE BRAIN WITHOUT AND WITH CONTRAST  2/5/2018 9:18 am       TECHNIQUE:   Multiplanar multisequence MRI of the head/brain was performed without and   with the administration of intravenous contrast.       COMPARISON:   Head CT on 09/25/2013.       HISTORY:   ORDERING SYSTEM PROVIDED HISTORY: Malignant neoplasm of overlapping sites of   right breast in female, estrogen receptor negative [facility])   TECHNOLOGIST PROVIDED HISTORY:   Ordering Physician Provided Reason for Exam:  Bad headaches for one month. Left eye twitching for two months and it is getting worse. History of breast   cancer. Acuity: Acute   Type of Exam: Initial   Additional signs and symptoms: Malignant neoplasm of overlapping sites of   right breast in female, estrogen receptor negative.       Initial evaluation.       FINDINGS:   INTRACRANIAL STRUCTURES/VENTRICLES: Kael Alfredito are no areas of restricted   diffusion to suggest an acute infarct.  The cerebral and cerebellar   parenchyma demonstrate normal volume and signal intensity.  There are no   abnormal extra-axial fluid collections.  The ventricles are normal in size. Normal major intracranial flow voids are noted.       There are no areas of abnormal contrast enhancement in the cerebral or   cerebellar parenchyma to suggest metastatic disease.       There are no areas of blooming artifact noted on the gradient echo sequences   to suggest sequela of acute or chronic hemorrhage.       ORBITS: The visualized portion of the orbits demonstrate no acute abnormality.       SINUSES: There is scattered mucosal thickening in the paranasal sinuses, with   greatest involvement in the ethmoid air cells and maxillary sinuses.  There   is an air-fluid level in the left sphenoid sinus, correlate with signs of   acute sinusitis.       The mastoid air cells are clear.       BONES/SOFT TISSUES: The bone marrow signal intensity appears normal. The soft   tissues demonstrate no acute abnormality.           Impression   1. Unremarkable MRI of the brain.  No evidence of metastatic disease. 2. Acute left sphenoid sinusitis.             VISITING DIAGNOSIS:      ICD-10-CM    1. Migraine without aura and without status migrainosus, not intractable  G43.009 topiramate (TOPAMAX) 50 MG tablet     butalbital-acetaminophen-caffeine (FIORICET, ESGIC) -40 MG per tablet   2. HX: breast cancer  Z85.3    3.  Blepharospasm  G24.5 4. H/O breast reconstruction  Z98.890    5. Chronic deep vein thrombosis (DVT) of axillary vein of right upper extremity (HCC)  I82. A21    6. TAY on CPAP  G47.33     Z99.89    7. Sleep difficulties  G47.9    8. Hemifacial spasm, unspecified laterality  G51.39    9. H/O right mastectomy  Z90.11    10. Bipolar 1 disorder (HCC)  F31.9    11. Anxiety and depression  F41.9     F32.9    12. Morbid obesity with BMI of 40.0-44.9, adult ([facility] [address]  E66.01     Z68.41    13. Muscle twitching  R25.3                      VARIOUS  RISK   FACTORS   WERE  REVIEWED   AND   DISCUSSED. *  PATIENT   HAS  MULTIPLE   MEDICAL, MENTAL HEALTH          NEUROLOGICAL   PROBLEMS . PATIENT  MANAGEMENT  IS  CHALLENGING              PLAN:       Jennifer Escamilla  Of  The  Diagnoses,  The  Management & Treatment  Options           AND    Care  plan  Were        Reviewed and   Discussed   With  patient. * Goals  And  Expectations  Of  The  Therapy  Discussed   And  Reviewed. *   Benefits   And   Side  Effect  Profile  Of  Medication/s   Were   Discussed             * Need   For  Further   Follow up For  The  Various  Problems  Were discussed. * Results  Of  The  Previous  Diagnostic tests were reviewed and questions answered. patient  understand the same. Medical  Decision  Making  Was  Made  Based on the   Complexity  Of  Above  Mentioned  Diagnoses,        Data reviewed   & diagnostic  Tests  Reviewed,  Risk  Of  Significant   Co morbidities and complicating   Factors. Medical  Decision  Was   High  Complexity  Due   To  The  Patient's  Multiple  Symptoms,      Complex  Treatment  Regimen,  Multiple medications and   Risk  Of   Side  Effects,      Difficulty  In  Medication  Management      And  Diagnostic  Challenges   In  View  Of  The  Associated   Co  Morbid  Conditions   And  Problems.                 *   ADEQUATE   FLUID  INTAKE   AND  ELECTROLYTE  BALANCE         * KEEP DAIRY  OF   THE  NEUROLOGICAL  SYMPTOMS        RECORDING THE    DURATION  AND  FREQUENCY. -    DISCUSSED  WITH PATIENT              *  AVOID    CONDITIONS  AND  FACTORS   THAT  MAKE   NEUROLOGICAL  SYMPTOMS  WORSE. *  TO  MAINTAIN  REGULAR  SLEEP  WAKE  CYCLES. *   TO  HAVE  ADEQUATE  REST  AND   SLEEP    HOURS.          *    TO   AVOID   TO  SLEEP  IN   SUPINE  POSITION. *      WEIGHT   LOSS. *    AVOID  ANY USAGE OF                   TOBACCO,  EXCESSIVE  ALCOHOL  AND   ILLEGAL   SUBSTANCES            *  CONTINUE MEDICATIONS PRESCRIBED BY NEUROLOGIST AS    RECOMMENDED     *   Compliance   With  Medications   And  Instructions          * CURRENTLY  TOLERATING  THE  PRESCRIBED   MEDICATIONS. WITHOUT  ANY  SIGNIFICANT  SIDE  EFFECTS   &  GETTING BENEFIT. *    Prophylactic  Use   Of     Vitamin   B   Complex,  Folic  Acid,    Vitamin  B12    Multivitamin,       Calcium  With  magnesium  And  Vit D    Supplementations   Over  The  Counter  Discussed            *  EVALUATIONS  AND  FOLLOW UP:                     * HEMATOLOGY/ONCOLOGY                    *   OPTHALMOLOGY                                                                             *        CURRENTLY    MIGRAINES  ARE   BETTER  CONTROLLED                               ON  TOPAMAX ,  FIORICET,  IMITREX   AS  NEEDED                              AND  PATIENT     FEELS  LOT  BETTER. PATIENT  DENIES  ANY  NEW  NEUROLOGICAL  CONCERNS. Controlled Substances Monitoring: Periodic Controlled Substance Monitoring: Possible medication side effects, risk of tolerance/dependence & alternative treatments discussed. , Assessed functional status.  Aleah Pimentel MD)            Orders Placed This Encounter   Medications    topiramate (TOPAMAX) 50 MG tablet     Sig: ONE  TABLET  TWICE  DAILY     Dispense:  60 tablet     Refill:  5    butalbital-acetaminophen-caffeine (FIORICET, ESGIC) -40 MG per tablet     Si-2   TABLET  TWICE  DAILY  AS  NEEDED     Dispense:  60 tablet     Refill:  2    SUMAtriptan (IMITREX) 100 MG tablet     Sig: Take 1 tablet by mouth once as needed for Migraine     Dispense:  12 tablet     Refill:  5               *PATIENT   TO  FOLLOW  UP  WITH   PRIMARY  CARE   AND   OTHER  CONSULTANTS  AS  BEFORE.           *TO  FOLLOW  WITH   MENTAL  HEALTH  PROFESSIONALS ,  INCLUDING            PSYCHOLOGICAL  COUNSELING   AND  PSYCHIATRIC  EVALUTIONS            *  Maintain   Healthy  Life Style    With   Periodic  Monitoring  Of      Any  Medical  Conditions  Including   Elevated  Blood  Pressure,  Lipid  Profile,     Blood  Sugar levels  And   Heart  Disease. *   Period   Screening  For  Cancers  Involving  Breast,  Colon,    lungs  And  Other  Organs  As  Applicable,  In consultation   With  Your  Primary Care Providers. * Second  Neurological  Opinion  And  Evaluations  In  [facility] AND [facility]  Setting  If  Patient  Is  Interested. * Please   Contact   Neurology  Clinic   Early   If   Are  Any  New  Neurological                             Symptoms   And  Any neurological  Concerns. *  If  The  Patient remains  Neurologically  Stable   Return   To  [facility] Neurology Department       IN           3-4            MONTHS  TIME   FOR  FURTHER  FOLLOW UP.                 *  If   There is  Any  Significant  Worsening   Of  Current  Symptoms  And  Or  If patient  Develops       Any additional  New  Neurological  Symptoms  Or  Significant  Concerns   Should  Call  911 or      Go  To  Emergency  Department  For  Further  Immediate  Evaluation. *   The  Neurological   Findings,  Possible  Diagnosis,  Differential diagnoses   And  Options  For    Further   Investigations       And  management   Are  Discussed  Comprehensively. Medications   And  Prescription   Risks  And  Side effects  Are   Also  Discussed. The  Above  Were  Reviewed  With  patient and     questions  Answered  In  Detail. More   Than   50% of face  To face Time   Was  Spent  On  Counseling   And   Coordination      Of  Care   Of   Patient's multiple   Neurological  Problems   And   Comorbid  Medical   Conditions. Electronically signed by Daysi Zarate MD.,  [facility]       Board Certified in  Neurology &  In  [address] of Psychiatry and Neurology ([facility])      DISCLAIMER:   Although every effort was made to ensure the accuracy of this  electronic transcription, some errors in transcription may have occurred. GENERAL PATIENT INSTRUCTIONS:     A Healthy Lifestyle: Care Instructions  Your Care Instructions  A healthy lifestyle can help you feel good, stay at a healthy weight, and have plenty of energy for both work and play. A healthy lifestyle is something you can share with your whole family. A healthy lifestyle also can lower your risk for serious health problems, such as high blood pressure, heart disease, and diabetes. You can follow a few steps listed below to improve your health and the health of your family. Follow-up care is a key part of your treatment and safety. Be sure to make and go to all appointments, and call your doctor if you are having problems. Its also a good idea to know your test results and keep a list of the medicines you take. How can you care for yourself at home? Do not eat too much sugar, fat, or fast foods. You can still have dessert and treats now and then. The goal is moderation. Start small to improve your eating habits. Pay attention to portion sizes, drink less juice and soda pop, and eat more fruits and vegetables. Eat a healthy amount of food. A 3-ounce serving of meat, for example, is about the size of a deck of cards.  Fill the rest of your plate with vegetables and whole grains. Limit the amount of soda and sports drinks you have every day. Drink more water when you are thirsty. Eat at least 5 servings of fruits and vegetables every day. It may seem like a lot, but it is not hard to reach this goal. A serving or helping is 1 piece of fruit, 1 cup of vegetables, or 2 cups of leafy, raw vegetables. Have an apple or some carrot sticks as an afternoon snack instead of a candy bar. Try to have fruits and/or vegetables at every meal.  Make exercise part of your daily routine. You may want to start with simple activities, such as walking, bicycling, or slow swimming. Try to be active 30 to 60 minutes every day. You do not need to do all 30 to 60 minutes all at once. For example, you can exercise 3 times a day for 10 or 20 minutes. Moderate exercise is safe for most people, but it is always a good idea to talk to your doctor before starting an exercise program.  Keep moving. Shemar Brandon the lawn, work in the garden, or [employer]. Take the stairs instead of the elevator at work. If you smoke, quit. People who smoke have an increased risk for heart attack, stroke, cancer, and other lung illnesses. Quitting is hard, but there are ways to boost your chance of quitting tobacco for good. Use nicotine gum, patches, or lozenges. Ask your doctor about stop-smoking programs and medicines. Keep trying. In addition to reducing your risk of diseases in the future, you will notice some benefits soon after you stop using tobacco. If you have shortness of breath or asthma symptoms, they will likely get better within a few weeks after you quit. Limit how much alcohol you drink. Moderate amounts of alcohol (up to 2 drinks a day for men, 1 drink a day for women) are okay. But drinking too much can lead to liver problems, high blood pressure, and other health problems. Family health  If you have a family, there are many things you can do together to improve your health.   Eat meals together as a family as often as possible. Eat healthy foods. This includes fruits, vegetables, lean meats and dairy, and whole grains. Include your family in your fitness plan. Most people think of activities such as jogging or tennis as the way to fitness, but there are many ways you and your family can be more active. Anything that makes you breathe hard and gets your heart pumping is exercise. Here are some tips:  Walk to do errands or to take your child to school or the bus. Go for a family bike ride after dinner instead of watching TV. Where can you learn more? Go to https://SchoolControlpepiceweb.[employer]. org and sign in to your [employer] account. Enter A680 in the buuteeq box to learn more about \"A Healthy Lifestyle: Care Instructions. \"     If you do not have an account, please click on the \"Sign Up Now\" link. Current as of: July 26, 2016  Content Version: 11.2  © 7611-1486 [employer]. Care instructions adapted under license by [facility] ([facility]). If you have questions about a medical condition or this instruction, always ask your healthcare professional. [address] any warranty or liability for your use of this information.

## 2021-09-15 ENCOUNTER — OFFICE VISIT (OUTPATIENT)
Dept: OPTOMETRY | Age: 56
End: 2021-09-15
Payer: MEDICARE

## 2021-09-15 DIAGNOSIS — H52.203 ASTIGMATISM OF BOTH EYES WITH PRESBYOPIA: ICD-10-CM

## 2021-09-15 DIAGNOSIS — G51.39 HEMIFACIAL SPASM, UNSPECIFIED LATERALITY: ICD-10-CM

## 2021-09-15 DIAGNOSIS — H25.13 NUCLEAR SCLEROSIS OF BOTH EYES: ICD-10-CM

## 2021-09-15 DIAGNOSIS — H53.8 BLURRED VISION, BILATERAL: Primary | ICD-10-CM

## 2021-09-15 DIAGNOSIS — H52.4 ASTIGMATISM OF BOTH EYES WITH PRESBYOPIA: ICD-10-CM

## 2021-09-15 PROCEDURE — 1036F TOBACCO NON-USER: CPT | Performed by: OPTOMETRIST

## 2021-09-15 PROCEDURE — 3017F COLORECTAL CA SCREEN DOC REV: CPT | Performed by: OPTOMETRIST

## 2021-09-15 PROCEDURE — G8427 DOCREV CUR MEDS BY ELIG CLIN: HCPCS | Performed by: OPTOMETRIST

## 2021-09-15 PROCEDURE — 99214 OFFICE O/P EST MOD 30 MIN: CPT | Performed by: OPTOMETRIST

## 2021-09-15 PROCEDURE — 99213 OFFICE O/P EST LOW 20 MIN: CPT

## 2021-09-15 PROCEDURE — G8417 CALC BMI ABV UP PARAM F/U: HCPCS | Performed by: OPTOMETRIST

## 2021-09-15 ASSESSMENT — VISUAL ACUITY
METHOD: SNELLEN - LINEAR
OS_SC: 20/60
OD_SC: 20/40
OD_SC: 20/50 OU

## 2021-09-15 ASSESSMENT — REFRACTION_MANIFEST
OD_SPHERE: PLANO
OS_ADD: +2.50
OS_AXIS: 028
OS_CYLINDER: -1.25
OD_ADD: +2.50
OD_CYLINDER: -1.25
OS_SPHERE: -0.25
OD_AXIS: 025

## 2021-09-15 ASSESSMENT — KERATOMETRY
OD_K1POWER_DIOPTERS: 44.00
METHOD_AUTO_MANUAL: AUTOMATED
OD_AXISANGLE2_DEGREES: 012
OD_AXISANGLE_DEGREES: 102
OD_K2POWER_DIOPTERS: 45.25

## 2021-09-15 ASSESSMENT — REFRACTION_WEARINGRX
OS_ADD: +2.50
SPECS_TYPE: TRIFOCAL
OS_CYLINDER: -1.00
OS_SPHERE: -0.50
OS_AXIS: 050
OD_ADD: +2.50
OD_AXIS: 025
OD_CYLINDER: -0.75
OD_SPHERE: PLANO

## 2021-09-15 ASSESSMENT — ENCOUNTER SYMPTOMS
RESPIRATORY NEGATIVE: 0
GASTROINTESTINAL NEGATIVE: 0
EYES NEGATIVE: 0
ALLERGIC/IMMUNOLOGIC NEGATIVE: 0

## 2021-09-15 ASSESSMENT — SLIT LAMP EXAM - LIDS
COMMENTS: NORMAL
COMMENTS: NORMAL

## 2021-09-15 ASSESSMENT — TONOMETRY
OD_IOP_MMHG: 19
IOP_METHOD: NON-CONTACT AIR PUFF
OS_IOP_MMHG: 19

## 2021-09-15 NOTE — PROGRESS NOTES
Lennox Gasman presents today for   Chief Complaint   Patient presents with    Blurred Vision    Vision Exam   .    HPI     Last Vision Exam: 6/29/2020 Aw  Last Ophthalmology Exam: 4/1/2019 Dr. Jeny Acosta Rx: 6/29/2020  Insurance: [place]  Update: Glasses  Distance and reading are getting more blurry             Current Outpatient Medications   Medication Sig Dispense Refill    topiramate (TOPAMAX) 50 MG tablet ONE  TABLET  TWICE  DAILY 60 tablet 5    butalbital-acetaminophen-caffeine (FIORICET, ESGIC) -40 MG per tablet 1-2   TABLET  TWICE  DAILY  AS  NEEDED 60 tablet 2    folic acid (FOLVITE) 1 MG tablet take 1 tablet by mouth once daily 30 tablet 3    olopatadine (PATANOL) 0.1 % ophthalmic solution instill 1 drop into both eyes twice a day      allopurinol (ZYLOPRIM) 100 MG tablet Take 1 tablet by mouth daily 90 tablet 1    colchicine (COLCRYS) 0.6 MG tablet Take 1 tablet by mouth daily Take 1.2mg at onset of symptoms then 0.6mg 1 hour later.  3 tablet 3    Calcium Carbonate-Vitamin D (OYSTER SHELL CALCIUM/D) 500-200 MG-UNIT TABS take 1 tablet by mouth twice a day 60 tablet 5    pravastatin (PRAVACHOL) 40 MG tablet take 1 tablet by mouth once daily 90 tablet 1    letrozole (FEMARA) 2.5 MG tablet take 1 tablet by mouth once daily 30 tablet 5    tiZANidine (ZANAFLEX) 4 MG tablet Take 1 tablet by mouth every 8 hours as needed (muscle pain) 20 tablet 0    Cyanocobalamin ER (RA VITAMIN B-12 TR) 1000 MCG TBCR take 1 tablet by mouth once daily 30 tablet 3    busPIRone (BUSPAR) 10 MG tablet take 1 tablet by mouth three times a day      levocetirizine (XYZAL) 5 MG tablet Take 1 tablet by mouth nightly 30 tablet 5    VENTOLIN  (90 Base) MCG/ACT inhaler Inhale 2 puffs into the lungs every 4 hours as needed for Wheezing 1 Inhaler 3    ELIQUIS 5 MG TABS tablet take 1 tablet by mouth twice a day 180 tablet 1    Chlorpheniramine Maleate (ALLERGY RELIEF PO) Take by mouth daily  lamoTRIgine (LAMICTAL) 25 MG tablet Take 50 mg by mouth daily At bedtime      OXcarbazepine (TRILEPTAL) 150 MG tablet take 1 tablet by mouth every morning BEFORE NOON      benztropine (COGENTIN) 0.5 MG tablet Take 0.5 mg by mouth daily      QUEtiapine (SEROQUEL) 100 MG tablet Take 50 mg by mouth nightly      lamoTRIgine (LAMICTAL) 100 MG tablet 150 mg nightly       SUMAtriptan (IMITREX) 100 MG tablet Take 1 tablet by mouth once as needed for Migraine 12 tablet 5    fluticasone (FLONASE) 50 MCG/ACT nasal spray 1 spray by Nasal route daily Indications: as needed 3 Bottle 3     No current facility-administered medications for this visit. Family History   Problem Relation Age of Onset    Breast Cancer Sister 54    Breast Cancer Maternal Aunt 71    Other Maternal Cousin         Atypical Ductal Hyperplasia     Uterine Cancer Sister 32    Other Sister         breast cyst    Cataracts Mother     Glaucoma Mother     Heart Disease Father     High Blood Pressure Father     High Cholesterol Father     Mental Retardation Father     Diabetes Neg Hx      Social History     Socioeconomic History    Marital status: Single     Spouse name: None    Number of children: None    Years of education: None    Highest education level: None   Occupational History    None   Tobacco Use    Smoking status: Never Smoker    Smokeless tobacco: Never Used    Tobacco comment: Never smoker.  TC, RRT 4/5/18   Vaping Use    Vaping Use: Never used   Substance and Sexual Activity    Alcohol use: No    Drug use: No    Sexual activity: Not Currently     Partners: Male     Birth control/protection: Surgical   Other Topics Concern    None   Social History Narrative    None     Social Determinants of Health     Financial Resource Strain: Unknown    Difficulty of Paying Living Expenses: Patient refused   Food Insecurity: Unknown    Worried About Running Out of Food in the Last Year: Patient refused    Faustino of 16.1  CH:  9.0          8.2  WS: 8.6          7.5                  Not recorded         Not recorded          Visual Acuity (Snellen - Linear)       Right Left    Dist sc 20/40 20/60    Near sc 20/50 OU           Pupils     Pupils       Pupils    Right PERRL    Left PERRL              Neuro/Psych     Neuro/Psych     Oriented x3: Yes    Mood/Affect: Normal              Keratometry     Keratometry (Automated)       K1 Axis K2 Axis    Right 44.00 012 45.25 102    Left                      Ophthalmology Exam     Wearing Rx       Sphere Cylinder Axis Add    Right [place] -0.75 025 +2.50    Left -0.50 -1.00 050 +2.50    Age: 1yr    Type: Trifocal              Manifest Refraction     Manifest Refraction       Sphere Cylinder Axis Dist VA Add    Right [place] -1.25 025 20/25 +2.50    Left -0.25 -1.25 028 20/30 +2.50          Manifest Refraction #2 (Auto)       Sphere Cylinder Axis Dist VA Add    Right +0.50 -1.00 025      Left -0.50 -1.25 023                 Final Rx       Sphere Cylinder Axis Add    Right [place] -1.25 025 +2.50    Left -0.25 -1.25 028 +2.50    Expiration Date: 9/16/2023            No orders of the defined types were placed in this encounter. IMPRESSION:  1. Blurred vision, bilateral    2. Nuclear sclerosis of both eyes    3. Astigmatism of both eyes with presbyopia    4. Hemifacial spasm, unspecified laterality        PLAN:    1. New glasses recommended  2. Monitor for future progression and visual significance. Counseled patient that more glare may be noticed and more light may be needed for reading. 3. New glasses recommended;  More astigmatism put in the rx to clear things up  4.  Continue to see Dr. Wandy Yi for any further evaluation and decisions.  (intermittent spasm in the office)       Patient Instructions   New glasses recommended      Return in about 1 year (around 9/15/2022) for complete eye exam.

## 2021-09-17 DIAGNOSIS — C50.919 MALIGNANT NEOPLASM OF FEMALE BREAST, UNSPECIFIED ESTROGEN RECEPTOR STATUS, UNSPECIFIED LATERALITY, UNSPECIFIED SITE OF BREAST (HCC): ICD-10-CM

## 2021-09-17 RX ORDER — PSYLLIUM HUSK 3.4 G/7G
POWDER ORAL
Qty: 30 TABLET | Refills: 3 | Status: SHIPPED | OUTPATIENT
Start: 2021-09-17 | End: 2022-01-19

## 2021-09-22 ENCOUNTER — OFFICE VISIT (OUTPATIENT)
Dept: NEPHROLOGY | Age: 56
End: 2021-09-22
Payer: MEDICARE

## 2021-09-22 VITALS
BODY MASS INDEX: 45.61 KG/M2 | WEIGHT: 283.8 LBS | SYSTOLIC BLOOD PRESSURE: 130 MMHG | HEIGHT: 66 IN | OXYGEN SATURATION: 98 % | HEART RATE: 63 BPM | DIASTOLIC BLOOD PRESSURE: 78 MMHG

## 2021-09-22 DIAGNOSIS — E79.0 HYPERURICEMIA: ICD-10-CM

## 2021-09-22 DIAGNOSIS — N18.31 STAGE 3A CHRONIC KIDNEY DISEASE (HCC): Primary | ICD-10-CM

## 2021-09-22 PROCEDURE — 1036F TOBACCO NON-USER: CPT | Performed by: INTERNAL MEDICINE

## 2021-09-22 PROCEDURE — G8417 CALC BMI ABV UP PARAM F/U: HCPCS | Performed by: INTERNAL MEDICINE

## 2021-09-22 PROCEDURE — 3017F COLORECTAL CA SCREEN DOC REV: CPT | Performed by: INTERNAL MEDICINE

## 2021-09-22 PROCEDURE — G8427 DOCREV CUR MEDS BY ELIG CLIN: HCPCS | Performed by: INTERNAL MEDICINE

## 2021-09-22 PROCEDURE — 99213 OFFICE O/P EST LOW 20 MIN: CPT | Performed by: INTERNAL MEDICINE

## 2021-09-22 PROCEDURE — 99214 OFFICE O/P EST MOD 30 MIN: CPT | Performed by: INTERNAL MEDICINE

## 2021-09-22 NOTE — PROGRESS NOTES
Renal Consult Note    Patient :  Flakito Kemp; 64 y.o. MRN# [id number]    Reason for Consult:     Asked by Dr Will Watkins to see for chronic kidney disease stage IIIa    History of Present Illness:     Flakito Kemp; 64 y.o. female with past medical history as mentioned below. She is seen in 6-month return visit for chronic kidney disease stage IIIa on 9/22/2021. She is currently frustrated with delay in getting her right breast implant surgery performed. She notes her breast cancer diagnosis and surgery was back in November 2018. Her creatinine has been relatively stable over the past couple of years. From August 2021, the patient's CBC shows white blood cells 8.4 with hemoglobin 12.8 and hematocrit 39.1. CMP shows BUN 18 with creatinine 1.17 with calcium 10.2, sodium 142, potassium 4.2, chloride 106, CO2 22 with alkaline phosphatase mildly elevated at 106, normal ALT, normal AST, normal bilirubin and total protein 7.9 with albumin of 4.3. Estimated GFR is 48 mL/min. Uric acid level is high at 9. She did have a acute gouty episode of her right foot associated with a high uric acid level. She was put on allopurinol 100 mg daily and colchicine for 3 days although she cannot afford the colchicine. She says the gout attack did improve. She is watching what she eats, especially red meat. She does not eat shellfish. She is avoiding alcohol. Patient's medications are reviewed. She has an allergy to prednisone which caused her significant swelling of the face.     Her past medical history includes morbid obesity, bipolar 1 disorder, right-sided breast cancer with stage IIa infiltrating ductal carcinoma of the right breast ER negative, AL positive and HER-2 amplified with the patient having a right mastectomy with sentinel lymph node biopsy in October 2017 with oncology treatment as noted in oncology note from September 2020., acute DVT of the right axillary vein, and massive pulmonary embolism bilaterally status post thrombectomy on 4/10/2020. She did suffer some acute kidney injury at that time with a creatinine going up to 1.81 mg/dL on 4/9/2020 with it coming back down to 1.48 mg/dl on 4/10/2020. She also has twitching of the left eye primarily, migraine headache, hyperlipidemia. She states she has also had a hysterectomy. Patient did have a right breast biopsy done on 3/12/2021 which showed fibrous walled cyst with attached minimal fibroadipose tissue, no breast glandular parenchyma present and negative for carcinoma. Of note, labs from August 28 of 2020 show the creatinine back down to 1.08 with sodium 143 potassium 3.9 chloride 108 CO2 22 BUN 17 glucose 102 and estimated GFR of 53 mL/min. That is very similar to a creatinine of 1.05 from September 2019 prior to the pulmonary embolism episode in April 2020. Renal ultrasound from December of 2020 showed right kidney 10.6 cm and left kidney 10.5 cm with no hydronephrosis and normal renal echogenicity with a questionable hypoechoic area within the left Kidney of undetermined origin. Renal ultrasound will be performed prior to the next visit. Labs from 2/24/2021 show A creatinine of 1.09 with normal electrolytes, Estimated GFR 52 ml/minute. CBC was normal with a hemoglobin of 12.5 g/dl, Microscopic urinalysis with 10-20 WBCs, 2-5 RBCs and 2-5 epithelial cells And moderate leukocyte esterase. From December 2020 random urine protein to creatinine ratio was normal, C3 normal, C4 normal and serum free kappa to lambda light chain ratio normal at 1.12. The patient is single. She is a never smoker. No history of recent contrast exposure, No h/o prolonged NSAIDs use in the past, No h/o nephrolithiasis, No recent skin rashes or arthralgias, No hematuria or pyuria noticed in the recent past. Doesn't report any reduction in the urine output recently.  Non report of any obstructive urinary symptoms (urgency, frequency, weak stream, straining while urination). No h/o recurrent UTIs in the past.    Past History/Allergies? Social History:     Past Medical History:   Diagnosis Date    Asthma     seasonal    [facility] ([facility]     Breast cancer ([facility] 2017    right side     DVT of axillary vein, acute right (HCC)     Eye twitch 2019    Headache(784.0)     History of blood transfusion     Hx of blood clots 03/2020    PE    Hyperlipidemia     Migraine     Obesity     Sleep apnea     uses CPAP       Allergies   Allergen Reactions    Prednisone Swelling     Whole side of her face swelled when she took it, difficulty breathing       Social History     Socioeconomic History    Marital status: Single     Spouse name: Not on file    Number of children: Not on file    Years of education: Not on file    Highest education level: Not on file   Occupational History    Not on file   Tobacco Use    Smoking status: Never Smoker    Smokeless tobacco: Never Used    Tobacco comment: Never smoker. TC, RRT 4/5/18   Vaping Use    Vaping Use: Never used   Substance and Sexual Activity    Alcohol use: No    Drug use: No    Sexual activity: Not Currently     Partners: Male     Birth control/protection: Surgical   Other Topics Concern    Not on file   Social History Narrative    Not on file     Social Determinants of Health     Financial Resource Strain: Unknown    Difficulty of Paying Living Expenses: Patient refused   Food Insecurity: Unknown    Worried About Running Out of Food in the Last Year: Patient refused    [address] in the Last Year: Patient refused   Transportation Needs:     Lack of Transportation (Medical):      Lack of Transportation (Non-Medical):    Physical Activity:     Days of Exercise per Week:     Minutes of Exercise per Session:    Stress:     Feeling of Stress :    Social Connections:     Frequency of Communication with Friends and Family:     Frequency of Social Gatherings with Friends and Family:     Attends Synagogue Services:     Active Member of Clubs or Organizations:     Attends Club or Organization Meetings:     Marital Status:    Intimate Partner Violence:     Fear of Current or Ex-Partner:     Emotionally Abused:     Physically Abused:     Sexually Abused:        Family History:        Family History   Problem Relation Age of Onset    Breast Cancer Sister 54    Breast Cancer Maternal Aunt 71    Other Maternal Cousin         Atypical Ductal Hyperplasia     Uterine Cancer Sister 32    Other Sister         breast cyst    Cataracts Mother     Glaucoma Mother     Heart Disease Father     High Blood Pressure Father     High Cholesterol Father     Mental Retardation Father     Diabetes Neg Hx        Outpatient Medications:     Not in a hospital admission. Review of Systems:     Constitutional: No fever, no chills, no lethargy, no weakness. HEENT:  No headache, ear pain, itchy eyes, nasal discharge or sore throat. Cardiac:  No chest pain, shortness of breath, orthopnea or PND. Chest:   No cough, phlegm or wheezing. Abdomen:  No abdominal pain, nausea or vomiting. Neuro:  No focal weakness, abnormal movements or seizure like activity. Skin:   No rashes, no itching, does have tattoos. :   No gross blood or pus in the urine, no dysuria, no flank pain, no suprapubic discomfort. Extremities:  No swelling or joint pains. She did have some foot pain associated with gout on the right foot in August 2021 that is now improved with the patient on allopurinol  ROS was otherwise negative except as mentioned in the [address] Vital Signs: There were no vitals filed for this visit. Wt Readings from Last 2 Encounters:   09/03/21 278 lb 6 oz (126.3 kg)   08/18/21 286 lb (129.7 kg)       Physical Examination:     General:  AAO x 3, speaking in full sentences, no accessory muscle use. HEENT: Atraumatic, normocephalic, moist mucosa.   Eyes:   Pupils equal, round, no scleral icterus, normal conjunctiva    Neck:   No JVD, midline trachea, no accessory muscle use   Chest:    Good bilateral air entry and clear to auscultation bilaterally without any rhonchi, rales or wheezes. Cardiac:  Regular rate and rhythm positive S1 and S2 and no rubs  Abdomen: Soft, non-tender, not distended, active bowel sounds   :   No suprapubic or flank tenderness. Neuro:  AAO x 3, No FND. SKIN:  No rashes, good skin turgor. Extremities:  No edema, palpable peripheral pulses, no calf tenderness. Labs:     No results for input(s): WBC, RBC, HGB, HCT, MCV, MCH, MCHC, RDW, PLT, MPV in the last 72 hours. BMP: No results for input(s): NA, K, CL, CO2, BUN, CREATININE, GLUCOSE, CALCIUM in the last 72 hours. Phosphorus:   No results for input(s): PHOS in the last 72 hours. Magnesium:  No results for input(s): MG in the last 72 hours. Albumin:  No results for input(s): LABALBU in the last 72 hours.   BNP:    No results found for: BNP  TIMBO:      Lab Results   Component Value Date    TIMBO NEGATIVE 09/13/2019     SPEP:  Lab Results   Component Value Date    PROT 7.9 08/17/2021     UPEP:   No results found for: LABPE  C3:     Lab Results   Component Value Date    C3 153 12/22/2020     C4:     Lab Results   Component Value Date    C4 28 12/22/2020     MPO ANCA:   No results found for: MPO  PR3 ANCA:   No results found for: PR3  Anti-GBM:   No results found for: GBMABIGG  Hep BsAg:       No results found for: HEPBSAG  Hep C AB:          Lab Results   Component Value Date    HEPCAB NONREACTIVE 02/11/2019       Urinalysis/Chemistries:      Lab Results   Component Value Date    NITRU NEGATIVE 02/24/2021    COLORU YELLOW 02/24/2021    PHUR 6.5 02/24/2021    WBCUA 10 TO 20 02/24/2021    RBCUA 2 TO 5 02/24/2021    MUCUS NOT REPORTED 02/24/2021    TRICHOMONAS NOT REPORTED 02/24/2021    YEAST NOT REPORTED 02/24/2021    BACTERIA FEW 02/24/2021    SPECGRAV 1.007 02/24/2021    LEUKOCYTESUR MODERATE 02/24/2021    UROBILINOGEN Normal 02/24/2021    BILIRUBINUR NEGATIVE 02/24/2021    GLUCOSEU NEGATIVE 02/24/2021    KETUA NEGATIVE 02/24/2021    AMORPHOUS NOT REPORTED 02/24/2021     Urine Sodium:   No results found for: MADDIE  Urine Potassium:  No results found for: KUR  Urine Chloride:  No results found for: CLUR  Urine Osmolarity: No results found for: OSMOU  Urine Protein:   No results found for: TPU  Urine Creatinine:     Lab Results   Component Value Date    LABCREA 30.6 12/22/2020     UPC:     Urine Eosinophils:  No components found for: UEOS    Radiology:     CXR:     Assessment:     1. Chronic kidney disease stage IIIA, last creatinine from August of 2021 was 1.17 mg/DL, for an estimated GFR 48 mL/min., possibly secondary to prior acute kidney injury episode versus other. Paraprotein disease, glomerulonephritis and connective tissue disease are ruled out. 2.  History of massive pulmonary embolism with thrombectomy in April 2020  3. History of breast cancer, status post right mastectomy and per the patient, hysterectomy  4. Morbid obesity  5. Prior history of anemia, hemoglobin within normal limits. 6. No protein in the urine with random urine protein to creatinine ratio normal  7. Serologic studies from December of 2020 showed C3 normal, C4 normal, normal serum free light chain ratio  8. Nonspecific acquired left renal cyst noted on ultrasound  9. Hyperuricemia and gout of her right foot in August 2021. It was the first time she had ever had a gouty episode and was started allopurinol 100 mg a day and received orders to take colchicine 3 tablets on 8/10/2021. However, the patient's insurance did not cover the colchicine so she is staying on just allopurinol alone and trying to avoid red meat and alcohol. Her symptoms have improved. 10.  Patient is up-to-date with COVID-19 vaccination as she did receive the initial vaccination series x2 and also now has recently received her booster  11.   Note, a renal ultrasound in July 2021 to check on a left renal cyst showed mildly echogenic kidneys, no hydronephrosis or perinephric fluid. The previously described hypoechoic area is favored to correspond to a hypertrophied column of [place] on this ultrasound. Plan:   1. No changes in medications at this time, avoid nephrotoxic agents, including NSAIDs  2. Return to see me in the office in about 1 year       Thank you for allowing me to see your very pleasant patient in nephrology return visit. Magdy You.  Ella Eric MD  [facility]  9/22/2021

## 2021-10-10 DIAGNOSIS — C50.919 MALIGNANT NEOPLASM OF FEMALE BREAST, UNSPECIFIED ESTROGEN RECEPTOR STATUS, UNSPECIFIED LATERALITY, UNSPECIFIED SITE OF BREAST (HCC): ICD-10-CM

## 2021-10-10 DIAGNOSIS — Z78.0 POSTMENOPAUSAL: ICD-10-CM

## 2021-10-10 DIAGNOSIS — Z90.11 H/O RIGHT MASTECTOMY: ICD-10-CM

## 2021-10-10 DIAGNOSIS — I82.A11 ACUTE DEEP VEIN THROMBOSIS (DVT) OF AXILLARY VEIN OF RIGHT UPPER EXTREMITY (HCC): ICD-10-CM

## 2021-10-12 RX ORDER — APIXABAN 5 MG/1
TABLET, FILM COATED ORAL
Qty: 180 TABLET | Refills: 1 | Status: SHIPPED | OUTPATIENT
Start: 2021-10-12 | End: 2022-03-08 | Stop reason: SDUPTHER

## 2021-10-20 ENCOUNTER — HOSPITAL ENCOUNTER (OUTPATIENT)
Dept: PREADMISSION TESTING | Age: 56
Discharge: HOME | End: 2021-10-24
Payer: MEDICARE

## 2021-10-20 VITALS
BODY MASS INDEX: 45.48 KG/M2 | WEIGHT: 283 LBS | TEMPERATURE: 97.7 F | DIASTOLIC BLOOD PRESSURE: 95 MMHG | HEIGHT: 66 IN | RESPIRATION RATE: 16 BRPM | SYSTOLIC BLOOD PRESSURE: 154 MMHG | HEART RATE: 81 BPM | OXYGEN SATURATION: 99 %

## 2021-10-20 PROCEDURE — 93005 ELECTROCARDIOGRAM TRACING: CPT | Performed by: ANESTHESIOLOGY

## 2021-10-21 ENCOUNTER — TELEPHONE (OUTPATIENT)
Dept: FAMILY MEDICINE CLINIC | Age: 56
End: 2021-10-21

## 2021-10-21 NOTE — TELEPHONE ENCOUNTER
Patient needs an ECHO prior to surgery. Scheduling called Varinder's office and they said that a family practice doctor has to order it. Supposedly she had some testing done and they didn't like it.

## 2021-10-22 LAB
EKG ATRIAL RATE: 75 BPM
EKG P AXIS: 53 DEGREES
EKG P-R INTERVAL: 158 MS
EKG Q-T INTERVAL: 414 MS
EKG QRS DURATION: 88 MS
EKG QTC CALCULATION (BAZETT): 462 MS
EKG R AXIS: 69 DEGREES
EKG T AXIS: 21 DEGREES
EKG VENTRICULAR RATE: 75 BPM

## 2021-10-26 DIAGNOSIS — J45.20 MILD INTERMITTENT EXTRINSIC ASTHMA WITHOUT COMPLICATION: ICD-10-CM

## 2021-10-26 RX ORDER — OLOPATADINE HYDROCHLORIDE 1 MG/ML
SOLUTION/ DROPS OPHTHALMIC
Qty: 5 ML | Refills: 0 | Status: SHIPPED | OUTPATIENT
Start: 2021-10-26 | End: 2021-11-01 | Stop reason: SDUPTHER

## 2021-10-26 RX ORDER — ALBUTEROL SULFATE 90 UG/1
AEROSOL, METERED RESPIRATORY (INHALATION)
Qty: 18 G | Refills: 0 | Status: SHIPPED | OUTPATIENT
Start: 2021-10-26 | End: 2021-11-01 | Stop reason: SDUPTHER

## 2021-10-26 NOTE — TELEPHONE ENCOUNTER
Lk pt, [facility] notes        Sarah Jordan called requesting a refill of the below medication which has been pended for you:     Requested Prescriptions     Pending Prescriptions Disp Refills    albuterol sulfate  (90 Base) MCG/ACT inhaler [Pharmacy Med Name: ALBUTEROL HFA 90 MCG INHALER] 18 g 0     Sig: inhale 2 puffs by mouth INTO THE LUNGS every 4 hours if needed for wheezing    olopatadine (PATANOL) 0.1 % ophthalmic solution [Pharmacy Med Name: OLOPATADINE HCL 0.1% EYE DROPS] 5 mL 0     Sig: instill 1 drop into both eyes twice a day       Last Appointment Date: 5/10/2021  Next Appointment Date: 11/1/2021    Allergies   Allergen Reactions    Prednisone Swelling     Whole side of her face swelled when she took it, difficulty breathing

## 2021-10-28 ENCOUNTER — OFFICE VISIT (OUTPATIENT)
Dept: CARDIOLOGY | Age: 56
End: 2021-10-28
Payer: MEDICARE

## 2021-10-28 VITALS
DIASTOLIC BLOOD PRESSURE: 72 MMHG | BODY MASS INDEX: 46.12 KG/M2 | WEIGHT: 287 LBS | HEART RATE: 84 BPM | SYSTOLIC BLOOD PRESSURE: 118 MMHG | HEIGHT: 66 IN

## 2021-10-28 DIAGNOSIS — I27.20 PULMONARY HYPERTENSION (HCC): Primary | ICD-10-CM

## 2021-10-28 PROCEDURE — 1036F TOBACCO NON-USER: CPT | Performed by: INTERNAL MEDICINE

## 2021-10-28 PROCEDURE — 3017F COLORECTAL CA SCREEN DOC REV: CPT | Performed by: INTERNAL MEDICINE

## 2021-10-28 PROCEDURE — G8427 DOCREV CUR MEDS BY ELIG CLIN: HCPCS | Performed by: INTERNAL MEDICINE

## 2021-10-28 PROCEDURE — G8484 FLU IMMUNIZE NO ADMIN: HCPCS | Performed by: INTERNAL MEDICINE

## 2021-10-28 PROCEDURE — 99204 OFFICE O/P NEW MOD 45 MIN: CPT | Performed by: INTERNAL MEDICINE

## 2021-10-28 PROCEDURE — G8417 CALC BMI ABV UP PARAM F/U: HCPCS | Performed by: INTERNAL MEDICINE

## 2021-10-28 PROCEDURE — 99214 OFFICE O/P EST MOD 30 MIN: CPT | Performed by: INTERNAL MEDICINE

## 2021-10-28 NOTE — PROGRESS NOTES
Cardiology Consultation/Follow Up. [facility]    CC: Patient is here to establish cardiac care. AILYN Gaona is a 64 yr old female with significant hx of breast cancer post mastectomy and sentinel lymph node resection in 2017, followed by acute DVT and massive bilateral PE in 04/2020 requiring mechanical thrombectomy, is here for prre op evaluation prior to breast implant surgery. She is overall stable and reports that her cancer is in remission. No recent hospitalizations. She denies any prior hx of CAD/CHF/CVA/Afib or flutter. She denies any recent chest pain or dyspnea. No orthopnea, PND or edema  Her last echo was in 04/2020 which did show severe pulmonary HTN.    She is maintained on therapeutic anticoagulation with eliquis         Past Medical:  Past Medical History:   Diagnosis Date    Asthma     seasonal    Bipolar 1 disorder ([address]     Breast cancer ([address] 2017    right side     DVT of axillary vein, acute right ([address]     Eye twitch 2019    Headache(784.0)     History of blood transfusion     Hx of blood clots 03/2020    PE    Hyperlipidemia     Migraine     Obesity     PONV (postoperative nausea and vomiting)     Prolonged emergence from general anesthesia     Sleep apnea     uses CPAP       Past Surgical:  Past Surgical History:   Procedure Laterality Date    BREAST ENHANCEMENT SURGERY Right 3/10/2021    BREAST RECONSTRUCTION WITH TISSUE EXPANDER INSERTION performed by Audelia Kam MD at [facility]      rt mastectomy    BREAST SURGERY Right 03/10/2021    BREAST RECONSTRUCTION WITH TISSUE EXPANDER INSERTION (    BREAST SURGERY Right 3/10/2021    EXCISION SEROMA RIGHT BREAST performed by Audelia Kam MD at [address] Left 6/6/2019    PORT REMOVAL performed by Duke Canavan, DO at [address] N/A 10/13/2017    D & C HYSTEROSCOPY with polypectomy performed stools. No change in bowel or bladder habits. · Genitourinary: No dysuria, trouble voiding, or hematuria. · Musculoskeletal:  No gait disturbance, No weakness or joint complaints. · Integumentary: No rash or pruritis. · Neurological: No headache, diplopia, change in muscle strength, numbness or tingling  · Psychiatric: No new anxiety or depression. · Endocrine: No temperature intolerance. No excessive thirst, fluid intake, or urination. No tremor. · Hematologic/Lymphatic: No abnormal bruising or bleeding, blood clots or swollen lymph nodes. · Allergic/Immunologic: No nasal congestion or hives.     Medications:  Current Outpatient Medications   Medication Sig Dispense Refill    albuterol sulfate  (90 Base) MCG/ACT inhaler inhale 2 puffs by mouth INTO THE LUNGS every 4 hours if needed for wheezing 18 g 0    olopatadine (PATANOL) 0.1 % ophthalmic solution instill 1 drop into both eyes twice a day 5 mL 0    ELIQUIS 5 MG TABS tablet take 1 tablet by mouth twice a day 180 tablet 1    RA VITAMIN B-12 TR 1000 MCG TBCR take 1 tablet by mouth once daily 30 tablet 3    topiramate (TOPAMAX) 50 MG tablet ONE  TABLET  TWICE  DAILY 60 tablet 5    butalbital-acetaminophen-caffeine (FIORICET, ESGIC) -40 MG per tablet 1-2   TABLET  TWICE  DAILY  AS  NEEDED 60 tablet 2    folic acid (FOLVITE) 1 MG tablet take 1 tablet by mouth once daily 30 tablet 3    allopurinol (ZYLOPRIM) 100 MG tablet Take 1 tablet by mouth daily 90 tablet 1    Calcium Carbonate-Vitamin D (OYSTER SHELL CALCIUM/D) 500-200 MG-UNIT TABS take 1 tablet by mouth twice a day 60 tablet 5    pravastatin (PRAVACHOL) 40 MG tablet take 1 tablet by mouth once daily 90 tablet 1    letrozole (FEMARA) 2.5 MG tablet take 1 tablet by mouth once daily 30 tablet 5    tiZANidine (ZANAFLEX) 4 MG tablet Take 1 tablet by mouth every 8 hours as needed (muscle pain) 20 tablet 0    busPIRone (BUSPAR) 15 MG tablet Take 15 mg by mouth 3 times daily       levocetirizine (XYZAL) 5 MG tablet Take 1 tablet by mouth nightly 30 tablet 5    lamoTRIgine (LAMICTAL) 25 MG tablet Take 50 mg by mouth daily At bedtime      OXcarbazepine (TRILEPTAL) 150 MG tablet take 1 tablet by mouth every morning BEFORE NOON      benztropine (COGENTIN) 0.5 MG tablet Take 0.5 mg by mouth daily      QUEtiapine (SEROQUEL) 100 MG tablet Take 50 mg by mouth nightly      lamoTRIgine (LAMICTAL) 100 MG tablet 150 mg nightly       cephALEXin (KEFLEX) 250 MG capsule Take one PO QID til gone. Start one day prior to surgery / procedure. (Patient not taking: Reported on 10/28/2021) 12 capsule 0    metoclopramide (REGLAN) 10 MG tablet Take 1 tablet by mouth once for 1 dose Take morning of surgery with SMALL SIP of water. 1 tablet 0    famotidine (PEPCID) 20 MG tablet Take 1 tablet by mouth once for 1 dose Take morning of surgery with minimal sip of water. 1 tablet 0    SUMAtriptan (IMITREX) 100 MG tablet Take 1 tablet by mouth once as needed for Migraine 12 tablet 5    fluticasone (FLONASE) 50 MCG/ACT nasal spray 1 spray by Nasal route daily Indications: as needed 3 Bottle 3     No current facility-administered medications for this visit. Physical Exam:   Vitals: /72 (Site: Left Lower Arm, Position: Sitting, Cuff Size: Large Adult)   Pulse 84   Ht 5' 6\" (1.676 m)   Wt 287 lb (130.2 kg)   LMP 02/28/2013 (Exact Date)   BMI 46.32 kg/m²      General appearance: alert, oriented and cooperative with exam  HEENT: Head: Normocephalic, no lesions, without obvious abnormality.   Neck: no carotid bruit, no JVD  Lungs: clear to auscultation bilaterally without any wheezing or rales   Heart: regular rate and rhythm, S1, S2 normal, no murmur, click, rub or gallop  Abdomen: soft, non-tender; bowel sounds normal; no masses,  no organomegaly  Extremities: extremities normal, atraumatic, no cyanosis or edema  Neurologic: Mental status: Alert, oriented, thought content appropriate    Labs:  CBC: No results for input(s): WBC, HGB, HCT, PLT in the last 72 hours. BMP: No results for input(s): NA, K, CO2, BUN, CREATININE, LABGLOM, GLUCOSE in the last 72 hours. PT/INR: No results for input(s): PROTIME, INR in the last 72 hours. FASTING LIPID PANEL:  Lab Results   Component Value Date    HDL 43 02/09/2021    TRIG 268 02/09/2021     EKG 10/20/2021: NSR, low voltage QRS, otherwise normal       LAST ECHO 04/2020:   Normal left ventricular diameter. Borderline left ventricular hypertrophy. Left ventricular systolic function is normal.  Left ventricular ejection fraction 65 %. Normal left ventricular diameter. Borderline left ventricular hypertrophy. Left ventricular systolic function is normal.  Left ventricular ejection fraction 65 %. Leftward compression of inter-ventricular septum (\"D-sign\") indicating RV  pressure overload. Abnormal septal wall motion. Right atrium is severely dilated . Severe right ventricular dilatation with poor systolic function. Normal tricuspid valve leaflets. Mild tricuspid regurgitation. Estimated right ventricular systolic pressure is 81 mmHg. Severe pulmonary hypertension. Normal function of other valves. No pericardial effusion. Past Medical and Surgical History, Problem List, Allergies, Medications, Labs, Imaging, all reviewed extensively in EMR and with the patient. Assessment:   1. Pre-op cardiac evaluation prior to breast implant sx. 2. Hx of right breast cancer (infiltratikg ductal CA), post mastectomy and sentinel lymph node resection in 2017  3. Hx of axillary DVT and Massive PE, 04/2020, required emergent mechanical thrombectomy  4. Severe pulmonary HTN 04/2020   5. Chronic kidney disease  6.  Hyperlipidemia, on statin         Plan:    Repeat echocardiogram due to hx of severe Pulm HTN   Further risk stratification based on echo    Continue therapeutic anticoagulation with Elqiuis       The patient was counseled regarding heart healthy diet, weight loss and exercise as tolerated. Patient's medications and side effects were discussed. All questions and concerns were addressed to patient's satisfaction. The patient is to follow up in 6 months or sooner if necessary. Thank you for allowing me to participate in the care of this patient, please do not hesitate to call if you have any questions. Casimiro Ruby MD, [address] [place] [address] [facility]  [facility]  [phone]

## 2021-11-01 ENCOUNTER — OFFICE VISIT (OUTPATIENT)
Dept: FAMILY MEDICINE CLINIC | Age: 56
End: 2021-11-01
Payer: MEDICARE

## 2021-11-01 VITALS
BODY MASS INDEX: 45.16 KG/M2 | OXYGEN SATURATION: 99 % | HEIGHT: 66 IN | WEIGHT: 281 LBS | HEART RATE: 68 BPM | TEMPERATURE: 98 F | SYSTOLIC BLOOD PRESSURE: 132 MMHG | DIASTOLIC BLOOD PRESSURE: 70 MMHG

## 2021-11-01 DIAGNOSIS — J45.20 MILD INTERMITTENT EXTRINSIC ASTHMA WITHOUT COMPLICATION: ICD-10-CM

## 2021-11-01 DIAGNOSIS — M10.9 ACUTE GOUT OF RIGHT FOOT, UNSPECIFIED CAUSE: ICD-10-CM

## 2021-11-01 DIAGNOSIS — E78.2 MIXED HYPERLIPIDEMIA: ICD-10-CM

## 2021-11-01 DIAGNOSIS — Z01.810 PREOP CARDIOVASCULAR EXAM: ICD-10-CM

## 2021-11-01 DIAGNOSIS — I27.20 MODERATE TO SEVERE PULMONARY HYPERTENSION (HCC): Primary | ICD-10-CM

## 2021-11-01 DIAGNOSIS — Z98.890 H/O BREAST RECONSTRUCTION: ICD-10-CM

## 2021-11-01 PROCEDURE — 1036F TOBACCO NON-USER: CPT | Performed by: FAMILY MEDICINE

## 2021-11-01 PROCEDURE — 3017F COLORECTAL CA SCREEN DOC REV: CPT | Performed by: FAMILY MEDICINE

## 2021-11-01 PROCEDURE — G8427 DOCREV CUR MEDS BY ELIG CLIN: HCPCS | Performed by: FAMILY MEDICINE

## 2021-11-01 PROCEDURE — G8482 FLU IMMUNIZE ORDER/ADMIN: HCPCS | Performed by: FAMILY MEDICINE

## 2021-11-01 PROCEDURE — 99214 OFFICE O/P EST MOD 30 MIN: CPT | Performed by: FAMILY MEDICINE

## 2021-11-01 PROCEDURE — 99212 OFFICE O/P EST SF 10 MIN: CPT

## 2021-11-01 PROCEDURE — G8417 CALC BMI ABV UP PARAM F/U: HCPCS | Performed by: FAMILY MEDICINE

## 2021-11-01 RX ORDER — ALLOPURINOL 100 MG/1
100 TABLET ORAL DAILY
Qty: 90 TABLET | Refills: 1 | Status: SHIPPED | OUTPATIENT
Start: 2021-11-01 | End: 2022-07-11

## 2021-11-01 RX ORDER — LORATADINE 10 MG/1
10 TABLET ORAL DAILY
Qty: 90 TABLET | Refills: 3 | Status: SHIPPED | OUTPATIENT
Start: 2021-11-01 | End: 2022-06-17

## 2021-11-01 RX ORDER — PRAVASTATIN SODIUM 40 MG
TABLET ORAL
Qty: 90 TABLET | Refills: 1 | Status: SHIPPED | OUTPATIENT
Start: 2021-11-01 | End: 2022-05-31

## 2021-11-01 RX ORDER — ALBUTEROL SULFATE 90 UG/1
AEROSOL, METERED RESPIRATORY (INHALATION)
Qty: 18 G | Refills: 5 | Status: SHIPPED | OUTPATIENT
Start: 2021-11-01 | End: 2022-06-17 | Stop reason: SDUPTHER

## 2021-11-01 RX ORDER — OLOPATADINE HYDROCHLORIDE 1 MG/ML
SOLUTION/ DROPS OPHTHALMIC
Qty: 5 ML | Refills: 3 | Status: SHIPPED | OUTPATIENT
Start: 2021-11-01 | End: 2022-05-31 | Stop reason: SDUPTHER

## 2021-11-01 ASSESSMENT — ENCOUNTER SYMPTOMS
ABDOMINAL PAIN: 0
NAUSEA: 0
CONSTIPATION: 0
COUGH: 0
SHORTNESS OF BREATH: 0
WHEEZING: 0
VOMITING: 0
CHEST TIGHTNESS: 0
DIARRHEA: 0

## 2021-11-01 NOTE — PROGRESS NOTES
comment: Never smoker. TC, RRT 4/5/18   Substance Use Topics    Alcohol use: Yes     Comment: Rarely     Current Outpatient Medications   Medication Sig Dispense Refill    pravastatin (PRAVACHOL) 40 MG tablet take 1 tablet by mouth once daily 90 tablet 1    albuterol sulfate  (90 Base) MCG/ACT inhaler inhale 2 puffs by mouth INTO THE LUNGS every 4 hours if needed for wheezing 18 g 5    allopurinol (ZYLOPRIM) 100 MG tablet Take 1 tablet by mouth daily 90 tablet 1    olopatadine (PATANOL) 0.1 % ophthalmic solution instill 1 drop into both eyes twice a day 5 mL 3    loratadine (CLARITIN) 10 MG tablet Take 1 tablet by mouth daily 90 tablet 3    ELIQUIS 5 MG TABS tablet take 1 tablet by mouth twice a day 180 tablet 1    cephALEXin (KEFLEX) 250 MG capsule Take one PO QID til gone. Start one day prior to surgery / procedure.  12 capsule 0    RA VITAMIN B-12 TR 1000 MCG TBCR take 1 tablet by mouth once daily 30 tablet 3    topiramate (TOPAMAX) 50 MG tablet ONE  TABLET  TWICE  DAILY 60 tablet 5    butalbital-acetaminophen-caffeine (FIORICET, ESGIC) -40 MG per tablet 1-2   TABLET  TWICE  DAILY  AS  NEEDED 60 tablet 2    folic acid (FOLVITE) 1 MG tablet take 1 tablet by mouth once daily 30 tablet 3    Calcium Carbonate-Vitamin D (OYSTER SHELL CALCIUM/D) 500-200 MG-UNIT TABS take 1 tablet by mouth twice a day 60 tablet 5    letrozole (FEMARA) 2.5 MG tablet take 1 tablet by mouth once daily 30 tablet 5    tiZANidine (ZANAFLEX) 4 MG tablet Take 1 tablet by mouth every 8 hours as needed (muscle pain) 20 tablet 0    busPIRone (BUSPAR) 15 MG tablet Take 15 mg by mouth 3 times daily       levocetirizine (XYZAL) 5 MG tablet Take 1 tablet by mouth nightly 30 tablet 5    lamoTRIgine (LAMICTAL) 25 MG tablet Take 50 mg by mouth daily At bedtime      OXcarbazepine (TRILEPTAL) 150 MG tablet take 1 tablet by mouth every morning BEFORE NOON      benztropine (COGENTIN) 0.5 MG tablet Take 0.5 mg by mouth daily  QUEtiapine (SEROQUEL) 100 MG tablet Take 50 mg by mouth nightly      lamoTRIgine (LAMICTAL) 100 MG tablet 150 mg nightly       metoclopramide (REGLAN) 10 MG tablet Take 1 tablet by mouth once for 1 dose Take morning of surgery with SMALL SIP of water. 1 tablet 0    famotidine (PEPCID) 20 MG tablet Take 1 tablet by mouth once for 1 dose Take morning of surgery with minimal sip of water. 1 tablet 0    SUMAtriptan (IMITREX) 100 MG tablet Take 1 tablet by mouth once as needed for Migraine 12 tablet 5    fluticasone (FLONASE) 50 MCG/ACT nasal spray 1 spray by Nasal route daily Indications: as needed 3 Bottle 3     No current facility-administered medications for this visit. Allergies   Allergen Reactions    Prednisone Swelling     Whole side of her face swelled when she took it, difficulty breathing       Subjective:     Review of Systems   Constitutional: Negative for activity change, appetite change, chills, fatigue and fever. Eyes: Negative for visual disturbance. Respiratory: Negative for cough, chest tightness, shortness of breath and wheezing. Cardiovascular: Negative for chest pain, palpitations and leg swelling. Gastrointestinal: Negative for abdominal pain, constipation, diarrhea, nausea and vomiting. Endocrine: Negative for polyuria. Genitourinary: Negative for difficulty urinating, dysuria, frequency, hematuria and urgency. Allergic/Immunologic: Positive for environmental allergies. Neurological: Negative for dizziness, syncope, weakness, light-headedness and headaches. Objective:      Physical Exam  Constitutional:       Appearance: Normal appearance. She is obese. HENT:      Head: Normocephalic and atraumatic. Right Ear: External ear normal.      Left Ear: External ear normal.      Nose: Nose normal.   Eyes:      Extraocular Movements: Extraocular movements intact. Cardiovascular:      Rate and Rhythm: Normal rate and regular rhythm.       Heart sounds: Normal heart sounds. Pulmonary:      Effort: Pulmonary effort is normal.      Breath sounds: Normal breath sounds. Musculoskeletal:      Cervical back: Normal range of motion. Skin:     General: Skin is warm and dry. Findings: No rash. Neurological:      General: No focal deficit present. Mental Status: She is alert and oriented to person, place, and time. Psychiatric:         Mood and Affect: Mood normal.         Behavior: Behavior normal.         Judgment: Judgment normal.       /70   Pulse 68   Temp 98 °F (36.7 °C)   Ht 5' 6\" (1.676 m)   Wt 281 lb (127.5 kg)   LMP 02/28/2013 (Exact Date)   SpO2 99%   BMI 45.35 kg/m²     Assessment:       Diagnosis Orders   1. Moderate to severe pulmonary hypertension ([address]     2. Preop cardiovascular exam     3. H/O breast reconstruction     4. Mixed hyperlipidemia  pravastatin (PRAVACHOL) 40 MG tablet   5. Mild intermittent extrinsic asthma without complication  albuterol sulfate  (90 Base) MCG/ACT inhaler   6. Acute gout of right foot, unspecified cause  allopurinol (ZYLOPRIM) 100 MG tablet             Plan:        Pre op clearance: she has further testing that needs to be done with her cardiologist so I will defer further clearance until she has that done and cardio clears her. From my side of things she is cleared for surgery if cardio clears her. Asthma: stable; she is doing well on her albuterol, but her allergies are worse so I switched her to claritin. Return if symptoms worsen or fail to improve.       Orders Placed This Encounter   Medications    pravastatin (PRAVACHOL) 40 MG tablet     Sig: take 1 tablet by mouth once daily     Dispense:  90 tablet     Refill:  1    albuterol sulfate  (90 Base) MCG/ACT inhaler     Sig: inhale 2 puffs by mouth INTO THE LUNGS every 4 hours if needed for wheezing     Dispense:  18 g     Refill:  5    allopurinol (ZYLOPRIM) 100 MG tablet     Sig: Take 1 tablet by mouth daily Dispense:  90 tablet     Refill:  1    olopatadine (PATANOL) 0.1 % ophthalmic solution     Sig: instill 1 drop into both eyes twice a day     Dispense:  5 mL     Refill:  3    loratadine (CLARITIN) 10 MG tablet     Sig: Take 1 tablet by mouth daily     Dispense:  90 tablet     Refill:  3       Patientgiven educational materials - see patient instructions. Discussed use, benefit,and side effects of prescribed medications. All patient questions answered. Ptvoiced understanding. Reviewed health maintenance. Instructed to continue currentmedications, diet and exercise. Patient agreed with treatment plan. Follow up asdirected.      Electronically signed by Carla Hagen MD on 11/1/2021 at 2:25 PM

## 2021-11-04 ENCOUNTER — TELEPHONE (OUTPATIENT)
Dept: CARDIOLOGY | Age: 56
End: 2021-11-04

## 2021-11-04 ENCOUNTER — ANESTHESIA EVENT (OUTPATIENT)
Dept: OPERATING ROOM | Age: 56
End: 2021-11-04
Payer: MEDICARE

## 2021-11-04 ENCOUNTER — HOSPITAL ENCOUNTER (OUTPATIENT)
Dept: NON INVASIVE DIAGNOSTICS | Age: 56
Discharge: HOME | End: 2021-11-04
Payer: MEDICARE

## 2021-11-04 DIAGNOSIS — I27.20 PULMONARY HYPERTENSION (HCC): ICD-10-CM

## 2021-11-04 LAB
LV EF: 60 %
LVEF MODALITY: NORMAL

## 2021-11-04 PROCEDURE — 93306 TTE W/DOPPLER COMPLETE: CPT

## 2021-11-04 NOTE — LETTER
Cardiology Consultation  [facility] [address], [address] [place]  [phone]      11/5/21      Regarding:  Paulo Avila  1965      To Whom It May Concern,      Patient is  Intermediate  Cardiac Risk for planned surgery. Special instructions:  Patient is on Eliquis for DVT. Managed per PCP.          Thank you,        Dr. Debo Isbell Cardiology     Electronically signed by Alexandria Epps MD

## 2021-11-04 NOTE — TELEPHONE ENCOUNTER
Cardiac clearance pending echo results.  Has surgery scheduled with Dr Xena Dixon on 11/8/21 right expander removal of right implant

## 2021-11-05 NOTE — TELEPHONE ENCOUNTER
See Dr. Kiara Hsu letter which I faxed to [facility]. Eliquis is for DVT and is managed by PCP Dr. Minor Moncada.

## 2021-11-05 NOTE — TELEPHONE ENCOUNTER
Patient states she was told 3 days for the elaquis to be held by Oncology. Do you still only want her to hold it for 24 hours prior?

## 2021-11-08 ENCOUNTER — HOSPITAL ENCOUNTER (OUTPATIENT)
Age: 56
Setting detail: OUTPATIENT SURGERY
Discharge: HOME | End: 2021-11-08
Attending: PLASTIC SURGERY | Admitting: PLASTIC SURGERY
Payer: MEDICARE

## 2021-11-08 ENCOUNTER — ANESTHESIA (OUTPATIENT)
Dept: OPERATING ROOM | Age: 56
End: 2021-11-08
Payer: MEDICARE

## 2021-11-08 VITALS
SYSTOLIC BLOOD PRESSURE: 134 MMHG | OXYGEN SATURATION: 81 % | TEMPERATURE: 97.2 F | RESPIRATION RATE: 12 BRPM | DIASTOLIC BLOOD PRESSURE: 61 MMHG

## 2021-11-08 VITALS
BODY MASS INDEX: 45.8 KG/M2 | HEIGHT: 66 IN | TEMPERATURE: 96 F | RESPIRATION RATE: 16 BRPM | SYSTOLIC BLOOD PRESSURE: 135 MMHG | WEIGHT: 285 LBS | HEART RATE: 75 BPM | OXYGEN SATURATION: 95 % | DIASTOLIC BLOOD PRESSURE: 70 MMHG

## 2021-11-08 DIAGNOSIS — Z98.890 H/O BREAST RECONSTRUCTION: Primary | ICD-10-CM

## 2021-11-08 DIAGNOSIS — G89.18 POST-OP PAIN: ICD-10-CM

## 2021-11-08 PROCEDURE — 3600000002 HC SURGERY LEVEL 2 BASE: Performed by: PLASTIC SURGERY

## 2021-11-08 PROCEDURE — 2580000003 HC RX 258: Performed by: NURSE ANESTHETIST, CERTIFIED REGISTERED

## 2021-11-08 PROCEDURE — C1789 PROSTHESIS, BREAST, IMP: HCPCS | Performed by: PLASTIC SURGERY

## 2021-11-08 PROCEDURE — 7100000011 HC PHASE II RECOVERY - ADDTL 15 MIN: Performed by: PLASTIC SURGERY

## 2021-11-08 PROCEDURE — 2500000003 HC RX 250 WO HCPCS: Performed by: PLASTIC SURGERY

## 2021-11-08 PROCEDURE — 7100000010 HC PHASE II RECOVERY - FIRST 15 MIN: Performed by: PLASTIC SURGERY

## 2021-11-08 PROCEDURE — 88304 TISSUE EXAM BY PATHOLOGIST: CPT

## 2021-11-08 PROCEDURE — 2709999900 HC NON-CHARGEABLE SUPPLY: Performed by: PLASTIC SURGERY

## 2021-11-08 PROCEDURE — 3700000000 HC ANESTHESIA ATTENDED CARE: Performed by: PLASTIC SURGERY

## 2021-11-08 PROCEDURE — 6370000000 HC RX 637 (ALT 250 FOR IP)

## 2021-11-08 PROCEDURE — 3700000001 HC ADD 15 MINUTES (ANESTHESIA): Performed by: PLASTIC SURGERY

## 2021-11-08 PROCEDURE — 2500000003 HC RX 250 WO HCPCS: Performed by: NURSE ANESTHETIST, CERTIFIED REGISTERED

## 2021-11-08 PROCEDURE — 6360000002 HC RX W HCPCS: Performed by: NURSE ANESTHETIST, CERTIFIED REGISTERED

## 2021-11-08 PROCEDURE — 3600000012 HC SURGERY LEVEL 2 ADDTL 15MIN: Performed by: PLASTIC SURGERY

## 2021-11-08 DEVICE — IMPLANTABLE DEVICE: Type: IMPLANTABLE DEVICE | Site: BREAST | Status: FUNCTIONAL

## 2021-11-08 RX ORDER — SODIUM CHLORIDE 9 MG/ML
25 INJECTION, SOLUTION INTRAVENOUS PRN
Status: DISCONTINUED | OUTPATIENT
Start: 2021-11-08 | End: 2021-11-08 | Stop reason: HOSPADM

## 2021-11-08 RX ORDER — BUPIVACAINE HYDROCHLORIDE 2.5 MG/ML
INJECTION, SOLUTION EPIDURAL; INFILTRATION; INTRACAUDAL
Status: DISCONTINUED
Start: 2021-11-08 | End: 2021-11-08 | Stop reason: HOSPADM

## 2021-11-08 RX ORDER — PROPOFOL 10 MG/ML
INJECTION, EMULSION INTRAVENOUS CONTINUOUS PRN
Status: DISCONTINUED | OUTPATIENT
Start: 2021-11-08 | End: 2021-11-08 | Stop reason: SDUPTHER

## 2021-11-08 RX ORDER — GLYCOPYRROLATE 1 MG/5 ML
SYRINGE (ML) INTRAVENOUS PRN
Status: DISCONTINUED | OUTPATIENT
Start: 2021-11-08 | End: 2021-11-08 | Stop reason: SDUPTHER

## 2021-11-08 RX ORDER — ROCURONIUM BROMIDE 10 MG/ML
INJECTION, SOLUTION INTRAVENOUS PRN
Status: DISCONTINUED | OUTPATIENT
Start: 2021-11-08 | End: 2021-11-08 | Stop reason: SDUPTHER

## 2021-11-08 RX ORDER — SODIUM CHLORIDE 0.9 % (FLUSH) 0.9 %
10 SYRINGE (ML) INJECTION PRN
Status: DISCONTINUED | OUTPATIENT
Start: 2021-11-08 | End: 2021-11-08 | Stop reason: HOSPADM

## 2021-11-08 RX ORDER — HYDROCODONE BITARTRATE AND ACETAMINOPHEN 5; 325 MG/1; MG/1
1 TABLET ORAL EVERY 4 HOURS PRN
Qty: 42 TABLET | Refills: 0 | Status: SHIPPED | OUTPATIENT
Start: 2021-11-08 | End: 2021-11-15

## 2021-11-08 RX ORDER — FENTANYL CITRATE 50 UG/ML
INJECTION, SOLUTION INTRAMUSCULAR; INTRAVENOUS PRN
Status: DISCONTINUED | OUTPATIENT
Start: 2021-11-08 | End: 2021-11-08 | Stop reason: SDUPTHER

## 2021-11-08 RX ORDER — SODIUM CHLORIDE 0.9 % (FLUSH) 0.9 %
10 SYRINGE (ML) INJECTION EVERY 12 HOURS SCHEDULED
Status: DISCONTINUED | OUTPATIENT
Start: 2021-11-08 | End: 2021-11-08 | Stop reason: HOSPADM

## 2021-11-08 RX ORDER — DEXAMETHASONE SODIUM PHOSPHATE 10 MG/ML
INJECTION INTRAMUSCULAR; INTRAVENOUS PRN
Status: DISCONTINUED | OUTPATIENT
Start: 2021-11-08 | End: 2021-11-08 | Stop reason: SDUPTHER

## 2021-11-08 RX ORDER — SUCCINYLCHOLINE/SOD CL,ISO/PF 100 MG/5ML
SYRINGE (ML) INTRAVENOUS PRN
Status: DISCONTINUED | OUTPATIENT
Start: 2021-11-08 | End: 2021-11-08 | Stop reason: SDUPTHER

## 2021-11-08 RX ORDER — HYDROCODONE BITARTRATE AND ACETAMINOPHEN 5; 325 MG/1; MG/1
2 TABLET ORAL PRN
Status: COMPLETED | OUTPATIENT
Start: 2021-11-08 | End: 2021-11-08

## 2021-11-08 RX ORDER — PROMETHAZINE HYDROCHLORIDE 25 MG/ML
6.25 INJECTION, SOLUTION INTRAMUSCULAR; INTRAVENOUS
Status: DISCONTINUED | OUTPATIENT
Start: 2021-11-08 | End: 2021-11-08 | Stop reason: HOSPADM

## 2021-11-08 RX ORDER — HYDRALAZINE HYDROCHLORIDE 20 MG/ML
5 INJECTION INTRAMUSCULAR; INTRAVENOUS EVERY 10 MIN PRN
Status: DISCONTINUED | OUTPATIENT
Start: 2021-11-08 | End: 2021-11-08 | Stop reason: HOSPADM

## 2021-11-08 RX ORDER — SUCCINYLCHOLINE/SOD CL,ISO/PF 100 MG/5ML
SYRINGE (ML) INTRAVENOUS
Status: COMPLETED
Start: 2021-11-08 | End: 2021-11-08

## 2021-11-08 RX ORDER — FENTANYL CITRATE 50 UG/ML
25 INJECTION, SOLUTION INTRAMUSCULAR; INTRAVENOUS EVERY 5 MIN PRN
Status: DISCONTINUED | OUTPATIENT
Start: 2021-11-08 | End: 2021-11-08 | Stop reason: HOSPADM

## 2021-11-08 RX ORDER — HYDROCODONE BITARTRATE AND ACETAMINOPHEN 5; 325 MG/1; MG/1
1 TABLET ORAL PRN
Status: COMPLETED | OUTPATIENT
Start: 2021-11-08 | End: 2021-11-08

## 2021-11-08 RX ORDER — DIPHENHYDRAMINE HYDROCHLORIDE 50 MG/ML
12.5 INJECTION INTRAMUSCULAR; INTRAVENOUS
Status: DISCONTINUED | OUTPATIENT
Start: 2021-11-08 | End: 2021-11-08 | Stop reason: HOSPADM

## 2021-11-08 RX ORDER — PROPOFOL 10 MG/ML
INJECTION, EMULSION INTRAVENOUS PRN
Status: DISCONTINUED | OUTPATIENT
Start: 2021-11-08 | End: 2021-11-08 | Stop reason: SDUPTHER

## 2021-11-08 RX ORDER — ONDANSETRON 2 MG/ML
INJECTION INTRAMUSCULAR; INTRAVENOUS PRN
Status: DISCONTINUED | OUTPATIENT
Start: 2021-11-08 | End: 2021-11-08 | Stop reason: SDUPTHER

## 2021-11-08 RX ORDER — SODIUM CHLORIDE, SODIUM LACTATE, POTASSIUM CHLORIDE, CALCIUM CHLORIDE 600; 310; 30; 20 MG/100ML; MG/100ML; MG/100ML; MG/100ML
INJECTION, SOLUTION INTRAVENOUS CONTINUOUS
Status: DISCONTINUED | OUTPATIENT
Start: 2021-11-08 | End: 2021-11-08 | Stop reason: HOSPADM

## 2021-11-08 RX ORDER — CEFAZOLIN SODIUM 1 G/3ML
INJECTION, POWDER, FOR SOLUTION INTRAMUSCULAR; INTRAVENOUS PRN
Status: DISCONTINUED | OUTPATIENT
Start: 2021-11-08 | End: 2021-11-08 | Stop reason: SDUPTHER

## 2021-11-08 RX ORDER — DIPHENHYDRAMINE HYDROCHLORIDE 50 MG/ML
INJECTION INTRAMUSCULAR; INTRAVENOUS PRN
Status: DISCONTINUED | OUTPATIENT
Start: 2021-11-08 | End: 2021-11-08 | Stop reason: SDUPTHER

## 2021-11-08 RX ORDER — MORPHINE SULFATE 1 MG/ML
1 INJECTION, SOLUTION EPIDURAL; INTRATHECAL; INTRAVENOUS EVERY 5 MIN PRN
Status: DISCONTINUED | OUTPATIENT
Start: 2021-11-08 | End: 2021-11-08 | Stop reason: HOSPADM

## 2021-11-08 RX ORDER — SODIUM CHLORIDE, SODIUM LACTATE, POTASSIUM CHLORIDE, CALCIUM CHLORIDE 600; 310; 30; 20 MG/100ML; MG/100ML; MG/100ML; MG/100ML
INJECTION, SOLUTION INTRAVENOUS CONTINUOUS PRN
Status: DISCONTINUED | OUTPATIENT
Start: 2021-11-08 | End: 2021-11-08 | Stop reason: SDUPTHER

## 2021-11-08 RX ORDER — NEOSTIGMINE METHYLSULFATE 5 MG/5 ML
SYRINGE (ML) INTRAVENOUS PRN
Status: DISCONTINUED | OUTPATIENT
Start: 2021-11-08 | End: 2021-11-08 | Stop reason: SDUPTHER

## 2021-11-08 RX ORDER — LIDOCAINE HYDROCHLORIDE 10 MG/ML
INJECTION, SOLUTION EPIDURAL; INFILTRATION; INTRACAUDAL; PERINEURAL PRN
Status: DISCONTINUED | OUTPATIENT
Start: 2021-11-08 | End: 2021-11-08 | Stop reason: SDUPTHER

## 2021-11-08 RX ORDER — MEPERIDINE HYDROCHLORIDE 50 MG/ML
12.5 INJECTION INTRAMUSCULAR; INTRAVENOUS; SUBCUTANEOUS EVERY 5 MIN PRN
Status: DISCONTINUED | OUTPATIENT
Start: 2021-11-08 | End: 2021-11-08 | Stop reason: HOSPADM

## 2021-11-08 RX ORDER — ONDANSETRON 2 MG/ML
4 INJECTION INTRAMUSCULAR; INTRAVENOUS
Status: DISCONTINUED | OUTPATIENT
Start: 2021-11-08 | End: 2021-11-08 | Stop reason: HOSPADM

## 2021-11-08 RX ORDER — HYDROCODONE BITARTRATE AND ACETAMINOPHEN 5; 325 MG/1; MG/1
TABLET ORAL
Status: COMPLETED
Start: 2021-11-08 | End: 2021-11-08

## 2021-11-08 RX ORDER — SODIUM CHLORIDE 9 MG/ML
INJECTION, SOLUTION INTRAVENOUS CONTINUOUS
Status: DISCONTINUED | OUTPATIENT
Start: 2021-11-08 | End: 2021-11-08 | Stop reason: HOSPADM

## 2021-11-08 RX ORDER — KETOROLAC TROMETHAMINE 30 MG/ML
INJECTION, SOLUTION INTRAMUSCULAR; INTRAVENOUS PRN
Status: DISCONTINUED | OUTPATIENT
Start: 2021-11-08 | End: 2021-11-08 | Stop reason: SDUPTHER

## 2021-11-08 RX ORDER — BUPIVACAINE HYDROCHLORIDE 2.5 MG/ML
INJECTION, SOLUTION INFILTRATION; PERINEURAL PRN
Status: DISCONTINUED | OUTPATIENT
Start: 2021-11-08 | End: 2021-11-08 | Stop reason: ALTCHOICE

## 2021-11-08 RX ORDER — PROPOFOL 10 MG/ML
INJECTION, EMULSION INTRAVENOUS
Status: COMPLETED
Start: 2021-11-08 | End: 2021-11-08

## 2021-11-08 RX ADMIN — CEFAZOLIN 3000 MG: 1 INJECTION, POWDER, FOR SOLUTION INTRAMUSCULAR; INTRAVENOUS at 13:41

## 2021-11-08 RX ADMIN — PROPOFOL INJECTABLE EMULSION 200 MG: 10 INJECTION, EMULSION INTRAVENOUS at 13:37

## 2021-11-08 RX ADMIN — PROPOFOL INJECTABLE EMULSION 50 MG: 10 INJECTION, EMULSION INTRAVENOUS at 14:54

## 2021-11-08 RX ADMIN — DEXAMETHASONE SODIUM PHOSPHATE 10 MG: 10 INJECTION INTRAMUSCULAR; INTRAVENOUS at 13:45

## 2021-11-08 RX ADMIN — HYDROCODONE BITARTRATE AND ACETAMINOPHEN 1 TABLET: 5; 325 TABLET ORAL at 16:00

## 2021-11-08 RX ADMIN — FENTANYL CITRATE 100 MCG: 50 INJECTION, SOLUTION INTRAMUSCULAR; INTRAVENOUS at 13:37

## 2021-11-08 RX ADMIN — SODIUM CHLORIDE, POTASSIUM CHLORIDE, SODIUM LACTATE AND CALCIUM CHLORIDE: 600; 310; 30; 20 INJECTION, SOLUTION INTRAVENOUS at 13:33

## 2021-11-08 RX ADMIN — PROPOFOL INJECTABLE EMULSION 100 MG: 10 INJECTION, EMULSION INTRAVENOUS at 13:39

## 2021-11-08 RX ADMIN — Medication 4 MG: at 15:21

## 2021-11-08 RX ADMIN — ROCURONIUM BROMIDE 20 MG: 10 INJECTION INTRAVENOUS at 14:21

## 2021-11-08 RX ADMIN — KETOROLAC TROMETHAMINE 30 MG: 30 INJECTION, SOLUTION INTRAMUSCULAR at 15:11

## 2021-11-08 RX ADMIN — ONDANSETRON 4 MG: 2 INJECTION, SOLUTION INTRAMUSCULAR; INTRAVENOUS at 15:11

## 2021-11-08 RX ADMIN — PROPOFOL INJECTABLE EMULSION 50 MG: 10 INJECTION, EMULSION INTRAVENOUS at 14:21

## 2021-11-08 RX ADMIN — Medication 12.5 MG: at 13:45

## 2021-11-08 RX ADMIN — Medication 20 MG: at 13:37

## 2021-11-08 RX ADMIN — LIDOCAINE HYDROCHLORIDE 50 MG: 10 INJECTION, SOLUTION EPIDURAL; INFILTRATION; INTRACAUDAL; PERINEURAL at 13:37

## 2021-11-08 RX ADMIN — ROCURONIUM BROMIDE 50 MG: 10 INJECTION INTRAVENOUS at 13:37

## 2021-11-08 RX ADMIN — PROPOFOL 50 MCG/KG/MIN: 10 INJECTION, EMULSION INTRAVENOUS at 13:45

## 2021-11-08 RX ADMIN — ROCURONIUM BROMIDE 10 MG: 10 INJECTION INTRAVENOUS at 14:54

## 2021-11-08 RX ADMIN — ONDANSETRON 4 MG: 2 INJECTION, SOLUTION INTRAMUSCULAR; INTRAVENOUS at 13:45

## 2021-11-08 RX ADMIN — Medication 0.6 MG: at 15:21

## 2021-11-08 ASSESSMENT — PULMONARY FUNCTION TESTS
PIF_VALUE: 25
PIF_VALUE: 25
PIF_VALUE: 24
PIF_VALUE: 22
PIF_VALUE: 2
PIF_VALUE: 28
PIF_VALUE: 26
PIF_VALUE: 20
PIF_VALUE: 26
PIF_VALUE: 24
PIF_VALUE: 26
PIF_VALUE: 26
PIF_VALUE: 25
PIF_VALUE: 25
PIF_VALUE: 27
PIF_VALUE: 26
PIF_VALUE: 25
PIF_VALUE: 27
PIF_VALUE: 24
PIF_VALUE: 22
PIF_VALUE: 19
PIF_VALUE: 28
PIF_VALUE: 28
PIF_VALUE: 22
PIF_VALUE: 26
PIF_VALUE: 20
PIF_VALUE: 26
PIF_VALUE: 36
PIF_VALUE: 25
PIF_VALUE: 25
PIF_VALUE: 26
PIF_VALUE: 26
PIF_VALUE: 25
PIF_VALUE: 23
PIF_VALUE: 38
PIF_VALUE: 27
PIF_VALUE: 25
PIF_VALUE: 24
PIF_VALUE: 22
PIF_VALUE: 25
PIF_VALUE: 25
PIF_VALUE: 26
PIF_VALUE: 27
PIF_VALUE: 25
PIF_VALUE: 25
PIF_VALUE: 24
PIF_VALUE: 26
PIF_VALUE: 25
PIF_VALUE: 20
PIF_VALUE: 26
PIF_VALUE: 24
PIF_VALUE: 8
PIF_VALUE: 20
PIF_VALUE: 25
PIF_VALUE: 5
PIF_VALUE: 21
PIF_VALUE: 25
PIF_VALUE: 28
PIF_VALUE: 25
PIF_VALUE: 26
PIF_VALUE: 26
PIF_VALUE: 25
PIF_VALUE: 24
PIF_VALUE: 25
PIF_VALUE: 2
PIF_VALUE: 26
PIF_VALUE: 25
PIF_VALUE: 24
PIF_VALUE: 25
PIF_VALUE: 25
PIF_VALUE: 26
PIF_VALUE: 25
PIF_VALUE: 24
PIF_VALUE: 27
PIF_VALUE: 27
PIF_VALUE: 26
PIF_VALUE: 27
PIF_VALUE: 26
PIF_VALUE: 33
PIF_VALUE: 26
PIF_VALUE: 20
PIF_VALUE: 25
PIF_VALUE: 26
PIF_VALUE: 26
PIF_VALUE: 2
PIF_VALUE: 19
PIF_VALUE: 26
PIF_VALUE: 21
PIF_VALUE: 27
PIF_VALUE: 26
PIF_VALUE: 25
PIF_VALUE: 24
PIF_VALUE: 21
PIF_VALUE: 22
PIF_VALUE: 28
PIF_VALUE: 27
PIF_VALUE: 19
PIF_VALUE: 29
PIF_VALUE: 26
PIF_VALUE: 27
PIF_VALUE: 25
PIF_VALUE: 20
PIF_VALUE: 4
PIF_VALUE: 20
PIF_VALUE: 23
PIF_VALUE: 26
PIF_VALUE: 29
PIF_VALUE: 19
PIF_VALUE: 25
PIF_VALUE: 26
PIF_VALUE: 25
PIF_VALUE: 26
PIF_VALUE: 22
PIF_VALUE: 3
PIF_VALUE: 25
PIF_VALUE: 24
PIF_VALUE: 25
PIF_VALUE: 27
PIF_VALUE: 26
PIF_VALUE: 20

## 2021-11-08 ASSESSMENT — PAIN SCALES - GENERAL
PAINLEVEL_OUTOF10: 0
PAINLEVEL_OUTOF10: 8
PAINLEVEL_OUTOF10: 6

## 2021-11-08 ASSESSMENT — PAIN DESCRIPTION - LOCATION: LOCATION: BREAST

## 2021-11-08 ASSESSMENT — PAIN DESCRIPTION - DESCRIPTORS: DESCRIPTORS: SHARP

## 2021-11-08 ASSESSMENT — PAIN DESCRIPTION - PAIN TYPE: TYPE: SURGICAL PAIN

## 2021-11-08 NOTE — BRIEF OP NOTE
Brief Postoperative Note      Patient: Yusuf Schwarz  YOB: 1965  MRN: [id number]    Date of Procedure: 11/8/2021    Pre-Op Diagnosis: S/P RIGHT BREAST CANCER    Post-Op Diagnosis: Same       Procedure(s):  RIGHT BREAST EXPANDER REMOVAL RIGHT IMPLANT PLACEMENT    Surgeon(s):  Geni Dupree MD    Assistant:  * No surgical staff found *    Anesthesia: General    Estimated Blood Loss (mL): less than 50     Complications: None    Specimens:   ID Type Source Tests Collected by Time Destination   A : implant capsule right breast Tissue Tissue SURGICAL PATHOLOGY Geni Dupree MD 11/8/2021 1443        Implants:  Implant Name Type Inv.  Item Serial No.  Lot No. LRB No. Used Action   IMPLANT BRST 545CC P6.2CM [id number] COHESIVE KAREN GEL [employer]MOOTH [[id number]] [facility] [employer]] - [id number]  IMPLANT BRST 545CC P6.2CM [id number] COHESIVE KAREN GEL [employer]MOOTH [[id number]] [facility] [employer]] [id number] [address]  Right 1 Implanted         Drains:   [REMOVED] Closed/Suction Drain Right Breast Bulb 10 [place] (Removed)       Findings:     Electronically signed by Geni Dupree MD on 11/8/2021 at 3:31 PM

## 2021-11-08 NOTE — ANESTHESIA PRE PROCEDURE
Department of Anesthesiology  Preprocedure Note       Name:  Eduarda Bear   Age:  64 y.o.  :  1965                                          MRN:  [id number]         Date:  2021      Surgeon: Jessie Mohs):  Dolly Beyrs MD    Procedure: Procedure(s):  RIGHT BREAST EXPANDER REMOVAL RIGHT IMPLANT PLACEMENT    Medications prior to admission:   Prior to Admission medications    Medication Sig Start Date End Date Taking? Authorizing Provider   pravastatin (PRAVACHOL) 40 MG tablet take 1 tablet by mouth once daily 21  Yes Merton Bloch, MD   allopurinol (ZYLOPRIM) 100 MG tablet Take 1 tablet by mouth daily 21  Yes Merton Bloch, MD   olopatadine (PATANOL) 0.1 % ophthalmic solution instill 1 drop into both eyes twice a day 21  Yes Merton Bloch, MD   loratadine (CLARITIN) 10 MG tablet Take 1 tablet by mouth daily 21  Yes Merton Bloch, MD   cephALEXin (KEFLEX) 250 MG capsule Take one PO QID til gone. Start one day prior to surgery / procedure. 10/7/21  Yes Dolly Byers MD   famotidine (PEPCID) 20 MG tablet Take 1 tablet by mouth once for 1 dose Take morning of surgery with minimal sip of water.  10/7/21 11/8/21 Yes Dolly Byers MD   RA VITAMIN B-12 TR 1000 MCG TBCR take 1 tablet by mouth once daily 21  Yes Lewis Mchugh MD   topiramate (TOPAMAX) 50 MG tablet ONE  TABLET  TWICE  DAILY 9/3/21  Yes Mitchell Castellon MD   folic acid (FOLVITE) 1 MG tablet take 1 tablet by mouth once daily 21  Yes Lewis Mchugh MD   Calcium Carbonate-Vitamin D (OYSTER SHELL CALCIUM/D) 500-200 MG-UNIT TABS take 1 tablet by mouth twice a day 21  Yes Jose Jiménez MD   letrozole [facility]) 2.5 MG tablet take 1 tablet by mouth once daily 21  Yes Lewis Mchugh MD   busPIRone (BUSPAR) 15 MG tablet Take 15 mg by mouth 3 times daily  21  Yes Historical Provider, MD   levocetirizine (XYZAL) 5 MG tablet Take 1 tablet by mouth nightly 5/10/21  Yes Merton Bloch, MD lamoTRIgine (LAMICTAL) 25 MG tablet Take 50 mg by mouth daily At bedtime   Yes Historical Provider, MD   OXcarbazepine (TRILEPTAL) 150 MG tablet take 1 tablet by mouth every morning BEFORE NOON 2/25/20  Yes Historical Provider, MD   benztropine (COGENTIN) 0.5 MG tablet Take 0.5 mg by mouth daily   Yes Historical Provider, MD   QUEtiapine (SEROQUEL) 100 MG tablet Take 50 mg by mouth nightly   Yes Historical Provider, MD   lamoTRIgine (LAMICTAL) 100 MG tablet 150 mg nightly  8/30/17  Yes Historical Provider, MD   albuterol sulfate  (90 Base) MCG/ACT inhaler inhale 2 puffs by mouth INTO THE LUNGS every 4 hours if needed for wheezing 11/1/21   Ileana Lr MD   ELIQUIS 5 MG TABS tablet take 1 tablet by mouth twice a day 10/12/21   [facility], MD   metoclopramide (REGLAN) 10 MG tablet Take 1 tablet by mouth once for 1 dose Take morning of surgery with SMALL SIP of water.  10/7/21 10/7/21  Anton Salazar MD   butalbital-acetaminophen-caffeine (FIORICET, ESGIC) -44 MG per tablet 1-2   TABLET  TWICE  DAILY  AS  NEEDED 8/2/88   Oscar Chaparro MD   SUMAtriptan (IMITREX) 100 MG tablet Take 1 tablet by mouth once as needed for Migraine 9/3/21 78/95/15  Oscar Chaparro MD   tiZANidine (ZANAFLEX) 4 MG tablet Take 1 tablet by mouth every 8 hours as needed (muscle pain) 6/23/21   [facility], MD   fluticasone [facility]) 50 MCG/ACT nasal spray 1 spray by Nasal route daily Indications: as needed 5/10/21 10/20/21  Ileana Lr MD       Current medications:    Current Facility-Administered Medications   Medication Dose Route Frequency Provider Last Rate Last Admin    0.9 % sodium chloride infusion   IntraVENous Continuous Asmita Cornell MD        lactated ringers infusion   IntraVENous Continuous Asmita Cornell MD        sodium chloride flush 0.9 % injection 10 mL  10 mL IntraVENous 2 times per day Asmita Cornell MD        sodium chloride flush 0.9 % injection 10 mL  10 mL IntraVENous PRN MD Wm Lerma 0.9 % sodium chloride infusion  25 mL IntraVENous PRN Alfonzo Quintero MD        ceFAZolin (ANCEF) IVPB                Allergies: Allergies   Allergen Reactions    Prednisone Swelling     Whole side of her face swelled when she took it, difficulty breathing       Problem List:    Patient Active Problem List   Diagnosis Code    Bipolar 1 disorder ([facility] F31.9    Hyperlipidemia E78.5    Malignant neoplasm of overlapping sites of right breast in female, estrogen receptor negative ([facility] C50.811, Z17.1    Port-A-Cath in place Z95.828    Migraine G43.909    Memory problem R41.3    Sleep difficulties G47.9    Anxiety and depression F41.9, F32. A    Muscle twitching R25.3    Hemifacial spasm G51.39    Combined forms of age-related cataract of both eyes H25.813    Squamous blepharitis of upper and lower eyelids of both eyes H01.02A, H01. 02B    Acute deep vein thrombosis (DVT) of axillary vein of right upper extremity (HCC) I82. A11    Malignant neoplasm of breast in female, estrogen receptor positive ([facility] C50.919, Z17.0    Seasonal allergies J30.2    H/O right mastectomy Z90.11    HX: breast cancer Z85.3    Acute massive pulmonary embolism (HCC) I26.99    Moderate to severe pulmonary hypertension (HCC) I27.20    TAY on CPAP G47.33, Z99.89    Morbid obesity with BMI of 40.0-44.9, adult (HCC) E66.01, Z68.41    Extrinsic asthma J45.909    Blepharospasm G24.5    Chronic deep vein thrombosis (DVT) of axillary vein of right upper extremity (HCC) I82. A21    H/O breast reconstruction Z98.890       Past Medical History:        Diagnosis Date    Asthma     seasonal    Bipolar 1 disorder ([facility]     Breast cancer ([facility] 2017    right side     [facility], [facility]     Eye twitch 2019    Headache(784.0)     History of blood transfusion     Hx of blood clots 03/2020    PE    Hyperlipidemia     Migraine     Obesity     PONV (postoperative nausea and vomiting)     Prolonged emergence from general anesthesia     Sleep apnea     uses CPAP       Past Surgical History:        Procedure Laterality Date    BREAST ENHANCEMENT SURGERY Right 3/10/2021    BREAST RECONSTRUCTION WITH TISSUE EXPANDER INSERTION performed by Jose Luis Lr MD at [address]      rt mastectomy    BREAST SURGERY Right 03/10/2021    BREAST RECONSTRUCTION WITH TISSUE EXPANDER INSERTION (    BREAST SURGERY Right 3/10/2021    EXCISION SEROMA RIGHT BREAST performed by Jose Luis Lr MD at [facility] Left 6/6/2019    PORT REMOVAL performed by Saima Mccord DO at [address] N/A 10/13/2017    D & C HYSTEROSCOPY with polypectomy performed by Shantal Irene MD at [facility] / REMOVAL / [address] Left 11/9/2017    PORT INSERTION performed by Rodriguez Silverman MD at [facility] / REMOVAL / [address] N/A 11/30/2017    [facility] & [facility] 10/19/2017     [facility]    MASTECTOMY Right 10/19/2017    Right Breast Mastectomy with  [place] Node Biopsy performed by Rodriguez Silverman MD at . [facility] CTR VAD W/SUBQ PORT AGE 5 YR/> Left 3/1/2018    PORT Exchange performed by Rodriguez Silverman MD at [place] [address] Left 2014, 2015    x 2 surgeries total; Dr. Kana Figueroa TUNNELED VENOUS PORT PLACEMENT Left 11/30/2017    removal of et replacement of       Social History:    Social History     Tobacco Use    Smoking status: Never Smoker    Smokeless tobacco: Never Used    Tobacco comment: Never smoker. TC, RRT 4/5/18   Substance Use Topics    Alcohol use: Yes     Comment: Rarely                                Counseling given: Not Answered  Comment: Never smoker.  TC, RRT 4/5/18      Vital Signs (Current):   Vitals:    11/08/21 1209   BP: (!) 162/67   Pulse: 82   Resp: 15 Temp: 97.4 °F (36.3 °C)   SpO2: 97%   Weight: 285 lb (129.3 kg)   Height: 5' 6\" (1.676 m)                                              BP Readings from Last 3 Encounters:   11/08/21 (!) 162/67   11/01/21 132/70   10/28/21 118/72       NPO Status:                                                                                 BMI:   Wt Readings from Last 3 Encounters:   11/08/21 285 lb (129.3 kg)   11/01/21 281 lb (127.5 kg)   10/28/21 287 lb (130.2 kg)     Body mass index is 46 kg/m². CBC:   Lab Results   Component Value Date    WBC 8.4 08/17/2021    RBC 4.04 08/17/2021    HGB 12.8 08/17/2021    HCT 39.1 08/17/2021    MCV 96.8 08/17/2021    RDW 13.0 08/17/2021     08/17/2021       CMP:   Lab Results   Component Value Date     08/17/2021    K 4.2 08/17/2021     08/17/2021    CO2 22 08/17/2021    BUN 18 08/17/2021    CREATININE 1.17 08/17/2021    GFRAA 58 08/17/2021    LABGLOM 48 08/17/2021    GLUCOSE 117 08/17/2021    PROT 7.9 08/17/2021    CALCIUM 10.2 08/17/2021    BILITOT 0.22 08/17/2021    ALKPHOS 106 08/17/2021    AST 16 08/17/2021    ALT 11 08/17/2021       POC Tests: No results for input(s): POCGLU, POCNA, POCK, POCCL, POCBUN, POCHEMO, POCHCT in the last 72 hours. Coags:   Lab Results   Component Value Date    PROTIME 10.4 02/24/2021    INR 1.0 02/24/2021    APTT >120.0 04/10/2020       HCG (If Applicable): No results found for: PREGTESTUR, PREGSERUM, HCG, HCGQUANT     ABGs: No results found for: PHART, PO2ART, WJG1GBR, WUC3LDC, BEART, X9ORKLWI     Type & Screen (If Applicable):  No results found for: LABABO, LABRH    Drug/Infectious Status (If Applicable):  Lab Results   Component Value Date    HEPCAB NONREACTIVE 02/11/2019       COVID-19 Screening (If Applicable):   Lab Results   Component Value Date    COVID19 Not Detected 03/05/2021           Anesthesia Evaluation  Patient summary reviewed and Nursing notes reviewed   history of anesthetic complications: PONV.   Airway: Mallampati: III  TM distance: >3 FB   Neck ROM: full  Mouth opening: > = 3 FB Dental: normal exam         Pulmonary:normal exam  breath sounds clear to auscultation  (+) sleep apnea: on CPAP,  asthma:                            Cardiovascular:  Exercise tolerance: no interval change,   (+) pulmonary hypertension: severe and no interval change, hyperlipidemia        Rhythm: regular  Rate: normal                    Neuro/Psych:   (+) neuromuscular disease:, headaches: migraine headaches, psychiatric history:            GI/Hepatic/Renal:   (+) morbid obesity          Endo/Other:    (+) malignancy/cancer. ROS comment: Breast cancer  Abdominal:   (+) obese,     Abdomen: soft. Vascular:   + DVT, . Other Findings:           Anesthesia Plan      general     ASA 3       Induction: intravenous. MIPS: Postoperative opioids intended and Prophylactic antiemetics administered. Anesthetic plan and risks discussed with patient. Plan discussed with CRNA.                   Shraddha Caceres MD   11/8/2021

## 2021-11-08 NOTE — H&P
Subjective:      Patient ID: Trace Licona is a 64 y.o. female.     HPI  Patient is here today for permanent implant placement to the right breast. Pt was diagnosed with right sided breast cancer in 2017. Pt has an expander in place and on 8/13/2021 expander apparently was flipped, port not detected by magnet.      Pt states she wore a C cup and would like to remain the same size. Pt denies pain to the breasts, does monthly self breast exams and the last mammogram was 10/2020 and the next  is scheduled for Nov. 2021. No other concerns at this time.     Review of Systems   Constitutional: Negative. HENT: Negative for congestion and trouble swallowing. Respiratory: Negative for cough and shortness of breath. Cardiovascular: Negative for chest pain. Gastrointestinal: Negative for abdominal pain. Neurological: Negative for light-headedness and headaches.    Psychiatric/Behavioral: Negative for dysphoric mood.         Medical History      Medical History    Diagnosis Date Comment Source   Asthma  seasonal    Bipolar 1 disorder ([facility] [address]      Breast cancer ([facility] [address] 2017 right side     Eye twitch 2019     History of blood transfusion           Other Medical History    Diagnosis Date Comment Source   DVT of axillary vein, acute right (HCC)      Headache(784.0)      Hx of blood clots 03/2020 PE    Hyperlipidemia      Migraine      Obesity      PONV (postoperative nausea and vomiting)      Prolonged emergence from general anesthesia      Sleep apnea  uses CPAP           Family History    Problem Relation Age of Onset Comments   Breast Cancer Maternal Aunt 71    Breast Cancer Sister 54    Cataracts Mother     Glaucoma Mother     Heart Disease Father     High Blood Pressure Father     High Cholesterol Father     Mental Retardation Father     Other Maternal Cousin  Atypical Ductal Hyperplasia    Other Sister  breast cyst   Uterine Cancer Sister 32      Social History      Tobacco History    Smoking Status   Never Smoker Smokeless Tobacco Use   Never Used   Tobacco Comment   Never smoker.  TC, RRT 4/5/18     Alcohol History    Alcohol Use Status   Yes Comment   Rarely     Drug Use    Drug Use Status   No     Sexual Activity    Sexually Active   Not Currently Partners   Male Birth Control/Protection   Surgical      Surgical History    Procedure Laterality Date Comment Source   BREAST ENHANCEMENT SURGERY Right 3/10/2021 BREAST RECONSTRUCTION WITH TISSUE EXPANDER INSERTION performed by Roman Salgado MD at [address]   rt mastectomy    BREAST SURGERY Right 03/10/2021 BREAST RECONSTRUCTION WITH TISSUE EXPANDER INSERTION (    BREAST SURGERY Right 3/10/2021 EXCISION SEROMA RIGHT BREAST performed by Roman Salgado MD at [facility] Left 6/6/2019 PORT REMOVAL performed by Jairo Price DO at [place] [address] N/A 10/13/2017 D & C HYSTEROSCOPY with polypectomy performed by Navjot Avila MD at [address] [place] [address] / REMOVAL / [address] Left 11/9/2017 PORT INSERTION performed by Vane Hou MD at [address] [place] [address] / REMOVAL / [address] N/A 11/30/2017 Port Removal & Insertion performed by Vane Hou MD at [address] [place] [address] Right 10/19/2017  [address] [facility]    MASTECTOMY Right 10/19/2017 Right Breast Mastectomy with  [place] Node Biopsy performed by Vane Hou MD at [address] VAD W/SUBQ PORT AGE 5 YR/> Left 3/1/2018 PORT Exchange performed by Vane Hou MD at [address] [address] Left 2014, 2015 x 2 surgeries total; Dr. Myah Quintero    TUNNELED VENOUS PORT PLACEMENT Left 11/30/2017 removal of et replacement of      E-Cigarettes/Vaping Use    Questions Responses   E-Cigarette/Vaping Use Never User   Start Date    Passive Exposure    Quit Date    Counseling Given    Comments      Socioeconomic History      Employment History    No employment history on file. Family and Education    Marital Status   Single     Social Identity    Preferred Language Ethnicity Race   English Non- / Non  White (non-)             Medications                  pravastatin (PRAVACHOL) 40 MG tablet take 1 tablet by mouth once daily    albuterol sulfate  (90 Base) MCG/ACT inhaler inhale 2 puffs by mouth INTO THE LUNGS every 4 hours if needed for wheezing    allopurinol (ZYLOPRIM) 100 MG tablet Take 1 tablet by mouth daily    olopatadine (PATANOL) 0.1 % ophthalmic solution instill 1 drop into both eyes twice a day    loratadine (CLARITIN) 10 MG tablet Take 1 tablet by mouth daily    ELIQUIS 5 MG TABS tablet take 1 tablet by mouth twice a day    cephALEXin (KEFLEX) 250 MG capsule Take one PO QID til gone. Start one day prior to surgery / procedure. metoclopramide (REGLAN) 10 MG tablet () Take 1 tablet by mouth once for 1 dose Take morning of surgery with SMALL SIP of water. famotidine (PEPCID) 20 MG tablet () Take 1 tablet by mouth once for 1 dose Take morning of surgery with minimal sip of water.     RA VITAMIN B-12 TR 1000 MCG TBCR take 1 tablet by mouth once daily    topiramate (TOPAMAX) 50 MG tablet ONE TABLET TWICE DAILY    butalbital-acetaminophen-caffeine (FIORICET, ESGIC) -40 MG per tablet 1-2 TABLET TWICE DAILY AS NEEDED    SUMAtriptan (IMITREX) 100 MG tablet () Take 1 tablet by mouth once as needed for Migraine    folic acid (FOLVITE) 1 MG tablet take 1 tablet by mouth once daily    Calcium Carbonate-Vitamin D (OYSTER SHELL CALCIUM/D) 500-200 MG-UNIT TABS take 1 tablet by mouth twice a day    letrozole (FEMARA) 2.5 MG tablet take 1 tablet by mouth once daily    tiZANidine (ZANAFLEX) 4 MG tablet Take 1 tablet by mouth every 8 hours as needed (muscle pain)    busPIRone (BUSPAR) 15 MG tablet Take 15 mg by mouth 3 times daily     levocetirizine (XYZAL) 5 MG tablet Take 1 tablet by mouth nightly    fluticasone (FLONASE)

## 2021-11-08 NOTE — ANESTHESIA POSTPROCEDURE EVALUATION
POST- ANESTHESIA EVALUATION       Pt Name: Lexus Oakley  MRN: [id number]  YOB: 1965  Date of evaluation: 11/8/2021  Time:  4:20 PM      BP (!) 140/80   Pulse 76   Temp 96 °F (35.6 °C) (Temporal)   Resp 18   Ht 5' 6\" (1.676 m)   Wt 285 lb (129.3 kg)   LMP 02/28/2013 (Exact Date)   SpO2 94%   BMI 46.00 kg/m²      Consciousness Level  Awake  Cardiopulmonary Status  Stable  Pain Adequately Treated YES  Nausea / Vomiting  NO  Adequate Hydration  YES  Anesthesia Related Complications NONE      Electronically signed by Berhane Vance MD on 11/8/2021 at 4:20 PM       Department of Anesthesiology  Postprocedure Note    Patient: Lexus Oakley  MRN: [id number]  YOB: 1965  Date of evaluation: 11/8/2021  Time:  4:19 PM     Procedure Summary     Date: 11/08/21 Room / Location: [facility] [address]    Anesthesia Start: 5427 Anesthesia Stop: 1542    Procedure: RIGHT BREAST EXPANDER REMOVAL RIGHT IMPLANT PLACEMENT (Right Breast) Diagnosis: (S/P RIGHT BREAST CANCER)    Surgeons: Susan Garay MD Responsible Provider: Berhane Vance MD    Anesthesia Type: general ASA Status: 3          Anesthesia Type: general    Chris Phase I: Chris Score: 10    Chris Phase II: Chris Score: 8    Last vitals: Reviewed and per EMR flowsheets.        Anesthesia Post Evaluation

## 2021-11-09 NOTE — OP NOTE
[facility] [address]                                OPERATIVE REPORT    PATIENT NAME: Tank Kwan                   :        1965  MED REC NO:   [id number]                             ROOM:  ACCOUNT NO:   [de-identified]                           ADMIT DATE: 2021  PROVIDER:     Army Stephens    DATE OF PROCEDURE:  2021    PREOPERATIVE DIAGNOSIS:  In progress right breast reconstruction. POSTOPERATIVE DIAGNOSIS:  In progress right breast reconstruction. PROCEDURE:  Removal of right breast expander with anterior capsulectomy  and placement of permanent breast implant. SURGEON:  Army Kat MD    ANESTHESIA:  General.    INDICATIONS:  The patient is a 77-year-old female with history right  breast cancer, treated with mastectomy. Reconstruction with expander  now ready for replacement with a permanent implant. The procedure, the  risks, the benefits were discussed with her. All questions were  answered to her satisfaction. Consent was signed. NARRATIVE SUMMARY:  The patient was brought to the operating suite, placed under  general anesthetic in supine position. She was prepped and draped in  the usual sterile fashion. Initial attention was taken to the right  breast and the previously used incision was reopened with a scalpel and  deepened through the full-thickness of the skin and continued till we  had encountered the pectoralis muscle. Here, flaps were developed  superior and inferior to expose an adequate portion of the muscle. The  muscle was then divided along the lines of its fibers with Metzenbaum  scissors and the muscle was elevated up off of the breast implant  capsule utilizing finger and scissor dissection. The capsule was opened  and the expander delivered. The anterior capsule was then excised. Bovie cautery was used for hemostasis throughout.   At this point, a  sizer was placed and the skin was tacked closed. The patient was placed  in the semi-sitting position and the sizer was adjusted until volume was  symmetric with the natural left breast.  This was approximately 550 mL  volume. Nearest implant was a 545 mL implant, so that would be used. At this point, the sizer was removed. The pocket was irrigated out with  sterile saline followed by half-strength Betadine solution. It was then  checked for hemostasis, which was good. 10 mL of 0.25% Marcaine plain  were instilled into the pocket to assist with postoperative pain  control. At this point, fresh gloves were donned to handle the implant. The  implant was brought out. This was a Hello Ince Flipps SRX - 545 smooth  round gel implant. It was kept bathed in sterile IV saline. It was  carefully placed into the pocket, no-touch technique and verified in  proper orientation. At this point, the muscle was closed over the  implant with interrupted figure-of-eight sutures of 3-0 Vicryl. Skin  closed first with a deep layer of 3-0 Vicryl buried sutures followed by  interrupted 3-0 Vicryl deep dermal for the skin closure. Steri-Strips  for dressing. The patient tolerated the procedure well. Blood loss  minimal.  Specimen, breast implant capsule sent for pathology. The  patient was transferred to Recovery in stable condition.         [place]    D: 11/09/2021 10:49:17       T: 11/09/2021 10:52:02     [id number]  Job#: [id number]     Doc#: [id number]    CC:

## 2021-11-10 LAB — SURGICAL PATHOLOGY REPORT: NORMAL

## 2021-11-12 ENCOUNTER — OFFICE VISIT (OUTPATIENT)
Dept: SURGERY | Age: 56
End: 2021-11-12
Payer: MEDICARE

## 2021-11-12 VITALS
WEIGHT: 288 LBS | DIASTOLIC BLOOD PRESSURE: 76 MMHG | HEIGHT: 66 IN | OXYGEN SATURATION: 98 % | BODY MASS INDEX: 46.28 KG/M2 | HEART RATE: 93 BPM | SYSTOLIC BLOOD PRESSURE: 122 MMHG | RESPIRATION RATE: 16 BRPM

## 2021-11-12 DIAGNOSIS — Z98.890 H/O BREAST RECONSTRUCTION: Primary | ICD-10-CM

## 2021-11-12 PROCEDURE — 99214 OFFICE O/P EST MOD 30 MIN: CPT

## 2021-11-12 PROCEDURE — 99024 POSTOP FOLLOW-UP VISIT: CPT | Performed by: PLASTIC SURGERY

## 2021-11-12 NOTE — PROGRESS NOTES
Subjective:      Patient ID: Bessie Canavan is a 64 y.o. female. HPI  Patient presents today for a post-op follow up of right breast expander removal right implant placement which was completed on 11/8/21. Steri-strips intact. Incision is flat, and free of excess redness, swelling, or heat. Area free of drainage, but patient does state that she was worried about the incision bleeding. Patient complaining of minimal pain. Review of Systems    Objective:   Physical Exam  Vitals and nursing note reviewed. Exam conducted with a chaperone present. Constitutional:       Appearance: Normal appearance. HENT:      Head: Normocephalic and atraumatic. Mouth/Throat:      Mouth: Mucous membranes are moist.   Eyes:      Extraocular Movements: Extraocular movements intact. Conjunctiva/sclera: Conjunctivae normal.      Pupils: Pupils are equal, round, and reactive to light. Pulmonary:      Effort: Pulmonary effort is normal.   Chest:      Comments:   Wound healing well. Tiny drainage on dressing. Resolving bruises laterally. Soft, good contour. Skin:     General: Skin is warm and dry. Neurological:      General: No focal deficit present. Mental Status: She is alert and oriented to person, place, and time. Assessment: In progress right breast reconstruction.       Plan:      Recheck in 3-4 weeks        Christi Denis MD

## 2021-12-02 DIAGNOSIS — C50.919 MALIGNANT NEOPLASM OF FEMALE BREAST, UNSPECIFIED ESTROGEN RECEPTOR STATUS, UNSPECIFIED LATERALITY, UNSPECIFIED SITE OF BREAST (HCC): ICD-10-CM

## 2021-12-03 RX ORDER — FOLIC ACID 1 MG/1
TABLET ORAL
Qty: 30 TABLET | Refills: 3 | Status: SHIPPED | OUTPATIENT
Start: 2021-12-03 | End: 2022-03-29

## 2021-12-10 ENCOUNTER — OFFICE VISIT (OUTPATIENT)
Dept: SURGERY | Age: 56
End: 2021-12-10
Payer: MEDICARE

## 2021-12-10 VITALS
RESPIRATION RATE: 16 BRPM | HEART RATE: 80 BPM | DIASTOLIC BLOOD PRESSURE: 84 MMHG | WEIGHT: 284 LBS | OXYGEN SATURATION: 98 % | SYSTOLIC BLOOD PRESSURE: 122 MMHG | BODY MASS INDEX: 45.64 KG/M2 | HEIGHT: 66 IN

## 2021-12-10 DIAGNOSIS — Z98.890 H/O BREAST RECONSTRUCTION: Primary | ICD-10-CM

## 2021-12-10 PROCEDURE — 99213 OFFICE O/P EST LOW 20 MIN: CPT | Performed by: PLASTIC SURGERY

## 2021-12-10 PROCEDURE — 99024 POSTOP FOLLOW-UP VISIT: CPT | Performed by: PLASTIC SURGERY

## 2021-12-10 NOTE — PROGRESS NOTES
Subjective:      Patient ID: Kevin Chen is a 64 y.o. female. HPI  Patient returns today for 4 week follow up for breast reconstruction. Patient denies pain or numbness today, however she did have some discomfort about 2 weeks ago. At that time, she had been feeling a lot better and was assisting a friend move. She had been getting in and out of her truck and this was apparently too much activity for her. She had a large amount of drainage and was very concerned. At that time she called the [place] office for direction. The drainage has since decreased. No drainage today. Objective:   Physical Exam  Vitals and nursing note reviewed. Exam conducted with a chaperone present. Constitutional:       Appearance: Normal appearance. HENT:      Head: Normocephalic and atraumatic. Mouth/Throat:      Mouth: Mucous membranes are moist.   Eyes:      Extraocular Movements: Extraocular movements intact. Conjunctiva/sclera: Conjunctivae normal.      Pupils: Pupils are equal, round, and reactive to light. Pulmonary:      Effort: Pulmonary effort is normal.   Chest:      Comments:   Small wound medially, about 0.5cm. vicryl exposed in wound, removed. No palpable fluid collection. No erythema or evidence of infection. Implant soft, Baker 1. Skin:     General: Skin is warm and dry. Neurological:      General: No focal deficit present. Mental Status: She is alert and oriented to person, place, and time. Assessment:      Right breast reconstruction. Plan:      Recheck in 1 month.           Jesica Pineda MD

## 2021-12-10 NOTE — PROGRESS NOTES
Patient returns today for 4 week follow up for breast reconstruction. Patient denies pain or numbness today, however she did have some discomfort about 2 weeks ago. At that time, she had been feeling a lot better and was assisting a friend move. She had been getting in and out of her truck and this was apparently too much activity for her. She had a large amount of drainage and was very concerned. At that time she called the [facility] office for direction. The drainage has since decreased.

## 2021-12-29 DIAGNOSIS — C50.919 MALIGNANT NEOPLASM OF FEMALE BREAST, UNSPECIFIED ESTROGEN RECEPTOR STATUS, UNSPECIFIED LATERALITY, UNSPECIFIED SITE OF BREAST (HCC): ICD-10-CM

## 2021-12-30 RX ORDER — LETROZOLE 2.5 MG/1
TABLET, FILM COATED ORAL
Qty: 30 TABLET | Refills: 5 | Status: SHIPPED | OUTPATIENT
Start: 2021-12-30 | End: 2022-06-14

## 2022-01-11 ENCOUNTER — HOSPITAL ENCOUNTER (OUTPATIENT)
Dept: MAMMOGRAPHY | Age: 57
Discharge: HOME | End: 2022-01-13
Payer: MEDICARE

## 2022-01-11 DIAGNOSIS — Z12.31 SCREENING MAMMOGRAM, ENCOUNTER FOR: ICD-10-CM

## 2022-01-11 PROCEDURE — 77063 BREAST TOMOSYNTHESIS BI: CPT

## 2022-01-14 ENCOUNTER — OFFICE VISIT (OUTPATIENT)
Dept: SURGERY | Age: 57
End: 2022-01-14
Payer: MEDICARE

## 2022-01-14 VITALS
RESPIRATION RATE: 16 BRPM | HEART RATE: 80 BPM | HEIGHT: 66 IN | DIASTOLIC BLOOD PRESSURE: 64 MMHG | SYSTOLIC BLOOD PRESSURE: 114 MMHG | BODY MASS INDEX: 46.28 KG/M2 | WEIGHT: 288 LBS | OXYGEN SATURATION: 98 %

## 2022-01-14 DIAGNOSIS — Z90.11 H/O RIGHT MASTECTOMY: ICD-10-CM

## 2022-01-14 DIAGNOSIS — Z98.890 H/O BREAST RECONSTRUCTION: Primary | ICD-10-CM

## 2022-01-14 PROCEDURE — 99024 POSTOP FOLLOW-UP VISIT: CPT | Performed by: PLASTIC SURGERY

## 2022-01-14 PROCEDURE — 99211 OFF/OP EST MAY X REQ PHY/QHP: CPT | Performed by: PLASTIC SURGERY

## 2022-01-14 NOTE — PROGRESS NOTES
Patient returns today for a 1 mo follow up for drainage of right breast post reconstruction. Patient denies pain or numbness today.

## 2022-01-14 NOTE — PROGRESS NOTES
Subjective:      Patient ID: Erna Vasquez is a 64 y.o. female. HPI  Patient returns today for a 1 mo follow up for drainage of right breast post reconstruction. Patient denies pain or numbness today    Review of Systems    Objective:   Physical Exam  Vitals and nursing note reviewed. Exam conducted with a chaperone present. Constitutional:       Appearance: Normal appearance. HENT:      Head: Normocephalic and atraumatic. Mouth/Throat:      Mouth: Mucous membranes are moist.   Eyes:      Extraocular Movements: Extraocular movements intact. Conjunctiva/sclera: Conjunctivae normal.      Pupils: Pupils are equal, round, and reactive to light. Pulmonary:      Effort: Pulmonary effort is normal.   Chest:      Comments:   Right reconstruction healing well. Skin scar soft. No masses. Right implant is settling. Implant feels slight firm, Huerta 1.5. patient has been afraid to push too hard. Skin:     General: Skin is warm and dry. Neurological:      General: No focal deficit present. Mental Status: She is alert and oriented to person, place, and time. Assessment:      Right breast reconstruction. Plan:      I discussed massaging the imp[lant more vigorously to keep soft. She is 2 months out now so she has no restrictions on activity. Recheck in 3 months.           Sridevi Kaye MD

## 2022-01-17 DIAGNOSIS — C50.919 MALIGNANT NEOPLASM OF FEMALE BREAST, UNSPECIFIED ESTROGEN RECEPTOR STATUS, UNSPECIFIED LATERALITY, UNSPECIFIED SITE OF BREAST (HCC): ICD-10-CM

## 2022-01-19 RX ORDER — LANOLIN ALCOHOL/MO/W.PET/CERES
CREAM (GRAM) TOPICAL
Qty: 30 TABLET | Refills: 3 | Status: SHIPPED | OUTPATIENT
Start: 2022-01-19 | End: 2022-05-04

## 2022-01-20 ENCOUNTER — OFFICE VISIT (OUTPATIENT)
Dept: NEUROLOGY | Age: 57
End: 2022-01-20
Payer: MEDICARE

## 2022-01-20 VITALS
HEIGHT: 66 IN | SYSTOLIC BLOOD PRESSURE: 130 MMHG | DIASTOLIC BLOOD PRESSURE: 82 MMHG | WEIGHT: 287 LBS | BODY MASS INDEX: 46.12 KG/M2 | RESPIRATION RATE: 16 BRPM | OXYGEN SATURATION: 99 % | HEART RATE: 85 BPM

## 2022-01-20 DIAGNOSIS — G47.33 OSA ON CPAP: ICD-10-CM

## 2022-01-20 DIAGNOSIS — F32.A ANXIETY AND DEPRESSION: ICD-10-CM

## 2022-01-20 DIAGNOSIS — Z99.89 OSA ON CPAP: ICD-10-CM

## 2022-01-20 DIAGNOSIS — G47.9 SLEEP DIFFICULTIES: ICD-10-CM

## 2022-01-20 DIAGNOSIS — F41.9 ANXIETY AND DEPRESSION: ICD-10-CM

## 2022-01-20 DIAGNOSIS — Z98.890 H/O BREAST RECONSTRUCTION: ICD-10-CM

## 2022-01-20 DIAGNOSIS — F31.9 BIPOLAR 1 DISORDER (HCC): ICD-10-CM

## 2022-01-20 DIAGNOSIS — I82.A21 CHRONIC DEEP VEIN THROMBOSIS (DVT) OF AXILLARY VEIN OF RIGHT UPPER EXTREMITY (HCC): ICD-10-CM

## 2022-01-20 DIAGNOSIS — G51.39 HEMIFACIAL SPASM, UNSPECIFIED LATERALITY: ICD-10-CM

## 2022-01-20 DIAGNOSIS — G24.5 BLEPHAROSPASM: ICD-10-CM

## 2022-01-20 DIAGNOSIS — E66.01 MORBID OBESITY WITH BMI OF 40.0-44.9, ADULT (HCC): ICD-10-CM

## 2022-01-20 DIAGNOSIS — G43.009 MIGRAINE WITHOUT AURA AND WITHOUT STATUS MIGRAINOSUS, NOT INTRACTABLE: Primary | ICD-10-CM

## 2022-01-20 DIAGNOSIS — Z90.11 H/O RIGHT MASTECTOMY: ICD-10-CM

## 2022-01-20 DIAGNOSIS — R25.3 MUSCLE TWITCHING: ICD-10-CM

## 2022-01-20 DIAGNOSIS — R41.3 MEMORY PROBLEM: ICD-10-CM

## 2022-01-20 PROCEDURE — 99214 OFFICE O/P EST MOD 30 MIN: CPT | Performed by: PSYCHIATRY & NEUROLOGY

## 2022-01-20 PROCEDURE — G8417 CALC BMI ABV UP PARAM F/U: HCPCS | Performed by: PSYCHIATRY & NEUROLOGY

## 2022-01-20 PROCEDURE — 3017F COLORECTAL CA SCREEN DOC REV: CPT | Performed by: PSYCHIATRY & NEUROLOGY

## 2022-01-20 PROCEDURE — G8482 FLU IMMUNIZE ORDER/ADMIN: HCPCS | Performed by: PSYCHIATRY & NEUROLOGY

## 2022-01-20 PROCEDURE — 1036F TOBACCO NON-USER: CPT | Performed by: PSYCHIATRY & NEUROLOGY

## 2022-01-20 PROCEDURE — G8427 DOCREV CUR MEDS BY ELIG CLIN: HCPCS | Performed by: PSYCHIATRY & NEUROLOGY

## 2022-01-20 RX ORDER — TOPIRAMATE 50 MG/1
TABLET, FILM COATED ORAL
Qty: 60 TABLET | Refills: 5 | Status: SHIPPED | OUTPATIENT
Start: 2022-01-20 | End: 2022-05-26 | Stop reason: SDUPTHER

## 2022-01-20 RX ORDER — QUETIAPINE FUMARATE 50 MG/1
TABLET, FILM COATED ORAL
COMMUNITY
Start: 2022-01-13

## 2022-01-20 RX ORDER — BUTALBITAL, ACETAMINOPHEN AND CAFFEINE 50; 325; 40 MG/1; MG/1; MG/1
TABLET ORAL
Qty: 60 TABLET | Refills: 2 | Status: SHIPPED | OUTPATIENT
Start: 2022-01-20 | End: 2022-05-26 | Stop reason: SDUPTHER

## 2022-01-20 RX ORDER — SUMATRIPTAN 100 MG/1
100 TABLET, FILM COATED ORAL
Qty: 12 TABLET | Refills: 2 | Status: SHIPPED | OUTPATIENT
Start: 2022-01-20 | End: 2022-03-21

## 2022-01-20 ASSESSMENT — ENCOUNTER SYMPTOMS
EYE REDNESS: 0
VOICE CHANGE: 0
WHEEZING: 0
EYE DISCHARGE: 0
CONSTIPATION: 0
SHORTNESS OF BREATH: 0
NAUSEA: 1
PHOTOPHOBIA: 1
SINUS PRESSURE: 0
CHEST TIGHTNESS: 0
RHINORRHEA: 0
TROUBLE SWALLOWING: 0
EYE ITCHING: 0
ABDOMINAL PAIN: 0
SCALP TENDERNESS: 0
BACK PAIN: 0
SWOLLEN GLANDS: 0
BLOOD IN STOOL: 0
SORE THROAT: 0
COLOR CHANGE: 0
APNEA: 0
FACIAL SWELLING: 0
DIARRHEA: 0
BOWEL INCONTINENCE: 0
VISUAL CHANGE: 0
COUGH: 0
EYE PAIN: 0
CHOKING: 0
VOMITING: 1
ABDOMINAL DISTENTION: 0
EYE WATERING: 1
FACIAL SWEATING: 0
BLURRED VISION: 0

## 2022-01-20 NOTE — PROGRESS NOTES
[facility]  Neurology  [address] [place] [address]  Phone: [phone]   Fax: [phone]        SUBJECTIVE:     PATIENT ID:  Ann Jin is a  RIGHT  HANDED 64 y.o. female. Migraine   This is a chronic problem. Episode onset: FOR  MORE  THAN   4  YEARS. The problem occurs intermittently. The problem has been gradually worsening. The pain is located in the bilateral and vertex region. The pain does not radiate. The pain quality is similar to prior headaches. The quality of the pain is described as aching, dull and throbbing. The pain is at a severity of 3/10. The pain is mild. Associated symptoms include eye watering, insomnia, nausea, phonophobia, photophobia and vomiting. Pertinent negatives include no abdominal pain, abnormal behavior, anorexia, back pain, blurred vision, coughing, dizziness, drainage, ear pain, eye pain, eye redness, facial sweating, fever, hearing loss, loss of balance, muscle aches, neck pain, numbness, rhinorrhea, scalp tenderness, seizures, sinus pressure, sore throat, swollen glands, tingling, tinnitus, visual change, weakness or weight loss. The symptoms are aggravated by unknown. She has tried acetaminophen and Excedrin for the symptoms. The treatment provided no relief. Her past medical history is significant for migraine headaches and obesity. There is no history of cancer, cluster headaches, hypertension, immunosuppression, migraines in the family, pseudotumor cerebri, recent head traumas, sinus disease or TMJ. Neurologic Problem  The patient's primary symptoms include memory loss. The patient's pertinent negatives include no altered mental status, clumsiness, focal sensory loss, focal weakness, loss of balance, near-syncope, slurred speech, syncope, visual change or weakness. Primary symptoms comment: LEFT  EYE  TWITCHING  AND  SPAMS. This is a chronic problem. Episode onset: SINCE  APRIL 2017.  The neurological problem developed insidiously. The problem has been waxing and waning since onset. There was facial and left-sided focality noted. Associated symptoms include headaches, nausea and vomiting. Pertinent negatives include no abdominal pain, auditory change, aura, back pain, bladder incontinence, bowel incontinence, chest pain, confusion, diaphoresis, dizziness, fatigue, fever, light-headedness, neck pain, palpitations, shortness of breath or vertigo. Treatments tried: Brenden Cruz. The treatment provided moderate relief. There is no history of a bleeding disorder, a clotting disorder, a CVA, dementia, head trauma, liver disease, mood changes or seizures. History obtained from  The patient      and other  available medical records were  Also  reviewed. The  Duration,  Quality,  Severity,  Location,  Timing,  Context,  Modifying  Factors   Of   The   Chief   Complaint       And  Present  Illness   Was   Reviewed   In   Chronological   [facility]. PATIENT'S  MAIN  CONCERNS INCLUDE :                       1)        H/O     CHRONIC      LEFT  EYE  TWITCHING                                           SINCE      April 2017     INTERMITTENTLY                                  H /  O    BLEPHAROSPASM    LEFT  EYE.                                         -     STABLE                               2)     H/O    BLEPHAROSPASM    LEFT  EYE. GETS   WORSE                                   DUE   TO     ANXIETY                               3)      PREVIOUS    H/O    BREAST    CANCER  RIGHT                                H/O   BREAST  CANCER   SURGERY  IN  NOV. 2017                               4)       HAD    CHEMO     IN   THE  PAST                                      ALSO  ON   ARIMIDEX   DAILY                             BEING  FOLLOWED  BY   HEMATOLOGY /  ONCOLOGY                               5)    H/O   CHRONIC   MIGRAINES       FOR    4  YEARS -    STABLE                                                            -    ON   FIORICET    AS  NEEDED                                   6)   H/O   CHRONIC  ANXIETY,   DEPRESSION,  BIPOLAR  DISORDER                                 -   ON    LAMICTAL,  SEROQUEL   TRAZODONE, TRILEPTAL                                                    -    STABLE                                -       BEING  FOLLOWED  BY  MENTAL  HEALTH PROFESSIONALS                                 7)    H/O   CHRONIC   SLEEP  DIFFICULTIES                                            -   BETTER                                                           8)   H/O   CHRONIC    MEMORY PROBLEMS                                           SINCE    OCTOBER 2017                                            -   STABLE                                  9)   OBESITY     WITH   EXCESSIVE    DAY   TIME  SLEEPINESS                                     H/O   OSAS     -   ON  CPAP                                                            10)   MULTIPLE  CO  MORBID  MEDICAL  CONDITIONS                                    BEING  FOLLOWED BY  HER  PCP                              11)   MRI  BRAIN  IN  FEB. 2018   SHOWED                                      NO ACUTE INTRA  CRANIAL PATHOLOGY                                  12)    HAD  EYE   EVALUATIONS  IN  MAY  2018                                  AND  DIAGNOSED  WITH LEFT  EYE BLEPHAROSPASM                                13)    PREVIOUS   H/O    LEFT  EYE  TWITCHING  AND  SPASM   ARE   CAUSING                                DIFFICULTY  WITH  READING,  WATCHING  TV   AND  DRIVING                                           -     IMPROVED      SIGNIFICANTLY                                                   14)      PATIENT   WAS    ON  KLONOPIN       FOR  LEFT  BLEPHAROSPASM                                    SINCE      June 2018                                  PATIENT     STOPPED     [address] 2021                                                       PATIENT  NOT  INTERESTED  IN  INJECTION  BOTOX                                                    15)      MRI BRAIN   WITH  AND  W/O   CONTRAST     IN  OCT. 2019                                   AND  LABS  SHOWED  NO  SIGNIFICANT  ABNORMALITIES                                                                                  16)         PATIENT     STARTED  ON    TOPAMAX     FOR  MIGRAINE PROPHYLAXIS                                                        IN  OCT. 2019       AND  PATIENT  TOLERATING  THE  SAME. 17)        MIGRAINES  ARE   BETTER  CONTROLLED                               ON  TOPAMAX,   FIORICET,   IMITREX      AS  NEEDED. AND  PATIENT     FEELS  LOT  BETTER. PATIENT  DENIES  ANY      RENAL  STONES. 18)    LEFT   FACIAL  AND  BLEPHAROSPASM     BETTER  CONTROLLED. 23)        H/O      HOSPITALIZATION     FOR   PULMONARY  EMBOLISM   IN   April 2020                                               -   ON       ELIQUIS                                        PATIENT  TO  FOLLOW  WITH   HER  PCP  AND  OTHER  CONSULTANTS                        20)        PATIENT  DENIES  ANY  NEW  NEUROLOGICAL  CONCERNS. VARIOUS  RISK   FACTORS   WERE  REVIEWED   AND   DISCUSSED. 21)        PATIENT   HAS  MULTIPLE   MEDICAL, MENTAL HEALTH                        NEUROLOGICAL   PROBLEMS .                          PATIENT  MANAGEMENT  IS  CHALLENGING                                                                                      PRECIPITATING  FACTORS: including  fever/infection, exertion/relaxation, position change, stress,     weather change, medications/alcohol, time of day/darkness/light  Are    Absent MODIFYING  FACTORS:  fever/infection, exertion/relaxation, position change, stress, weather change,     medications/alcohol, time of day/darkness/light    Are  absent             Patient   Indicates   The  Presence   And  The  Absence  Of  The  Following  Associated  And       Additional  Neurological    Symptoms:                                Balance  And coordination problems  absent           Gait problems     absent            Headaches      absent              Migraines           present           Memory problems        Present             Confusion        absent            Paresthesia numbness          absent           Seizures  And  Starring  Episodes           absent           Syncope,  Near  syncopal episodes         absent           Speech problems           absent             Swallowing  Problems      absent            Dizziness,  Light headedness           absent                        Vertigo        absent             Generalized   Weakness    absent              focal  Weakness     absent             Tremors         absent              Sleep  Problems     absent             History  Of   Recent   Head  Injury     absent             History  Of   Recent  TIA     absent             History  Of   Recent    Stroke     absent             Neck  Pain and  Neck muscle  Spasms  Absent               Radiating  down   And   Weakness           absent            Lower back   Pain  And     Spasms  Absent              Radiating    Down   And   Weakness          absent                H/O   FALLS        absent               History  Of   Visual  Symptoms    Present                    Associated   Diplopia       absent                                      Also   Additional   Symptoms   Present    As  Documented    In   The detailed      Review  Of  Systems   And    Please   Refer   To    Them for   Additional  Information.                   Any components  That are either  Unobtainable  Or  Limited  In   HPI, ROS And/or PFSH   Are       Due   To   Patient's  Medical  Problems,  Clinical  Condition and/or lack of other  Alternate resources.               RECORDS   REVIEWED:    historical medical records         INFORMATION   REVIEWED:     MEDICAL   HISTORY,     SURGICAL   HISTORY,   MEDICATIONS   LIST,   ALLERGIES AND  DRUG  INTOLERANCES,     FAMILY   HISTORY,  SOCIAL  HISTORY,    PROBLEM  LIST   FOR  PATIENT  CARE   COORDINATION    Past Medical History:   Diagnosis Date    Asthma     seasonal    [facility]     Breast cancer ([facility] 2017    right side     DVT of axillary vein, acute right ([facility]     Eye twitch 2019    Headache(784.0)     History of blood transfusion     Hx of blood clots 03/2020    PE    Hyperlipidemia     Migraine     Obesity     PONV (postoperative nausea and vomiting)     Prolonged emergence from general anesthesia     Sleep apnea     uses CPAP         Past Surgical History:   Procedure Laterality Date    BREAST ENHANCEMENT SURGERY Right 3/10/2021    BREAST RECONSTRUCTION WITH TISSUE EXPANDER INSERTION performed by Ben Cam MD at [facility] Right 11/08/2021    RIGHT BREAST EXPANDER REMOVAL RIGHT IMPLANT PLACEMENT (Right Breast)    BREAST SURGERY      rt mastectomy    BREAST SURGERY Right 03/10/2021    BREAST RECONSTRUCTION WITH TISSUE EXPANDER INSERTION (    BREAST SURGERY Right 3/10/2021    EXCISION SEROMA RIGHT BREAST performed by Ben Cam MD at [facility] Right 11/8/2021    RIGHT BREAST EXPANDER REMOVAL RIGHT IMPLANT PLACEMENT performed by [facility] Left 6/6/2019    PORT REMOVAL performed by Clarice Villaseñor DO at [address] N/A 10/13/2017    D & C HYSTEROSCOPY with polypectomy performed by Marsha Watkins MD at [facility] / REMOVAL / [address] Left 11/9/2017    PORT INSERTION performed by Ca Shane MD at [address] / REMOVAL / [address] N/A 11/30/2017    Port Removal & Insertion performed by Ca Shane MD at [address] Right 10/19/2017     [facility]    MASTECTOMY Right 10/19/2017    Right Breast Mastectomy with  [place] Node Biopsy performed by Ca Shane MD at [address] CTR VAD W/SUBQ PORT AGE 5 YR/> Left 3/1/2018    PORT Exchange performed by Ca Shane MD at [address] Left 2014, 2015    x 2 surgeries total; Dr. Ella Jackson TUNNELED VENOUS PORT PLACEMENT Left 11/30/2017    removal of et replacement of         Current Outpatient Medications   Medication Sig Dispense Refill    QUEtiapine (SEROQUEL) 50 MG tablet take 1 tablet by mouth at bedtime      vitamin B-12 (CYANOCOBALAMIN) 1000 MCG tablet take 1 tablet by mouth once daily 30 tablet 3    Calcium Carb-Cholecalciferol (OYSTER SHELL CALCIUM W/D) 500-200 MG-UNIT TABS tablet take 1 tablet by mouth twice a day 60 tablet 2    letrozole (FEMARA) 2.5 MG tablet take 1 tablet by mouth once daily 30 tablet 5    folic acid (FOLVITE) 1 MG tablet take 1 tablet by mouth once daily 30 tablet 3    pravastatin (PRAVACHOL) 40 MG tablet take 1 tablet by mouth once daily 90 tablet 1    albuterol sulfate  (90 Base) MCG/ACT inhaler inhale 2 puffs by mouth INTO THE LUNGS every 4 hours if needed for wheezing 18 g 5    allopurinol (ZYLOPRIM) 100 MG tablet Take 1 tablet by mouth daily 90 tablet 1    olopatadine (PATANOL) 0.1 % ophthalmic solution instill 1 drop into both eyes twice a day 5 mL 3    loratadine (CLARITIN) 10 MG tablet Take 1 tablet by mouth daily 90 tablet 3    ELIQUIS 5 MG TABS tablet take 1 tablet by mouth twice a day 180 tablet 1    topiramate (TOPAMAX) 50 MG tablet ONE  TABLET  TWICE  DAILY 60 tablet 5    butalbital-acetaminophen-caffeine (FIORICET, ESGIC) -40 MG per tablet 1-2   TABLET TWICE  DAILY  AS  NEEDED 60 tablet 2    tiZANidine (ZANAFLEX) 4 MG tablet Take 1 tablet by mouth every 8 hours as needed (muscle pain) 20 tablet 0    busPIRone (BUSPAR) 15 MG tablet Take 15 mg by mouth 3 times daily       levocetirizine (XYZAL) 5 MG tablet Take 1 tablet by mouth nightly 30 tablet 5    fluticasone (FLONASE) 50 MCG/ACT nasal spray 1 spray by Nasal route daily Indications: as needed 3 Bottle 3    lamoTRIgine (LAMICTAL) 25 MG tablet Take 50 mg by mouth daily At bedtime      OXcarbazepine (TRILEPTAL) 150 MG tablet take 1 tablet by mouth every morning BEFORE NOON      benztropine (COGENTIN) 0.5 MG tablet Take 0.5 mg by mouth daily      QUEtiapine (SEROQUEL) 100 MG tablet Take 100 mg by mouth nightly       lamoTRIgine (LAMICTAL) 100 MG tablet 150 mg nightly       SUMAtriptan (IMITREX) 100 MG tablet Take 1 tablet by mouth once as needed for Migraine (Patient not taking: Reported on 1/20/2022) 12 tablet 5     No current facility-administered medications for this visit. Allergies   Allergen Reactions    Prednisone Swelling     Whole side of her face swelled when she took it, difficulty breathing         Family History   Problem Relation Age of Onset    Breast Cancer Sister 54    Breast Cancer Maternal Aunt 71    Other Maternal Cousin         Atypical Ductal Hyperplasia     Uterine Cancer Sister 32    Other Sister         breast cyst    Cataracts Mother     Glaucoma Mother     Heart Disease Father     High Blood Pressure Father     High Cholesterol Father     Mental Retardation Father     Diabetes Neg Hx          Social History     Socioeconomic History    Marital status: Single     Spouse name: Not on file    Number of children: Not on file    Years of education: Not on file    Highest education level: Not on file   Occupational History    Not on file   Tobacco Use    Smoking status: Never Smoker    Smokeless tobacco: Never Used    Tobacco comment: Never smoker.  TC, RRT 4/5/18   Vaping Use    Vaping Use: Never used   Substance and Sexual Activity    Alcohol use: Yes     Comment: Rarely    Drug use: No    Sexual activity: Not Currently     Partners: Male     Birth control/protection: Surgical   Other Topics Concern    Not on file   Social History Narrative    Not on file     Social Determinants of Health     Financial Resource Strain: Unknown    Difficulty of Paying Living Expenses: Patient refused   Food Insecurity: Unknown    Worried About Running Out of Food in the Last Year: Patient refused    Ran Out of Food in the Last Year: Patient refused   Transportation Needs:     Lack of Transportation (Medical): Not on file    Lack of Transportation (Non-Medical):  Not on file   Physical Activity:     Days of Exercise per Week: Not on file    Minutes of Exercise per Session: Not on file   Stress:     Feeling of Stress : Not on file   Social Connections:     Frequency of Communication with Friends and Family: Not on file    Frequency of Social Gatherings with Friends and Family: Not on file    Attends Synagogue Services: Not on file    Active Member of [address] [place] [address] or Organizations: Not on file    Attends Club or Organization Meetings: Not on file    Marital Status: Not on file   Intimate Partner Violence:     Fear of Current or Ex-Partner: Not on file    Emotionally Abused: Not on file    Physically Abused: Not on file    Sexually Abused: Not on file   Housing Stability:     Unable to Pay for Housing in the Last Year: Not on file    Number of [place] in the Last Year: Not on file    Unstable Housing in the Last Year: Not on file       Vitals:    01/20/22 1426   BP: 130/82   Pulse: 85   Resp: 16   SpO2: 99%         Wt Readings from Last 3 Encounters:   01/20/22 287 lb (130.2 kg)   01/14/22 288 lb (130.6 kg)   12/10/21 284 lb (128.8 kg)         BP Readings from Last 3 Encounters:   01/20/22 130/82   01/14/22 114/64   12/10/21 122/84       Hematology and Coagulation  Lab Results   Component Value Date    WBC 8.4 08/17/2021    RBC 4.04 08/17/2021    HGB 12.8 08/17/2021    HCT 39.1 08/17/2021    MCV 96.8 08/17/2021    MCH 31.7 08/17/2021    MCHC 32.7 08/17/2021    RDW 13.0 08/17/2021     08/17/2021    MPV 10.1 08/17/2021       Chemistries  Lab Results   Component Value Date     08/17/2021    K 4.2 08/17/2021     08/17/2021    CO2 22 08/17/2021    BUN 18 08/17/2021    CREATININE 1.17 08/17/2021    CALCIUM 10.2 08/17/2021    PROT 7.9 08/17/2021    LABALBU 4.3 08/17/2021    BILITOT 0.22 08/17/2021    ALKPHOS 106 08/17/2021    AST 16 08/17/2021    ALT 11 08/17/2021     Lab Results   Component Value Date    ALKPHOS 106 08/17/2021    ALT 11 08/17/2021    AST 16 08/17/2021    PROT 7.9 08/17/2021    BILITOT 0.22 08/17/2021    BILIDIR 0.12 04/09/2020    LABALBU 4.3 08/17/2021     Lab Results   Component Value Date    BUN 18 08/17/2021    CREATININE 1.17 08/17/2021     Lab Results   Component Value Date    CALCIUM 10.2 08/17/2021    MG 1.8 04/10/2020     Lab Results   Component Value Date    AST 16 08/17/2021    ALT 11 08/17/2021       Lab Results   Component Value Date    CKTOTAL 27 04/09/2020     Lab Results   Component Value Date    [id number] 384 06/04/2018             Review of Systems   Constitutional: Negative for appetite change, chills, diaphoresis, fatigue, fever, unexpected weight change and weight loss. HENT: Negative for congestion, dental problem, drooling, ear discharge, ear pain, facial swelling, hearing loss, mouth sores, nosebleeds, postnasal drip, rhinorrhea, sinus pressure, sore throat, tinnitus, trouble swallowing and voice change. Eyes: Positive for photophobia. Negative for blurred vision, pain, discharge, redness, itching and visual disturbance. Respiratory: Negative for apnea, cough, choking, chest tightness, shortness of breath and wheezing.     Cardiovascular: Negative for chest pain, palpitations, leg swelling and near-syncope. Gastrointestinal: Positive for nausea and vomiting. Negative for abdominal distention, abdominal pain, anorexia, blood in stool, bowel incontinence, constipation and diarrhea. Endocrine: Negative for cold intolerance, heat intolerance, polydipsia, polyphagia and polyuria. Genitourinary: Negative for bladder incontinence. Musculoskeletal: Negative for arthralgias, back pain, gait problem, joint swelling, myalgias, neck pain and neck stiffness. Skin: Negative for color change, pallor, rash and wound. Allergic/Immunologic: Negative for environmental allergies, food allergies and immunocompromised state. Neurological: Positive for headaches. Negative for dizziness, vertigo, tingling, tremors, focal weakness, seizures, syncope, facial asymmetry, speech difficulty, weakness, light-headedness, numbness and loss of balance. Hematological: Negative for adenopathy. Does not bruise/bleed easily. Psychiatric/Behavioral: Positive for decreased concentration and memory loss. Negative for agitation, behavioral problems, confusion, dysphoric mood, hallucinations, self-injury, sleep disturbance and suicidal ideas. The patient is nervous/anxious and has insomnia. The patient is not hyperactive. OBJECTIVE:    Physical Exam  Constitutional:       Appearance: She is well-developed. HENT:      Head: Normocephalic and atraumatic. No raccoon eyes or Varma's sign. Right Ear: External ear normal.      Left Ear: External ear normal.      Nose: Nose normal.   Eyes:      Conjunctiva/sclera: Conjunctivae normal.   Neck:      Thyroid: No thyroid mass or thyromegaly. Vascular: No carotid bruit. Trachea: No tracheal deviation. Meningeal: Brudzinski's sign and Kernig's sign absent. Cardiovascular:      Rate and Rhythm: Normal rate and regular rhythm. Pulmonary:      Effort: Pulmonary effort is normal.   Musculoskeletal:         General: No tenderness.       Cervical back: Normal range of motion and neck supple. No rigidity. No muscular tenderness. Normal range of motion. Skin:     General: Skin is warm. Coloration: Skin is not pale. Findings: No erythema or rash. Nails: There is no clubbing. Psychiatric:         Attention and Perception: She is attentive. Mood and Affect: Mood is anxious and depressed. Affect is not labile, blunt, angry or inappropriate. Behavior: Behavior is not agitated, slowed, aggressive, withdrawn, hyperactive or combative. Behavior is cooperative. Thought Content: Thought content is not paranoid or delusional. Thought content does not include homicidal or suicidal ideation. Thought content does not include homicidal or suicidal plan. Cognition and Memory: Memory is impaired. She does not exhibit impaired recent memory or impaired remote memory. Judgment: Judgment is not impulsive or inappropriate. Neurologic Exam    NEUROLOGICAL EXAMINATION :       A) MENTAL STATUS:                   Alert and  oriented  To time, place  And  Person. No Aphasia. No  Dysarthria. Able   To  Follow  commands without   Any  Difficulty. No right  To left confusion. Normal  Speech  And language function. Insight and  Judgment ,Fund  Of  Knowledge   within normal limits                Recent  And  Remote memory  within normal limits                Attention &Concentration are within normal limits                                                   B) CRANIAL NERVES :             2 CN : Visual  Acuity  And  Visual fields  within normal limits                        Fundi  Could  Not  Be  Could  Not  Be  Evaluated. 3,4,6 CN : Both  Pupils are   Reactive and  Equal.                            Extraocular   Movements  Are  Intact. No  Nystagmus. No  YUMIKO. No  Afferent  Pupillary  Defect noted.           5 CN :  Normal  Facial sensations and Corneal  Reflexes           7 CN :  Normal  Facial  Symmetry  And  Strength. No facial  Weakness. 8 CN :  Hearing  Appears within normal limits          9, 10 CN: Normal spontaneous, reflex palate movements         11 CN:   Normal  Shoulder shrug and  Strength         12 CN :   Normal  Tongue movements and  Tongue  In midline                        No tongue   Fasciculations or atrophy         C) MOTOR  EXAM:                 Strength  In upper  And  Lower extremities   within normal limits               No  Drift. No  Atrophy               Rapid alternating  And  repetitions  Movements  within normal limits               Muscle  Tone  In upper  And  Lower  Extremities  Normal                No rigidity. No  Spasticity. Bradykinesia   Absent                 No  Asterixis. Sustention  Tremor , Resting  Tremor   absent                      LEFT  EYE  LIDS   SHOWS   BLEPHAROSPASM   INTERMITTENTLY                       D) SENSORY :               light touch, pinprick, position  And  Vibration  within normal limits        E) REFLEXES:                   Deep  Tendon  Reflexes normal                    No pathological  Reflexes  Bilaterally.                                     F) COORDINATION  AND  GAIT :                                Station and  Gait  normal                                        Romberg's test negative                          Ataxia negative          ASSESSMENT:      Patient Active Problem List   Diagnosis    Bipolar 1 disorder ([facility]    Hyperlipidemia    Malignant neoplasm of overlapping sites of right breast in female, estrogen receptor negative ([facility]    Port-A-Cath in place    Migraine    Memory problem    Sleep difficulties    Anxiety and depression    Muscle twitching    Hemifacial spasm    Combined forms of age-related cataract of both eyes    Squamous blepharitis of upper and lower eyelids of both eyes    Acute deep vein thrombosis (DVT) of axillary vein of right upper extremity (HCC)    Malignant neoplasm of breast in female, estrogen receptor positive ([address]    Seasonal allergies    H/O right mastectomy    HX: breast cancer    Acute massive pulmonary embolism (HCC)    Moderate to severe pulmonary hypertension ([address]    TAY on CPAP    Morbid obesity with BMI of 40.0-44.9, adult (HCC)    Extrinsic asthma    Blepharospasm    Chronic deep vein thrombosis (DVT) of axillary vein of right upper extremity (HCC)    H/O breast reconstruction          MRI OF THE BRAIN WITHOUT AND WITH CONTRAST  10/2/2019 1:14 pm       TECHNIQUE:   Multiplanar multisequence MRI of the head/brain was performed without and   with the administration of intravenous contrast.       COMPARISON:   MRI brain performed 10/08/2018.       HISTORY:   ORDERING SYSTEM PROVIDED HISTORY: Migraine without aura and without status   migrainosus, not intractable   TECHNOLOGIST PROVIDED HISTORY:   migraines   Is the patient pregnant?->No   Reason for Exam: Migraines for quite a while, becoming worse. Acuity: Chronic   Type of Exam: Unknown   Additional signs and symptoms: Migraine without aura and without status   migrainosus, not intractable       FINDINGS:   INTRACRANIAL STRUCTURES/VENTRICLES:  The sellar and suprasellar structures,   optic chiasm, corpus callosum, pineal gland, tectum, and midline brainstem   structures are unremarkable.  The craniocervical junction is unremarkable. There is no acute intracranial hemorrhage, mass effect, or midline shift. There is satisfactory overall gray-white matter differentiation.  There is no   abnormal postcontrast enhancement.  The ventricular structures are symmetric   and unremarkable.  The infratentorial structures including the   cerebellopontine angles and internal auditory canals are unremarkable. There   is no abnormal restricted diffusion.  There is no abnormal blooming artifact   on susceptibility weighted imaging.       ORBITS: The visualized portion of the orbits demonstrate no acute abnormality.       SINUSES: The visualized paranasal sinuses and mastoid air cells are well   aerated.       BONES/SOFT TISSUES: The bone marrow signal intensity appears normal. The soft   tissues demonstrate no acute abnormality.           Impression   Unremarkable pre and post-contrast MRI of the brain.               MRI OF THE BRAIN WITHOUT AND WITH CONTRAST  2/5/2018 9:18 am       TECHNIQUE:   Multiplanar multisequence MRI of the head/brain was performed without and   with the administration of intravenous contrast.       COMPARISON:   Head CT on 09/25/2013.       HISTORY:   ORDERING SYSTEM PROVIDED HISTORY: Malignant neoplasm of overlapping sites of   right breast in female, estrogen receptor negative ([facility]   TECHNOLOGIST PROVIDED HISTORY:   Ordering Physician Provided Reason for Exam:  Bad headaches for one month. Left eye twitching for two months and it is getting worse. History of breast   cancer. Acuity: Acute   Type of Exam: Initial   Additional signs and symptoms: Malignant neoplasm of overlapping sites of   right breast in female, estrogen receptor negative.       Initial evaluation.       FINDINGS:   INTRACRANIAL STRUCTURES/VENTRICLES: Dariana Glimpse are no areas of restricted   diffusion to suggest an acute infarct.  The cerebral and cerebellar   parenchyma demonstrate normal volume and signal intensity.  There are no   abnormal extra-axial fluid collections.  The ventricles are normal in size.    Normal major intracranial flow voids are noted.       There are no areas of abnormal contrast enhancement in the cerebral or   cerebellar parenchyma to suggest metastatic disease.       There are no areas of blooming artifact noted on the gradient echo sequences   to suggest sequela of acute or chronic hemorrhage.       ORBITS: The visualized portion of the orbits demonstrate no acute abnormality.     SINUSES: There is scattered mucosal thickening in the paranasal sinuses, with   greatest involvement in the ethmoid air cells and maxillary sinuses. Hawa Bloch   is an air-fluid level in the left sphenoid sinus, correlate with signs of   acute sinusitis.       The mastoid air cells are clear.       BONES/SOFT TISSUES: The bone marrow signal intensity appears normal. The soft   tissues demonstrate no acute abnormality.           Impression   1. Unremarkable MRI of the brain.  No evidence of metastatic disease. 2. Acute left sphenoid sinusitis.             VISITING DIAGNOSIS:      ICD-10-CM    1. Migraine without aura and without status migrainosus, not intractable  G43.009    2. Anxiety and depression  F41.9     F32. A    3. Sleep difficulties  G47.9    4. Memory problem  R41.3    5. Muscle twitching  R25.3    6. Blepharospasm  G24.5    7. H/O right mastectomy  Z90.11    8. TAY on CPAP  G47.33     Z99.89    9. Hemifacial spasm, unspecified laterality  G51.39    10. H/O breast reconstruction  Z98.890    11. Morbid obesity with BMI of 40.0-44.9, adult ([facility]  E66.01     Z68.41    12. Chronic deep vein thrombosis (DVT) of axillary vein of right upper extremity ([facility])  I82. A21    13. Bipolar 1 disorder ([facility])  F31.9                      VARIOUS  RISK   FACTORS   WERE  REVIEWED   AND   DISCUSSED. *  PATIENT   HAS  MULTIPLE   MEDICAL, MENTAL HEALTH          NEUROLOGICAL   PROBLEMS . PATIENT  MANAGEMENT  IS  CHALLENGING              PLAN:       Mary Mai  Of  The  Diagnoses,  The  Management & Treatment  Options           AND    Care  plan  Were        Reviewed and   Discussed   With  patient. * Goals  And  Expectations  Of  The  Therapy  Discussed   And  Reviewed. *   Benefits   And   Side  Effect  Profile  Of  Medication/s   Were   Discussed             * Need   For  Further   Follow up For  The  Various  Problems  Were discussed.        * Results  Of  The  Previous  Diagnostic tests were reviewed and questions answered. patient  understand the same. Medical  Decision  Making  Was  Made  Based on the   Complexity  Of  Above  Mentioned  Diagnoses,        Data reviewed   & diagnostic  Tests  Reviewed,  Risk  Of  Significant   Co morbidities and complicating   Factors. Medical  Decision  Was   High  Complexity  Due   To  The  Patient's  Multiple  Symptoms,      Complex  Treatment  Regimen,  Multiple medications and   Risk  Of   Side  Effects,      Difficulty  In  Medication  Management      And  Diagnostic  Challenges   In  View  Of  The  Associated   Co  Morbid  Conditions   And  Problems. *   ADEQUATE   FLUID  INTAKE   AND  ELECTROLYTE  BALANCE         * KEEP  DAIRY  OF   THE  NEUROLOGICAL  SYMPTOMS        RECORDING THE    DURATION  AND  FREQUENCY. -    DISCUSSED  WITH PATIENT              *  AVOID    CONDITIONS  AND  FACTORS   THAT  MAKE   NEUROLOGICAL  SYMPTOMS  WORSE. *  TO  MAINTAIN  REGULAR  SLEEP  WAKE  CYCLES. *   TO  HAVE  ADEQUATE  REST  AND   SLEEP    HOURS.          *    TO   AVOID   TO  SLEEP  IN   SUPINE  POSITION. *      WEIGHT   LOSS. *    AVOID  ANY USAGE OF                   TOBACCO,  EXCESSIVE  ALCOHOL  AND   ILLEGAL   SUBSTANCES            *  CONTINUE MEDICATIONS PRESCRIBED BY NEUROLOGIST AS    RECOMMENDED     *   Compliance   With  Medications   And  Instructions          * CURRENTLY  TOLERATING  THE  PRESCRIBED   MEDICATIONS. WITHOUT  ANY  SIGNIFICANT  SIDE  EFFECTS   &  GETTING BENEFIT.             *    Prophylactic  Use   Of     Vitamin   B   Complex,  Folic  Acid,    Vitamin  B12    Multivitamin,       Calcium  With  magnesium  And  Vit D    Supplementations   Over  The  Counter  Discussed            *  EVALUATIONS  AND  FOLLOW UP:                     * HEMATOLOGY/ONCOLOGY                    *   OPTHALMOLOGY *        CURRENTLY    MIGRAINES  ARE   BETTER  CONTROLLED                               ON  TOPAMAX ,  FIORICET,  IMITREX   AS  NEEDED                              AND  PATIENT     FEELS  LOT  BETTER. PATIENT  DENIES  ANY  NEW  NEUROLOGICAL  CONCERNS. Orders Placed This Encounter   Medications    topiramate (TOPAMAX) 50 MG tablet     Sig: ONE  TABLET  TWICE  DAILY     Dispense:  60 tablet     Refill:  5    butalbital-acetaminophen-caffeine (FIORICET, ESGIC) -40 MG per tablet     Si-2   TABLET  TWICE  DAILY  AS  NEEDED     Dispense:  60 tablet     Refill:  2    SUMAtriptan (IMITREX) 100 MG tablet     Sig: Take 1 tablet by mouth once as needed for Migraine     Dispense:  12 tablet     Refill:  2                   *PATIENT   TO  FOLLOW  UP  WITH   PRIMARY  CARE   AND   OTHER  CONSULTANTS  AS  BEFORE.           *TO  FOLLOW  WITH   MENTAL  HEALTH  PROFESSIONALS ,  INCLUDING            PSYCHOLOGICAL  COUNSELING   AND  PSYCHIATRIC  EVALUTIONS            *  Maintain   Healthy  Life Style    With   Periodic  Monitoring  Of      Any  Medical  Conditions  Including   Elevated  Blood  Pressure,  Lipid  Profile,     Blood  Sugar levels  And   Heart  Disease. *   Period   Screening  For  Cancers  Involving  Breast,  Colon,    lungs  And  Other  Organs  As  Applicable,  In consultation   With  Your  Primary Care Providers. * Second  Neurological  Opinion  And  Evaluations  In  [facility]  Setting  If  Patient  Is  Interested. * Please   Contact   Neurology  Clinic   Early   If   Are  Any  New  Neurological                             Symptoms   And  Any neurological  Concerns.                     *  If  The  Patient remains  Neurologically  Stable   Return   To  [facility] Neurology Department       IN           3-4            MONTHS  TIME   FOR  FURTHER  FOLLOW UP.                 *  If   There is Any  Significant  Worsening   Of  Current  Symptoms  And  Or  If patient  Develops       Any additional  New  Neurological  Symptoms  Or  Significant  Concerns   Should  Call  911 or      Go  To  Emergency  Department  For  Further  Immediate  Evaluation. *   The  Neurological   Findings,  Possible  Diagnosis,  Differential diagnoses   And  Options  For    Further   Investigations       And  management   Are  Discussed  Comprehensively. Medications   And  Prescription   Risks  And  Side effects  Are   Also  Discussed. The  Above  Were  Reviewed  With  patient and     questions  Answered  In  Detail. More   Than   50% of face  To face Time   Was  Spent  On  Counseling   And   Coordination      Of  Care   Of   Patient's multiple   Neurological  Problems   And   Comorbid  Medical   Conditions. Electronically signed by Malachi Cueto MD.,  [facility]       Board Certified in  Neurology &  In  [address] of Psychiatry and Neurology ([employer])      DISCLAIMER:   Although every effort was made to ensure the accuracy of this  electronic transcription, some errors in transcription may have occurred. GENERAL PATIENT INSTRUCTIONS:     A Healthy Lifestyle: Care Instructions  Your Care Instructions  A healthy lifestyle can help you feel good, stay at a healthy weight, and have plenty of energy for both work and play. A healthy lifestyle is something you can share with your whole family. A healthy lifestyle also can lower your risk for serious health problems, such as high blood pressure, heart disease, and diabetes. You can follow a few steps listed below to improve your health and the health of your family. Follow-up care is a key part of your treatment and safety. Be sure to make and go to all appointments, and call your doctor if you are having problems.  Its also a good idea to know your test results and keep a list of the medicines you take. How can you care for yourself at home? Do not eat too much sugar, fat, or fast foods. You can still have dessert and treats now and then. The goal is moderation. Start small to improve your eating habits. Pay attention to portion sizes, drink less juice and soda pop, and eat more fruits and vegetables. Eat a healthy amount of food. A 3-ounce serving of meat, for example, is about the size of a deck of cards. Fill the rest of your plate with vegetables and whole grains. Limit the amount of soda and sports drinks you have every day. Drink more water when you are thirsty. Eat at least 5 servings of fruits and vegetables every day. It may seem like a lot, but it is not hard to reach this goal. A serving or helping is 1 piece of fruit, 1 cup of vegetables, or 2 cups of leafy, raw vegetables. Have an apple or some carrot sticks as an afternoon snack instead of a candy bar. Try to have fruits and/or vegetables at every meal.  Make exercise part of your daily routine. You may want to start with simple activities, such as walking, bicycling, or slow swimming. Try to be active 30 to 60 minutes every day. You do not need to do all 30 to 60 minutes all at once. For example, you can exercise 3 times a day for 10 or 20 minutes. Moderate exercise is safe for most people, but it is always a good idea to talk to your doctor before starting an exercise program.  Keep moving. Lida Hamper the lawn, work in the garden, or [employer]. Take the stairs instead of the elevator at work. If you smoke, quit. People who smoke have an increased risk for heart attack, stroke, cancer, and other lung illnesses. Quitting is hard, but there are ways to boost your chance of quitting tobacco for good. Use nicotine gum, patches, or lozenges. Ask your doctor about stop-smoking programs and medicines. Keep trying.   In addition to reducing your risk of diseases in the future, you will notice some benefits soon after you stop using tobacco. If you have shortness of breath or asthma symptoms, they will likely get better within a few weeks after you quit. Limit how much alcohol you drink. Moderate amounts of alcohol (up to 2 drinks a day for men, 1 drink a day for women) are okay. But drinking too much can lead to liver problems, high blood pressure, and other health problems. Family health  If you have a family, there are many things you can do together to improve your health. Eat meals together as a family as often as possible. Eat healthy foods. This includes fruits, vegetables, lean meats and dairy, and whole grains. Include your family in your fitness plan. Most people think of activities such as jogging or tennis as the way to fitness, but there are many ways you and your family can be more active. Anything that makes you breathe hard and gets your heart pumping is exercise. Here are some tips:  Walk to do errands or to take your child to school or the bus. Go for a family bike ride after dinner instead of watching TV. Where can you learn more? Go to https://[employer]. org and sign in to your [employer] account. Enter H002 in the [employer] box to learn more about \"A Healthy Lifestyle: Care Instructions. \"     If you do not have an account, please click on the \"Sign Up Now\" link. Current as of: July 26, 2016  Content Version: 11.2  © 0457-0464 [employer]. Care instructions adapted under license by [facility] ([facility]). If you have questions about a medical condition or this instruction, always ask your healthcare professional. [place] [address] any warranty or liability for your use of this information.

## 2022-03-01 ENCOUNTER — HOSPITAL ENCOUNTER (EMERGENCY)
Age: 57
Discharge: HOME | End: 2022-03-01
Attending: EMERGENCY MEDICINE
Payer: MEDICARE

## 2022-03-01 VITALS
BODY MASS INDEX: 43.39 KG/M2 | OXYGEN SATURATION: 97 % | SYSTOLIC BLOOD PRESSURE: 142 MMHG | HEART RATE: 80 BPM | RESPIRATION RATE: 18 BRPM | HEIGHT: 66 IN | WEIGHT: 270 LBS | TEMPERATURE: 98.3 F | DIASTOLIC BLOOD PRESSURE: 80 MMHG

## 2022-03-01 DIAGNOSIS — T81.31XA DEHISCENCE OF OPERATIVE WOUND, INITIAL ENCOUNTER: Primary | ICD-10-CM

## 2022-03-01 PROCEDURE — 6370000000 HC RX 637 (ALT 250 FOR IP): Performed by: EMERGENCY MEDICINE

## 2022-03-01 PROCEDURE — 6360000002 HC RX W HCPCS

## 2022-03-01 PROCEDURE — 99285 EMERGENCY DEPT VISIT HI MDM: CPT

## 2022-03-01 PROCEDURE — 90471 IMMUNIZATION ADMIN: CPT

## 2022-03-01 PROCEDURE — 90715 TDAP VACCINE 7 YRS/> IM: CPT

## 2022-03-01 RX ORDER — OXYCODONE HYDROCHLORIDE AND ACETAMINOPHEN 5; 325 MG/1; MG/1
1 TABLET ORAL ONCE
Status: COMPLETED | OUTPATIENT
Start: 2022-03-01 | End: 2022-03-01

## 2022-03-01 RX ADMIN — TETANUS TOXOID, REDUCED DIPHTHERIA TOXOID AND ACELLULAR PERTUSSIS VACCINE, ADSORBED 0.5 ML: 5; 2.5; 8; 8; 2.5 SUSPENSION INTRAMUSCULAR at 07:28

## 2022-03-01 RX ADMIN — OXYCODONE AND ACETAMINOPHEN 1 TABLET: 5; 325 TABLET ORAL at 07:21

## 2022-03-01 ASSESSMENT — PAIN SCALES - GENERAL
PAINLEVEL_OUTOF10: 9
PAINLEVEL_OUTOF10: 10
PAINLEVEL_OUTOF10: 10

## 2022-03-01 ASSESSMENT — ENCOUNTER SYMPTOMS
ALLERGIC/IMMUNOLOGIC NEGATIVE: 1
RESPIRATORY NEGATIVE: 1
EYES NEGATIVE: 1
GASTROINTESTINAL NEGATIVE: 1

## 2022-03-01 ASSESSMENT — PAIN DESCRIPTION - ORIENTATION: ORIENTATION: RIGHT

## 2022-03-01 ASSESSMENT — PAIN DESCRIPTION - DESCRIPTORS: DESCRIPTORS: SHARP

## 2022-03-01 ASSESSMENT — PAIN DESCRIPTION - LOCATION: LOCATION: BREAST

## 2022-03-01 ASSESSMENT — PAIN - FUNCTIONAL ASSESSMENT: PAIN_FUNCTIONAL_ASSESSMENT: 0-10

## 2022-03-01 ASSESSMENT — PAIN DESCRIPTION - PAIN TYPE: TYPE: ACUTE PAIN

## 2022-03-01 NOTE — ED PROVIDER NOTES
eMERGENCY dEPARTMENT eNCOUnter      Pt Name: Bobby Rodriguez  MRN: [id number]  [place] 1965  Date of evaluation: 3/1/2022      CHIEF COMPLAINT       Chief Complaint   Patient presents with    Other     R breast implant fell out          [address] is a 64 y.o. female who presents emergency department with a wound dehiscence and a dehiscence of her right breast implant. Patient states that she was not doing anything she is just lying in bed at home that she has been having some issues with this anyway and that suddenly it just came out she found out on the bed beside her. Implant was put in about 6 months ago in [place] [facility]. REVIEW OF SYSTEMS       Review of Systems   Constitutional: Negative. HENT: Negative. Eyes: Negative. Respiratory: Negative. Cardiovascular: Negative. Gastrointestinal: Negative. Endocrine: Negative. Genitourinary: Negative. Musculoskeletal:        Wound dehiscence   Skin: Negative. Allergic/Immunologic: Negative. Neurological: Negative. Hematological: Negative. Psychiatric/Behavioral: Negative. PAST MEDICAL HISTORY    has a past medical history of Asthma, Bipolar 1 disorder ([address] Breast cancer ([address] DVT of axillary vein, acute right ([address] Eye twitch, Headache(784.0), History of blood transfusion, Hx of blood clots, Hyperlipidemia, Migraine, Obesity, PONV (postoperative nausea and vomiting), Prolonged emergence from general anesthesia, and Sleep apnea. SURGICAL HISTORY      has a past surgical history that includes shoulder surgery (Left, 2014, 2015); Dilation and curettage of uterus (N/A, 10/13/2017); Mastectomy (Right, 10/19/2017); Mastectomy (Right, 10/19/2017); INSERTION / REMOVAL / REPLACEMENT VENOUS ACCESS CATHETER (Left, 11/9/2017); Tunneled venous port placement (Left, 11/30/2017);  INSERTION / REMOVAL / REPLACEMENT VENOUS ACCESS CATHETER (N/A, 11/30/2017); pr insj prph ctr vad w/subq port age 11 yr/> (Left, 3/1/2018); Catheter Removal (Left, 6/6/2019); Breast surgery; Breast surgery (Right, 03/10/2021); Breast enhancement surgery (Right, 3/10/2021); Breast surgery (Right, 3/10/2021); Breast reconstruction (Right, 11/08/2021); and Breast surgery (Right, 11/8/2021).     CURRENT MEDICATIONS       Previous Medications    ALBUTEROL SULFATE  (90 BASE) MCG/ACT INHALER    inhale 2 puffs by mouth INTO THE LUNGS every 4 hours if needed for wheezing    ALLOPURINOL (ZYLOPRIM) 100 MG TABLET    Take 1 tablet by mouth daily    BENZTROPINE (COGENTIN) 0.5 MG TABLET    Take 0.5 mg by mouth daily    BUSPIRONE (BUSPAR) 15 MG TABLET    Take 15 mg by mouth 3 times daily     BUTALBITAL-ACETAMINOPHEN-CAFFEINE (FIORICET, ESGIC) -40 MG PER TABLET    1-2   TABLET  TWICE  DAILY  AS  NEEDED    CALCIUM CARB-CHOLECALCIFEROL (OYSTER SHELL CALCIUM W/D) 500-200 MG-UNIT TABS TABLET    take 1 tablet by mouth twice a day    ELIQUIS 5 MG TABS TABLET    take 1 tablet by mouth twice a day    FLUTICASONE (FLONASE) 50 MCG/ACT NASAL SPRAY    1 spray by Nasal route daily Indications: as needed    FOLIC ACID (FOLVITE) 1 MG TABLET    take 1 tablet by mouth once daily    LAMOTRIGINE (LAMICTAL) 100 MG TABLET    150 mg nightly     LAMOTRIGINE (LAMICTAL) 25 MG TABLET    Take 50 mg by mouth daily At bedtime    LETROZOLE (FEMARA) 2.5 MG TABLET    take 1 tablet by mouth once daily    LEVOCETIRIZINE (XYZAL) 5 MG TABLET    Take 1 tablet by mouth nightly    LORATADINE (CLARITIN) 10 MG TABLET    Take 1 tablet by mouth daily    OLOPATADINE (PATANOL) 0.1 % OPHTHALMIC SOLUTION    instill 1 drop into both eyes twice a day    OXCARBAZEPINE (TRILEPTAL) 150 MG TABLET    take 1 tablet by mouth every morning BEFORE NOON    PRAVASTATIN (PRAVACHOL) 40 MG TABLET    take 1 tablet by mouth once daily    QUETIAPINE (SEROQUEL) 100 MG TABLET    Take 100 mg by mouth nightly     QUETIAPINE (SEROQUEL) 50 MG TABLET    take 1 tablet by mouth at bedtime    SUMATRIPTAN (IMITREX) 100 MG TABLET    Take 1 tablet by mouth once as needed for Migraine    TIZANIDINE (ZANAFLEX) 4 MG TABLET    Take 1 tablet by mouth every 8 hours as needed (muscle pain)    TOPIRAMATE (TOPAMAX) 50 MG TABLET    ONE  TABLET  TWICE  DAILY    VITAMIN B-12 (CYANOCOBALAMIN) 1000 MCG TABLET    take 1 tablet by mouth once daily       ALLERGIES     is allergic to prednisone. FAMILY HISTORY     She indicated that her mother is alive. She indicated that her father is . She indicated that one of her three sisters is . She indicated that the status of her maternal aunt is unknown. She indicated that the status of her neg hx is unknown. She indicated that the status of her maternal cousin is unknown.     family history includes Breast Cancer (age of onset: 54) in her sister; Breast Cancer (age of onset: 71) in her maternal aunt; Cataracts in her mother; Glaucoma in her mother; Heart Disease in her father; High Blood Pressure in her father; High Cholesterol in her father; Mental Retardation in her father; Other in her maternal cousin and sister; Uterine Cancer (age of onset: 32) in her sister. SOCIAL HISTORY      reports that she has never smoked. She has never used smokeless tobacco. She reports current alcohol use. She reports that she does not use drugs. PHYSICAL EXAM     INITIAL VITALS:  height is 5' 6\" (1.676 m) and weight is 270 lb (122.5 kg). Her tympanic temperature is 98.3 °F (36.8 °C). Her blood pressure is 137/82 and her pulse is 94. Her respiration is 17 and oxygen saturation is 97%.      Constitutional: Alert, oriented x3, nontoxic, afebrile, answering questions appropriately, acting properly for age, in no acute distress  HEENT: Extraocular muscles intact, mucus membranes moist, TMs clear bilaterally, no posterior pharyngeal erythema or exudates, Pupils equal, round, reactive to light,   Neck: Trachea midline, Supple without lymphadenopathy, no posterior midline neck tenderness to palpation  Cardiovascular: Regular rhythm and rate no S3, S4, or murmurs  Respiratory: Clear to auscultation bilaterally no wheezes, rhonchi, rales, no respiratory distress. Lamination the patient's chest wall reveals an open pocket where the implant was seated this has clot and it were able to clean out some of it however we are not being too aggressive with this. There is no sign of any surrounding cellulitis. Gastrointestinal: Soft, nontender, nondistended, positive bowel sounds. No rebound, rigidity, or guarding. Musculoskeletal: No extremity pain or swelling  Neurologic: Moving all 4 extremities without difficulty there are no gross focal neurologic deficits  Skin: Warm and dry      DIFFERENTIAL DIAGNOSIS/ MDM:     Wound dehiscence, we will contact the plastic surgeon to see how they want to handle this. DIAGNOSTIC RESULTS     EKG: All EKG's are interpreted by the Emergency Department Physician who either signs or Co-signs this chart in the absence of a cardiologist.        Not indicated unless otherwise documented above    LABS:  No results found for this visit on 03/01/22.     Not indicated unless otherwise documented above    RADIOLOGY:   I reviewed the radiologist interpretations:  No orders to display       Not indicated unless otherwise documented above    EMERGENCY DEPARTMENT COURSE:     The patient was given the following medications:  Orders Placed This Encounter   Medications    oxyCODONE-acetaminophen (PERCOCET) 5-325 MG per tablet 1 tablet    Tetanus-Diphth-Acell Pertussis (BOOSTRIX) injection 0.5 mL    tetanus-diphth-acell pertussis (BOOSTRIX) 5-2.5-18.5 LF-MCG/0.5 injection     Vishnu Patches: cabinet override        Vitals:    Vitals:    03/01/22 0701   BP: 137/82   Pulse: 94   Resp: 17   Temp: 98.3 °F (36.8 °C)   TempSrc: Tympanic   SpO2: 97%   Weight: 270 lb (122.5 kg)   Height: 5' 6\" (1.676 m)     -------------------------  /82   Pulse 94   Temp 98.3 °F (36.8 °C) (Tympanic) Resp 17   Ht 5' 6\" (1.676 m)   Wt 270 lb (122.5 kg)   LMP 02/28/2013 (Exact Date)   SpO2 97%   BMI 43.58 kg/m²         I have reviewed the disposition diagnosis with the patient and or their family/guardian. I have answered their questions and given discharge instructions. They voiced understanding of these instructions and did not have any further questions or complaints. CRITICAL CARE:    None    CONSULTS:    Spoke with the plastic surgeon who did the procedure Dr. Brian patel and he suggests that we put a wet-to-dry dressing on this and that the patient be seen in the [facility] office later this afternoon this is amenable to the patient and us as well we will give the patient some pain medicine update her tetanus and then sent her home. PROCEDURES:    None      OARRS Report if indicated             FINAL IMPRESSION      1. Dehiscence of operative wound, initial encounter          DISPOSITION/PLAN   DISPOSITION Decision To Discharge    I have reviewed the disposition diagnosis with the patient and or their family/guardian. I have answered their questions and given discharge instructions. They voiced understanding of these instructions and did not have any further questions or complaints. Reevaluation: Patient is happy with the fact we contacted her plastic surgeon the plan is to have him take a look at this this afternoon in the office and the patient will be set up there. The plan is also to go ahead and apply a wet-to-dry dressing on this she has gotten some pain medicine and we have updated her tetanus. SHe is stable to go home.     PATIENT REFERRED TO:  MD Yves Hubbard [id number] [phone]            DISCHARGE MEDICATIONS:  New Prescriptions    No medications on file       (Please note that portions of this note were completed with a voice recognition program.  Efforts were made to edit the dictations but occasionally words are mis-transcribed.)    Mehreen Lambert MD  Attending Emergency Physician           Mehreen Lambert MD  03/01/22 [id number]

## 2022-03-08 ENCOUNTER — HOSPITAL ENCOUNTER (OUTPATIENT)
Dept: LAB | Age: 57
Discharge: HOME | End: 2022-03-08
Payer: MEDICARE

## 2022-03-08 ENCOUNTER — OFFICE VISIT (OUTPATIENT)
Dept: ONCOLOGY | Age: 57
End: 2022-03-08
Payer: MEDICARE

## 2022-03-08 VITALS
DIASTOLIC BLOOD PRESSURE: 80 MMHG | HEIGHT: 66 IN | TEMPERATURE: 99.1 F | OXYGEN SATURATION: 98 % | HEART RATE: 87 BPM | BODY MASS INDEX: 46.61 KG/M2 | SYSTOLIC BLOOD PRESSURE: 138 MMHG | WEIGHT: 290 LBS

## 2022-03-08 DIAGNOSIS — Z79.811 AROMATASE INHIBITOR USE: Primary | ICD-10-CM

## 2022-03-08 DIAGNOSIS — Z13.1 SCREENING FOR DIABETES MELLITUS: ICD-10-CM

## 2022-03-08 DIAGNOSIS — Z90.11 H/O RIGHT MASTECTOMY: ICD-10-CM

## 2022-03-08 DIAGNOSIS — E78.2 MIXED HYPERLIPIDEMIA: ICD-10-CM

## 2022-03-08 DIAGNOSIS — C50.919 MALIGNANT NEOPLASM OF FEMALE BREAST, UNSPECIFIED ESTROGEN RECEPTOR STATUS, UNSPECIFIED LATERALITY, UNSPECIFIED SITE OF BREAST (HCC): Primary | ICD-10-CM

## 2022-03-08 DIAGNOSIS — Z78.0 POSTMENOPAUSAL: ICD-10-CM

## 2022-03-08 DIAGNOSIS — I82.A11 ACUTE DEEP VEIN THROMBOSIS (DVT) OF AXILLARY VEIN OF RIGHT UPPER EXTREMITY (HCC): ICD-10-CM

## 2022-03-08 DIAGNOSIS — Z79.811 AROMATASE INHIBITOR USE: ICD-10-CM

## 2022-03-08 DIAGNOSIS — C50.919 MALIGNANT NEOPLASM OF FEMALE BREAST, UNSPECIFIED ESTROGEN RECEPTOR STATUS, UNSPECIFIED LATERALITY, UNSPECIFIED SITE OF BREAST (HCC): ICD-10-CM

## 2022-03-08 LAB
ABSOLUTE EOS #: 0.31 K/UL (ref 0–0.44)
ABSOLUTE IMMATURE GRANULOCYTE: 0.12 K/UL (ref 0–0.3)
ABSOLUTE LYMPH #: 2.5 K/UL (ref 1.1–3.7)
ABSOLUTE MONO #: 0.61 K/UL (ref 0.1–1.2)
ALBUMIN SERPL-MCNC: 4.3 G/DL (ref 3.5–5.2)
ALBUMIN/GLOBULIN RATIO: 1.2 (ref 1–2.5)
ALP BLD-CCNC: 107 U/L (ref 35–104)
ALT SERPL-CCNC: 14 U/L (ref 5–33)
ANION GAP SERPL CALCULATED.3IONS-SCNC: 13 MMOL/L (ref 9–17)
AST SERPL-CCNC: 14 U/L
BASOPHILS # BLD: 1 % (ref 0–2)
BASOPHILS ABSOLUTE: 0.06 K/UL (ref 0–0.2)
BILIRUB SERPL-MCNC: 0.19 MG/DL (ref 0.3–1.2)
BUN BLDV-MCNC: 26 MG/DL (ref 6–20)
BUN/CREAT BLD: 25 (ref 9–20)
CALCIUM SERPL-MCNC: 9.9 MG/DL (ref 8.6–10.4)
CHLORIDE BLD-SCNC: 106 MMOL/L (ref 98–107)
CHOLESTEROL/HDL RATIO: 3.6
CHOLESTEROL: 168 MG/DL
CO2: 23 MMOL/L (ref 20–31)
CREAT SERPL-MCNC: 1.06 MG/DL (ref 0.5–0.9)
EOSINOPHILS RELATIVE PERCENT: 4 % (ref 1–4)
GFR AFRICAN AMERICAN: >60 ML/MIN
GFR NON-AFRICAN AMERICAN: 54 ML/MIN
GFR SERPL CREATININE-BSD FRML MDRD: ABNORMAL ML/MIN/{1.73_M2}
GLUCOSE BLD-MCNC: 96 MG/DL (ref 70–99)
HCT VFR BLD CALC: 40 % (ref 36.3–47.1)
HDLC SERPL-MCNC: 47 MG/DL
HEMOGLOBIN: 12.9 G/DL (ref 11.9–15.1)
IMMATURE GRANULOCYTES: 1 %
LDL CHOLESTEROL: 75 MG/DL (ref 0–130)
LYMPHOCYTES # BLD: 29 % (ref 24–43)
MCH RBC QN AUTO: 31.5 PG (ref 25.2–33.5)
MCHC RBC AUTO-ENTMCNC: 32.3 G/DL (ref 25.2–33.5)
MCV RBC AUTO: 97.8 FL (ref 82.6–102.9)
MONOCYTES # BLD: 7 % (ref 3–12)
NRBC AUTOMATED: 0 PER 100 WBC
PDW BLD-RTO: 13.4 % (ref 11.8–14.4)
PLATELET # BLD: 232 K/UL (ref 138–453)
PMV BLD AUTO: 9.4 FL (ref 8.1–13.5)
POTASSIUM SERPL-SCNC: 4.3 MMOL/L (ref 3.7–5.3)
RBC # BLD: 4.09 M/UL (ref 3.95–5.11)
SEG NEUTROPHILS: 58 % (ref 36–65)
SEGMENTED NEUTROPHILS ABSOLUTE COUNT: 4.9 K/UL (ref 1.5–8.1)
SODIUM BLD-SCNC: 142 MMOL/L (ref 135–144)
TOTAL PROTEIN: 7.9 G/DL (ref 6.4–8.3)
TRIGL SERPL-MCNC: 230 MG/DL
WBC # BLD: 8.5 K/UL (ref 3.5–11.3)

## 2022-03-08 PROCEDURE — 85025 COMPLETE CBC W/AUTO DIFF WBC: CPT

## 2022-03-08 PROCEDURE — 80048 BASIC METABOLIC PNL TOTAL CA: CPT

## 2022-03-08 PROCEDURE — 80053 COMPREHEN METABOLIC PANEL: CPT

## 2022-03-08 PROCEDURE — 83036 HEMOGLOBIN GLYCOSYLATED A1C: CPT

## 2022-03-08 PROCEDURE — 99214 OFFICE O/P EST MOD 30 MIN: CPT | Performed by: INTERNAL MEDICINE

## 2022-03-08 PROCEDURE — G8427 DOCREV CUR MEDS BY ELIG CLIN: HCPCS | Performed by: INTERNAL MEDICINE

## 2022-03-08 PROCEDURE — G8482 FLU IMMUNIZE ORDER/ADMIN: HCPCS | Performed by: INTERNAL MEDICINE

## 2022-03-08 PROCEDURE — 36415 COLL VENOUS BLD VENIPUNCTURE: CPT

## 2022-03-08 PROCEDURE — 1036F TOBACCO NON-USER: CPT | Performed by: INTERNAL MEDICINE

## 2022-03-08 PROCEDURE — 3017F COLORECTAL CA SCREEN DOC REV: CPT | Performed by: INTERNAL MEDICINE

## 2022-03-08 PROCEDURE — G8417 CALC BMI ABV UP PARAM F/U: HCPCS | Performed by: INTERNAL MEDICINE

## 2022-03-08 PROCEDURE — 80061 LIPID PANEL: CPT

## 2022-03-08 NOTE — PROGRESS NOTES
johnny Melgar                                                                                                                  3/8/2022  MRN:   [id number]  YOB: 1965  PCP:                           Maximino Toro MD  Referring Physician: No ref. provider found  Treating Physician Name: Christopher Mccarthy MD      Reason for visit:  Toxicity check. Discussed treatment plan. Current problems:  Stage IIa infiltrating ductal carcinoma of right breast, ER negative, OH positive and HER-2 amplified. DVT of right upper extremity secondary to PORT(11/20/18), Xarelto discontinues after PORT removal  Bilateral massive pulmonary embolism status post thrombectomy, 4/10/2020  Strong family history of breast cancer in sister and maternal aunt    Active and recent treatments:  Right mastectomy with sentinel lymph node biopsy: 10/19/2017  TCH P x6: Cycle 1 day 1 on 11/13/2017. Completed Herceptin 11/29/2018  Adjuvant anastrozole: 5/2018. Discontinued due to toxicity  Adjuvant Femara: 6/2018  Pulmonary embolism thrombectomy: 4/10/2020    Summary of Case/History:    Francisco Javier Melgar a 64 y. o.female who presents to the hematology oncology office to establish care and for further recommendations for management of her recently diagnosed right breast cancer    Patient had a mammogram after many years in September 2017 which came back abnormal.  Subsequently patient had an ultrasound which confirmed a 1.2 cm lesion in the right breast at 1:00 position. There was another 4 mm lesion at 12:00 position    Patient underwent ultrasound-guided biopsy on 9/8/17. the lesion at 1:00 position shows invasive ductal carcinoma. ER negative and OH positive associated with high-grade DCIS. Lesion at 12:00 position showed fibrocystic disease and focal adenosis and hyperplasia. Patient underwent right sided mastectomy with sentinel lymph node biopsy on 10/19/17.   Pathology report showed invasive ductal carcinoma, grade 3 out of 3, 2.8 cm in size with negative margins. pT2,pN0,M0  Tumor specimen was negative for ER receptor and weekly positive for SC receptor (5-10 percent). High-grade DCIS was also noted. One sentinel lymph node was sampled which was negative for metastasis    Patient started on neoadjuvant chemotherapy using TCHP regimen. Cycle #1, day #1 on 11/13/17    Patient underwent removal of port with replacement due to port malfunction on 11/30/17    Patient completed 6 cycles of TCHP and continued maintenance Herceptin. Started patient on anastrozole along with calcium and vitamin D in May 2018. Switched anti-hormonal therapy to Femara in June 2018 due to arthralgia    Herceptin last cycle completed 11/29/18    Interim History:    Patient presents to the clinic for a follow-up visit and to discuss results of her lab work-up and other relevant clinical data. Labs are pending. Since last office visit patient had failure of the right breast implant which according to the patient just fell out she now has open wound in the right chest and is on antibiotics. She is scheduled to see her plastic surgeon later this week. Continues to be on Femara. Continues to be in anticoagulation. During this visit patient's allergy, social, medical, surgical history and medications were reviewed and updated.     Past Medical History:   Past Medical History:   Diagnosis Date    Asthma     seasonal    Bipolar 1 disorder ([address]     Breast cancer ([address] 2017    right side     DVT of axillary vein, acute right ([address]     Eye twitch 2019    Headache(784.0)     History of blood transfusion     Hx of blood clots 03/2020    PE    Hyperlipidemia     Migraine     Obesity     PONV (postoperative nausea and vomiting)     Prolonged emergence from general anesthesia     Sleep apnea     uses CPAP     Past Surgical History:     Past Surgical History:   Procedure Laterality Date    BREAST ENHANCEMENT SURGERY Right 3/10/2021 BREAST RECONSTRUCTION WITH TISSUE EXPANDER INSERTION performed by Liban Sifuentes MD at . [address] Right 11/08/2021    RIGHT BREAST EXPANDER REMOVAL RIGHT IMPLANT PLACEMENT (Right Breast)    BREAST SURGERY      rt mastectomy    BREAST SURGERY Right 03/10/2021    BREAST RECONSTRUCTION WITH TISSUE EXPANDER INSERTION (    BREAST SURGERY Right 3/10/2021    EXCISION SEROMA RIGHT BREAST performed by Liban Sifuentes MD at [facility] Right 11/8/2021    RIGHT BREAST EXPANDER REMOVAL RIGHT IMPLANT PLACEMENT performed by Liban Sifuentes MD at [facility] Left 6/6/2019    PORT REMOVAL performed by Arnaldo Schwartz DO at [address] N/A 10/13/2017    D & C HYSTEROSCOPY with polypectomy performed by Estevan Vee MD at [facility] / REMOVAL / [address] Left 11/9/2017    PORT INSERTION performed by Lorenzo Choi MD at [facility] / REMOVAL / [address] N/A 11/30/2017    Port Removal & Insertion performed by Lorenzo Choi MD at [facility] Right 10/19/2017     [facility]    MASTECTOMY Right 10/19/2017    Right Breast Mastectomy with  Meridian Node Biopsy performed by Lorenzo Choi MD at . [address] [facility] VAD W/SUBQ PORT AGE 5 YR/> Left 3/1/2018    PORT Exchange performed by Lorenzo Choi MD at [place] [address] Left 2014, 2015    x 2 surgeries total; Dr. Winnie Acharya TUNNELED VENOUS PORT PLACEMENT Left 11/30/2017    removal of et replacement of       Patient Family Social History:     Family History   Problem Relation Age of Onset    Breast Cancer Sister 54    Breast Cancer Maternal Aunt 71    Other Maternal Cousin         Atypical Ductal Hyperplasia     Uterine Cancer Sister 32    Other Sister         breast cyst    Cataracts Mother     Glaucoma Mother     Heart tablet Take 15 mg by mouth 3 times daily       levocetirizine (XYZAL) 5 MG tablet Take 1 tablet by mouth nightly 30 tablet 5    fluticasone (FLONASE) 50 MCG/ACT nasal spray 1 spray by Nasal route daily Indications: as needed 3 Bottle 3    lamoTRIgine (LAMICTAL) 25 MG tablet Take 50 mg by mouth daily At bedtime      OXcarbazepine (TRILEPTAL) 150 MG tablet take 1 tablet by mouth every morning BEFORE NOON      benztropine (COGENTIN) 0.5 MG tablet Take 0.5 mg by mouth daily      QUEtiapine (SEROQUEL) 100 MG tablet Take 100 mg by mouth nightly       lamoTRIgine (LAMICTAL) 100 MG tablet 150 mg nightly       SUMAtriptan (IMITREX) 100 MG tablet Take 1 tablet by mouth once as needed for Migraine (Patient not taking: Reported on 3/8/2022) 12 tablet 2    albuterol sulfate  (90 Base) MCG/ACT inhaler inhale 2 puffs by mouth INTO THE LUNGS every 4 hours if needed for wheezing (Patient not taking: Reported on 3/8/2022) 18 g 5    tiZANidine (ZANAFLEX) 4 MG tablet Take 1 tablet by mouth every 8 hours as needed (muscle pain) (Patient not taking: Reported on 3/8/2022) 20 tablet 0     No current facility-administered medications for this visit. Allergies:   Prednisone    Review of Systems:    Constitutional: Negative for fever or chills. No night sweats, weight is stable now  Eyes: No eye discharge, double vision, or eye pain . Twitching of left eye  HEENT: negative for sore mouth, sore throat, hoarseness and voice change . Complains of twitching of left eye. Improved  Respiratory: negative for cough , sputum, dyspnea, wheezing, hemoptysis, chest pain   Cardiovascular: negative for chest pain, dyspnea, palpitations, orthopnea, PND   Gastrointestinal: Positive for nausea, vomiting, constipation, abdominal pain, Dysphagia, hematemesis and hematochezia   Genitourinary: negative for frequency, dysuria, nocturia, urinary incontinence, and hematuria   Integument: negative for rash, skin lesions, bruises. Hematologic/Lymphatic: negative for easy bruising, bleeding, lymphadenopathy, or petechiae   Endocrine: negative for heat or cold intolerance,weight changes, change in bowel habits and hair loss   Musculoskeletal: negative for myalgias, arthralgias, pain, joint swelling,and bone pain . Positive for discomfort in right arm. Neurological: negative for headaches, dizziness, seizures, weakness, numbness      Physical Exam:    Vitals:  /80   Pulse 87   Temp 99.1 °F (37.3 °C) (Tympanic)   Ht 5' 6\" (1.676 m)   Wt 290 lb (131.5 kg)   LMP 02/28/2013 (Exact Date)   SpO2 98%   BMI 46.81 kg/m²    General appearance - patient not in acute distress  Mental status - AAO X3  Eyes - pupils equal and reactive, extraocular eye movements intact  Mouth - mucous membranes moist, pharynx normal without lesions  Neck - supple, no significant adenopathy  Lymphatics - no palpable lymphadenopathy, no hepatosplenomegaly  Chest - clear to auscultation, no wheezes, rales or rhonchi, symmetric air entry. Heart - normal rate, regular rhythm, normal S1, S2, no murmurs  Abdomen - soft, nontender, nondistended, no masses or organomegaly  Neurological - alert, oriented, normal speech, no focal findings . Twitching of left eye noted  Extremities - peripheral pulses normal, no pedal edema, no clubbing or cyanosis  Skin - normal coloration and turgor, no rashes, right chest wall wound covered with dressings      DATA:    Results for orders placed or performed during the hospital encounter of 11/08/21   SURGICAL PATHOLOGY REPORT   Result Value Ref Range    Surgical Pathology Report       -- Diagnosis --  FIBROADIPOSE TISSUES WITH MUSCLE (RIGHT BREAST):       -CHRONIC INFLAMMATION WITH FOREIGN BODY REACTION. -CONSISTENT WITH IMPLANT CAPSULE.       -NO NEOPLASM IDENTIFIED.       Deepika Monahan M.D.  **Electronically Signed Out**         [facility]/11/10/2021       Clinical Information  Pre-op Diagnosis:  S/P RIGHT BREAST CANCER   Operative June 2023  DEXA scan every 2 years. We will schedule DEXA scan before next office visit. Discussed results of mammogram which was unremarkable. Continue screening mammogram for the left breast every year  Patient encouraged to continue to follow-up with the plastic surgeon regarding breast reconstructive surgery  Continue Eliquis. Continue calcium vitamin D  Patient has had recurrent venous thromboembolism. Last episode was a massive PE requiring thrombectomy. Patient will need long-term anticoagulation  Continue Femara. Patient tumor was ER negative but UT positive. Continue calcium and vitamin D  DEXA scan every 2 years  Return to clinic in 6 months. NCCN guidelines were reviewed and discussed with the patient. The diagnosis and care plan were discussed with the patient in detail. I discussed the natural history of the disease, prognosis, risks and goals of therapy and answered all the patients questions to the best of my ability. Patient expressed understanding and was in agreement. Addendum:    BMP shows creatinine 1.06. Hemoglobin is within range. Bertha Rea MD          This note is created with the assistance of a speech recognition program.  While intending to generate a document that actually reflects the content of the visit, the document can still have some errors including those of syntax and sound a like substitutions which may escape proof reading. It such instances, actual meaning can be extrapolated by contextual diversion.

## 2022-03-09 LAB
ANION GAP SERPL CALCULATED.3IONS-SCNC: 13 MMOL/L (ref 9–17)
BUN BLDV-MCNC: 27 MG/DL (ref 6–20)
BUN/CREAT BLD: 25 (ref 9–20)
CALCIUM SERPL-MCNC: 9.9 MG/DL (ref 8.6–10.4)
CHLORIDE BLD-SCNC: 107 MMOL/L (ref 98–107)
CO2: 22 MMOL/L (ref 20–31)
CREAT SERPL-MCNC: 1.06 MG/DL (ref 0.5–0.9)
ESTIMATED AVERAGE GLUCOSE: 131 MG/DL
GFR AFRICAN AMERICAN: >60 ML/MIN
GFR NON-AFRICAN AMERICAN: 54 ML/MIN
GFR SERPL CREATININE-BSD FRML MDRD: ABNORMAL ML/MIN/{1.73_M2}
GLUCOSE BLD-MCNC: 92 MG/DL (ref 70–99)
HBA1C MFR BLD: 6.2 % (ref 4–6)
POTASSIUM SERPL-SCNC: 4.3 MMOL/L (ref 3.7–5.3)
SODIUM BLD-SCNC: 142 MMOL/L (ref 135–144)

## 2022-03-11 ENCOUNTER — OFFICE VISIT (OUTPATIENT)
Dept: SURGERY | Age: 57
End: 2022-03-11
Payer: MEDICARE

## 2022-03-11 VITALS
BODY MASS INDEX: 46.77 KG/M2 | WEIGHT: 289.8 LBS | OXYGEN SATURATION: 98 % | SYSTOLIC BLOOD PRESSURE: 120 MMHG | DIASTOLIC BLOOD PRESSURE: 64 MMHG | HEART RATE: 78 BPM

## 2022-03-11 DIAGNOSIS — T85.49XA EXTRUSION OF BREAST IMPLANT, INITIAL ENCOUNTER: ICD-10-CM

## 2022-03-11 DIAGNOSIS — Z98.890 H/O BREAST RECONSTRUCTION: Primary | ICD-10-CM

## 2022-03-11 PROCEDURE — 3017F COLORECTAL CA SCREEN DOC REV: CPT | Performed by: PLASTIC SURGERY

## 2022-03-11 PROCEDURE — 1036F TOBACCO NON-USER: CPT | Performed by: PLASTIC SURGERY

## 2022-03-11 PROCEDURE — 99212 OFFICE O/P EST SF 10 MIN: CPT | Performed by: PLASTIC SURGERY

## 2022-03-11 PROCEDURE — G8427 DOCREV CUR MEDS BY ELIG CLIN: HCPCS | Performed by: PLASTIC SURGERY

## 2022-03-11 PROCEDURE — G8417 CALC BMI ABV UP PARAM F/U: HCPCS | Performed by: PLASTIC SURGERY

## 2022-03-11 PROCEDURE — G8482 FLU IMMUNIZE ORDER/ADMIN: HCPCS | Performed by: PLASTIC SURGERY

## 2022-03-11 PROCEDURE — 99214 OFFICE O/P EST MOD 30 MIN: CPT | Performed by: PLASTIC SURGERY

## 2022-03-11 RX ORDER — NON-ADHERENT BANDAGE 5"X9"
1 BANDAGE TOPICAL 2 TIMES DAILY
Qty: 30 EACH | Refills: 2 | Status: SHIPPED | OUTPATIENT
Start: 2022-03-11 | End: 2022-06-17

## 2022-03-11 RX ORDER — LOXAPINE SUCCINATE 10 MG/1
10 TABLET ORAL NIGHTLY
COMMUNITY

## 2022-03-11 NOTE — PROGRESS NOTES
Subjective:      Patient ID: Conor Hall is a 64 y.o. female. HPI  Patient presents today to discuss a wound on her R breast, She had an implant placed 11/08/2021 for breast reconstruction. Over the course of a week she noticed an increase in redness and woke up one morning with the implant in her bra. She brought the implant to the [address] [place] [address]. she was seen in my office later that day. Patient reports a pain of 5 on scale of 1-10 on 3/11/22. Patient says that wound is closing and has slight bloody drainage. Review of Systems    Objective:   Physical Exam  Vitals and nursing note reviewed. Exam conducted with a chaperone present. Constitutional:       Appearance: Normal appearance. HENT:      Head: Normocephalic and atraumatic. Mouth/Throat:      Mouth: Mucous membranes are moist.   Eyes:      Extraocular Movements: Extraocular movements intact. Conjunctiva/sclera: Conjunctivae normal.      Pupils: Pupils are equal, round, and reactive to light. Pulmonary:      Effort: Pulmonary effort is normal.   Chest:      Comments:   Cavity is closing down nicely. Goes up about 3cm, only 1cm in other directions. Clean, granulated, no infection. Repacked. Skin:     General: Skin is warm and dry. Neurological:      General: No focal deficit present. Mental Status: She is alert and oriented to person, place, and time. Assessment:      Extrusion breast implant. Plan:      Continue saline damp dressings BID. Recheck in 2 weeks.         Brooklyn Angeles MD

## 2022-03-11 NOTE — PROGRESS NOTES
HPI  Patient presents today to discuss a wound on her R breast, She had an implant placed 11/08/2021 for breast reconstruction. Over the course of the last week she noticed an increase in redness and woke up this morning with the implant in her bra. She brings the implant with her today. She was seen in [facility] ER this morning. Patient reports a pain of 5 on scale of 1-10 on 3/11/22. Patient says that wound is closing and has slight bloody drainage.

## 2022-03-21 DIAGNOSIS — G43.009 MIGRAINE WITHOUT AURA AND WITHOUT STATUS MIGRAINOSUS, NOT INTRACTABLE: Primary | ICD-10-CM

## 2022-03-21 RX ORDER — SUMATRIPTAN 100 MG/1
TABLET, FILM COATED ORAL
Qty: 12 TABLET | Refills: 2 | Status: SHIPPED | OUTPATIENT
Start: 2022-03-21 | End: 2022-05-26 | Stop reason: SDUPTHER

## 2022-03-25 ENCOUNTER — OFFICE VISIT (OUTPATIENT)
Dept: SURGERY | Age: 57
End: 2022-03-25
Payer: MEDICARE

## 2022-03-25 VITALS
BODY MASS INDEX: 47.09 KG/M2 | OXYGEN SATURATION: 100 % | DIASTOLIC BLOOD PRESSURE: 68 MMHG | HEART RATE: 87 BPM | HEIGHT: 66 IN | WEIGHT: 293 LBS | SYSTOLIC BLOOD PRESSURE: 118 MMHG

## 2022-03-25 DIAGNOSIS — T85.49XA EXTRUSION OF BREAST IMPLANT, INITIAL ENCOUNTER: Primary | ICD-10-CM

## 2022-03-25 DIAGNOSIS — Z98.890 H/O BREAST RECONSTRUCTION: ICD-10-CM

## 2022-03-25 PROCEDURE — 99214 OFFICE O/P EST MOD 30 MIN: CPT

## 2022-03-25 PROCEDURE — 99024 POSTOP FOLLOW-UP VISIT: CPT | Performed by: PLASTIC SURGERY

## 2022-03-25 NOTE — PROGRESS NOTES
Subjective:      Patient ID: Kristi Han is a 64 y.o. female. HPI  Patient presents today for a post-op follow up of right breast implant removal which was completed on 3/1/22. Wet to dry dressing intact. Incision is flat, and free of excess redness, swelling, or heat. Area free of drainage or bleeding. Patient rates pain as a 5 on 1 to 10 pain scale. Review of Systems    Objective:   Physical Exam  Vitals and nursing note reviewed. Exam conducted with a chaperone present. Constitutional:       Appearance: Normal appearance. HENT:      Head: Normocephalic and atraumatic. Mouth/Throat:      Mouth: Mucous membranes are moist.   Eyes:      Extraocular Movements: Extraocular movements intact. Conjunctiva/sclera: Conjunctivae normal.      Pupils: Pupils are equal, round, and reactive to light. Pulmonary:      Effort: Pulmonary effort is normal.   Chest:          Comments:   Cavity only about half the size it was last week, about 3cm diameter. No odor or signs of infection. Closing nicely. Skin:     General: Skin is warm and dry. Neurological:      General: No focal deficit present. Mental Status: She is alert and oriented to person, place, and time. Assessment:      Extrusion right breast implant. Plan:      Continue NS damp dressings. Recheck in 2 weeks.           Justin Suarez MD

## 2022-03-25 NOTE — PROGRESS NOTES
Post Op Follow Up    64year old female presents today for a post-op follow up of right breast implant removal which was completed on 3/1/22. Wet to dry dressing intact. Incision is flat, and free of excess redness, swelling, or heat. Area free of drainage or bleeding. Patient rates pain as a 5 on 1 to 10 pain scale. I have reviewed the patient's medical history in detail and updated the computerized patient record.

## 2022-03-28 DIAGNOSIS — C50.919 MALIGNANT NEOPLASM OF FEMALE BREAST, UNSPECIFIED ESTROGEN RECEPTOR STATUS, UNSPECIFIED LATERALITY, UNSPECIFIED SITE OF BREAST (HCC): ICD-10-CM

## 2022-03-28 NOTE — TELEPHONE ENCOUNTER
Last Appt:  3/8/2022  Next Appt:  9/13/2022  Med verified in [facility] [address]

## 2022-03-29 RX ORDER — FOLIC ACID 1 MG/1
TABLET ORAL
Qty: 30 TABLET | Refills: 3 | Status: SHIPPED | OUTPATIENT
Start: 2022-03-29 | End: 2022-05-13

## 2022-04-08 ENCOUNTER — OFFICE VISIT (OUTPATIENT)
Dept: SURGERY | Age: 57
End: 2022-04-08
Payer: MEDICARE

## 2022-04-08 VITALS
SYSTOLIC BLOOD PRESSURE: 120 MMHG | BODY MASS INDEX: 47.09 KG/M2 | OXYGEN SATURATION: 100 % | HEART RATE: 107 BPM | DIASTOLIC BLOOD PRESSURE: 78 MMHG | WEIGHT: 293 LBS | HEIGHT: 66 IN

## 2022-04-08 DIAGNOSIS — Z98.890 H/O BREAST RECONSTRUCTION: ICD-10-CM

## 2022-04-08 DIAGNOSIS — T85.49XA EXTRUSION OF BREAST IMPLANT, INITIAL ENCOUNTER: Primary | ICD-10-CM

## 2022-04-08 PROCEDURE — 99212 OFFICE O/P EST SF 10 MIN: CPT | Performed by: PLASTIC SURGERY

## 2022-04-08 PROCEDURE — 3017F COLORECTAL CA SCREEN DOC REV: CPT | Performed by: PLASTIC SURGERY

## 2022-04-08 PROCEDURE — G8427 DOCREV CUR MEDS BY ELIG CLIN: HCPCS | Performed by: PLASTIC SURGERY

## 2022-04-08 PROCEDURE — G8417 CALC BMI ABV UP PARAM F/U: HCPCS | Performed by: PLASTIC SURGERY

## 2022-04-08 PROCEDURE — 1036F TOBACCO NON-USER: CPT | Performed by: PLASTIC SURGERY

## 2022-04-08 PROCEDURE — 99213 OFFICE O/P EST LOW 20 MIN: CPT

## 2022-04-08 NOTE — PROGRESS NOTES
Subjective:      Patient ID: Gordon Chavez is a 64 y.o. female. HPI  Patient presents today for a post-op follow up of right breast implant removal which was completed on 3/1/22. Wet to dry dressing intact. Incision is flat, and free of excess redness, swelling, or heat.  Area free of drainage or bleeding.  Patient rates pain as a 0 on 1 to 10 pain scale. Review of Systems    Objective:   Physical Exam  Vitals and nursing note reviewed. Exam conducted with a chaperone present. Constitutional:       Appearance: Normal appearance. HENT:      Head: Normocephalic and atraumatic. Mouth/Throat:      Mouth: Mucous membranes are moist.   Eyes:      Extraocular Movements: Extraocular movements intact. Conjunctiva/sclera: Conjunctivae normal.      Pupils: Pupils are equal, round, and reactive to light. Pulmonary:      Effort: Pulmonary effort is normal.   Chest:      Comments:   Wound continues to contract. No odor or evidence of infection. Repacked. Skin:     General: Skin is warm and dry. Neurological:      General: No focal deficit present. Mental Status: She is alert and oriented to person, place, and time. Assessment:      Failed breast reconstruction. Plan:      Continue dressings. Recheck in 5 weeks.         Andrez Alvarado MD

## 2022-04-08 NOTE — PROGRESS NOTES
64year old female presents today for a post-op follow up of right breast implant removal which was completed on 3/1/22. Wet to dry dressing intact. Incision is flat, and free of excess redness, swelling, or heat. Area free of drainage or bleeding. Patient rates pain as a 0 on 1 to 10 pain scale. I have reviewed the patient's medical history in detail and updated the computerized patient record.

## 2022-05-03 DIAGNOSIS — C50.919 MALIGNANT NEOPLASM OF FEMALE BREAST, UNSPECIFIED ESTROGEN RECEPTOR STATUS, UNSPECIFIED LATERALITY, UNSPECIFIED SITE OF BREAST (HCC): ICD-10-CM

## 2022-05-04 RX ORDER — LANOLIN ALCOHOL/MO/W.PET/CERES
CREAM (GRAM) TOPICAL
Qty: 30 TABLET | Refills: 3 | Status: SHIPPED | OUTPATIENT
Start: 2022-05-04

## 2022-05-13 ENCOUNTER — OFFICE VISIT (OUTPATIENT)
Dept: SURGERY | Age: 57
End: 2022-05-13
Payer: MEDICARE

## 2022-05-13 VITALS
DIASTOLIC BLOOD PRESSURE: 84 MMHG | HEART RATE: 80 BPM | WEIGHT: 283 LBS | SYSTOLIC BLOOD PRESSURE: 128 MMHG | OXYGEN SATURATION: 97 % | RESPIRATION RATE: 16 BRPM | HEIGHT: 66 IN | BODY MASS INDEX: 45.48 KG/M2

## 2022-05-13 DIAGNOSIS — Z98.890 H/O BREAST RECONSTRUCTION: ICD-10-CM

## 2022-05-13 DIAGNOSIS — T85.49XA EXTRUSION OF BREAST IMPLANT, INITIAL ENCOUNTER: Primary | ICD-10-CM

## 2022-05-13 PROCEDURE — 99214 OFFICE O/P EST MOD 30 MIN: CPT | Performed by: PLASTIC SURGERY

## 2022-05-13 PROCEDURE — 99024 POSTOP FOLLOW-UP VISIT: CPT | Performed by: PLASTIC SURGERY

## 2022-05-13 RX ORDER — LAMOTRIGINE 150 MG/1
TABLET ORAL
COMMUNITY
Start: 2022-04-22 | End: 2022-09-29 | Stop reason: SDUPTHER

## 2022-05-13 NOTE — PROGRESS NOTES
Patient presents today for a post-op follow up of right breast implant removal which was completed on 3/1/22. Wet to dry dressing intact. Incision is flat, and free of excess redness, swelling, or heat.  Area free of drainage or bleeding.  Patient rates pain as a 0 on 1 to 10 pain scale.
Subjective:      Patient ID: Luz Leone is a 64 y.o. female. HPI  Patient presents today for a post-op follow up of right breast implant removal which was completed on 3/1/22. Wet to dry dressing intact. Incision is flat, and free of excess redness, swelling, or heat.  Area free of drainage or bleeding.  Patient rates pain as a 0 on 1 to 10 pain scale. Review of Systems    Objective:   Physical Exam  Vitals and nursing note reviewed. Exam conducted with a chaperone present. Constitutional:       Appearance: Normal appearance. HENT:      Head: Normocephalic and atraumatic. Mouth/Throat:      Mouth: Mucous membranes are moist.   Eyes:      Extraocular Movements: Extraocular movements intact. Conjunctiva/sclera: Conjunctivae normal.      Pupils: Pupils are equal, round, and reactive to light. Pulmonary:      Effort: Pulmonary effort is normal.   Chest:      Comments:   Pocket now only about 1x2cm. Clean, no infection. Skin:     General: Skin is warm and dry. Neurological:      General: No focal deficit present. Mental Status: She is alert and oriented to person, place, and time. Assessment:      S/P implant removal.      Plan:      Continue dressings. Just putting corner into opening to keep it from closing until inside closes. Recheck in 1 month.           Palmira Nava MD
BRBPR (bright red blood per rectum)

## 2022-05-18 NOTE — PROGRESS NOTES
Magnesium infused. Has 400 ml of 1000 ml of saline blous to infuse yet. Patient has no complaints. No

## 2022-05-26 ENCOUNTER — OFFICE VISIT (OUTPATIENT)
Dept: NEUROLOGY | Age: 57
End: 2022-05-26
Payer: MEDICARE

## 2022-05-26 VITALS
HEART RATE: 78 BPM | WEIGHT: 279 LBS | OXYGEN SATURATION: 97 % | BODY MASS INDEX: 45.03 KG/M2 | SYSTOLIC BLOOD PRESSURE: 128 MMHG | DIASTOLIC BLOOD PRESSURE: 78 MMHG | RESPIRATION RATE: 16 BRPM

## 2022-05-26 DIAGNOSIS — Z98.890 H/O BREAST RECONSTRUCTION: ICD-10-CM

## 2022-05-26 DIAGNOSIS — G47.9 SLEEP DIFFICULTIES: ICD-10-CM

## 2022-05-26 DIAGNOSIS — F41.9 ANXIETY AND DEPRESSION: ICD-10-CM

## 2022-05-26 DIAGNOSIS — R25.3 MUSCLE TWITCHING: ICD-10-CM

## 2022-05-26 DIAGNOSIS — Z90.11 H/O RIGHT MASTECTOMY: ICD-10-CM

## 2022-05-26 DIAGNOSIS — F32.A ANXIETY AND DEPRESSION: ICD-10-CM

## 2022-05-26 DIAGNOSIS — G47.33 OSA ON CPAP: ICD-10-CM

## 2022-05-26 DIAGNOSIS — I82.A21 CHRONIC DEEP VEIN THROMBOSIS (DVT) OF AXILLARY VEIN OF RIGHT UPPER EXTREMITY (HCC): ICD-10-CM

## 2022-05-26 DIAGNOSIS — G51.39 HEMIFACIAL SPASM, UNSPECIFIED LATERALITY: ICD-10-CM

## 2022-05-26 DIAGNOSIS — Z85.3 HX: BREAST CANCER: ICD-10-CM

## 2022-05-26 DIAGNOSIS — G24.5 BLEPHAROSPASM: ICD-10-CM

## 2022-05-26 DIAGNOSIS — G43.009 MIGRAINE WITHOUT AURA AND WITHOUT STATUS MIGRAINOSUS, NOT INTRACTABLE: Primary | ICD-10-CM

## 2022-05-26 DIAGNOSIS — Z99.89 OSA ON CPAP: ICD-10-CM

## 2022-05-26 PROCEDURE — 99214 OFFICE O/P EST MOD 30 MIN: CPT | Performed by: PSYCHIATRY & NEUROLOGY

## 2022-05-26 PROCEDURE — 3017F COLORECTAL CA SCREEN DOC REV: CPT | Performed by: PSYCHIATRY & NEUROLOGY

## 2022-05-26 PROCEDURE — G8417 CALC BMI ABV UP PARAM F/U: HCPCS | Performed by: PSYCHIATRY & NEUROLOGY

## 2022-05-26 PROCEDURE — 1036F TOBACCO NON-USER: CPT | Performed by: PSYCHIATRY & NEUROLOGY

## 2022-05-26 PROCEDURE — G8427 DOCREV CUR MEDS BY ELIG CLIN: HCPCS | Performed by: PSYCHIATRY & NEUROLOGY

## 2022-05-26 RX ORDER — TOPIRAMATE 50 MG/1
TABLET, FILM COATED ORAL
Qty: 60 TABLET | Refills: 5 | Status: SHIPPED | OUTPATIENT
Start: 2022-05-26

## 2022-05-26 RX ORDER — IBUPROFEN 800 MG/1
TABLET ORAL
Qty: 90 TABLET | Refills: 1 | Status: SHIPPED | OUTPATIENT
Start: 2022-05-26 | End: 2022-07-21

## 2022-05-26 RX ORDER — BUTALBITAL, ACETAMINOPHEN AND CAFFEINE 50; 325; 40 MG/1; MG/1; MG/1
TABLET ORAL
Qty: 60 TABLET | Refills: 2 | Status: SHIPPED | OUTPATIENT
Start: 2022-05-26 | End: 2022-09-29 | Stop reason: SDUPTHER

## 2022-05-26 RX ORDER — SUMATRIPTAN 100 MG/1
TABLET, FILM COATED ORAL
Qty: 12 TABLET | Refills: 2 | Status: SHIPPED | OUTPATIENT
Start: 2022-05-26 | End: 2022-09-29 | Stop reason: SDUPTHER

## 2022-05-26 ASSESSMENT — ENCOUNTER SYMPTOMS
ABDOMINAL PAIN: 0
BOWEL INCONTINENCE: 0
SWOLLEN GLANDS: 0
SORE THROAT: 0
EYE WATERING: 1
DIARRHEA: 0
EYE REDNESS: 0
WHEEZING: 0
VOMITING: 1
VISUAL CHANGE: 0
COLOR CHANGE: 0
EYE PAIN: 0
CHEST TIGHTNESS: 0
COUGH: 0
VOICE CHANGE: 0
BLOOD IN STOOL: 0
EYE ITCHING: 0
CONSTIPATION: 0
FACIAL SWELLING: 0
BACK PAIN: 0
BLURRED VISION: 0
RHINORRHEA: 0
TROUBLE SWALLOWING: 0
NAUSEA: 1
PHOTOPHOBIA: 1
SINUS PRESSURE: 0
ABDOMINAL DISTENTION: 0
SCALP TENDERNESS: 0
CHOKING: 0
SHORTNESS OF BREATH: 0
APNEA: 0
FACIAL SWEATING: 0
EYE DISCHARGE: 0

## 2022-05-26 ASSESSMENT — PATIENT HEALTH QUESTIONNAIRE - PHQ9
2. FEELING DOWN, DEPRESSED OR HOPELESS: 0
SUM OF ALL RESPONSES TO PHQ QUESTIONS 1-9: 0
SUM OF ALL RESPONSES TO PHQ9 QUESTIONS 1 & 2: 0
SUM OF ALL RESPONSES TO PHQ QUESTIONS 1-9: 0
1. LITTLE INTEREST OR PLEASURE IN DOING THINGS: 0

## 2022-05-26 NOTE — PROGRESS NOTES
[facility]  Neurology  [address] [place] [address]  Phone: [phone]   Fax: [phone]        SUBJECTIVE:     PATIENT ID:  Adolfo Galo is a  RIGHT  HANDED 64 y.o. female. Migraine   This is a chronic problem. Episode onset: FOR  MORE  THAN   4  YEARS. The problem occurs intermittently. The problem has been gradually worsening. The pain is located in the bilateral and vertex region. The pain does not radiate. The pain quality is similar to prior headaches. The quality of the pain is described as aching, dull and throbbing. The pain is at a severity of 3/10. The pain is mild. Associated symptoms include eye watering, insomnia, nausea, phonophobia, photophobia and vomiting. Pertinent negatives include no abdominal pain, abnormal behavior, anorexia, back pain, blurred vision, coughing, dizziness, drainage, ear pain, eye pain, eye redness, facial sweating, fever, hearing loss, loss of balance, muscle aches, neck pain, numbness, rhinorrhea, scalp tenderness, seizures, sinus pressure, sore throat, swollen glands, tingling, tinnitus, visual change, weakness or weight loss. The symptoms are aggravated by unknown. She has tried acetaminophen and Excedrin for the symptoms. The treatment provided no relief. Her past medical history is significant for migraine headaches and obesity. There is no history of cancer, cluster headaches, hypertension, immunosuppression, migraines in the family, pseudotumor cerebri, recent head traumas, sinus disease or TMJ. Neurologic Problem  The patient's primary symptoms include memory loss. The patient's pertinent negatives include no altered mental status, clumsiness, focal sensory loss, focal weakness, loss of balance, near-syncope, slurred speech, syncope, visual change or weakness. Primary symptoms comment: LEFT  EYE  TWITCHING  AND  SPAMS. This is a chronic problem. Episode onset: SINCE  APRIL 2017.  The neurological problem developed insidiously. The problem has been waxing and waning since onset. There was facial and left-sided focality noted. Associated symptoms include headaches, nausea and vomiting. Pertinent negatives include no abdominal pain, auditory change, aura, back pain, bladder incontinence, bowel incontinence, chest pain, confusion, diaphoresis, dizziness, fatigue, fever, light-headedness, neck pain, palpitations, shortness of breath or vertigo. Treatments tried: Katie Conley. The treatment provided moderate relief. There is no history of a bleeding disorder, a clotting disorder, a CVA, dementia, head trauma, liver disease, mood changes or seizures. History obtained from  The patient      and other  available medical records were  Also  reviewed. The  Duration,  Quality,  Severity,  Location,  Timing,  Context,  Modifying  Factors   Of   The   Chief   Complaint       And  Present  Illness   Was   Reviewed   In   Chronological   Manner. PATIENT'S  MAIN  CONCERNS INCLUDE :                       1)        H/O     CHRONIC  BLEPHAROSPASM    LEFT  EYE. SINCE      April 2017                                                       -     STABLE                                 2)      PREVIOUS    H/O    BREAST    CANCER  RIGHT                                H/O   BREAST  CANCER   SURGERY  IN  NOV. 2017                               3)       HAD    CHEMO     IN   THE  PAST                                      ALSO  ON   ARIMIDEX   DAILY                             BEING  FOLLOWED  BY   HEMATOLOGY /  ONCOLOGY                               4)    H/O   CHRONIC   MIGRAINES       FOR    5YEARS                                               -   IMPROVED     AND     STABLE                                                            -    ON   FIORICET,    TOPAMAX,  IMITREX ,  IBUPROFEN                                                   AS  NEEDED 5)   H/O   CHRONIC  ANXIETY,   DEPRESSION,  BIPOLAR  DISORDER                                 -   ON    LAMICTAL,  SEROQUEL   TRAZODONE, TRILEPTAL                                                    -    STABLE                                -       BEING  FOLLOWED  BY  MENTAL  HEALTH PROFESSIONALS                                 6)    H/O   CHRONIC   SLEEP  DIFFICULTIES                                            -   BETTER                                                           7)   H/O   CHRONIC    MEMORY PROBLEMS                                           SINCE    OCTOBER 2017                                            -   STABLE                                  8)   OBESITY     WITH   EXCESSIVE    DAY   TIME  SLEEPINESS                                     H/O   OSAS     -   ON  CPAP                                                            9)   MULTIPLE  CO  MORBID  MEDICAL  CONDITIONS                                    BEING  FOLLOWED BY  HER  PCP                              10)   MRI  BRAIN  IN  FEB. 2018   SHOWED                                      NO ACUTE INTRA  CRANIAL PATHOLOGY                                    11)    PREVIOUS   H/O    LEFT  EYE  TWITCHING  AND  SPASM   ARE   CAUSING                                DIFFICULTY  WITH  READING,  WATCHING  TV   AND  DRIVING                                           -     IMPROVED      SIGNIFICANTLY                                                   12)      PATIENT   WAS    ON  KLONOPIN       FOR  LEFT  BLEPHAROSPASM                                    SINCE      June 2018                                PATIENT     STOPPED     KLONOPIN      SINCE    MAY     2021                                                       PATIENT  NOT  INTERESTED  IN  INJECTION  BOTOX                                                    13)      MRI BRAIN   WITH  AND  W/O   CONTRAST     IN  OCT.    2019                                   AND  LABS SHOWED  NO  SIGNIFICANT  ABNORMALITIES                                                                           14)        MIGRAINES  ARE   BETTER  CONTROLLED                               ON  TOPAMAX,   FIORICET,   IMITREX      AS  NEEDED. AND  PATIENT     FEELS  LOT  BETTER. PATIENT  DENIES  ANY      RENAL  STONES. 15)    LEFT     BLEPHAROSPASM     BETTER  CONTROLLED. H/O    BLEPHAROSPASM    LEFT  EYE. CAN  GET   WORSE                                         DUE   TO     ANXIETY                                      16)        H/O    PULMONARY  EMBOLISM   IN   April 2020                                               -   ON       ELIQUIS                                  PATIENT  TO  FOLLOW  WITH   HER  PCP  AND  OTHER  CONSULTANTS                        17)        PATIENT  DENIES  ANY  NEW  NEUROLOGICAL  CONCERNS. VARIOUS  RISK   FACTORS   WERE  REVIEWED   AND   DISCUSSED. 18)        PATIENT   HAS  MULTIPLE   MEDICAL, MENTAL HEALTH                        NEUROLOGICAL   PROBLEMS .                          PATIENT  MANAGEMENT  IS  CHALLENGING                                                                                      PRECIPITATING  FACTORS: including  fever/infection, exertion/relaxation, position change, stress,     weather change, medications/alcohol, time of day/darkness/light  Are    Absent                                                           MODIFYING  FACTORS:  fever/infection, exertion/relaxation, position change, stress, weather change,     medications/alcohol, time of day/darkness/light    Are  absent             Patient   Indicates   The  Presence   And  The  Absence  Of  The  Following  Associated  And       Additional  Neurological    Symptoms:                                Balance  And coordination problems absent           Gait problems     absent            Headaches      absent              Migraines           present           Memory problems        Present             Confusion        absent            Paresthesia numbness          absent           Seizures  And  Starring  Episodes           absent           Syncope,  Near  syncopal episodes         absent           Speech problems           absent             Swallowing  Problems      absent            Dizziness,  Light headedness           absent                        Vertigo        absent             Generalized   Weakness    absent              focal  Weakness     absent             Tremors         absent              Sleep  Problems     absent             History  Of   Recent   Head  Injury     absent             History  Of   Recent  TIA     absent             History  Of   Recent    Stroke     absent             Neck  Pain and  Neck muscle  Spasms  Absent               Radiating  down   And   Weakness           absent            Lower back   Pain  And     Spasms  Absent              Radiating    Down   And   Weakness          absent                H/O   FALLS        absent               History  Of   Visual  Symptoms    Present                    Associated   Diplopia       absent                                      Also   Additional   Symptoms   Present    As  Documented    In   The detailed      Review  Of  Systems   And    Please   Refer   To    Them for   Additional  Information. Any components  That are either  Unobtainable  Or  Limited  In   HPI, ROS  And/or PFSH   Are       Due   To   Patient's  Medical  Problems,  Clinical  Condition and/or lack of other  Alternate resources.               RECORDS   REVIEWED:    historical medical records         INFORMATION   REVIEWED:     MEDICAL   HISTORY,     SURGICAL   HISTORY,   MEDICATIONS   LIST,   ALLERGIES AND  DRUG  INTOLERANCES,     FAMILY   HISTORY,  SOCIAL  HISTORY,    PROBLEM  LIST FOR  PATIENT  CARE   COORDINATION    Past Medical History:   Diagnosis Date    Asthma     seasonal    [facility]     Breast cancer ([facility] 2017    right side     DVT of axillary vein, acute right ([address]     Eye twitch 2019    Headache(784.0)     History of blood transfusion     Hx of blood clots 03/2020    PE    Hyperlipidemia     Migraine     Obesity     PONV (postoperative nausea and vomiting)     Prolonged emergence from general anesthesia     Sleep apnea     uses CPAP         Past Surgical History:   Procedure Laterality Date    BREAST ENHANCEMENT SURGERY Right 3/10/2021    BREAST RECONSTRUCTION WITH TISSUE EXPANDER INSERTION performed by Joseph Mendez MD at [facility] Right 11/08/2021    RIGHT BREAST EXPANDER REMOVAL RIGHT IMPLANT PLACEMENT (Right Breast)    BREAST SURGERY      rt mastectomy    BREAST SURGERY Right 03/10/2021    BREAST RECONSTRUCTION WITH TISSUE EXPANDER INSERTION (    BREAST SURGERY Right 3/10/2021    EXCISION SEROMA RIGHT BREAST performed by Joseph Mendez MD at [address] [facility] Right 11/8/2021    RIGHT BREAST EXPANDER REMOVAL RIGHT IMPLANT PLACEMENT performed by Joseph Mendez MD at [facility] 34 Left 6/6/2019    PORT REMOVAL performed by Toribio Gao DO at [facility] [address] N/A 10/13/2017    D & C HYSTEROSCOPY with polypectomy performed by Vidya Rivera MD at [address] [place] / REMOVAL / [address] Left 11/9/2017    PORT INSERTION performed by Aj Bernal MD at [address] / REMOVAL / REPLACEMENT VENOUS ACCESS CATHETER N/A 11/30/2017    Port Removal & Insertion performed by Aj Bernal MD at [address] Right 10/19/2017     [address] [facility]    MASTECTOMY Right 10/19/2017    Right Breast Mastectomy with  Bertram Node Biopsy performed by Aj Bernal MD at [address] [facility] CTR VAD W/SUBQ PORT AGE 5 YR/> Left 3/1/2018    PORT Exchange performed by Kathleen Soria MD at [address] Left 2014, 2015    x 2 surgeries total; Dr. Emperatriz Farmer TUNNELED VENOUS PORT PLACEMENT Left 11/30/2017    removal of et replacement of         Current Outpatient Medications   Medication Sig Dispense Refill    lamoTRIgine (LAMICTAL) 150 MG tablet take 1 tablet by mouth at bedtime take with 25 milligram tablet 3-24      vitamin B-12 (CYANOCOBALAMIN) 1000 MCG tablet take 1 tablet by mouth once daily 30 tablet 3    SUMAtriptan (IMITREX) 100 MG tablet take 1 tablet by mouth once daily if needed for migraines 12 tablet 2    ibuprofen (ADVIL;MOTRIN) 800 MG tablet Take 1 tablet by mouth 3 times daily (with meals) 90 tablet 0    loxapine (LOXITANE) 10 MG capsule Take 10 mg by mouth nightly      Gauze Pads & Dressings (COMBINE ABD) 5\"X9\" PADS 1 each by Does not apply route 2 times daily 30 each 2    Calcium Carb-Cholecalciferol (OYSTER SHELL CALCIUM W/D) 500-200 MG-UNIT TABS tablet take 1 tablet by mouth twice a day 180 tablet 1    apixaban (ELIQUIS) 5 MG TABS tablet take 1 tablet by mouth twice a day 180 tablet 1    SODIUM CHLORIDE, EXTERNAL, (SALINE WOUND WASH) 0.9 % SOLN Use for twice daily dressings.  1000 mL 5    Gauze Pads & Dressings (KERLIX BANDAGE ROLL 2-1/4\"X9') MISC 1 each by Does not apply route 2 times daily 15 each 5    Gauze Pads & Dressings 4\"X4\" PADS 2 each by Does not apply route 2 times daily 15 each 5    QUEtiapine (SEROQUEL) 50 MG tablet take 1 tablet by mouth at bedtime      topiramate (TOPAMAX) 50 MG tablet ONE  TABLET  TWICE  DAILY 60 tablet 5    butalbital-acetaminophen-caffeine (FIORICET, ESGIC) -40 MG per tablet 1-2   TABLET  TWICE  DAILY  AS  NEEDED 60 tablet 2    letrozole (FEMARA) 2.5 MG tablet take 1 tablet by mouth once daily 30 tablet 5    pravastatin (PRAVACHOL) 40 MG tablet take 1 tablet by mouth once daily 90 tablet 1    albuterol sulfate  (90 Base) MCG/ACT inhaler inhale 2 puffs by mouth INTO THE LUNGS every 4 hours if needed for wheezing 18 g 5    allopurinol (ZYLOPRIM) 100 MG tablet Take 1 tablet by mouth daily 90 tablet 1    olopatadine (PATANOL) 0.1 % ophthalmic solution instill 1 drop into both eyes twice a day 5 mL 3    loratadine (CLARITIN) 10 MG tablet Take 1 tablet by mouth daily 90 tablet 3    tiZANidine (ZANAFLEX) 4 MG tablet Take 1 tablet by mouth every 8 hours as needed (muscle pain) 20 tablet 0    busPIRone (BUSPAR) 15 MG tablet Take 15 mg by mouth 3 times daily       levocetirizine (XYZAL) 5 MG tablet Take 1 tablet by mouth nightly 30 tablet 5    fluticasone (FLONASE) 50 MCG/ACT nasal spray 1 spray by Nasal route daily Indications: as needed 3 Bottle 3    lamoTRIgine (LAMICTAL) 25 MG tablet Take 50 mg by mouth daily At bedtime      OXcarbazepine (TRILEPTAL) 150 MG tablet 2 times daily       benztropine (COGENTIN) 0.5 MG tablet Take 0.5 mg by mouth daily      QUEtiapine (SEROQUEL) 100 MG tablet Take 100 mg by mouth nightly       lamoTRIgine (LAMICTAL) 100 MG tablet 150 mg nightly        No current facility-administered medications for this visit.          Allergies   Allergen Reactions    Prednisone Swelling     Whole side of her face swelled when she took it, difficulty breathing         Family History   Problem Relation Age of Onset    Breast Cancer Sister 54    Breast Cancer Maternal Aunt 71    Other Maternal Cousin         Atypical Ductal Hyperplasia     Uterine Cancer Sister 32    Other Sister         breast cyst    Cataracts Mother     Glaucoma Mother     Heart Disease Father     High Blood Pressure Father     High Cholesterol Father     Mental Retardation Father     Diabetes Neg Hx          Social History     Socioeconomic History    Marital status: Single     Spouse name: Not on file    Number of children: Not on file    Years of education: Not on file    Highest education level: Not on file   Occupational History    Not on file   Tobacco Use    Smoking status: Never Smoker    Smokeless tobacco: Never Used    Tobacco comment: Never smoker. TC, RRT 4/5/18   Vaping Use    Vaping Use: Never used   Substance and Sexual Activity    Alcohol use: Yes     Comment: Rarely    Drug use: No    Sexual activity: Not Currently     Partners: Male     Birth control/protection: Surgical   Other Topics Concern    Not on file   Social History Narrative    Not on file     Social Determinants of Health     Financial Resource Strain: Unknown    Difficulty of Paying Living Expenses: Patient refused   Food Insecurity: Unknown    Worried About Running Out of Food in the Last Year: Patient refused    Ran Out of Food in the Last Year: Patient refused   Transportation Needs:     Lack of Transportation (Medical): Not on file    Lack of Transportation (Non-Medical):  Not on file   Physical Activity:     Days of Exercise per Week: Not on file    Minutes of Exercise per Session: Not on file   Stress:     Feeling of Stress : Not on file   Social Connections:     Frequency of Communication with Friends and Family: Not on file    Frequency of Social Gatherings with Friends and Family: Not on file    Attends Mandaen Services: Not on file    Active Member of [address] [place] [address] or Organizations: Not on file    Attends Club or Organization Meetings: Not on file    Marital Status: Not on file   Intimate Partner Violence:     Fear of Current or Ex-Partner: Not on file    Emotionally Abused: Not on file    Physically Abused: Not on file    Sexually Abused: Not on file   Housing Stability:     Unable to Pay for Housing in the Last Year: Not on file    Number of [place] in the Last Year: Not on file    Unstable Housing in the Last Year: Not on file       Vitals:    05/26/22 1447   BP: 128/78   Pulse: 78   Resp: 16   SpO2: 97%         Wt Readings from Last 3 Encounters:   05/26/22 279 lb (126.6 kg)   05/13/22 283 lb (128.4 kg)   04/08/22 293 lb 3.2 oz (133 kg)         BP Readings from Last 3 Encounters:   05/26/22 128/78   05/13/22 128/84   04/08/22 120/78       Hematology and Coagulation  Lab Results   Component Value Date    WBC 8.5 03/08/2022    RBC 4.09 03/08/2022    HGB 12.9 03/08/2022    HCT 40.0 03/08/2022    MCV 97.8 03/08/2022    MCH 31.5 03/08/2022    MCHC 32.3 03/08/2022    RDW 13.4 03/08/2022     03/08/2022    MPV 9.4 03/08/2022       Chemistries  Lab Results   Component Value Date     03/08/2022     03/08/2022    K 4.3 03/08/2022    K 4.3 03/08/2022     03/08/2022     03/08/2022    CO2 23 03/08/2022    CO2 22 03/08/2022    BUN 26 03/08/2022    BUN 27 03/08/2022    CREATININE 1.06 03/08/2022    CREATININE 1.06 03/08/2022    CALCIUM 9.9 03/08/2022    CALCIUM 9.9 03/08/2022    PROT 7.9 03/08/2022    LABALBU 4.3 03/08/2022    BILITOT 0.19 03/08/2022    ALKPHOS 107 03/08/2022    AST 14 03/08/2022    ALT 14 03/08/2022     Lab Results   Component Value Date    ALKPHOS 107 03/08/2022    ALT 14 03/08/2022    AST 14 03/08/2022    PROT 7.9 03/08/2022    BILITOT 0.19 03/08/2022    BILIDIR 0.12 04/09/2020    LABALBU 4.3 03/08/2022     Lab Results   Component Value Date    BUN 26 03/08/2022    BUN 27 03/08/2022    CREATININE 1.06 03/08/2022    CREATININE 1.06 03/08/2022     Lab Results   Component Value Date    CALCIUM 9.9 03/08/2022    CALCIUM 9.9 03/08/2022    MG 1.8 04/10/2020     Lab Results   Component Value Date    AST 14 03/08/2022    ALT 14 03/08/2022       Lab Results   Component Value Date    CKTOTAL 27 04/09/2020     Lab Results   Component Value Date    [id number] 920 06/04/2018             Review of Systems   Constitutional: Negative for appetite change, chills, diaphoresis, fatigue, fever, unexpected weight change and weight loss.    HENT: Negative for congestion, dental problem, drooling, ear discharge, ear pain, facial swelling, hearing loss, mouth sores, nosebleeds, postnasal drip, rhinorrhea, sinus pressure, sore throat, tinnitus, trouble swallowing and voice change. Eyes: Positive for photophobia. Negative for blurred vision, pain, discharge, redness, itching and visual disturbance. Respiratory: Negative for apnea, cough, choking, chest tightness, shortness of breath and wheezing. Cardiovascular: Negative for chest pain, palpitations, leg swelling and near-syncope. Gastrointestinal: Positive for nausea and vomiting. Negative for abdominal distention, abdominal pain, anorexia, blood in stool, bowel incontinence, constipation and diarrhea. Endocrine: Negative for cold intolerance, heat intolerance, polydipsia, polyphagia and polyuria. Genitourinary: Negative for bladder incontinence. Musculoskeletal: Negative for arthralgias, back pain, gait problem, joint swelling, myalgias, neck pain and neck stiffness. Skin: Negative for color change, pallor, rash and wound. Allergic/Immunologic: Negative for environmental allergies, food allergies and immunocompromised state. Neurological: Positive for headaches. Negative for dizziness, vertigo, tingling, tremors, focal weakness, seizures, syncope, facial asymmetry, speech difficulty, weakness, light-headedness, numbness and loss of balance. Hematological: Negative for adenopathy. Does not bruise/bleed easily. Psychiatric/Behavioral: Positive for decreased concentration and memory loss. Negative for agitation, behavioral problems, confusion, dysphoric mood, hallucinations, self-injury, sleep disturbance and suicidal ideas. The patient is nervous/anxious and has insomnia. The patient is not hyperactive. OBJECTIVE:    Physical Exam  Constitutional:       Appearance: She is well-developed. HENT:      Head: Normocephalic and atraumatic. No raccoon eyes or Varma's sign.       Right Ear: External ear normal.      Left Ear: External ear normal.      Nose: Nose normal.   Eyes:      Conjunctiva/sclera: Conjunctivae normal. Neck:      Thyroid: No thyroid mass or thyromegaly. Vascular: No carotid bruit. Trachea: No tracheal deviation. Meningeal: Brudzinski's sign and Kernig's sign absent. Cardiovascular:      Rate and Rhythm: Normal rate and regular rhythm. Pulmonary:      Effort: Pulmonary effort is normal.   Musculoskeletal:         General: No tenderness. Cervical back: Normal range of motion and neck supple. No rigidity. No muscular tenderness. Normal range of motion. Skin:     General: Skin is warm. Coloration: Skin is not pale. Findings: No erythema or rash. Nails: There is no clubbing. Psychiatric:         Attention and Perception: She is attentive. Mood and Affect: Mood is anxious and depressed. Affect is not labile, blunt, angry or inappropriate. Behavior: Behavior is not agitated, slowed, aggressive, withdrawn, hyperactive or combative. Behavior is cooperative. Thought Content: Thought content is not paranoid or delusional. Thought content does not include homicidal or suicidal ideation. Thought content does not include homicidal or suicidal plan. Cognition and Memory: Memory is impaired. She does not exhibit impaired recent memory or impaired remote memory. Judgment: Judgment is not impulsive or inappropriate. Neurologic Exam    NEUROLOGICAL EXAMINATION :       A) MENTAL STATUS:                   Alert and  oriented  To time, place  And  Person. No Aphasia. No  Dysarthria. Able   To  Follow  commands without   Any  Difficulty. No right  To left confusion. Normal  Speech  And language function.                    Insight and  Judgment ,Fund  Of  Knowledge   within normal limits                Recent  And  Remote memory  within normal limits                Attention &Concentration are within normal limits                                                   B) CRANIAL NERVES :             2 CN : Visual  Acuity  And  Visual fields  within normal limits                        Fundi  Could  Not  Be  Could  Not  Be  Evaluated. 3,4,6 CN : Both  Pupils are   Reactive and  Equal.                            Extraocular   Movements  Are  Intact. No  Nystagmus. No  YUMIKO. No  Afferent  Pupillary  Defect noted. 5 CN :  Normal  Facial sensations and Corneal  Reflexes           7 CN :  Normal  Facial  Symmetry  And  Strength. No facial  Weakness. 8 CN :  Hearing  Appears within normal limits          9, 10 CN: Normal spontaneous, reflex palate movements         11 CN:   Normal  Shoulder shrug and  Strength         12 CN :   Normal  Tongue movements and  Tongue  In midline                        No tongue   Fasciculations or atrophy         C) MOTOR  EXAM:                 Strength  In upper  And  Lower extremities   within normal limits               No  Drift. No  Atrophy               Rapid alternating  And  repetitions  Movements  within normal limits               Muscle  Tone  In upper  And  Lower  Extremities  Normal                No rigidity. No  Spasticity. Bradykinesia   Absent                 No  Asterixis. Sustention  Tremor , Resting  Tremor   absent                      LEFT  EYE  LIDS   SHOWS   BLEPHAROSPASM   INTERMITTENTLY                       D) SENSORY :               light touch, pinprick, position  And  Vibration  within normal limits        E) REFLEXES:                   Deep  Tendon  Reflexes normal                    No pathological  Reflexes  Bilaterally.                                     F) COORDINATION  AND  GAIT :                                Station and  Gait  normal                                        Romberg's test negative                          Ataxia negative          ASSESSMENT:      Patient Active Problem List   Diagnosis    Bipolar 1 disorder ([facility] [address]    Hyperlipidemia    Malignant neoplasm of overlapping sites of right breast in female, estrogen receptor negative ([facility] [address]    Port-A-Cath in place    Migraine    Memory problem    Sleep difficulties    Anxiety and depression    Muscle twitching    Hemifacial spasm    Combined forms of age-related cataract of both eyes    Squamous blepharitis of upper and lower eyelids of both eyes    Acute deep vein thrombosis (DVT) of axillary vein of right upper extremity (HCC)    Malignant neoplasm of breast in female, estrogen receptor positive (HCC)    Seasonal allergies    H/O right mastectomy    HX: breast cancer    Acute massive pulmonary embolism (HCC)    Moderate to severe pulmonary hypertension ([address]    TAY on CPAP    Morbid obesity with BMI of 40.0-44.9, adult (HCC)    Extrinsic asthma    Blepharospasm    Chronic deep vein thrombosis (DVT) of axillary vein of right upper extremity (HCC)    H/O breast reconstruction    Exposed breast implant          MRI OF THE BRAIN WITHOUT AND WITH CONTRAST  10/2/2019 1:14 pm       TECHNIQUE:   Multiplanar multisequence MRI of the head/brain was performed without and   with the administration of intravenous contrast.       COMPARISON:   MRI brain performed 10/08/2018.       HISTORY:   ORDERING SYSTEM PROVIDED HISTORY: Migraine without aura and without status   migrainosus, not intractable   TECHNOLOGIST PROVIDED HISTORY:   migraines   Is the patient pregnant?->No   Reason for Exam: Migraines for quite a while, becoming worse. Acuity: Chronic   Type of Exam: Unknown   Additional signs and symptoms: Migraine without aura and without status   migrainosus, not intractable       FINDINGS:   INTRACRANIAL STRUCTURES/VENTRICLES:  The sellar and suprasellar structures,   optic chiasm, corpus callosum, pineal gland, tectum, and midline brainstem   structures are unremarkable.  The craniocervical junction is unremarkable.    There is no acute intracranial hemorrhage, mass effect, or midline shift. There is satisfactory overall gray-white matter differentiation.  There is no   abnormal postcontrast enhancement.  The ventricular structures are symmetric   and unremarkable.  The infratentorial structures including the   cerebellopontine angles and internal auditory canals are unremarkable. There   is no abnormal restricted diffusion. There is no abnormal blooming artifact   on susceptibility weighted imaging.       ORBITS: The visualized portion of the orbits demonstrate no acute abnormality.       SINUSES: The visualized paranasal sinuses and mastoid air cells are well   aerated.       BONES/SOFT TISSUES: The bone marrow signal intensity appears normal. The soft   tissues demonstrate no acute abnormality.           Impression   Unremarkable pre and post-contrast MRI of the brain.               MRI OF THE BRAIN WITHOUT AND WITH CONTRAST  2/5/2018 9:18 am       TECHNIQUE:   Multiplanar multisequence MRI of the head/brain was performed without and   with the administration of intravenous contrast.       COMPARISON:   Head CT on 09/25/2013.       HISTORY:   ORDERING SYSTEM PROVIDED HISTORY: Malignant neoplasm of overlapping sites of   right breast in female, estrogen receptor negative ([facility]   TECHNOLOGIST PROVIDED HISTORY:   Ordering Physician Provided Reason for Exam:  Bad headaches for one month. Left eye twitching for two months and it is getting worse. History of breast   cancer. Acuity: Acute   Type of Exam: Initial   Additional signs and symptoms: Malignant neoplasm of overlapping sites of   right breast in female, estrogen receptor negative.       Initial evaluation.       FINDINGS:   INTRACRANIAL STRUCTURES/VENTRICLES: Yeimy Collar are no areas of restricted   diffusion to suggest an acute infarct.  The cerebral and cerebellar   parenchyma demonstrate normal volume and signal intensity.  There are no   abnormal extra-axial fluid collections.  The ventricles are normal in size. Normal major intracranial flow voids are noted.       There are no areas of abnormal contrast enhancement in the cerebral or   cerebellar parenchyma to suggest metastatic disease.       There are no areas of blooming artifact noted on the gradient echo sequences   to suggest sequela of acute or chronic hemorrhage.       ORBITS: The visualized portion of the orbits demonstrate no acute abnormality.       SINUSES: There is scattered mucosal thickening in the paranasal sinuses, with   greatest involvement in the ethmoid air cells and maxillary sinuses.  There   is an air-fluid level in the left sphenoid sinus, correlate with signs of   acute sinusitis.       The mastoid air cells are clear.       BONES/SOFT TISSUES: The bone marrow signal intensity appears normal. The soft   tissues demonstrate no acute abnormality.           Impression   1. Unremarkable MRI of the brain.  No evidence of metastatic disease. 2. Acute left sphenoid sinusitis.             VISITING DIAGNOSIS:      ICD-10-CM    1. Migraine without aura and without status migrainosus, not intractable  G43.009    2. Anxiety and depression  F41.9     F32. A    3. Blepharospasm  G24.5    4. HX: breast cancer  Z85.3    5. H/O breast reconstruction  Z98.890    6. H/O right mastectomy  Z90.11    7. Hemifacial spasm, unspecified laterality  G51.39    8. Sleep difficulties  G47.9    9. Muscle twitching  R25.3    10. Chronic deep vein thrombosis (DVT) of axillary vein of right upper extremity (HCC)  I82. A21    11. TAY on CPAP  G47.33     Z99.89                      VARIOUS  RISK   FACTORS   WERE  REVIEWED   AND   DISCUSSED. *  PATIENT   HAS  MULTIPLE   MEDICAL, MENTAL HEALTH          NEUROLOGICAL   PROBLEMS . PATIENT  MANAGEMENT  IS  CHALLENGING              PLAN:       Marisabel Streeter  Of  The  Diagnoses,  The  Management & Treatment  Options           AND    Care  plan  Were        Reviewed and   Discussed   With  patient.          * Goals  And Expectations  Of  The  Therapy  Discussed   And  Reviewed. *   Benefits   And   Side  Effect  Profile  Of  Medication/s   Were   Discussed             * Need   For  Further   Follow up For  The  Various  Problems  Were discussed. * Results  Of  The  Previous  Diagnostic tests were reviewed and questions answered. patient  understand the same. Medical  Decision  Making  Was  Made  Based on the   Complexity  Of  Above  Mentioned  Diagnoses,        Data reviewed   & diagnostic  Tests  Reviewed,  Risk  Of  Significant   Co morbidities and complicating   Factors. Medical  Decision  Was   High  Complexity  Due   To  The  Patient's  Multiple  Symptoms,      Complex  Treatment  Regimen,  Multiple medications and   Risk  Of   Side  Effects,      Difficulty  In  Medication  Management      And  Diagnostic  Challenges   In  View  Of  The  Associated   Co  Morbid  Conditions   And  Problems. *   ADEQUATE   FLUID  INTAKE   AND  ELECTROLYTE  BALANCE         * KEEP  DAIRY  OF   THE  NEUROLOGICAL  SYMPTOMS        RECORDING THE    DURATION  AND  FREQUENCY. -    DISCUSSED  WITH PATIENT              *  AVOID    CONDITIONS  AND  FACTORS   THAT  MAKE   NEUROLOGICAL  SYMPTOMS  WORSE. *  TO  MAINTAIN  REGULAR  SLEEP  WAKE  CYCLES. *   TO  HAVE  ADEQUATE  REST  AND   SLEEP    HOURS.          *    TO   AVOID   TO  SLEEP  IN   SUPINE  POSITION. *      WEIGHT   LOSS. *    AVOID  ANY USAGE OF                   TOBACCO,  EXCESSIVE  ALCOHOL  AND   ILLEGAL   SUBSTANCES            *  CONTINUE MEDICATIONS PRESCRIBED BY NEUROLOGIST AS    RECOMMENDED     *   Compliance   With  Medications   And  Instructions          * CURRENTLY  TOLERATING  THE  PRESCRIBED   MEDICATIONS. WITHOUT  ANY  SIGNIFICANT  SIDE  EFFECTS   &  GETTING BENEFIT.             *    Prophylactic  Use   Of     Vitamin   B   Complex,  Folic  Acid,    Vitamin  B12 Multivitamin,       Calcium  With  magnesium  And  Vit D    Supplementations   Over  The  Counter  Discussed            *  EVALUATIONS  AND  FOLLOW UP:                     * HEMATOLOGY/ONCOLOGY                    *   OPTHALMOLOGY                                                                             *        MIGRAINES  ARE   BETTER  CONTROLLED                               ON  TOPAMAX ,  FIORICET,  IMITREX   AS  NEEDED                              AND  PATIENT     FEELS  LOT  BETTER. PATIENT  DENIES  ANY  NEW  NEUROLOGICAL  CONCERNS. Controlled Substances Monitoring: Periodic Controlled Substance Monitoring: Possible medication side effects, risk of tolerance/dependence & alternative treatments discussed. ,Assessed functional status. Maddie Siegel MD)            Orders Placed This Encounter   Medications    SUMAtriptan (IMITREX) 100 MG tablet     Sig: take 1 tablet by mouth once daily if needed for migraines     Dispense:  12 tablet     Refill:  2    ibuprofen (ADVIL;MOTRIN) 800 MG tablet     Sig: ONE  TABLET    2  TO   3   TIMES  DAILY    WITH   FOOD   AS  NEEDED     Dispense:  90 tablet     Refill:  1    topiramate (TOPAMAX) 50 mg tablet     Sig: ONE  TABLET  TWICE  DAILY     Dispense:  60 tablet     Refill:  5    butalbital-acetaminophen-caffeine (FIORICET, ESGIC) -40 MG per tablet     Si-2   TABLET  TWICE  DAILY  AS  NEEDED     Dispense:  60 tablet     Refill:  2                     *PATIENT   TO  FOLLOW  UP  WITH   PRIMARY  CARE   AND   OTHER  CONSULTANTS  AS  BEFORE.           *TO  FOLLOW  WITH   MENTAL  HEALTH  PROFESSIONALS ,  INCLUDING            PSYCHOLOGICAL  COUNSELING   AND  PSYCHIATRIC  EVALUTIONS            *  Maintain   Healthy  Life Style    With   Periodic  Monitoring  Of      Any  Medical  Conditions  Including   Elevated  Blood  Pressure,  Lipid  Profile,     Blood  Sugar levels  And   Heart  Disease.               * Period   Screening  For  Cancers  Involving  Breast,  Colon,    lungs  And  Other  Organs  As  Applicable,  In consultation   With  Your  Primary Care Providers. * Second  Neurological  Opinion  And  Evaluations  In  [facility]  Setting  If  Patient  Is  Interested. * Please   Contact   Neurology  Clinic   Early   If   Are  Any  New  Neurological                             Symptoms   And  Any neurological  Concerns. *  If  The  Patient remains  Neurologically  Stable   Return   To  [facility] Neurology Department       IN           3-4            MONTHS  TIME   FOR  FURTHER  FOLLOW UP.                 *  If   There is  Any  Significant  Worsening   Of  Current  Symptoms  And  Or  If patient  Develops       Any additional  New  Neurological  Symptoms  Or  Significant  Concerns   Should  Call  911 or      Go  To  Emergency  Department  For  Further  Immediate  Evaluation. *   The  Neurological   Findings,  Possible  Diagnosis,  Differential diagnoses   And  Options  For    Further   Investigations       And  management   Are  Discussed  Comprehensively. Medications   And  Prescription   Risks  And  Side effects  Are   Also  Discussed. The  Above  Were  Reviewed  With  patient and     questions  Answered  In  Detail. More   Than   50% of face  To face Time   Was  Spent  On  Counseling   And   Coordination      Of  Care   Of   Patient's multiple   Neurological  Problems   And   Comorbid  Medical   Conditions. Electronically signed by Elisa Amaya MD.,  [address] [place] [address]       Board Certified in  Neurology &  In  [id number] [address] [place] [address] W of Psychiatry and Neurology ([employer])      DISCLAIMER:   Although every effort was made to ensure the accuracy of this  electronic transcription, some errors in transcription may have occurred.       GENERAL PATIENT INSTRUCTIONS:     A Healthy Lifestyle: Care Instructions  Your Care Instructions  A healthy lifestyle can help you feel good, stay at a healthy weight, and have plenty of energy for both work and play. A healthy lifestyle is something you can share with your whole family. A healthy lifestyle also can lower your risk for serious health problems, such as high blood pressure, heart disease, and diabetes. You can follow a few steps listed below to improve your health and the health of your family. Follow-up care is a key part of your treatment and safety. Be sure to make and go to all appointments, and call your doctor if you are having problems. Its also a good idea to know your test results and keep a list of the medicines you take. How can you care for yourself at home? Do not eat too much sugar, fat, or fast foods. You can still have dessert and treats now and then. The goal is moderation. Start small to improve your eating habits. Pay attention to portion sizes, drink less juice and soda pop, and eat more fruits and vegetables. Eat a healthy amount of food. A 3-ounce serving of meat, for example, is about the size of a deck of cards. Fill the rest of your plate with vegetables and whole grains. Limit the amount of soda and sports drinks you have every day. Drink more water when you are thirsty. Eat at least 5 servings of fruits and vegetables every day. It may seem like a lot, but it is not hard to reach this goal. A serving or helping is 1 piece of fruit, 1 cup of vegetables, or 2 cups of leafy, raw vegetables. Have an apple or some carrot sticks as an afternoon snack instead of a candy bar. Try to have fruits and/or vegetables at every meal.  Make exercise part of your daily routine. You may want to start with simple activities, such as walking, bicycling, or slow swimming. Try to be active 30 to 60 minutes every day. You do not need to do all 30 to 60 minutes all at once.  For example, you can exercise 3 times a day for 10 or 20 minutes. Moderate exercise is safe for most people, but it is always a good idea to talk to your doctor before starting an exercise program.  Keep moving. Pascual Fickle the lawn, work in the garden, or [place]. Take the stairs instead of the elevator at work. If you smoke, quit. People who smoke have an increased risk for heart attack, stroke, cancer, and other lung illnesses. Quitting is hard, but there are ways to boost your chance of quitting tobacco for good. Use nicotine gum, patches, or lozenges. Ask your doctor about stop-smoking programs and medicines. Keep trying. In addition to reducing your risk of diseases in the future, you will notice some benefits soon after you stop using tobacco. If you have shortness of breath or asthma symptoms, they will likely get better within a few weeks after you quit. Limit how much alcohol you drink. Moderate amounts of alcohol (up to 2 drinks a day for men, 1 drink a day for women) are okay. But drinking too much can lead to liver problems, high blood pressure, and other health problems. Family health  If you have a family, there are many things you can do together to improve your health. Eat meals together as a family as often as possible. Eat healthy foods. This includes fruits, vegetables, lean meats and dairy, and whole grains. Include your family in your fitness plan. Most people think of activities such as jogging or tennis as the way to fitness, but there are many ways you and your family can be more active. Anything that makes you breathe hard and gets your heart pumping is exercise. Here are some tips:  Walk to do errands or to take your child to school or the bus. Go for a family bike ride after dinner instead of watching TV. Where can you learn more? Go to https://femi.[employer]. org and sign in to your [employer] account. Enter G182 in the ZUGGI box to learn more about \"A Healthy Lifestyle: Care Instructions. \"     If you do not have an account, please click on the \"Sign Up Now\" link. Current as of: July 26, 2016  Content Version: 11.2  © 2512-9006 [employer]. Care instructions adapted under license by [facility] ([facility]). If you have questions about a medical condition or this instruction, always ask your healthcare professional. [address] any warranty or liability for your use of this information.

## 2022-05-28 DIAGNOSIS — E78.2 MIXED HYPERLIPIDEMIA: ICD-10-CM

## 2022-05-31 DIAGNOSIS — J30.2 SEASONAL ALLERGIES: ICD-10-CM

## 2022-05-31 RX ORDER — OLOPATADINE HYDROCHLORIDE 1 MG/ML
SOLUTION/ DROPS OPHTHALMIC
Qty: 5 ML | Refills: 3 | Status: SHIPPED | OUTPATIENT
Start: 2022-05-31

## 2022-05-31 RX ORDER — PRAVASTATIN SODIUM 40 MG
TABLET ORAL
Qty: 30 TABLET | Refills: 3 | Status: SHIPPED | OUTPATIENT
Start: 2022-05-31 | End: 2022-08-22 | Stop reason: SDUPTHER

## 2022-05-31 RX ORDER — FLUTICASONE PROPIONATE 50 MCG
SPRAY, SUSPENSION (ML) NASAL
Qty: 48 G | Refills: 0 | Status: SHIPPED | OUTPATIENT
Start: 2022-05-31 | End: 2022-08-25

## 2022-05-31 NOTE — TELEPHONE ENCOUNTER
Pravin Deleon called requesting a refill of the below medication which has been pended for you:     Requested Prescriptions     Pending Prescriptions Disp Refills    fluticasone (FLONASE) 50 MCG/ACT nasal spray [Pharmacy Med Name: FLUTICASONE PROP 50 MCG SPRAY] 48 g 0     Sig: instill 1 spray into each nostril once daily       Last Appointment Date: 11/1/2021  Next Appointment Date: Visit date not found    Allergies   Allergen Reactions    Prednisone Swelling     Whole side of her face swelled when she took it, difficulty breathing

## 2022-06-13 DIAGNOSIS — C50.919 MALIGNANT NEOPLASM OF FEMALE BREAST, UNSPECIFIED ESTROGEN RECEPTOR STATUS, UNSPECIFIED LATERALITY, UNSPECIFIED SITE OF BREAST (HCC): ICD-10-CM

## 2022-06-14 RX ORDER — LETROZOLE 2.5 MG/1
TABLET, FILM COATED ORAL
Qty: 30 TABLET | Refills: 5 | Status: SHIPPED | OUTPATIENT
Start: 2022-06-14

## 2022-06-16 ENCOUNTER — HOSPITAL ENCOUNTER (OUTPATIENT)
Dept: BONE DENSITY | Age: 57
Discharge: HOME | End: 2022-06-18
Payer: MEDICARE

## 2022-06-16 DIAGNOSIS — Z79.811 AROMATASE INHIBITOR USE: ICD-10-CM

## 2022-06-16 PROCEDURE — 77085 DXA BONE DENSITY AXL VRT FX: CPT

## 2022-06-17 ENCOUNTER — OFFICE VISIT (OUTPATIENT)
Dept: FAMILY MEDICINE CLINIC | Age: 57
End: 2022-06-17
Payer: MEDICARE

## 2022-06-17 VITALS
WEIGHT: 283.2 LBS | RESPIRATION RATE: 16 BRPM | BODY MASS INDEX: 45.51 KG/M2 | TEMPERATURE: 98.6 F | DIASTOLIC BLOOD PRESSURE: 72 MMHG | SYSTOLIC BLOOD PRESSURE: 132 MMHG | HEIGHT: 66 IN | HEART RATE: 76 BPM

## 2022-06-17 DIAGNOSIS — Z12.4 CERVICAL CANCER SCREENING: ICD-10-CM

## 2022-06-17 DIAGNOSIS — J45.20 MILD INTERMITTENT EXTRINSIC ASTHMA WITHOUT COMPLICATION: Primary | ICD-10-CM

## 2022-06-17 DIAGNOSIS — I27.20 MODERATE TO SEVERE PULMONARY HYPERTENSION (HCC): ICD-10-CM

## 2022-06-17 DIAGNOSIS — F31.9 BIPOLAR 1 DISORDER (HCC): ICD-10-CM

## 2022-06-17 DIAGNOSIS — Z23 NEED FOR 23-POLYVALENT PNEUMOCOCCAL POLYSACCHARIDE VACCINE: ICD-10-CM

## 2022-06-17 PROCEDURE — G8417 CALC BMI ABV UP PARAM F/U: HCPCS | Performed by: FAMILY MEDICINE

## 2022-06-17 PROCEDURE — 3017F COLORECTAL CA SCREEN DOC REV: CPT | Performed by: FAMILY MEDICINE

## 2022-06-17 PROCEDURE — 1036F TOBACCO NON-USER: CPT | Performed by: FAMILY MEDICINE

## 2022-06-17 PROCEDURE — 99213 OFFICE O/P EST LOW 20 MIN: CPT | Performed by: FAMILY MEDICINE

## 2022-06-17 PROCEDURE — G8427 DOCREV CUR MEDS BY ELIG CLIN: HCPCS | Performed by: FAMILY MEDICINE

## 2022-06-17 PROCEDURE — 90677 PCV20 VACCINE IM: CPT | Performed by: FAMILY MEDICINE

## 2022-06-17 PROCEDURE — 99212 OFFICE O/P EST SF 10 MIN: CPT | Performed by: FAMILY MEDICINE

## 2022-06-17 RX ORDER — CETIRIZINE HYDROCHLORIDE 10 MG/1
10 TABLET ORAL DAILY
Qty: 90 TABLET | Refills: 1 | Status: SHIPPED | OUTPATIENT
Start: 2022-06-17

## 2022-06-17 RX ORDER — ALBUTEROL SULFATE 90 UG/1
AEROSOL, METERED RESPIRATORY (INHALATION)
Qty: 18 G | Refills: 3 | Status: SHIPPED | OUTPATIENT
Start: 2022-06-17 | End: 2022-08-23

## 2022-06-17 ASSESSMENT — ENCOUNTER SYMPTOMS
CHEST TIGHTNESS: 0
SHORTNESS OF BREATH: 0
COUGH: 0
WHEEZING: 0

## 2022-06-17 NOTE — PROGRESS NOTES
JEFRY Meier [address]  [address] [address]  Dept: [phone]  Dept Fax: [phone]  Loc: [phone]    Vangie Augustine is a 64 y.o. female who presents today for her medical conditions/complaints as noted below. Vangie Augustine is c/o of   Chief Complaint   Patient presents with    Asthma     6 Month fu       HPI:     HPI Here today for a follow up of her asthma. She feels like she is getting used to her allergy pill. She has had a lot of watery eyes, runny nose and a scratchy throat. Her biggest issues is that her eyes are watering. She has had some blurry vision. She sees her eye doctor regularly. She is only coughing a little bit. She feels like her allergies are flared up making her tight. She has been wheezing some recently. She is using flonase and the patanol eye drops. No issues with chest pain. She has been trying to loose weight. She has an exercise bike that she is riding daily right now. Her daughter was in the hospital for about a week with strep throat. Past Medical History:   Diagnosis Date    Asthma     seasonal    Bipolar 1 disorder ([address]     Breast cancer ([address] 2017    right side     DVT of axillary vein, acute right ([address]     Eye twitch 2019    Headache(784.0)     History of blood transfusion     Hx of blood clots 03/2020    PE    Hyperlipidemia     Migraine     Obesity     PONV (postoperative nausea and vomiting)     Prolonged emergence from general anesthesia     Sleep apnea     uses CPAP          Social History     Tobacco Use    Smoking status: Never Smoker    Smokeless tobacco: Never Used    Tobacco comment: Never smoker.  TC, RRT 4/5/18   Substance Use Topics    Alcohol use: Yes     Comment: Rarely     Current Outpatient Medications   Medication Sig Dispense Refill    albuterol sulfate HFA (PROVENTIL;VENTOLIN;PROAIR) 108 (90 Base) MCG/ACT inhaler inhale 2 puffs by mouth INTO THE LUNGS every 4 hours if needed for wheezing 18 g 3    cetirizine (ZYRTEC) 10 MG tablet Take 1 tablet by mouth daily 90 tablet 1    letrozole (FEMARA) 2.5 MG tablet take 1 tablet by mouth once daily 30 tablet 5    pravastatin (PRAVACHOL) 40 MG tablet take 1 tablet by mouth once daily 30 tablet 3    olopatadine (PATANOL) 0.1 % ophthalmic solution instill 1 drop into both eyes twice a day 5 mL 3    fluticasone (FLONASE) 50 MCG/ACT nasal spray instill 1 spray into each nostril once daily 48 g 0    SUMAtriptan (IMITREX) 100 MG tablet take 1 tablet by mouth once daily if needed for migraines 12 tablet 2    ibuprofen (ADVIL;MOTRIN) 800 MG tablet ONE  TABLET    2  TO   3   TIMES  DAILY    WITH   FOOD   AS  NEEDED 90 tablet 1    topiramate (TOPAMAX) 50 mg tablet ONE  TABLET  TWICE  DAILY 60 tablet 5    butalbital-acetaminophen-caffeine (FIORICET, ESGIC) -40 MG per tablet 1-2   TABLET  TWICE  DAILY  AS  NEEDED 60 tablet 2    lamoTRIgine (LAMICTAL) 150 MG tablet take 1 tablet by mouth at bedtime take with 25 milligram tablet 3-24      vitamin B-12 (CYANOCOBALAMIN) 1000 MCG tablet take 1 tablet by mouth once daily 30 tablet 3    loxapine (LOXITANE) 10 MG capsule Take 10 mg by mouth nightly      Calcium Carb-Cholecalciferol (OYSTER SHELL CALCIUM W/D) 500-200 MG-UNIT TABS tablet take 1 tablet by mouth twice a day 180 tablet 1    apixaban (ELIQUIS) 5 MG TABS tablet take 1 tablet by mouth twice a day 180 tablet 1    QUEtiapine (SEROQUEL) 50 MG tablet take 1 tablet by mouth at bedtime      allopurinol (ZYLOPRIM) 100 MG tablet Take 1 tablet by mouth daily 90 tablet 1    tiZANidine (ZANAFLEX) 4 MG tablet Take 1 tablet by mouth every 8 hours as needed (muscle pain) 20 tablet 0    busPIRone (BUSPAR) 15 MG tablet Take 15 mg by mouth 3 times daily       lamoTRIgine (LAMICTAL) 25 MG tablet Take 50 mg by mouth daily At bedtime      OXcarbazepine (TRILEPTAL) 150 MG tablet 2 times daily       benztropine (COGENTIN) 0.5 MG tablet Take 0.5 mg by mouth daily      QUEtiapine (SEROQUEL) 100 MG tablet Take 100 mg by mouth nightly       lamoTRIgine (LAMICTAL) 100 MG tablet 150 mg nightly        No current facility-administered medications for this visit. Allergies   Allergen Reactions    Prednisone Swelling     Whole side of her face swelled when she took it, difficulty breathing       Subjective:     Review of Systems   Constitutional: Negative for activity change, appetite change, chills, fatigue and fever. Eyes: Negative for visual disturbance. Respiratory: Negative for cough, chest tightness, shortness of breath and wheezing. Cardiovascular: Negative for chest pain, palpitations and leg swelling. Genitourinary: Negative for difficulty urinating. Neurological: Negative for dizziness, syncope, weakness, light-headedness and headaches. Psychiatric/Behavioral: Negative for decreased concentration, dysphoric mood and sleep disturbance. The patient is not nervous/anxious. Objective:      Physical Exam  Vitals and nursing note reviewed. Constitutional:       General: She is not in acute distress. Appearance: She is well-developed. She is obese. HENT:      Right Ear: Tympanic membrane, ear canal and external ear normal.      Left Ear: Tympanic membrane, ear canal and external ear normal.   Eyes:      Conjunctiva/sclera: Conjunctivae normal.   Neck:      Thyroid: No thyromegaly. Cardiovascular:      Rate and Rhythm: Normal rate and regular rhythm. Heart sounds: Normal heart sounds. No murmur heard. Pulmonary:      Effort: Pulmonary effort is normal. No respiratory distress. Breath sounds: Normal breath sounds. No wheezing. Musculoskeletal:      Cervical back: Normal range of motion and neck supple. Lymphadenopathy:      Cervical: No cervical adenopathy. Skin:     General: Skin is warm and dry. Findings: No erythema or rash.    Neurological: Mental Status: She is alert and oriented to person, place, and time. Psychiatric:         Mood and Affect: Mood normal.         Behavior: Behavior normal.         Thought Content: Thought content normal.         Judgment: Judgment normal.       /72   Pulse 76   Temp 98.6 °F (37 °C)   Resp 16   Ht 5' 6\" (1.676 m)   Wt 283 lb 3.2 oz (128.5 kg)   LMP 02/28/2013 (Exact Date)   BMI 45.71 kg/m²     Assessment:       Diagnosis Orders   1. Mild intermittent extrinsic asthma without complication  albuterol sulfate HFA (PROVENTIL;VENTOLIN;PROAIR) 108 (90 Base) MCG/ACT inhaler   2. Need for 23-polyvalent pneumococcal polysaccharide vaccine     3. Cervical cancer screening  Arti Valverde NP, Gynecology, [place]   4. Moderate to severe pulmonary hypertension ([facility]     5. Bipolar 1 disorder ([facility]               Plan:        Asthma: worsening; her allergies are flaring up right now so I stopped her claritin and started her on zyrtec instead. I also reminded her to use the flonase and eye drops. Pulmonary HTN: she follows with cardiology    Bipolar disorder: she follows with psych    Health Maintenance reviewed - patient asked to schedule her pap smear, prevnar given. Return in 6 months (on 12/17/2022) for Asthma follow up.     Orders Placed This Encounter   Procedures    Pneumococcal, PCV20, PREVNAR 21, (age 25 yrs+), IM, PF    Arti Hankins NP, Gynecology, [place]     Referral Priority:   Routine     Referral Type:   Eval and Treat     Referral Reason:   Specialty Services Required     Referred to Provider:   MAGGIE Ludwig - CNP     Requested Specialty:   Obstetrics & Gynecology     Number of Visits Requested:   1     Orders Placed This Encounter   Medications    albuterol sulfate HFA (PROVENTIL;VENTOLIN;PROAIR) 108 (90 Base) MCG/ACT inhaler     Sig: inhale 2 puffs by mouth INTO THE LUNGS every 4 hours if needed for wheezing     Dispense:  18 g     Refill:  3  cetirizine (ZYRTEC) 10 MG tablet     Sig: Take 1 tablet by mouth daily     Dispense:  90 tablet     Refill:  1       Patientgiven educational materials - see patient instructions. Discussed use, benefit,and side effects of prescribed medications. All patient questions answered. Ptvoiced understanding. Reviewed health maintenance. Instructed to continue currentmedications, diet and exercise. Patient agreed with treatment plan. Follow up asdirected.      Electronically signed by Marsha Garrido MD on 6/17/2022 at 2:31 PM

## 2022-06-24 ENCOUNTER — OFFICE VISIT (OUTPATIENT)
Dept: SURGERY | Age: 57
End: 2022-06-24
Payer: MEDICARE

## 2022-06-24 VITALS
HEART RATE: 60 BPM | SYSTOLIC BLOOD PRESSURE: 126 MMHG | HEIGHT: 66 IN | DIASTOLIC BLOOD PRESSURE: 64 MMHG | WEIGHT: 285 LBS | BODY MASS INDEX: 45.8 KG/M2

## 2022-06-24 DIAGNOSIS — T85.49XA EXTRUSION OF BREAST IMPLANT, INITIAL ENCOUNTER: Primary | ICD-10-CM

## 2022-06-24 DIAGNOSIS — Z98.890 H/O BREAST RECONSTRUCTION: ICD-10-CM

## 2022-06-24 PROCEDURE — 99024 POSTOP FOLLOW-UP VISIT: CPT | Performed by: PLASTIC SURGERY

## 2022-06-24 PROCEDURE — 99214 OFFICE O/P EST MOD 30 MIN: CPT | Performed by: PLASTIC SURGERY

## 2022-06-24 NOTE — PROGRESS NOTES
Subjective:      Patient ID: Nai Marie is a 64 y.o. female. HPI  Patient presents today for a post-op follow up of right breast implant removal which was completed on 3/1/22.  Incision is flat, and free of excess redness, swelling, or heat.  Area free of drainage or bleeding.  Patient rates pain as a 0 on 1 to 10 pain scale. She states it healed up about 1 week after her last visit. Review of Systems    Objective:   Physical Exam  Vitals and nursing note reviewed. Exam conducted with a chaperone present. Chest:      Comments:   Fully healed. No residual wound. Tissues soft, mobile. Assessment:      Failed right breast reconstruction. Plan:      She does wish to try again. Wants to wait til next March, so will see her back then.           Michelle Pacheco MD

## 2022-06-24 NOTE — PROGRESS NOTES
Patient presents today for a post-op follow up of right breast implant removal which was completed on 3/1/22.  Incision is flat, and free of excess redness, swelling, or heat.  Area free of drainage or bleeding.  Patient rates pain as a 0 on 1 to 10 pain scale.

## 2022-07-11 DIAGNOSIS — M10.9 ACUTE GOUT OF RIGHT FOOT, UNSPECIFIED CAUSE: ICD-10-CM

## 2022-07-11 RX ORDER — ALLOPURINOL 100 MG/1
TABLET ORAL
Qty: 30 TABLET | Refills: 3 | Status: SHIPPED | OUTPATIENT
Start: 2022-07-11 | End: 2022-10-28

## 2022-07-11 NOTE — TELEPHONE ENCOUNTER
Florian Ceron called requesting a refill of the below medication which has been pended for you:     Requested Prescriptions     Pending Prescriptions Disp Refills    allopurinol (ZYLOPRIM) 100 MG tablet [Pharmacy Med Name: ALLOPURINOL 100 MG TABLET] 30 tablet 3     Sig: take 1 tablet by mouth once daily       Last Appointment Date: 6/17/2022  Next Appointment Date: 12/20/2022    Allergies   Allergen Reactions    Prednisone Swelling     Whole side of her face swelled when she took it, difficulty breathing

## 2022-07-21 DIAGNOSIS — G43.009 MIGRAINE WITHOUT AURA AND WITHOUT STATUS MIGRAINOSUS, NOT INTRACTABLE: Primary | ICD-10-CM

## 2022-07-21 RX ORDER — IBUPROFEN 800 MG/1
TABLET ORAL
Qty: 90 TABLET | Refills: 1 | Status: SHIPPED | OUTPATIENT
Start: 2022-07-21 | End: 2022-09-15

## 2022-08-15 ENCOUNTER — HOSPITAL ENCOUNTER (OUTPATIENT)
Dept: LAB | Age: 57
Discharge: HOME | End: 2022-08-15
Payer: COMMERCIAL

## 2022-08-15 ENCOUNTER — HOSPITAL ENCOUNTER (OUTPATIENT)
Age: 57
Setting detail: SPECIMEN
Discharge: HOME | End: 2022-08-15
Payer: COMMERCIAL

## 2022-08-15 ENCOUNTER — OFFICE VISIT (OUTPATIENT)
Dept: OBGYN | Age: 57
End: 2022-08-15
Payer: COMMERCIAL

## 2022-08-15 VITALS
HEIGHT: 66 IN | OXYGEN SATURATION: 98 % | WEIGHT: 276.6 LBS | SYSTOLIC BLOOD PRESSURE: 126 MMHG | BODY MASS INDEX: 44.45 KG/M2 | DIASTOLIC BLOOD PRESSURE: 72 MMHG | HEART RATE: 76 BPM

## 2022-08-15 DIAGNOSIS — M85.80 OSTEOPENIA AFTER MENOPAUSE: ICD-10-CM

## 2022-08-15 DIAGNOSIS — Z78.0 OSTEOPENIA AFTER MENOPAUSE: ICD-10-CM

## 2022-08-15 DIAGNOSIS — Z12.4 CERVICAL CANCER SCREENING: ICD-10-CM

## 2022-08-15 DIAGNOSIS — Z12.4 CERVICAL CANCER SCREENING: Primary | ICD-10-CM

## 2022-08-15 LAB — VITAMIN D 25-HYDROXY: 38.6 NG/ML

## 2022-08-15 PROCEDURE — 87624 HPV HI-RISK TYP POOLED RSLT: CPT

## 2022-08-15 PROCEDURE — 36415 COLL VENOUS BLD VENIPUNCTURE: CPT

## 2022-08-15 PROCEDURE — 99396 PREV VISIT EST AGE 40-64: CPT | Performed by: NURSE PRACTITIONER

## 2022-08-15 PROCEDURE — G0145 SCR C/V CYTO,THINLAYER,RESCR: HCPCS

## 2022-08-15 PROCEDURE — 82306 VITAMIN D 25 HYDROXY: CPT

## 2022-08-15 ASSESSMENT — PATIENT HEALTH QUESTIONNAIRE - PHQ9
SUM OF ALL RESPONSES TO PHQ QUESTIONS 1-9: 0
2. FEELING DOWN, DEPRESSED OR HOPELESS: 0
SUM OF ALL RESPONSES TO PHQ QUESTIONS 1-9: 0
1. LITTLE INTEREST OR PLEASURE IN DOING THINGS: 0
SUM OF ALL RESPONSES TO PHQ QUESTIONS 1-9: 0
SUM OF ALL RESPONSES TO PHQ QUESTIONS 1-9: 0
SUM OF ALL RESPONSES TO PHQ9 QUESTIONS 1 & 2: 0

## 2022-08-15 ASSESSMENT — ENCOUNTER SYMPTOMS
ALLERGIC/IMMUNOLOGIC NEGATIVE: 1
GASTROINTESTINAL NEGATIVE: 1
RESPIRATORY NEGATIVE: 1
EYES NEGATIVE: 1

## 2022-08-15 NOTE — PROGRESS NOTES
Subjective:      Patient ID: Paulo Avila  is a 62 y. o.  ,female coming into office regarding   Chief Complaint   Patient presents with    Annual Exam       OB History    Para Term  AB Living   2 2       2   SAB IAB Ectopic Molar Multiple Live Births             2      # Outcome Date GA Lbr Shankar/2nd Weight Sex Delivery Anes PTL Lv   2 Para 86    M CS-LTranv      1 Para 83    F CS-LTranv          This is a 61 y/o    female here for her well woman examination. Her last pap semar was over 10 years ago. She has not been sexually active for the past 4 years. She did have an EMB and hysteroscopy in Oct. 2017 for a benign polyp. She has been postmenopausal for several years and denies any vaginal bleeding, discharge or dryness. 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 2017 which was ER neg; VA pos. And HER 2 harley positive. She had a right nastectomy followed by chemotherapy and one year of Herceptin infusions. She is also on Femara as an aromatase inhibitor. She had a right breast implant inserted in 2021, but it fell out and was removed in 2022. She has a history of having spontaneous bilateral PE. She quit smoking in  and admits to being an alcoholic, but also quit in 2017. She denies any physical, emotional or sexual abuse. She's had her covid , flu and shingles vaccines. Sig. Fam hx: sister with breast cancer at age 48; 2 mat. Aunts with breas cancer; The patient has had genetic testing and all were negative.     Past Medical History:   Diagnosis Date    Asthma     seasonal    Bipolar 1 disorder ([address]     Breast cancer ([address] 2017    right side     Deep vein thrombosis (HCC)     DVT of axillary vein, acute right ([address]     Eye twitch 2019    Headache(784.0)     History of blood transfusion     Hx of blood clots 2020    PE    Hyperlipidemia     Migraine     Obesity     PONV 2018    Protected or Unprotected: Yes    Last Pap: unknown    Last HPV: unknown    High Risk HPV: unknown    Last Mammogram:  1/11/22 Birads 2; stable    Last Dexascan:6/16/22 L/S 0; total left hip -0.9; fem neck -1.6    Last Colonoscopy cologuard done with pst 3 years; neg. Per pt. Do you do self breast exams: Yes      Assessment:      Diagnosis Orders   1. Cervical cancer screening  PAP SMEAR      2. Osteopenia after menopause  Vitamin D 25 Hydroxy      3. Right breast cancer--on Femara  4. Postmenopausal5. Obesity  5. Hx. Of PE. Plan:   Pap with Hpv and if neg, repeat cotesting in 5 years  Repeat DEXA in 2-3 years  Vit D level  Suggested taking 8,210 mg caclicum and Vit D 489 Int. Unites daily  I spent 30 min. With pt. Discussing her medical status and management  RTO 1 yr. prn    No follow-ups on file. Orders Placed This Encounter   Procedures    PAP SMEAR     Patient History:    Patient's last menstrual period was 02/28/2013 (exact date). OBGYN Status: Postmenopausal  Past Surgical History:  03/10/2021: BREAST ENHANCEMENT SURGERY; Right      Comment:  BREAST RECONSTRUCTION WITH TISSUE EXPANDER INSERTION                performed by Tayo Hamilton MD at [address] [place] [address].  11/08/2021: BREAST RECONSTRUCTION; Right      Comment:  RIGHT BREAST EXPANDER REMOVAL RIGHT IMPLANT PLACEMENT                (Right Breast)  No date: BREAST SURGERY      Comment:  rt mastectomy  03/10/2021: BREAST SURGERY; Right      Comment:  BREAST RECONSTRUCTION WITH TISSUE EXPANDER INSERTION (  03/10/2021: BREAST SURGERY; Right      Comment:  EXCISION SEROMA RIGHT BREAST performed by Raiza Borja MD at [address] [place] [address].  11/08/2021: BREAST SURGERY; Right      Comment:  RIGHT BREAST EXPANDER REMOVAL RIGHT IMPLANT PLACEMENT                performed by Tayo Hamilton MD at [address] [place] [address].  06/06/2019: CATHETER REMOVAL;  Left      Comment:  PORT REMOVAL performed by Galina Horowitz DO at [facility] OR  No date:  SECTION  No date: DILATION AND CURETTAGE  10/13/2017: DILATION AND CURETTAGE OF UTERUS; N/A      Comment:  D & C HYSTEROSCOPY with polypectomy performed by Maycol Delaney MD at [address] [place] [address]  2017: INSERTION / REMOVAL / [address];   Left      Comment:  PORT INSERTION performed by Jesus Lombardi MD at [address] [place] [address]  2017: INSERTION / REMOVAL / [address];   N/A      Comment:  Port Removal & Insertion performed by Jesus Lombardi MD at [address] [place] [address]  10/19/2017: MASTECTOMY; Right      Comment:   [facility]  10/19/2017: MASTECTOMY; Right      Comment:  Right Breast Mastectomy with  Milner Node Biopsy                performed by Jesus Lombardi MD at [address] [place] [address]  2018: [place] [facility] [address] CTR VAD W/SUBQ PORT AGE 5 YR/>; Left      Comment:  PORT Exchange performed by Jesus Lombardi MD at [address] [place] [address]  , : SHOULDER SURGERY; Left      Comment:  x 2 surgeries total; Dr. Lj Amado  2017: TUNNELED VENOUS PORT PLACEMENT; Left      Comment:  removal of et replacement of  Medications/Contraceptives Affecting Cytology     Gout Agents Disp Start End     allopurinol (ZYLOPRIM) 100 MG tablet    30 tablet 2022     Sig: take 1 tablet by mouth once daily        Social History    Tobacco Use      Smoking status: Never      Smokeless tobacco: Never      Tobacco comments: Never smoker. TC, RRT 18       Standing Status:   Future     Number of Occurrences:   1     Standing Expiration Date:   8/15/2023     Order Specific Question:   Collection Type     Answer: Thin Prep     Order Specific Question:   Prior Abnormal Pap Test     Answer:   No     Order Specific Question:   Screening or Diagnostic     Answer:   Screening     Order Specific Question:   HPV Requested?      Answer:   Yes     Order Specific Question:   High Risk Patient     Answer:   N/A    Vitamin D 25 Hydroxy Standing Status:   Future     Number of Occurrences:   1     Standing Expiration Date:   8/15/2023       Electronically signed by:  Claudell Mcbride, APRN - CNP

## 2022-08-17 LAB
HPV SAMPLE: NORMAL
HPV, GENOTYPE 16: NOT DETECTED
HPV, GENOTYPE 18: NOT DETECTED
HPV, HIGH RISK OTHER: NOT DETECTED
HPV, INTERPRETATION: NORMAL
SPECIMEN DESCRIPTION: NORMAL

## 2022-08-18 ENCOUNTER — OFFICE VISIT (OUTPATIENT)
Dept: PRIMARY CARE CLINIC | Age: 57
End: 2022-08-18
Payer: MEDICARE

## 2022-08-18 VITALS
BODY MASS INDEX: 44.56 KG/M2 | DIASTOLIC BLOOD PRESSURE: 88 MMHG | HEIGHT: 66 IN | OXYGEN SATURATION: 96 % | RESPIRATION RATE: 16 BRPM | HEART RATE: 83 BPM | WEIGHT: 277.25 LBS | TEMPERATURE: 97.8 F | SYSTOLIC BLOOD PRESSURE: 130 MMHG

## 2022-08-18 DIAGNOSIS — R29.898 BILATERAL LEG WEAKNESS: Primary | ICD-10-CM

## 2022-08-18 PROCEDURE — 1036F TOBACCO NON-USER: CPT | Performed by: NURSE PRACTITIONER

## 2022-08-18 PROCEDURE — 99213 OFFICE O/P EST LOW 20 MIN: CPT

## 2022-08-18 PROCEDURE — G8417 CALC BMI ABV UP PARAM F/U: HCPCS | Performed by: NURSE PRACTITIONER

## 2022-08-18 PROCEDURE — 99213 OFFICE O/P EST LOW 20 MIN: CPT | Performed by: NURSE PRACTITIONER

## 2022-08-18 PROCEDURE — G8427 DOCREV CUR MEDS BY ELIG CLIN: HCPCS | Performed by: NURSE PRACTITIONER

## 2022-08-18 PROCEDURE — 3017F COLORECTAL CA SCREEN DOC REV: CPT | Performed by: NURSE PRACTITIONER

## 2022-08-18 ASSESSMENT — ENCOUNTER SYMPTOMS: BACK PAIN: 1

## 2022-08-18 NOTE — PROGRESS NOTES
Subjective:      Patient ID: Nakia Gaona is a 62 y.o. female coming in for   Chief Complaint   Patient presents with    Extremity Weakness     Started about 2 weeks ago she has been working out twice a week at [employer], she states she has hx of blood clots. Weakness walking to mailbox. She states weakness when she is working out and after. HPI  Weakness to entire legs bilaterally, x 2 weeks. Pt has been working out for approx one month, twice weekly. No significant pain in the legs. Does have chronic back pain, but no more than her baseline. Does have history of right sided DVT, is taking eliquis daily as prescribed. Denies any injury or trauma. Review of Systems   Musculoskeletal:  Positive for back pain (chronic in nature, unchanged from baseline). Neurological:  Positive for weakness (reported per pt). All other systems reviewed and are negative. Objective:/88 (Site: Left Upper Arm, Position: Sitting, Cuff Size: Large Adult)   Pulse 83   Temp 97.8 °F (36.6 °C) (Tympanic)   Resp 16   Ht 5' 6\" (1.676 m)   Wt 277 lb 4 oz (125.8 kg)   LMP 02/28/2013 (Exact Date)   SpO2 96%   BMI 44.75 kg/m²      Physical Exam  Vitals and nursing note reviewed. Constitutional:       General: She is not in acute distress. Appearance: Normal appearance. She is obese. She is not ill-appearing. HENT:      Head: Normocephalic. Cardiovascular:      Rate and Rhythm: Normal rate and regular rhythm. Heart sounds: Normal heart sounds. Pulmonary:      Effort: Pulmonary effort is normal.      Breath sounds: Normal breath sounds. Musculoskeletal:      Right lower leg: No edema. Left lower leg: No edema. Comments: Lower leg strength 4/5 bilateral    Skin:     General: Skin is warm and dry. Findings: No rash. Neurological:      General: No focal deficit present. Mental Status: She is alert and oriented to person, place, and time. Motor: No weakness (no obvious weakness. gait steady). Gait: Gait normal.      Assessment:      1. Bilateral leg weakness    -no obvious injury, no neurological deficits at this time.   -advised to maybe go a little lighter on exercise  -f/u with her pcp as needed       Plan:      No orders of the defined types were placed in this encounter.      Outpatient Encounter Medications as of 8/18/2022   Medication Sig Dispense Refill    ibuprofen (ADVIL;MOTRIN) 800 MG tablet take 1 tablet by mouth two to three times a day with food if needed 90 tablet 1    allopurinol (ZYLOPRIM) 100 MG tablet take 1 tablet by mouth once daily 30 tablet 3    albuterol sulfate HFA (PROVENTIL;VENTOLIN;PROAIR) 108 (90 Base) MCG/ACT inhaler inhale 2 puffs by mouth INTO THE LUNGS every 4 hours if needed for wheezing 18 g 3    cetirizine (ZYRTEC) 10 MG tablet Take 1 tablet by mouth daily 90 tablet 1    letrozole (FEMARA) 2.5 MG tablet take 1 tablet by mouth once daily 30 tablet 5    pravastatin (PRAVACHOL) 40 MG tablet take 1 tablet by mouth once daily 30 tablet 3    olopatadine (PATANOL) 0.1 % ophthalmic solution instill 1 drop into both eyes twice a day 5 mL 3    fluticasone (FLONASE) 50 MCG/ACT nasal spray instill 1 spray into each nostril once daily 48 g 0    SUMAtriptan (IMITREX) 100 MG tablet take 1 tablet by mouth once daily if needed for migraines 12 tablet 2    topiramate (TOPAMAX) 50 mg tablet ONE  TABLET  TWICE  DAILY 60 tablet 5    butalbital-acetaminophen-caffeine (FIORICET, ESGIC) -40 MG per tablet 1-2   TABLET  TWICE  DAILY  AS  NEEDED 60 tablet 2    lamoTRIgine (LAMICTAL) 150 MG tablet take 1 tablet by mouth at bedtime take with 25 milligram tablet 3-24      vitamin B-12 (CYANOCOBALAMIN) 1000 MCG tablet take 1 tablet by mouth once daily 30 tablet 3    loxapine (LOXITANE) 10 MG capsule Take 10 mg by mouth nightly      Calcium Carb-Cholecalciferol (OYSTER SHELL CALCIUM W/D) 500-200 MG-UNIT TABS tablet take 1 tablet by mouth twice a day 180 tablet 1    apixaban (ELIQUIS) 5 MG TABS tablet take 1 tablet by mouth twice a day 180 tablet 1    QUEtiapine (SEROQUEL) 50 MG tablet take 1 tablet by mouth at bedtime      tiZANidine (ZANAFLEX) 4 MG tablet Take 1 tablet by mouth every 8 hours as needed (muscle pain) 20 tablet 0    busPIRone (BUSPAR) 15 MG tablet Take 15 mg by mouth 3 times daily       lamoTRIgine (LAMICTAL) 25 MG tablet Take 50 mg by mouth daily At bedtime      OXcarbazepine (TRILEPTAL) 150 MG tablet 2 times daily       benztropine (COGENTIN) 0.5 MG tablet Take 0.5 mg by mouth daily      QUEtiapine (SEROQUEL) 100 MG tablet Take 100 mg by mouth nightly       lamoTRIgine (LAMICTAL) 100 MG tablet 150 mg nightly        No facility-administered encounter medications on file as of 8/18/2022.             Daryl Roque, APRN - CNP

## 2022-08-19 DIAGNOSIS — B96.89 BV (BACTERIAL VAGINOSIS): Primary | ICD-10-CM

## 2022-08-19 DIAGNOSIS — N76.0 BV (BACTERIAL VAGINOSIS): Primary | ICD-10-CM

## 2022-08-19 LAB — CYTOLOGY REPORT: NORMAL

## 2022-08-19 RX ORDER — METRONIDAZOLE 500 MG/1
500 TABLET ORAL 2 TIMES DAILY
Qty: 14 TABLET | Refills: 0 | Status: SHIPPED | OUTPATIENT
Start: 2022-08-19 | End: 2022-08-26

## 2022-08-22 DIAGNOSIS — E78.2 MIXED HYPERLIPIDEMIA: ICD-10-CM

## 2022-08-22 RX ORDER — PRAVASTATIN SODIUM 40 MG
TABLET ORAL
Qty: 90 TABLET | Refills: 1 | Status: SHIPPED | OUTPATIENT
Start: 2022-08-22

## 2022-08-22 NOTE — TELEPHONE ENCOUNTER
Gianluca Maroon called requesting a refill of the below medication which has been pended for you:     Requested Prescriptions     Pending Prescriptions Disp Refills    pravastatin (PRAVACHOL) 40 MG tablet         Last Appointment Date: 6/17/2022  Next Appointment Date: 12/20/2022    Allergies   Allergen Reactions    Prednisone Swelling     Whole side of her face swelled when she took it, difficulty breathing

## 2022-08-23 DIAGNOSIS — J45.20 MILD INTERMITTENT EXTRINSIC ASTHMA WITHOUT COMPLICATION: ICD-10-CM

## 2022-08-23 RX ORDER — ALBUTEROL SULFATE 90 UG/1
AEROSOL, METERED RESPIRATORY (INHALATION)
Qty: 18 G | Refills: 3 | Status: SHIPPED | OUTPATIENT
Start: 2022-08-23

## 2022-08-23 NOTE — TELEPHONE ENCOUNTER
Wing Ingram called requesting a refill of the below medication which has been pended for you:     Requested Prescriptions     Pending Prescriptions Disp Refills    albuterol sulfate HFA (VENTOLIN HFA) 108 (90 Base) MCG/ACT inhaler [Pharmacy Med Name: VENTOLIN HFA 90 MCG INHALER] 18 g 3     Sig: inhale 2 puffs by mouth and INTO THE LUNGS every 4 hours if needed for wheezing       Last Appointment Date: 6/17/2022  Next Appointment Date: 12/20/2022    Allergies   Allergen Reactions    Prednisone Swelling     Whole side of her face swelled when she took it, difficulty breathing

## 2022-08-24 DIAGNOSIS — J30.2 SEASONAL ALLERGIES: ICD-10-CM

## 2022-08-24 NOTE — TELEPHONE ENCOUNTER
Melvin Or called requesting a refill of the below medication which has been pended for you:     Requested Prescriptions     Pending Prescriptions Disp Refills    fluticasone (FLONASE) 50 MCG/ACT nasal spray [Pharmacy Med Name: FLUTICASONE PROP 50 MCG SPRAY] 16 g 3     Sig: instill 1 spray into each nostril once daily       Last Appointment Date: 6/17/2022  Next Appointment Date: 12/20/2022    Allergies   Allergen Reactions    Prednisone Swelling     Whole side of her face swelled when she took it, difficulty breathing

## 2022-08-25 RX ORDER — FLUTICASONE PROPIONATE 50 MCG
SPRAY, SUSPENSION (ML) NASAL
Qty: 16 G | Refills: 3 | Status: SHIPPED | OUTPATIENT
Start: 2022-08-25

## 2022-09-02 DIAGNOSIS — Z78.0 POSTMENOPAUSAL: ICD-10-CM

## 2022-09-02 DIAGNOSIS — Z90.11 H/O RIGHT MASTECTOMY: ICD-10-CM

## 2022-09-02 DIAGNOSIS — C50.919 MALIGNANT NEOPLASM OF FEMALE BREAST, UNSPECIFIED ESTROGEN RECEPTOR STATUS, UNSPECIFIED LATERALITY, UNSPECIFIED SITE OF BREAST (HCC): ICD-10-CM

## 2022-09-02 DIAGNOSIS — I82.A11 ACUTE DEEP VEIN THROMBOSIS (DVT) OF AXILLARY VEIN OF RIGHT UPPER EXTREMITY (HCC): ICD-10-CM

## 2022-09-02 NOTE — TELEPHONE ENCOUNTER
Last Appt:  3/8/2022  Next Appt:   9/13/2022  Med verified in [facility] [address]

## 2022-09-06 DIAGNOSIS — C50.919 MALIGNANT NEOPLASM OF FEMALE BREAST, UNSPECIFIED ESTROGEN RECEPTOR STATUS, UNSPECIFIED LATERALITY, UNSPECIFIED SITE OF BREAST (HCC): ICD-10-CM

## 2022-09-06 RX ORDER — PSYLLIUM HUSK 3.4 G/7G
POWDER ORAL
Qty: 30 TABLET | Refills: 3 | Status: SHIPPED | OUTPATIENT
Start: 2022-09-06

## 2022-09-13 ENCOUNTER — HOSPITAL ENCOUNTER (OUTPATIENT)
Dept: LAB | Age: 57
Discharge: HOME | End: 2022-09-13
Payer: MEDICARE

## 2022-09-13 ENCOUNTER — OFFICE VISIT (OUTPATIENT)
Dept: ONCOLOGY | Age: 57
End: 2022-09-13
Payer: MEDICARE

## 2022-09-13 VITALS
HEIGHT: 66 IN | WEIGHT: 270 LBS | DIASTOLIC BLOOD PRESSURE: 76 MMHG | TEMPERATURE: 98.4 F | BODY MASS INDEX: 43.39 KG/M2 | OXYGEN SATURATION: 99 % | SYSTOLIC BLOOD PRESSURE: 124 MMHG | HEART RATE: 76 BPM

## 2022-09-13 DIAGNOSIS — C50.919 MALIGNANT NEOPLASM OF FEMALE BREAST, UNSPECIFIED ESTROGEN RECEPTOR STATUS, UNSPECIFIED LATERALITY, UNSPECIFIED SITE OF BREAST (HCC): Primary | ICD-10-CM

## 2022-09-13 DIAGNOSIS — Z79.811 AROMATASE INHIBITOR USE: ICD-10-CM

## 2022-09-13 DIAGNOSIS — Z78.0 POSTMENOPAUSAL: ICD-10-CM

## 2022-09-13 DIAGNOSIS — Z90.11 H/O RIGHT MASTECTOMY: ICD-10-CM

## 2022-09-13 DIAGNOSIS — M85.80 OSTEOPENIA, UNSPECIFIED LOCATION: ICD-10-CM

## 2022-09-13 DIAGNOSIS — Z12.31 SCREENING MAMMOGRAM, ENCOUNTER FOR: ICD-10-CM

## 2022-09-13 DIAGNOSIS — I82.A11 ACUTE DEEP VEIN THROMBOSIS (DVT) OF AXILLARY VEIN OF RIGHT UPPER EXTREMITY (HCC): ICD-10-CM

## 2022-09-13 DIAGNOSIS — C50.919 MALIGNANT NEOPLASM OF FEMALE BREAST, UNSPECIFIED ESTROGEN RECEPTOR STATUS, UNSPECIFIED LATERALITY, UNSPECIFIED SITE OF BREAST (HCC): ICD-10-CM

## 2022-09-13 LAB
ABSOLUTE EOS #: 0.11 K/UL (ref 0–0.44)
ABSOLUTE IMMATURE GRANULOCYTE: <0.03 K/UL (ref 0–0.3)
ABSOLUTE LYMPH #: 1.97 K/UL (ref 1.1–3.7)
ABSOLUTE MONO #: 0.66 K/UL (ref 0.1–1.2)
ALBUMIN SERPL-MCNC: 4.1 G/DL (ref 3.5–5.2)
ALBUMIN/GLOBULIN RATIO: 1.2 (ref 1–2.5)
ALP BLD-CCNC: 107 U/L (ref 35–104)
ALT SERPL-CCNC: 18 U/L (ref 5–33)
ANION GAP SERPL CALCULATED.3IONS-SCNC: 11 MMOL/L (ref 9–17)
AST SERPL-CCNC: 16 U/L
BASOPHILS # BLD: 0 % (ref 0–2)
BASOPHILS ABSOLUTE: <0.03 K/UL (ref 0–0.2)
BILIRUB SERPL-MCNC: 0.2 MG/DL (ref 0.3–1.2)
BUN BLDV-MCNC: 21 MG/DL (ref 6–20)
BUN/CREAT BLD: 20 (ref 9–20)
CALCIUM SERPL-MCNC: 9.9 MG/DL (ref 8.6–10.4)
CHLORIDE BLD-SCNC: 104 MMOL/L (ref 98–107)
CO2: 25 MMOL/L (ref 20–31)
CREAT SERPL-MCNC: 1.07 MG/DL (ref 0.5–0.9)
EOSINOPHILS RELATIVE PERCENT: 2 % (ref 1–4)
GFR AFRICAN AMERICAN: >60 ML/MIN
GFR NON-AFRICAN AMERICAN: 53 ML/MIN
GFR SERPL CREATININE-BSD FRML MDRD: ABNORMAL ML/MIN/{1.73_M2}
GLUCOSE BLD-MCNC: 97 MG/DL (ref 70–99)
HCT VFR BLD CALC: 40.1 % (ref 36.3–47.1)
HEMOGLOBIN: 13.2 G/DL (ref 11.9–15.1)
IMMATURE GRANULOCYTES: 0 %
LYMPHOCYTES # BLD: 42 % (ref 24–43)
MCH RBC QN AUTO: 32 PG (ref 25.2–33.5)
MCHC RBC AUTO-ENTMCNC: 32.9 G/DL (ref 25.2–33.5)
MCV RBC AUTO: 97.1 FL (ref 82.6–102.9)
MONOCYTES # BLD: 14 % (ref 3–12)
NRBC AUTOMATED: 0 PER 100 WBC
PDW BLD-RTO: 13.5 % (ref 11.8–14.4)
PLATELET # BLD: 196 K/UL (ref 138–453)
PMV BLD AUTO: 9.8 FL (ref 8.1–13.5)
POTASSIUM SERPL-SCNC: 4.1 MMOL/L (ref 3.7–5.3)
RBC # BLD: 4.13 M/UL (ref 3.95–5.11)
SEG NEUTROPHILS: 42 % (ref 36–65)
SEGMENTED NEUTROPHILS ABSOLUTE COUNT: 1.97 K/UL (ref 1.5–8.1)
SODIUM BLD-SCNC: 140 MMOL/L (ref 135–144)
TOTAL PROTEIN: 7.5 G/DL (ref 6.4–8.3)
WBC # BLD: 4.8 K/UL (ref 3.5–11.3)

## 2022-09-13 PROCEDURE — 85025 COMPLETE CBC W/AUTO DIFF WBC: CPT

## 2022-09-13 PROCEDURE — 1036F TOBACCO NON-USER: CPT | Performed by: INTERNAL MEDICINE

## 2022-09-13 PROCEDURE — 36415 COLL VENOUS BLD VENIPUNCTURE: CPT

## 2022-09-13 PROCEDURE — 99214 OFFICE O/P EST MOD 30 MIN: CPT | Performed by: INTERNAL MEDICINE

## 2022-09-13 PROCEDURE — G8417 CALC BMI ABV UP PARAM F/U: HCPCS | Performed by: INTERNAL MEDICINE

## 2022-09-13 PROCEDURE — 80053 COMPREHEN METABOLIC PANEL: CPT

## 2022-09-13 PROCEDURE — G8427 DOCREV CUR MEDS BY ELIG CLIN: HCPCS | Performed by: INTERNAL MEDICINE

## 2022-09-13 PROCEDURE — 3017F COLORECTAL CA SCREEN DOC REV: CPT | Performed by: INTERNAL MEDICINE

## 2022-09-13 RX ORDER — LORATADINE 10 MG/1
TABLET ORAL
COMMUNITY
Start: 2022-08-23 | End: 2022-10-27 | Stop reason: SDUPTHER

## 2022-09-13 NOTE — PROGRESS NOTES
2.8 cm in size with negative margins. pT2,pN0,M0  Tumor specimen was negative for ER receptor and weekly positive for OR receptor (5-10 percent). High-grade DCIS was also noted. One sentinel lymph node was sampled which was negative for metastasis    Patient started on neoadjuvant chemotherapy using TCHP regimen. Cycle #1, day #1 on 11/13/17    Patient underwent removal of port with replacement due to port malfunction on 11/30/17    Patient completed 6 cycles of TCHP and continued maintenance Herceptin. Started patient on anastrozole along with calcium and vitamin D in May 2018. Switched anti-hormonal therapy to Femara in June 2018 due to arthralgia    Herceptin last cycle completed 11/29/18    Interim History:    Patient presents to the clinic for a follow-up visit and to discuss results of her lab work-up. Patient continues to be on Femara. Overall tolerating it well. Complains of being tired the fatigue started about a week denies any area of pain. Denies any other complaint. Denies feeling sick. DEXA scan shows osteopenia with worsening of T score. During this visit patient's allergy, social, medical, surgical history and medications were reviewed and updated.     Past Medical History:   Past Medical History:   Diagnosis Date    Asthma     seasonal    Bipolar 1 disorder ([facility] [address]     Breast cancer ([facility] [address] 2017    right side     Deep vein thrombosis (HCC)     DVT of axillary vein, acute right ([address]     Eye twitch 2019    Headache(784.0)     History of blood transfusion     Hx of blood clots 03/2020    PE    Hyperlipidemia     Migraine     Obesity     PONV (postoperative nausea and vomiting)     Prolonged emergence from general anesthesia     Sleep apnea     uses CPAP     Past Surgical History:     Past Surgical History:   Procedure Laterality Date    BREAST ENHANCEMENT SURGERY Right 03/10/2021    BREAST RECONSTRUCTION WITH TISSUE EXPANDER INSERTION performed by Ruby Sibley MD at [facility] Retardation Father     Diabetes Neg Hx       Social History     Socioeconomic History    Marital status: Single     Spouse name: Not on file    Number of children: Not on file    Years of education: Not on file    Highest education level: Not on file   Occupational History    Not on file   Tobacco Use    Smoking status: Never    Smokeless tobacco: Never    Tobacco comments:     Never smoker.  TC, RRT 4/5/18   Vaping Use    Vaping Use: Never used   Substance and Sexual Activity    Alcohol use: Yes     Comment: Rarely    Drug use: No    Sexual activity: Not Currently     Partners: Male     Birth control/protection: Surgical   Other Topics Concern    Not on file   Social History Narrative    Not on file     Social Determinants of Health     Financial Resource Strain: Not on file   Food Insecurity: Not on file   Transportation Needs: Not on file   Physical Activity: Not on file   Stress: Not on file   Social Connections: Not on file   Intimate Partner Violence: Not on file   Housing Stability: Not on file      Current Medications:     Current Outpatient Medications   Medication Sig Dispense Refill    loratadine (CLARITIN) 10 MG tablet take 1 tablet by mouth once daily      RA VITAMIN B-12 TR 1000 MCG TBCR take 1 tablet by mouth once daily 30 tablet 3    apixaban (ELIQUIS) 5 MG TABS tablet take 1 tablet by mouth twice a day 60 tablet 1    fluticasone (FLONASE) 50 MCG/ACT nasal spray instill 1 spray into each nostril once daily 16 g 3    albuterol sulfate HFA (VENTOLIN HFA) 108 (90 Base) MCG/ACT inhaler inhale 2 puffs by mouth and INTO THE LUNGS every 4 hours if needed for wheezing 18 g 3    pravastatin (PRAVACHOL) 40 MG tablet take 1 tablet by mouth once daily 90 tablet 1    ibuprofen (ADVIL;MOTRIN) 800 MG tablet take 1 tablet by mouth two to three times a day with food if needed 90 tablet 1    allopurinol (ZYLOPRIM) 100 MG tablet take 1 tablet by mouth once daily 30 tablet 3    cetirizine (ZYRTEC) 10 MG tablet Take 1 tablet by mouth daily 90 tablet 1    letrozole (FEMARA) 2.5 MG tablet take 1 tablet by mouth once daily 30 tablet 5    olopatadine (PATANOL) 0.1 % ophthalmic solution instill 1 drop into both eyes twice a day 5 mL 3    SUMAtriptan (IMITREX) 100 MG tablet take 1 tablet by mouth once daily if needed for migraines 12 tablet 2    topiramate (TOPAMAX) 50 mg tablet ONE  TABLET  TWICE  DAILY 60 tablet 5    butalbital-acetaminophen-caffeine (FIORICET, ESGIC) -40 MG per tablet 1-2   TABLET  TWICE  DAILY  AS  NEEDED 60 tablet 2    lamoTRIgine (LAMICTAL) 150 MG tablet take 1 tablet by mouth at bedtime take with 25 milligram tablet 3-24      vitamin B-12 (CYANOCOBALAMIN) 1000 MCG tablet take 1 tablet by mouth once daily 30 tablet 3    loxapine (LOXITANE) 10 MG capsule Take 10 mg by mouth nightly      Calcium Carb-Cholecalciferol (OYSTER SHELL CALCIUM W/D) 500-200 MG-UNIT TABS tablet take 1 tablet by mouth twice a day 180 tablet 1    QUEtiapine (SEROQUEL) 50 MG tablet take 1 tablet by mouth at bedtime      tiZANidine (ZANAFLEX) 4 MG tablet Take 1 tablet by mouth every 8 hours as needed (muscle pain) 20 tablet 0    busPIRone (BUSPAR) 15 MG tablet Take 15 mg by mouth 3 times daily       lamoTRIgine (LAMICTAL) 25 MG tablet Take 50 mg by mouth daily At bedtime      OXcarbazepine (TRILEPTAL) 150 MG tablet 2 times daily       benztropine (COGENTIN) 0.5 MG tablet Take 0.5 mg by mouth daily      QUEtiapine (SEROQUEL) 100 MG tablet Take 100 mg by mouth nightly       lamoTRIgine (LAMICTAL) 100 MG tablet 150 mg nightly        No current facility-administered medications for this visit. Allergies:   Prednisone    Review of Systems:    Constitutional: Negative for fever or chills. No night sweats, weight is stable now. Positive fatigue  Eyes: No eye discharge, double vision, or eye pain . Twitching of left eye  HEENT: negative for sore mouth, sore throat, hoarseness and voice change .     Respiratory: negative for cough , sputum, dyspnea, wheezing, hemoptysis, chest pain   Cardiovascular: negative for chest pain, dyspnea, palpitations, orthopnea, PND   Gastrointestinal: Positive for nausea, vomiting, constipation, abdominal pain, Dysphagia, hematemesis and hematochezia   Genitourinary: negative for frequency, dysuria, nocturia, urinary incontinence, and hematuria   Integument: negative for rash, skin lesions, bruises. Hematologic/Lymphatic: negative for easy bruising, bleeding, lymphadenopathy, or petechiae   Endocrine: negative for heat or cold intolerance,weight changes, change in bowel habits and hair loss   Musculoskeletal: negative for myalgias, arthralgias, pain, joint swelling,and bone pain . Neurological: negative for headaches, dizziness, seizures, weakness, numbness      Physical Exam:    Vitals:  /76 (Site: Left Lower Arm, Position: Sitting, Cuff Size: Large Adult)   Pulse 76   Temp 98.4 °F (36.9 °C)   Ht 5' 6\" (1.676 m)   Wt 270 lb (122.5 kg)   LMP 02/28/2013 (Exact Date)   SpO2 99%   BMI 43.58 kg/m²    General appearance - patient not in acute distress  Mental status - AAO X3  Eyes - pupils equal and reactive, extraocular eye movements intact  Mouth - mucous membranes moist, pharynx normal without lesions  Neck - supple, no significant adenopathy  Lymphatics - no palpable lymphadenopathy, no hepatosplenomegaly  Chest - clear to auscultation, no wheezes, rales or rhonchi, symmetric air entry. Heart - normal rate, regular rhythm, normal S1, S2, no murmurs  Abdomen - soft, nontender, nondistended, no masses or organomegaly  Neurological - alert, oriented, normal speech, no focal findings .   Twitching of left eye noted  Extremities - peripheral pulses normal, no pedal edema, no clubbing or cyanosis  Skin - normal coloration and turgor, no rashes, right chest wall wound covered with dressings      DATA:    Results for orders placed or performed during the hospital encounter of 09/13/22   Comprehensive Metabolic Panel   Result Value Ref Range    Glucose 97 70 - 99 mg/dL    BUN 21 (H) 6 - 20 mg/dL    Creatinine 1.07 (H) 0.50 - 0.90 mg/dL    Bun/Cre Ratio 20 9 - 20    Calcium 9.9 8.6 - 10.4 mg/dL    Sodium 140 135 - 144 mmol/L    Potassium 4.1 3.7 - 5.3 mmol/L    Chloride 104 98 - 107 mmol/L    CO2 25 20 - 31 mmol/L    Anion Gap 11 9 - 17 mmol/L    Alkaline Phosphatase 107 (H) 35 - 104 U/L    ALT 18 5 - 33 U/L    AST 16 <32 U/L    Total Bilirubin 0.2 (L) 0.3 - 1.2 mg/dL    Total Protein 7.5 6.4 - 8.3 g/dL    Albumin 4.1 3.5 - 5.2 g/dL    Albumin/Globulin Ratio 1.2 1.0 - 2.5    GFR Non-African American 53 (L) >60 mL/min    GFR African American >60 >60 mL/min    GFR Comment         CBC with Auto Differential   Result Value Ref Range    WBC 4.8 3.5 - 11.3 k/uL    RBC 4.13 3.95 - 5.11 m/uL    Hemoglobin 13.2 11.9 - 15.1 g/dL    Hematocrit 40.1 36.3 - 47.1 %    MCV 97.1 82.6 - 102.9 fL    MCH 32.0 25.2 - 33.5 pg    MCHC 32.9 25.2 - 33.5 g/dL    RDW 13.5 11.8 - 14.4 %    Platelets 667 796 - 936 k/uL    MPV 9.8 8.1 - 13.5 fL    NRBC Automated 0.0 0.0 per 100 WBC    Seg Neutrophils 42 36 - 65 %    Lymphocytes 42 24 - 43 %    Monocytes 14 (H) 3 - 12 %    Eosinophils % 2 1 - 4 %    Basophils 0 0 - 2 %    Immature Granulocytes 0 0 %    Segs Absolute 1.97 1.50 - 8.10 k/uL    Absolute Lymph # 1.97 1.10 - 3.70 k/uL    Absolute Mono # 0.66 0.10 - 1.20 k/uL    Absolute Eos # 0.11 0.00 - 0.44 k/uL    Basophils Absolute <0.03 0.00 - 0.20 k/uL    Absolute Immature Granulocyte <0.03 0.00 - 0.30 k/uL         Impression:  Invasive ductal carcinoma of right breast, stage IIa. pT2,pN0,M0. ER negative, MN weakly positive. HER-2 amplified.   Status post right mastectomy with sentinel lymph node biopsy  Twitching of left eye, MRI negative  Family history of breast cancer  Lower back pain  Postmenopausal  Right upper extremity DVT of the axillary and basilic veins secondary to PORT(11/20/18), Xarelto discontinued after PORT removal, treated with Xarelto  Osteopenia    Plan:  Personally reviewed results of lab work-up and other relevant clinical data. Discussed natural history of breast cancer  Continue adjuvant hormonal therapy plan for 5 years of adjuvant hormone therapy which will be in June 2023. Patient tumor was ER negative but SC weakly positive. Discussed results of DEXA scan. Patient has osteopenia. T score is worsened. We will start patient on Prolia  Continue calcium vitamin D  Patient is complaining of nonspecific complaints of worsening fatigue without any other thing else I advised the patient to monitor his symptoms if persist or get worse let us know in the next few weeks. If symptoms continue to worsen we can consider obtaining imaging scans  Mammogram in January 2023  Continue Eliquis  Return to clinic in 6 months. NCCN guidelines were reviewed and discussed with the patient. The diagnosis and care plan were discussed with the patient in detail. I discussed the natural history of the disease, prognosis, risks and goals of therapy and answered all the patients questions to the best of my ability. Patient expressed understanding and was in agreement. Kurtis Jordan MD          This note is created with the assistance of a speech recognition program.  While intending to generate a document that actually reflects the content of the visit, the document can still have some errors including those of syntax and sound a like substitutions which may escape proof reading. It such instances, actual meaning can be extrapolated by contextual diversion.

## 2022-09-15 DIAGNOSIS — G43.009 MIGRAINE WITHOUT AURA AND WITHOUT STATUS MIGRAINOSUS, NOT INTRACTABLE: ICD-10-CM

## 2022-09-15 RX ORDER — IBUPROFEN 800 MG/1
TABLET ORAL
Qty: 90 TABLET | Refills: 1 | Status: SHIPPED | OUTPATIENT
Start: 2022-09-15

## 2022-09-28 ENCOUNTER — OFFICE VISIT (OUTPATIENT)
Dept: NEPHROLOGY | Age: 57
End: 2022-09-28
Payer: MEDICARE

## 2022-09-28 VITALS
HEIGHT: 66 IN | BODY MASS INDEX: 42.91 KG/M2 | WEIGHT: 267 LBS | DIASTOLIC BLOOD PRESSURE: 78 MMHG | SYSTOLIC BLOOD PRESSURE: 126 MMHG | HEART RATE: 72 BPM

## 2022-09-28 DIAGNOSIS — N18.31 STAGE 3A CHRONIC KIDNEY DISEASE (HCC): Primary | ICD-10-CM

## 2022-09-28 PROCEDURE — G8427 DOCREV CUR MEDS BY ELIG CLIN: HCPCS | Performed by: INTERNAL MEDICINE

## 2022-09-28 PROCEDURE — 99214 OFFICE O/P EST MOD 30 MIN: CPT

## 2022-09-28 PROCEDURE — 99212 OFFICE O/P EST SF 10 MIN: CPT | Performed by: INTERNAL MEDICINE

## 2022-09-28 PROCEDURE — 1036F TOBACCO NON-USER: CPT | Performed by: INTERNAL MEDICINE

## 2022-09-28 PROCEDURE — 3017F COLORECTAL CA SCREEN DOC REV: CPT | Performed by: INTERNAL MEDICINE

## 2022-09-28 PROCEDURE — G8417 CALC BMI ABV UP PARAM F/U: HCPCS | Performed by: INTERNAL MEDICINE

## 2022-09-28 NOTE — PROGRESS NOTES
Renal Consult Note    Patient :  Nishi Díaz; 62 y.o. MRN# [id number]    Reason for Consult:     Asked by Dr Ryan Sanchez to see for chronic kidney disease stage IIIa    History of Present Illness:     Nishi Díaz; 62 y.o. female with past medical history as mentioned below. She is seen in 1 year return visit for chronic kidney disease stage IIIa on 9/28/2022. No chest pain or shortness of breath or nausea or vomiting. Renal function has been quite stable over the past year. Last estimated GFR of 53 ml/minute, with last serum creatinine 1.07 with a BUN 21 and normal electrolytes. She still see Dr. Jacob Ordoñez from oncology. Apparently, she has been diagnosed with osteoporosis and is waiting on insurance clearance to possibly do some bisphosphonate injections, I believe. She notes her breast cancer diagnosis and surgery was back in November 2018. She previously had an acute gouty episode of her right foot associated with a high uric acid level. She was put on allopurinol 100 mg daily and colchicine for 3 days although she cannot afford the colchicine. She says the gout attack did improve. She is watching what she eats, especially red meat. She does not eat shellfish. She is avoiding alcohol. Patient's medications are reviewed. She has an allergy to prednisone which caused her significant swelling of the face. Her past medical history includes morbid obesity, bipolar 1 disorder, right-sided breast cancer with stage IIa infiltrating ductal carcinoma of the right breast ER negative, IN positive and HER-2 amplified with the patient having a right mastectomy with sentinel lymph node biopsy in October 2017 with oncology treatment as noted in oncology note from September 2020., acute DVT of the right axillary vein, and massive pulmonary embolism bilaterally status post thrombectomy on 4/10/2020.   She did suffer some acute kidney injury at that time with a creatinine going up to 1.81 mg/dL on 4/9/2020 with it coming back down to 1.48 mg/dl on 4/10/2020. She also has twitching of the left eye primarily, migraine headache, hyperlipidemia. She states she has also had a hysterectomy. Patient did have a right breast biopsy done on 3/12/2021 which showed fibrous walled cyst with attached minimal fibroadipose tissue, no breast glandular parenchyma present and negative for carcinoma. Of note, labs from August 28 of 2020 show the creatinine back down to 1.08 with sodium 143 potassium 3.9 chloride 108 CO2 22 BUN 17 glucose 102 and estimated GFR of 53 mL/min. That is very similar to a creatinine of 1.05 from September 2019 prior to the pulmonary embolism episode in April 2020. Renal ultrasound from December of 2020 showed right kidney 10.6 cm and left kidney 10.5 cm with no hydronephrosis and normal renal echogenicity with a questionable hypoechoic area within the left Kidney of undetermined origin. Renal ultrasound will be performed prior to the next visit. Labs from 2/24/2021 show A creatinine of 1.09 with normal electrolytes, Estimated GFR 52 ml/minute. CBC was normal with a hemoglobin of 12.5 g/dl, Microscopic urinalysis with 10-20 WBCs, 2-5 RBCs and 2-5 epithelial cells And moderate leukocyte esterase. From December 2020 random urine protein to creatinine ratio was normal, C3 normal, C4 normal and serum free kappa to lambda light chain ratio normal at 1.12. The patient is single. She is a never smoker. No history of recent contrast exposure, No h/o prolonged NSAIDs use in the past, No h/o nephrolithiasis, No recent skin rashes or arthralgias, No hematuria or pyuria noticed in the recent past. Doesn't report any reduction in the urine output recently. Non report of any obstructive urinary symptoms (urgency, frequency, weak stream, straining while urination). No h/o recurrent UTIs in the past.    Past History/Allergies? Social History:     Past Medical History:   Diagnosis Date Asthma     seasonal    B[facility]     Breast cancer ([facility] 2017    right side     Deep vein thrombosis (HCC)     DVT of axillary vein, acute right ([facility] [address]     Eye twitch 2019    Headache(784.0)     History of blood transfusion     Hx of blood clots 03/2020    PE    Hyperlipidemia     Migraine     Obesity     PONV (postoperative nausea and vomiting)     Prolonged emergence from general anesthesia     Sleep apnea     uses CPAP       Allergies   Allergen Reactions    Prednisone Swelling     Whole side of her face swelled when she took it, difficulty breathing       Social History     Socioeconomic History    Marital status: Single     Spouse name: Not on file    Number of children: Not on file    Years of education: Not on file    Highest education level: Not on file   Occupational History    Not on file   Tobacco Use    Smoking status: Never    Smokeless tobacco: Never    Tobacco comments:     Never smoker.  TC, RRT 4/5/18   Vaping Use    Vaping Use: Never used   Substance and Sexual Activity    Alcohol use: Yes     Comment: Rarely    Drug use: No    Sexual activity: Not Currently     Partners: Male     Birth control/protection: Surgical   Other Topics Concern    Not on file   Social History Narrative    Not on file     Social Determinants of Health     Financial Resource Strain: Not on file   Food Insecurity: Not on file   Transportation Needs: Not on file   Physical Activity: Not on file   Stress: Not on file   Social Connections: Not on file   Intimate Partner Violence: Not on file   Housing Stability: Not on file       Family History:        Family History   Problem Relation Age of Onset    Breast Cancer Sister 54    Breast Cancer Maternal Aunt 71    Other Maternal Cousin         Atypical Ductal Hyperplasia     Uterine Cancer Sister 32    Other Sister         breast cyst    Cataracts Mother     Glaucoma Mother     Heart Disease Father     High Blood Pressure Father     High Cholesterol Father     Mental hours.   Phosphorus:   No results for input(s): PHOS in the last 72 hours. Magnesium:  No results for input(s): MG in the last 72 hours. Albumin:  No results for input(s): LABALBU in the last 72 hours.   BNP:    No results found for: BNP  TIMBO:      Lab Results   Component Value Date/Time    TIMBO NEGATIVE 09/13/2019 12:39 PM     SPEP:  Lab Results   Component Value Date/Time    PROT 7.5 09/13/2022 01:59 PM     UPEP:   No results found for: LABPE  C3:     Lab Results   Component Value Date/Time    C3 153 12/22/2020 02:14 PM     C4:     Lab Results   Component Value Date/Time    C4 28 12/22/2020 02:14 PM     MPO ANCA:   No results found for: MPO  PR3 ANCA:   No results found for: PR3  Anti-GBM:   No results found for: GBMABIGG  Hep BsAg:       No results found for: HEPBSAG  Hep C AB:          Lab Results   Component Value Date/Time    HEPCAB NONREACTIVE 02/11/2019 01:37 PM       Urinalysis/Chemistries:      Lab Results   Component Value Date/Time    NITRU NEGATIVE 02/24/2021 12:02 PM    COLORU YELLOW 02/24/2021 12:02 PM    PHUR 6.5 02/24/2021 12:02 PM    WBCUA 10 TO 20 02/24/2021 12:02 PM    RBCUA 2 TO 5 02/24/2021 12:02 PM    MUCUS NOT REPORTED 02/24/2021 12:02 PM    TRICHOMONAS NOT REPORTED 02/24/2021 12:02 PM    YEAST NOT REPORTED 02/24/2021 12:02 PM    BACTERIA FEW 02/24/2021 12:02 PM    SPECGRAV 1.007 02/24/2021 12:02 PM    LEUKOCYTESUR MODERATE 02/24/2021 12:02 PM    UROBILINOGEN Normal 02/24/2021 12:02 PM    BILIRUBINUR NEGATIVE 02/24/2021 12:02 PM    GLUCOSEU NEGATIVE 02/24/2021 12:02 PM    KETUA NEGATIVE 02/24/2021 12:02 PM    AMORPHOUS NOT REPORTED 02/24/2021 12:02 PM     Urine Sodium:   No results found for: MADDIE  Urine Potassium:  No results found for: KUR  Urine Chloride:  No results found for: CLUR  Urine Osmolarity: No results found for: OSMOU  Urine Protein:   No results found for: TPU  Urine Creatinine:     Lab Results   Component Value Date/Time    LABCREA 30.6 12/22/2020 02:14 PM     UPC:     Urine Eosinophils:  No components found for: UEOS    Radiology:     CXR:     Assessment:     1. Chronic kidney disease stage IIIA, last creatinine from 9/13/2022 was 1.07 mg/DL, for an estimated GFR 53 mL/min., possibly secondary to prior acute kidney injury episode versus other. Paraprotein disease, glomerulonephritis and connective tissue disease are ruled out. 2.  History of massive pulmonary embolism with thrombectomy in April 2020  3. History of breast cancer, status post right mastectomy and per the patient, hysterectomy  4. Morbid obesity  5. Prior history of anemia, hemoglobin within normal limits. 6. No protein in the urine with random urine protein to creatinine ratio normal  7. Serologic studies from December of 2020 showed C3 normal, C4 normal, normal serum free light chain ratio  8. Nonspecific acquired left renal cyst noted on ultrasound  9. Hyperuricemia and gout of her right foot in August 2021. It was the first time she had ever had a gouty episode and was started allopurinol 100 mg a day and received orders to take colchicine 3 tablets on 8/10/2021. However, the patient's insurance did not cover the colchicine so she is staying on just allopurinol alone and trying to avoid red meat and alcohol. Her symptoms have improved. 10.  Patient is up-to-date with COVID-19 vaccination as she did receive the initial vaccination series x2 and also now has recently received her booster  11. Note, a renal ultrasound in July 2021 to check on a left renal cyst showed mildly echogenic kidneys, no hydronephrosis or perinephric fluid. The previously described hypoechoic area is favored to correspond to a hypertrophied column of [place] on this ultrasound. Plan:   1. No changes in medications at this time, avoid nephrotoxic agents, including NSAIDs  2. Return to see me in the office in about 1 year       Thank you for allowing me to see your very pleasant patient in nephrology return visit. Zakiadelores Sotomayor. Megha Ye MD  [facility]  9/28/2022

## 2022-09-29 ENCOUNTER — OFFICE VISIT (OUTPATIENT)
Dept: NEUROLOGY | Age: 57
End: 2022-09-29
Payer: MEDICARE

## 2022-09-29 VITALS
BODY MASS INDEX: 42.93 KG/M2 | WEIGHT: 266 LBS | OXYGEN SATURATION: 99 % | DIASTOLIC BLOOD PRESSURE: 80 MMHG | HEART RATE: 79 BPM | SYSTOLIC BLOOD PRESSURE: 132 MMHG | RESPIRATION RATE: 16 BRPM

## 2022-09-29 DIAGNOSIS — Z85.3 HX: BREAST CANCER: ICD-10-CM

## 2022-09-29 DIAGNOSIS — F31.9 BIPOLAR 1 DISORDER (HCC): ICD-10-CM

## 2022-09-29 DIAGNOSIS — G51.32 HEMIFACIAL SPASM OF LEFT SIDE OF FACE: ICD-10-CM

## 2022-09-29 DIAGNOSIS — F32.A ANXIETY AND DEPRESSION: ICD-10-CM

## 2022-09-29 DIAGNOSIS — F41.9 ANXIETY AND DEPRESSION: ICD-10-CM

## 2022-09-29 DIAGNOSIS — R25.3 MUSCLE TWITCHING: ICD-10-CM

## 2022-09-29 DIAGNOSIS — G24.5 BLEPHAROSPASM: Primary | ICD-10-CM

## 2022-09-29 DIAGNOSIS — G47.9 SLEEP DIFFICULTIES: ICD-10-CM

## 2022-09-29 DIAGNOSIS — J30.2 SEASONAL ALLERGIES: ICD-10-CM

## 2022-09-29 DIAGNOSIS — Z90.11 H/O RIGHT MASTECTOMY: ICD-10-CM

## 2022-09-29 DIAGNOSIS — G43.009 MIGRAINE WITHOUT AURA AND WITHOUT STATUS MIGRAINOSUS, NOT INTRACTABLE: ICD-10-CM

## 2022-09-29 DIAGNOSIS — R41.3 MEMORY PROBLEM: ICD-10-CM

## 2022-09-29 PROCEDURE — G8417 CALC BMI ABV UP PARAM F/U: HCPCS | Performed by: PSYCHIATRY & NEUROLOGY

## 2022-09-29 PROCEDURE — 99214 OFFICE O/P EST MOD 30 MIN: CPT | Performed by: PSYCHIATRY & NEUROLOGY

## 2022-09-29 PROCEDURE — G8427 DOCREV CUR MEDS BY ELIG CLIN: HCPCS | Performed by: PSYCHIATRY & NEUROLOGY

## 2022-09-29 PROCEDURE — 1036F TOBACCO NON-USER: CPT | Performed by: PSYCHIATRY & NEUROLOGY

## 2022-09-29 PROCEDURE — 3017F COLORECTAL CA SCREEN DOC REV: CPT | Performed by: PSYCHIATRY & NEUROLOGY

## 2022-09-29 RX ORDER — CLONAZEPAM 0.5 MG/1
0.5 TABLET ORAL 2 TIMES DAILY
Qty: 60 TABLET | Refills: 2 | Status: SHIPPED | OUTPATIENT
Start: 2022-09-29 | End: 2022-10-30

## 2022-09-29 RX ORDER — LAMOTRIGINE 100 MG/1
TABLET ORAL
Qty: 90 TABLET | Refills: 2 | Status: SHIPPED | OUTPATIENT
Start: 2022-09-29

## 2022-09-29 RX ORDER — SUMATRIPTAN 100 MG/1
TABLET, FILM COATED ORAL
Qty: 12 TABLET | Refills: 2 | Status: SHIPPED | OUTPATIENT
Start: 2022-09-29

## 2022-09-29 RX ORDER — BUTALBITAL, ACETAMINOPHEN AND CAFFEINE 50; 325; 40 MG/1; MG/1; MG/1
TABLET ORAL
Qty: 60 TABLET | Refills: 2 | Status: SHIPPED | OUTPATIENT
Start: 2022-09-29

## 2022-09-29 ASSESSMENT — ENCOUNTER SYMPTOMS
EYE REDNESS: 0
PHOTOPHOBIA: 1
EYE ITCHING: 0
EYE WATERING: 1
FACIAL SWELLING: 0
SWOLLEN GLANDS: 0
BACK PAIN: 0
CONSTIPATION: 0
CHEST TIGHTNESS: 0
BLOOD IN STOOL: 0
TROUBLE SWALLOWING: 0
ABDOMINAL PAIN: 0
BOWEL INCONTINENCE: 0
RHINORRHEA: 0
VOICE CHANGE: 0
SORE THROAT: 0
CHOKING: 0
NAUSEA: 1
COUGH: 0
FACIAL SWEATING: 0
APNEA: 0
SCALP TENDERNESS: 0
SHORTNESS OF BREATH: 0
COLOR CHANGE: 0
BLURRED VISION: 0
SINUS PRESSURE: 0
DIARRHEA: 0
EYE PAIN: 0
WHEEZING: 0
EYE DISCHARGE: 0
ABDOMINAL DISTENTION: 0
VOMITING: 1
VISUAL CHANGE: 0

## 2022-09-29 ASSESSMENT — PATIENT HEALTH QUESTIONNAIRE - PHQ9
2. FEELING DOWN, DEPRESSED OR HOPELESS: 0
SUM OF ALL RESPONSES TO PHQ9 QUESTIONS 1 & 2: 0
SUM OF ALL RESPONSES TO PHQ QUESTIONS 1-9: 0
1. LITTLE INTEREST OR PLEASURE IN DOING THINGS: 0
SUM OF ALL RESPONSES TO PHQ QUESTIONS 1-9: 0

## 2022-09-29 NOTE — PROGRESS NOTES
[facility]  Neurology  [address] [place] [address]  Phone: [phone]   Fax: [phone]        SUBJECTIVE:     PATIENT ID:  Olivia Fairchild is a  RIGHT  HANDED 62 y.o. female. Migraine   This is a chronic problem. Episode onset: FOR  MORE  THAN   4  YEARS. The problem occurs intermittently. The problem has been gradually worsening. The pain is located in the Bilateral and vertex region. The pain does not radiate. The pain quality is similar to prior headaches. The quality of the pain is described as aching, dull and throbbing. The pain is at a severity of 3/10. The pain is mild. Associated symptoms include eye watering, insomnia, nausea, phonophobia, photophobia and vomiting. Pertinent negatives include no abdominal pain, abnormal behavior, anorexia, back pain, blurred vision, coughing, dizziness, drainage, ear pain, eye pain, eye redness, facial sweating, fever, hearing loss, loss of balance, muscle aches, neck pain, numbness, rhinorrhea, scalp tenderness, seizures, sinus pressure, sore throat, swollen glands, tingling, tinnitus, visual change, weakness or weight loss. The symptoms are aggravated by unknown. She has tried acetaminophen and Excedrin for the symptoms. The treatment provided no relief. Her past medical history is significant for migraine headaches and obesity. There is no history of cancer, cluster headaches, hypertension, immunosuppression, migraines in the family, pseudotumor cerebri, recent head traumas, sinus disease or TMJ. Neurologic Problem  The patient's primary symptoms include memory loss. The patient's pertinent negatives include no altered mental status, clumsiness, focal sensory loss, focal weakness, loss of balance, near-syncope, slurred speech, syncope, visual change or weakness. Primary symptoms comment: LEFT  EYE  TWITCHING  AND  SPAMS. This is a chronic problem. Episode onset: SINCE  APRIL 2017.  The neurological problem developed insidiously. The problem has been waxing and waning since onset. There was facial and left-sided focality noted. Associated symptoms include headaches, nausea and vomiting. Pertinent negatives include no abdominal pain, auditory change, aura, back pain, bladder incontinence, bowel incontinence, chest pain, confusion, diaphoresis, dizziness, fatigue, fever, light-headedness, neck pain, palpitations, shortness of breath or vertigo. Treatments tried: Sarah Smith. The treatment provided moderate relief. There is no history of a bleeding disorder, a clotting disorder, a CVA, dementia, head trauma, liver disease, mood changes or seizures. History obtained from  The patient      and other  available medical records were  Also  reviewed. The  Duration,  Quality,  Severity,  Location,  Timing,  Context,  Modifying  Factors   Of   The   Chief   Complaint       And  Present  Illness   Was   Reviewed   In   Chronological   Manner. PATIENT'S  MAIN  CONCERNS INCLUDE :                       1)        H/O     CHRONIC  BLEPHAROSPASM    LEFT  EYE. SINCE      April 2017                                                       -     STABLE                                 2)      PREVIOUS    H/O    BREAST    CANCER  RIGHT                                H/O   BREAST  CANCER   SURGERY  IN  NOV. 2017                               3)       HAD    CHEMO     IN   THE  PAST                                      ALSO  ON   ARIMIDEX   DAILY                             BEING  FOLLOWED  BY   HEMATOLOGY /  ONCOLOGY                               4)    H/O   CHRONIC   MIGRAINES       FOR    5YEARS                                               -   IMPROVED     AND     STABLE                                                            -    ON   FIORICET,    TOPAMAX,  IMITREX ,  IBUPROFEN                                                   AS once daily 30 tablet 5    olopatadine (PATANOL) 0.1 % ophthalmic solution instill 1 drop into both eyes twice a day 5 mL 3    topiramate (TOPAMAX) 50 mg tablet ONE  TABLET  TWICE  DAILY 60 tablet 5    vitamin B-12 (CYANOCOBALAMIN) 1000 MCG tablet take 1 tablet by mouth once daily 30 tablet 3    loxapine (LOXITANE) 10 MG capsule Take 10 mg by mouth nightly      Calcium Carb-Cholecalciferol (OYSTER SHELL CALCIUM W/D) 500-200 MG-UNIT TABS tablet take 1 tablet by mouth twice a day 180 tablet 1    QUEtiapine (SEROQUEL) 50 MG tablet take 1 tablet by mouth at bedtime      busPIRone (BUSPAR) 15 MG tablet Take 15 mg by mouth 3 times daily       lamoTRIgine (LAMICTAL) 25 MG tablet Take 50 mg by mouth daily At bedtime      OXcarbazepine (TRILEPTAL) 150 MG tablet 2 times daily       benztropine (COGENTIN) 0.5 MG tablet Take 0.5 mg by mouth daily      QUEtiapine (SEROQUEL) 100 MG tablet Take 100 mg by mouth nightly       lamoTRIgine (LAMICTAL) 100 MG tablet 150 mg nightly       tiZANidine (ZANAFLEX) 4 MG tablet Take 1 tablet by mouth every 8 hours as needed (muscle pain) 20 tablet 0     No current facility-administered medications for this visit.          Allergies   Allergen Reactions    Prednisone Swelling     Whole side of her face swelled when she took it, difficulty breathing         Family History   Problem Relation Age of Onset    Breast Cancer Sister 54    Breast Cancer Maternal Aunt 71    Other Maternal Cousin         Atypical Ductal Hyperplasia     Uterine Cancer Sister 32    Other Sister         breast cyst    Cataracts Mother     Glaucoma Mother     Heart Disease Father     High Blood Pressure Father     High Cholesterol Father     Mental Retardation Father     Diabetes Neg Hx          Social History     Socioeconomic History    Marital status: Single     Spouse name: Not on file    Number of children: Not on file    Years of education: Not on file    Highest education level: Not on file   Occupational History    Not Date/Time    ALKPHOS 107 09/13/2022 01:59 PM    ALT 18 09/13/2022 01:59 PM    AST 16 09/13/2022 01:59 PM    PROT 7.5 09/13/2022 01:59 PM    BILITOT 0.2 09/13/2022 01:59 PM    BILIDIR 0.12 04/09/2020 10:43 AM    LABALBU 4.1 09/13/2022 01:59 PM     Lab Results   Component Value Date/Time    BUN 21 09/13/2022 01:59 PM    CREATININE 1.07 09/13/2022 01:59 PM     Lab Results   Component Value Date/Time    CALCIUM 9.9 09/13/2022 01:59 PM    MG 1.8 04/10/2020 01:29 PM     Lab Results   Component Value Date/Time    AST 16 09/13/2022 01:59 PM    ALT 18 09/13/2022 01:59 PM       Lab Results   Component Value Date/Time    CKTOTAL 27 04/09/2020 10:43 AM     Lab Results   Component Value Date/Time    [id number] 580 06/04/2018 11:27 AM             Review of Systems   Constitutional:  Negative for appetite change, chills, diaphoresis, fatigue, fever, unexpected weight change and weight loss. HENT:  Negative for congestion, dental problem, drooling, ear discharge, ear pain, facial swelling, hearing loss, mouth sores, nosebleeds, postnasal drip, rhinorrhea, sinus pressure, sore throat, tinnitus, trouble swallowing and voice change. Eyes:  Positive for photophobia. Negative for blurred vision, pain, discharge, redness, itching and visual disturbance. Respiratory:  Negative for apnea, cough, choking, chest tightness, shortness of breath and wheezing. Cardiovascular:  Negative for chest pain, palpitations, leg swelling and near-syncope. Gastrointestinal:  Positive for nausea and vomiting. Negative for abdominal distention, abdominal pain, anorexia, blood in stool, bowel incontinence, constipation and diarrhea. Endocrine: Negative for cold intolerance, heat intolerance, polydipsia, polyphagia and polyuria. Genitourinary:  Negative for bladder incontinence. Musculoskeletal:  Negative for arthralgias, back pain, gait problem, joint swelling, myalgias, neck pain and neck stiffness.    Skin:  Negative for color change, pallor, rash and wound. Allergic/Immunologic: Negative for environmental allergies, food allergies and immunocompromised state. Neurological:  Positive for headaches. Negative for dizziness, vertigo, tingling, tremors, focal weakness, seizures, syncope, facial asymmetry, speech difficulty, weakness, light-headedness, numbness and loss of balance. Hematological:  Negative for adenopathy. Does not bruise/bleed easily. Psychiatric/Behavioral:  Positive for decreased concentration and memory loss. Negative for agitation, behavioral problems, confusion, dysphoric mood, hallucinations, self-injury, sleep disturbance and suicidal ideas. The patient is nervous/anxious and has insomnia. The patient is not hyperactive. OBJECTIVE:    Physical Exam  Constitutional:       Appearance: She is well-developed. HENT:      Head: Normocephalic and atraumatic. No raccoon eyes or Varma's sign. Right Ear: External ear normal.      Left Ear: External ear normal.      Nose: Nose normal.   Eyes:      Conjunctiva/sclera: Conjunctivae normal.   Neck:      Thyroid: No thyroid mass or thyromegaly. Vascular: No carotid bruit. Trachea: No tracheal deviation. Meningeal: Brudzinski's sign and Kernig's sign absent. Cardiovascular:      Rate and Rhythm: Normal rate and regular rhythm. Pulmonary:      Effort: Pulmonary effort is normal.   Musculoskeletal:         General: No tenderness. Cervical back: Normal range of motion and neck supple. No rigidity. No muscular tenderness. Normal range of motion. Skin:     General: Skin is warm. Coloration: Skin is not pale. Findings: No erythema or rash. Nails: There is no clubbing. Psychiatric:         Attention and Perception: She is attentive. Mood and Affect: Mood is anxious and depressed. Affect is not labile, blunt, angry or inappropriate.          Behavior: Behavior is not agitated, slowed, aggressive, withdrawn, hyperactive or combative. Behavior is cooperative. Thought Content: Thought content is not paranoid or delusional. Thought content does not include homicidal or suicidal ideation. Thought content does not include homicidal or suicidal plan. Cognition and Memory: Memory is impaired. She does not exhibit impaired recent memory or impaired remote memory. Judgment: Judgment is not impulsive or inappropriate. Neurologic Exam    NEUROLOGICAL EXAMINATION :       A) MENTAL STATUS:                   Alert and  oriented  To time, place  And  Person. No Aphasia. No  Dysarthria. Able   To  Follow  commands without   Any  Difficulty. No right  To left confusion. Normal  Speech  And language function. Insight and  Judgment ,Fund  Of  Knowledge   within normal limits                Recent  And  Remote memory  within normal limits                Attention &Concentration are within normal limits                                                   B) CRANIAL NERVES :             2 CN : Visual  Acuity  And  Visual fields  within normal limits                        Fundi  Could  Not  Be  Could  Not  Be  Evaluated. 3,4,6 CN : Both  Pupils are   Reactive and  Equal.                            Extraocular   Movements  Are  Intact. No  Nystagmus. No  YUMIKO. No  Afferent  Pupillary  Defect noted. 5 CN :  Normal  Facial sensations and Corneal  Reflexes           7 CN :  Normal  Facial  Symmetry  And  Strength. No facial  Weakness.            8 CN :  Hearing  Appears within normal limits          9, 10 CN: Normal spontaneous, reflex palate movements         11 CN:   Normal  Shoulder shrug and  Strength         12 CN :   Normal  Tongue movements and  Tongue  In midline                        No tongue   Fasciculations or atrophy         C) MOTOR  EXAM:                 Strength  In upper  And Lower extremities   within normal limits               No  Drift. No  Atrophy               Rapid alternating  And  repetitions  Movements  within normal limits               Muscle  Tone  In upper  And  Lower  Extremities  Normal                No rigidity. No  Spasticity. Bradykinesia   Absent                 No  Asterixis. Sustention  Tremor , Resting  Tremor   absent                      LEFT  EYE   SHOWS   BLEPHAROSPASM   INTERMITTENTLY                       D) SENSORY :               light touch, pinprick, position  And  Vibration  within normal limits        E) REFLEXES:                   Deep  Tendon  Reflexes normal                    No pathological  Reflexes  Bilaterally.                                     F) COORDINATION  AND  GAIT :                                Station and  Gait  normal                                        Romberg's test negative                          Ataxia negative          ASSESSMENT:      Patient Active Problem List   Diagnosis    Bipolar 1 disorder ([address]    Hyperlipidemia    Malignant neoplasm of overlapping sites of right breast in female, estrogen receptor negative ([address]    Port-A-Cath in place    Migraine    Memory problem    Sleep difficulties    Anxiety and depression    Muscle twitching    Hemifacial spasm    Combined forms of age-related cataract of both eyes    Squamous blepharitis of upper and lower eyelids of both eyes    Acute deep vein thrombosis (DVT) of axillary vein of right upper extremity (HCC)    Malignant neoplasm of breast in female, estrogen receptor positive (HCC)    Seasonal allergies    H/O right mastectomy    HX: breast cancer    Acute massive pulmonary embolism (HCC)    Moderate to severe pulmonary hypertension (HCC)    TAY on CPAP    Morbid obesity with BMI of 40.0-44.9, adult (HCC)    Extrinsic asthma    Blepharospasm    Chronic deep vein thrombosis (DVT) of axillary vein of right upper extremity ([address]    H/O breast reconstruction    Exposed breast implant          MRI OF THE BRAIN WITHOUT AND WITH CONTRAST  10/2/2019 1:14 pm       TECHNIQUE:   Multiplanar multisequence MRI of the head/brain was performed without and   with the administration of intravenous contrast.       COMPARISON:   MRI brain performed 10/08/2018. HISTORY:   ORDERING SYSTEM PROVIDED HISTORY: Migraine without aura and without status   migrainosus, not intractable   TECHNOLOGIST PROVIDED HISTORY:   migraines   Is the patient pregnant?->No   Reason for Exam: Migraines for quite a while, becoming worse. Acuity: Chronic   Type of Exam: Unknown   Additional signs and symptoms: Migraine without aura and without status   migrainosus, not intractable       FINDINGS:   INTRACRANIAL STRUCTURES/VENTRICLES:  The sellar and suprasellar structures,   optic chiasm, corpus callosum, pineal gland, tectum, and midline brainstem   structures are unremarkable. The craniocervical junction is unremarkable. There is no acute intracranial hemorrhage, mass effect, or midline shift. There is satisfactory overall gray-white matter differentiation. There is no   abnormal postcontrast enhancement. The ventricular structures are symmetric   and unremarkable. The infratentorial structures including the   cerebellopontine angles and internal auditory canals are unremarkable. There   is no abnormal restricted diffusion. There is no abnormal blooming artifact   on susceptibility weighted imaging. ORBITS: The visualized portion of the orbits demonstrate no acute abnormality. SINUSES: The visualized paranasal sinuses and mastoid air cells are well   aerated. BONES/SOFT TISSUES: The bone marrow signal intensity appears normal. The soft   tissues demonstrate no acute abnormality. Impression   Unremarkable pre and post-contrast MRI of the brain.                MRI OF THE BRAIN WITHOUT AND WITH CONTRAST  2/5/2018 9:18 am       TECHNIQUE: Multiplanar multisequence MRI of the head/brain was performed without and   with the administration of intravenous contrast.       COMPARISON:   Head CT on 09/25/2013. HISTORY:   ORDERING SYSTEM PROVIDED HISTORY: Malignant neoplasm of overlapping sites of   right breast in female, estrogen receptor negative ([facility]   TECHNOLOGIST PROVIDED HISTORY:   Ordering Physician Provided Reason for Exam:  Bad headaches for one month. Left eye twitching for two months and it is getting worse. History of breast   cancer. Acuity: Acute   Type of Exam: Initial   Additional signs and symptoms: Malignant neoplasm of overlapping sites of   right breast in female, estrogen receptor negative. Initial evaluation. FINDINGS:   INTRACRANIAL STRUCTURES/VENTRICLES:  There are no areas of restricted   diffusion to suggest an acute infarct. The cerebral and cerebellar   parenchyma demonstrate normal volume and signal intensity. There are no   abnormal extra-axial fluid collections. The ventricles are normal in size. Normal major intracranial flow voids are noted. There are no areas of abnormal contrast enhancement in the cerebral or   cerebellar parenchyma to suggest metastatic disease. There are no areas of blooming artifact noted on the gradient echo sequences   to suggest sequela of acute or chronic hemorrhage. ORBITS: The visualized portion of the orbits demonstrate no acute abnormality. SINUSES: There is scattered mucosal thickening in the paranasal sinuses, with   greatest involvement in the ethmoid air cells and maxillary sinuses. There   is an air-fluid level in the left sphenoid sinus, correlate with signs of   acute sinusitis. The mastoid air cells are clear. BONES/SOFT TISSUES: The bone marrow signal intensity appears normal. The soft   tissues demonstrate no acute abnormality. Impression   1. Unremarkable MRI of the brain. No evidence of metastatic disease. 2. Acute left sphenoid sinusitis. VISITING DIAGNOSIS:      ICD-10-CM    1. Blepharospasm  G24.5       2. Migraine without aura and without status migrainosus, not intractable  G43.009 SUMAtriptan (IMITREX) 100 MG tablet     butalbital-acetaminophen-caffeine (FIORICET, ESGIC) -40 MG per tablet     clonazePAM (KLONOPIN) 0.5 MG tablet      3. Bipolar 1 disorder (HCC)  F31.9 clonazePAM (KLONOPIN) 0.5 MG tablet      4. Memory problem  R41.3 clonazePAM (KLONOPIN) 0.5 MG tablet      5. Sleep difficulties  G47.9 clonazePAM (KLONOPIN) 0.5 MG tablet      6. Anxiety and depression  F41.9 clonazePAM (KLONOPIN) 0.5 MG tablet    F32. A       7. Muscle twitching  R25.3 clonazePAM (KLONOPIN) 0.5 MG tablet      8. Hemifacial spasm of left side of face  G51.32 clonazePAM (KLONOPIN) 0.5 MG tablet      9. HX: breast cancer  Z85.3       10. Seasonal allergies  J30.2       11. H/O right mastectomy  Z90.11                         VARIOUS  RISK   FACTORS   WERE  REVIEWED   AND   DISCUSSED. *  PATIENT   HAS  MULTIPLE   MEDICAL, MENTAL HEALTH          NEUROLOGICAL   PROBLEMS . PATIENT  MANAGEMENT  IS  CHALLENGING              PLAN:       Dewitte Bolster  Of  The  Diagnoses,  The  Management & Treatment  Options           AND    Care  plan  Were        Reviewed and   Discussed   With  patient. * Goals  And  Expectations  Of  The  Therapy  Discussed   And  Reviewed. *   Benefits   And   Side  Effect  Profile  Of  Medication/s   Were   Discussed             * Need   For  Further   Follow up For  The  Various  Problems  Were discussed. * Results  Of  The  Previous  Diagnostic tests were reviewed and questions answered. patient  understand the same.              Medical  Decision  Making  Was  Made  Based on the   Complexity  Of  Above  Mentioned  Diagnoses,        Data reviewed   & diagnostic  Tests  Reviewed,  Risk  Of  Significant   Co morbidities and complicating Factors. Medical  Decision  Was   High  Complexity  Due   To  The  Patient's  Multiple  Symptoms,      Complex  Treatment  Regimen,  Multiple medications and   Risk  Of   Side  Effects,      Difficulty  In  Medication  Management      And  Diagnostic  Challenges   In  View  Of  The  Associated   Co  Morbid  Conditions   And  Problems. *   ADEQUATE   FLUID  INTAKE   AND  ELECTROLYTE  BALANCE         * KEEP  DAIRY  OF   THE  NEUROLOGICAL  SYMPTOMS        RECORDING THE    DURATION  AND  FREQUENCY. -    DISCUSSED  WITH PATIENT              *  AVOID    CONDITIONS  AND  FACTORS   THAT  MAKE   NEUROLOGICAL  SYMPTOMS  WORSE. *  TO  MAINTAIN  REGULAR  SLEEP  WAKE  CYCLES. *   TO  HAVE  ADEQUATE  REST  AND   SLEEP    HOURS.          *    TO   AVOID   TO  SLEEP  IN   SUPINE  POSITION. *      WEIGHT   LOSS. *    AVOID  ANY USAGE OF                   TOBACCO,  EXCESSIVE  ALCOHOL  AND   ILLEGAL   SUBSTANCES            *  CONTINUE MEDICATIONS PRESCRIBED BY NEUROLOGIST AS    RECOMMENDED     *   Compliance   With  Medications   And  Instructions          * CURRENTLY  TOLERATING  THE  PRESCRIBED   MEDICATIONS. WITHOUT  ANY  SIGNIFICANT  SIDE  EFFECTS   &  GETTING BENEFIT.             *    Prophylactic  Use   Of     Vitamin   B   Complex,  Folic  Acid,    Vitamin  B12    Multivitamin,       Calcium  With  magnesium  And  Vit D    Supplementations   Over  The  Counter  Discussed            *  EVALUATIONS  AND  FOLLOW UP:                     * HEMATOLOGY/ONCOLOGY                    *   OPTHALMOLOGY                                                                             *        MIGRAINES  ARE   BETTER  CONTROLLED                               ON  TOPAMAX ,  FIORICET,  IMITREX   AS  NEEDED                              AND  PATIENT     FEELS  LOT  BETTER.                                      *       H/O    WORSENING  OF    LEFT   BLEPHAROSPASM     SINCE AUG.  2022                         PATIENT      WILL   BE     TRIED    ALSO    WITH  KLONOPIN     AS    NEEDED          Controlled Substances Monitoring: Periodic Controlled Substance Monitoring: Possible medication side effects, risk of tolerance/dependence & alternative treatments discussed. , Assessed functional status. Rivas Kimble MD)            Orders Placed This Encounter   Medications    SUMAtriptan (IMITREX) 100 MG tablet     Sig: take 1 tablet by mouth once daily if needed for migraines     Dispense:  12 tablet     Refill:  2    butalbital-acetaminophen-caffeine (FIORICET, ESGIC) -40 MG per tablet     Si-2   TABLET  TWICE  DAILY  AS  NEEDED     Dispense:  60 tablet     Refill:  2    lamoTRIgine (LAMICTAL) 100 MG tablet     Sig: ONE   TABLET    AM      AND  TWO  TABLETS     AT   BED   TIME    DAILY     Dispense:  90 tablet     Refill:  2    clonazePAM (KLONOPIN) 0.5 MG tablet     Sig: Take 1 tablet by mouth 2 times daily for 31 days. Dispense:  60 tablet     Refill:  2                     *PATIENT   TO  FOLLOW  UP  WITH   PRIMARY  CARE   AND   OTHER  CONSULTANTS  AS  BEFORE.           *TO  FOLLOW  WITH   MENTAL  HEALTH  PROFESSIONALS ,  INCLUDING            PSYCHOLOGICAL  COUNSELING   AND  PSYCHIATRIC  EVALUTIONS            *  Maintain   Healthy  Life Style    With   Periodic  Monitoring  Of      Any  Medical  Conditions  Including   Elevated  Blood  Pressure,  Lipid  Profile,     Blood  Sugar levels  And   Heart  Disease. *   Period   Screening  For  Cancers  Involving  Breast,  Colon,    lungs  And  Other  Organs  As  Applicable,  In consultation   With  Your  Primary Care Providers. * Second  Neurological  Opinion  And  Evaluations  In  [facility]  Setting  If  Patient  Is  Interested.             * Please   Contact   Neurology  Clinic   Early   If   Are  Any  New  Neurological                             Symptoms   And  Any neurological Concerns. *  If  The  Patient remains  Neurologically  Stable   Return   To  [facility] Neurology Department       IN           2-3             MONTHS  TIME   FOR  FURTHER  FOLLOW UP.                 *  If   There is  Any  Significant  Worsening   Of  Current  Symptoms  And  Or  If patient  Develops       Any additional  New  Neurological  Symptoms  Or  Significant  Concerns   Should  Call  911 or      Go  To  Emergency  Department  For  Further  Immediate  Evaluation. *   The  Neurological   Findings,  Possible  Diagnosis,  Differential diagnoses   And  Options  For    Further   Investigations       And  management   Are  Discussed  Comprehensively. Medications   And  Prescription   Risks  And  Side effects  Are   Also  Discussed. The  Above  Were  Reviewed  With  patient and     questions  Answered  In  Detail. More   Than   50% of face  To face Time   Was  Spent  On  Counseling   And   Coordination      Of  Care   Of   Patient's multiple   Neurological  Problems   And   Comorbid  Medical   Conditions. Electronically signed by Ernie Mariee MD.,  Luis Davis       Board Certified in  Neurology &  In  [employer] [address] [employer] and Neurology ([employer])      DISCLAIMER:   Although every effort was made to ensure the accuracy of this  electronic transcription, some errors in transcription may have occurred. GENERAL PATIENT INSTRUCTIONS:     A Healthy Lifestyle: Care Instructions  Your Care Instructions  A healthy lifestyle can help you feel good, stay at a healthy weight, and have plenty of energy for both work and play. A healthy lifestyle is something you can share with your whole family. A healthy lifestyle also can lower your risk for serious health problems, such as high blood pressure, heart disease, and diabetes.   You can follow a few steps listed below to improve your health and the health of your family. Follow-up care is a key part of your treatment and safety. Be sure to make and go to all appointments, and call your doctor if you are having problems. Its also a good idea to know your test results and keep a list of the medicines you take. How can you care for yourself at home? Do not eat too much sugar, fat, or fast foods. You can still have dessert and treats now and then. The goal is moderation. Start small to improve your eating habits. Pay attention to portion sizes, drink less juice and soda pop, and eat more fruits and vegetables. Eat a healthy amount of food. A 3-ounce serving of meat, for example, is about the size of a deck of cards. Fill the rest of your plate with vegetables and whole grains. Limit the amount of soda and sports drinks you have every day. Drink more water when you are thirsty. Eat at least 5 servings of fruits and vegetables every day. It may seem like a lot, but it is not hard to reach this goal. A serving or helping is 1 piece of fruit, 1 cup of vegetables, or 2 cups of leafy, raw vegetables. Have an apple or some carrot sticks as an afternoon snack instead of a candy bar. Try to have fruits and/or vegetables at every meal.  Make exercise part of your daily routine. You may want to start with simple activities, such as walking, bicycling, or slow swimming. Try to be active 30 to 60 minutes every day. You do not need to do all 30 to 60 minutes all at once. For example, you can exercise 3 times a day for 10 or 20 minutes. Moderate exercise is safe for most people, but it is always a good idea to talk to your doctor before starting an exercise program.  Keep moving. Veria Bidding the lawn, work in the garden, or [place]. Take the stairs instead of the elevator at work. If you smoke, quit. People who smoke have an increased risk for heart attack, stroke, cancer, and other lung illnesses.  Quitting is hard, but there are ways to boost your chance of quitting tobacco for good. Use nicotine gum, patches, or lozenges. Ask your doctor about stop-smoking programs and medicines. Keep trying. In addition to reducing your risk of diseases in the future, you will notice some benefits soon after you stop using tobacco. If you have shortness of breath or asthma symptoms, they will likely get better within a few weeks after you quit. Limit how much alcohol you drink. Moderate amounts of alcohol (up to 2 drinks a day for men, 1 drink a day for women) are okay. But drinking too much can lead to liver problems, high blood pressure, and other health problems. Family health  If you have a family, there are many things you can do together to improve your health. Eat meals together as a family as often as possible. Eat healthy foods. This includes fruits, vegetables, lean meats and dairy, and whole grains. Include your family in your fitness plan. Most people think of activities such as jogging or tennis as the way to fitness, but there are many ways you and your family can be more active. Anything that makes you breathe hard and gets your heart pumping is exercise. Here are some tips:  Walk to do errands or to take your child to school or the bus. Go for a family bike ride after dinner instead of watching TV. Where can you learn more? Go to https://[employer].M-Files. org and sign in to your [employer] account. Enter F269 in the [facility] box to learn more about \"A Healthy Lifestyle: Care Instructions. \"     If you do not have an account, please click on the \"Sign Up Now\" link. Current as of: July 26, 2016  Content Version: 11.2  © 0686-5216 [employer]. Care instructions adapted under license by [address] If you have questions about a medical condition or this instruction, always ask your healthcare professional. [place] [address] any warranty or liability for your use of this information.

## 2022-10-27 RX ORDER — LORATADINE 10 MG/1
TABLET ORAL
Qty: 30 TABLET | Refills: 1 | Status: SHIPPED | OUTPATIENT
Start: 2022-10-27

## 2022-10-27 NOTE — TELEPHONE ENCOUNTER
Adelfo Mendoza called requesting a refill of the below medication which has been pended for you:     Requested Prescriptions     Pending Prescriptions Disp Refills    loratadine (CLARITIN) 10 MG tablet 30 tablet 1     Sig: take 1 tablet by mouth once daily       Last Appointment Date: 6/17/2022  Next Appointment Date: 12/20/2022    Allergies   Allergen Reactions    Prednisone Swelling     Whole side of her face swelled when she took it, difficulty breathing

## 2022-10-28 DIAGNOSIS — Z78.0 POSTMENOPAUSAL: ICD-10-CM

## 2022-10-28 DIAGNOSIS — C50.919 MALIGNANT NEOPLASM OF FEMALE BREAST, UNSPECIFIED ESTROGEN RECEPTOR STATUS, UNSPECIFIED LATERALITY, UNSPECIFIED SITE OF BREAST (HCC): ICD-10-CM

## 2022-10-28 DIAGNOSIS — I82.A11 ACUTE DEEP VEIN THROMBOSIS (DVT) OF AXILLARY VEIN OF RIGHT UPPER EXTREMITY (HCC): ICD-10-CM

## 2022-10-28 DIAGNOSIS — M10.9 ACUTE GOUT OF RIGHT FOOT, UNSPECIFIED CAUSE: ICD-10-CM

## 2022-10-28 DIAGNOSIS — Z90.11 H/O RIGHT MASTECTOMY: ICD-10-CM

## 2022-10-28 RX ORDER — ALLOPURINOL 100 MG/1
TABLET ORAL
Qty: 30 TABLET | Refills: 3 | Status: SHIPPED | OUTPATIENT
Start: 2022-10-28

## 2022-10-28 NOTE — TELEPHONE ENCOUNTER
Paula Anaya called requesting a refill of the below medication which has been pended for you:     Requested Prescriptions     Pending Prescriptions Disp Refills    allopurinol (ZYLOPRIM) 100 MG tablet [Pharmacy Med Name: ALLOPURINOL 100 MG TABLET] 30 tablet 3     Sig: take 1 tablet by mouth once daily       Last Appointment Date: 6/17/2022  Next Appointment Date: 12/20/2022    Allergies   Allergen Reactions    Prednisone Swelling     Whole side of her face swelled when she took it, difficulty breathing

## 2022-11-16 DIAGNOSIS — G43.009 MIGRAINE WITHOUT AURA AND WITHOUT STATUS MIGRAINOSUS, NOT INTRACTABLE: ICD-10-CM

## 2022-11-16 RX ORDER — IBUPROFEN 800 MG/1
TABLET ORAL
Qty: 90 TABLET | Refills: 1 | Status: SHIPPED | OUTPATIENT
Start: 2022-11-16

## 2022-11-22 DIAGNOSIS — C50.919 MALIGNANT NEOPLASM OF FEMALE BREAST, UNSPECIFIED ESTROGEN RECEPTOR STATUS, UNSPECIFIED LATERALITY, UNSPECIFIED SITE OF BREAST (HCC): ICD-10-CM

## 2022-11-22 RX ORDER — LAMOTRIGINE 150 MG/1
TABLET ORAL
Qty: 30 TABLET | Refills: 2 | Status: SHIPPED | OUTPATIENT
Start: 2022-11-22

## 2022-11-22 RX ORDER — LAMOTRIGINE 25 MG/1
TABLET ORAL
Qty: 60 TABLET | Refills: 2 | Status: SHIPPED | OUTPATIENT
Start: 2022-11-22

## 2022-11-23 RX ORDER — LETROZOLE 2.5 MG/1
TABLET, FILM COATED ORAL
Qty: 30 TABLET | Refills: 5 | Status: SHIPPED | OUTPATIENT
Start: 2022-11-23

## 2022-11-28 DIAGNOSIS — C50.919 MALIGNANT NEOPLASM OF FEMALE BREAST, UNSPECIFIED ESTROGEN RECEPTOR STATUS, UNSPECIFIED LATERALITY, UNSPECIFIED SITE OF BREAST (HCC): ICD-10-CM

## 2022-12-06 RX ORDER — CETIRIZINE HYDROCHLORIDE 10 MG/1
TABLET ORAL
Qty: 90 TABLET | Refills: 1 | Status: SHIPPED | OUTPATIENT
Start: 2022-12-06

## 2022-12-06 NOTE — TELEPHONE ENCOUNTER
Dharmesh Nick called requesting a refill of the below medication which has been pended for you:     Requested Prescriptions     Pending Prescriptions Disp Refills    cetirizine (ZYRTEC) 10 MG tablet [Pharmacy Med Name: CETIRIZINE HCL 10 MG TABLET] 90 tablet 1     Sig: take 1 tablet by mouth once daily       Last Appointment Date: 6/17/2022  Next Appointment Date: 12/20/2022    Allergies   Allergen Reactions    Prednisone Swelling     Whole side of her face swelled when she took it, difficulty breathing

## 2022-12-09 DIAGNOSIS — G43.009 MIGRAINE WITHOUT AURA AND WITHOUT STATUS MIGRAINOSUS, NOT INTRACTABLE: ICD-10-CM

## 2022-12-09 RX ORDER — TOPIRAMATE 50 MG/1
TABLET, FILM COATED ORAL
Qty: 60 TABLET | Refills: 5 | Status: SHIPPED | OUTPATIENT
Start: 2022-12-09

## 2022-12-09 NOTE — TELEPHONE ENCOUNTER
Last Appt:  9/29/2022  Next Appt:   1/18/2023  Med verified in [facility] [address]

## 2022-12-19 DIAGNOSIS — R25.3 MUSCLE TWITCHING: Primary | ICD-10-CM

## 2022-12-19 RX ORDER — LAMOTRIGINE 100 MG/1
TABLET ORAL
Qty: 90 TABLET | Refills: 2 | Status: SHIPPED | OUTPATIENT
Start: 2022-12-19

## 2022-12-20 ENCOUNTER — OFFICE VISIT (OUTPATIENT)
Dept: FAMILY MEDICINE CLINIC | Age: 57
End: 2022-12-20
Payer: MEDICARE

## 2022-12-20 VITALS
BODY MASS INDEX: 42.43 KG/M2 | DIASTOLIC BLOOD PRESSURE: 80 MMHG | WEIGHT: 264 LBS | OXYGEN SATURATION: 99 % | TEMPERATURE: 99 F | HEART RATE: 94 BPM | SYSTOLIC BLOOD PRESSURE: 130 MMHG | HEIGHT: 66 IN

## 2022-12-20 DIAGNOSIS — J45.20 MILD INTERMITTENT EXTRINSIC ASTHMA WITHOUT COMPLICATION: Primary | ICD-10-CM

## 2022-12-20 PROCEDURE — G8482 FLU IMMUNIZE ORDER/ADMIN: HCPCS | Performed by: FAMILY MEDICINE

## 2022-12-20 PROCEDURE — 1036F TOBACCO NON-USER: CPT | Performed by: FAMILY MEDICINE

## 2022-12-20 PROCEDURE — 99213 OFFICE O/P EST LOW 20 MIN: CPT | Performed by: FAMILY MEDICINE

## 2022-12-20 PROCEDURE — G8427 DOCREV CUR MEDS BY ELIG CLIN: HCPCS | Performed by: FAMILY MEDICINE

## 2022-12-20 PROCEDURE — 3017F COLORECTAL CA SCREEN DOC REV: CPT | Performed by: FAMILY MEDICINE

## 2022-12-20 PROCEDURE — G8417 CALC BMI ABV UP PARAM F/U: HCPCS | Performed by: FAMILY MEDICINE

## 2022-12-20 RX ORDER — LORATADINE 10 MG/1
TABLET ORAL
Qty: 90 TABLET | Refills: 1 | Status: SHIPPED | OUTPATIENT
Start: 2022-12-20

## 2022-12-20 SDOH — ECONOMIC STABILITY: FOOD INSECURITY: WITHIN THE PAST 12 MONTHS, YOU WORRIED THAT YOUR FOOD WOULD RUN OUT BEFORE YOU GOT MONEY TO BUY MORE.: PATIENT DECLINED

## 2022-12-20 SDOH — ECONOMIC STABILITY: FOOD INSECURITY: WITHIN THE PAST 12 MONTHS, THE FOOD YOU BOUGHT JUST DIDN'T LAST AND YOU DIDN'T HAVE MONEY TO GET MORE.: PATIENT DECLINED

## 2022-12-20 ASSESSMENT — ENCOUNTER SYMPTOMS
WHEEZING: 0
CHEST TIGHTNESS: 0
COUGH: 0
SHORTNESS OF BREATH: 0

## 2022-12-20 ASSESSMENT — SOCIAL DETERMINANTS OF HEALTH (SDOH): HOW HARD IS IT FOR YOU TO PAY FOR THE VERY BASICS LIKE FOOD, HOUSING, MEDICAL CARE, AND HEATING?: PATIENT DECLINED

## 2022-12-20 NOTE — PROGRESS NOTES
JEFRY Meier [address]  [address] [address]  Dept: [phone]  Dept Fax: [phone]  Loc: [phone]    Martina Haney is a 62 y.o. female who presents today for her medical conditions/complaints as noted below. Martina Haney is c/o of   Chief Complaint   Patient presents with    Asthma     6 month       HPI:     HPI Here today for a follow up of her asthma. She has been shaking really badly due to her Lamictal but her psychiatrist is working on it. Her has been doing well with her asthma. She is not coughing much. She is not having any issues with wheezing since she is taking the claritin. She is not having any increased cough when she lays down at night. She is just taking the albuterol as needed. She needs the albuterol less than weekly. She has not had any chest pain. She was exercising at the [facility] but she stopped recently due to the holidays. Past Medical History:   Diagnosis Date    Asthma     seasonal    Bipolar 1 disorder ([address]     Breast cancer ([address] 2017    right side     Deep vein thrombosis ([facility])     DVT of axillary vein, acute right ([address]     Eye twitch 2019    Headache(784.0)     History of blood transfusion     Hx of blood clots 03/2020    PE    Hyperlipidemia     Migraine     Obesity     PONV (postoperative nausea and vomiting)     Prolonged emergence from general anesthesia     Sleep apnea     uses CPAP          Social History     Tobacco Use    Smoking status: Never    Smokeless tobacco: Never    Tobacco comments:     Never smoker.  TC, RRT 4/5/18   Substance Use Topics    Alcohol use: Yes     Comment: Rarely     Current Outpatient Medications   Medication Sig Dispense Refill    loratadine (CLARITIN) 10 MG tablet take 1 tablet by mouth once daily 90 tablet 1    lamoTRIgine (LAMICTAL) 100 MG tablet ONE   TABLET    AM      AND  TWO  TABLETS     AT   BED   TIME    DAILY 90 tablet 2 topiramate (TOPAMAX) 50 MG tablet ONE  TABLET  TWICE  DAILY 60 tablet 5    cetirizine (ZYRTEC) 10 MG tablet take 1 tablet by mouth once daily 90 tablet 1    Calcium Carb-Cholecalciferol (OYSTER SHELL CALCIUM W/D) 500-200 MG-UNIT TABS tablet take 1 tablet by mouth twice a day 180 tablet 1    letrozole (FEMARA) 2.5 MG tablet take 1 tablet by mouth once daily 30 tablet 5    lamoTRIgine (LAMICTAL) 150 MG tablet take 1 tablet by mouth at bedtime take with 25 milligram tablet 30 tablet 2    lamoTRIgine (LAMICTAL) 25 MG tablet take 2 tablets by mouth at bedtime for MOOD 60 tablet 2    ibuprofen (ADVIL;MOTRIN) 800 MG tablet take 1 tablet by mouth two to three times a day with food if needed 90 tablet 1    apixaban (ELIQUIS) 5 MG TABS tablet take 1 tablet by mouth twice a day 60 tablet 1    allopurinol (ZYLOPRIM) 100 MG tablet take 1 tablet by mouth once daily 30 tablet 3    SUMAtriptan (IMITREX) 100 MG tablet take 1 tablet by mouth once daily if needed for migraines 12 tablet 2    butalbital-acetaminophen-caffeine (FIORICET, ESGIC) -40 MG per tablet 1-2   TABLET  TWICE  DAILY  AS  NEEDED 60 tablet 2    RA VITAMIN B-12 TR 1000 MCG TBCR take 1 tablet by mouth once daily 30 tablet 3    fluticasone (FLONASE) 50 MCG/ACT nasal spray instill 1 spray into each nostril once daily 16 g 3    albuterol sulfate HFA (VENTOLIN HFA) 108 (90 Base) MCG/ACT inhaler inhale 2 puffs by mouth and INTO THE LUNGS every 4 hours if needed for wheezing 18 g 3    pravastatin (PRAVACHOL) 40 MG tablet take 1 tablet by mouth once daily 90 tablet 1    olopatadine (PATANOL) 0.1 % ophthalmic solution instill 1 drop into both eyes twice a day 5 mL 3    vitamin B-12 (CYANOCOBALAMIN) 1000 MCG tablet take 1 tablet by mouth once daily 30 tablet 3    loxapine (LOXITANE) 10 MG capsule Take 10 mg by mouth nightly      QUEtiapine (SEROQUEL) 50 MG tablet take 1 tablet by mouth at bedtime      tiZANidine (ZANAFLEX) 4 MG tablet Take 1 tablet by mouth every 8 hours as needed (muscle pain) 20 tablet 0    busPIRone (BUSPAR) 15 MG tablet Take 15 mg by mouth 3 times daily       OXcarbazepine (TRILEPTAL) 150 MG tablet 2 times daily       benztropine (COGENTIN) 0.5 MG tablet Take 0.5 mg by mouth daily      QUEtiapine (SEROQUEL) 100 MG tablet Take 100 mg by mouth nightly       lamoTRIgine (LAMICTAL) 100 MG tablet 150 mg nightly       clonazePAM (KLONOPIN) 0.5 MG tablet Take 1 tablet by mouth 2 times daily for 31 days. 60 tablet 2     No current facility-administered medications for this visit. Allergies   Allergen Reactions    Prednisone Swelling     Whole side of her face swelled when she took it, difficulty breathing       Subjective:     Review of Systems   Constitutional:  Negative for activity change, appetite change, chills, fatigue and fever. Eyes:  Negative for visual disturbance. Respiratory:  Negative for cough, chest tightness, shortness of breath and wheezing. Cardiovascular:  Negative for chest pain, palpitations and leg swelling. Neurological:  Positive for tremors. Negative for dizziness, syncope, weakness, light-headedness and headaches. Objective:      Physical Exam  Vitals and nursing note reviewed. Constitutional:       General: She is not in acute distress. Appearance: She is well-developed. Eyes:      Conjunctiva/sclera: Conjunctivae normal.   Neck:      Thyroid: No thyromegaly. Cardiovascular:      Rate and Rhythm: Normal rate and regular rhythm. Heart sounds: Normal heart sounds. No murmur heard. Pulmonary:      Effort: Pulmonary effort is normal. No respiratory distress. Breath sounds: Normal breath sounds. No wheezing. Musculoskeletal:      Cervical back: Normal range of motion and neck supple. Lymphadenopathy:      Cervical: No cervical adenopathy. Skin:     General: Skin is warm and dry. Findings: No erythema or rash.    Neurological:      Mental Status: She is alert and oriented to person, place, and time. Psychiatric:         Mood and Affect: Mood normal.         Behavior: Behavior normal.     /80   Pulse 94   Temp 99 °F (37.2 °C)   Ht 5' 6\" (1.676 m)   Wt 264 lb (119.7 kg)   LMP 02/28/2013 (Exact Date)   SpO2 99%   BMI 42.61 kg/m²     Assessment:       Diagnosis Orders   1. Mild intermittent extrinsic asthma without complication                  Plan:       Asthma: stable; she has been doing well overall. She only uses the albuterol on an as needed basis. Return in about 6 months (around 6/20/2023) for [address] Orders Placed This Encounter   Medications    loratadine (CLARITIN) 10 MG tablet     Sig: take 1 tablet by mouth once daily     Dispense:  90 tablet     Refill:  1       Patientgiven educational materials - see patient instructions. Discussed use, benefit,and side effects of prescribed medications. All patient questions answered. Ptvoiced understanding. Reviewed health maintenance. Instructed to continue currentmedications, diet and exercise. Patient agreed with treatment plan. Follow up asdirected.      Electronically signed by Pedrito Corrales MD on 12/20/2022 at 3:37 PM

## 2022-12-24 DIAGNOSIS — C50.919 MALIGNANT NEOPLASM OF FEMALE BREAST, UNSPECIFIED ESTROGEN RECEPTOR STATUS, UNSPECIFIED LATERALITY, UNSPECIFIED SITE OF BREAST (HCC): ICD-10-CM

## 2022-12-24 DIAGNOSIS — Z90.11 H/O RIGHT MASTECTOMY: ICD-10-CM

## 2022-12-24 DIAGNOSIS — Z78.0 POSTMENOPAUSAL: ICD-10-CM

## 2022-12-24 DIAGNOSIS — I82.A11 ACUTE DEEP VEIN THROMBOSIS (DVT) OF AXILLARY VEIN OF RIGHT UPPER EXTREMITY (HCC): ICD-10-CM

## 2023-01-10 DIAGNOSIS — C50.919 MALIGNANT NEOPLASM OF FEMALE BREAST, UNSPECIFIED ESTROGEN RECEPTOR STATUS, UNSPECIFIED LATERALITY, UNSPECIFIED SITE OF BREAST (HCC): ICD-10-CM

## 2023-01-11 RX ORDER — PSYLLIUM HUSK 3.4 G/7G
POWDER ORAL
Qty: 30 TABLET | Refills: 3 | Status: SHIPPED | OUTPATIENT
Start: 2023-01-11

## 2023-01-16 ENCOUNTER — HOSPITAL ENCOUNTER (OUTPATIENT)
Dept: MAMMOGRAPHY | Age: 58
Discharge: HOME | End: 2023-01-18
Payer: MEDICARE

## 2023-01-16 DIAGNOSIS — Z12.31 SCREENING MAMMOGRAM, ENCOUNTER FOR: ICD-10-CM

## 2023-01-16 PROCEDURE — 77067 SCR MAMMO BI INCL CAD: CPT

## 2023-01-24 ENCOUNTER — OFFICE VISIT (OUTPATIENT)
Dept: ONCOLOGY | Age: 58
End: 2023-01-24
Payer: MEDICARE

## 2023-01-24 VITALS
TEMPERATURE: 99.3 F | DIASTOLIC BLOOD PRESSURE: 74 MMHG | HEART RATE: 74 BPM | OXYGEN SATURATION: 98 % | BODY MASS INDEX: 42.01 KG/M2 | WEIGHT: 261.4 LBS | HEIGHT: 66 IN | SYSTOLIC BLOOD PRESSURE: 114 MMHG

## 2023-01-24 DIAGNOSIS — I82.A11 ACUTE DEEP VEIN THROMBOSIS (DVT) OF AXILLARY VEIN OF RIGHT UPPER EXTREMITY (HCC): ICD-10-CM

## 2023-01-24 DIAGNOSIS — Z79.811 AROMATASE INHIBITOR USE: ICD-10-CM

## 2023-01-24 DIAGNOSIS — C50.919 MALIGNANT NEOPLASM OF FEMALE BREAST, UNSPECIFIED ESTROGEN RECEPTOR STATUS, UNSPECIFIED LATERALITY, UNSPECIFIED SITE OF BREAST (HCC): Primary | ICD-10-CM

## 2023-01-24 PROCEDURE — 3017F COLORECTAL CA SCREEN DOC REV: CPT | Performed by: INTERNAL MEDICINE

## 2023-01-24 PROCEDURE — 99214 OFFICE O/P EST MOD 30 MIN: CPT | Performed by: INTERNAL MEDICINE

## 2023-01-24 PROCEDURE — G8427 DOCREV CUR MEDS BY ELIG CLIN: HCPCS | Performed by: INTERNAL MEDICINE

## 2023-01-24 PROCEDURE — 99213 OFFICE O/P EST LOW 20 MIN: CPT | Performed by: INTERNAL MEDICINE

## 2023-01-24 PROCEDURE — G8417 CALC BMI ABV UP PARAM F/U: HCPCS | Performed by: INTERNAL MEDICINE

## 2023-01-24 PROCEDURE — G8482 FLU IMMUNIZE ORDER/ADMIN: HCPCS | Performed by: INTERNAL MEDICINE

## 2023-01-24 PROCEDURE — 1036F TOBACCO NON-USER: CPT | Performed by: INTERNAL MEDICINE

## 2023-01-24 NOTE — PROGRESS NOTES
johnny Oakley                                                                                                                  1/24/2023  MRN:   [id number]  YOB: 1965  PCP:                           Annette Tejada MD  Referring Physician: Eugenia Ormond  Treating Physician Name: Fernando Duarte MD      Reason for visit:  Toxicity check. Discussed treatment plan. Current problems:  Stage IIa infiltrating ductal carcinoma of right breast, ER negative, IA positive and HER-2 amplified. DVT of right upper extremity secondary to PORT(11/20/18), Xarelto discontinues after PORT removal  Bilateral massive pulmonary embolism status post thrombectomy, 4/10/2020  Strong family history of breast cancer in sister and maternal aunt    Active and recent treatments:  Right mastectomy with sentinel lymph node biopsy: 10/19/2017  TCH P x6: Cycle 1 day 1 on 11/13/2017. Completed Herceptin 11/29/2018  Adjuvant anastrozole: 5/2018. Discontinued due to toxicity  Adjuvant Femara: 6/2018  Pulmonary embolism thrombectomy: 4/10/2020    Summary of Case/History:    Lexus Oakley a 62 y. o.female who presents to the hematology oncology office to establish care and for further recommendations for management of her recently diagnosed right breast cancer    Patient had a mammogram after many years in September 2017 which came back abnormal.  Subsequently patient had an ultrasound which confirmed a 1.2 cm lesion in the right breast at 1:00 position. There was another 4 mm lesion at 12:00 position    Patient underwent ultrasound-guided biopsy on 9/8/17. the lesion at 1:00 position shows invasive ductal carcinoma. ER negative and IA positive associated with high-grade DCIS. Lesion at 12:00 position showed fibrocystic disease and focal adenosis and hyperplasia. Patient underwent right sided mastectomy with sentinel lymph node biopsy on 10/19/17.   Pathology report showed invasive ductal carcinoma, grade 3 out of 3, 2.8 cm in size with negative margins. pT2,pN0,M0  Tumor specimen was negative for ER receptor and weekly positive for DE receptor (5-10 percent). High-grade DCIS was also noted. One sentinel lymph node was sampled which was negative for metastasis    Patient started on neoadjuvant chemotherapy using TCHP regimen. Cycle #1, day #1 on 11/13/17    Patient underwent removal of port with replacement due to port malfunction on 11/30/17    Patient completed 6 cycles of TCHP and continued maintenance Herceptin. Started patient on anastrozole along with calcium and vitamin D in May 2018. Switched anti-hormonal therapy to Femara in June 2018 due to arthralgia    Herceptin last cycle completed 11/29/18    Interim History:    Patient presents to the clinic for a follow-up visit to discuss further treatment plan. Continues to be on Femara. Overall tolerating it well. Continues to be in calcium vitamin D. Continues to struggle with twitching of of her left eye. Patient stopped taking multiple medication due to worsening of tremors. She has not been started on Prolia yet    During this visit patient's allergy, social, medical, surgical history and medications were reviewed and updated.     Past Medical History:   Past Medical History:   Diagnosis Date    Asthma     seasonal    Bipolar 1 disorder ([facility] [address]     Breast cancer ([facility] [address] 2017    right side     Deep vein thrombosis (HCC)     DVT of axillary vein, acute right ([address]     Eye twitch 2019    Headache(784.0)     History of blood transfusion     Hx of blood clots 03/2020    PE    Hyperlipidemia     Migraine     Obesity     PONV (postoperative nausea and vomiting)     Prolonged emergence from general anesthesia     Sleep apnea     uses CPAP     Past Surgical History:     Past Surgical History:   Procedure Laterality Date    BREAST ENHANCEMENT SURGERY Right 03/10/2021    BREAST RECONSTRUCTION WITH TISSUE EXPANDER INSERTION performed by Tayo Hamilton MD at [facility] OR    BREAST RECONSTRUCTION Right 11/08/2021    RIGHT BREAST EXPANDER REMOVAL RIGHT IMPLANT PLACEMENT (Right Breast)    BREAST SURGERY      rt mastectomy    BREAST SURGERY Right 03/10/2021    BREAST RECONSTRUCTION WITH TISSUE EXPANDER INSERTION (    BREAST SURGERY Right 03/10/2021    EXCISION SEROMA RIGHT BREAST performed by Drew Sanchez MD at [address] Right 11/08/2021    RIGHT BREAST EXPANDER REMOVAL RIGHT IMPLANT PLACEMENT performed by Drew Sanchez MD at [facility] Left 06/06/2019    PORT REMOVAL performed by Soheila Malhotra DO at [address] [place] [address] N/A 10/13/2017    D & C HYSTEROSCOPY with polypectomy performed by Hung Rizvi MD at [address] [place] [address] / REMOVAL / [facility] [address] Left 11/09/2017    PORT INSERTION performed by Shailesh Fung MD at [facility] / REMOVAL / [address] N/A 11/30/2017    Port Removal & Insertion performed by Shailesh Fung MD at [facility] Right 10/19/2017     [address] [facility]    MASTECTOMY Right 10/19/2017    Right Breast Mastectomy with  [place] Node Biopsy performed by Shailesh Fung MD at [facility] CTR VAD W/SUBQ PORT AGE 5 YR/> Left 03/01/2018    PORT Exchange performed by Shailesh Fung MD at [address] [address] Left 2014, 2015    x 2 surgeries total; Dr. Medina Mace    TUNNELED VENOUS PORT PLACEMENT Left 11/30/2017    removal of et replacement of       Patient Family Social History:     Family History   Problem Relation Age of Onset    Breast Cancer Sister 54    Breast Cancer Maternal Aunt 71    Other Maternal Cousin         Atypical Ductal Hyperplasia     Uterine Cancer Sister 32    Other Sister         breast cyst    Cataracts Mother     Glaucoma Mother     Heart Disease Father     High Blood Pressure Father     High Cholesterol Father     Mental Retardation Father     Diabetes Neg Hx       Social History     Socioeconomic History    Marital status: Single     Spouse name: Not on file    Number of children: Not on file    Years of education: Not on file    Highest education level: Not on file   Occupational History    Not on file   Tobacco Use    Smoking status: Never    Smokeless tobacco: Never    Tobacco comments:     Never smoker.  TC, RRT 4/5/18   Vaping Use    Vaping Use: Never used   Substance and Sexual Activity    Alcohol use: Yes     Comment: Rarely    Drug use: No    Sexual activity: Not Currently     Partners: Male     Birth control/protection: Surgical   Other Topics Concern    Not on file   Social History Narrative    Not on file     Social Determinants of Health     Financial Resource Strain: Unknown    Difficulty of Paying Living Expenses: Patient refused   Food Insecurity: Unknown    Worried About Running Out of Food in the Last Year: Patient refused    Ran Out of Food in the Last Year: Patient refused   Transportation Needs: Not on file   Physical Activity: Not on file   Stress: Not on file   Social Connections: Not on file   Intimate Partner Violence: Not on file   Housing Stability: Not on file      Current Medications:     Current Outpatient Medications   Medication Sig Dispense Refill    Cyanocobalamin ER (RA VITAMIN B-12 TR) 1000 MCG TBCR Take 1 tab daily 30 tablet 3    apixaban (ELIQUIS) 5 MG TABS tablet take 1 tablet by mouth twice a day 60 tablet 1    loratadine (CLARITIN) 10 MG tablet take 1 tablet by mouth once daily 90 tablet 1    cetirizine (ZYRTEC) 10 MG tablet take 1 tablet by mouth once daily 90 tablet 1    Calcium Carb-Cholecalciferol (OYSTER SHELL CALCIUM W/D) 500-200 MG-UNIT TABS tablet take 1 tablet by mouth twice a day 180 tablet 1    letrozole (FEMARA) 2.5 MG tablet take 1 tablet by mouth once daily 30 tablet 5    allopurinol (ZYLOPRIM) 100 MG tablet take 1 tablet by mouth once daily 30 tablet 3    fluticasone (FLONASE) 50 MCG/ACT nasal spray instill 1 spray into each nostril once daily 16 g 3    albuterol sulfate HFA (VENTOLIN HFA) 108 (90 Base) MCG/ACT inhaler inhale 2 puffs by mouth and INTO THE LUNGS every 4 hours if needed for wheezing 18 g 3    pravastatin (PRAVACHOL) 40 MG tablet take 1 tablet by mouth once daily 90 tablet 1    vitamin B-12 (CYANOCOBALAMIN) 1000 MCG tablet take 1 tablet by mouth once daily 30 tablet 3    loxapine (LOXITANE) 10 MG capsule Take 10 mg by mouth nightly      QUEtiapine (SEROQUEL) 50 MG tablet take 1 tablet by mouth at bedtime      busPIRone (BUSPAR) 15 MG tablet Take 15 mg by mouth 3 times daily       OXcarbazepine (TRILEPTAL) 150 MG tablet 2 times daily       benztropine (COGENTIN) 0.5 MG tablet Take 0.5 mg by mouth daily      QUEtiapine (SEROQUEL) 100 MG tablet Take 100 mg by mouth nightly       lamoTRIgine (LAMICTAL) 100 MG tablet ONE   TABLET    AM      AND  TWO  TABLETS     AT   BED   TIME    DAILY (Patient not taking: Reported on 1/24/2023) 90 tablet 2    topiramate (TOPAMAX) 50 MG tablet ONE  TABLET  TWICE  DAILY (Patient not taking: Reported on 1/24/2023) 60 tablet 5    lamoTRIgine (LAMICTAL) 150 MG tablet take 1 tablet by mouth at bedtime take with 25 milligram tablet (Patient not taking: Reported on 1/24/2023) 30 tablet 2    lamoTRIgine (LAMICTAL) 25 MG tablet take 2 tablets by mouth at bedtime for MOOD (Patient not taking: Reported on 1/24/2023) 60 tablet 2    ibuprofen (ADVIL;MOTRIN) 800 MG tablet take 1 tablet by mouth two to three times a day with food if needed (Patient not taking: Reported on 1/24/2023) 90 tablet 1    SUMAtriptan (IMITREX) 100 MG tablet take 1 tablet by mouth once daily if needed for migraines (Patient not taking: Reported on 1/24/2023) 12 tablet 2    butalbital-acetaminophen-caffeine (FIORICET, ESGIC) -40 MG per tablet 1-2   TABLET  TWICE  DAILY  AS  NEEDED (Patient not taking: Reported on 1/24/2023) 60 tablet 2 clonazePAM (KLONOPIN) 0.5 MG tablet Take 1 tablet by mouth 2 times daily for 31 days. (Patient not taking: Reported on 1/24/2023) 60 tablet 2    olopatadine (PATANOL) 0.1 % ophthalmic solution instill 1 drop into both eyes twice a day (Patient not taking: Reported on 1/24/2023) 5 mL 3    tiZANidine (ZANAFLEX) 4 MG tablet Take 1 tablet by mouth every 8 hours as needed (muscle pain) (Patient not taking: Reported on 1/24/2023) 20 tablet 0    lamoTRIgine (LAMICTAL) 100 MG tablet 150 mg nightly  (Patient not taking: Reported on 1/24/2023)       No current facility-administered medications for this visit. Allergies:   Prednisone    Review of Systems:    Constitutional: Negative for fever or chills. No night sweats, weight is stable now. Positive fatigue  Eyes: No eye discharge, double vision, or eye pain . Twitching of left eye  HEENT: negative for sore mouth, sore throat, hoarseness and voice change . Respiratory: negative for cough , sputum, dyspnea, wheezing, hemoptysis, chest pain   Cardiovascular: negative for chest pain, dyspnea, palpitations, orthopnea, PND   Gastrointestinal: Positive for nausea, vomiting, constipation, abdominal pain, Dysphagia, hematemesis and hematochezia   Genitourinary: negative for frequency, dysuria, nocturia, urinary incontinence, and hematuria   Integument: negative for rash, skin lesions, bruises. Hematologic/Lymphatic: negative for easy bruising, bleeding, lymphadenopathy, or petechiae   Endocrine: negative for heat or cold intolerance,weight changes, change in bowel habits and hair loss   Musculoskeletal: negative for myalgias, arthralgias, pain, joint swelling,and bone pain .     Neurological: negative for headaches, dizziness, seizures, weakness, numbness      Physical Exam:    Vitals:  /74 (Site: Left Lower Arm, Position: Sitting, Cuff Size: Large Adult)   Pulse 74   Temp 99.3 °F (37.4 °C)   Ht 5' 6\" (1.676 m)   Wt 261 lb 6.4 oz (118.6 kg)   LMP 02/28/2013 (Exact Date)   SpO2 98%   BMI 42.19 kg/m²    General appearance - patient not in acute distress  Mental status - AAO X3  Eyes - pupils equal and reactive, extraocular eye movements intact  Mouth - mucous membranes moist, pharynx normal without lesions  Neck - supple, no significant adenopathy  Lymphatics - no palpable lymphadenopathy, no hepatosplenomegaly  Chest - clear to auscultation, no wheezes, rales or rhonchi, symmetric air entry. Heart - normal rate, regular rhythm, normal S1, S2, no murmurs  Abdomen - soft, nontender, nondistended, no masses or organomegaly  Neurological - alert, oriented, normal speech, no focal findings .   Twitching of left eye noted  Extremities - peripheral pulses normal, no pedal edema, no clubbing or cyanosis  Skin - normal coloration and turgor, no rashes, right chest wall wound covered with dressings      DATA:    Results for orders placed or performed during the hospital encounter of 09/13/22   Comprehensive Metabolic Panel   Result Value Ref Range    Glucose 97 70 - 99 mg/dL    BUN 21 (H) 6 - 20 mg/dL    Creatinine 1.07 (H) 0.50 - 0.90 mg/dL    Bun/Cre Ratio 20 9 - 20    Calcium 9.9 8.6 - 10.4 mg/dL    Sodium 140 135 - 144 mmol/L    Potassium 4.1 3.7 - 5.3 mmol/L    Chloride 104 98 - 107 mmol/L    CO2 25 20 - 31 mmol/L    Anion Gap 11 9 - 17 mmol/L    Alkaline Phosphatase 107 (H) 35 - 104 U/L    ALT 18 5 - 33 U/L    AST 16 <32 U/L    Total Bilirubin 0.2 (L) 0.3 - 1.2 mg/dL    Total Protein 7.5 6.4 - 8.3 g/dL    Albumin 4.1 3.5 - 5.2 g/dL    Albumin/Globulin Ratio 1.2 1.0 - 2.5    GFR Non-African American 53 (L) >60 mL/min    GFR African American >60 >60 mL/min    GFR Comment         CBC with Auto Differential   Result Value Ref Range    WBC 4.8 3.5 - 11.3 k/uL    RBC 4.13 3.95 - 5.11 m/uL    Hemoglobin 13.2 11.9 - 15.1 g/dL    Hematocrit 40.1 36.3 - 47.1 %    MCV 97.1 82.6 - 102.9 fL    MCH 32.0 25.2 - 33.5 pg    MCHC 32.9 25.2 - 33.5 g/dL    RDW 13.5 11.8 - 14.4 % Platelets 864 740 - 205 k/uL    MPV 9.8 8.1 - 13.5 fL    NRBC Automated 0.0 0.0 per 100 WBC    Seg Neutrophils 42 36 - 65 %    Lymphocytes 42 24 - 43 %    Monocytes 14 (H) 3 - 12 %    Eosinophils % 2 1 - 4 %    Basophils 0 0 - 2 %    Immature Granulocytes 0 0 %    Segs Absolute 1.97 1.50 - 8.10 k/uL    Absolute Lymph # 1.97 1.10 - 3.70 k/uL    Absolute Mono # 0.66 0.10 - 1.20 k/uL    Absolute Eos # 0.11 0.00 - 0.44 k/uL    Basophils Absolute <0.03 0.00 - 0.20 k/uL    Absolute Immature Granulocyte <0.03 0.00 - 0.30 k/uL         Impression:  Invasive ductal carcinoma of right breast, stage IIa. pT2,pN0,M0. ER negative, WA weakly positive. HER-2 amplified. Status post right mastectomy with sentinel lymph node biopsy  Twitching of left eye, MRI negative  Family history of breast cancer  Lower back pain  Postmenopausal  Right upper extremity DVT of the axillary and basilic veins secondary to PORT(11/20/18), Xarelto discontinued after PORT removal, treated with Xarelto  Osteopenia    Plan:  Personally reviewed results of lab work-up and other relevant clinical data. Discussed natural history of breast cancer  Continue adjuvant hormonal therapy plan for 5 years of adjuvant hormonal therapy which will be completed in June 2023. Patient tumor was ER negative WA weakly positive  Continue calcium vitamin D  Patient has not been started on Prolia I have reached out to the infusion center and will follow up on their call back regarding Prolia  Continue Eliquis  Continue anticoagulation  NCCN guidelines were reviewed and discussed with the patient. The diagnosis and care plan were discussed with the patient in detail. I discussed the natural history of the disease, prognosis, risks and goals of therapy and answered all the patients questions to the best of my ability. Patient expressed understanding and was in agreement.       Sherine Navarro MD          This note is created with the assistance of a speech recognition program.  While intending to generate a document that actually reflects the content of the visit, the document can still have some errors including those of syntax and sound a like substitutions which may escape proof reading. It such instances, actual meaning can be extrapolated by contextual diversion.

## 2023-02-11 DIAGNOSIS — E78.2 MIXED HYPERLIPIDEMIA: ICD-10-CM

## 2023-02-13 RX ORDER — PRAVASTATIN SODIUM 40 MG
TABLET ORAL
Qty: 90 TABLET | Refills: 1 | Status: SHIPPED | OUTPATIENT
Start: 2023-02-13

## 2023-02-13 NOTE — TELEPHONE ENCOUNTER
Ryland Diaz called requesting a refill of the below medication which has been pended for you:     Requested Prescriptions     Pending Prescriptions Disp Refills    pravastatin (PRAVACHOL) 40 MG tablet [Pharmacy Med Name: PRAVASTATIN SODIUM 40 MG TAB] 90 tablet 1     Sig: take 1 tablet by mouth once daily       Last Appointment Date: 12/20/2022  Next Appointment Date: 6/22/2023    Allergies   Allergen Reactions    Prednisone Swelling     Whole side of her face swelled when she took it, difficulty breathing

## 2023-03-13 ENCOUNTER — OFFICE VISIT (OUTPATIENT)
Dept: NEUROLOGY | Age: 58
End: 2023-03-13
Payer: MEDICARE

## 2023-03-13 ENCOUNTER — HOSPITAL ENCOUNTER (OUTPATIENT)
Age: 58
Discharge: HOME | End: 2023-03-13
Payer: MEDICARE

## 2023-03-13 VITALS
WEIGHT: 256 LBS | HEIGHT: 66 IN | OXYGEN SATURATION: 100 % | BODY MASS INDEX: 41.14 KG/M2 | HEART RATE: 68 BPM | DIASTOLIC BLOOD PRESSURE: 72 MMHG | SYSTOLIC BLOOD PRESSURE: 124 MMHG

## 2023-03-13 DIAGNOSIS — R79.9 ABNORMAL FINDING OF BLOOD CHEMISTRY, UNSPECIFIED: ICD-10-CM

## 2023-03-13 DIAGNOSIS — F41.9 ANXIETY AND DEPRESSION: ICD-10-CM

## 2023-03-13 DIAGNOSIS — F32.A ANXIETY AND DEPRESSION: ICD-10-CM

## 2023-03-13 DIAGNOSIS — G51.39 HEMIFACIAL SPASM, UNSPECIFIED LATERALITY: ICD-10-CM

## 2023-03-13 DIAGNOSIS — R25.3 MUSCLE TWITCHING: ICD-10-CM

## 2023-03-13 DIAGNOSIS — G43.009 MIGRAINE WITHOUT AURA AND WITHOUT STATUS MIGRAINOSUS, NOT INTRACTABLE: Primary | ICD-10-CM

## 2023-03-13 DIAGNOSIS — G24.5 BLEPHAROSPASM: ICD-10-CM

## 2023-03-13 DIAGNOSIS — Z85.3 HX: BREAST CANCER: ICD-10-CM

## 2023-03-13 DIAGNOSIS — E66.01 MORBID OBESITY WITH BMI OF 40.0-44.9, ADULT (HCC): ICD-10-CM

## 2023-03-13 DIAGNOSIS — Z90.11 H/O RIGHT MASTECTOMY: ICD-10-CM

## 2023-03-13 DIAGNOSIS — G47.9 SLEEP DIFFICULTIES: ICD-10-CM

## 2023-03-13 DIAGNOSIS — F31.9 BIPOLAR 1 DISORDER (HCC): ICD-10-CM

## 2023-03-13 DIAGNOSIS — R41.3 MEMORY PROBLEM: ICD-10-CM

## 2023-03-13 DIAGNOSIS — G43.009 MIGRAINE WITHOUT AURA AND WITHOUT STATUS MIGRAINOSUS, NOT INTRACTABLE: ICD-10-CM

## 2023-03-13 DIAGNOSIS — G51.32 HEMIFACIAL SPASM OF LEFT SIDE OF FACE: ICD-10-CM

## 2023-03-13 LAB
ALBUMIN SERPL-MCNC: 4.3 G/DL (ref 3.5–5.2)
ALBUMIN/GLOBULIN RATIO: 1.4 (ref 1–2.5)
ALP SERPL-CCNC: 92 U/L (ref 35–104)
ALT SERPL-CCNC: 13 U/L (ref 5–33)
ANION GAP SERPL CALCULATED.3IONS-SCNC: 11 MMOL/L (ref 9–17)
AST SERPL-CCNC: 14 U/L
BILIRUB SERPL-MCNC: 0.2 MG/DL (ref 0.3–1.2)
BUN SERPL-MCNC: 25 MG/DL (ref 6–20)
BUN/CREAT BLD: 23 (ref 9–20)
CALCIUM SERPL-MCNC: 10.2 MG/DL (ref 8.6–10.4)
CHLORIDE SERPL-SCNC: 104 MMOL/L (ref 98–107)
CO2 SERPL-SCNC: 25 MMOL/L (ref 20–31)
CREAT SERPL-MCNC: 1.07 MG/DL (ref 0.5–0.9)
FOLATE SERPL-MCNC: NORMAL NG/ML
GFR SERPL CREATININE-BSD FRML MDRD: >60 ML/MIN/1.73M2
GLUCOSE SERPL-MCNC: 98 MG/DL (ref 70–99)
HCT VFR BLD AUTO: 41.4 % (ref 36.3–47.1)
HGB BLD-MCNC: 13.7 G/DL (ref 11.9–15.1)
MCH RBC QN AUTO: 31.9 PG (ref 25.2–33.5)
MCHC RBC AUTO-ENTMCNC: 33.1 G/DL (ref 25.2–33.5)
MCV RBC AUTO: 96.3 FL (ref 82.6–102.9)
NRBC AUTOMATED: 0 PER 100 WBC
PDW BLD-RTO: 12.8 % (ref 11.8–14.4)
PLATELET # BLD AUTO: 248 K/UL (ref 138–453)
PMV BLD AUTO: 9.4 FL (ref 8.1–13.5)
POTASSIUM SERPL-SCNC: 4.1 MMOL/L (ref 3.7–5.3)
PROT SERPL-MCNC: 7.4 G/DL (ref 6.4–8.3)
RBC # BLD: 4.3 M/UL (ref 3.95–5.11)
SODIUM SERPL-SCNC: 140 MMOL/L (ref 135–144)
TSH SERPL-ACNC: 2.29 UIU/ML (ref 0.3–5)
VIT B12 SERPL-MCNC: 1146 PG/ML (ref 232–1245)
WBC # BLD AUTO: 9.4 K/UL (ref 3.5–11.3)

## 2023-03-13 PROCEDURE — 80053 COMPREHEN METABOLIC PANEL: CPT

## 2023-03-13 PROCEDURE — 36415 COLL VENOUS BLD VENIPUNCTURE: CPT

## 2023-03-13 PROCEDURE — 99214 OFFICE O/P EST MOD 30 MIN: CPT | Performed by: PSYCHIATRY & NEUROLOGY

## 2023-03-13 PROCEDURE — 1036F TOBACCO NON-USER: CPT | Performed by: PSYCHIATRY & NEUROLOGY

## 2023-03-13 PROCEDURE — 85027 COMPLETE CBC AUTOMATED: CPT

## 2023-03-13 PROCEDURE — 3017F COLORECTAL CA SCREEN DOC REV: CPT | Performed by: PSYCHIATRY & NEUROLOGY

## 2023-03-13 PROCEDURE — G8417 CALC BMI ABV UP PARAM F/U: HCPCS | Performed by: PSYCHIATRY & NEUROLOGY

## 2023-03-13 PROCEDURE — 84443 ASSAY THYROID STIM HORMONE: CPT

## 2023-03-13 PROCEDURE — G8482 FLU IMMUNIZE ORDER/ADMIN: HCPCS | Performed by: PSYCHIATRY & NEUROLOGY

## 2023-03-13 PROCEDURE — 83036 HEMOGLOBIN GLYCOSYLATED A1C: CPT

## 2023-03-13 PROCEDURE — 82607 VITAMIN B-12: CPT

## 2023-03-13 PROCEDURE — 99215 OFFICE O/P EST HI 40 MIN: CPT | Performed by: PSYCHIATRY & NEUROLOGY

## 2023-03-13 PROCEDURE — G8427 DOCREV CUR MEDS BY ELIG CLIN: HCPCS | Performed by: PSYCHIATRY & NEUROLOGY

## 2023-03-13 PROCEDURE — 82746 ASSAY OF FOLIC ACID SERUM: CPT

## 2023-03-13 RX ORDER — SUMATRIPTAN 100 MG/1
TABLET, FILM COATED ORAL
Qty: 12 TABLET | Refills: 2 | Status: SHIPPED | OUTPATIENT
Start: 2023-03-13

## 2023-03-13 RX ORDER — CLONAZEPAM 0.5 MG/1
0.5 TABLET ORAL 2 TIMES DAILY
Qty: 60 TABLET | Refills: 2 | Status: SHIPPED | OUTPATIENT
Start: 2023-03-13 | End: 2023-04-13

## 2023-03-13 RX ORDER — LAMOTRIGINE 100 MG/1
TABLET ORAL
Qty: 90 TABLET | Refills: 2 | Status: SHIPPED | OUTPATIENT
Start: 2023-03-13

## 2023-03-13 RX ORDER — SERTRALINE HYDROCHLORIDE 25 MG/1
TABLET, FILM COATED ORAL
COMMUNITY
Start: 2023-02-27

## 2023-03-13 RX ORDER — BUTALBITAL, ACETAMINOPHEN AND CAFFEINE 50; 325; 40 MG/1; MG/1; MG/1
TABLET ORAL
Qty: 60 TABLET | Refills: 2 | Status: SHIPPED | OUTPATIENT
Start: 2023-03-13

## 2023-03-13 RX ORDER — TOPIRAMATE 50 MG/1
TABLET, FILM COATED ORAL
Qty: 60 TABLET | Refills: 5 | Status: SHIPPED | OUTPATIENT
Start: 2023-03-13

## 2023-03-13 ASSESSMENT — ENCOUNTER SYMPTOMS
FACIAL SWELLING: 0
COLOR CHANGE: 0
FACIAL SWEATING: 0
RHINORRHEA: 0
BOWEL INCONTINENCE: 0
COUGH: 0
BACK PAIN: 0
VOMITING: 1
VISUAL CHANGE: 0
EYE DISCHARGE: 0
BLOOD IN STOOL: 0
TROUBLE SWALLOWING: 0
WHEEZING: 0
ABDOMINAL PAIN: 0
SHORTNESS OF BREATH: 0
NAUSEA: 1
EYE PAIN: 0
CHOKING: 0
EYE REDNESS: 0
SCALP TENDERNESS: 0
EYE ITCHING: 0
ABDOMINAL DISTENTION: 0
PHOTOPHOBIA: 1
VOICE CHANGE: 0
BLURRED VISION: 0
APNEA: 0
SINUS PRESSURE: 0
SWOLLEN GLANDS: 0
CHEST TIGHTNESS: 0
SORE THROAT: 0
DIARRHEA: 0
EYE WATERING: 1
CONSTIPATION: 0

## 2023-03-13 NOTE — PROGRESS NOTES
[facility]  Neurology  [address] [address]  Phone: [phone]   Fax: [phone]        SUBJECTIVE:     PATIENT ID:  Daniela Noel is a  RIGHT  HANDED 62 y.o. female. Migraine   This is a chronic problem. Episode onset: FOR  MORE  THAN   4  YEARS. The problem occurs intermittently. The problem has been gradually worsening. The pain is located in the Bilateral and vertex region. The pain does not radiate. The pain quality is similar to prior headaches. The quality of the pain is described as aching, dull and throbbing. The pain is at a severity of 3/10. The pain is mild. Associated symptoms include eye watering, insomnia, nausea, phonophobia, photophobia and vomiting. Pertinent negatives include no abdominal pain, abnormal behavior, anorexia, back pain, blurred vision, coughing, dizziness, drainage, ear pain, eye pain, eye redness, facial sweating, fever, hearing loss, loss of balance, muscle aches, neck pain, numbness, rhinorrhea, scalp tenderness, seizures, sinus pressure, sore throat, swollen glands, tingling, tinnitus, visual change, weakness or weight loss. The symptoms are aggravated by unknown. She has tried acetaminophen and Excedrin for the symptoms. The treatment provided no relief. Her past medical history is significant for migraine headaches and obesity. There is no history of cancer, cluster headaches, hypertension, immunosuppression, migraines in the family, pseudotumor cerebri, recent head traumas, sinus disease or TMJ. Neurologic Problem  The patient's primary symptoms include memory loss. The patient's pertinent negatives include no altered mental status, clumsiness, focal sensory loss, focal weakness, loss of balance, near-syncope, slurred speech, syncope, visual change or weakness. Primary symptoms comment: LEFT  EYE  TWITCHING  AND  SPAMS. This is a chronic problem. Episode onset: SINCE  APRIL 2017.  The neurological problem developed insidiously. The problem has been waxing and waning since onset. There was facial and left-sided focality noted. Associated symptoms include headaches, nausea and vomiting. Pertinent negatives include no abdominal pain, auditory change, aura, back pain, bladder incontinence, bowel incontinence, chest pain, confusion, diaphoresis, dizziness, fatigue, fever, light-headedness, neck pain, palpitations, shortness of breath or vertigo. Treatments tried: Tarik Ok. The treatment provided moderate relief. There is no history of a bleeding disorder, a clotting disorder, a CVA, dementia, head trauma, liver disease, mood changes or seizures. History obtained from  The patient      and other  available medical records were  Also  reviewed. The  Duration,  Quality,  Severity,  Location,  Timing,  Context,  Modifying  Factors   Of   The   Chief   Complaint       And  Present  Illness   Was   Reviewed   In   Chronological   Manner. PATIENT'S  MAIN  CONCERNS INCLUDE :                       1)        H/O     CHRONIC  BLEPHAROSPASM    LEFT  EYE. SINCE      April 2017     -     STABLE                                 2)      PREVIOUS    H/O    BREAST    CANCER  RIGHT                                H/O   BREAST  CANCER   SURGERY  IN  NOV. 2017                               3)      PREVIOUS     HAD    CHEMO      AND                                       ALSO  ON   ARIMIDEX   DAILY                             BEING  FOLLOWED  BY   HEMATOLOGY /  ONCOLOGY                               4)    H/O   CHRONIC   MIGRAINES       FOR    5    YEARS                                               -   IMPROVED     AND     STABLE                                                            -    ON   FIORICET,    TOPAMAX,  IMITREX ,  IBUPROFEN                                                   AS  NEEDED                                  5) H/O   CHRONIC  ANXIETY,   DEPRESSION,  BIPOLAR  DISORDER                                 -   ON    LAMICTAL,  SEROQUEL   TRAZODONE, TRILEPTAL                                                    -    STABLE                                -       BEING  FOLLOWED  BY  MENTAL  HEALTH PROFESSIONALS                                 6)       H/O   CHRONIC   SLEEP  DIFFICULTIES                                            -   BETTER                                                           7)       H/O   CHRONIC    MEMORY PROBLEMS                                           SINCE    OCTOBER 2017                                            -   STABLE                                  8)       OBESITY     WITH   EXCESSIVE    DAY   TIME  SLEEPINESS                                          H/O   OSAS     -   ON  CPAP                                                            9)   MULTIPLE  CO  MORBID  MEDICAL  CONDITIONS                                    BEING  FOLLOWED BY  HER  PCP                              10)   MRI  BRAIN  IN  FEB. 2018   SHOWED                                      NO ACUTE    INTRACRANIAL PATHOLOGY                                    11)    PREVIOUS   H/O    LEFT  EYE  TWITCHING  AND  SPASM   ARE   CAUSING                                DIFFICULTY  WITH  READING,  WATCHING  TV   AND  DRIVING                                           -     IMPROVED      SIGNIFICANTLY     IN    THE  PAST                                                12)      PATIENT   WAS    ON  KLONOPIN       FOR  LEFT  BLEPHAROSPASM                                    SINCE      June 2018                                PATIENT     STOPPED     KLONOPIN      SINCE    MAY     2021                                                       PATIENT  NOT  INTERESTED  IN  INJECTION  BOTOX                                                    13)      MRI BRAIN   WITH  AND  W/O   CONTRAST     IN  OCT.    2019                                   AND  LABS  SHOWED NO  SIGNIFICANT  ABNORMALITIES                                                                           14)        MIGRAINES  ARE   BETTER  CONTROLLED                               ON  TOPAMAX,   FIORICET,   IMITREX      AS  NEEDED. AND  PATIENT     FEELS  LOT  BETTER. PATIENT  DENIES  ANY      RENAL  STONES. 15)    LEFT     BLEPHAROSPASM     BETTER  CONTROLLED   IN    THE  PAST                                H/O    BLEPHAROSPASM    LEFT  EYE. CAN  GET   WORSE                                         DUE   TO     ANXIETY                                      16)        H/O    PULMONARY  EMBOLISM   IN   April 2020                                               -   ON       ELIQUIS                                  PATIENT  TO  FOLLOW  WITH   HER  PCP  AND  OTHER  CONSULTANTS                        17)        H/O   SIGNIFICANT   WORSENING  OF    LEFT   BLEPHAROSPASM                              SINCE    JAN.2023     AFTER  PATIENT  QUIT   TAKING  ALL   MULTIPLE                              MEDICATIONS   INCLUDING    LAMICTAL                    18)       PATIENT      TAKING      SEROQUEL,  BUSPAR,    TRILEPTAL    AND  COGENTIN                                  FROM      HER  PSYCHIATRIST      AT  [facility]                             19)       PATIENT     REQUESTS       RE STARTING  LAMICTAL      AND                                       MIGRAINE   MANAGEMENT  MEDICATIONS                                               20)        VARIOUS  RISK   FACTORS   WERE  REVIEWED   AND   DISCUSSED. PATIENT   HAS  MULTIPLE   MEDICAL, MENTAL HEALTH                            NEUROLOGICAL   PROBLEMS .                                PATIENT  MANAGEMENT  IS  CHALLENGING                                                                                      PRECIPITATING  FACTORS: including  fever/infection, exertion/relaxation, position change, stress,     weather change, medications/alcohol, time of day/darkness/light  Are    Absent                                                           MODIFYING  FACTORS:  fever/infection, exertion/relaxation, position change, stress, weather change,     medications/alcohol, time of day/darkness/light    Are  absent             Patient   Indicates   The  Presence   And  The  Absence  Of  The  Following  Associated  And       Additional  Neurological    Symptoms:                                Balance  And coordination problems  absent           Gait problems     absent            Headaches      absent              Migraines           present           Memory problems        Present             Confusion        absent            Paresthesia numbness          absent           Seizures  And  Starring  Episodes           absent           Syncope,  Near  syncopal episodes         absent           Speech problems           absent             Swallowing  Problems      absent            Dizziness,  Light headedness           absent                        Vertigo        absent             Generalized   Weakness    absent              focal  Weakness     absent             Tremors         absent              Sleep  Problems     absent             History  Of   Recent   Head  Injury     absent             History  Of   Recent  TIA     absent             History  Of   Recent    Stroke     absent             Neck  Pain and  Neck muscle  Spasms  Absent               Radiating  down   And   Weakness           absent            Lower back   Pain  And     Spasms  Absent              Radiating    Down   And   Weakness          absent                H/O   FALLS        absent               History  Of   Visual  Symptoms    Present                    Associated   Diplopia       absent                                      Also   Additional   Symptoms   Present    As  Documented    In   The detailed Review  Of  Systems   And    Please   Refer   To    Them for   Additional  Information. Any components  That are either  Unobtainable  Or  Limited  In   HPI, ROS  And/or PFSH   Are       Due   To   Patient's  Medical  Problems,  Clinical  Condition and/or lack of other  Alternate resources.               RECORDS   REVIEWED:    historical medical records         INFORMATION   REVIEWED:     MEDICAL   HISTORY,     SURGICAL   HISTORY,   MEDICATIONS   LIST,   ALLERGIES AND  DRUG  INTOLERANCES,     FAMILY   HISTORY,  SOCIAL  HISTORY,    PROBLEM  LIST   FOR  PATIENT  CARE   COORDINATION    Past Medical History:   Diagnosis Date    Asthma     seasonal    Bipolar 1 disorder ([facility] [address]     Breast cancer ([facility] [address] 2017    right side     Deep vein thrombosis ([facility] [address]     DVT of axillary vein, acute right ([address]     Eye twitch 2019    Headache(784.0)     History of blood transfusion     Hx of blood clots 03/2020    PE    Hyperlipidemia     Migraine     Obesity     PONV (postoperative nausea and vomiting)     Prolonged emergence from general anesthesia     Sleep apnea     uses CPAP         Past Surgical History:   Procedure Laterality Date    BREAST ENHANCEMENT SURGERY Right 03/10/2021    BREAST RECONSTRUCTION WITH TISSUE EXPANDER INSERTION performed by Slime Pepe MD at [facility] Right 11/08/2021    RIGHT BREAST EXPANDER REMOVAL RIGHT IMPLANT PLACEMENT (Right Breast)    BREAST SURGERY      rt mastectomy    BREAST SURGERY Right 03/10/2021    BREAST RECONSTRUCTION WITH TISSUE EXPANDER INSERTION (    BREAST SURGERY Right 03/10/2021    EXCISION SEROMA RIGHT BREAST performed by Slime Pepe MD at [address] Right 11/08/2021    RIGHT BREAST EXPANDER REMOVAL RIGHT IMPLANT PLACEMENT performed by Slime Pepe MD at [facility] Left 06/06/2019    PORT REMOVAL performed by Abhilash Trujillo DO at [address] [place] [address] DILATION AND CURETTAGE      DILATION AND CURETTAGE OF UTERUS N/A 10/13/2017    D & C HYSTEROSCOPY with polypectomy performed by Palomo Haile MD at [facility] [address] / [address] Left 11/09/2017    PORT INSERTION performed by Rafael Bergman MD at [address] [place] [address] / REMOVAL / [address] N/A 11/30/2017    Port Removal & Insertion performed by Rafael Bergman MD at [facility] Right 10/19/2017     [address] [facility]    MASTECTOMY Right 10/19/2017    Right Breast Mastectomy with  Zion Node Biopsy performed by Rafael Bergman MD at [address] VAD W/SUBQ PORT AGE 5 YR/> Left 03/01/2018    PORT Exchange performed by Rafael Bergman MD at [address] [address] Left 2014, 2015    x 2 surgeries total; Dr. Mayank Tai    TUNNELED VENOUS PORT PLACEMENT Left 11/30/2017    removal of et replacement of         Current Outpatient Medications   Medication Sig Dispense Refill    sertraline (ZOLOFT) 25 MG tablet take 1 tablet by mouth once daily      pravastatin (PRAVACHOL) 40 MG tablet take 1 tablet by mouth once daily 90 tablet 1    Cyanocobalamin ER (RA VITAMIN B-12 TR) 1000 MCG TBCR Take 1 tab daily 30 tablet 3    apixaban (ELIQUIS) 5 MG TABS tablet take 1 tablet by mouth twice a day 60 tablet 1    loratadine (CLARITIN) 10 MG tablet take 1 tablet by mouth once daily 90 tablet 1    cetirizine (ZYRTEC) 10 MG tablet take 1 tablet by mouth once daily 90 tablet 1    letrozole (FEMARA) 2.5 MG tablet take 1 tablet by mouth once daily 30 tablet 5    allopurinol (ZYLOPRIM) 100 MG tablet take 1 tablet by mouth once daily 30 tablet 3    fluticasone (FLONASE) 50 MCG/ACT nasal spray instill 1 spray into each nostril once daily 16 g 3    albuterol sulfate HFA (VENTOLIN HFA) 108 (90 Base) MCG/ACT inhaler inhale 2 puffs by mouth and INTO THE LUNGS every 4 hours if needed for wheezing 18 g 3    vitamin B-12 (CYANOCOBALAMIN) 1000 MCG tablet take 1 tablet by mouth once daily 30 tablet 3    loxapine (LOXITANE) 10 MG capsule Take 10 mg by mouth nightly      QUEtiapine (SEROQUEL) 50 MG tablet take 1 tablet by mouth at bedtime      busPIRone (BUSPAR) 15 MG tablet Take 15 mg by mouth 3 times daily       OXcarbazepine (TRILEPTAL) 150 MG tablet 2 times daily       benztropine (COGENTIN) 0.5 MG tablet Take 0.5 mg by mouth daily      QUEtiapine (SEROQUEL) 100 MG tablet Take 100 mg by mouth nightly       lamoTRIgine (LAMICTAL) 100 MG tablet ONE   TABLET    AM      AND  TWO  TABLETS     AT   BED   TIME    DAILY (Patient not taking: No sig reported) 90 tablet 2    Calcium Carb-Cholecalciferol (OYSTER SHELL CALCIUM W/D) 500-200 MG-UNIT TABS tablet take 1 tablet by mouth twice a day 180 tablet 1    lamoTRIgine (LAMICTAL) 100 MG tablet 150 mg nightly  (Patient not taking: No sig reported)       No current facility-administered medications for this visit. Allergies   Allergen Reactions    Prednisone Swelling     Whole side of her face swelled when she took it, difficulty breathing         Family History   Problem Relation Age of Onset    Breast Cancer Sister 54    Breast Cancer Maternal Aunt 71    Other Maternal Cousin         Atypical Ductal Hyperplasia     Uterine Cancer Sister 32    Other Sister         breast cyst    Cataracts Mother     Glaucoma Mother     Heart Disease Father     High Blood Pressure Father     High Cholesterol Father     Mental Retardation Father     Diabetes Neg Hx          Social History     Socioeconomic History    Marital status: Single     Spouse name: Not on file    Number of children: Not on file    Years of education: Not on file    Highest education level: Not on file   Occupational History    Not on file   Tobacco Use    Smoking status: Never    Smokeless tobacco: Never    Tobacco comments:     Never smoker.  TC, RRT 4/5/18   Vaping Use    Vaping Use: Never used   Substance and Sexual Activity    Alcohol use: Yes     Comment: Rarely    Drug use: No    Sexual activity: Not Currently     Partners: Male     Birth control/protection: Surgical   Other Topics Concern    Not on file   Social History Narrative    Not on file     Social Determinants of Health     Financial Resource Strain: Unknown    Difficulty of Paying Living Expenses: Patient refused   Food Insecurity: Unknown    Worried About Running Out of Food in the Last Year: Patient refused    Ran Out of Food in the Last Year: Patient refused   Transportation Needs: Not on file   Physical Activity: Not on file   Stress: Not on file   Social Connections: Not on file   Intimate Partner Violence: Not on file   Housing Stability: Not on file       Vitals:    03/13/23 1428   BP: 124/72   Pulse: 68   SpO2: 100%         Wt Readings from Last 3 Encounters:   03/13/23 256 lb (116.1 kg)   01/24/23 261 lb 6.4 oz (118.6 kg)   12/20/22 264 lb (119.7 kg)         BP Readings from Last 3 Encounters:   03/13/23 124/72   01/24/23 114/74   12/20/22 130/80       Hematology and Coagulation  Lab Results   Component Value Date/Time    WBC 4.8 09/13/2022 01:59 PM    RBC 4.13 09/13/2022 01:59 PM    HGB 13.2 09/13/2022 01:59 PM    HCT 40.1 09/13/2022 01:59 PM    MCV 97.1 09/13/2022 01:59 PM    MCH 32.0 09/13/2022 01:59 PM    MCHC 32.9 09/13/2022 01:59 PM    RDW 13.5 09/13/2022 01:59 PM     09/13/2022 01:59 PM    MPV 9.8 09/13/2022 01:59 PM       Chemistries  Lab Results   Component Value Date/Time     09/13/2022 01:59 PM    K 4.1 09/13/2022 01:59 PM     09/13/2022 01:59 PM    CO2 25 09/13/2022 01:59 PM    BUN 21 09/13/2022 01:59 PM    CREATININE 1.07 09/13/2022 01:59 PM    CALCIUM 9.9 09/13/2022 01:59 PM    PROT 7.5 09/13/2022 01:59 PM    LABALBU 4.1 09/13/2022 01:59 PM    BILITOT 0.2 09/13/2022 01:59 PM    ALKPHOS 107 09/13/2022 01:59 PM    AST 16 09/13/2022 01:59 PM    ALT 18 09/13/2022 01:59 PM     Lab Results   Component Value Date/Time    ALKPHOS 107 09/13/2022 01:59 PM    ALT 18 09/13/2022 01:59 PM    AST 16 09/13/2022 01:59 PM    PROT 7.5 09/13/2022 01:59 PM    BILITOT 0.2 09/13/2022 01:59 PM    BILIDIR 0.12 04/09/2020 10:43 AM    LABALBU 4.1 09/13/2022 01:59 PM     Lab Results   Component Value Date/Time    BUN 21 09/13/2022 01:59 PM    CREATININE 1.07 09/13/2022 01:59 PM     Lab Results   Component Value Date/Time    CALCIUM 9.9 09/13/2022 01:59 PM    MG 1.8 04/10/2020 01:29 PM     Lab Results   Component Value Date/Time    AST 16 09/13/2022 01:59 PM    ALT 18 09/13/2022 01:59 PM       Lab Results   Component Value Date/Time    CKTOTAL 27 04/09/2020 10:43 AM     Lab Results   Component Value Date/Time    [id number] 580 06/04/2018 11:27 AM             Review of Systems   Constitutional:  Negative for appetite change, chills, diaphoresis, fatigue, fever, unexpected weight change and weight loss. HENT:  Negative for congestion, dental problem, drooling, ear discharge, ear pain, facial swelling, hearing loss, mouth sores, nosebleeds, postnasal drip, rhinorrhea, sinus pressure, sore throat, tinnitus, trouble swallowing and voice change. Eyes:  Positive for photophobia. Negative for blurred vision, pain, discharge, redness, itching and visual disturbance. Respiratory:  Negative for apnea, cough, choking, chest tightness, shortness of breath and wheezing. Cardiovascular:  Negative for chest pain, palpitations, leg swelling and near-syncope. Gastrointestinal:  Positive for nausea and vomiting. Negative for abdominal distention, abdominal pain, anorexia, blood in stool, bowel incontinence, constipation and diarrhea. Endocrine: Negative for cold intolerance, heat intolerance, polydipsia, polyphagia and polyuria. Genitourinary:  Negative for bladder incontinence. Musculoskeletal:  Negative for arthralgias, back pain, gait problem, joint swelling, myalgias, neck pain and neck stiffness. Skin:  Negative for color change, pallor, rash and wound.    Allergic/Immunologic: Negative for environmental allergies, food allergies and immunocompromised state. Neurological:  Positive for headaches. Negative for dizziness, vertigo, tingling, tremors, focal weakness, seizures, syncope, facial asymmetry, speech difficulty, weakness, light-headedness, numbness and loss of balance. Hematological:  Negative for adenopathy. Does not bruise/bleed easily. Psychiatric/Behavioral:  Positive for decreased concentration and memory loss. Negative for agitation, behavioral problems, confusion, dysphoric mood, hallucinations, self-injury, sleep disturbance and suicidal ideas. The patient is nervous/anxious and has insomnia. The patient is not hyperactive. OBJECTIVE:    Physical Exam  Constitutional:       Appearance: She is well-developed. HENT:      Head: Normocephalic and atraumatic. No raccoon eyes or Varma's sign. Right Ear: External ear normal.      Left Ear: External ear normal.      Nose: Nose normal.   Eyes:      Conjunctiva/sclera: Conjunctivae normal.   Neck:      Thyroid: No thyroid mass or thyromegaly. Vascular: No carotid bruit. Trachea: No tracheal deviation. Meningeal: Brudzinski's sign and Kernig's sign absent. Cardiovascular:      Rate and Rhythm: Normal rate and regular rhythm. Pulmonary:      Effort: Pulmonary effort is normal.   Musculoskeletal:         General: No tenderness. Cervical back: Normal range of motion and neck supple. No rigidity. No muscular tenderness. Normal range of motion. Skin:     General: Skin is warm. Coloration: Skin is not pale. Findings: No erythema or rash. Nails: There is no clubbing. Psychiatric:         Attention and Perception: She is attentive. Mood and Affect: Mood is anxious and depressed. Affect is not labile, blunt, angry or inappropriate. Behavior: Behavior is not agitated, slowed, aggressive, withdrawn, hyperactive or combative. Behavior is cooperative.          Thought Content: Thought content is not paranoid or delusional. Thought content does not include homicidal or suicidal ideation. Thought content does not include homicidal or suicidal plan. Cognition and Memory: Memory is impaired. She does not exhibit impaired recent memory or impaired remote memory. Judgment: Judgment is not impulsive or inappropriate. NEUROLOGICAL EXAMINATION :       A) MENTAL STATUS:                   Alert and  oriented  To time, place  And  Person. No Aphasia. No  Dysarthria. Able   To  Follow  commands without   Any  Difficulty. No right  To left confusion. Normal  Speech  And language function. Insight and  Judgment ,Fund  Of  Knowledge   within normal limits                Recent  And  Remote memory  within normal limits                Attention &Concentration are within normal limits                                                   B) CRANIAL NERVES :             2 CN : Visual  Acuity  And  Visual fields  within normal limits                        Fundi  Could  Not  Be  Could  Not  Be  Evaluated. 3,4,6 CN : Both  Pupils are   Reactive and  Equal.                            Extraocular   Movements  Are  Intact. No  Nystagmus. No  YUMIKO. No  Afferent  Pupillary  Defect noted. 5 CN :  Normal  Facial sensations and Corneal  Reflexes           7 CN :  Normal  Facial  Symmetry  And  Strength. No facial  Weakness. 8 CN :  Hearing  Appears within normal limits          9, 10 CN: Normal spontaneous, reflex palate movements         11 CN:   Normal  Shoulder shrug and  Strength         12 CN :   Normal  Tongue movements and  Tongue  In midline                        No tongue   Fasciculations or atrophy         C) MOTOR  EXAM:                 Strength  In upper  And  Lower extremities   within normal limits               No  Drift.      No Atrophy               Rapid alternating  And  repetitions  Movements  within normal limits               Muscle  Tone  In upper  And  Lower  Extremities  Normal                No rigidity.  No  Spasticity.                 Bradykinesia   Absent                 No  Asterixis.              Sustention  Tremor , Resting  Tremor   absent                      LEFT  EYE   SHOWED   SEVERE     BLEPHAROSPASM   WITH  TWITCHING                         D) SENSORY :               light touch, pinprick, position  And  Vibration  within normal limits        E) REFLEXES:                   Deep  Tendon  Reflexes normal                    No pathological  Reflexes  Bilaterally.                                    F) COORDINATION  AND  GAIT :                                Station and  Gait  normal                                        Romberg's test negative                          Ataxia negative          ASSESSMENT:      Patient Active Problem List   Diagnosis    Bipolar 1 disorder (HCC)    Hyperlipidemia    Malignant neoplasm of overlapping sites of right breast in female, estrogen receptor negative (HCC)    Port-A-Cath in place    Migraine    Memory problem    Sleep difficulties    Anxiety and depression    Muscle twitching    Hemifacial spasm    Combined forms of age-related cataract of both eyes    Squamous blepharitis of upper and lower eyelids of both eyes    Acute deep vein thrombosis (DVT) of axillary vein of right upper extremity (HCC)    Malignant neoplasm of breast in female, estrogen receptor positive (HCC)    Seasonal allergies    H/O right mastectomy    HX: breast cancer    Acute massive pulmonary embolism (HCC)    Moderate to severe pulmonary hypertension (HCC)    TAY on CPAP    Morbid obesity with BMI of 40.0-44.9, adult (HCC)    Extrinsic asthma    Blepharospasm    Chronic deep vein thrombosis (DVT) of axillary vein of right upper extremity (HCC)    H/O breast reconstruction    Exposed breast implant          MRI OF  THE BRAIN WITHOUT AND WITH CONTRAST  10/2/2019 1:14 pm       TECHNIQUE:   Multiplanar multisequence MRI of the head/brain was performed without and   with the administration of intravenous contrast.       COMPARISON:   MRI brain performed 10/08/2018. HISTORY:   ORDERING SYSTEM PROVIDED HISTORY: Migraine without aura and without status   migrainosus, not intractable   TECHNOLOGIST PROVIDED HISTORY:   migraines   Is the patient pregnant?->No   Reason for Exam: Migraines for quite a while, becoming worse. Acuity: Chronic   Type of Exam: Unknown   Additional signs and symptoms: Migraine without aura and without status   migrainosus, not intractable       FINDINGS:   INTRACRANIAL STRUCTURES/VENTRICLES:  The sellar and suprasellar structures,   optic chiasm, corpus callosum, pineal gland, tectum, and midline brainstem   structures are unremarkable. The craniocervical junction is unremarkable. There is no acute intracranial hemorrhage, mass effect, or midline shift. There is satisfactory overall gray-white matter differentiation. There is no   abnormal postcontrast enhancement. The ventricular structures are symmetric   and unremarkable. The infratentorial structures including the   cerebellopontine angles and internal auditory canals are unremarkable. There   is no abnormal restricted diffusion. There is no abnormal blooming artifact   on susceptibility weighted imaging. ORBITS: The visualized portion of the orbits demonstrate no acute abnormality. SINUSES: The visualized paranasal sinuses and mastoid air cells are well   aerated. BONES/SOFT TISSUES: The bone marrow signal intensity appears normal. The soft   tissues demonstrate no acute abnormality. Impression   Unremarkable pre and post-contrast MRI of the brain.                MRI OF THE BRAIN WITHOUT AND WITH CONTRAST  2/5/2018 9:18 am       TECHNIQUE:   Multiplanar multisequence MRI of the head/brain was performed without and   with the administration of intravenous contrast.       COMPARISON:   Head CT on 09/25/2013. HISTORY:   ORDERING SYSTEM PROVIDED HISTORY: Malignant neoplasm of overlapping sites of   right breast in female, estrogen receptor negative ([facility] [address]   TECHNOLOGIST PROVIDED HISTORY:   Ordering Physician Provided Reason for Exam:  Bad headaches for one month. Left eye twitching for two months and it is getting worse. History of breast   cancer. Acuity: Acute   Type of Exam: Initial   Additional signs and symptoms: Malignant neoplasm of overlapping sites of   right breast in female, estrogen receptor negative. Initial evaluation. FINDINGS:   INTRACRANIAL STRUCTURES/VENTRICLES:  There are no areas of restricted   diffusion to suggest an acute infarct. The cerebral and cerebellar   parenchyma demonstrate normal volume and signal intensity. There are no   abnormal extra-axial fluid collections. The ventricles are normal in size. Normal major intracranial flow voids are noted. There are no areas of abnormal contrast enhancement in the cerebral or   cerebellar parenchyma to suggest metastatic disease. There are no areas of blooming artifact noted on the gradient echo sequences   to suggest sequela of acute or chronic hemorrhage. ORBITS: The visualized portion of the orbits demonstrate no acute abnormality. SINUSES: There is scattered mucosal thickening in the paranasal sinuses, with   greatest involvement in the ethmoid air cells and maxillary sinuses. There   is an air-fluid level in the left sphenoid sinus, correlate with signs of   acute sinusitis. The mastoid air cells are clear. BONES/SOFT TISSUES: The bone marrow signal intensity appears normal. The soft   tissues demonstrate no acute abnormality. Impression   1. Unremarkable MRI of the brain. No evidence of metastatic disease. 2. Acute left sphenoid sinusitis.              VISITING DIAGNOSIS: ICD-10-CM    1. Migraine without aura and without status migrainosus, not intractable  G43.009       2. H/O right mastectomy  Z90.11       3. HX: breast cancer  Z85.3       4. Memory problem  R41.3       5. Hemifacial spasm of left side of face  G51.32       6. Muscle twitching  R25.3       7. Bipolar 1 disorder (HCC)  F31.9       8. Anxiety and depression  F41.9     F32. A       9. Sleep difficulties  G47.9                         VARIOUS  RISK   FACTORS   WERE  REVIEWED   AND   DISCUSSED. *  PATIENT   HAS  MULTIPLE   MEDICAL, MENTAL HEALTH          NEUROLOGICAL   PROBLEMS . PATIENT  MANAGEMENT  IS  CHALLENGING              PLAN:       Hazle Mealing  Of  The  Diagnoses,  The  Management & Treatment  Options           AND    Care  plan  Were        Reviewed and   Discussed   With  patient. * Goals  And  Expectations  Of  The  Therapy  Discussed   And  Reviewed. *   Benefits   And   Side  Effect  Profile  Of  Medication/s   Were   Discussed             * Need   For  Further   Follow up For  The  Various  Problems  Were discussed. * Results  Of  The  Previous  Diagnostic tests were reviewed and questions answered. patient  understand the same. Medical  Decision  Making  Was  Made  Based on the   Complexity  Of  Above  Mentioned  Diagnoses,        Data reviewed   & diagnostic  Tests  Reviewed,  Risk  Of  Significant   Co morbidities and complicating   Factors. Medical  Decision  Was   High  Complexity  Due   To  The  Patient's  Multiple  Symptoms,      Complex  Treatment  Regimen,  Multiple medications and   Risk  Of   Side  Effects,      Difficulty  In  Medication  Management      And  Diagnostic  Challenges   In  View  Of  The  Associated   Co  Morbid  Conditions   And  Problems.                 *   ADEQUATE   FLUID  INTAKE   AND  ELECTROLYTE  BALANCE         * KEEP  DAIRY  OF   THE  NEUROLOGICAL  SYMPTOMS        RECORDING THE    DURATION AND  FREQUENCY. -    DISCUSSED  WITH PATIENT              *  AVOID    CONDITIONS  AND  FACTORS   THAT  MAKE   NEUROLOGICAL  SYMPTOMS  WORSE. *  TO  MAINTAIN  REGULAR  SLEEP  WAKE  CYCLES. *   TO  HAVE  ADEQUATE  REST  AND   SLEEP    HOURS.          *    TO   AVOID   TO  SLEEP  IN   SUPINE  POSITION. *      WEIGHT   LOSS. *    AVOID  ANY USAGE OF                   TOBACCO,  EXCESSIVE  ALCOHOL  AND   ILLEGAL   SUBSTANCES            *  CONTINUE MEDICATIONS PRESCRIBED BY NEUROLOGIST AS    RECOMMENDED     *   Compliance   With  Medications   And  Instructions          * CURRENTLY  TOLERATING  THE  PRESCRIBED   MEDICATIONS. WITHOUT  ANY  SIGNIFICANT  SIDE  EFFECTS   &  GETTING BENEFIT. *    Prophylactic  Use   Of     Vitamin   B   Complex,  Folic  Acid,    Vitamin  B12    Multivitamin,       Calcium  With  magnesium  And  Vit D    Supplementations   Over  The  Counter  Discussed            *  EVALUATIONS  AND  FOLLOW UP:                     * HEMATOLOGY/ONCOLOGY                    *   OPTHALMOLOGY                                                                     *       PREVIOUS   H/O      MIGRAINES    BETTER  CONTROLLED                              WITH    TOPAMAX ,  FIORICET,  IMITREX   AS  NEEDED                Controlled Substances Monitoring: Periodic Controlled Substance Monitoring: Possible medication side effects, risk of tolerance/dependence & alternative treatments discussed. , Assessed functional status.  Edith Christian MD)            Orders Placed This Encounter   Procedures    CT HEAD WO CONTRAST    CBC    Comprehensive Metabolic Panel    Hemoglobin A1C    TSH    Vitamin B12 & Folate    EEG       Orders Placed This Encounter   Medications    lamoTRIgine (LAMICTAL) 100 MG tablet     Si/2       TABLET       AT    BED   TIME    FOR    3   DAYS,      1/2    TABLET      TWICE      DAILY       FOR     3  DAYS         1/2    TABLET         AM AND      ONE   TABLET    PM        FOR       ONE     WEEK,             THEN     ONE     TABLET      TWICE       DAILY     Dispense:  90 tablet     Refill:  2    topiramate (TOPAMAX) 50 MG tablet     Sig: ONE  TABLET  TWICE  DAILY     Dispense:  60 tablet     Refill:  5    clonazePAM (KLONOPIN) 0.5 MG tablet     Sig: Take 1 tablet by mouth 2 times daily for 31 days. Dispense:  60 tablet     Refill:  2    butalbital-acetaminophen-caffeine (FIORICET, ESGIC) -40 MG per tablet     Si-2   TABLET  TWICE  DAILY  AS  NEEDED     Dispense:  60 tablet     Refill:  2    SUMAtriptan (IMITREX) 100 MG tablet     Sig: take 1 tablet by mouth once daily if needed for migraines     Dispense:  12 tablet     Refill:  2                   *PATIENT   TO  FOLLOW  UP  WITH   PRIMARY  CARE   AND   OTHER  CONSULTANTS  AS  BEFORE.           *TO  FOLLOW  WITH   MENTAL  HEALTH  PROFESSIONALS ,  INCLUDING            PSYCHOLOGICAL  COUNSELING   AND  PSYCHIATRIC  EVALUTIONS            *  Maintain   Healthy  Life Style    With   Periodic  Monitoring  Of      Any  Medical  Conditions  Including   Elevated  Blood  Pressure,  Lipid  Profile,     Blood  Sugar levels  And   Heart  Disease. *   Period   Screening  For  Cancers  Involving  Breast,  Colon,    lungs  And  Other  Organs  As  Applicable,  In consultation   With  Your  Primary Care Providers. * Second  Neurological  Opinion  And  Evaluations  In  [facility]  Setting  If  Patient  Is  Interested. * Please   Contact   Neurology  Clinic   Early   If   Are  Any  New  Neurological                             Symptoms   And  Any neurological  Concerns.                     *  If  The  Patient remains  Neurologically  Stable   Return   To  [facility] Neurology Department               IN      2     MONTHS  TIME   FOR  FURTHER  FOLLOW UP.                 *  If   There is  Any  Significant  Worsening   Of  Current  Symptoms  And Or  If patient  Develops       Any additional  New  Neurological  Symptoms  Or  Significant  Concerns   Should  Call  911 or      Go  To  Emergency  Department  For  Further  Immediate  Evaluation. *   The  Neurological   Findings,  Possible  Diagnosis,  Differential diagnoses   And  Options  For    Further   Investigations       And  management   Are  Discussed  Comprehensively. Medications   And  Prescription   Risks  And  Side effects  Are   Also  Discussed. The  Above  Were  Reviewed  With  patient and     questions  Answered  In  Detail. More   Than   50% of face  To face Time   Was  Spent  On  Counseling   And   Coordination      Of  Care   Of   Patient's multiple   Neurological  Problems   And   Comorbid  Medical   Conditions. TOTAL   TIME     SPENT :                On this date 3/13/2023 I have spent    40  minutes   reviewing previous notes, test results               and face to face time with the patient including   discussing the diagnosis and importance of compliance               with the treatment plan  as well as documenting on the day of the visit. Electronically signed by Dolly Haile MD.,  [facility]       Board Certified in  Neurology &  In  [address] of [employer] and Neurology ([employer])      DISCLAIMER:   Although every effort was made to ensure the accuracy of this  electronic transcription, some errors in transcription may have occurred. GENERAL PATIENT INSTRUCTIONS:     A Healthy Lifestyle: Care Instructions  Your Care Instructions  A healthy lifestyle can help you feel good, stay at a healthy weight, and have plenty of energy for both work and play. A healthy lifestyle is something you can share with your whole family.   A healthy lifestyle also can lower your risk for serious health problems, such as high blood pressure, heart disease, and diabetes. You can follow a few steps listed below to improve your health and the health of your family. Follow-up care is a key part of your treatment and safety. Be sure to make and go to all appointments, and call your doctor if you are having problems. Its also a good idea to know your test results and keep a list of the medicines you take. How can you care for yourself at home? Do not eat too much sugar, fat, or fast foods. You can still have dessert and treats now and then. The goal is moderation. Start small to improve your eating habits. Pay attention to portion sizes, drink less juice and soda pop, and eat more fruits and vegetables. Eat a healthy amount of food. A 3-ounce serving of meat, for example, is about the size of a deck of cards. Fill the rest of your plate with vegetables and whole grains. Limit the amount of soda and sports drinks you have every day. Drink more water when you are thirsty. Eat at least 5 servings of fruits and vegetables every day. It may seem like a lot, but it is not hard to reach this goal. A serving or helping is 1 piece of fruit, 1 cup of vegetables, or 2 cups of leafy, raw vegetables. Have an apple or some carrot sticks as an afternoon snack instead of a candy bar. Try to have fruits and/or vegetables at every meal.  Make exercise part of your daily routine. You may want to start with simple activities, such as walking, bicycling, or slow swimming. Try to be active 30 to 60 minutes every day. You do not need to do all 30 to 60 minutes all at once. For example, you can exercise 3 times a day for 10 or 20 minutes. Moderate exercise is safe for most people, but it is always a good idea to talk to your doctor before starting an exercise program.  Keep moving. Vicente Reek the lawn, work in the garden, or [employer]. Take the stairs instead of the elevator at work. If you smoke, quit.  People who smoke have an increased risk for heart attack, stroke, cancer, and other lung illnesses. Quitting is hard, but there are ways to boost your chance of quitting tobacco for good. Use nicotine gum, patches, or lozenges. Ask your doctor about stop-smoking programs and medicines. Keep trying. In addition to reducing your risk of diseases in the future, you will notice some benefits soon after you stop using tobacco. If you have shortness of breath or asthma symptoms, they will likely get better within a few weeks after you quit. Limit how much alcohol you drink. Moderate amounts of alcohol (up to 2 drinks a day for men, 1 drink a day for women) are okay. But drinking too much can lead to liver problems, high blood pressure, and other health problems. Family health  If you have a family, there are many things you can do together to improve your health. Eat meals together as a family as often as possible. Eat healthy foods. This includes fruits, vegetables, lean meats and dairy, and whole grains. Include your family in your fitness plan. Most people think of activities such as jogging or tennis as the way to fitness, but there are many ways you and your family can be more active. Anything that makes you breathe hard and gets your heart pumping is exercise. Here are some tips:  Walk to do errands or to take your child to school or the bus. Go for a family bike ride after dinner instead of watching TV. Where can you learn more? Go to https://[employer]healthTyfone. org and sign in to your [employer] account. Enter P089 in the [facility] box to learn more about \"A Healthy Lifestyle: Care Instructions. \"     If you do not have an account, please click on the \"Sign Up Now\" link. Current as of: July 26, 2016  Content Version: 11.2  © 1882-9131 [employer]. Care instructions adapted under license by [facility] ([facility]).  If you have questions about a medical condition or this instruction, always ask your healthcare professional. [address] any warranty or liability for your use of this information.

## 2023-03-14 LAB
EST. AVERAGE GLUCOSE BLD GHB EST-MCNC: 114 MG/DL
HBA1C MFR BLD: 5.6 % (ref 4–6)

## 2023-03-16 ENCOUNTER — TELEPHONE (OUTPATIENT)
Dept: FAMILY MEDICINE CLINIC | Age: 58
End: 2023-03-16

## 2023-03-16 ENCOUNTER — HOSPITAL ENCOUNTER (OUTPATIENT)
Dept: CT IMAGING | Age: 58
Discharge: HOME | End: 2023-03-18
Payer: MEDICARE

## 2023-03-16 DIAGNOSIS — R41.3 MEMORY PROBLEM: ICD-10-CM

## 2023-03-16 DIAGNOSIS — G24.5 BLEPHAROSPASM: ICD-10-CM

## 2023-03-16 DIAGNOSIS — E66.01 MORBID OBESITY WITH BMI OF 40.0-44.9, ADULT (HCC): ICD-10-CM

## 2023-03-16 DIAGNOSIS — R25.3 MUSCLE TWITCHING: ICD-10-CM

## 2023-03-16 DIAGNOSIS — G43.009 MIGRAINE WITHOUT AURA AND WITHOUT STATUS MIGRAINOSUS, NOT INTRACTABLE: ICD-10-CM

## 2023-03-16 PROCEDURE — 70450 CT HEAD/BRAIN W/O DYE: CPT

## 2023-03-22 ENCOUNTER — TELEPHONE (OUTPATIENT)
Dept: INFUSION THERAPY | Age: 58
End: 2023-03-22

## 2023-03-22 NOTE — TELEPHONE ENCOUNTER
On 1/25/2023 writer called patient and informed her that  would like patient to get Prolia injections but she would need to get dental clearance before proceeding with injections. Patient verbalized understanding and would call unit back once clearance was obtained. Writer called patient today to see if dental clearance had been obtained and patient states that she is not interested in getting the injection because her dentist won't give clearance since she \"has bad teeth\". Writer will inform  of patients decision regarding the prolia injections.

## 2023-03-24 ENCOUNTER — OFFICE VISIT (OUTPATIENT)
Dept: SURGERY | Age: 58
End: 2023-03-24

## 2023-03-24 VITALS
HEIGHT: 66 IN | RESPIRATION RATE: 16 BRPM | HEART RATE: 71 BPM | BODY MASS INDEX: 39.7 KG/M2 | WEIGHT: 247 LBS | DIASTOLIC BLOOD PRESSURE: 68 MMHG | SYSTOLIC BLOOD PRESSURE: 116 MMHG | OXYGEN SATURATION: 99 %

## 2023-03-24 DIAGNOSIS — Z90.11 H/O RIGHT MASTECTOMY: Primary | ICD-10-CM

## 2023-03-24 ASSESSMENT — ENCOUNTER SYMPTOMS
GASTROINTESTINAL NEGATIVE: 1
RESPIRATORY NEGATIVE: 1

## 2023-03-24 NOTE — PROGRESS NOTES
62year old white female presents today for 1 year follow up of extrusion of right breast implant. Incision has healed flat, and free of excess redness, swelling or heat. Area free of drainage or bleeding. Patient rates pain as a 0 on a 1 to 10 pain scale. She states she is here today to discuss her options, but she is afraid of trying a new implant at this time. She would like to discuss all of her options with Dr. Khushi Bains today.

## 2023-03-24 NOTE — PROGRESS NOTES
Subjective:      Patient ID: Maya Cortes is a 62 y.o. female. HPI  Patient presents today for 1 year follow up of extrusion of right breast implant. Incision has healed flat, and free of excess redness, swelling or heat. Area free of drainage or bleeding. Patient rates pain as a 0 on a 1 to 10 pain scale. She states she is here today to discuss her options, but she is afraid of trying a new implant at this time. She would like to discuss all of her options with Dr. Joellen Goncalves today. Review of Systems   Constitutional: Negative. HENT: Negative. Respiratory: Negative. Cardiovascular: Negative. Gastrointestinal: Negative. Musculoskeletal: Negative. Neurological: Negative. Objective:   Physical Exam  Vitals and nursing note reviewed. Exam conducted with a chaperone present. Constitutional:       Appearance: Normal appearance. HENT:      Head: Normocephalic and atraumatic. Mouth/Throat:      Mouth: Mucous membranes are moist.   Eyes:      Extraocular Movements: Extraocular movements intact. Conjunctiva/sclera: Conjunctivae normal.      Pupils: Pupils are equal, round, and reactive to light. Pulmonary:      Effort: Pulmonary effort is normal.   Chest:      Comments:     Right chest wound well healed. Tissues soft, mobile from underlying chest wall. Central scar area skin thin. Small, ptotic left breast.  No masses. Skin:     General: Skin is warm and dry. Neurological:      General: No focal deficit present. Mental Status: She is alert and oriented to person, place, and time. Assessment:      Right mastectomy for breast ca. Plan:      We discussed at length options for reconstruction of right breast.  She is not planning any modification to left breast, just wants volume to match. She is unhappy with external prostheses as she sweats too much beneath. She would like to avoid breast implant because of previous experience.   We discussed flap

## 2023-03-29 DIAGNOSIS — M10.9 ACUTE GOUT OF RIGHT FOOT, UNSPECIFIED CAUSE: ICD-10-CM

## 2023-03-29 LAB
FOLATE SERPL-MCNC: 9 NG/ML
VIT B12 SERPL-MCNC: 1146 PG/ML (ref 232–1245)

## 2023-03-29 NOTE — TELEPHONE ENCOUNTER
Tom Hicks called requesting a refill of the below medication which has been pended for you:     Requested Prescriptions     Pending Prescriptions Disp Refills    allopurinol (ZYLOPRIM) 100 MG tablet [Pharmacy Med Name: ALLOPURINOL 100 MG TABLET] 30 tablet 3     Sig: take 1 tablet by mouth once daily       Last Appointment Date: 12/20/2022  Next Appointment Date: 6/22/2023    Allergies   Allergen Reactions    Prednisone Swelling     Whole side of her face swelled when she took it, difficulty breathing

## 2023-03-30 RX ORDER — ALLOPURINOL 100 MG/1
TABLET ORAL
Qty: 30 TABLET | Refills: 5 | Status: SHIPPED | OUTPATIENT
Start: 2023-03-30

## 2023-04-24 ENCOUNTER — HOSPITAL ENCOUNTER (OUTPATIENT)
Dept: NON INVASIVE DIAGNOSTICS | Age: 58
Discharge: HOME | End: 2023-04-24
Payer: MEDICARE

## 2023-04-24 ENCOUNTER — HOSPITAL ENCOUNTER (OUTPATIENT)
Age: 58
Discharge: HOME | End: 2023-04-24
Payer: MEDICARE

## 2023-04-24 DIAGNOSIS — Z90.11 H/O RIGHT MASTECTOMY: ICD-10-CM

## 2023-04-24 DIAGNOSIS — Z01.818 PREOP TESTING: ICD-10-CM

## 2023-04-24 DIAGNOSIS — Z85.3 HX: BREAST CANCER: ICD-10-CM

## 2023-04-24 LAB
ANION GAP SERPL CALCULATED.3IONS-SCNC: 12 MMOL/L (ref 9–17)
BACTERIA: ABNORMAL
BILIRUBIN URINE: NEGATIVE
BUN SERPL-MCNC: 20 MG/DL (ref 6–20)
BUN/CREAT BLD: 19 (ref 9–20)
CALCIUM SERPL-MCNC: 9.6 MG/DL (ref 8.6–10.4)
CHLORIDE SERPL-SCNC: 104 MMOL/L (ref 98–107)
CO2 SERPL-SCNC: 24 MMOL/L (ref 20–31)
COLOR: YELLOW
CREAT SERPL-MCNC: 1.04 MG/DL (ref 0.5–0.9)
EPITHELIAL CELLS UA: ABNORMAL /HPF (ref 0–5)
GFR SERPL CREATININE-BSD FRML MDRD: >60 ML/MIN/1.73M2
GLUCOSE SERPL-MCNC: 107 MG/DL (ref 70–99)
GLUCOSE UR STRIP.AUTO-MCNC: NEGATIVE MG/DL
HCT VFR BLD AUTO: 39 % (ref 36.3–47.1)
HGB BLD-MCNC: 12.6 G/DL (ref 11.9–15.1)
KETONES UR STRIP.AUTO-MCNC: NEGATIVE MG/DL
LEUKOCYTE ESTERASE UR QL STRIP.AUTO: ABNORMAL
MCH RBC QN AUTO: 31.6 PG (ref 25.2–33.5)
MCHC RBC AUTO-ENTMCNC: 32.3 G/DL (ref 25.2–33.5)
MCV RBC AUTO: 97.7 FL (ref 82.6–102.9)
NITRITE UR QL STRIP.AUTO: NEGATIVE
NRBC AUTOMATED: 0 PER 100 WBC
OTHER OBSERVATIONS UA: ABNORMAL
PDW BLD-RTO: 13.2 % (ref 11.8–14.4)
PLATELET # BLD AUTO: 210 K/UL (ref 138–453)
PMV BLD AUTO: 9.4 FL (ref 8.1–13.5)
POTASSIUM SERPL-SCNC: 4.1 MMOL/L (ref 3.7–5.3)
PROT UR STRIP.AUTO-MCNC: 7 MG/DL (ref 5–6)
PROT UR STRIP.AUTO-MCNC: NEGATIVE MG/DL
RBC # BLD: 3.99 M/UL (ref 3.95–5.11)
RBC CLUMPS #/AREA URNS AUTO: ABNORMAL /HPF (ref 0–4)
SODIUM SERPL-SCNC: 140 MMOL/L (ref 135–144)
SPECIFIC GRAVITY UA: 1.01 (ref 1.01–1.02)
TURBIDITY: CLEAR
URINE HGB: NEGATIVE
UROBILINOGEN, URINE: NORMAL
WBC # BLD AUTO: 7.4 K/UL (ref 3.5–11.3)
WBC UA: ABNORMAL /HPF (ref 0–4)

## 2023-04-24 PROCEDURE — 36415 COLL VENOUS BLD VENIPUNCTURE: CPT

## 2023-04-24 PROCEDURE — 85027 COMPLETE CBC AUTOMATED: CPT

## 2023-04-24 PROCEDURE — 80048 BASIC METABOLIC PNL TOTAL CA: CPT

## 2023-04-24 PROCEDURE — 93005 ELECTROCARDIOGRAM TRACING: CPT

## 2023-04-24 PROCEDURE — 81001 URINALYSIS AUTO W/SCOPE: CPT

## 2023-04-26 LAB
EKG ATRIAL RATE: 79 BPM
EKG P AXIS: 43 DEGREES
EKG P-R INTERVAL: 176 MS
EKG Q-T INTERVAL: 392 MS
EKG QRS DURATION: 98 MS
EKG QTC CALCULATION (BAZETT): 449 MS
EKG R AXIS: 69 DEGREES
EKG T AXIS: 6 DEGREES
EKG VENTRICULAR RATE: 79 BPM

## 2023-05-03 ENCOUNTER — OFFICE VISIT (OUTPATIENT)
Dept: NEUROLOGY | Age: 58
End: 2023-05-03
Payer: MEDICARE

## 2023-05-03 VITALS
OXYGEN SATURATION: 98 % | RESPIRATION RATE: 16 BRPM | SYSTOLIC BLOOD PRESSURE: 118 MMHG | HEART RATE: 84 BPM | BODY MASS INDEX: 42.13 KG/M2 | WEIGHT: 261 LBS | DIASTOLIC BLOOD PRESSURE: 68 MMHG

## 2023-05-03 DIAGNOSIS — F31.9 BIPOLAR 1 DISORDER (HCC): ICD-10-CM

## 2023-05-03 DIAGNOSIS — Z85.3 HX: BREAST CANCER: ICD-10-CM

## 2023-05-03 DIAGNOSIS — F41.9 ANXIETY AND DEPRESSION: ICD-10-CM

## 2023-05-03 DIAGNOSIS — G43.009 MIGRAINE WITHOUT AURA AND WITHOUT STATUS MIGRAINOSUS, NOT INTRACTABLE: Primary | ICD-10-CM

## 2023-05-03 DIAGNOSIS — E78.2 MIXED HYPERLIPIDEMIA: ICD-10-CM

## 2023-05-03 DIAGNOSIS — Z90.11 H/O RIGHT MASTECTOMY: ICD-10-CM

## 2023-05-03 DIAGNOSIS — G47.9 SLEEP DIFFICULTIES: ICD-10-CM

## 2023-05-03 DIAGNOSIS — I82.A21 CHRONIC DEEP VEIN THROMBOSIS (DVT) OF AXILLARY VEIN OF RIGHT UPPER EXTREMITY (HCC): ICD-10-CM

## 2023-05-03 DIAGNOSIS — G24.5 BLEPHAROSPASM: ICD-10-CM

## 2023-05-03 DIAGNOSIS — R41.3 MEMORY PROBLEM: ICD-10-CM

## 2023-05-03 DIAGNOSIS — E66.01 MORBID OBESITY WITH BMI OF 40.0-44.9, ADULT (HCC): ICD-10-CM

## 2023-05-03 DIAGNOSIS — R25.3 MUSCLE TWITCHING: ICD-10-CM

## 2023-05-03 DIAGNOSIS — G51.39 HEMIFACIAL SPASM, UNSPECIFIED LATERALITY: ICD-10-CM

## 2023-05-03 DIAGNOSIS — F32.A ANXIETY AND DEPRESSION: ICD-10-CM

## 2023-05-03 PROCEDURE — G8417 CALC BMI ABV UP PARAM F/U: HCPCS | Performed by: PSYCHIATRY & NEUROLOGY

## 2023-05-03 PROCEDURE — 1036F TOBACCO NON-USER: CPT | Performed by: PSYCHIATRY & NEUROLOGY

## 2023-05-03 PROCEDURE — 3017F COLORECTAL CA SCREEN DOC REV: CPT | Performed by: PSYCHIATRY & NEUROLOGY

## 2023-05-03 PROCEDURE — G8427 DOCREV CUR MEDS BY ELIG CLIN: HCPCS | Performed by: PSYCHIATRY & NEUROLOGY

## 2023-05-03 PROCEDURE — 99214 OFFICE O/P EST MOD 30 MIN: CPT | Performed by: PSYCHIATRY & NEUROLOGY

## 2023-05-03 RX ORDER — METOCLOPRAMIDE 10 MG/1
TABLET ORAL
COMMUNITY
Start: 2023-04-24

## 2023-05-03 RX ORDER — LAMOTRIGINE 100 MG/1
TABLET ORAL
Qty: 60 TABLET | Refills: 2 | Status: SHIPPED | OUTPATIENT
Start: 2023-05-03

## 2023-05-03 RX ORDER — UBROGEPANT 100 MG/1
100 TABLET ORAL 2 TIMES DAILY PRN
Qty: 30 TABLET | Refills: 1 | Status: SHIPPED | OUTPATIENT
Start: 2023-05-03

## 2023-05-03 ASSESSMENT — ENCOUNTER SYMPTOMS
ABDOMINAL PAIN: 0
FACIAL SWEATING: 0
VOICE CHANGE: 0
VISUAL CHANGE: 0
CHEST TIGHTNESS: 0
COLOR CHANGE: 0
DIARRHEA: 0
EYE DISCHARGE: 0
EYE WATERING: 1
TROUBLE SWALLOWING: 0
CONSTIPATION: 0
ABDOMINAL DISTENTION: 0
SCALP TENDERNESS: 0
SWOLLEN GLANDS: 0
SORE THROAT: 0
SINUS PRESSURE: 0
APNEA: 0
FACIAL SWELLING: 0
PHOTOPHOBIA: 1
NAUSEA: 1
BLOOD IN STOOL: 0
CHOKING: 0
SHORTNESS OF BREATH: 0
BOWEL INCONTINENCE: 0
WHEEZING: 0
RHINORRHEA: 0
VOMITING: 1
BLURRED VISION: 0
COUGH: 0
EYE PAIN: 0
EYE REDNESS: 0
BACK PAIN: 0
EYE ITCHING: 0

## 2023-05-03 ASSESSMENT — PATIENT HEALTH QUESTIONNAIRE - PHQ9
SUM OF ALL RESPONSES TO PHQ QUESTIONS 1-9: 0
SUM OF ALL RESPONSES TO PHQ QUESTIONS 1-9: 0
SUM OF ALL RESPONSES TO PHQ9 QUESTIONS 1 & 2: 0
SUM OF ALL RESPONSES TO PHQ QUESTIONS 1-9: 0
2. FEELING DOWN, DEPRESSED OR HOPELESS: 0
1. LITTLE INTEREST OR PLEASURE IN DOING THINGS: 0
SUM OF ALL RESPONSES TO PHQ QUESTIONS 1-9: 0

## 2023-05-03 NOTE — PROGRESS NOTES
have plenty of energy for both work and play. A healthy lifestyle is something you can share with your whole family. A healthy lifestyle also can lower your risk for serious health problems, such as high blood pressure, heart disease, and diabetes. You can follow a few steps listed below to improve your health and the health of your family. Follow-up care is a key part of your treatment and safety. Be sure to make and go to all appointments, and call your doctor if you are having problems. Its also a good idea to know your test results and keep a list of the medicines you take. How can you care for yourself at home? Do not eat too much sugar, fat, or fast foods. You can still have dessert and treats now and then. The goal is moderation. Start small to improve your eating habits. Pay attention to portion sizes, drink less juice and soda pop, and eat more fruits and vegetables. Eat a healthy amount of food. A 3-ounce serving of meat, for example, is about the size of a deck of cards. Fill the rest of your plate with vegetables and whole grains. Limit the amount of soda and sports drinks you have every day. Drink more water when you are thirsty. Eat at least 5 servings of fruits and vegetables every day. It may seem like a lot, but it is not hard to reach this goal. A serving or helping is 1 piece of fruit, 1 cup of vegetables, or 2 cups of leafy, raw vegetables. Have an apple or some carrot sticks as an afternoon snack instead of a candy bar. Try to have fruits and/or vegetables at every meal.  Make exercise part of your daily routine. You may want to start with simple activities, such as walking, bicycling, or slow swimming. Try to be active 30 to 60 minutes every day. You do not need to do all 30 to 60 minutes all at once. For example, you can exercise 3 times a day for 10 or 20 minutes.  Moderate exercise is safe for most people, but it is always a good idea to talk to your doctor before starting an

## 2023-05-08 DIAGNOSIS — C50.919 MALIGNANT NEOPLASM OF FEMALE BREAST, UNSPECIFIED ESTROGEN RECEPTOR STATUS, UNSPECIFIED LATERALITY, UNSPECIFIED SITE OF BREAST (HCC): ICD-10-CM

## 2023-05-08 RX ORDER — PSYLLIUM HUSK 3.4 G/7G
POWDER ORAL
Qty: 30 TABLET | Refills: 3 | Status: SHIPPED | OUTPATIENT
Start: 2023-05-08

## 2023-05-10 DIAGNOSIS — Z78.0 POSTMENOPAUSAL: ICD-10-CM

## 2023-05-10 DIAGNOSIS — I82.A11 ACUTE DEEP VEIN THROMBOSIS (DVT) OF AXILLARY VEIN OF RIGHT UPPER EXTREMITY (HCC): ICD-10-CM

## 2023-05-10 DIAGNOSIS — Z90.11 H/O RIGHT MASTECTOMY: ICD-10-CM

## 2023-05-10 DIAGNOSIS — C50.919 MALIGNANT NEOPLASM OF FEMALE BREAST, UNSPECIFIED ESTROGEN RECEPTOR STATUS, UNSPECIFIED LATERALITY, UNSPECIFIED SITE OF BREAST (HCC): ICD-10-CM

## 2023-05-17 ENCOUNTER — ANESTHESIA (OUTPATIENT)
Dept: OPERATING ROOM | Age: 58
End: 2023-05-17
Payer: MEDICARE

## 2023-05-17 ENCOUNTER — HOSPITAL ENCOUNTER (OUTPATIENT)
Age: 58
Setting detail: OBSERVATION
Discharge: HOME | End: 2023-05-19
Attending: PLASTIC SURGERY | Admitting: PLASTIC SURGERY
Payer: MEDICARE

## 2023-05-17 ENCOUNTER — ANESTHESIA EVENT (OUTPATIENT)
Dept: OPERATING ROOM | Age: 58
End: 2023-05-17
Payer: MEDICARE

## 2023-05-17 DIAGNOSIS — Z90.11 HX OF MASTECTOMY, RIGHT: ICD-10-CM

## 2023-05-17 DIAGNOSIS — G89.18 POSTOPERATIVE PAIN: Primary | ICD-10-CM

## 2023-05-17 PROBLEM — Z98.890 STATUS POST RIGHT BREAST RECONSTRUCTION: Status: ACTIVE | Noted: 2023-05-17

## 2023-05-17 PROCEDURE — 2500000003 HC RX 250 WO HCPCS: Performed by: NURSE ANESTHETIST, CERTIFIED REGISTERED

## 2023-05-17 PROCEDURE — 2580000003 HC RX 258: Performed by: PLASTIC SURGERY

## 2023-05-17 PROCEDURE — 6360000002 HC RX W HCPCS: Performed by: PLASTIC SURGERY

## 2023-05-17 PROCEDURE — 7100000000 HC PACU RECOVERY - FIRST 15 MIN: Performed by: PLASTIC SURGERY

## 2023-05-17 PROCEDURE — A4216 STERILE WATER/SALINE, 10 ML: HCPCS | Performed by: NURSE ANESTHETIST, CERTIFIED REGISTERED

## 2023-05-17 PROCEDURE — 2580000003 HC RX 258: Performed by: NURSE ANESTHETIST, CERTIFIED REGISTERED

## 2023-05-17 PROCEDURE — 3600000014 HC SURGERY LEVEL 4 ADDTL 15MIN: Performed by: PLASTIC SURGERY

## 2023-05-17 PROCEDURE — C1773 RET DEV, INSERTABLE: HCPCS | Performed by: PLASTIC SURGERY

## 2023-05-17 PROCEDURE — 6360000002 HC RX W HCPCS: Performed by: NURSE ANESTHETIST, CERTIFIED REGISTERED

## 2023-05-17 PROCEDURE — 6360000002 HC RX W HCPCS: Performed by: ANESTHESIOLOGY

## 2023-05-17 PROCEDURE — G0378 HOSPITAL OBSERVATION PER HR: HCPCS

## 2023-05-17 PROCEDURE — 6360000002 HC RX W HCPCS

## 2023-05-17 PROCEDURE — 7100000001 HC PACU RECOVERY - ADDTL 15 MIN: Performed by: PLASTIC SURGERY

## 2023-05-17 PROCEDURE — 3700000001 HC ADD 15 MINUTES (ANESTHESIA): Performed by: PLASTIC SURGERY

## 2023-05-17 PROCEDURE — 3600000004 HC SURGERY LEVEL 4 BASE: Performed by: PLASTIC SURGERY

## 2023-05-17 PROCEDURE — 2709999900 HC NON-CHARGEABLE SUPPLY: Performed by: PLASTIC SURGERY

## 2023-05-17 PROCEDURE — 88304 TISSUE EXAM BY PATHOLOGIST: CPT

## 2023-05-17 PROCEDURE — 96372 THER/PROPH/DIAG INJ SC/IM: CPT

## 2023-05-17 PROCEDURE — 3700000000 HC ANESTHESIA ATTENDED CARE: Performed by: PLASTIC SURGERY

## 2023-05-17 PROCEDURE — 2500000003 HC RX 250 WO HCPCS: Performed by: PLASTIC SURGERY

## 2023-05-17 RX ORDER — FENTANYL CITRATE 50 UG/ML
25 INJECTION, SOLUTION INTRAMUSCULAR; INTRAVENOUS EVERY 5 MIN PRN
Status: COMPLETED | OUTPATIENT
Start: 2023-05-17 | End: 2023-05-17

## 2023-05-17 RX ORDER — HYDROCODONE BITARTRATE AND ACETAMINOPHEN 5; 325 MG/1; MG/1
1 TABLET ORAL EVERY 4 HOURS PRN
Status: DISCONTINUED | OUTPATIENT
Start: 2023-05-17 | End: 2023-05-19 | Stop reason: HOSPADM

## 2023-05-17 RX ORDER — PROPOFOL 10 MG/ML
INJECTION, EMULSION INTRAVENOUS PRN
Status: DISCONTINUED | OUTPATIENT
Start: 2023-05-17 | End: 2023-05-17 | Stop reason: SDUPTHER

## 2023-05-17 RX ORDER — DEXAMETHASONE SODIUM PHOSPHATE 10 MG/ML
INJECTION INTRAMUSCULAR; INTRAVENOUS PRN
Status: DISCONTINUED | OUTPATIENT
Start: 2023-05-17 | End: 2023-05-17 | Stop reason: SDUPTHER

## 2023-05-17 RX ORDER — SCOLOPAMINE TRANSDERMAL SYSTEM 1 MG/1
1 PATCH, EXTENDED RELEASE TRANSDERMAL
Status: DISCONTINUED | OUTPATIENT
Start: 2023-05-17 | End: 2023-05-17

## 2023-05-17 RX ORDER — GLYCOPYRROLATE 0.2 MG/ML
INJECTION INTRAMUSCULAR; INTRAVENOUS PRN
Status: DISCONTINUED | OUTPATIENT
Start: 2023-05-17 | End: 2023-05-17 | Stop reason: SDUPTHER

## 2023-05-17 RX ORDER — SODIUM CHLORIDE 9 MG/ML
INJECTION, SOLUTION INTRAVENOUS PRN
Status: DISCONTINUED | OUTPATIENT
Start: 2023-05-17 | End: 2023-05-17 | Stop reason: HOSPADM

## 2023-05-17 RX ORDER — FENTANYL CITRATE 50 UG/ML
INJECTION, SOLUTION INTRAMUSCULAR; INTRAVENOUS
Status: COMPLETED
Start: 2023-05-17 | End: 2023-05-17

## 2023-05-17 RX ORDER — ONDANSETRON 2 MG/ML
4 INJECTION INTRAMUSCULAR; INTRAVENOUS
Status: DISCONTINUED | OUTPATIENT
Start: 2023-05-17 | End: 2023-05-17 | Stop reason: HOSPADM

## 2023-05-17 RX ORDER — SCOLOPAMINE TRANSDERMAL SYSTEM 1 MG/1
1 PATCH, EXTENDED RELEASE TRANSDERMAL
Status: DISCONTINUED | OUTPATIENT
Start: 2023-05-20 | End: 2023-05-19 | Stop reason: HOSPADM

## 2023-05-17 RX ORDER — ONDANSETRON 4 MG/1
4 TABLET, ORALLY DISINTEGRATING ORAL EVERY 8 HOURS PRN
Status: DISCONTINUED | OUTPATIENT
Start: 2023-05-17 | End: 2023-05-19 | Stop reason: HOSPADM

## 2023-05-17 RX ORDER — SODIUM CHLORIDE 0.9 % (FLUSH) 0.9 %
5-40 SYRINGE (ML) INJECTION PRN
Status: DISCONTINUED | OUTPATIENT
Start: 2023-05-17 | End: 2023-05-19 | Stop reason: HOSPADM

## 2023-05-17 RX ORDER — MIDAZOLAM HYDROCHLORIDE 1 MG/ML
INJECTION INTRAMUSCULAR; INTRAVENOUS PRN
Status: DISCONTINUED | OUTPATIENT
Start: 2023-05-17 | End: 2023-05-17 | Stop reason: SDUPTHER

## 2023-05-17 RX ORDER — SODIUM CHLORIDE 0.9 % (FLUSH) 0.9 %
5-40 SYRINGE (ML) INJECTION EVERY 12 HOURS SCHEDULED
Status: DISCONTINUED | OUTPATIENT
Start: 2023-05-17 | End: 2023-05-17 | Stop reason: HOSPADM

## 2023-05-17 RX ORDER — FENTANYL CITRATE 50 UG/ML
INJECTION, SOLUTION INTRAMUSCULAR; INTRAVENOUS PRN
Status: DISCONTINUED | OUTPATIENT
Start: 2023-05-17 | End: 2023-05-17 | Stop reason: SDUPTHER

## 2023-05-17 RX ORDER — DIPHENHYDRAMINE HYDROCHLORIDE 50 MG/ML
12.5 INJECTION INTRAMUSCULAR; INTRAVENOUS
Status: DISCONTINUED | OUTPATIENT
Start: 2023-05-17 | End: 2023-05-17 | Stop reason: HOSPADM

## 2023-05-17 RX ORDER — CEFAZOLIN SODIUM 1 G/3ML
INJECTION, POWDER, FOR SOLUTION INTRAMUSCULAR; INTRAVENOUS PRN
Status: DISCONTINUED | OUTPATIENT
Start: 2023-05-17 | End: 2023-05-17 | Stop reason: SDUPTHER

## 2023-05-17 RX ORDER — MAGNESIUM HYDROXIDE 1200 MG/15ML
LIQUID ORAL CONTINUOUS PRN
Status: COMPLETED | OUTPATIENT
Start: 2023-05-17 | End: 2023-05-17

## 2023-05-17 RX ORDER — SODIUM CHLORIDE 9 MG/ML
INJECTION INTRAVENOUS PRN
Status: DISCONTINUED | OUTPATIENT
Start: 2023-05-17 | End: 2023-05-17 | Stop reason: SDUPTHER

## 2023-05-17 RX ORDER — SODIUM CHLORIDE 0.9 % (FLUSH) 0.9 %
5-40 SYRINGE (ML) INJECTION PRN
Status: DISCONTINUED | OUTPATIENT
Start: 2023-05-17 | End: 2023-05-17 | Stop reason: HOSPADM

## 2023-05-17 RX ORDER — MORPHINE SULFATE 2 MG/ML
2 INJECTION, SOLUTION INTRAMUSCULAR; INTRAVENOUS
Status: DISCONTINUED | OUTPATIENT
Start: 2023-05-17 | End: 2023-05-19 | Stop reason: HOSPADM

## 2023-05-17 RX ORDER — EPHEDRINE SULFATE/0.9% NACL/PF 50 MG/5 ML
SYRINGE (ML) INTRAVENOUS PRN
Status: DISCONTINUED | OUTPATIENT
Start: 2023-05-17 | End: 2023-05-17 | Stop reason: SDUPTHER

## 2023-05-17 RX ORDER — ROCURONIUM BROMIDE 10 MG/ML
INJECTION, SOLUTION INTRAVENOUS PRN
Status: DISCONTINUED | OUTPATIENT
Start: 2023-05-17 | End: 2023-05-17 | Stop reason: SDUPTHER

## 2023-05-17 RX ORDER — ENOXAPARIN SODIUM 100 MG/ML
30 INJECTION SUBCUTANEOUS 2 TIMES DAILY
Status: DISCONTINUED | OUTPATIENT
Start: 2023-05-17 | End: 2023-05-19 | Stop reason: HOSPADM

## 2023-05-17 RX ORDER — NEOSTIGMINE METHYLSULFATE 5 MG/5 ML
SYRINGE (ML) INTRAVENOUS PRN
Status: DISCONTINUED | OUTPATIENT
Start: 2023-05-17 | End: 2023-05-17 | Stop reason: SDUPTHER

## 2023-05-17 RX ORDER — LIDOCAINE HYDROCHLORIDE 10 MG/ML
INJECTION, SOLUTION EPIDURAL; INFILTRATION; INTRACAUDAL; PERINEURAL PRN
Status: DISCONTINUED | OUTPATIENT
Start: 2023-05-17 | End: 2023-05-17 | Stop reason: SDUPTHER

## 2023-05-17 RX ORDER — PHENYLEPHRINE HCL IN 0.9% NACL 1 MG/10 ML
SYRINGE (ML) INTRAVENOUS PRN
Status: DISCONTINUED | OUTPATIENT
Start: 2023-05-17 | End: 2023-05-17 | Stop reason: SDUPTHER

## 2023-05-17 RX ORDER — ONDANSETRON 2 MG/ML
4 INJECTION INTRAMUSCULAR; INTRAVENOUS EVERY 6 HOURS PRN
Status: DISCONTINUED | OUTPATIENT
Start: 2023-05-17 | End: 2023-05-19 | Stop reason: HOSPADM

## 2023-05-17 RX ORDER — SODIUM CHLORIDE, SODIUM LACTATE, POTASSIUM CHLORIDE, CALCIUM CHLORIDE 600; 310; 30; 20 MG/100ML; MG/100ML; MG/100ML; MG/100ML
INJECTION, SOLUTION INTRAVENOUS CONTINUOUS PRN
Status: DISCONTINUED | OUTPATIENT
Start: 2023-05-17 | End: 2023-05-17 | Stop reason: SDUPTHER

## 2023-05-17 RX ORDER — HYDROCODONE BITARTRATE AND ACETAMINOPHEN 5; 325 MG/1; MG/1
2 TABLET ORAL EVERY 4 HOURS PRN
Status: DISCONTINUED | OUTPATIENT
Start: 2023-05-17 | End: 2023-05-19 | Stop reason: HOSPADM

## 2023-05-17 RX ORDER — MORPHINE SULFATE 2 MG/ML
2 INJECTION, SOLUTION INTRAMUSCULAR; INTRAVENOUS EVERY 5 MIN PRN
Status: DISCONTINUED | OUTPATIENT
Start: 2023-05-17 | End: 2023-05-17 | Stop reason: HOSPADM

## 2023-05-17 RX ORDER — BUPIVACAINE HYDROCHLORIDE 2.5 MG/ML
INJECTION, SOLUTION EPIDURAL; INFILTRATION; INTRACAUDAL PRN
Status: DISCONTINUED | OUTPATIENT
Start: 2023-05-17 | End: 2023-05-17 | Stop reason: ALTCHOICE

## 2023-05-17 RX ORDER — SODIUM CHLORIDE 0.9 % (FLUSH) 0.9 %
5-40 SYRINGE (ML) INJECTION EVERY 12 HOURS SCHEDULED
Status: DISCONTINUED | OUTPATIENT
Start: 2023-05-17 | End: 2023-05-19 | Stop reason: HOSPADM

## 2023-05-17 RX ORDER — SODIUM CHLORIDE 9 MG/ML
INJECTION, SOLUTION INTRAVENOUS PRN
Status: DISCONTINUED | OUTPATIENT
Start: 2023-05-17 | End: 2023-05-19 | Stop reason: HOSPADM

## 2023-05-17 RX ORDER — ONDANSETRON 2 MG/ML
INJECTION INTRAMUSCULAR; INTRAVENOUS PRN
Status: DISCONTINUED | OUTPATIENT
Start: 2023-05-17 | End: 2023-05-17 | Stop reason: SDUPTHER

## 2023-05-17 RX ADMIN — ENOXAPARIN SODIUM 30 MG: 30 INJECTION SUBCUTANEOUS at 21:32

## 2023-05-17 RX ADMIN — LIDOCAINE HYDROCHLORIDE 50 MG: 10 INJECTION, SOLUTION EPIDURAL; INFILTRATION; INTRACAUDAL; PERINEURAL at 09:29

## 2023-05-17 RX ADMIN — SODIUM CHLORIDE, POTASSIUM CHLORIDE, SODIUM LACTATE AND CALCIUM CHLORIDE: 600; 310; 30; 20 INJECTION, SOLUTION INTRAVENOUS at 10:38

## 2023-05-17 RX ADMIN — FENTANYL CITRATE 100 MCG: 0.05 INJECTION, SOLUTION INTRAMUSCULAR; INTRAVENOUS at 09:37

## 2023-05-17 RX ADMIN — FENTANYL CITRATE 25 MCG: 0.05 INJECTION, SOLUTION INTRAMUSCULAR; INTRAVENOUS at 14:51

## 2023-05-17 RX ADMIN — ROCURONIUM BROMIDE 20 MG: 10 INJECTION INTRAVENOUS at 12:46

## 2023-05-17 RX ADMIN — Medication 100 MCG: at 14:27

## 2023-05-17 RX ADMIN — FENTANYL CITRATE 50 MCG: 0.05 INJECTION, SOLUTION INTRAMUSCULAR; INTRAVENOUS at 10:16

## 2023-05-17 RX ADMIN — ROCURONIUM BROMIDE 20 MG: 10 INJECTION INTRAVENOUS at 13:01

## 2023-05-17 RX ADMIN — FENTANYL CITRATE 25 MCG: 0.05 INJECTION, SOLUTION INTRAMUSCULAR; INTRAVENOUS at 14:37

## 2023-05-17 RX ADMIN — Medication 10 MG: at 14:00

## 2023-05-17 RX ADMIN — GLYCOPYRROLATE 0.4 MG: 0.2 INJECTION INTRAMUSCULAR; INTRAVENOUS at 14:44

## 2023-05-17 RX ADMIN — Medication 100 MCG: at 14:05

## 2023-05-17 RX ADMIN — Medication 100 MCG: at 12:17

## 2023-05-17 RX ADMIN — Medication 100 MCG: at 10:25

## 2023-05-17 RX ADMIN — ROCURONIUM BROMIDE 10 MG: 10 INJECTION INTRAVENOUS at 11:08

## 2023-05-17 RX ADMIN — SODIUM CHLORIDE, POTASSIUM CHLORIDE, SODIUM LACTATE AND CALCIUM CHLORIDE: 600; 310; 30; 20 INJECTION, SOLUTION INTRAVENOUS at 12:31

## 2023-05-17 RX ADMIN — SODIUM CHLORIDE, POTASSIUM CHLORIDE, SODIUM LACTATE AND CALCIUM CHLORIDE: 600; 310; 30; 20 INJECTION, SOLUTION INTRAVENOUS at 14:13

## 2023-05-17 RX ADMIN — Medication 100 MCG: at 12:48

## 2023-05-17 RX ADMIN — SODIUM CHLORIDE, PRESERVATIVE FREE 10 ML: 5 INJECTION INTRAVENOUS at 21:33

## 2023-05-17 RX ADMIN — SODIUM CHLORIDE, PRESERVATIVE FREE 10 ML: 5 INJECTION INTRAVENOUS at 19:53

## 2023-05-17 RX ADMIN — ONDANSETRON 4 MG: 2 INJECTION INTRAMUSCULAR; INTRAVENOUS at 14:43

## 2023-05-17 RX ADMIN — SODIUM CHLORIDE 10 ML: 9 INJECTION INTRAMUSCULAR; INTRAVENOUS; SUBCUTANEOUS at 10:11

## 2023-05-17 RX ADMIN — FENTANYL CITRATE 25 MCG: 50 INJECTION, SOLUTION INTRAMUSCULAR; INTRAVENOUS at 15:15

## 2023-05-17 RX ADMIN — MORPHINE SULFATE 2 MG: 2 INJECTION, SOLUTION INTRAMUSCULAR; INTRAVENOUS at 17:00

## 2023-05-17 RX ADMIN — FENTANYL CITRATE 25 MCG: 0.05 INJECTION, SOLUTION INTRAMUSCULAR; INTRAVENOUS at 14:49

## 2023-05-17 RX ADMIN — Medication 100 MCG: at 10:52

## 2023-05-17 RX ADMIN — Medication 2 MG: at 14:43

## 2023-05-17 RX ADMIN — FENTANYL CITRATE 25 MCG: 50 INJECTION, SOLUTION INTRAMUSCULAR; INTRAVENOUS at 16:15

## 2023-05-17 RX ADMIN — ROCURONIUM BROMIDE 20 MG: 10 INJECTION INTRAVENOUS at 10:20

## 2023-05-17 RX ADMIN — MORPHINE SULFATE 2 MG: 2 INJECTION, SOLUTION INTRAMUSCULAR; INTRAVENOUS at 21:56

## 2023-05-17 RX ADMIN — ROCURONIUM BROMIDE 50 MG: 10 INJECTION INTRAVENOUS at 09:29

## 2023-05-17 RX ADMIN — ROCURONIUM BROMIDE 20 MG: 10 INJECTION INTRAVENOUS at 10:53

## 2023-05-17 RX ADMIN — MIDAZOLAM 2 MG: 1 INJECTION INTRAMUSCULAR; INTRAVENOUS at 09:22

## 2023-05-17 RX ADMIN — SODIUM CHLORIDE, POTASSIUM CHLORIDE, SODIUM LACTATE AND CALCIUM CHLORIDE: 600; 310; 30; 20 INJECTION, SOLUTION INTRAVENOUS at 09:23

## 2023-05-17 RX ADMIN — CEFAZOLIN 2 G: 1 INJECTION, POWDER, FOR SOLUTION INTRAMUSCULAR; INTRAVENOUS at 13:52

## 2023-05-17 RX ADMIN — FENTANYL CITRATE 25 MCG: 0.05 INJECTION, SOLUTION INTRAMUSCULAR; INTRAVENOUS at 15:00

## 2023-05-17 RX ADMIN — MORPHINE SULFATE 2 MG: 2 INJECTION, SOLUTION INTRAMUSCULAR; INTRAVENOUS at 17:19

## 2023-05-17 RX ADMIN — PROPOFOL 200 MG: 10 INJECTION, EMULSION INTRAVENOUS at 09:29

## 2023-05-17 RX ADMIN — Medication 100 MCG: at 11:49

## 2023-05-17 RX ADMIN — Medication 2 G: at 10:00

## 2023-05-17 RX ADMIN — FENTANYL CITRATE 25 MCG: 50 INJECTION, SOLUTION INTRAMUSCULAR; INTRAVENOUS at 15:34

## 2023-05-17 RX ADMIN — Medication 10 MG: at 13:47

## 2023-05-17 RX ADMIN — Medication 2000 MG: at 21:32

## 2023-05-17 RX ADMIN — FENTANYL CITRATE 25 MCG: 50 INJECTION, SOLUTION INTRAMUSCULAR; INTRAVENOUS at 16:25

## 2023-05-17 RX ADMIN — Medication 15 MG: at 14:25

## 2023-05-17 RX ADMIN — ONDANSETRON 4 MG: 2 INJECTION INTRAMUSCULAR; INTRAVENOUS at 21:41

## 2023-05-17 RX ADMIN — Medication 100 MCG: at 10:11

## 2023-05-17 RX ADMIN — DEXAMETHASONE SODIUM PHOSPHATE 10 MG: 10 INJECTION INTRAMUSCULAR; INTRAVENOUS at 09:50

## 2023-05-17 RX ADMIN — GLYCOPYRROLATE 0.4 MG: 0.2 INJECTION INTRAMUSCULAR; INTRAVENOUS at 14:43

## 2023-05-17 RX ADMIN — Medication 3 MG: at 14:44

## 2023-05-17 RX ADMIN — ROCURONIUM BROMIDE 10 MG: 10 INJECTION INTRAVENOUS at 13:21

## 2023-05-17 RX ADMIN — MORPHINE SULFATE 2 MG: 2 INJECTION, SOLUTION INTRAMUSCULAR; INTRAVENOUS at 19:52

## 2023-05-17 ASSESSMENT — PAIN SCALES - WONG BAKER
WONGBAKER_NUMERICALRESPONSE: 4
WONGBAKER_NUMERICALRESPONSE: 0
WONGBAKER_NUMERICALRESPONSE: 0
WONGBAKER_NUMERICALRESPONSE: 2

## 2023-05-17 ASSESSMENT — PAIN DESCRIPTION - LOCATION
LOCATION: BACK
LOCATION: BREAST;BACK
LOCATION: BACK
LOCATION: BACK
LOCATION: BREAST;BACK
LOCATION: BACK
LOCATION: BREAST;BACK

## 2023-05-17 ASSESSMENT — PAIN DESCRIPTION - DESCRIPTORS
DESCRIPTORS: ACHING

## 2023-05-17 ASSESSMENT — PAIN - FUNCTIONAL ASSESSMENT: PAIN_FUNCTIONAL_ASSESSMENT: NONE - DENIES PAIN

## 2023-05-17 ASSESSMENT — PAIN SCALES - GENERAL
PAINLEVEL_OUTOF10: 7
PAINLEVEL_OUTOF10: 8
PAINLEVEL_OUTOF10: 7
PAINLEVEL_OUTOF10: 10
PAINLEVEL_OUTOF10: 8
PAINLEVEL_OUTOF10: 10
PAINLEVEL_OUTOF10: 10
PAINLEVEL_OUTOF10: 6
PAINLEVEL_OUTOF10: 7

## 2023-05-17 ASSESSMENT — PAIN DESCRIPTION - ORIENTATION
ORIENTATION: RIGHT

## 2023-05-17 NOTE — ANESTHESIA PRE PROCEDURE
Department of Anesthesiology  Preprocedure Note       Name:  Eusebio Salmon   Age:  62 y.o.  :  1965                                          MRN:  [id number]         Date:  2023      Surgeon: Noy Porter):  Betty Cevallos MD    Procedure: Procedure(s):  BREAST RECONSTRUCTION OF LATISSIMUS MYOCUTANEOUS FLAP, POSSIBLE IMPLANT    Medications prior to admission:   Prior to Admission medications    Medication Sig Start Date End Date Taking? Authorizing Provider   apixaban (ELIQUIS) 5 MG TABS tablet take 1 tablet by mouth twice a day 5/10/23   Ryland Conway MD   Cyanocobalamin ER (RA VITAMIN B-12 TR) 1000 MCG TBCR take 1 tablet by mouth once daily 23   Ryland Conway MD   metoclopramide (REGLAN) 10 MG tablet take 1 tablet by mouth THE MORNING OF SURGERY WITH A SMALL SIP OF WATER  Patient not taking: Reported on 5/3/2023 4/24/23   Historical Provider, MD   lamoTRIgine (LAMICTAL) 100 MG tablet ,             ONE     TABLET      TWICE       DAILY 48   Aron Li MD   Erenumab-aooe 140 MG/ML SOAJ Inject 140 mg into the skin every 30 days 50   Aron Li MD   Ubrogepant (UBRELVY) 100 MG TABS Take 100 mg by mouth 2 times daily as needed (FOR  MIGRAINE) 90   Aron Li MD   cephALEXin (KEFLEX) 250 MG capsule Take one PO QID til gone. Start one day prior to surgery / procedure. Patient not taking: Reported on 5/3/2023 4/5/23   Betty Cevallos MD   famotidine (PEPCID) 20 MG tablet Take 1 tablet by mouth once for 1 dose Take morning of surgery with minimal sip of water.   Patient not taking: Reported on 5/3/2023 4/5/23 5/17/23  Betty Cevallos MD   allopurinol (ZYLOPRIM) 100 MG tablet take 1 tablet by mouth once daily 3/30/23   Suzanna Low MD   sertraline (ZOLOFT) 25 MG tablet take 1 tablet by mouth once daily 23   Historical Provider, MD   topiramate (TOPAMAX) 50 MG tablet ONE  TABLET  TWICE  DAILY 13   Aron Li MD   clonazePAM Gia Jenkins)

## 2023-05-17 NOTE — H&P
History and Physical    Pt Name: Jesus Sinha  MRN: [id number]  YOB: 1965  Date of evaluation: 2023    SUBJECTIVE:   History of Chief Complaint:    Patient presents preprocedure for BREAST RECONSTRUCTION OF LATISSIMUS MYOCUTANEOUS FLAP, POSSIBLE IMPLANT. She has a history of breast cancer 2017, s/p mastectomy and then reconstruction. Patient says that her implant \"moved\" and had to be removed last year. She says that she had wanted to heal for a year, now has been scheduled for procedure today. Past Medical History    has a past medical history of Asthma, Bipolar 1 disorder ([address] Breast cancer ([address] Deep vein thrombosis ([address] DVT of axillary vein, acute right ([address] Eye twitch, Headache(784.0), History of blood transfusion, Hx of blood clots, Hyperlipidemia, Migraine, Obesity, PONV (postoperative nausea and vomiting), Prolonged emergence from general anesthesia, and Sleep apnea. Past Surgical History   has a past surgical history that includes shoulder surgery (Left, , ); Dilation and curettage of uterus (N/A, 10/13/2017); Mastectomy (Right, 10/19/2017); INSERTION / REMOVAL / REPLACEMENT VENOUS ACCESS CATHETER (Left, 2017); INSERTION / REMOVAL / REPLACEMENT VENOUS ACCESS CATHETER (N/A, 2017); pr insj prph ctr vad w/subq port age 11 yr/> (Left, 2018); Catheter Removal (Left, 2019); Breast enhancement surgery (Right, 03/10/2021); Breast surgery (Right, 03/10/2021); Breast surgery (Right, 2021); and  section. Medications  Prior to Admission medications    Medication Sig Start Date End Date Taking?  Authorizing Provider   apixaban (ELIQUIS) 5 MG TABS tablet take 1 tablet by mouth twice a day 5/10/23   Rosalina Taylor MD   Cyanocobalamin ER (RA VITAMIN B-12 TR) 1000 MCG TBCR take 1 tablet by mouth once daily 23   Rosalina Taylor MD   metoclopramide (REGLAN) 10 MG tablet take 1 tablet by mouth THE MORNING OF SURGERY WITH A SMALL SIP OF WATER  Patient

## 2023-05-17 NOTE — BRIEF OP NOTE
Brief Postoperative Note      Patient: Samia Montero  YOB: 1965  MRN: [id number]    Date of Procedure: 5/17/2023    Pre-Op Diagnosis Codes:     * Hx of mastectomy, right [Z90.11]    Post-Op Diagnosis: Same       Procedure(s):  BREAST RECONSTRUCTION WITH LATISSIMUS MYOCUTANEOUS FLAP    Surgeon(s):  Philipp Villanueva MD    Assistant:  First Assistant: Debby Brand    Anesthesia: General    Estimated Blood Loss (mL): less than 50     Complications: None    Specimens:   ID Type Source Tests Collected by Time Destination   A : RESECTION OF RIGHT BREAST SCAR TISSUE  Tissue Breast SURGICAL PATHOLOGY Philipp Villanueva MD 5/17/2023 1316        Implants:  * No implants in log *      Drains:   Closed/Suction Drain Inferior; Lateral;Right Back Bulb (Active)       Urinary Catheter 05/17/23 [place] (Active)       Findings:       Electronically signed by Philipp Villanueva MD on 5/17/2023 at 2:53 PM

## 2023-05-18 LAB — SURGICAL PATHOLOGY REPORT: NORMAL

## 2023-05-18 PROCEDURE — 6370000000 HC RX 637 (ALT 250 FOR IP): Performed by: PLASTIC SURGERY

## 2023-05-18 PROCEDURE — 96372 THER/PROPH/DIAG INJ SC/IM: CPT

## 2023-05-18 PROCEDURE — 2580000003 HC RX 258: Performed by: PLASTIC SURGERY

## 2023-05-18 PROCEDURE — G0378 HOSPITAL OBSERVATION PER HR: HCPCS

## 2023-05-18 PROCEDURE — 6360000002 HC RX W HCPCS: Performed by: PLASTIC SURGERY

## 2023-05-18 RX ADMIN — ONDANSETRON 4 MG: 4 TABLET, ORALLY DISINTEGRATING ORAL at 21:57

## 2023-05-18 RX ADMIN — SODIUM CHLORIDE, PRESERVATIVE FREE 10 ML: 5 INJECTION INTRAVENOUS at 08:10

## 2023-05-18 RX ADMIN — SODIUM CHLORIDE, PRESERVATIVE FREE 10 ML: 5 INJECTION INTRAVENOUS at 21:22

## 2023-05-18 RX ADMIN — ENOXAPARIN SODIUM 30 MG: 30 INJECTION SUBCUTANEOUS at 20:41

## 2023-05-18 RX ADMIN — Medication 2000 MG: at 13:37

## 2023-05-18 RX ADMIN — SODIUM CHLORIDE, PRESERVATIVE FREE 10 ML: 5 INJECTION INTRAVENOUS at 00:00

## 2023-05-18 RX ADMIN — ONDANSETRON 4 MG: 4 TABLET, ORALLY DISINTEGRATING ORAL at 13:37

## 2023-05-18 RX ADMIN — MORPHINE SULFATE 2 MG: 2 INJECTION, SOLUTION INTRAMUSCULAR; INTRAVENOUS at 04:03

## 2023-05-18 RX ADMIN — HYDROCODONE BITARTRATE AND ACETAMINOPHEN 2 TABLET: 5; 325 TABLET ORAL at 20:41

## 2023-05-18 RX ADMIN — MORPHINE SULFATE 2 MG: 2 INJECTION, SOLUTION INTRAMUSCULAR; INTRAVENOUS at 00:00

## 2023-05-18 RX ADMIN — Medication 2000 MG: at 06:02

## 2023-05-18 RX ADMIN — HYDROCODONE BITARTRATE AND ACETAMINOPHEN 2 TABLET: 5; 325 TABLET ORAL at 08:10

## 2023-05-18 RX ADMIN — MORPHINE SULFATE 2 MG: 2 INJECTION, SOLUTION INTRAMUSCULAR; INTRAVENOUS at 02:00

## 2023-05-18 RX ADMIN — Medication 2000 MG: at 21:22

## 2023-05-18 RX ADMIN — MORPHINE SULFATE 2 MG: 2 INJECTION, SOLUTION INTRAMUSCULAR; INTRAVENOUS at 06:01

## 2023-05-18 RX ADMIN — HYDROCODONE BITARTRATE AND ACETAMINOPHEN 2 TABLET: 5; 325 TABLET ORAL at 12:10

## 2023-05-18 RX ADMIN — ENOXAPARIN SODIUM 30 MG: 30 INJECTION SUBCUTANEOUS at 08:10

## 2023-05-18 ASSESSMENT — PAIN DESCRIPTION - LOCATION
LOCATION: BACK;BREAST
LOCATION: BACK;INCISION
LOCATION: BACK;BREAST
LOCATION: BREAST;BACK

## 2023-05-18 ASSESSMENT — PAIN DESCRIPTION - ORIENTATION
ORIENTATION: RIGHT
ORIENTATION: POSTERIOR
ORIENTATION: RIGHT

## 2023-05-18 ASSESSMENT — PAIN SCALES - GENERAL
PAINLEVEL_OUTOF10: 8
PAINLEVEL_OUTOF10: 4
PAINLEVEL_OUTOF10: 8
PAINLEVEL_OUTOF10: 6
PAINLEVEL_OUTOF10: 7
PAINLEVEL_OUTOF10: 8
PAINLEVEL_OUTOF10: 10
PAINLEVEL_OUTOF10: 6
PAINLEVEL_OUTOF10: 6
PAINLEVEL_OUTOF10: 9
PAINLEVEL_OUTOF10: 6
PAINLEVEL_OUTOF10: 8
PAINLEVEL_OUTOF10: 8
PAINLEVEL_OUTOF10: 7
PAINLEVEL_OUTOF10: 5
PAINLEVEL_OUTOF10: 6

## 2023-05-18 ASSESSMENT — PAIN DESCRIPTION - PAIN TYPE: TYPE: SURGICAL PAIN

## 2023-05-18 ASSESSMENT — PAIN DESCRIPTION - DESCRIPTORS: DESCRIPTORS: ACHING

## 2023-05-18 NOTE — ANESTHESIA POSTPROCEDURE EVALUATION
Department of Anesthesiology  Postprocedure Note    Patient: April Thomas  MRN: [id number]  YOB: 1965  Date of evaluation: 5/17/2023      Procedure Summary     Date: 05/17/23 Room / Location: [address]    Anesthesia Start: 6263 Anesthesia Stop: 0085    Procedure: BREAST RECONSTRUCTION WITH LATISSIMUS MYOCUTANEOUS FLAP (Right) Diagnosis:       Hx of mastectomy, right      (HISTORY OF MASTECTOMY RIGHT BREAST)    Surgeons: Columba Teixeira MD Responsible Provider: Angelica Omer MD    Anesthesia Type: general ASA Status: 3          Anesthesia Type: No value filed.     Chris Phase I: Chris Score: 9    Chris Phase II:      POST-OP ANESTHESIA NOTE       BP (!) 132/22   Pulse 95   Temp 97.2 °F (36.2 °C) (Oral)   Resp 18   Ht 5' 6\" (1.676 m)   Wt 283 lb 4.7 oz (128.5 kg)   LMP 02/28/2013 (Exact Date)   SpO2 97%   BMI 45.72 kg/m²    Pain Assessment: Ibarra-Baker FACES  Pain Level: 10         Anesthesia Post Evaluation    Patient location during evaluation: PACU  Patient participation: complete - patient participated  Level of consciousness: awake  Pain score: 10  Airway patency: patent  Nausea & Vomiting: no vomiting and no nausea  Complications: no  Cardiovascular status: hemodynamically stable  Respiratory status: acceptable  Hydration status: stable

## 2023-05-18 NOTE — PROGRESS NOTES
Plastics - [facility]    POD#1    Patient in good spirits. C/O much pain in back, donor area. Back wound looks good, no seroma. Flap on breast pink, warm. Looks good. No infection. VITO drainage serosang. Not ready for discharge today. Recheck tomorrow.

## 2023-05-18 NOTE — CARE COORDINATION
Case Management Assessment  Initial Evaluation    Date/Time of Evaluation: 5/18/2023 8:33 AM  Assessment Completed by: Matt Huitron RN    If patient is discharged prior to next notation, then this note serves as note for discharge by case management. Patient Name: Jaja Marie                   YOB: 1965  Diagnosis: Hx of mastectomy, right [Z90.11]  Status post right breast reconstruction [Z98.890]                   Date / Time: 5/17/2023  6:25 AM    Patient Admission Status: Observation   Readmission Risk (Low < 19, Mod (19-27), High > 27): No data recorded  Current PCP: Sage Corbin MD  PCP verified by CM? Yes    Chart Reviewed: Yes      History Provided by: Patient  Patient Orientation: Alert and Oriented    Patient Cognition: Alert    Hospitalization in the last 30 days (Readmission):  No    If yes, Readmission Assessment in  Navigator will be completed. Advance Directives:      Code Status: Full Code   Patient's Primary Decision Maker is: Legal Next of Kin      Discharge Planning:    Patient lives with: Children Type of Home: Trailer/Mobile Home  Primary Care Giver: Self  Patient Support Systems include: Children (daughter)   Current Financial resources: Medicaid, Medicare  Current community resources:    Current services prior to admission: C-pap            Current DME:              Type of Home Care services:  None    ADLS  Prior functional level: Independent in ADLs/IADLs  Current functional level: Independent in ADLs/IADLs    PT AM-PAC:   /24  OT AM-PAC:   /24    Family can provide assistance at DC: Yes  Would you like Case Management to discuss the discharge plan with any other family members/significant others, and if so, who?  No  Plans to Return to Present Housing: Yes  Other Identified Issues/Barriers to RETURNING to current housing: pain management  Potential Assistance needed at discharge: N/A            Potential DME:    Patient expects to discharge to:

## 2023-05-18 NOTE — PLAN OF CARE
Problem: Discharge Planning  Goal: Discharge to home or other facility with appropriate resources  5/18/2023 1647 by Mark Tejada RN  Outcome: Progressing  5/18/2023 0428 by Pavel Germain RN  Outcome: Progressing     Problem: Pain  Goal: Verbalizes/displays adequate comfort level or baseline comfort level  5/18/2023 1647 by Mark Tejada RN  Outcome: Progressing  5/18/2023 0428 by Pavel Germain RN  Outcome: Progressing     Problem: Safety - Adult  Goal: Free from fall injury  5/18/2023 1647 by Mark Tejada RN  Outcome: Progressing  5/18/2023 0428 by Pavel Germain RN  Outcome: Progressing     Problem: ABCDS Injury Assessment  Goal: Absence of physical injury  5/18/2023 1647 by Mark Tejada RN  Outcome: Progressing  5/18/2023 0428 by Pavel Germain RN  Outcome: Progressing

## 2023-05-19 VITALS
TEMPERATURE: 98.1 F | DIASTOLIC BLOOD PRESSURE: 75 MMHG | BODY MASS INDEX: 45.53 KG/M2 | WEIGHT: 283.29 LBS | SYSTOLIC BLOOD PRESSURE: 143 MMHG | HEART RATE: 90 BPM | RESPIRATION RATE: 16 BRPM | OXYGEN SATURATION: 93 % | HEIGHT: 66 IN

## 2023-05-19 PROCEDURE — 2580000003 HC RX 258: Performed by: PLASTIC SURGERY

## 2023-05-19 PROCEDURE — 6370000000 HC RX 637 (ALT 250 FOR IP): Performed by: PLASTIC SURGERY

## 2023-05-19 PROCEDURE — 96372 THER/PROPH/DIAG INJ SC/IM: CPT

## 2023-05-19 PROCEDURE — 6360000002 HC RX W HCPCS: Performed by: PLASTIC SURGERY

## 2023-05-19 PROCEDURE — G0378 HOSPITAL OBSERVATION PER HR: HCPCS

## 2023-05-19 RX ORDER — HYDROCODONE BITARTRATE AND ACETAMINOPHEN 5; 325 MG/1; MG/1
1 TABLET ORAL EVERY 4 HOURS PRN
Qty: 42 TABLET | Refills: 0 | Status: SHIPPED | OUTPATIENT
Start: 2023-05-19 | End: 2023-05-26

## 2023-05-19 RX ADMIN — SODIUM CHLORIDE, PRESERVATIVE FREE 10 ML: 5 INJECTION INTRAVENOUS at 05:54

## 2023-05-19 RX ADMIN — Medication 2000 MG: at 05:54

## 2023-05-19 RX ADMIN — ENOXAPARIN SODIUM 30 MG: 30 INJECTION SUBCUTANEOUS at 08:14

## 2023-05-19 RX ADMIN — HYDROCODONE BITARTRATE AND ACETAMINOPHEN 2 TABLET: 5; 325 TABLET ORAL at 09:45

## 2023-05-19 RX ADMIN — SODIUM CHLORIDE, PRESERVATIVE FREE 10 ML: 5 INJECTION INTRAVENOUS at 08:15

## 2023-05-19 RX ADMIN — HYDROCODONE BITARTRATE AND ACETAMINOPHEN 2 TABLET: 5; 325 TABLET ORAL at 02:23

## 2023-05-19 ASSESSMENT — PAIN SCALES - GENERAL
PAINLEVEL_OUTOF10: 4
PAINLEVEL_OUTOF10: 7
PAINLEVEL_OUTOF10: 7

## 2023-05-19 ASSESSMENT — PAIN DESCRIPTION - LOCATION: LOCATION: BACK

## 2023-05-19 NOTE — PROGRESS NOTES
Discharge paperwork reviewed with pt. All questions were answered and pt verbalized understanding. Offered wheelchair. Pt discharged safely off unit with all belongings.

## 2023-05-19 NOTE — OP NOTE
[address]                                OPERATIVE REPORT    PATIENT NAME: Jordan Hickman                   :        1965  MED REC NO:   [id number]                             ROOM:       1154  ACCOUNT NO:   [de-identified]                           ADMIT DATE: 2023  PROVIDER:     Ella Huffman    DATE OF PROCEDURE:  2023    PREOPERATIVE DIAGNOSIS:  Status post right mastectomy and failure of  breast implant. POSTOPERATIVE DIAGNOSIS:  Status post right mastectomy and failure of  breast implant. PROCEDURE:  Right breast reconstruction utilizing latissimus denae  myocutaneous flap. ANESTHESIA:  General.    INDICATIONS:  The patient is a 55-year-old female who presents for  secondary breast reconstruction after failed implant reconstruction. The procedures, the risks, the benefits were discussed with her. All  questions answered to her satisfaction. Note that implants were  available in case needed for symmetry and volume. The patient preferred  to be tissue only. NARRATIVE SUMMARY:  The patient was brought to the operating suite,  placed under general anesthetic in supine position. She was then placed  up into the lateral right side up position, appropriately supported and  padded. Note that in the preoperative holding area while sitting  upright, the landmarks of the opposite breasts were transposed over to  the right side and also marked for the bottom of the scapula in the back  and the border of the latissimus. At this point, attention was first  taken to the axilla and a lazy S incision was carried out from the end  of the previous mastectomy scar to the axilla. This was then deepened  with sharp and blunt scissor dissection. The anterior border of the  latissimus was identified and utilizing careful finger dissection, the  axilla was opened up.   The thoracodorsal

## 2023-05-20 NOTE — PROGRESS NOTES
Plastics - [facility]    POD#2      Patient in good spirits today. Pain much improved from yesterday. Moving well. Wounds clean, healing well. No infection. VITO drainage serosanguinous. No seroma. Flap pink and warm. Patient wishing to go home. Will dischsrge today. F/U 1 week.

## 2023-05-22 ENCOUNTER — CARE COORDINATION (OUTPATIENT)
Dept: CASE MANAGEMENT | Age: 58
End: 2023-05-22

## 2023-05-22 NOTE — CARE COORDINATION
[facility] Care Transitions Initial Follow Up Call Care Transitions Outreach Attempt. LPN CC attempted to reach patient for post hospital  discharge. LPN CC left detailed VM with reason for call & contact information. Will attempt outreach another day. Call within 2 business days of discharge: Yes       Patient: Lc Cortez Patient : 1965 MRN: [id number]    Last Discharge [address]       Date Complaint Diagnosis Description Type Department Provider    23  Postoperative pain . .. Admission (Discharged) Pina Wheatley MD              Was this an external facility discharge? No Discharge Facility: [address] [place] [address]    Noted following upcoming appointments from discharge chart review:   [facility] follow up appointment(s):   Future Appointments   Date Time Provider Lee Kelley   2023  1:30 PM Vladimir Martinez MD [facility] None   2023  3:00 PM [facility]   [facility]  2:15 PM [facility]   [facility]  6:18 PM [facility]   2023  1:30 PM [facility]     [address] [place]  follow up appointment(s):  Dr. Drew Adjutant     Call within 2 business days of discharge: Yes    Patient Current Location:  Home: [place] [address]  Patient: Lc Cortez Patient : 1965   MRN: [id number]  Reason for Admission: S/P post Breast Reconstruction with Latissimus Myoccutaneous Flap   Discharge Date: 23 RARS: No data recorded    Last Discharge  Street       Date Complaint Diagnosis Description Type Department Provider    23  Postoperative pain . ..  Admission (Discharged) Pina Wheatley MD              Challenges to be reviewed by the provider         Care Transitions 24 Hour Call    Do you have all of your prescriptions and are they filled?: Yes  Care Transitions Interventions       Follow Up  Future Appointments   Date Time Provider Lee Kelley   2023  1:30

## 2023-05-23 ENCOUNTER — CARE COORDINATION (OUTPATIENT)
Dept: CASE MANAGEMENT | Age: 58
End: 2023-05-23

## 2023-05-23 NOTE — CARE COORDINATION
Care Transitions Outreach Attempt # 2 attempt    Call within 2 business days of discharge: Yes   Attempted to reach patient for transitions of care follow up. Unable to reach patient. LPN CC left detailed VM with reason for call & contact information . Patient: Kalia Zavaleta Patient : 1965 MRN: [id number]    Last Discharge [address]       Date Complaint Diagnosis Description Type Department Provider    23  Postoperative pain . .. Admission (Discharged) Nomi Hednrickson MD              Was this an external facility discharge? No Discharge Facility: [address] [place] [address]    Noted following upcoming appointments from discharge chart review:   [facility] follow up appointment(s):   Future Appointments   Date Time Provider Lee Kelley   2023  2:00 PM Leah Parker MD [facility]   2023  1:30 PM [facility]   2023  3:00 PM Zahida Saba MD [facility]   2023  2:15 PM [facility]   2023  2:46 PM Raphael [facility]   2023  1:30 PM Bernabe Moran MD [facility] DPP     [address] [address] follow up appointment(s):  Dr. Natalya Aponte. [facility]   Care Transitions Nurse  Cell

## 2023-05-24 ENCOUNTER — CARE COORDINATION (OUTPATIENT)
Dept: CASE MANAGEMENT | Age: 58
End: 2023-05-24

## 2023-05-26 ENCOUNTER — OFFICE VISIT (OUTPATIENT)
Dept: SURGERY | Age: 58
End: 2023-05-26

## 2023-05-26 VITALS
HEART RATE: 88 BPM | DIASTOLIC BLOOD PRESSURE: 86 MMHG | HEIGHT: 66 IN | WEIGHT: 260 LBS | SYSTOLIC BLOOD PRESSURE: 136 MMHG | BODY MASS INDEX: 41.78 KG/M2

## 2023-05-26 DIAGNOSIS — Z98.890 STATUS POST RIGHT BREAST RECONSTRUCTION: Primary | ICD-10-CM

## 2023-05-26 PROCEDURE — 99024 POSTOP FOLLOW-UP VISIT: CPT | Performed by: PLASTIC SURGERY

## 2023-05-26 NOTE — PROGRESS NOTES
Subjective:      Patient ID: Galina Linares is a 62 y.o. female. HPI  Patient presents today for a post-op follow up of right breast reconstruction which was completed on 5/17/23. Steri-strips and sutures are intact. Incision is flat, and free of excess redness, swelling, or heat. Area free of drainage or bleeding. Patient c/o minimal pain. Review of Systems    Objective:   Physical Exam  Vitals and nursing note reviewed. Exam conducted with a chaperone present. Chest:      Comments:     Wounds healing nicely, also back donor. Nice contour. Patient very happy with result. VITO serosang; decreasing. Assessment:      Right breast latissimus reconstruction. Plan:      Return in 2 weeks for drain removal.  Can now raise arm and lift up to 10#.           Vera Garibay MD

## 2023-05-26 NOTE — PROGRESS NOTES
Post Op Follow Up    62year old white female presents today for a post-op follow up of right breast reconstruction which was completed on 5/17/23. Steri-strips and sutures are intact. Incision is flat, and free of excess redness, swelling, or heat. Area free of drainage or bleeding. Patient c/o minimal pain. I have reviewed the patient's medical history in detail and updated the computerized patient record.

## 2023-06-01 DIAGNOSIS — C50.919 MALIGNANT NEOPLASM OF FEMALE BREAST, UNSPECIFIED ESTROGEN RECEPTOR STATUS, UNSPECIFIED LATERALITY, UNSPECIFIED SITE OF BREAST (HCC): ICD-10-CM

## 2023-06-09 ENCOUNTER — OFFICE VISIT (OUTPATIENT)
Dept: SURGERY | Age: 58
End: 2023-06-09

## 2023-06-09 VITALS
SYSTOLIC BLOOD PRESSURE: 136 MMHG | HEART RATE: 92 BPM | DIASTOLIC BLOOD PRESSURE: 84 MMHG | OXYGEN SATURATION: 99 % | RESPIRATION RATE: 16 BRPM | WEIGHT: 260 LBS | BODY MASS INDEX: 41.78 KG/M2 | HEIGHT: 66 IN

## 2023-06-09 DIAGNOSIS — Z98.890 STATUS POST RIGHT BREAST RECONSTRUCTION: Primary | ICD-10-CM

## 2023-06-09 PROCEDURE — 99024 POSTOP FOLLOW-UP VISIT: CPT | Performed by: PLASTIC SURGERY

## 2023-06-09 NOTE — PROGRESS NOTES
Subjective:      Patient ID: Tamela Cardozo is a 62 y.o. female. HPI  Patient presents today for a post-op follow up of right breast reconstruction which was completed on 5/17/23. Sutures are intact. Incision is flat, and free of excess redness, swelling, or heat. Area free of drainage or bleeding. Patient c/o minimal pain. Review of Systems    Objective:   Physical Exam  Vitals and nursing note reviewed. Exam conducted with a chaperone present. Chest:      Comments:     Flap looks great, nice shape. Patient very happy. VITO stopped draining days ago. D/C'd  Suture D/C'd. Assessment:      Right breast reconstruction. Plan:      Recheck in 3 months. Gradually increase activity. Back off if pain.           Demetri Carlin MD

## 2023-06-09 NOTE — PROGRESS NOTES
Patient presents today for a post-op follow up of right breast reconstruction which was completed on 5/17/23. Sutures are intact. Incision is flat, and free of excess redness, swelling, or heat. Area free of drainage or bleeding. Patient c/o minimal pain.

## 2023-06-19 DIAGNOSIS — R25.3 MUSCLE TWITCHING: ICD-10-CM

## 2023-06-19 DIAGNOSIS — G43.009 MIGRAINE WITHOUT AURA AND WITHOUT STATUS MIGRAINOSUS, NOT INTRACTABLE: ICD-10-CM

## 2023-06-19 DIAGNOSIS — R41.3 MEMORY PROBLEM: ICD-10-CM

## 2023-06-19 DIAGNOSIS — G47.9 SLEEP DIFFICULTIES: ICD-10-CM

## 2023-06-19 DIAGNOSIS — F32.A ANXIETY AND DEPRESSION: ICD-10-CM

## 2023-06-19 DIAGNOSIS — F41.9 ANXIETY AND DEPRESSION: ICD-10-CM

## 2023-06-19 DIAGNOSIS — F31.9 BIPOLAR 1 DISORDER (HCC): ICD-10-CM

## 2023-06-19 DIAGNOSIS — G51.32 HEMIFACIAL SPASM OF LEFT SIDE OF FACE: ICD-10-CM

## 2023-06-19 RX ORDER — CLONAZEPAM 0.5 MG/1
TABLET ORAL
Qty: 60 TABLET | Refills: 2 | Status: SHIPPED | OUTPATIENT
Start: 2023-06-19 | End: 2023-08-19

## 2023-06-19 RX ORDER — LORATADINE 10 MG/1
TABLET ORAL
Qty: 90 TABLET | Refills: 1 | Status: SHIPPED | OUTPATIENT
Start: 2023-06-19

## 2023-06-19 NOTE — TELEPHONE ENCOUNTER
Benedicto Pruitt called requesting a refill of the below medication which has been pended for you:     Requested Prescriptions     Pending Prescriptions Disp Refills    loratadine (CLARITIN) 10 MG tablet [Pharmacy Med Name: LORATADINE 10 MG TABLET] 90 tablet 1     Sig: take 1 tablet by mouth once daily       Last Appointment Date: 12/20/2022  Next Appointment Date: 8/8/2023    Allergies   Allergen Reactions    Other Other (See Comments)     RUE Precautions, NO BP RUE.       Prednisone Swelling     Whole side of her face swelled when she took it, difficulty breathing

## 2023-07-08 DIAGNOSIS — Z90.11 H/O RIGHT MASTECTOMY: ICD-10-CM

## 2023-07-08 DIAGNOSIS — Z78.0 POSTMENOPAUSAL: ICD-10-CM

## 2023-07-08 DIAGNOSIS — C50.919 MALIGNANT NEOPLASM OF FEMALE BREAST, UNSPECIFIED ESTROGEN RECEPTOR STATUS, UNSPECIFIED LATERALITY, UNSPECIFIED SITE OF BREAST (HCC): ICD-10-CM

## 2023-07-08 DIAGNOSIS — I82.A11 ACUTE DEEP VEIN THROMBOSIS (DVT) OF AXILLARY VEIN OF RIGHT UPPER EXTREMITY (HCC): ICD-10-CM

## 2023-07-14 NOTE — PLAN OF CARE
How Severe Are Your Spot(S)?: mild Problem: Discharge Planning  Goal: Discharge to home or other facility with appropriate resources  5/19/2023 0522 by Derek Nguyen RN  Outcome: Progressing  5/18/2023 1647 by Bree Woo RN  Outcome: Progressing     Problem: Pain  Goal: Verbalizes/displays adequate comfort level or baseline comfort level  5/19/2023 0522 by Derek Nguyen RN  Outcome: Progressing  5/18/2023 1647 by Bree Woo RN  Outcome: Progressing     Problem: Safety - Adult  Goal: Free from fall injury  5/19/2023 0522 by Derek Nguyen RN  Outcome: Progressing  5/18/2023 1647 by Bree Woo RN  Outcome: Progressing     Problem: ABCDS Injury Assessment  Goal: Absence of physical injury  5/19/2023 0522 by Derek Nguyen RN  Outcome: Progressing  5/18/2023 1647 by Bree Woo RN  Outcome: Progressing What Is The Reason For Today's Visit?: Full Body Skin Examination with No Concerns What Is The Reason For Today's Visit? (Being Monitored For X): concerning skin lesions on an annual basis

## 2023-08-01 ENCOUNTER — HOSPITAL ENCOUNTER (OUTPATIENT)
Age: 58
Discharge: HOME | End: 2023-08-01
Payer: MEDICARE

## 2023-08-01 DIAGNOSIS — C50.919 MALIGNANT NEOPLASM OF FEMALE BREAST, UNSPECIFIED ESTROGEN RECEPTOR STATUS, UNSPECIFIED LATERALITY, UNSPECIFIED SITE OF BREAST (HCC): ICD-10-CM

## 2023-08-01 DIAGNOSIS — Z79.811 AROMATASE INHIBITOR USE: ICD-10-CM

## 2023-08-01 DIAGNOSIS — I82.A11 ACUTE DEEP VEIN THROMBOSIS (DVT) OF AXILLARY VEIN OF RIGHT UPPER EXTREMITY (HCC): ICD-10-CM

## 2023-08-01 LAB
ALBUMIN SERPL-MCNC: 4.4 G/DL (ref 3.5–5.2)
ALBUMIN/GLOB SERPL: 1.4 {RATIO} (ref 1–2.5)
ALP SERPL-CCNC: 105 U/L (ref 35–104)
ALT SERPL-CCNC: 12 U/L (ref 5–33)
ANION GAP SERPL CALCULATED.3IONS-SCNC: 11 MMOL/L (ref 9–17)
AST SERPL-CCNC: 14 U/L
BASOPHILS # BLD: 0.05 K/UL (ref 0–0.2)
BASOPHILS NFR BLD: 1 % (ref 0–2)
BILIRUB SERPL-MCNC: 0.2 MG/DL (ref 0.3–1.2)
BUN SERPL-MCNC: 16 MG/DL (ref 6–20)
BUN/CREAT SERPL: 18 (ref 9–20)
CALCIUM SERPL-MCNC: 9.8 MG/DL (ref 8.6–10.4)
CHLORIDE SERPL-SCNC: 101 MMOL/L (ref 98–107)
CO2 SERPL-SCNC: 24 MMOL/L (ref 20–31)
CREAT SERPL-MCNC: 0.9 MG/DL (ref 0.5–0.9)
EOSINOPHIL # BLD: 0.17 K/UL (ref 0–0.44)
EOSINOPHILS RELATIVE PERCENT: 2 % (ref 1–4)
ERYTHROCYTE [DISTWIDTH] IN BLOOD BY AUTOMATED COUNT: 12.6 % (ref 11.8–14.4)
GFR SERPL CREATININE-BSD FRML MDRD: >60 ML/MIN/1.73M2
GLUCOSE SERPL-MCNC: 102 MG/DL (ref 70–99)
HCT VFR BLD AUTO: 39.7 % (ref 36.3–47.1)
HGB BLD-MCNC: 13.3 G/DL (ref 11.9–15.1)
IMM GRANULOCYTES # BLD AUTO: 0.05 K/UL (ref 0–0.3)
IMM GRANULOCYTES NFR BLD: 1 %
LYMPHOCYTES NFR BLD: 2.71 K/UL (ref 1.1–3.7)
LYMPHOCYTES RELATIVE PERCENT: 29 % (ref 24–43)
MCH RBC QN AUTO: 32 PG (ref 25.2–33.5)
MCHC RBC AUTO-ENTMCNC: 33.5 G/DL (ref 25.2–33.5)
MCV RBC AUTO: 95.7 FL (ref 82.6–102.9)
MONOCYTES NFR BLD: 0.81 K/UL (ref 0.1–1.2)
MONOCYTES NFR BLD: 9 % (ref 3–12)
NEUTROPHILS NFR BLD: 58 % (ref 36–65)
NEUTS SEG NFR BLD: 5.56 K/UL (ref 1.5–8.1)
NRBC BLD-RTO: 0 PER 100 WBC
PLATELET # BLD AUTO: 250 K/UL (ref 138–453)
PMV BLD AUTO: 9.6 FL (ref 8.1–13.5)
POTASSIUM SERPL-SCNC: 4.2 MMOL/L (ref 3.7–5.3)
PROT SERPL-MCNC: 7.6 G/DL (ref 6.4–8.3)
RBC # BLD AUTO: 4.15 M/UL (ref 3.95–5.11)
SODIUM SERPL-SCNC: 136 MMOL/L (ref 135–144)
WBC OTHER # BLD: 9.4 K/UL (ref 3.5–11.3)

## 2023-08-01 PROCEDURE — 85025 COMPLETE CBC W/AUTO DIFF WBC: CPT

## 2023-08-01 PROCEDURE — 36415 COLL VENOUS BLD VENIPUNCTURE: CPT

## 2023-08-01 PROCEDURE — 80053 COMPREHEN METABOLIC PANEL: CPT

## 2023-08-03 ENCOUNTER — TELEPHONE (OUTPATIENT)
Dept: NEUROLOGY | Age: 58
End: 2023-08-03

## 2023-08-03 NOTE — TELEPHONE ENCOUNTER
Last Appt:  5/3/2023  Next Appt:   8/14/2023  Med verified in Epic       PATIENT REQUESTING [address] 6/19/23

## 2023-08-14 ENCOUNTER — OFFICE VISIT (OUTPATIENT)
Dept: NEUROLOGY | Age: 58
End: 2023-08-14
Payer: MEDICARE

## 2023-08-14 VITALS
HEART RATE: 70 BPM | DIASTOLIC BLOOD PRESSURE: 80 MMHG | BODY MASS INDEX: 42.43 KG/M2 | HEIGHT: 66 IN | OXYGEN SATURATION: 99 % | WEIGHT: 264 LBS | SYSTOLIC BLOOD PRESSURE: 124 MMHG

## 2023-08-14 DIAGNOSIS — G43.009 MIGRAINE WITHOUT AURA AND WITHOUT STATUS MIGRAINOSUS, NOT INTRACTABLE: Primary | ICD-10-CM

## 2023-08-14 DIAGNOSIS — F41.9 ANXIETY AND DEPRESSION: ICD-10-CM

## 2023-08-14 DIAGNOSIS — Z85.3 HX: BREAST CANCER: ICD-10-CM

## 2023-08-14 DIAGNOSIS — Z99.89 OSA ON CPAP: ICD-10-CM

## 2023-08-14 DIAGNOSIS — R79.9 ABNORMAL FINDING OF BLOOD CHEMISTRY, UNSPECIFIED: ICD-10-CM

## 2023-08-14 DIAGNOSIS — E66.01 MORBID OBESITY WITH BMI OF 40.0-44.9, ADULT (HCC): ICD-10-CM

## 2023-08-14 DIAGNOSIS — C50.919 MALIGNANT NEOPLASM OF FEMALE BREAST, UNSPECIFIED ESTROGEN RECEPTOR STATUS, UNSPECIFIED LATERALITY, UNSPECIFIED SITE OF BREAST (HCC): ICD-10-CM

## 2023-08-14 DIAGNOSIS — E78.2 MIXED HYPERLIPIDEMIA: ICD-10-CM

## 2023-08-14 DIAGNOSIS — F32.A ANXIETY AND DEPRESSION: ICD-10-CM

## 2023-08-14 DIAGNOSIS — F31.9 BIPOLAR 1 DISORDER (HCC): ICD-10-CM

## 2023-08-14 DIAGNOSIS — G47.9 SLEEP DIFFICULTIES: ICD-10-CM

## 2023-08-14 DIAGNOSIS — G47.33 OSA ON CPAP: ICD-10-CM

## 2023-08-14 DIAGNOSIS — G24.5 BLEPHAROSPASM: ICD-10-CM

## 2023-08-14 DIAGNOSIS — Z98.890 H/O BREAST RECONSTRUCTION: ICD-10-CM

## 2023-08-14 DIAGNOSIS — Z90.11 H/O RIGHT MASTECTOMY: ICD-10-CM

## 2023-08-14 DIAGNOSIS — G51.32 HEMIFACIAL SPASM OF LEFT SIDE OF FACE: ICD-10-CM

## 2023-08-14 DIAGNOSIS — I82.A21 CHRONIC DEEP VEIN THROMBOSIS (DVT) OF AXILLARY VEIN OF RIGHT UPPER EXTREMITY (HCC): ICD-10-CM

## 2023-08-14 DIAGNOSIS — R41.3 MEMORY PROBLEM: ICD-10-CM

## 2023-08-14 DIAGNOSIS — G51.39 HEMIFACIAL SPASM, UNSPECIFIED LATERALITY: ICD-10-CM

## 2023-08-14 DIAGNOSIS — R25.3 MUSCLE TWITCHING: ICD-10-CM

## 2023-08-14 PROCEDURE — 1036F TOBACCO NON-USER: CPT | Performed by: PSYCHIATRY & NEUROLOGY

## 2023-08-14 PROCEDURE — 99214 OFFICE O/P EST MOD 30 MIN: CPT | Performed by: PSYCHIATRY & NEUROLOGY

## 2023-08-14 PROCEDURE — 3017F COLORECTAL CA SCREEN DOC REV: CPT | Performed by: PSYCHIATRY & NEUROLOGY

## 2023-08-14 PROCEDURE — G8417 CALC BMI ABV UP PARAM F/U: HCPCS | Performed by: PSYCHIATRY & NEUROLOGY

## 2023-08-14 PROCEDURE — G8427 DOCREV CUR MEDS BY ELIG CLIN: HCPCS | Performed by: PSYCHIATRY & NEUROLOGY

## 2023-08-14 RX ORDER — UBROGEPANT 100 MG/1
100 TABLET ORAL 2 TIMES DAILY PRN
Qty: 30 TABLET | Refills: 1 | Status: SHIPPED | OUTPATIENT
Start: 2023-08-14

## 2023-08-14 RX ORDER — TOPIRAMATE 50 MG/1
TABLET, FILM COATED ORAL
Qty: 60 TABLET | Refills: 5 | Status: SHIPPED | OUTPATIENT
Start: 2023-08-14

## 2023-08-14 RX ORDER — BUTALBITAL, ACETAMINOPHEN AND CAFFEINE 50; 325; 40 MG/1; MG/1; MG/1
TABLET ORAL
Qty: 60 TABLET | Refills: 2 | Status: SHIPPED | OUTPATIENT
Start: 2023-08-14

## 2023-08-14 RX ORDER — CLONAZEPAM 0.5 MG/1
0.5 TABLET ORAL 2 TIMES DAILY
Qty: 60 TABLET | Refills: 2 | Status: SHIPPED | OUTPATIENT
Start: 2023-08-14 | End: 2023-10-14

## 2023-08-14 RX ORDER — PSYLLIUM HUSK 3.4 G/7G
POWDER ORAL
Qty: 30 TABLET | Refills: 3 | Status: SHIPPED | OUTPATIENT
Start: 2023-08-14

## 2023-08-14 ASSESSMENT — ENCOUNTER SYMPTOMS
CONSTIPATION: 0
DIARRHEA: 0
EYE REDNESS: 0
APNEA: 0
CHOKING: 0
SORE THROAT: 0
ABDOMINAL PAIN: 0
SWOLLEN GLANDS: 0
VISUAL CHANGE: 0
BOWEL INCONTINENCE: 0
FACIAL SWELLING: 0
SCALP TENDERNESS: 0
BACK PAIN: 0
WHEEZING: 0
PHOTOPHOBIA: 1
RHINORRHEA: 0
BLOOD IN STOOL: 0
SINUS PRESSURE: 0
EYE ITCHING: 0
VOICE CHANGE: 0
FACIAL SWEATING: 0
EYE WATERING: 1
EYE PAIN: 0
CHEST TIGHTNESS: 0
ABDOMINAL DISTENTION: 0
NAUSEA: 1
VOMITING: 1
BLURRED VISION: 0
EYE DISCHARGE: 0
COLOR CHANGE: 0
COUGH: 0
SHORTNESS OF BREATH: 0
TROUBLE SWALLOWING: 0

## 2023-08-22 ENCOUNTER — OFFICE VISIT (OUTPATIENT)
Dept: ONCOLOGY | Age: 58
End: 2023-08-22
Payer: MEDICARE

## 2023-08-22 VITALS
DIASTOLIC BLOOD PRESSURE: 68 MMHG | BODY MASS INDEX: 42.77 KG/M2 | WEIGHT: 266.13 LBS | HEART RATE: 84 BPM | TEMPERATURE: 98.3 F | HEIGHT: 66 IN | SYSTOLIC BLOOD PRESSURE: 126 MMHG | OXYGEN SATURATION: 96 %

## 2023-08-22 DIAGNOSIS — Z12.31 ENCOUNTER FOR SCREENING MAMMOGRAM FOR MALIGNANT NEOPLASM OF BREAST: ICD-10-CM

## 2023-08-22 DIAGNOSIS — Z90.11 HX OF MASTECTOMY, RIGHT: ICD-10-CM

## 2023-08-22 DIAGNOSIS — Z12.31 SCREENING MAMMOGRAM, ENCOUNTER FOR: ICD-10-CM

## 2023-08-22 DIAGNOSIS — I82.90 VTE (VENOUS THROMBOEMBOLISM): ICD-10-CM

## 2023-08-22 DIAGNOSIS — Z79.01 ANTICOAGULATED: ICD-10-CM

## 2023-08-22 DIAGNOSIS — C50.919 MALIGNANT NEOPLASM OF FEMALE BREAST, UNSPECIFIED ESTROGEN RECEPTOR STATUS, UNSPECIFIED LATERALITY, UNSPECIFIED SITE OF BREAST (HCC): Primary | ICD-10-CM

## 2023-08-22 PROCEDURE — 3017F COLORECTAL CA SCREEN DOC REV: CPT | Performed by: INTERNAL MEDICINE

## 2023-08-22 PROCEDURE — G8417 CALC BMI ABV UP PARAM F/U: HCPCS | Performed by: INTERNAL MEDICINE

## 2023-08-22 PROCEDURE — 99214 OFFICE O/P EST MOD 30 MIN: CPT | Performed by: INTERNAL MEDICINE

## 2023-08-22 PROCEDURE — 1036F TOBACCO NON-USER: CPT | Performed by: INTERNAL MEDICINE

## 2023-08-22 PROCEDURE — G8427 DOCREV CUR MEDS BY ELIG CLIN: HCPCS | Performed by: INTERNAL MEDICINE

## 2023-08-22 RX ORDER — OXCARBAZEPINE 150 MG/1
150 TABLET, FILM COATED ORAL 2 TIMES DAILY
COMMUNITY
Start: 2023-08-01

## 2023-08-22 NOTE — PROGRESS NOTES
Dinhr scheduled scan prior to follow up
LATISSIMUS MYOCUTANEOUS FLAP (Right)    BREAST RECONSTRUCTION Right 5/17/2023    BREAST RECONSTRUCTION WITH LATISSIMUS MYOCUTANEOUS FLAP performed by Morgan Smith MD at [address] Right 03/10/2021    EXCISION SEROMA RIGHT BREAST performed by Morgan Smith MD at [address] Right 11/08/2021    RIGHT BREAST EXPANDER REMOVAL RIGHT IMPLANT PLACEMENT performed by Morgan Smith MD at [address] Left 06/06/2019    PORT REMOVAL performed by Jenni Martinez DO at [address] [facility] N/A 10/13/2017    D & C HYSTEROSCOPY with polypectomy performed by Ofelia Hall MD at [address] / REMOVAL / [address] [facility] Left 11/09/2017    PORT INSERTION performed by Regulo Carmona MD at [address] / REMOVAL / [address] [facility] N/A 11/30/2017    Port Removal & Insertion performed by Regulo Carmona MD at [address] [facility] Right 10/19/2017    Right Breast Mastectomy with  [place] Node Biopsy performed by Regulo Carmona MD at [address] VAD W/SUBQ PORT AGE 5 YR/> Left 03/01/2018    PORT Exchange performed by Regulo Carmona MD at [address] [facility] Left 2014, 2015    x 2 surgeries total; Dr. Grace Keys       Patient Family Social History:     Family History   Problem Relation Age of Onset    Breast Cancer Sister 54    Breast Cancer Maternal Aunt 71    Other Maternal Cousin         Atypical Ductal Hyperplasia     Uterine Cancer Sister 32    Other Sister         breast cyst    Cataracts Mother     Glaucoma Mother     Heart Disease Father     High Blood Pressure Father     High Cholesterol Father     Mental Retardation Father     Diabetes Neg Hx       Social History     Socioeconomic History    Marital status: Single     Spouse name: Not on file    Number of children: Not on file    Years of education: Not on file

## 2023-08-30 NOTE — PROGRESS NOTES
Port site bleeds after de accessed. Site alisha.   Tianna Richmond and set appointment with Dr. Светлана Kaye to evaluate port site tomorrow at 10 AM. Tick testing is negative.

## 2023-09-04 DIAGNOSIS — Z90.11 H/O RIGHT MASTECTOMY: ICD-10-CM

## 2023-09-04 DIAGNOSIS — Z78.0 POSTMENOPAUSAL: ICD-10-CM

## 2023-09-04 DIAGNOSIS — C50.919 MALIGNANT NEOPLASM OF FEMALE BREAST, UNSPECIFIED ESTROGEN RECEPTOR STATUS, UNSPECIFIED LATERALITY, UNSPECIFIED SITE OF BREAST (HCC): ICD-10-CM

## 2023-09-04 DIAGNOSIS — I82.A11 ACUTE DEEP VEIN THROMBOSIS (DVT) OF AXILLARY VEIN OF RIGHT UPPER EXTREMITY (HCC): ICD-10-CM

## 2023-09-05 DIAGNOSIS — J30.2 SEASONAL ALLERGIES: ICD-10-CM

## 2023-09-05 RX ORDER — FLUTICASONE PROPIONATE 50 MCG
SPRAY, SUSPENSION (ML) NASAL
Qty: 16 G | Refills: 3 | Status: SHIPPED | OUTPATIENT
Start: 2023-09-05

## 2023-09-08 ENCOUNTER — OFFICE VISIT (OUTPATIENT)
Dept: SURGERY | Age: 58
End: 2023-09-08

## 2023-09-08 VITALS
HEART RATE: 80 BPM | RESPIRATION RATE: 16 BRPM | WEIGHT: 266 LBS | HEIGHT: 66 IN | OXYGEN SATURATION: 99 % | BODY MASS INDEX: 42.75 KG/M2 | SYSTOLIC BLOOD PRESSURE: 136 MMHG | DIASTOLIC BLOOD PRESSURE: 84 MMHG

## 2023-09-08 DIAGNOSIS — Z98.890 STATUS POST RIGHT BREAST RECONSTRUCTION: Primary | ICD-10-CM

## 2023-09-08 DIAGNOSIS — Z90.11 H/O RIGHT MASTECTOMY: ICD-10-CM

## 2023-09-08 NOTE — PROGRESS NOTES
Patient presents today for a post-op follow up of right breast reconstruction which was completed on 5/17/23. Incision is flat, and free of excess redness, swelling, or heat. Area free of drainage or bleeding. Patient c/o pain or aching in the mid-back area, and she is questioning if this is normal after this type of reconstruction.

## 2023-09-08 NOTE — PROGRESS NOTES
Subjective:      Patient ID: Janessa Naidu is a 62 y.o. female. HPI  Patient presents today for a post-op follow up of right breast reconstruction which was completed on 5/17/23. Incision is flat, and free of excess redness, swelling, or heat. Area free of drainage or bleeding. Patient c/o pain or aching in the mid-back area, and she is questioning if this is normal after this type of reconstruction. Review of Systems    Objective:   Physical Exam  Vitals and nursing note reviewed. Exam conducted with a chaperone present. Chest:      Comments:     Breast has healed in nicely. Good contour, soft, scars soft and fading. Still a bit larger than left breast.  Tender on back along edge of latissimus resection. This is location of her pain. Assessment:      Right breast reconstruction. Plan:      I discussed that the area is still healing. Discomfort should decrease with time. NSAIDs would be best help with discomfort. Recheck in 3 months.           Efrain Goss MD

## 2023-09-29 DIAGNOSIS — G51.32 HEMIFACIAL SPASM OF LEFT SIDE OF FACE: ICD-10-CM

## 2023-09-29 DIAGNOSIS — G47.9 SLEEP DIFFICULTIES: ICD-10-CM

## 2023-09-29 DIAGNOSIS — G43.009 MIGRAINE WITHOUT AURA AND WITHOUT STATUS MIGRAINOSUS, NOT INTRACTABLE: ICD-10-CM

## 2023-09-29 DIAGNOSIS — F32.A ANXIETY AND DEPRESSION: ICD-10-CM

## 2023-09-29 DIAGNOSIS — F31.9 BIPOLAR 1 DISORDER (HCC): ICD-10-CM

## 2023-09-29 DIAGNOSIS — R25.3 MUSCLE TWITCHING: ICD-10-CM

## 2023-09-29 DIAGNOSIS — R41.3 MEMORY PROBLEM: ICD-10-CM

## 2023-09-29 DIAGNOSIS — F41.9 ANXIETY AND DEPRESSION: ICD-10-CM

## 2023-09-29 RX ORDER — CLONAZEPAM 0.5 MG/1
0.5 TABLET ORAL 2 TIMES DAILY
Qty: 60 TABLET | Refills: 2 | Status: SHIPPED | OUTPATIENT
Start: 2023-09-29 | End: 2023-11-29

## 2023-09-29 NOTE — TELEPHONE ENCOUNTER
Last Appt:  8/14/2023  Next Appt:   11/13/2023  Med verified in Epic     Patient needs refill on Klonopin

## 2023-10-10 DIAGNOSIS — R25.3 MUSCLE TWITCHING: ICD-10-CM

## 2023-10-10 NOTE — TELEPHONE ENCOUNTER
Last Appt:  8/14/2023  Next Appt:   11/13/2023  Med verified in Epic     Patient needs refill on Lamotrigine

## 2023-10-11 RX ORDER — LAMOTRIGINE 100 MG/1
TABLET ORAL
Qty: 60 TABLET | Refills: 2 | Status: SHIPPED | OUTPATIENT
Start: 2023-10-11

## 2023-10-14 DIAGNOSIS — E78.2 MIXED HYPERLIPIDEMIA: ICD-10-CM

## 2023-10-16 DIAGNOSIS — C50.919 MALIGNANT NEOPLASM OF FEMALE BREAST, UNSPECIFIED ESTROGEN RECEPTOR STATUS, UNSPECIFIED LATERALITY, UNSPECIFIED SITE OF BREAST (HCC): ICD-10-CM

## 2023-10-16 RX ORDER — PRAVASTATIN SODIUM 40 MG
TABLET ORAL
Qty: 90 TABLET | Refills: 1 | Status: SHIPPED | OUTPATIENT
Start: 2023-10-16

## 2023-10-17 RX ORDER — LANOLIN ALCOHOL/MO/W.PET/CERES
CREAM (GRAM) TOPICAL
Qty: 30 TABLET | Refills: 3 | Status: SHIPPED | OUTPATIENT
Start: 2023-10-17

## 2023-10-20 DIAGNOSIS — M10.9 ACUTE GOUT OF RIGHT FOOT, UNSPECIFIED CAUSE: ICD-10-CM

## 2023-10-20 RX ORDER — ALLOPURINOL 100 MG/1
100 TABLET ORAL DAILY
Qty: 30 TABLET | Refills: 0 | Status: SHIPPED | OUTPATIENT
Start: 2023-10-20

## 2023-10-20 RX ORDER — LORATADINE 10 MG/1
10 TABLET ORAL DAILY
Qty: 90 TABLET | Refills: 0 | Status: SHIPPED | OUTPATIENT
Start: 2023-10-20

## 2023-10-20 NOTE — TELEPHONE ENCOUNTER
[facility] called requesting a refill of the below medication which has been pended for you:     Requested Prescriptions     Pending Prescriptions Disp Refills    allopurinol (ZYLOPRIM) 100 MG tablet 30 tablet 5     Sig: Take 1 tablet by mouth daily    loratadine (CLARITIN) 10 MG tablet 90 tablet 1     Sig: Take 1 tablet by mouth daily       Last Appointment Date: 12/20/2022  Next Appointment Date: 11/2/2023    Allergies   Allergen Reactions    Other Other (See Comments)     RUE Precautions, NO BP RUE.       Prednisone Swelling     Whole side of her face swelled when she took it, difficulty breathing

## 2023-10-30 DIAGNOSIS — I82.A11 ACUTE DEEP VEIN THROMBOSIS (DVT) OF AXILLARY VEIN OF RIGHT UPPER EXTREMITY (HCC): ICD-10-CM

## 2023-10-30 DIAGNOSIS — Z90.11 H/O RIGHT MASTECTOMY: ICD-10-CM

## 2023-10-30 DIAGNOSIS — C50.919 MALIGNANT NEOPLASM OF FEMALE BREAST, UNSPECIFIED ESTROGEN RECEPTOR STATUS, UNSPECIFIED LATERALITY, UNSPECIFIED SITE OF BREAST (HCC): ICD-10-CM

## 2023-10-30 DIAGNOSIS — Z78.0 POSTMENOPAUSAL: ICD-10-CM

## 2023-11-02 ENCOUNTER — TELEPHONE (OUTPATIENT)
Dept: FAMILY MEDICINE CLINIC | Age: 58
End: 2023-11-02

## 2023-11-02 ENCOUNTER — OFFICE VISIT (OUTPATIENT)
Dept: FAMILY MEDICINE CLINIC | Age: 58
End: 2023-11-02
Payer: MEDICARE

## 2023-11-02 VITALS
TEMPERATURE: 99.4 F | OXYGEN SATURATION: 94 % | HEART RATE: 84 BPM | DIASTOLIC BLOOD PRESSURE: 80 MMHG | BODY MASS INDEX: 44.52 KG/M2 | SYSTOLIC BLOOD PRESSURE: 134 MMHG | HEIGHT: 66 IN | WEIGHT: 277 LBS

## 2023-11-02 DIAGNOSIS — Z00.00 MEDICARE ANNUAL WELLNESS VISIT, SUBSEQUENT: Primary | ICD-10-CM

## 2023-11-02 DIAGNOSIS — R07.9 CHEST PAIN ON EXERTION: ICD-10-CM

## 2023-11-02 DIAGNOSIS — R06.02 SHORTNESS OF BREATH ON EXERTION: ICD-10-CM

## 2023-11-02 PROCEDURE — 99213 OFFICE O/P EST LOW 20 MIN: CPT | Performed by: FAMILY MEDICINE

## 2023-11-02 PROCEDURE — G8484 FLU IMMUNIZE NO ADMIN: HCPCS | Performed by: FAMILY MEDICINE

## 2023-11-02 PROCEDURE — G8427 DOCREV CUR MEDS BY ELIG CLIN: HCPCS | Performed by: FAMILY MEDICINE

## 2023-11-02 PROCEDURE — G8417 CALC BMI ABV UP PARAM F/U: HCPCS | Performed by: FAMILY MEDICINE

## 2023-11-02 PROCEDURE — 93010 ELECTROCARDIOGRAM REPORT: CPT | Performed by: FAMILY MEDICINE

## 2023-11-02 PROCEDURE — 3017F COLORECTAL CA SCREEN DOC REV: CPT | Performed by: FAMILY MEDICINE

## 2023-11-02 PROCEDURE — 1036F TOBACCO NON-USER: CPT | Performed by: FAMILY MEDICINE

## 2023-11-02 PROCEDURE — G0439 PPPS, SUBSEQ VISIT: HCPCS | Performed by: FAMILY MEDICINE

## 2023-11-02 SDOH — ECONOMIC STABILITY: FOOD INSECURITY: WITHIN THE PAST 12 MONTHS, THE FOOD YOU BOUGHT JUST DIDN'T LAST AND YOU DIDN'T HAVE MONEY TO GET MORE.: NEVER TRUE

## 2023-11-02 SDOH — ECONOMIC STABILITY: FOOD INSECURITY: WITHIN THE PAST 12 MONTHS, YOU WORRIED THAT YOUR FOOD WOULD RUN OUT BEFORE YOU GOT MONEY TO BUY MORE.: NEVER TRUE

## 2023-11-02 SDOH — ECONOMIC STABILITY: HOUSING INSECURITY
IN THE LAST 12 MONTHS, WAS THERE A TIME WHEN YOU DID NOT HAVE A STEADY PLACE TO SLEEP OR SLEPT IN A SHELTER (INCLUDING NOW)?: NO

## 2023-11-02 SDOH — ECONOMIC STABILITY: INCOME INSECURITY: HOW HARD IS IT FOR YOU TO PAY FOR THE VERY BASICS LIKE FOOD, HOUSING, MEDICAL CARE, AND HEATING?: NOT HARD AT ALL

## 2023-11-02 ASSESSMENT — PATIENT HEALTH QUESTIONNAIRE - PHQ9
SUM OF ALL RESPONSES TO PHQ QUESTIONS 1-9: 3
SUM OF ALL RESPONSES TO PHQ QUESTIONS 1-9: 3
2. FEELING DOWN, DEPRESSED OR HOPELESS: 0
SUM OF ALL RESPONSES TO PHQ QUESTIONS 1-9: 3
6. FEELING BAD ABOUT YOURSELF - OR THAT YOU ARE A FAILURE OR HAVE LET YOURSELF OR YOUR FAMILY DOWN: 1
9. THOUGHTS THAT YOU WOULD BE BETTER OFF DEAD, OR OF HURTING YOURSELF: 0
8. MOVING OR SPEAKING SO SLOWLY THAT OTHER PEOPLE COULD HAVE NOTICED. OR THE OPPOSITE, BEING SO FIGETY OR RESTLESS THAT YOU HAVE BEEN MOVING AROUND A LOT MORE THAN USUAL: 0
4. FEELING TIRED OR HAVING LITTLE ENERGY: 0
SUM OF ALL RESPONSES TO PHQ QUESTIONS 1-9: 3
7. TROUBLE CONCENTRATING ON THINGS, SUCH AS READING THE NEWSPAPER OR WATCHING TELEVISION: 1
SUM OF ALL RESPONSES TO PHQ9 QUESTIONS 1 & 2: 0
5. POOR APPETITE OR OVEREATING: 1
1. LITTLE INTEREST OR PLEASURE IN DOING THINGS: 0
3. TROUBLE FALLING OR STAYING ASLEEP: 0
10. IF YOU CHECKED OFF ANY PROBLEMS, HOW DIFFICULT HAVE THESE PROBLEMS MADE IT FOR YOU TO DO YOUR WORK, TAKE CARE OF THINGS AT HOME, OR GET ALONG WITH OTHER PEOPLE: 1

## 2023-11-02 ASSESSMENT — COLUMBIA-SUICIDE SEVERITY RATING SCALE - C-SSRS
4. HAVE YOU HAD THESE THOUGHTS AND HAD SOME INTENTION OF ACTING ON THEM?: NO
3. HAVE YOU BEEN THINKING ABOUT HOW YOU MIGHT KILL YOURSELF?: NO
7. DID THIS OCCUR IN THE LAST THREE MONTHS: NO
5. HAVE YOU STARTED TO WORK OUT OR WORKED OUT THE DETAILS OF HOW TO KILL YOURSELF? DO YOU INTEND TO CARRY OUT THIS PLAN?: NO

## 2023-11-02 ASSESSMENT — ENCOUNTER SYMPTOMS
ABDOMINAL PAIN: 0
SHORTNESS OF BREATH: 1
WHEEZING: 0
COUGH: 0
CHEST TIGHTNESS: 1
CONSTIPATION: 0
DIARRHEA: 0

## 2023-11-02 ASSESSMENT — LIFESTYLE VARIABLES
HOW MANY STANDARD DRINKS CONTAINING ALCOHOL DO YOU HAVE ON A TYPICAL DAY: PATIENT DOES NOT DRINK
HOW OFTEN DO YOU HAVE A DRINK CONTAINING ALCOHOL: NEVER

## 2023-11-02 NOTE — TELEPHONE ENCOUNTER
Last Appt:  8/14/2023  Next Appt:   11/15/2023  Med verified in Epic     Patient needs refill on Aimovig

## 2023-11-02 NOTE — PATIENT INSTRUCTIONS
Learning About Vision Tests  What are vision tests? The four most common vision tests are visual acuity tests, refraction, visual field tests, and color vision tests. Visual acuity (sharpness) tests  These tests are used: To see if you need glasses or contact lenses. To monitor an eye problem. To check an eye injury. Visual acuity tests are done as part of routine exams. You may also have this test when you get your 's license or apply for some types of jobs. Visual field tests  These tests are used: To check for vision loss in any area of your range of vision. To screen for certain eye diseases. To look for nerve damage after a stroke, head injury, or other problem that could reduce blood flow to the brain. Refraction and color tests  A refraction test is done to find the right prescription for glasses and contact lenses. A color vision test is done to check for color blindness. Color vision is often tested as part of a routine exam. You may also have this test when you apply for a job where recognizing different colors is important, such as , electronics, or the [employer]. How are vision tests done? Visual acuity test   You cover one eye at a time. You read aloud from a wall chart across the room. You read aloud from a small card that you hold in your hand. Refraction   You look into a special device. The device puts lenses of different strengths in front of each eye to see how strong your glasses or contact lenses need to be. Visual field tests   Your doctor may have you look through special machines. Or your doctor may simply have you stare straight ahead while they move a finger into and out of your field of vision. Color vision test   You look at pieces of printed test patterns in various colors. You say what number or symbol you see. Your doctor may have you trace the number or symbol using a pointer. How do these tests feel?   There is very little chance of

## 2023-11-02 NOTE — TELEPHONE ENCOUNTER
Insurance won't cover anything but a treadmill stress test. She won't have to run, she just has to walk. The point of the test is to see what happens while she is exercising. If they have to stop in the middle of the test they will.

## 2023-11-02 NOTE — PROGRESS NOTES
[address]  [address] [facility] [address]  Dept: [phone]  Dept Fax: [phone]  Loc: [phone]    Vilma Forde is a 62 y.o. female who presents today for her medical conditions/complaints as noted below. Vilma Forde is c/o of   Chief Complaint   Patient presents with    Annual Exam    Breathing Problem     Breathing difficulty with activity, doesn't think inhaler helps so concerns it is other than asthma        HPI:     HPI Here today for her [address] and she has been having issues with shortness of breath. Shortness of breath: new; she has been having trouble and she is worried because she has a hx of blood clots. Breathing problems started about a month ago, where she has episodes of finding it difficult to catch her breath. She feels like it has worsened over the past month. She wonders if that is due to her recent increase in appetite and eating. She stress eats and her aunt . She has a strong family history of heart disease. It occurs both with activity and at rest, and is worse with activity. During each episode she also has heart palpitations and an aching chest pain. Each episode lasts about ten minutes, and occurs about 3x per week. The SOB is worse when she is sitting upright versus laying supine. It is worse in the evening. She has asthma but her inhaler has not been helping her. She has been sleeping much more than usual. She doesn't have a cough or chest pain. She denies recent illness, though she has allergies and notes they were worse than normal this year. She denies nasal congestion. She denies headaches, and she gets a monthly shot for her migraines. She has had intermittent constipation for several months that is mildly improved with medication. She has otherwise not had any changes to medications or environment.     Past Medical History:   Diagnosis Date    Asthma     seasonal

## 2023-11-12 DIAGNOSIS — M10.9 ACUTE GOUT OF RIGHT FOOT, UNSPECIFIED CAUSE: ICD-10-CM

## 2023-11-13 RX ORDER — ALLOPURINOL 100 MG/1
100 TABLET ORAL DAILY
Qty: 30 TABLET | Refills: 11 | Status: SHIPPED | OUTPATIENT
Start: 2023-11-13

## 2023-11-13 NOTE — TELEPHONE ENCOUNTER
Last Appt:  11/2/2023  Next Appt:   Visit date not found  Med verified in [address] [place] [address]

## 2023-11-15 ENCOUNTER — OFFICE VISIT (OUTPATIENT)
Dept: NEUROLOGY | Age: 58
End: 2023-11-15
Payer: MEDICARE

## 2023-11-15 VITALS
RESPIRATION RATE: 16 BRPM | OXYGEN SATURATION: 97 % | WEIGHT: 274 LBS | SYSTOLIC BLOOD PRESSURE: 126 MMHG | HEART RATE: 70 BPM | DIASTOLIC BLOOD PRESSURE: 76 MMHG | BODY MASS INDEX: 44.22 KG/M2

## 2023-11-15 DIAGNOSIS — Z90.11 H/O RIGHT MASTECTOMY: ICD-10-CM

## 2023-11-15 DIAGNOSIS — G47.9 SLEEP DIFFICULTIES: ICD-10-CM

## 2023-11-15 DIAGNOSIS — G51.39 HEMIFACIAL SPASM, UNSPECIFIED LATERALITY: ICD-10-CM

## 2023-11-15 DIAGNOSIS — E66.01 MORBID OBESITY WITH BMI OF 40.0-44.9, ADULT (HCC): ICD-10-CM

## 2023-11-15 DIAGNOSIS — G43.009 MIGRAINE WITHOUT AURA AND WITHOUT STATUS MIGRAINOSUS, NOT INTRACTABLE: ICD-10-CM

## 2023-11-15 DIAGNOSIS — R25.3 MUSCLE TWITCHING: ICD-10-CM

## 2023-11-15 DIAGNOSIS — F41.9 ANXIETY AND DEPRESSION: ICD-10-CM

## 2023-11-15 DIAGNOSIS — G24.5 BLEPHAROSPASM: Primary | ICD-10-CM

## 2023-11-15 DIAGNOSIS — G51.32 HEMIFACIAL SPASM OF LEFT SIDE OF FACE: ICD-10-CM

## 2023-11-15 DIAGNOSIS — R41.3 MEMORY PROBLEM: ICD-10-CM

## 2023-11-15 DIAGNOSIS — F31.9 BIPOLAR 1 DISORDER (HCC): ICD-10-CM

## 2023-11-15 DIAGNOSIS — G47.33 OSA ON CPAP: ICD-10-CM

## 2023-11-15 DIAGNOSIS — F32.A ANXIETY AND DEPRESSION: ICD-10-CM

## 2023-11-15 PROCEDURE — G8484 FLU IMMUNIZE NO ADMIN: HCPCS | Performed by: PSYCHIATRY & NEUROLOGY

## 2023-11-15 PROCEDURE — 3017F COLORECTAL CA SCREEN DOC REV: CPT | Performed by: PSYCHIATRY & NEUROLOGY

## 2023-11-15 PROCEDURE — 99214 OFFICE O/P EST MOD 30 MIN: CPT | Performed by: PSYCHIATRY & NEUROLOGY

## 2023-11-15 PROCEDURE — 1036F TOBACCO NON-USER: CPT | Performed by: PSYCHIATRY & NEUROLOGY

## 2023-11-15 PROCEDURE — G8417 CALC BMI ABV UP PARAM F/U: HCPCS | Performed by: PSYCHIATRY & NEUROLOGY

## 2023-11-15 PROCEDURE — G8427 DOCREV CUR MEDS BY ELIG CLIN: HCPCS | Performed by: PSYCHIATRY & NEUROLOGY

## 2023-11-15 RX ORDER — CLONAZEPAM 0.5 MG/1
TABLET ORAL
Qty: 90 TABLET | Refills: 2 | Status: SHIPPED | OUTPATIENT
Start: 2023-11-15 | End: 2024-02-16

## 2023-11-15 RX ORDER — TOPIRAMATE 50 MG/1
TABLET, FILM COATED ORAL
Qty: 60 TABLET | Refills: 5 | Status: SHIPPED | OUTPATIENT
Start: 2023-11-15

## 2023-11-15 RX ORDER — LAMOTRIGINE 100 MG/1
TABLET ORAL
Qty: 60 TABLET | Refills: 2 | Status: SHIPPED | OUTPATIENT
Start: 2023-11-15

## 2023-11-15 RX ORDER — UBROGEPANT 100 MG/1
100 TABLET ORAL 2 TIMES DAILY PRN
Qty: 30 TABLET | Refills: 1 | Status: SHIPPED | OUTPATIENT
Start: 2023-11-15

## 2023-11-15 RX ORDER — BUTALBITAL, ACETAMINOPHEN AND CAFFEINE 50; 325; 40 MG/1; MG/1; MG/1
TABLET ORAL
Qty: 60 TABLET | Refills: 2 | Status: SHIPPED | OUTPATIENT
Start: 2023-11-15

## 2023-11-15 ASSESSMENT — ENCOUNTER SYMPTOMS
SCALP TENDERNESS: 0
EYE PAIN: 0
BOWEL INCONTINENCE: 0
FACIAL SWELLING: 0
SINUS PRESSURE: 0
FACIAL SWEATING: 0
VOMITING: 1
COLOR CHANGE: 0
PHOTOPHOBIA: 1
SORE THROAT: 0
ABDOMINAL DISTENTION: 0
COUGH: 0
EYE WATERING: 1
SHORTNESS OF BREATH: 0
EYE ITCHING: 0
EYE DISCHARGE: 0
CONSTIPATION: 0
EYE REDNESS: 0
SWOLLEN GLANDS: 0
NAUSEA: 1
CHEST TIGHTNESS: 0
TROUBLE SWALLOWING: 0
RHINORRHEA: 0
VOICE CHANGE: 0
BLURRED VISION: 0
APNEA: 0
BLOOD IN STOOL: 0
CHOKING: 0
BACK PAIN: 0
WHEEZING: 0
ABDOMINAL PAIN: 0
VISUAL CHANGE: 0
DIARRHEA: 0

## 2023-11-15 ASSESSMENT — PATIENT HEALTH QUESTIONNAIRE - PHQ9
SUM OF ALL RESPONSES TO PHQ QUESTIONS 1-9: 0
2. FEELING DOWN, DEPRESSED OR HOPELESS: 0
1. LITTLE INTEREST OR PLEASURE IN DOING THINGS: 0
SUM OF ALL RESPONSES TO PHQ9 QUESTIONS 1 & 2: 0

## 2023-11-15 NOTE — PROGRESS NOTES
REQUESTED   REFILLS  FOR  HER  MULTIPLE  MEDICATIONS                                        22)        VARIOUS  RISK   FACTORS   WERE  REVIEWED   AND   DISCUSSED. PATIENT   HAS  MULTIPLE   MEDICAL, MENTAL HEALTH                            NEUROLOGICAL   PROBLEMS .                                PATIENT  MANAGEMENT  IS  CHALLENGING                                                                                      PRECIPITATING  FACTORS: including  fever/infection, exertion/relaxation, position change, stress,     weather change, medications/alcohol, time of day/darkness/light  Are    Absent                                                           MODIFYING  FACTORS:  fever/infection, exertion/relaxation, position change, stress, weather change,     medications/alcohol, time of day/darkness/light    Are  absent             Patient   Indicates   The  Presence   And  The  Absence  Of  The  Following  Associated  And       Additional  Neurological    Symptoms:                                Balance  And coordination problems  absent           Gait problems     absent            Headaches      absent              Migraines           present           Memory problems        Present             Confusion        absent            Paresthesia numbness          absent           Seizures  And  Starring  Episodes           absent           Syncope,  Near  syncopal episodes         absent           Speech problems           absent             Swallowing  Problems      absent            Dizziness,  Light headedness           absent                        Vertigo        absent             Generalized   Weakness    absent              focal  Weakness     absent             Tremors         absent              Sleep  Problems     absent             History  Of   Recent   Head  Injury     absent             History  Of   Recent  TIA     absent             History  Of   Recent    Stroke     absent

## 2023-11-29 DIAGNOSIS — C50.919 MALIGNANT NEOPLASM OF FEMALE BREAST, UNSPECIFIED ESTROGEN RECEPTOR STATUS, UNSPECIFIED LATERALITY, UNSPECIFIED SITE OF BREAST (HCC): ICD-10-CM

## 2023-12-08 ENCOUNTER — OFFICE VISIT (OUTPATIENT)
Dept: SURGERY | Age: 58
End: 2023-12-08
Payer: MEDICARE

## 2023-12-08 VITALS
WEIGHT: 276 LBS | BODY MASS INDEX: 44.36 KG/M2 | DIASTOLIC BLOOD PRESSURE: 62 MMHG | RESPIRATION RATE: 16 BRPM | OXYGEN SATURATION: 99 % | HEIGHT: 66 IN | SYSTOLIC BLOOD PRESSURE: 108 MMHG | HEART RATE: 76 BPM

## 2023-12-08 DIAGNOSIS — Z90.11 H/O RIGHT MASTECTOMY: ICD-10-CM

## 2023-12-08 DIAGNOSIS — Z98.890 STATUS POST RIGHT BREAST RECONSTRUCTION: Primary | ICD-10-CM

## 2023-12-08 PROCEDURE — 99214 OFFICE O/P EST MOD 30 MIN: CPT | Performed by: PLASTIC SURGERY

## 2023-12-08 NOTE — PROGRESS NOTES
Patient presents today for a post-op follow up of right breast reconstruction which was completed on 5/17/23. Incision is flat, and free of excess redness, swelling, or heat. Area free of drainage or bleeding. Patient still has occasional pain or aching in the mid-back area, but this is not bothersome and was told at last OV that this could be normal for a year.

## 2023-12-08 NOTE — PROGRESS NOTES
Subjective:      Patient ID: Sade Ball is a 62 y.o. female. HPI  Patient presents today for a post-op follow up of right breast reconstruction which was completed on 5/17/23. Incision is flat, and free of excess redness, swelling, or heat. Area free of drainage or bleeding. Patient still has occasional pain or aching in the mid-back area, but this is not bothersome and was told at last OV that this could be normal for a year. Review of Systems    Objective:   Physical Exam  Vitals and nursing note reviewed. Exam conducted with a chaperone present. Chest:      Comments:     Right reconstruction is soft, no masses, no axillary adenopathy palpated. Left breast is soft, no masses, no axillary adenopathy palpated. Still with soreness at back donor. Assessment:      Right breast reconstruction with flap. Plan:      All is doing well. She is considering nipple reconstruction. Recheck in 6 months. We will discuss nipple reconstruction at that time.            Renetta Ye MD

## 2023-12-11 RX ORDER — CETIRIZINE HYDROCHLORIDE 10 MG/1
TABLET ORAL
Qty: 90 TABLET | Refills: 1 | Status: SHIPPED | OUTPATIENT
Start: 2023-12-11

## 2024-01-22 RX ORDER — UBROGEPANT 100 MG/1
100 TABLET ORAL 2 TIMES DAILY PRN
Qty: 30 TABLET | Refills: 1 | Status: SHIPPED | OUTPATIENT
Start: 2024-01-22

## 2024-01-22 NOTE — TELEPHONE ENCOUNTER
Sonia called requesting a refill of the below medication which has been pended for you:   Left message for patient to return call to schedule 2 month follow up for shortness of breath.     Requested Prescriptions     Pending Prescriptions Disp Refills    loratadine (CLARITIN) 10 MG tablet [Pharmacy Med Name: LORATADINE 10 MG TABLET] 90 tablet 0     Sig: take 1 tablet by mouth once daily       Last Appointment Date: 11/2/2023  Next Appointment Date: Visit date not found    Allergies   Allergen Reactions    Other Other (See Comments)     RUE Precautions, NO BP RUE.      Prednisone Swelling     Whole side of her face swelled when she took it, difficulty breathing

## 2024-01-22 NOTE — TELEPHONE ENCOUNTER
Last Appt:  11/15/2023  Next Appt:   2/15/2024  Med verified in Epic       Patient needs refill on Ubrelvy

## 2024-01-25 RX ORDER — LORATADINE 10 MG/1
10 TABLET ORAL DAILY
Qty: 90 TABLET | Refills: 0 | OUTPATIENT
Start: 2024-01-25

## 2024-01-25 NOTE — TELEPHONE ENCOUNTER
Sonia called requesting a refill of the below medication which has been pended for you:     Requested Prescriptions     Refused Prescriptions Disp Refills    loratadine (CLARITIN) 10 MG tablet [Pharmacy Med Name: LORATADINE 10 MG TABLET] 90 tablet 0     Sig: take 1 tablet by mouth once daily     Refused By: FRANCK RENEE     Reason for Refusal: Patient needs an appointment       Last Appointment Date: 11/2/2023  Next Appointment Date: Visit date not found    Allergies   Allergen Reactions    Other Other (See Comments)     RUE Precautions, NO BP RUE.      Prednisone Swelling     Whole side of her face swelled when she took it, difficulty breathing

## 2024-01-30 ENCOUNTER — HOSPITAL ENCOUNTER (OUTPATIENT)
Dept: MAMMOGRAPHY | Age: 59
Discharge: HOME | End: 2024-02-01
Attending: INTERNAL MEDICINE
Payer: MEDICARE

## 2024-01-30 VITALS — WEIGHT: 270 LBS | BODY MASS INDEX: 43.39 KG/M2 | HEIGHT: 66 IN

## 2024-01-30 DIAGNOSIS — Z12.31 SCREENING MAMMOGRAM, ENCOUNTER FOR: ICD-10-CM

## 2024-01-30 PROCEDURE — 77063 BREAST TOMOSYNTHESIS BI: CPT

## 2024-01-30 NOTE — TELEPHONE ENCOUNTER
Last Appt:  11/15/2023  Next Appt:   2/15/2024  Med verified in Epic     Patient needs refill on Aimovig

## 2024-02-06 ENCOUNTER — OFFICE VISIT (OUTPATIENT)
Dept: ONCOLOGY | Age: 59
End: 2024-02-06
Payer: MEDICARE

## 2024-02-06 VITALS
BODY MASS INDEX: 44.77 KG/M2 | HEART RATE: 84 BPM | OXYGEN SATURATION: 92 % | TEMPERATURE: 97.6 F | WEIGHT: 278.6 LBS | SYSTOLIC BLOOD PRESSURE: 110 MMHG | DIASTOLIC BLOOD PRESSURE: 60 MMHG | HEIGHT: 66 IN | RESPIRATION RATE: 18 BRPM

## 2024-02-06 DIAGNOSIS — Z12.31 ENCOUNTER FOR SCREENING MAMMOGRAM FOR MALIGNANT NEOPLASM OF BREAST: ICD-10-CM

## 2024-02-06 DIAGNOSIS — C50.919 MALIGNANT NEOPLASM OF FEMALE BREAST, UNSPECIFIED ESTROGEN RECEPTOR STATUS, UNSPECIFIED LATERALITY, UNSPECIFIED SITE OF BREAST (HCC): Primary | ICD-10-CM

## 2024-02-06 DIAGNOSIS — Z12.31 SCREENING MAMMOGRAM, ENCOUNTER FOR: ICD-10-CM

## 2024-02-06 DIAGNOSIS — I82.A11 ACUTE DEEP VEIN THROMBOSIS (DVT) OF AXILLARY VEIN OF RIGHT UPPER EXTREMITY (HCC): ICD-10-CM

## 2024-02-06 PROCEDURE — G8484 FLU IMMUNIZE NO ADMIN: HCPCS | Performed by: INTERNAL MEDICINE

## 2024-02-06 PROCEDURE — G8427 DOCREV CUR MEDS BY ELIG CLIN: HCPCS | Performed by: INTERNAL MEDICINE

## 2024-02-06 PROCEDURE — 1036F TOBACCO NON-USER: CPT | Performed by: INTERNAL MEDICINE

## 2024-02-06 PROCEDURE — G8417 CALC BMI ABV UP PARAM F/U: HCPCS | Performed by: INTERNAL MEDICINE

## 2024-02-06 PROCEDURE — 99214 OFFICE O/P EST MOD 30 MIN: CPT | Performed by: INTERNAL MEDICINE

## 2024-02-06 PROCEDURE — 3017F COLORECTAL CA SCREEN DOC REV: CPT | Performed by: INTERNAL MEDICINE

## 2024-02-06 NOTE — PROGRESS NOTES
Mammogram scheduled prior to follow up  
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  High-grade DCIS was also noted.  One sentinel lymph node was sampled which was negative for metastasis    Patient started on neoadjuvant chemotherapy using TCHP regimen.    Cycle #1, day #1 on 11/13/17    Patient underwent removal of port with replacement due to port malfunction on 11/30/17    Patient completed 6 cycles of TCHP and continued maintenance Herceptin.    Started patient on anastrozole along with calcium and vitamin D in May 2018.  Switched anti-hormonal therapy to Femara in June 2018 due to arthralgia    Herceptin last cycle completed 11/29/18    Interim History:  Patient presents to the clinic for follow-up visit and to discuss results of her lab workup and mammogram.  Overall patient continues to do well without any signs or symptoms of breast cancer recurrence.  Denies fever or chills.    During this visit patient's allergy, social, medical, surgical history and medications were reviewed and updated.    Past Medical History:   Past Medical History:   Diagnosis Date    Asthma     seasonal    Bipolar 1 disorder (HCC)     Breast cancer (HCC) 2017    right side     Deep vein thrombosis (HCC)     DVT of axillary vein, acute right (HCC)     Eye twitch 2019    Headache(784.0)     History of blood transfusion     Hx antineoplastic chemo     Hx of blood clots 03/2020    PE    Hyperlipidemia     Migraine     Obesity     PONV (postoperative nausea and vomiting)     Prolonged emergence from general anesthesia     Sleep apnea     uses CPAP     Past Surgical History:     Past Surgical History:   Procedure Laterality Date    BREAST ENHANCEMENT SURGERY Right 03/10/2021    BREAST RECONSTRUCTION WITH TISSUE EXPANDER INSERTION performed by Miley Reeder MD at [facility] OR    BREAST RECONSTRUCTION Right 05/17/2023    BREAST

## 2024-02-13 ENCOUNTER — TELEPHONE (OUTPATIENT)
Dept: FAMILY MEDICINE CLINIC | Age: 59
End: 2024-02-13

## 2024-02-13 NOTE — TELEPHONE ENCOUNTER
Maranda calling to let Dr. Michelle know that they have reached out to patient 3 times to get the stress test scheduled but have not gotten an answer

## 2024-02-21 DIAGNOSIS — Z90.11 H/O RIGHT MASTECTOMY: ICD-10-CM

## 2024-02-21 DIAGNOSIS — I82.A11 ACUTE DEEP VEIN THROMBOSIS (DVT) OF AXILLARY VEIN OF RIGHT UPPER EXTREMITY (HCC): ICD-10-CM

## 2024-02-21 DIAGNOSIS — Z78.0 POSTMENOPAUSAL: ICD-10-CM

## 2024-02-21 DIAGNOSIS — C50.919 MALIGNANT NEOPLASM OF FEMALE BREAST, UNSPECIFIED ESTROGEN RECEPTOR STATUS, UNSPECIFIED LATERALITY, UNSPECIFIED SITE OF BREAST (HCC): ICD-10-CM

## 2024-03-12 DIAGNOSIS — C50.919 MALIGNANT NEOPLASM OF FEMALE BREAST, UNSPECIFIED ESTROGEN RECEPTOR STATUS, UNSPECIFIED LATERALITY, UNSPECIFIED SITE OF BREAST (HCC): ICD-10-CM

## 2024-03-13 RX ORDER — LANOLIN ALCOHOL/MO/W.PET/CERES
1000 CREAM (GRAM) TOPICAL DAILY
Qty: 30 TABLET | Refills: 5 | Status: SHIPPED | OUTPATIENT
Start: 2024-03-13

## 2024-03-31 DIAGNOSIS — E78.2 MIXED HYPERLIPIDEMIA: ICD-10-CM

## 2024-04-01 DIAGNOSIS — G43.009 MIGRAINE WITHOUT AURA AND WITHOUT STATUS MIGRAINOSUS, NOT INTRACTABLE: ICD-10-CM

## 2024-04-01 DIAGNOSIS — F31.9 BIPOLAR 1 DISORDER (HCC): ICD-10-CM

## 2024-04-01 DIAGNOSIS — R25.3 MUSCLE TWITCHING: ICD-10-CM

## 2024-04-01 DIAGNOSIS — F32.A ANXIETY AND DEPRESSION: ICD-10-CM

## 2024-04-01 DIAGNOSIS — R41.3 MEMORY PROBLEM: ICD-10-CM

## 2024-04-01 DIAGNOSIS — F41.9 ANXIETY AND DEPRESSION: ICD-10-CM

## 2024-04-01 DIAGNOSIS — G47.9 SLEEP DIFFICULTIES: ICD-10-CM

## 2024-04-01 DIAGNOSIS — G51.32 HEMIFACIAL SPASM OF LEFT SIDE OF FACE: ICD-10-CM

## 2024-04-01 RX ORDER — CLONAZEPAM 0.5 MG/1
TABLET ORAL
Qty: 90 TABLET | Refills: 0 | Status: SHIPPED | OUTPATIENT
Start: 2024-04-01 | End: 2024-07-03

## 2024-04-01 NOTE — TELEPHONE ENCOUNTER
Last Appt:  11/15/2023  Next Appt:   5/15/2024  Med verified in Epic     Patient is requesting a refill of klonopin    OARRS 4/1/24    [facility]

## 2024-04-01 NOTE — TELEPHONE ENCOUNTER
Sonia called requesting a refill of the below medication which has been pended for you:   Left message for patient to return call to schedule follow up  Requested Prescriptions     Pending Prescriptions Disp Refills    pravastatin (PRAVACHOL) 40 MG tablet [Pharmacy Med Name: PRAVASTATIN SODIUM 40 MG TAB] 90 tablet 1     Sig: take 1 tablet by mouth once daily       Last Appointment Date: 11/2/2023  Next Appointment Date: Visit date not found    Allergies   Allergen Reactions    Other Other (See Comments)     RUE Precautions, NO BP RUE.      Prednisone Swelling     Whole side of her face swelled when she took it, difficulty breathing

## 2024-04-02 DIAGNOSIS — R25.3 MUSCLE TWITCHING: ICD-10-CM

## 2024-04-02 RX ORDER — LAMOTRIGINE 100 MG/1
TABLET ORAL
Qty: 60 TABLET | Refills: 2 | Status: SHIPPED | OUTPATIENT
Start: 2024-04-02

## 2024-04-02 RX ORDER — PRAVASTATIN SODIUM 40 MG
TABLET ORAL
Qty: 90 TABLET | Refills: 1 | OUTPATIENT
Start: 2024-04-02

## 2024-04-02 NOTE — TELEPHONE ENCOUNTER
Pt calling stating she can't come in until she gets her truck fixed, advised pt we could schedule out a week or two and then [facility] could possibly fill her meds but pt refuses to schedule as she's not sure when truck will get fixed.

## 2024-04-02 NOTE — TELEPHONE ENCOUNTER
Last Appt:  11/15/2023  Next Appt:   5/15/2024  Med verified in Epic       Patient is requesting a refill for lamictal    [facility]

## 2024-04-11 NOTE — TELEPHONE ENCOUNTER
Last Appt:  10/17/2019  Next Appt:   2/18/2020  Med verified in Epic    Pt needs refill on Cyanocobalamin
yes

## 2024-04-15 DIAGNOSIS — E78.2 MIXED HYPERLIPIDEMIA: ICD-10-CM

## 2024-04-15 NOTE — TELEPHONE ENCOUNTER
Sonia called requesting a refill of the below medication which has been pended for you:   Left message for patient to return call to schedule follow up office visit   Requested Prescriptions     Pending Prescriptions Disp Refills    pravastatin (PRAVACHOL) 40 MG tablet 90 tablet 1     Sig: Take 1 tablet by mouth daily       Last Appointment Date: 11/2/2023  Next Appointment Date: Visit date not found    Allergies   Allergen Reactions    Other Other (See Comments)     RUE Precautions, NO BP RUE.      Prednisone Swelling     Whole side of her face swelled when she took it, difficulty breathing

## 2024-04-18 RX ORDER — PRAVASTATIN SODIUM 40 MG
40 TABLET ORAL DAILY
Qty: 90 TABLET | Refills: 1 | OUTPATIENT
Start: 2024-04-18

## 2024-04-18 NOTE — TELEPHONE ENCOUNTER
Sonia called requesting a refill of the below medication which has been pended for you:     Requested Prescriptions     Refused Prescriptions Disp Refills    pravastatin (PRAVACHOL) 40 MG tablet 90 tablet 1     Sig: Take 1 tablet by mouth daily     Refused By: FRANCK RENEE     Reason for Refusal: Patient needs an appointment       Last Appointment Date: 11/2/2023  Next Appointment Date: Visit date not found    Allergies   Allergen Reactions    Other Other (See Comments)     RUE Precautions, NO BP RUE.      Prednisone Swelling     Whole side of her face swelled when she took it, difficulty breathing

## 2024-05-10 DIAGNOSIS — E78.2 MIXED HYPERLIPIDEMIA: ICD-10-CM

## 2024-05-10 RX ORDER — PRAVASTATIN SODIUM 40 MG
40 TABLET ORAL DAILY
Qty: 90 TABLET | Refills: 0 | Status: SHIPPED | OUTPATIENT
Start: 2024-05-10

## 2024-05-10 NOTE — TELEPHONE ENCOUNTER
----- Message from Carolina Bartlett sent at 5/10/2024 11:53 AM EDT -----  Subject: Refill Request    QUESTIONS  Name of Medication? pravastatin (PRAVACHOL) 40 MG tablet  Patient-reported dosage and instructions? 40mg once per day   How many days do you have left? 0  Preferred Pharmacy? [id number]  Pharmacy phone number (if available)? [phone]  ---------------------------------------------------------------------------  --------------  CALL BACK INFO  What is the best way for the office to contact you? OK to leave message on   voicemail  Preferred Call Back Phone Number? [phone]  ---------------------------------------------------------------------------  --------------  SCRIPT ANSWERS  Relationship to Patient? Self

## 2024-05-10 NOTE — TELEPHONE ENCOUNTER
Sonia called requesting a refill of the below medication which has been pended for you:     Requested Prescriptions     Pending Prescriptions Disp Refills    pravastatin (PRAVACHOL) 40 MG tablet 90 tablet 1     Sig: Take 1 tablet by mouth daily       Last Appointment Date: 11/2/2023  Next Appointment Date: 5/30/2024    Allergies   Allergen Reactions    Other Other (See Comments)     RUE Precautions, NO BP RUE.      Prednisone Swelling     Whole side of her face swelled when she took it, difficulty breathing

## 2024-05-15 ENCOUNTER — OFFICE VISIT (OUTPATIENT)
Dept: NEUROLOGY | Age: 59
End: 2024-05-15
Payer: MEDICARE

## 2024-05-15 VITALS
WEIGHT: 278.66 LBS | OXYGEN SATURATION: 94 % | RESPIRATION RATE: 18 BRPM | BODY MASS INDEX: 44.98 KG/M2 | SYSTOLIC BLOOD PRESSURE: 116 MMHG | DIASTOLIC BLOOD PRESSURE: 70 MMHG | HEART RATE: 78 BPM

## 2024-05-15 DIAGNOSIS — F32.A ANXIETY AND DEPRESSION: ICD-10-CM

## 2024-05-15 DIAGNOSIS — F41.9 ANXIETY AND DEPRESSION: ICD-10-CM

## 2024-05-15 DIAGNOSIS — F31.9 BIPOLAR 1 DISORDER (HCC): ICD-10-CM

## 2024-05-15 DIAGNOSIS — G47.33 OSA ON CPAP: ICD-10-CM

## 2024-05-15 DIAGNOSIS — G47.9 SLEEP DIFFICULTIES: ICD-10-CM

## 2024-05-15 DIAGNOSIS — G24.5 BLEPHAROSPASM: ICD-10-CM

## 2024-05-15 DIAGNOSIS — Z85.3 HX: BREAST CANCER: ICD-10-CM

## 2024-05-15 DIAGNOSIS — E66.01 MORBID OBESITY WITH BMI OF 40.0-44.9, ADULT (HCC): ICD-10-CM

## 2024-05-15 DIAGNOSIS — G43.009 MIGRAINE WITHOUT AURA AND WITHOUT STATUS MIGRAINOSUS, NOT INTRACTABLE: Primary | ICD-10-CM

## 2024-05-15 DIAGNOSIS — R25.3 MUSCLE TWITCHING: ICD-10-CM

## 2024-05-15 DIAGNOSIS — R41.3 MEMORY PROBLEM: ICD-10-CM

## 2024-05-15 DIAGNOSIS — G51.32 HEMIFACIAL SPASM OF LEFT SIDE OF FACE: ICD-10-CM

## 2024-05-15 DIAGNOSIS — G51.39 HEMIFACIAL SPASM, UNSPECIFIED LATERALITY: ICD-10-CM

## 2024-05-15 DIAGNOSIS — E66.01 OBESITY, CLASS III, BMI 40-49.9 (MORBID OBESITY) (HCC): ICD-10-CM

## 2024-05-15 DIAGNOSIS — I82.A21 CHRONIC DEEP VEIN THROMBOSIS (DVT) OF AXILLARY VEIN OF RIGHT UPPER EXTREMITY (HCC): ICD-10-CM

## 2024-05-15 PROBLEM — E66.813 OBESITY, CLASS III, BMI 40-49.9 (MORBID OBESITY): Status: ACTIVE | Noted: 2020-07-28

## 2024-05-15 PROCEDURE — 99215 OFFICE O/P EST HI 40 MIN: CPT | Performed by: PSYCHIATRY & NEUROLOGY

## 2024-05-15 PROCEDURE — G8417 CALC BMI ABV UP PARAM F/U: HCPCS | Performed by: PSYCHIATRY & NEUROLOGY

## 2024-05-15 PROCEDURE — 1036F TOBACCO NON-USER: CPT | Performed by: PSYCHIATRY & NEUROLOGY

## 2024-05-15 PROCEDURE — G8427 DOCREV CUR MEDS BY ELIG CLIN: HCPCS | Performed by: PSYCHIATRY & NEUROLOGY

## 2024-05-15 PROCEDURE — 3017F COLORECTAL CA SCREEN DOC REV: CPT | Performed by: PSYCHIATRY & NEUROLOGY

## 2024-05-15 PROCEDURE — 99214 OFFICE O/P EST MOD 30 MIN: CPT | Performed by: PSYCHIATRY & NEUROLOGY

## 2024-05-15 RX ORDER — BUTALBITAL, ACETAMINOPHEN AND CAFFEINE 50; 325; 40 MG/1; MG/1; MG/1
TABLET ORAL
Qty: 60 TABLET | Refills: 2 | Status: SHIPPED | OUTPATIENT
Start: 2024-05-15

## 2024-05-15 RX ORDER — UBROGEPANT 100 MG/1
100 TABLET ORAL 2 TIMES DAILY PRN
Qty: 30 TABLET | Refills: 5 | Status: SHIPPED | OUTPATIENT
Start: 2024-05-15

## 2024-05-15 RX ORDER — PSYLLIUM HUSK 3.4 G/7G
POWDER ORAL
Qty: 30 TABLET | Refills: 3 | Status: SHIPPED | OUTPATIENT
Start: 2024-05-15

## 2024-05-15 RX ORDER — LAMOTRIGINE 100 MG/1
TABLET ORAL
Qty: 60 TABLET | Refills: 5 | Status: SHIPPED | OUTPATIENT
Start: 2024-05-15

## 2024-05-15 RX ORDER — TOPIRAMATE 50 MG/1
TABLET, FILM COATED ORAL
Qty: 60 TABLET | Refills: 5 | Status: SHIPPED | OUTPATIENT
Start: 2024-05-15

## 2024-05-15 RX ORDER — CLONAZEPAM 0.5 MG/1
TABLET ORAL
Qty: 90 TABLET | Refills: 2 | Status: SHIPPED | OUTPATIENT
Start: 2024-05-15 | End: 2024-08-16

## 2024-05-15 ASSESSMENT — ENCOUNTER SYMPTOMS
SORE THROAT: 0
SHORTNESS OF BREATH: 0
SWOLLEN GLANDS: 0
PHOTOPHOBIA: 1
EYE REDNESS: 0
VOMITING: 1
BOWEL INCONTINENCE: 0
VOICE CHANGE: 0
CHOKING: 0
EYE PAIN: 0
WHEEZING: 0
CHEST TIGHTNESS: 0
NAUSEA: 1
SINUS PRESSURE: 0
ABDOMINAL PAIN: 0
EYE ITCHING: 0
SCALP TENDERNESS: 0
EYE DISCHARGE: 0
BLOOD IN STOOL: 0
APNEA: 0
COUGH: 0
BLURRED VISION: 0
ABDOMINAL DISTENTION: 0
EYE WATERING: 1
VISUAL CHANGE: 0
FACIAL SWELLING: 0
CONSTIPATION: 0
RHINORRHEA: 0
COLOR CHANGE: 0
DIARRHEA: 0

## 2024-05-15 ASSESSMENT — PATIENT HEALTH QUESTIONNAIRE - PHQ9
1. LITTLE INTEREST OR PLEASURE IN DOING THINGS: NOT AT ALL
SUM OF ALL RESPONSES TO PHQ QUESTIONS 1-9: 0
2. FEELING DOWN, DEPRESSED OR HOPELESS: NOT AT ALL
SUM OF ALL RESPONSES TO PHQ9 QUESTIONS 1 & 2: 0
SUM OF ALL RESPONSES TO PHQ QUESTIONS 1-9: 0

## 2024-05-15 NOTE — PATIENT INSTRUCTIONS
*    TO   AVOID   TO  SLEEP  IN   SUPINE  POSITION.    *      WEIGHT   LOSS.         *   ADEQUATE   FLUID  INTAKE   AND  ELECTROLYTE  BALANCE             * KEEP  DAIRY  OF   THE  NEUROLOGICAL  SYMPTOMS          *  TO  MAINTAIN  REGULAR  SLEEP  WAKE  CYCLES.     *   TO  HAVE  ADEQUATE  REST  AND   SLEEP    HOURS.          *    AVOID  USAGE OF   TOBACCO,  EXCESSIVE  ALCOHOL                AND   ILLEGAL   SUBSTANCES,  IF  ANY          *  Maintain   Healthy  Life Style    With   Periodic  Monitoring  Of         Any  Medical  Conditions  Including   Elevated  Blood  Pressure,  Lipid  Profile,       Blood  Sugar levels  And   Heart  Disease.                *   Period   Screening  For  Cancers  Involving  Breast,  Colon,         Lungs  And  Other  Organs  As  Applicable,           In consultation   With  Your  Primary Care Providers.                *  If   There is  Any  Significant  Worsening   Of  Current  Symptoms  And             Or  If    Any additional  New  Neurological  Symptoms  and          Significant  Concerns   Should  Call  911 or  Go  To  Emergency  Department            For  Further  Immediate  Evaluation.

## 2024-05-15 NOTE — PROGRESS NOTES
Carb-Cholecalciferol (OYSTER SHELL CALCIUM W/D) 500-5 MG-MCG TABS tablet take 1 tablet by mouth twice a day 180 tablet 1    allopurinol (ZYLOPRIM) 100 MG tablet take 1 tablet by mouth once daily 30 tablet 11    loratadine (CLARITIN) 10 MG tablet Take 1 tablet by mouth daily 90 tablet 0    fluticasone (FLONASE) 50 MCG/ACT nasal spray instill 1 spray into each nostril once daily 16 g 3    OXcarbazepine (TRILEPTAL) 150 MG tablet Take 1 tablet by mouth 2 times daily      sertraline (ZOLOFT) 25 MG tablet take 1 tablet by mouth once daily      albuterol sulfate HFA (VENTOLIN HFA) 108 (90 Base) MCG/ACT inhaler inhale 2 puffs by mouth and INTO THE LUNGS every 4 hours if needed for wheezing 18 g 3    loxapine (LOXITANE) 10 MG capsule Take 1 capsule by mouth nightly      QUEtiapine (SEROQUEL) 50 MG tablet take 1 tablet by mouth at bedtime      busPIRone (BUSPAR) 15 MG tablet Take 15 mg by mouth 3 times daily      benztropine (COGENTIN) 0.5 MG tablet Take 1 tablet by mouth daily      QUEtiapine (SEROQUEL) 100 MG tablet Take 1 tablet by mouth nightly       No current facility-administered medications for this visit.         Allergies   Allergen Reactions    Other Other (See Comments)     RUE Precautions, NO BP RUE.      Prednisone Swelling     Whole side of her face swelled when she took it, difficulty breathing         Family History   Problem Relation Age of Onset    Breast Cancer Sister 55    Breast Cancer Maternal Aunt 69    Other Maternal Cousin         Atypical Ductal Hyperplasia     Uterine Cancer Sister 31    Other Sister         breast cyst    Cataracts Mother     Glaucoma Mother     Heart Disease Father     High Blood Pressure Father     High Cholesterol Father     Mental Retardation Father     Diabetes Neg Hx          Social History     Socioeconomic History    Marital status: Single     Spouse name: Not on file    Number of children: Not on file    Years of education: Not on file    Highest education level: Not on

## 2024-05-27 DIAGNOSIS — C50.919 MALIGNANT NEOPLASM OF FEMALE BREAST, UNSPECIFIED ESTROGEN RECEPTOR STATUS, UNSPECIFIED LATERALITY, UNSPECIFIED SITE OF BREAST (HCC): ICD-10-CM

## 2024-07-09 DIAGNOSIS — I82.A11 ACUTE DEEP VEIN THROMBOSIS (DVT) OF AXILLARY VEIN OF RIGHT UPPER EXTREMITY (HCC): ICD-10-CM

## 2024-07-09 DIAGNOSIS — C50.919 MALIGNANT NEOPLASM OF FEMALE BREAST, UNSPECIFIED ESTROGEN RECEPTOR STATUS, UNSPECIFIED LATERALITY, UNSPECIFIED SITE OF BREAST (HCC): ICD-10-CM

## 2024-07-09 DIAGNOSIS — Z78.0 POSTMENOPAUSAL: ICD-10-CM

## 2024-07-09 DIAGNOSIS — Z90.11 H/O RIGHT MASTECTOMY: ICD-10-CM

## 2024-07-25 DIAGNOSIS — R25.3 MUSCLE TWITCHING: ICD-10-CM

## 2024-07-25 RX ORDER — LAMOTRIGINE 100 MG/1
TABLET ORAL
Qty: 60 TABLET | Refills: 5 | OUTPATIENT
Start: 2024-07-25

## 2024-08-15 ENCOUNTER — OFFICE VISIT (OUTPATIENT)
Dept: NEUROLOGY | Age: 59
End: 2024-08-15
Payer: MEDICARE

## 2024-08-15 ENCOUNTER — HOSPITAL ENCOUNTER (OUTPATIENT)
Age: 59
Discharge: HOME | End: 2024-08-15
Payer: MEDICARE

## 2024-08-15 VITALS — WEIGHT: 282 LBS | OXYGEN SATURATION: 95 % | HEART RATE: 74 BPM | HEIGHT: 66 IN | BODY MASS INDEX: 45.32 KG/M2

## 2024-08-15 DIAGNOSIS — R25.3 MUSCLE TWITCHING: ICD-10-CM

## 2024-08-15 DIAGNOSIS — F32.A ANXIETY AND DEPRESSION: ICD-10-CM

## 2024-08-15 DIAGNOSIS — G43.009 MIGRAINE WITHOUT AURA AND WITHOUT STATUS MIGRAINOSUS, NOT INTRACTABLE: ICD-10-CM

## 2024-08-15 DIAGNOSIS — R41.3 MEMORY PROBLEM: ICD-10-CM

## 2024-08-15 DIAGNOSIS — F41.9 ANXIETY AND DEPRESSION: ICD-10-CM

## 2024-08-15 DIAGNOSIS — G51.39 HEMIFACIAL SPASM, UNSPECIFIED LATERALITY: ICD-10-CM

## 2024-08-15 DIAGNOSIS — G43.009 MIGRAINE WITHOUT AURA AND WITHOUT STATUS MIGRAINOSUS, NOT INTRACTABLE: Primary | ICD-10-CM

## 2024-08-15 DIAGNOSIS — I82.A11 ACUTE DEEP VEIN THROMBOSIS (DVT) OF AXILLARY VEIN OF RIGHT UPPER EXTREMITY (HCC): ICD-10-CM

## 2024-08-15 DIAGNOSIS — I82.A21 CHRONIC DEEP VEIN THROMBOSIS (DVT) OF AXILLARY VEIN OF RIGHT UPPER EXTREMITY (HCC): ICD-10-CM

## 2024-08-15 DIAGNOSIS — F31.9 BIPOLAR 1 DISORDER (HCC): ICD-10-CM

## 2024-08-15 DIAGNOSIS — G51.32 HEMIFACIAL SPASM OF LEFT SIDE OF FACE: ICD-10-CM

## 2024-08-15 DIAGNOSIS — G24.5 BLEPHAROSPASM: ICD-10-CM

## 2024-08-15 DIAGNOSIS — E66.01 OBESITY, CLASS III, BMI 40-49.9 (MORBID OBESITY) (HCC): ICD-10-CM

## 2024-08-15 DIAGNOSIS — Z85.3 HX: BREAST CANCER: ICD-10-CM

## 2024-08-15 DIAGNOSIS — C50.919 MALIGNANT NEOPLASM OF FEMALE BREAST, UNSPECIFIED ESTROGEN RECEPTOR STATUS, UNSPECIFIED LATERALITY, UNSPECIFIED SITE OF BREAST (HCC): ICD-10-CM

## 2024-08-15 DIAGNOSIS — G47.9 SLEEP DIFFICULTIES: ICD-10-CM

## 2024-08-15 DIAGNOSIS — Z90.11 H/O RIGHT MASTECTOMY: ICD-10-CM

## 2024-08-15 LAB
ALBUMIN SERPL-MCNC: 3.9 G/DL (ref 3.5–5.2)
ALBUMIN/GLOB SERPL: 1.1 {RATIO} (ref 1–2.5)
ALP SERPL-CCNC: 104 U/L (ref 35–104)
ALT SERPL-CCNC: 14 U/L (ref 5–33)
ANION GAP SERPL CALCULATED.3IONS-SCNC: 10 MMOL/L (ref 9–17)
ANION GAP SERPL CALCULATED.3IONS-SCNC: 10 MMOL/L (ref 9–17)
AST SERPL-CCNC: 11 U/L
BASOPHILS # BLD: 0.04 K/UL (ref 0–0.2)
BASOPHILS NFR BLD: 1 % (ref 0–2)
BILIRUB SERPL-MCNC: 0.2 MG/DL (ref 0.3–1.2)
BUN SERPL-MCNC: 21 MG/DL (ref 6–20)
BUN/CREAT SERPL: 23 (ref 9–20)
CALCIUM SERPL-MCNC: 9 MG/DL (ref 8.6–10.4)
CHLORIDE SERPL-SCNC: 101 MMOL/L (ref 98–107)
CHLORIDE SERPL-SCNC: 101 MMOL/L (ref 98–107)
CO2 SERPL-SCNC: 24 MMOL/L (ref 20–31)
CO2 SERPL-SCNC: 24 MMOL/L (ref 20–31)
CREAT SERPL-MCNC: 0.9 MG/DL (ref 0.5–0.9)
EOSINOPHIL # BLD: 0.15 K/UL (ref 0–0.44)
EOSINOPHILS RELATIVE PERCENT: 2 % (ref 1–4)
ERYTHROCYTE [DISTWIDTH] IN BLOOD BY AUTOMATED COUNT: 13.3 % (ref 11.8–14.4)
ERYTHROCYTE [DISTWIDTH] IN BLOOD BY AUTOMATED COUNT: 13.3 % (ref 11.8–14.4)
FOLATE SERPL-MCNC: 6.1 NG/ML (ref 4.8–24.2)
GFR, ESTIMATED: 74 ML/MIN/1.73M2
GLUCOSE SERPL-MCNC: 102 MG/DL (ref 70–99)
HCT VFR BLD AUTO: 39 % (ref 36.3–47.1)
HCT VFR BLD AUTO: 39 % (ref 36.3–47.1)
HGB BLD-MCNC: 13.1 G/DL (ref 11.9–15.1)
HGB BLD-MCNC: 13.1 G/DL (ref 11.9–15.1)
IMM GRANULOCYTES # BLD AUTO: 0.04 K/UL (ref 0–0.3)
IMM GRANULOCYTES NFR BLD: 1 %
LAMOTRIGINE SERPL-MCNC: 2.1 UG/ML (ref 3–15)
LYMPHOCYTES NFR BLD: 2.35 K/UL (ref 1.1–3.7)
LYMPHOCYTES RELATIVE PERCENT: 31 % (ref 24–43)
MCH RBC QN AUTO: 31.5 PG (ref 25.2–33.5)
MCH RBC QN AUTO: 31.5 PG (ref 25.2–33.5)
MCHC RBC AUTO-ENTMCNC: 33.6 G/DL (ref 25.2–33.5)
MCHC RBC AUTO-ENTMCNC: 33.6 G/DL (ref 25.2–33.5)
MCV RBC AUTO: 93.8 FL (ref 82.6–102.9)
MCV RBC AUTO: 93.8 FL (ref 82.6–102.9)
MONOCYTES NFR BLD: 0.76 K/UL (ref 0.1–1.2)
MONOCYTES NFR BLD: 10 % (ref 3–12)
NEUTROPHILS NFR BLD: 55 % (ref 36–65)
NEUTS SEG NFR BLD: 4.23 K/UL (ref 1.5–8.1)
NRBC BLD-RTO: 0 PER 100 WBC
NRBC BLD-RTO: 0 PER 100 WBC
PLATELET # BLD AUTO: 231 K/UL (ref 138–453)
PLATELET # BLD AUTO: 231 K/UL (ref 138–453)
PMV BLD AUTO: 9.2 FL (ref 8.1–13.5)
PMV BLD AUTO: 9.2 FL (ref 8.1–13.5)
POTASSIUM SERPL-SCNC: 4 MMOL/L (ref 3.7–5.3)
POTASSIUM SERPL-SCNC: 4.1 MMOL/L (ref 3.7–5.3)
PROT SERPL-MCNC: 7.4 G/DL (ref 6.4–8.3)
RBC # BLD AUTO: 4.16 M/UL (ref 3.95–5.11)
RBC # BLD AUTO: 4.16 M/UL (ref 3.95–5.11)
SODIUM SERPL-SCNC: 135 MMOL/L (ref 135–144)
SODIUM SERPL-SCNC: 135 MMOL/L (ref 135–144)
TSH SERPL DL<=0.05 MIU/L-ACNC: 1.59 UIU/ML (ref 0.3–5)
VIT B12 SERPL-MCNC: 1282 PG/ML (ref 232–1245)
WBC OTHER # BLD: 7.6 K/UL (ref 3.5–11.3)
WBC OTHER # BLD: 7.6 K/UL (ref 3.5–11.3)

## 2024-08-15 PROCEDURE — 85027 COMPLETE CBC AUTOMATED: CPT

## 2024-08-15 PROCEDURE — 80053 COMPREHEN METABOLIC PANEL: CPT

## 2024-08-15 PROCEDURE — 99214 OFFICE O/P EST MOD 30 MIN: CPT | Performed by: PSYCHIATRY & NEUROLOGY

## 2024-08-15 PROCEDURE — 80183 DRUG SCRN QUANT OXCARBAZEPIN: CPT

## 2024-08-15 PROCEDURE — 80175 DRUG SCREEN QUAN LAMOTRIGINE: CPT

## 2024-08-15 PROCEDURE — 80051 ELECTROLYTE PANEL: CPT

## 2024-08-15 PROCEDURE — 36415 COLL VENOUS BLD VENIPUNCTURE: CPT

## 2024-08-15 PROCEDURE — 82746 ASSAY OF FOLIC ACID SERUM: CPT

## 2024-08-15 PROCEDURE — 84443 ASSAY THYROID STIM HORMONE: CPT

## 2024-08-15 PROCEDURE — 82607 VITAMIN B-12: CPT

## 2024-08-15 PROCEDURE — 85025 COMPLETE CBC W/AUTO DIFF WBC: CPT

## 2024-08-15 RX ORDER — UBROGEPANT 100 MG/1
100 TABLET ORAL 2 TIMES DAILY PRN
Qty: 30 TABLET | Refills: 5 | Status: SHIPPED | OUTPATIENT
Start: 2024-08-15

## 2024-08-15 RX ORDER — LAMOTRIGINE 100 MG/1
TABLET ORAL
Qty: 60 TABLET | Refills: 5 | Status: SHIPPED | OUTPATIENT
Start: 2024-08-15

## 2024-08-15 RX ORDER — TOPIRAMATE 50 MG/1
TABLET, FILM COATED ORAL
Qty: 60 TABLET | Refills: 5 | Status: SHIPPED | OUTPATIENT
Start: 2024-08-15

## 2024-08-15 RX ORDER — CLONAZEPAM 0.5 MG/1
TABLET ORAL
Qty: 90 TABLET | Refills: 2 | Status: SHIPPED | OUTPATIENT
Start: 2024-08-15 | End: 2024-11-16

## 2024-08-15 RX ORDER — PSYLLIUM HUSK 3.4 G/7G
POWDER ORAL
Qty: 30 TABLET | Refills: 5 | Status: SHIPPED | OUTPATIENT
Start: 2024-08-15

## 2024-08-15 RX ORDER — BUTALBITAL, ACETAMINOPHEN AND CAFFEINE 50; 325; 40 MG/1; MG/1; MG/1
TABLET ORAL
Qty: 60 TABLET | Refills: 2 | Status: SHIPPED | OUTPATIENT
Start: 2024-08-15

## 2024-08-15 ASSESSMENT — ENCOUNTER SYMPTOMS
VOMITING: 1
BLOOD IN STOOL: 0
FACIAL SWELLING: 0
SINUS PRESSURE: 0
WHEEZING: 0
EYE DISCHARGE: 0
SHORTNESS OF BREATH: 0
VISUAL CHANGE: 0
EYE ITCHING: 0
APNEA: 0
EYE WATERING: 1
SORE THROAT: 0
ABDOMINAL PAIN: 0
COLOR CHANGE: 0
VOICE CHANGE: 0
DIARRHEA: 0
COUGH: 0
BOWEL INCONTINENCE: 0
BLURRED VISION: 0
SCALP TENDERNESS: 0
TROUBLE SWALLOWING: 0
SWOLLEN GLANDS: 0
CHEST TIGHTNESS: 0
CHOKING: 0
ABDOMINAL DISTENTION: 0
CONSTIPATION: 0
RHINORRHEA: 0
NAUSEA: 1
FACIAL SWEATING: 0
PHOTOPHOBIA: 1
BACK PAIN: 0
EYE REDNESS: 0
EYE PAIN: 0

## 2024-08-15 ASSESSMENT — PATIENT HEALTH QUESTIONNAIRE - PHQ9
SUM OF ALL RESPONSES TO PHQ QUESTIONS 1-9: 0
2. FEELING DOWN, DEPRESSED OR HOPELESS: NOT AT ALL
SUM OF ALL RESPONSES TO PHQ9 QUESTIONS 1 & 2: 0
SUM OF ALL RESPONSES TO PHQ QUESTIONS 1-9: 0
1. LITTLE INTEREST OR PLEASURE IN DOING THINGS: NOT AT ALL
SUM OF ALL RESPONSES TO PHQ QUESTIONS 1-9: 0
SUM OF ALL RESPONSES TO PHQ QUESTIONS 1-9: 0

## 2024-08-15 NOTE — PROGRESS NOTES
[facility]  Neurology  [address] [place]  [address]  Phone: [phone]   Fax: [phone]        SUBJECTIVE:     PATIENT ID:  Sonia Grant is a  RIGHT  HANDED 59 y.o. female.          Migraine   This is a chronic problem. Episode onset: FOR  MORE  THAN   4  YEARS. The problem occurs intermittently. The problem has been gradually worsening. The pain is located in the Bilateral and vertex region. The pain does not radiate. The pain quality is similar to prior headaches. The quality of the pain is described as aching, dull and throbbing. The pain is at a severity of 3/10. The pain is mild. Associated symptoms include eye watering, insomnia, nausea, phonophobia, photophobia and vomiting. Pertinent negatives include no abdominal pain, abnormal behavior, anorexia, back pain, blurred vision, coughing, dizziness, drainage, ear pain, eye pain, eye redness, facial sweating, fever, hearing loss, loss of balance, muscle aches, neck pain, numbness, rhinorrhea, scalp tenderness, seizures, sinus pressure, sore throat, swollen glands, tingling, tinnitus, visual change, weakness or weight loss. The symptoms are aggravated by unknown. She has tried acetaminophen and Excedrin for the symptoms. The treatment provided no relief. Her past medical history is significant for migraine headaches and obesity. There is no history of cancer, cluster headaches, hypertension, immunosuppression, migraines in the family, pseudotumor cerebri, recent head traumas, sinus disease or TMJ.   Neurologic Problem  The patient's primary symptoms include memory loss. The patient's pertinent negatives include no altered mental status, clumsiness, focal sensory loss, focal weakness, loss of balance, near-syncope, slurred speech, syncope, visual change or weakness. Primary symptoms comment: LEFT  EYE  TWITCHING  AND  SPAMS. This is a chronic problem. Episode onset: SINCE  APRIL 2017. The neurological problem developed

## 2024-08-19 LAB — 10OH-CARBAZEPINE SERPL-MCNC: 6 UG/ML (ref 3–35)

## 2024-08-20 DIAGNOSIS — M10.9 ACUTE GOUT OF RIGHT FOOT, UNSPECIFIED CAUSE: ICD-10-CM

## 2024-08-20 DIAGNOSIS — E78.2 MIXED HYPERLIPIDEMIA: ICD-10-CM

## 2024-08-20 RX ORDER — PRAVASTATIN SODIUM 40 MG
40 TABLET ORAL DAILY
Qty: 90 TABLET | Refills: 0 | Status: SHIPPED | OUTPATIENT
Start: 2024-08-20

## 2024-08-20 RX ORDER — CETIRIZINE HYDROCHLORIDE 10 MG/1
10 TABLET ORAL DAILY
Qty: 90 TABLET | Refills: 1 | Status: SHIPPED | OUTPATIENT
Start: 2024-08-20

## 2024-08-20 RX ORDER — ALLOPURINOL 100 MG/1
100 TABLET ORAL DAILY
Qty: 30 TABLET | Refills: 1 | Status: SHIPPED | OUTPATIENT
Start: 2024-08-20

## 2024-08-20 NOTE — TELEPHONE ENCOUNTER
Sonia called requesting a refill of the below medication which has been pended for you:     Requested Prescriptions     Pending Prescriptions Disp Refills    pravastatin (PRAVACHOL) 40 MG tablet 90 tablet 0     Sig: Take 1 tablet by mouth daily    allopurinol (ZYLOPRIM) 100 MG tablet 30 tablet 1     Sig: Take 1 tablet by mouth daily    cetirizine (ZYRTEC) 10 MG tablet 90 tablet 1     Sig: Take 1 tablet by mouth daily       Last Appointment Date: 11/2/2023  Next Appointment Date: 10/3/2024    Allergies   Allergen Reactions    Other Other (See Comments)     RUE Precautions, NO BP RUE.      Prednisone Swelling     Whole side of her face swelled when she took it, difficulty breathing

## 2024-10-02 SDOH — HEALTH STABILITY: PHYSICAL HEALTH
ON AVERAGE, HOW MANY DAYS PER WEEK DO YOU ENGAGE IN MODERATE TO STRENUOUS EXERCISE (LIKE A BRISK WALK)?: PATIENT DECLINED

## 2024-10-02 ASSESSMENT — PATIENT HEALTH QUESTIONNAIRE - PHQ9
2. FEELING DOWN, DEPRESSED OR HOPELESS: MORE THAN HALF THE DAYS
SUM OF ALL RESPONSES TO PHQ QUESTIONS 1-9: 8
3. TROUBLE FALLING OR STAYING ASLEEP: NOT AT ALL
SUM OF ALL RESPONSES TO PHQ QUESTIONS 1-9: 8
SUM OF ALL RESPONSES TO PHQ QUESTIONS 1-9: 8
8. MOVING OR SPEAKING SO SLOWLY THAT OTHER PEOPLE COULD HAVE NOTICED. OR THE OPPOSITE, BEING SO FIGETY OR RESTLESS THAT YOU HAVE BEEN MOVING AROUND A LOT MORE THAN USUAL: NOT AT ALL
1. LITTLE INTEREST OR PLEASURE IN DOING THINGS: NOT AT ALL
7. TROUBLE CONCENTRATING ON THINGS, SUCH AS READING THE NEWSPAPER OR WATCHING TELEVISION: NEARLY EVERY DAY
4. FEELING TIRED OR HAVING LITTLE ENERGY: NOT AT ALL
SUM OF ALL RESPONSES TO PHQ QUESTIONS 1-9: 8
5. POOR APPETITE OR OVEREATING: NEARLY EVERY DAY
6. FEELING BAD ABOUT YOURSELF - OR THAT YOU ARE A FAILURE OR HAVE LET YOURSELF OR YOUR FAMILY DOWN: NOT AT ALL
10. IF YOU CHECKED OFF ANY PROBLEMS, HOW DIFFICULT HAVE THESE PROBLEMS MADE IT FOR YOU TO DO YOUR WORK, TAKE CARE OF THINGS AT HOME, OR GET ALONG WITH OTHER PEOPLE: NOT DIFFICULT AT ALL
9. THOUGHTS THAT YOU WOULD BE BETTER OFF DEAD, OR OF HURTING YOURSELF: NOT AT ALL
SUM OF ALL RESPONSES TO PHQ9 QUESTIONS 1 & 2: 2

## 2024-10-02 ASSESSMENT — LIFESTYLE VARIABLES
HOW OFTEN DO YOU HAVE SIX OR MORE DRINKS ON ONE OCCASION: 1
HOW MANY STANDARD DRINKS CONTAINING ALCOHOL DO YOU HAVE ON A TYPICAL DAY: PATIENT DOES NOT DRINK
HOW MANY STANDARD DRINKS CONTAINING ALCOHOL DO YOU HAVE ON A TYPICAL DAY: 0
HOW OFTEN DO YOU HAVE A DRINK CONTAINING ALCOHOL: NEVER
HOW OFTEN DO YOU HAVE A DRINK CONTAINING ALCOHOL: 1

## 2024-10-03 ENCOUNTER — OFFICE VISIT (OUTPATIENT)
Dept: FAMILY MEDICINE CLINIC | Age: 59
End: 2024-10-03

## 2024-10-03 VITALS
DIASTOLIC BLOOD PRESSURE: 74 MMHG | HEART RATE: 88 BPM | WEIGHT: 284.5 LBS | BODY MASS INDEX: 45.72 KG/M2 | SYSTOLIC BLOOD PRESSURE: 118 MMHG | HEIGHT: 66 IN | OXYGEN SATURATION: 95 %

## 2024-10-03 DIAGNOSIS — I27.20 MODERATE TO SEVERE PULMONARY HYPERTENSION (HCC): ICD-10-CM

## 2024-10-03 DIAGNOSIS — N18.31 STAGE 3A CHRONIC KIDNEY DISEASE (HCC): ICD-10-CM

## 2024-10-03 DIAGNOSIS — E78.2 MIXED HYPERLIPIDEMIA: ICD-10-CM

## 2024-10-03 DIAGNOSIS — Z00.00 MEDICARE ANNUAL WELLNESS VISIT, SUBSEQUENT: Primary | ICD-10-CM

## 2024-10-03 DIAGNOSIS — E66.01 OBESITY, CLASS III, BMI 40-49.9 (MORBID OBESITY): ICD-10-CM

## 2024-10-03 DIAGNOSIS — J30.2 SEASONAL ALLERGIES: ICD-10-CM

## 2024-10-03 DIAGNOSIS — J45.20 MILD INTERMITTENT EXTRINSIC ASTHMA WITHOUT COMPLICATION: ICD-10-CM

## 2024-10-03 RX ORDER — LEVOCETIRIZINE DIHYDROCHLORIDE 5 MG/1
5 TABLET, FILM COATED ORAL NIGHTLY
Qty: 30 TABLET | Refills: 2 | Status: SHIPPED | OUTPATIENT
Start: 2024-10-03

## 2024-10-03 RX ORDER — ALBUTEROL SULFATE 90 UG/1
INHALANT RESPIRATORY (INHALATION)
Qty: 18 G | Refills: 3 | Status: SHIPPED | OUTPATIENT
Start: 2024-10-03

## 2024-10-03 RX ORDER — OLOPATADINE HYDROCHLORIDE 2 MG/ML
1 SOLUTION/ DROPS OPHTHALMIC DAILY
Qty: 2.5 ML | Refills: 3 | Status: SHIPPED | OUTPATIENT
Start: 2024-10-03

## 2024-10-03 RX ORDER — PRAVASTATIN SODIUM 40 MG
40 TABLET ORAL DAILY
Qty: 90 TABLET | Refills: 0 | Status: SHIPPED | OUTPATIENT
Start: 2024-10-03

## 2024-10-03 ASSESSMENT — ENCOUNTER SYMPTOMS
COUGH: 0
BACK PAIN: 0
ABDOMINAL PAIN: 0
CONSTIPATION: 0
WHEEZING: 0
SHORTNESS OF BREATH: 0
CHEST TIGHTNESS: 0
BLOOD IN STOOL: 0
DIARRHEA: 0

## 2024-10-03 NOTE — PROGRESS NOTES
Soc Hx  Fam Hx              
zyrtec and I sent in pataday eye drops.    Return in about 1 year (around 10/3/2025) for MWV.    Orders Placed This Encounter   Procedures    Lipid Panel     Standing Status:   Future     Standing Expiration Date:   4/3/2026     Order Specific Question:   Is Patient Fasting?/# of Hours     Answer:   0     Orders Placed This Encounter   Medications    pravastatin (PRAVACHOL) 40 MG tablet     Sig: Take 1 tablet by mouth daily     Dispense:  90 tablet     Refill:  0    albuterol sulfate HFA (VENTOLIN HFA) 108 (90 Base) MCG/ACT inhaler     Sig: inhale 2 puffs by mouth and INTO THE LUNGS every 4 hours if needed for wheezing     Dispense:  18 g     Refill:  3    olopatadine (PATADAY) 0.2 % SOLN ophthalmic solution     Sig: Place 1 drop into both eyes daily     Dispense:  2.5 mL     Refill:  3    levocetirizine (XYZAL) 5 MG tablet     Sig: Take 1 tablet by mouth nightly     Dispense:  30 tablet     Refill:  2       Patientgiven educational materials - see patient instructions.  Discussed use, benefit,and side effects of prescribed medications.  All patient questions answered. Ptvoiced understanding. Reviewed health maintenance.  Instructed to continue currentmedications, diet and exercise.  Patient agreed with treatment plan. Follow up asdirected.     Electronically signed by Desiree Michelle MD on 10/3/2024 at 5:27 PM

## 2024-10-14 DIAGNOSIS — M10.9 ACUTE GOUT OF RIGHT FOOT, UNSPECIFIED CAUSE: ICD-10-CM

## 2024-10-14 RX ORDER — ALLOPURINOL 100 MG/1
100 TABLET ORAL DAILY
Qty: 30 TABLET | Refills: 5 | Status: SHIPPED | OUTPATIENT
Start: 2024-10-14

## 2024-10-14 NOTE — TELEPHONE ENCOUNTER
Sonia called requesting a refill of the below medication which has been pended for you:     Requested Prescriptions     Pending Prescriptions Disp Refills    allopurinol (ZYLOPRIM) 100 MG tablet [Pharmacy Med Name: Allopurinol Oral Tablet 100 MG] 30 tablet 0     Sig: TAKE 1 TABLET BY MOUTH EVERY DAY       Last Appointment Date: 10/3/2024  Next Appointment Date: 10/9/2025    Allergies   Allergen Reactions    Other Other (See Comments)     RUE Precautions, NO BP RUE.      Prednisone Swelling     Whole side of her face swelled when she took it, difficulty breathing

## 2024-11-01 DIAGNOSIS — E78.2 MIXED HYPERLIPIDEMIA: ICD-10-CM

## 2024-11-01 RX ORDER — PRAVASTATIN SODIUM 40 MG
40 TABLET ORAL DAILY
Qty: 90 TABLET | Refills: 0 | OUTPATIENT
Start: 2024-11-01

## 2024-11-17 DIAGNOSIS — J30.2 SEASONAL ALLERGIES: ICD-10-CM

## 2024-11-18 RX ORDER — FLUTICASONE PROPIONATE 50 MCG
SPRAY, SUSPENSION (ML) NASAL
Qty: 16 G | Refills: 1 | Status: SHIPPED | OUTPATIENT
Start: 2024-11-18

## 2024-11-18 NOTE — TELEPHONE ENCOUNTER
Sonia called requesting a refill of the below medication which has been pended for you:     Requested Prescriptions     Pending Prescriptions Disp Refills    fluticasone (FLONASE) 50 MCG/ACT nasal spray [Pharmacy Med Name: Fluticasone Propionate Nasal Suspension 50 MCG/ACT] 16 g 0     Sig: INSTILL 1 SPRAY INTO EACH NOSTRIL ONCE DAILY       Last Appointment Date: 10/3/2024  Next Appointment Date: 10/9/2025    Allergies   Allergen Reactions    Other Other (See Comments)     RUE Precautions, NO BP RUE.      Prednisone Swelling     Whole side of her face swelled when she took it, difficulty breathing

## 2024-11-27 ENCOUNTER — OFFICE VISIT (OUTPATIENT)
Dept: NEUROLOGY | Age: 59
End: 2024-11-27
Payer: MEDICARE

## 2024-11-27 VITALS
DIASTOLIC BLOOD PRESSURE: 82 MMHG | SYSTOLIC BLOOD PRESSURE: 126 MMHG | RESPIRATION RATE: 16 BRPM | BODY MASS INDEX: 44.87 KG/M2 | HEART RATE: 78 BPM | OXYGEN SATURATION: 97 % | WEIGHT: 278 LBS

## 2024-11-27 DIAGNOSIS — G51.32 HEMIFACIAL SPASM OF LEFT SIDE OF FACE: ICD-10-CM

## 2024-11-27 DIAGNOSIS — F31.9 BIPOLAR 1 DISORDER (HCC): ICD-10-CM

## 2024-11-27 DIAGNOSIS — G43.009 MIGRAINE WITHOUT AURA AND WITHOUT STATUS MIGRAINOSUS, NOT INTRACTABLE: Primary | ICD-10-CM

## 2024-11-27 DIAGNOSIS — R25.3 MUSCLE TWITCHING: ICD-10-CM

## 2024-11-27 DIAGNOSIS — I82.A21 CHRONIC DEEP VEIN THROMBOSIS (DVT) OF AXILLARY VEIN OF RIGHT UPPER EXTREMITY (HCC): ICD-10-CM

## 2024-11-27 DIAGNOSIS — G24.5 BLEPHAROSPASM: ICD-10-CM

## 2024-11-27 DIAGNOSIS — Z90.11 H/O RIGHT MASTECTOMY: ICD-10-CM

## 2024-11-27 DIAGNOSIS — F32.A ANXIETY AND DEPRESSION: ICD-10-CM

## 2024-11-27 DIAGNOSIS — Z85.3 HX: BREAST CANCER: ICD-10-CM

## 2024-11-27 DIAGNOSIS — G47.9 SLEEP DIFFICULTIES: ICD-10-CM

## 2024-11-27 DIAGNOSIS — R41.3 MEMORY PROBLEM: ICD-10-CM

## 2024-11-27 DIAGNOSIS — F41.9 ANXIETY AND DEPRESSION: ICD-10-CM

## 2024-11-27 DIAGNOSIS — E66.01 OBESITY, CLASS III, BMI 40-49.9 (MORBID OBESITY): ICD-10-CM

## 2024-11-27 PROCEDURE — 99214 OFFICE O/P EST MOD 30 MIN: CPT | Performed by: PSYCHIATRY & NEUROLOGY

## 2024-11-27 PROCEDURE — 1036F TOBACCO NON-USER: CPT | Performed by: PSYCHIATRY & NEUROLOGY

## 2024-11-27 PROCEDURE — 3017F COLORECTAL CA SCREEN DOC REV: CPT | Performed by: PSYCHIATRY & NEUROLOGY

## 2024-11-27 PROCEDURE — G8427 DOCREV CUR MEDS BY ELIG CLIN: HCPCS | Performed by: PSYCHIATRY & NEUROLOGY

## 2024-11-27 PROCEDURE — G8417 CALC BMI ABV UP PARAM F/U: HCPCS | Performed by: PSYCHIATRY & NEUROLOGY

## 2024-11-27 PROCEDURE — G8484 FLU IMMUNIZE NO ADMIN: HCPCS | Performed by: PSYCHIATRY & NEUROLOGY

## 2024-11-27 RX ORDER — PSYLLIUM HUSK 3.4 G/7G
POWDER ORAL
Qty: 30 TABLET | Refills: 5 | Status: SHIPPED | OUTPATIENT
Start: 2024-11-27

## 2024-11-27 RX ORDER — BUTALBITAL, ACETAMINOPHEN AND CAFFEINE 50; 325; 40 MG/1; MG/1; MG/1
TABLET ORAL
Qty: 60 TABLET | Refills: 2 | Status: SHIPPED | OUTPATIENT
Start: 2024-11-27

## 2024-11-27 RX ORDER — CLONAZEPAM 0.5 MG/1
TABLET ORAL
Qty: 90 TABLET | Refills: 2 | Status: SHIPPED | OUTPATIENT
Start: 2024-11-27 | End: 2025-02-28

## 2024-11-27 RX ORDER — UBROGEPANT 100 MG/1
100 TABLET ORAL 2 TIMES DAILY PRN
Qty: 30 TABLET | Refills: 5 | Status: SHIPPED | OUTPATIENT
Start: 2024-11-27

## 2024-11-27 RX ORDER — LAMOTRIGINE 100 MG/1
TABLET ORAL
Qty: 60 TABLET | Refills: 5 | Status: SHIPPED | OUTPATIENT
Start: 2024-11-27

## 2024-11-27 RX ORDER — TOPIRAMATE 50 MG/1
TABLET, FILM COATED ORAL
Qty: 60 TABLET | Refills: 5 | Status: SHIPPED | OUTPATIENT
Start: 2024-11-27

## 2024-11-27 ASSESSMENT — ENCOUNTER SYMPTOMS
VOICE CHANGE: 0
ABDOMINAL PAIN: 0
EYE ITCHING: 0
VOMITING: 1
BACK PAIN: 0
SINUS PRESSURE: 0
EYE PAIN: 0
CHOKING: 0
BLURRED VISION: 0
VISUAL CHANGE: 0
FACIAL SWELLING: 0
CONSTIPATION: 0
SCALP TENDERNESS: 0
EYE DISCHARGE: 0
BLOOD IN STOOL: 0
DIARRHEA: 0
PHOTOPHOBIA: 1
TROUBLE SWALLOWING: 0
EYE REDNESS: 0
ABDOMINAL DISTENTION: 0
CHEST TIGHTNESS: 0
SHORTNESS OF BREATH: 0
FACIAL SWEATING: 0
SORE THROAT: 0
EYE WATERING: 1
NAUSEA: 1
WHEEZING: 0
BOWEL INCONTINENCE: 0
RHINORRHEA: 0
COUGH: 0
SWOLLEN GLANDS: 0
APNEA: 0
COLOR CHANGE: 0

## 2024-11-27 NOTE — PROGRESS NOTES
REMOVAL performed by Frank Cochran DO at [facility] OR     SECTION      DILATION AND CURETTAGE OF UTERUS N/A 10/13/2017    D & C HYSTEROSCOPY with polypectomy performed by Lebron Hernandez MD at [facility] OR    INSERTION / REMOVAL / REPLACEMENT VENOUS ACCESS CATHETER Left 2017    PORT INSERTION performed by Sunil Mcclure MD at [facility] OR    INSERTION / REMOVAL / REPLACEMENT VENOUS ACCESS CATHETER N/A 2017    Port Removal & Insertion performed by Sunil Mcclure MD at [facility] OR    MASTECTOMY Right 10/19/2017    Right Breast Mastectomy with  [place] Node Biopsy performed by Sunil Mcclure MD at [facility] OR    WI [facility]J [facility] CTR VAD W/SUBQ PORT AGE 5 YR/> Left 2018    PORT Exchange performed by Sunil Mcclure MD at [facility] OR    SHOULDER SURGERY Left , 2015    x 2 surgeries total; Dr. Rowe         Current Outpatient Medications   Medication Sig Dispense Refill    fluticasone (FLONASE) 50 MCG/ACT nasal spray INSTILL 1 SPRAY INTO EACH NOSTRIL ONCE DAILY 16 g 1    allopurinol (ZYLOPRIM) 100 MG tablet TAKE 1 TABLET BY MOUTH EVERY DAY 30 tablet 5    pravastatin (PRAVACHOL) 40 MG tablet Take 1 tablet by mouth daily 90 tablet 0    albuterol sulfate HFA (VENTOLIN HFA) 108 (90 Base) MCG/ACT inhaler inhale 2 puffs by mouth and INTO THE LUNGS every 4 hours if needed for wheezing 18 g 3    levocetirizine (XYZAL) 5 MG tablet Take 1 tablet by mouth nightly 30 tablet 2    butalbital-acetaminophen-caffeine (FIORICET, ESGIC) -40 MG per tablet 1-2   TABLET  TWICE  DAILY  AS  NEEDED 60 tablet 2    clonazePAM (KLONOPIN) 0.5 MG tablet ONE    TABLET     2 - 3   TIMES   DAILY 90 tablet 2    Cyanocobalamin ER (RA VITAMIN B-12 TR) 1000 MCG TBCR take 1 tablet by mouth once daily 30 tablet 5    Erenumab-aooe 140 MG/ML SOAJ Inject 140 mg into the skin every 30 days 1 Adjustable Dose Pre-filled Pen Syringe 5    lamoTRIgine (LAMICTAL) 100 MG tablet take 1 tablet by mouth twice a day 60 tablet 5    topiramate

## 2024-12-23 RX ORDER — LEVOCETIRIZINE DIHYDROCHLORIDE 5 MG/1
5 TABLET, FILM COATED ORAL NIGHTLY
Qty: 30 TABLET | Refills: 0 | Status: SHIPPED | OUTPATIENT
Start: 2024-12-23

## 2024-12-23 NOTE — TELEPHONE ENCOUNTER
Sonia called requesting a refill of the below medication which has been pended for you:     Requested Prescriptions     Pending Prescriptions Disp Refills    levocetirizine (XYZAL) 5 MG tablet [Pharmacy Med Name: Levocetirizine Dihydrochloride Oral Tablet 5 MG] 30 tablet 0     Sig: TAKE 1 TABLET BY MOUTH AT NIGHT       Last Appointment Date: 10/3/2024  Next Appointment Date: 10/9/2025    Allergies   Allergen Reactions    Other Other (See Comments)     RUE Precautions, NO BP RUE.      Prednisone Swelling     Whole side of her face swelled when she took it, difficulty breathing

## 2025-01-01 DIAGNOSIS — I82.A11 ACUTE DEEP VEIN THROMBOSIS (DVT) OF AXILLARY VEIN OF RIGHT UPPER EXTREMITY (HCC): ICD-10-CM

## 2025-01-16 DIAGNOSIS — I82.A11 ACUTE DEEP VEIN THROMBOSIS (DVT) OF AXILLARY VEIN OF RIGHT UPPER EXTREMITY: ICD-10-CM

## 2025-01-20 RX ORDER — LEVOCETIRIZINE DIHYDROCHLORIDE 5 MG/1
5 TABLET, FILM COATED ORAL NIGHTLY
Qty: 30 TABLET | Refills: 10 | Status: SHIPPED | OUTPATIENT
Start: 2025-01-20

## 2025-02-12 DIAGNOSIS — E78.2 MIXED HYPERLIPIDEMIA: ICD-10-CM

## 2025-02-13 RX ORDER — PRAVASTATIN SODIUM 40 MG
40 TABLET ORAL DAILY
Qty: 90 TABLET | Refills: 0 | OUTPATIENT
Start: 2025-02-13

## 2025-02-13 RX ORDER — PRAVASTATIN SODIUM 40 MG
40 TABLET ORAL DAILY
Qty: 90 TABLET | Refills: 0 | Status: SHIPPED | OUTPATIENT
Start: 2025-02-13

## 2025-02-13 NOTE — TELEPHONE ENCOUNTER
Sonia called requesting a refill of the below medication which has been pended for you:     Requested Prescriptions     Pending Prescriptions Disp Refills    pravastatin (PRAVACHOL) 40 MG tablet 90 tablet 0     Sig: Take 1 tablet by mouth daily       Last Appointment Date: 10/3/2024  Next Appointment Date: 2/12/2025    Allergies   Allergen Reactions    Other Other (See Comments)     RUE Precautions, NO BP RUE.      Prednisone Swelling     Whole side of her face swelled when she took it, difficulty breathing

## 2025-02-20 DIAGNOSIS — J30.2 SEASONAL ALLERGIES: ICD-10-CM

## 2025-02-20 DIAGNOSIS — G43.009 MIGRAINE WITHOUT AURA AND WITHOUT STATUS MIGRAINOSUS, NOT INTRACTABLE: ICD-10-CM

## 2025-02-20 RX ORDER — FLUTICASONE PROPIONATE 50 MCG
SPRAY, SUSPENSION (ML) NASAL
Qty: 16 G | Refills: 0 | Status: SHIPPED | OUTPATIENT
Start: 2025-02-20

## 2025-02-20 RX ORDER — BUTALBITAL, ACETAMINOPHEN AND CAFFEINE 50; 325; 40 MG/1; MG/1; MG/1
TABLET ORAL
Qty: 60 TABLET | Refills: 0 | OUTPATIENT
Start: 2025-02-20

## 2025-02-20 NOTE — TELEPHONE ENCOUNTER
Sonia called requesting a refill of the below medication which has been pended for you:     Requested Prescriptions     Pending Prescriptions Disp Refills    fluticasone (FLONASE) 50 MCG/ACT nasal spray [Pharmacy Med Name: Fluticasone Propionate Nasal Suspension 50 MCG/ACT] 16 g 0     Sig: ADMINISTER 1 SPRAY IN EACH NOSTRIL ONCE DAILY       Last Appointment Date: 10/3/2024  Next Appointment Date: 10/9/2025    Allergies   Allergen Reactions    Other Other (See Comments)     RUE Precautions, NO BP RUE.      Prednisone Swelling     Whole side of her face swelled when she took it, difficulty breathing

## 2025-02-20 NOTE — TELEPHONE ENCOUNTER
Last Appt:  11/27/2024  Next Appt:   2/27/2025  Med verified in Epic     OARRS 2/20/25    Patient is requesting a refill of fioricet

## 2025-02-27 ENCOUNTER — OFFICE VISIT (OUTPATIENT)
Dept: NEUROLOGY | Age: 60
End: 2025-02-27
Payer: MEDICARE

## 2025-02-27 VITALS
HEART RATE: 98 BPM | WEIGHT: 293 LBS | DIASTOLIC BLOOD PRESSURE: 84 MMHG | OXYGEN SATURATION: 98 % | BODY MASS INDEX: 47.45 KG/M2 | SYSTOLIC BLOOD PRESSURE: 112 MMHG

## 2025-02-27 DIAGNOSIS — G24.5 BLEPHAROSPASM: ICD-10-CM

## 2025-02-27 DIAGNOSIS — R25.3 MUSCLE TWITCHING: ICD-10-CM

## 2025-02-27 DIAGNOSIS — G51.32 HEMIFACIAL SPASM OF LEFT SIDE OF FACE: ICD-10-CM

## 2025-02-27 DIAGNOSIS — G43.009 MIGRAINE WITHOUT AURA AND WITHOUT STATUS MIGRAINOSUS, NOT INTRACTABLE: Primary | ICD-10-CM

## 2025-02-27 DIAGNOSIS — R41.3 MEMORY PROBLEM: ICD-10-CM

## 2025-02-27 DIAGNOSIS — E66.01 OBESITY, CLASS III, BMI 40-49.9 (MORBID OBESITY): ICD-10-CM

## 2025-02-27 DIAGNOSIS — F31.9 BIPOLAR 1 DISORDER (HCC): ICD-10-CM

## 2025-02-27 DIAGNOSIS — G51.39 HEMIFACIAL SPASM, UNSPECIFIED LATERALITY: ICD-10-CM

## 2025-02-27 DIAGNOSIS — Z98.890 H/O BREAST RECONSTRUCTION: ICD-10-CM

## 2025-02-27 DIAGNOSIS — Z90.11 H/O RIGHT MASTECTOMY: ICD-10-CM

## 2025-02-27 DIAGNOSIS — Z85.3 HX: BREAST CANCER: ICD-10-CM

## 2025-02-27 DIAGNOSIS — F32.A ANXIETY AND DEPRESSION: ICD-10-CM

## 2025-02-27 DIAGNOSIS — I82.A21 CHRONIC DEEP VEIN THROMBOSIS (DVT) OF AXILLARY VEIN OF RIGHT UPPER EXTREMITY (HCC): ICD-10-CM

## 2025-02-27 DIAGNOSIS — G47.9 SLEEP DIFFICULTIES: ICD-10-CM

## 2025-02-27 DIAGNOSIS — F41.9 ANXIETY AND DEPRESSION: ICD-10-CM

## 2025-02-27 PROCEDURE — 99214 OFFICE O/P EST MOD 30 MIN: CPT | Performed by: PSYCHIATRY & NEUROLOGY

## 2025-02-27 PROCEDURE — 3017F COLORECTAL CA SCREEN DOC REV: CPT | Performed by: PSYCHIATRY & NEUROLOGY

## 2025-02-27 PROCEDURE — 1036F TOBACCO NON-USER: CPT | Performed by: PSYCHIATRY & NEUROLOGY

## 2025-02-27 PROCEDURE — G8417 CALC BMI ABV UP PARAM F/U: HCPCS | Performed by: PSYCHIATRY & NEUROLOGY

## 2025-02-27 PROCEDURE — G8427 DOCREV CUR MEDS BY ELIG CLIN: HCPCS | Performed by: PSYCHIATRY & NEUROLOGY

## 2025-02-27 RX ORDER — TOPIRAMATE 50 MG/1
TABLET, FILM COATED ORAL
Qty: 60 TABLET | Refills: 5 | Status: SHIPPED | OUTPATIENT
Start: 2025-02-27

## 2025-02-27 RX ORDER — CLONAZEPAM 0.5 MG/1
TABLET ORAL
Qty: 90 TABLET | Refills: 2 | Status: SHIPPED | OUTPATIENT
Start: 2025-02-27 | End: 2025-05-31

## 2025-02-27 RX ORDER — BUTALBITAL, ACETAMINOPHEN AND CAFFEINE 50; 325; 40 MG/1; MG/1; MG/1
TABLET ORAL
Qty: 60 TABLET | Refills: 2 | Status: SHIPPED | OUTPATIENT
Start: 2025-02-27

## 2025-02-27 RX ORDER — LAMOTRIGINE 100 MG/1
TABLET ORAL
Qty: 60 TABLET | Refills: 5 | Status: SHIPPED | OUTPATIENT
Start: 2025-02-27

## 2025-02-27 RX ORDER — UBROGEPANT 100 MG/1
100 TABLET ORAL 2 TIMES DAILY PRN
Qty: 30 TABLET | Refills: 5 | Status: SHIPPED | OUTPATIENT
Start: 2025-02-27

## 2025-02-27 ASSESSMENT — ENCOUNTER SYMPTOMS
CHOKING: 0
RHINORRHEA: 0
BLOOD IN STOOL: 0
NAUSEA: 1
FACIAL SWELLING: 0
BACK PAIN: 0
DIARRHEA: 0
BLURRED VISION: 0
VOMITING: 1
EYE ITCHING: 0
WHEEZING: 0
EYE PAIN: 0
EYE WATERING: 1
SCALP TENDERNESS: 0
BOWEL INCONTINENCE: 0
PHOTOPHOBIA: 1
SINUS PRESSURE: 0
SWOLLEN GLANDS: 0
VOICE CHANGE: 0
EYE DISCHARGE: 0
SORE THROAT: 0
FACIAL SWEATING: 0
ABDOMINAL DISTENTION: 0
CONSTIPATION: 0
EYE REDNESS: 0
CHEST TIGHTNESS: 0
TROUBLE SWALLOWING: 0
APNEA: 0
SHORTNESS OF BREATH: 0
COUGH: 0
ABDOMINAL PAIN: 0
VISUAL CHANGE: 0
COLOR CHANGE: 0

## 2025-02-27 NOTE — PROGRESS NOTES
transcription, some errors in transcription may have occurred.      GENERAL PATIENT INSTRUCTIONS:     A Healthy Lifestyle: Care Instructions  Your Care Instructions  A healthy lifestyle can help you feel good, stay at a healthy weight, and have plenty of energy for both work and play. A healthy lifestyle is something you can share with your whole family.  A healthy lifestyle also can lower your risk for serious health problems, such as high blood pressure, heart disease, and diabetes.  You can follow a few steps listed below to improve your health and the health of your family.  Follow-up care is a key part of your treatment and safety. Be sure to make and go to all appointments, and call your doctor if you are having problems. It’s also a good idea to know your test results and keep a list of the medicines you take.  How can you care for yourself at home?  Do not eat too much sugar, fat, or fast foods. You can still have dessert and treats now and then. The goal is moderation.  Start small to improve your eating habits. Pay attention to portion sizes, drink less juice and soda pop, and eat more fruits and vegetables.  Eat a healthy amount of food. A 3-ounce serving of meat, for example, is about the size of a deck of cards. Fill the rest of your plate with vegetables and whole grains.  Limit the amount of soda and sports drinks you have every day. Drink more water when you are thirsty.  Eat at least 5 servings of fruits and vegetables every day. It may seem like a lot, but it is not hard to reach this goal. A serving or helping is 1 piece of fruit, 1 cup of vegetables, or 2 cups of leafy, raw vegetables. Have an apple or some carrot sticks as an afternoon snack instead of a candy bar. Try to have fruits and/or vegetables at every meal.  Make exercise part of your daily routine. You may want to start with simple activities, such as walking, bicycling, or slow swimming. Try to be active 30 to 60 minutes every day.

## 2025-03-18 DIAGNOSIS — J30.2 SEASONAL ALLERGIES: ICD-10-CM

## 2025-03-19 ENCOUNTER — HOSPITAL ENCOUNTER (OUTPATIENT)
Dept: MAMMOGRAPHY | Age: 60
Discharge: HOME | End: 2025-03-21
Attending: INTERNAL MEDICINE
Payer: MEDICARE

## 2025-03-19 DIAGNOSIS — Z12.31 SCREENING MAMMOGRAM, ENCOUNTER FOR: ICD-10-CM

## 2025-03-19 PROCEDURE — 77063 BREAST TOMOSYNTHESIS BI: CPT

## 2025-03-19 RX ORDER — FLUTICASONE PROPIONATE 50 MCG
1 SPRAY, SUSPENSION (ML) NASAL DAILY
Qty: 16 G | Refills: 3 | Status: SHIPPED | OUTPATIENT
Start: 2025-03-19

## 2025-03-19 NOTE — TELEPHONE ENCOUNTER
Sonia called requesting a refill of the below medication which has been pended for you:     Requested Prescriptions     Pending Prescriptions Disp Refills    fluticasone (FLONASE) 50 MCG/ACT nasal spray 16 g 3     Si spray by Nasal route daily ADMINISTER 1 SPRAY IN EACH NOSTRIL ONCE DAILY       Last Appointment Date: 10/3/2024  Next Appointment Date: 10/9/2025    Allergies   Allergen Reactions    Other Other (See Comments)     RUE Precautions, NO BP RUE.      Prednisone Swelling     Whole side of her face swelled when she took it, difficulty breathing

## 2025-03-26 RX ORDER — GALCANEZUMAB 120 MG/ML
INJECTION, SOLUTION SUBCUTANEOUS
COMMUNITY
End: 2025-03-26 | Stop reason: SDUPTHER

## 2025-03-26 RX ORDER — GALCANEZUMAB 120 MG/ML
120 INJECTION, SOLUTION SUBCUTANEOUS
Qty: 1 ADJUSTABLE DOSE PRE-FILLED PEN SYRINGE | Refills: 3 | Status: SHIPPED | OUTPATIENT
Start: 2025-03-26

## 2025-03-26 NOTE — TELEPHONE ENCOUNTER
Last Appt:  2/27/2025  Next Appt:   5/15/2025  Med verified in [facility]     Patient's insurance denied aimovig. Per pharmacy, emgality will be covered.     Patient uses [facility]

## 2025-04-01 ENCOUNTER — OFFICE VISIT (OUTPATIENT)
Dept: ONCOLOGY | Age: 60
End: 2025-04-01
Payer: MEDICARE

## 2025-04-01 VITALS
RESPIRATION RATE: 18 BRPM | WEIGHT: 292.6 LBS | DIASTOLIC BLOOD PRESSURE: 76 MMHG | OXYGEN SATURATION: 93 % | SYSTOLIC BLOOD PRESSURE: 130 MMHG | TEMPERATURE: 97.7 F | HEART RATE: 85 BPM | BODY MASS INDEX: 47.02 KG/M2 | HEIGHT: 66 IN

## 2025-04-01 DIAGNOSIS — C50.919 MALIGNANT NEOPLASM OF FEMALE BREAST, UNSPECIFIED ESTROGEN RECEPTOR STATUS, UNSPECIFIED LATERALITY, UNSPECIFIED SITE OF BREAST: Primary | ICD-10-CM

## 2025-04-01 DIAGNOSIS — Z09 ENCOUNTER FOR FOLLOW-UP EXAMINATION AFTER COMPLETED TREATMENT FOR CONDITIONS OTHER THAN MALIGNANT NEOPLASM: ICD-10-CM

## 2025-04-01 DIAGNOSIS — Z12.31 ENCOUNTER FOR SCREENING MAMMOGRAM FOR MALIGNANT NEOPLASM OF BREAST: ICD-10-CM

## 2025-04-01 DIAGNOSIS — Z79.01 ANTICOAGULATED: ICD-10-CM

## 2025-04-01 DIAGNOSIS — I82.90 VTE (VENOUS THROMBOEMBOLISM): ICD-10-CM

## 2025-04-01 DIAGNOSIS — Z90.11 HX OF MASTECTOMY, RIGHT: ICD-10-CM

## 2025-04-01 PROCEDURE — 99214 OFFICE O/P EST MOD 30 MIN: CPT | Performed by: INTERNAL MEDICINE

## 2025-04-01 NOTE — PROGRESS NOTES
Dinhr scheduled scan prior to follow up   
level: Not on file   Occupational History    Not on file   Tobacco Use    Smoking status: Never    Smokeless tobacco: Never    Tobacco comments:     Never smoker. TC, RRT 4/5/18   Vaping Use    Vaping status: Never Used   Substance and Sexual Activity    Alcohol use: Not Currently     Comment: Rarely    Drug use: No    Sexual activity: Not Currently     Partners: Male     Birth control/protection: Surgical   Other Topics Concern    Not on file   Social History Narrative    Not on file     Social Drivers of Health     Financial Resource Strain: Low Risk  (11/2/2023)    Overall Financial Resource Strain (CARDIA)     Difficulty of Paying Living Expenses: Not hard at all   Food Insecurity: Not on file (11/2/2023)   Transportation Needs: Unknown (11/2/2023)    PRAPARE - Transportation     Lack of Transportation (Medical): Not on file     Lack of Transportation (Non-Medical): No   Physical Activity: Unknown (10/2/2024)    Exercise Vital Sign     Days of Exercise per Week: Patient declined     Minutes of Exercise per Session: Not on file   Stress: Not on file   Social Connections: Not on file   Intimate Partner Violence: Not on file   Housing Stability: Unknown (11/2/2023)    Housing Stability Vital Sign     Unable to Pay for Housing in the Last Year: Not on file     Number of Places Lived in the Last Year: Not on file     Unstable Housing in the Last Year: No      Current Medications:     Current Outpatient Medications   Medication Sig Dispense Refill    Galcanezumab-gnlm (EMGALITY) 120 MG/ML SOAJ Inject 120 mg into the skin every 30 days 1 Adjustable Dose Pre-filled Pen Syringe 3    fluticasone (FLONASE) 50 MCG/ACT nasal spray 1 spray by Nasal route daily ADMINISTER 1 SPRAY IN EACH NOSTRIL ONCE DAILY 16 g 3    clonazePAM (KLONOPIN) 0.5 MG tablet ONE    TABLET     2 - 3   TIMES   DAILY 90 tablet 2    butalbital-acetaminophen-caffeine (FIORICET, ESGIC) -40 MG per tablet 1-2   TABLET  TWICE  DAILY  AS  NEEDED 60

## 2025-04-10 DIAGNOSIS — M10.9 ACUTE GOUT OF RIGHT FOOT, UNSPECIFIED CAUSE: ICD-10-CM

## 2025-04-11 RX ORDER — ALLOPURINOL 100 MG/1
100 TABLET ORAL DAILY
Qty: 30 TABLET | Refills: 5 | Status: SHIPPED | OUTPATIENT
Start: 2025-04-11

## 2025-04-11 NOTE — TELEPHONE ENCOUNTER
Sonia called requesting a refill of the below medication which has been pended for you:     Requested Prescriptions     Pending Prescriptions Disp Refills    allopurinol (ZYLOPRIM) 100 MG tablet 30 tablet 5     Sig: Take 1 tablet by mouth daily       Last Appointment Date: 10/3/2024  Next Appointment Date: 10/9/2025    Allergies   Allergen Reactions    Other Other (See Comments)     RUE Precautions, NO BP RUE.      Prednisone Swelling     Whole side of her face swelled when she took it, difficulty breathing

## 2025-05-12 DIAGNOSIS — I82.A11 ACUTE DEEP VEIN THROMBOSIS (DVT) OF AXILLARY VEIN OF RIGHT UPPER EXTREMITY (HCC): ICD-10-CM

## 2025-05-15 ENCOUNTER — APPOINTMENT (OUTPATIENT)
Dept: CT IMAGING | Age: 60
End: 2025-05-15
Payer: MEDICARE

## 2025-05-15 ENCOUNTER — OFFICE VISIT (OUTPATIENT)
Dept: NEUROLOGY | Age: 60
End: 2025-05-15
Payer: MEDICARE

## 2025-05-15 ENCOUNTER — HOSPITAL ENCOUNTER (EMERGENCY)
Age: 60
Discharge: HOME | End: 2025-05-15
Attending: EMERGENCY MEDICINE
Payer: MEDICARE

## 2025-05-15 VITALS
SYSTOLIC BLOOD PRESSURE: 158 MMHG | HEART RATE: 82 BPM | TEMPERATURE: 97 F | RESPIRATION RATE: 16 BRPM | DIASTOLIC BLOOD PRESSURE: 90 MMHG | OXYGEN SATURATION: 95 %

## 2025-05-15 VITALS
RESPIRATION RATE: 18 BRPM | WEIGHT: 293 LBS | BODY MASS INDEX: 47.29 KG/M2 | DIASTOLIC BLOOD PRESSURE: 84 MMHG | OXYGEN SATURATION: 94 % | SYSTOLIC BLOOD PRESSURE: 138 MMHG | HEART RATE: 82 BPM

## 2025-05-15 DIAGNOSIS — F41.9 ANXIETY AND DEPRESSION: ICD-10-CM

## 2025-05-15 DIAGNOSIS — G47.9 SLEEP DIFFICULTIES: ICD-10-CM

## 2025-05-15 DIAGNOSIS — Z98.890 H/O BREAST RECONSTRUCTION: ICD-10-CM

## 2025-05-15 DIAGNOSIS — Z90.11 H/O RIGHT MASTECTOMY: ICD-10-CM

## 2025-05-15 DIAGNOSIS — F31.9 BIPOLAR 1 DISORDER (HCC): ICD-10-CM

## 2025-05-15 DIAGNOSIS — R25.3 MUSCLE TWITCHING: ICD-10-CM

## 2025-05-15 DIAGNOSIS — Z85.3 HX: BREAST CANCER: ICD-10-CM

## 2025-05-15 DIAGNOSIS — G51.32 HEMIFACIAL SPASM OF LEFT SIDE OF FACE: ICD-10-CM

## 2025-05-15 DIAGNOSIS — R41.3 MEMORY PROBLEM: ICD-10-CM

## 2025-05-15 DIAGNOSIS — I82.A21 CHRONIC DEEP VEIN THROMBOSIS (DVT) OF AXILLARY VEIN OF RIGHT UPPER EXTREMITY (HCC): ICD-10-CM

## 2025-05-15 DIAGNOSIS — G44.039 NONINTRACTABLE PAROXYSMAL HEMICRANIA, UNSPECIFIED CHRONICITY PATTERN: Primary | ICD-10-CM

## 2025-05-15 DIAGNOSIS — G43.009 MIGRAINE WITHOUT AURA AND WITHOUT STATUS MIGRAINOSUS, NOT INTRACTABLE: Primary | ICD-10-CM

## 2025-05-15 DIAGNOSIS — F32.A ANXIETY AND DEPRESSION: ICD-10-CM

## 2025-05-15 DIAGNOSIS — G24.5 BLEPHAROSPASM: ICD-10-CM

## 2025-05-15 DIAGNOSIS — E66.813 OBESITY, CLASS III, BMI 40-49.9 (MORBID OBESITY) (HCC): ICD-10-CM

## 2025-05-15 PROCEDURE — 99214 OFFICE O/P EST MOD 30 MIN: CPT | Performed by: PSYCHIATRY & NEUROLOGY

## 2025-05-15 PROCEDURE — G8417 CALC BMI ABV UP PARAM F/U: HCPCS | Performed by: PSYCHIATRY & NEUROLOGY

## 2025-05-15 PROCEDURE — 3017F COLORECTAL CA SCREEN DOC REV: CPT | Performed by: PSYCHIATRY & NEUROLOGY

## 2025-05-15 PROCEDURE — 70450 CT HEAD/BRAIN W/O DYE: CPT

## 2025-05-15 PROCEDURE — 1036F TOBACCO NON-USER: CPT | Performed by: PSYCHIATRY & NEUROLOGY

## 2025-05-15 PROCEDURE — G8427 DOCREV CUR MEDS BY ELIG CLIN: HCPCS | Performed by: PSYCHIATRY & NEUROLOGY

## 2025-05-15 PROCEDURE — 99284 EMERGENCY DEPT VISIT MOD MDM: CPT

## 2025-05-15 RX ORDER — BUTALBITAL, ACETAMINOPHEN AND CAFFEINE 50; 325; 40 MG/1; MG/1; MG/1
TABLET ORAL
Qty: 60 TABLET | Refills: 2 | Status: SHIPPED | OUTPATIENT
Start: 2025-05-15

## 2025-05-15 RX ORDER — LAMOTRIGINE 100 MG/1
TABLET ORAL
Qty: 60 TABLET | Refills: 5 | Status: SHIPPED | OUTPATIENT
Start: 2025-05-15

## 2025-05-15 RX ORDER — UBROGEPANT 100 MG/1
100 TABLET ORAL 2 TIMES DAILY PRN
Qty: 30 TABLET | Refills: 5 | Status: SHIPPED | OUTPATIENT
Start: 2025-05-15

## 2025-05-15 RX ORDER — GALCANEZUMAB 120 MG/ML
120 INJECTION, SOLUTION SUBCUTANEOUS
Qty: 1 ADJUSTABLE DOSE PRE-FILLED PEN SYRINGE | Refills: 3 | Status: SHIPPED | OUTPATIENT
Start: 2025-05-15

## 2025-05-15 RX ORDER — CLONAZEPAM 0.5 MG/1
TABLET ORAL
Qty: 90 TABLET | Refills: 2 | Status: SHIPPED | OUTPATIENT
Start: 2025-05-15 | End: 2025-08-16

## 2025-05-15 RX ORDER — PSYLLIUM HUSK 3.4 G/7G
POWDER ORAL
Qty: 30 TABLET | Refills: 5 | Status: SHIPPED | OUTPATIENT
Start: 2025-05-15

## 2025-05-15 RX ORDER — TOPIRAMATE 50 MG/1
TABLET, FILM COATED ORAL
Qty: 60 TABLET | Refills: 5 | Status: SHIPPED | OUTPATIENT
Start: 2025-05-15

## 2025-05-15 ASSESSMENT — PAIN SCALES - GENERAL
PAINLEVEL_OUTOF10: 8
PAINLEVEL_OUTOF10: 7

## 2025-05-15 ASSESSMENT — ENCOUNTER SYMPTOMS
ABDOMINAL PAIN: 0
BACK PAIN: 0
SWOLLEN GLANDS: 0
TROUBLE SWALLOWING: 0
SORE THROAT: 0
APNEA: 0
COLOR CHANGE: 0
SINUS PRESSURE: 0
EYE REDNESS: 0
NAUSEA: 1
CHOKING: 0
COUGH: 0
BOWEL INCONTINENCE: 0
DIARRHEA: 0
FACIAL SWELLING: 0
PHOTOPHOBIA: 1
BLOOD IN STOOL: 0
WHEEZING: 0
CONSTIPATION: 0
BLURRED VISION: 0
RHINORRHEA: 0
SHORTNESS OF BREATH: 0
CHEST TIGHTNESS: 0
ABDOMINAL DISTENTION: 0
VOMITING: 1
EYE DISCHARGE: 0
EYE ITCHING: 0
EYE PAIN: 0
SCALP TENDERNESS: 0
VISUAL CHANGE: 0
VOICE CHANGE: 0
EYE WATERING: 1
FACIAL SWEATING: 0

## 2025-05-15 ASSESSMENT — PAIN DESCRIPTION - LOCATION: LOCATION: HEAD

## 2025-05-15 ASSESSMENT — PAIN - FUNCTIONAL ASSESSMENT: PAIN_FUNCTIONAL_ASSESSMENT: 0-10

## 2025-05-15 ASSESSMENT — PAIN DESCRIPTION - DESCRIPTORS: DESCRIPTORS: ACHING

## 2025-05-15 NOTE — DISCHARGE INSTRUCTIONS
Go directly to Dr. Quinn's office.  Continue medications as directed.  Return for weakness, numbness, worsening pain, or if worse in any way.    PLEASE RETURN TO THE EMERGENCY DEPARTMENT IMMEDIATELY if your symptoms worsen in anyway or in 8-12 hours if not improved for re-evaluation.  You should immediately return to the ER for symptoms such as new or worsening pain, fever, visual or hearing changes, stiff neck, rash, a feeling of passing out, chest pain, shortness of breath, persistent nausea and/or vomiting, numbness or weakness to the arms or legs, coolness or color change of the arms or legs.      Take your medication as indicated and prescribed.  If you are given an antibiotic then, make sure you get the prescription filled and take the antibiotics until finished.      Please understand that at this time there is no evidence for a more serious underlying process, but that early in the process of an illness or injury, an emergency department workup can be falsely reassuring.  You should contact your family doctor within the next 24 hours for a follow up appointment    THANK YOU!!!    From [facility] and [facility]    On behalf of the Emergency Department staff at [facility], I would like to thank you for giving us the opportunity to address your health care needs and concerns.    We hope that during your visit, our service was delivered in a professional and caring manner. Please keep [facility] in mind as we walk with you down the path to your own personal wellness.     Please expect an automated text message or email from us so we can ask a few questions about your health and progress. Based on your answers, a clinician may call you back to offer help and instructions.    Please understand that early in the process of an illness or injury, an emergency department workup can be falsely reassuring.  If you notice any worsening, changing or persistent symptoms please call your family

## 2025-05-15 NOTE — PROGRESS NOTES
1500 Pt returned from ED s/p Eval/Tx.  (Neg) CT Head no labdraw.  Pt stated, \"I feel better after having my CT done.  I feel less anxious.\"    
example, is about the size of a deck of cards. Fill the rest of your plate with vegetables and whole grains.  Limit the amount of soda and sports drinks you have every day. Drink more water when you are thirsty.  Eat at least 5 servings of fruits and vegetables every day. It may seem like a lot, but it is not hard to reach this goal. A serving or helping is 1 piece of fruit, 1 cup of vegetables, or 2 cups of leafy, raw vegetables. Have an apple or some carrot sticks as an afternoon snack instead of a candy bar. Try to have fruits and/or vegetables at every meal.  Make exercise part of your daily routine. You may want to start with simple activities, such as walking, bicycling, or slow swimming. Try to be active 30 to 60 minutes every day. You do not need to do all 30 to 60 minutes all at once. For example, you can exercise 3 times a day for 10 or 20 minutes. Moderate exercise is safe for most people, but it is always a good idea to talk to your doctor before starting an exercise program.  Keep moving. Mow the lawn, work in the garden, or clean your house. Take the stairs instead of the elevator at work.  If you smoke, quit. People who smoke have an increased risk for heart attack, stroke, cancer, and other lung illnesses. Quitting is hard, but there are ways to boost your chance of quitting tobacco for good.  Use nicotine gum, patches, or lozenges.  Ask your doctor about stop-smoking programs and medicines.  Keep trying.  In addition to reducing your risk of diseases in the future, you will notice some benefits soon after you stop using tobacco. If you have shortness of breath or asthma symptoms, they will likely get better within a few weeks after you quit.  Limit how much alcohol you drink. Moderate amounts of alcohol (up to 2 drinks a day for men, 1 drink a day for women) are okay. But drinking too much can lead to liver problems, high blood pressure, and other health problems.  Family health  If you have a

## 2025-05-15 NOTE — ED PROVIDER NOTES
[facility] Emergency Department  [address] [address]  Phone: [phone]      Patient Name:  Sonia Grant  Medical Record Number:  [id number]  YOB: 1965  Date of Service:  5/15/2025  Primary Care Physician:  Desiree Michelle MD      CHIEF COMPLAINT:       Chief Complaint   Patient presents with    Headache    Facial Droop       HISTORY OF PRESENT ILLNESS:    Sonia Grant is a 59 y.o. female who presents with the complaint of headache.  The nurse was concerned about a facial droop.  The patient says that the facial twitching and movement she has of the face on the left side are chronic.  She says it was a side effect from medication that she takes.  The patient has a history of migraines.  She was at her neurologist office for a routine appointment.  She has a migraine currently.  It is on the left side of her head.  She said it started yesterday.  She did not take her medication for migraines because she wanted to talk to her doctor about it before doing so.  The patient says that she started taking Emgality for her headaches, and it has helped.  She denies focal weakness or numbness.  She has not had problems swallowing.  She has not had drooling.  The patient was sent over by the nurse from the office because the doctor has not arrived to see patients yet and she was concerned about a stroke.  The patient does take Eliquis.    CURRENT MEDICATIONS:      Previous Medications    ALBUTEROL SULFATE HFA (VENTOLIN HFA) 108 (90 BASE) MCG/ACT INHALER    inhale 2 puffs by mouth and INTO THE LUNGS every 4 hours if needed for wheezing    ALLOPURINOL (ZYLOPRIM) 100 MG TABLET    Take 1 tablet by mouth daily    APIXABAN (ELIQUIS) 5 MG TABS TABLET    TAKE 1 TABLET BY MOUTH 2 TIMES A DAY    BENZTROPINE (COGENTIN) 0.5 MG TABLET    Take 1 tablet by mouth daily    BUSPIRONE (BUSPAR) 15 MG TABLET    Take 15 mg by mouth 3 times daily    BUTALBITAL-ACETAMINOPHEN-CAFFEINE (FIORICET, ESGIC)  ideation.   Lymphatics.:  No lymphadenopathy.     NIH score 0.    MEDICAL DECISION MAKING:     DIFFERENTIAL DIAGNOSIS:   Cephalgia, migraine, subarachnoid hemorrhage, meningitis    PLAN:     Orders Placed This Encounter   Procedures    CT HEAD WO CONTRAST       MEDICATIONS ORDERED:   No orders of the defined types were placed in this encounter.      DIAGNOSTIC RESULTS:       RADIOLOGY:     CT HEAD WO CONTRAST   Final Result   No acute intracranial abnormality.              CT HEAD WO CONTRAST (Final result)  Result time 05/15/25 14:33:27  Final result by Carlos Collado MD (05/15/25 14:33:27)                Impression:    No acute intracranial abnormality.            Narrative:    EXAMINATION:  CT OF THE HEAD WITHOUT CONTRAST  5/15/2025 1:49 pm    TECHNIQUE:  CT of the head was performed without the administration of intravenous  contrast. Automated exposure control, iterative reconstruction, and/or weight  based adjustment of the mA/kV was utilized to reduce the radiation dose to as  low as reasonably achievable.    COMPARISON:  None.    HISTORY:  ORDERING SYSTEM PROVIDED HISTORY: headache; history of migraines; facial  twitch left sided  TECHNOLOGIST PROVIDED HISTORY:    headache; history of migraines; facial twitch left sided  Decision Support Exception - unselect if not a suspected or confirmed  emergency medical condition->Emergency Medical Condition (MA)  Reason for Exam: headache; history of migraines; facial twitch left sided    FINDINGS:  BRAIN/VENTRICLES: There is no acute intracranial hemorrhage, mass effect or  midline shift.  No abnormal extra-axial fluid collection.  The gray-white  differentiation is maintained without evidence of an acute infarct.  There is  no evidence of hydrocephalus.    ORBITS: The visualized portion of the orbits demonstrate no acute abnormality.    SINUSES: There is mild left mastoid effusion.  The visualized paranasal  sinuses and right mastoid air cells demonstrate no acute

## 2025-05-19 DIAGNOSIS — E78.2 MIXED HYPERLIPIDEMIA: ICD-10-CM

## 2025-05-19 RX ORDER — PRAVASTATIN SODIUM 40 MG
40 TABLET ORAL DAILY
Qty: 90 TABLET | Refills: 0 | Status: SHIPPED | OUTPATIENT
Start: 2025-05-19

## 2025-05-19 NOTE — TELEPHONE ENCOUNTER
Sonia called requesting a refill of the below medication which has been pended for you:     Requested Prescriptions     Pending Prescriptions Disp Refills    pravastatin (PRAVACHOL) 40 MG tablet 90 tablet 0     Sig: Take 1 tablet by mouth daily       Last Appointment Date: 10/3/2024  Next Appointment Date: 10/9/2025    Allergies   Allergen Reactions    Other/Food Other (See Comments)     RUE Precautions, NO BP RUE.      Prednisone Swelling     Whole side of her face swelled when she took it, difficulty breathing

## 2025-06-12 DIAGNOSIS — I82.A11 ACUTE DEEP VEIN THROMBOSIS (DVT) OF AXILLARY VEIN OF RIGHT UPPER EXTREMITY (HCC): ICD-10-CM

## 2025-06-12 NOTE — TELEPHONE ENCOUNTER
Last Appt:  4/1/2025  Next Appt: 4/7/2026  Med verified in Epic     Patient is requesting a refill of eliquis

## 2025-07-18 DIAGNOSIS — G43.009 MIGRAINE WITHOUT AURA AND WITHOUT STATUS MIGRAINOSUS, NOT INTRACTABLE: ICD-10-CM

## 2025-07-18 RX ORDER — BUTALBITAL, ACETAMINOPHEN AND CAFFEINE 50; 325; 40 MG/1; MG/1; MG/1
TABLET ORAL
Qty: 60 TABLET | Refills: 1 | Status: SHIPPED | OUTPATIENT
Start: 2025-07-18

## 2025-07-18 NOTE — TELEPHONE ENCOUNTER
Last Appt:  5/15/2025  Next Appt:   8/14/2025  Med verified in Epic     OARRS 7/18/25    Patient is requesting a refill of fioricet

## 2025-08-07 DIAGNOSIS — E78.2 MIXED HYPERLIPIDEMIA: ICD-10-CM

## 2025-08-07 RX ORDER — PRAVASTATIN SODIUM 40 MG
40 TABLET ORAL DAILY
Qty: 90 TABLET | Refills: 3 | Status: SHIPPED | OUTPATIENT
Start: 2025-08-07

## 2025-08-14 ENCOUNTER — OFFICE VISIT (OUTPATIENT)
Dept: NEUROLOGY | Age: 60
End: 2025-08-14
Payer: MEDICARE

## 2025-08-14 VITALS
WEIGHT: 293 LBS | OXYGEN SATURATION: 97 % | BODY MASS INDEX: 49.07 KG/M2 | DIASTOLIC BLOOD PRESSURE: 92 MMHG | HEART RATE: 83 BPM | SYSTOLIC BLOOD PRESSURE: 138 MMHG

## 2025-08-14 DIAGNOSIS — G47.9 SLEEP DIFFICULTIES: ICD-10-CM

## 2025-08-14 DIAGNOSIS — G43.009 MIGRAINE WITHOUT AURA AND WITHOUT STATUS MIGRAINOSUS, NOT INTRACTABLE: Primary | ICD-10-CM

## 2025-08-14 DIAGNOSIS — R25.3 MUSCLE TWITCHING: ICD-10-CM

## 2025-08-14 DIAGNOSIS — R41.3 MEMORY PROBLEM: ICD-10-CM

## 2025-08-14 DIAGNOSIS — Z90.11 H/O RIGHT MASTECTOMY: ICD-10-CM

## 2025-08-14 DIAGNOSIS — I82.A21 CHRONIC DEEP VEIN THROMBOSIS (DVT) OF AXILLARY VEIN OF RIGHT UPPER EXTREMITY (HCC): ICD-10-CM

## 2025-08-14 DIAGNOSIS — F31.9 BIPOLAR 1 DISORDER (HCC): ICD-10-CM

## 2025-08-14 DIAGNOSIS — G51.32 HEMIFACIAL SPASM OF LEFT SIDE OF FACE: ICD-10-CM

## 2025-08-14 DIAGNOSIS — F32.A ANXIETY AND DEPRESSION: ICD-10-CM

## 2025-08-14 DIAGNOSIS — G24.5 BLEPHAROSPASM: ICD-10-CM

## 2025-08-14 DIAGNOSIS — E66.813 OBESITY, CLASS III, BMI 40-49.9 (MORBID OBESITY) (HCC): ICD-10-CM

## 2025-08-14 DIAGNOSIS — Z85.3 HX: BREAST CANCER: ICD-10-CM

## 2025-08-14 DIAGNOSIS — F41.9 ANXIETY AND DEPRESSION: ICD-10-CM

## 2025-08-14 PROCEDURE — G8427 DOCREV CUR MEDS BY ELIG CLIN: HCPCS | Performed by: PSYCHIATRY & NEUROLOGY

## 2025-08-14 PROCEDURE — 99215 OFFICE O/P EST HI 40 MIN: CPT | Performed by: PSYCHIATRY & NEUROLOGY

## 2025-08-14 PROCEDURE — 1036F TOBACCO NON-USER: CPT | Performed by: PSYCHIATRY & NEUROLOGY

## 2025-08-14 PROCEDURE — G8417 CALC BMI ABV UP PARAM F/U: HCPCS | Performed by: PSYCHIATRY & NEUROLOGY

## 2025-08-14 PROCEDURE — 3017F COLORECTAL CA SCREEN DOC REV: CPT | Performed by: PSYCHIATRY & NEUROLOGY

## 2025-08-14 RX ORDER — LAMOTRIGINE 100 MG/1
TABLET ORAL
Qty: 60 TABLET | Refills: 5 | Status: SHIPPED | OUTPATIENT
Start: 2025-08-14

## 2025-08-14 RX ORDER — GALCANEZUMAB 120 MG/ML
120 INJECTION, SOLUTION SUBCUTANEOUS
Qty: 1 ADJUSTABLE DOSE PRE-FILLED PEN SYRINGE | Refills: 3 | Status: SHIPPED | OUTPATIENT
Start: 2025-08-14

## 2025-08-14 RX ORDER — CLONAZEPAM 0.5 MG/1
TABLET ORAL
Qty: 90 TABLET | Refills: 2 | Status: SHIPPED | OUTPATIENT
Start: 2025-08-14 | End: 2025-11-15

## 2025-08-14 RX ORDER — TOPIRAMATE 50 MG/1
TABLET, FILM COATED ORAL
Qty: 60 TABLET | Refills: 5 | Status: SHIPPED | OUTPATIENT
Start: 2025-08-14

## 2025-08-14 RX ORDER — UBROGEPANT 100 MG/1
100 TABLET ORAL 2 TIMES DAILY PRN
Qty: 30 TABLET | Refills: 5 | Status: SHIPPED | OUTPATIENT
Start: 2025-08-14

## 2025-08-14 RX ORDER — BUTALBITAL, ACETAMINOPHEN AND CAFFEINE 50; 325; 40 MG/1; MG/1; MG/1
TABLET ORAL
Qty: 60 TABLET | Refills: 2 | Status: SHIPPED | OUTPATIENT
Start: 2025-08-14

## 2025-08-14 ASSESSMENT — ENCOUNTER SYMPTOMS
COUGH: 0
SINUS PRESSURE: 0
ABDOMINAL DISTENTION: 0
BACK PAIN: 0
BLURRED VISION: 0
TROUBLE SWALLOWING: 0
CHEST TIGHTNESS: 0
WHEEZING: 0
SORE THROAT: 0
CHOKING: 0
VOMITING: 1
DIARRHEA: 0
COLOR CHANGE: 0
CONSTIPATION: 0
EYE REDNESS: 0
FACIAL SWEATING: 0
RHINORRHEA: 0
VOICE CHANGE: 0
SHORTNESS OF BREATH: 0
PHOTOPHOBIA: 1
BOWEL INCONTINENCE: 0
EYE ITCHING: 0
FACIAL SWELLING: 0
SCALP TENDERNESS: 0
EYE WATERING: 1
NAUSEA: 1
APNEA: 0
BLOOD IN STOOL: 0
VISUAL CHANGE: 0
ABDOMINAL PAIN: 0
EYE DISCHARGE: 0
EYE PAIN: 0
SWOLLEN GLANDS: 0

## (undated) DEVICE — DECANTER FLD 9IN ST BG FOR ASEP TRNSF OF FLD

## (undated) DEVICE — GARMENT,MEDLINE,DVT,INT,CALF,MED, GEN2: Brand: MEDLINE

## (undated) DEVICE — SUTURE VCRL SZ 3-0 L18IN ABSRB UD W/O NDL POLYGLACTIN 910 J110T

## (undated) DEVICE — MEDI-VAC YANK SUCT HNDL W/TPRD BULBOUS TIP & NON-CONDUCTIVE: Brand: CARDINAL HEALTH

## (undated) DEVICE — Z DISCONTINUED USE 2273271 SOLUTION PREP 4OZ 10% POVIDONE IOD BTL FLIP TOP CAP

## (undated) DEVICE — 1200CC GUARDIAN II: Brand: GUARDIAN

## (undated) DEVICE — GOWN,AURORA,NONRNF,XL,30/CS: Brand: MEDLINE

## (undated) DEVICE — SOLUTION IV 1000ML 0.9% SOD CHL PH 5 INJ USP VIAFLX PLAS

## (undated) DEVICE — SUTURE ETHLN SZ 5-0 L18IN NONABSORBABLE BLK L19MM PS-2 3/8 1666G

## (undated) DEVICE — Z DISCONTINUED USE 2624852 GLOVE SURG 7 PF TEXT NEOPRNE BRN STRL NEOLON 2G LF

## (undated) DEVICE — 9165 UNIVERSAL PATIENT PLATE: Brand: 3M™

## (undated) DEVICE — 3M™ STERI-STRIP™ REINFORCED ADHESIVE SKIN CLOSURES, R1547, 1/2 IN X 4 IN (12 MM X 100 MM), 6 STRIPS/ENVELOPE: Brand: 3M™ STERI-STRIP™

## (undated) DEVICE — OCCLUSIVE GAUZE ROLL,3% BISMUTH TRIBROMOPHENATE IN PETROLATUM BLEND: Brand: XEROFORM

## (undated) DEVICE — GAUZE,SPONGE,2"X2",8PLY,STERILE,LF,2'S: Brand: MEDLINE

## (undated) DEVICE — GLOVE SURG SZ 9 L12IN THK91MIL BRN LTX FREE POLYCHLOROPRENE

## (undated) DEVICE — SPONGE LAP W18XL18IN WHT COT 4 PLY FLD STRUNG RADPQ DISP ST 2 PER PACK

## (undated) DEVICE — JP PERF DRN SIL FLT 10MM FULL: Brand: CARDINAL HEALTH

## (undated) DEVICE — STRAP,POSITIONING,KNEE/BODY,FOAM,4X60": Brand: MEDLINE

## (undated) DEVICE — GLOVE ORANGE PI 7 1/2   MSG9075

## (undated) DEVICE — MARKER W/PRE PRINTED CUSTOM LABEL

## (undated) DEVICE — STERILE PVP: Brand: MEDLINE INDUSTRIES, INC.

## (undated) DEVICE — 3M™ TEGADERM™ TRANSPARENT FILM DRESSING FRAME STYLE, 1624W, 2-3/8 IN X 2-3/4 IN (6 CM X 7 CM), 100/CT 4CT/CASE: Brand: 3M™ TEGADERM™

## (undated) DEVICE — CONVERTED USE 280781 SYRINGE EAR ULCER 2OZ BL LTX FR

## (undated) DEVICE — PADDING CAST W6INXL4YD COT LO LINTING WYTEX

## (undated) DEVICE — MHPB GEN MINOR PACK: Brand: MEDLINE INDUSTRIES, INC.

## (undated) DEVICE — SURGIBRA 42-44IN 2XL THER BREAST SUPP LF

## (undated) DEVICE — DRAPE C ARM W104XL188CM FLD MOB XR

## (undated) DEVICE — Z DISCONTINUED BY MEDLINE USE 2711682 TRAY SKIN PREP DRY W/ PREM GLV

## (undated) DEVICE — GLOVE ORANGE PI 7   MSG9070

## (undated) DEVICE — DRAIN SURG W10MMXL20CM SIL FULL PERF HUBLESS FLAT RADPQ

## (undated) DEVICE — CATHETER URETH STR TIP 16 FRX12 IN SMOOTH RND DOVER

## (undated) DEVICE — BASIN SET: Brand: MEDLINE INDUSTRIES, INC.

## (undated) DEVICE — MEDI-VAC NON-CONDUCTIVE SUCTION TUBING: Brand: CARDINAL HEALTH

## (undated) DEVICE — SOLUTION SURG PREP POV IOD 7.5% 4 OZ

## (undated) DEVICE — SOLUTION IV IRRIG POUR BRL 0.9% SODIUM CHL 2F7124

## (undated) DEVICE — SOLUTION SCRB 4OZ 7.5% POVIDONE IOD FLIP TOP CAP

## (undated) DEVICE — SUTURE ETHLN SZ 4-0 L18IN NONABSORBABLE BLK L19MM PS-2 3/8 1667H

## (undated) DEVICE — DRAIN SURG W10XL20CM SIL SMOOTH FLAT 3/4 PERF DBL WRP

## (undated) DEVICE — SUTURE MCRYL SZ 4-0 L18IN ABSRB UD PC-1 L13MM 3/8 CIR Y835G

## (undated) DEVICE — PACK SURG PROC REMINDER N WVN DISPOSABLE BEAC TIME OUT

## (undated) DEVICE — MEDI-VAC NON-CONDUCTIVE SUCTION TUBING 7MM X 6.1M (20 FT.) L: Brand: CARDINAL HEALTH

## (undated) DEVICE — PENCIL ES L10FT COAT BLDE HOLSTER

## (undated) DEVICE — GLOVE SURG SZ 8 L12IN THK75MIL DK GRN LTX FREE

## (undated) DEVICE — SNARE VASC SINGLE LOOP 0.035 IN 35 MMX150 CM SYMPRO ELITE

## (undated) DEVICE — BLADE,CARBON-STEEL,15,STRL,DISPOSABLE,TB: Brand: MEDLINE

## (undated) DEVICE — CYSTO/BLADDER IRRIGATION SET, REGULATING CLAMP

## (undated) DEVICE — NEEDLE HYPO 25GA L1.5IN BLU POLYPR HUB S STL REG BVL STR

## (undated) DEVICE — GOWN,AURORA,NONREINFORCED,LARGE: Brand: MEDLINE

## (undated) DEVICE — PACK SURG ABD SVMMC

## (undated) DEVICE — GLOVE SURG SZ 75 CRM LTX FREE POLYISOPRENE POLYMER BEAD ANTI

## (undated) DEVICE — GLOVE SURG SZ 65 THK91MIL LTX FREE SYN POLYISOPRENE

## (undated) DEVICE — SHEET, T, LAPAROTOMY, STERILE: Brand: MEDLINE

## (undated) DEVICE — SUTURE VCRL SZ 3-0 L18IN ABSRB UD PS-2 L19MM 1/2 CIR J497G

## (undated) DEVICE — SUTURE VCRL SZ 3-0 L27IN ABSRB UD L19MM PS-2 3/8 CIR PRIM J427H

## (undated) DEVICE — INTENDED FOR TISSUE SEPARATION, AND OTHER PROCEDURES THAT REQUIRE A SHARP SURGICAL BLADE TO PUNCTURE OR CUT.: Brand: BARD-PARKER ® CARBON RIB-BACK BLADES

## (undated) DEVICE — SUTURE ABSRB BRAID COAT VLT SH 3-0 27IN VCRL J311H

## (undated) DEVICE — SUTURE VCRL SZ 3-0 L27IN ABSRB UD L26MM SH 1/2 CIR J416H

## (undated) DEVICE — BRA COMPR 3XL NYL LYCRA SPANDEX CURAD

## (undated) DEVICE — SYRINGE, LUER LOCK, 10ML: Brand: MEDLINE

## (undated) DEVICE — GAUZE,SPONGE,FLUFF,6"X6.75",STRL,5/TRAY: Brand: MEDLINE

## (undated) DEVICE — SOLUTION IV 100ML 0.9% SOD CHL PH5 CONTAIN DEHP VIAFLX QP

## (undated) DEVICE — SUTURE VCRL SZ 3-0 L27IN ABSRB VLT L26MM SH 1/2 CIR J316H

## (undated) DEVICE — PACK,LAPAROTOMY,PK IV,AURORA: Brand: MEDLINE

## (undated) DEVICE — TUBING, SUCTION, 3/16" X 20', STRAIGHT: Brand: MEDLINE

## (undated) DEVICE — INCISE SURGICAL DRAPE: Brand: OPSITE INCISE 28X30CM CTN 10

## (undated) DEVICE — SUTURE ETHBND EXCEL SZ 3-0 L30IN NONABSORBABLE GRN L26MM SH X832H

## (undated) DEVICE — DRAPE,REIN 53X77,STERILE: Brand: MEDLINE

## (undated) DEVICE — RESERVOIR,SUCTION,100CC,SILICONE: Brand: MEDLINE

## (undated) DEVICE — YANKAUER,BULB TIP,W/O VENT,RIGID,STERILE: Brand: MEDLINE

## (undated) DEVICE — COVER LT HNDL BLU PLAS

## (undated) DEVICE — CHLORAPREP 26ML ORANGE

## (undated) DEVICE — PACK,LITHOTOMY,PK I: Brand: MEDLINE

## (undated) DEVICE — SPONGE LAP W18XL18IN WHT COT 4 PLY FLD STRUNG RADPQ DISP ST

## (undated) DEVICE — COUNTER NDL 40 COUNT HLD 70 FOAM BLK ADH W/ MAG

## (undated) DEVICE — SYRINGE,EAR/ULCER, 2 OZ, STERILE: Brand: MEDLINE

## (undated) DEVICE — SUTURE VCRL SZ 4-0 L18IN ABSRB UD L13MM P-3 3/8 CIR PRIM J494H

## (undated) DEVICE — STANDARD HYPODERMIC NEEDLE,POLYPROPYLENE HUB: Brand: MONOJECT

## (undated) DEVICE — KIT EVAC 400CC DIA1/8IN H PAT 12.5IN 3 SPR RND SHP PVC DRN

## (undated) DEVICE — SOLUTION SCRB 4OZ 10% POVIDONE IOD ANTIMIC BTL

## (undated) DEVICE — TOWEL,OR,DSP,ST,BLUE,STD,6/PK,12PK/CS: Brand: MEDLINE

## (undated) DEVICE — CONVERTED USE 248063 TOWELS OR BL ST

## (undated) DEVICE — ELECTRODE PT RET AD L9FT HI MOIST COND ADH HYDRGEL CORDED

## (undated) DEVICE — POUCH INSTR W6.75XL11.5IN FRST 2 PKT ADH FOR ORTH AND

## (undated) DEVICE — GLOVE SURG SZ 6 THK91MIL LTX FREE SYN POLYISOPRENE ANTI

## (undated) DEVICE — DISCONTINUED USE 394504 SYRINGE LUER LOCK TIP 12 ML

## (undated) DEVICE — PREMIUM DRY TRAY LF: Brand: MEDLINE INDUSTRIES, INC.

## (undated) DEVICE — TRAP,MUCUS SPECIMEN, 80CC: Brand: MEDLINE

## (undated) DEVICE — MARKER,SKIN,WI/RULER AND LABELS: Brand: MEDLINE

## (undated) DEVICE — BRA SURG 4XL FOR 46 48IN 96% COT 4% SPANDEX KNIT MODERATELY

## (undated) DEVICE — SUTURE PERMAHAND SZ 3-0 L18IN NONABSORBABLE BLK L26MM SH C013D

## (undated) DEVICE — GAUZE,SPONGE,4"X4",16PLY,STRL,LF,10/TRAY: Brand: MEDLINE

## (undated) DEVICE — KIT INTRO PTFE TEARAWAY 10FX15CM

## (undated) DEVICE — GEL US 20GM NONIRRITATING OVERWRAPPED FILE PCH TRNSMIT

## (undated) DEVICE — BRA COMPR 2XL NYL LYCRA SPANDEX WHT CURAD

## (undated) DEVICE — TOWEL,OR,DSP,ST,BLUE,STD,4/PK,20PK/CS: Brand: MEDLINE

## (undated) DEVICE — JELLY LUBRICATING 4OZ FLIP TOP TB E Z

## (undated) DEVICE — NEEDLE SPINAL 22GA L3.5IN SPINOCAN

## (undated) DEVICE — 4-PORT MANIFOLD: Brand: NEPTUNE 2

## (undated) DEVICE — 450 ML BOTTLE OF 0.05% CHLORHEXIDINE GLUCONATE IN 99.95% STERILE WATER FOR IRRIGATION, USP AND APPLICATOR.: Brand: IRRISEPT ANTIMICROBIAL WOUND LAVAGE

## (undated) DEVICE — GAUZE,SPONGE,4"X4",16PLY,XRAY,STRL,LF: Brand: MEDLINE

## (undated) DEVICE — 3M™ TEGADERM™ TRANSPARENT FILM DRESSING FRAME STYLE, 1626W, 4 IN X 4-3/4 IN (10 CM X 12 CM), 50/CT 4CT/CASE: Brand: 3M™ TEGADERM™

## (undated) DEVICE — Device

## (undated) DEVICE — TRAP FLUID

## (undated) DEVICE — KIT DRN FLAT W/ 100CC EVAC 10MM FULL PERF

## (undated) DEVICE — DRAIN WND SIL FLAT RADIOPAQUE 10MM FULL FLUTED

## (undated) DEVICE — STRIP,CLOSURE,WOUND,MEDI-STRIP,1/2X4: Brand: MEDLINE

## (undated) DEVICE — GAUZE,SPONGE,4"X4",12PLY,STERILE,LF,2'S: Brand: MEDLINE

## (undated) DEVICE — POUCH INSTR W40XL26IN BUTT FLD COLL FLTR DRNGE PRT

## (undated) DEVICE — PAD MATERNITY CURITY ADH STRIP DISP

## (undated) DEVICE — PAD N ADH W3XL4IN POLY COT SFT PERF FLM EASILY CUT ABSRB

## (undated) DEVICE — SOLUTION IV 250ML 0.9% SOD CHL PH 5 INJ USP VIAFLX PLAS

## (undated) DEVICE — SUTURE NONABSORBABLE MONOFILAMENT 3-0 PS-1 18 IN BLK ETHILON 1663H

## (undated) DEVICE — Z DISCONTINUED USE 2651424 COVER EQUIP D18IN CNTOUR ELAS BND SNUG CLSR

## (undated) DEVICE — SUTURE ETHLN SZ 4-0 L18IN NONABSORBABLE BLK L16MM PC-3 3/8 1864G

## (undated) DEVICE — BLADE,CARBON-STEEL,10,STRL,DISPOSABLE,TB: Brand: MEDLINE

## (undated) DEVICE — PENCIL SMK EVAC 3 M ROCKER SWITCH HOLSTER CRD EDGE

## (undated) DEVICE — 1LYRTR 16FR10ML100%SIL UMS SNP: Brand: MEDLINE INDUSTRIES, INC.

## (undated) DEVICE — TOWEL,OR,DSP,ST,NATURAL,DLX,4/PK,20PK/CS: Brand: MEDLINE

## (undated) DEVICE — SUTURE PROL SZ 0 L30IN NONABSORBABLE BLU L36MM CT-1 1/2 CIR 8424H

## (undated) DEVICE — SOLUTION IV IRRIG WATER 1000ML POUR BRL 2F7114

## (undated) DEVICE — GOWN,SIRUS,NONRNF,SETINSLV,XL,20/CS: Brand: MEDLINE

## (undated) DEVICE — PROTECTOR ULN NRV PUR FOAM HK LOOP STRP ANATOMICALLY